# Patient Record
Sex: FEMALE | Race: WHITE | NOT HISPANIC OR LATINO | URBAN - METROPOLITAN AREA
[De-identification: names, ages, dates, MRNs, and addresses within clinical notes are randomized per-mention and may not be internally consistent; named-entity substitution may affect disease eponyms.]

---

## 2017-12-04 ENCOUNTER — INPATIENT (INPATIENT)
Facility: HOSPITAL | Age: 77
LOS: 37 days | Discharge: HOME | End: 2018-01-11
Attending: PSYCHIATRY & NEUROLOGY

## 2017-12-18 PROBLEM — Z00.00 ENCOUNTER FOR PREVENTIVE HEALTH EXAMINATION: Status: ACTIVE | Noted: 2017-12-18

## 2017-12-19 ENCOUNTER — APPOINTMENT (OUTPATIENT)
Dept: DERMATOLOGY | Facility: CLINIC | Age: 77
End: 2017-12-19

## 2017-12-19 ENCOUNTER — OUTPATIENT (OUTPATIENT)
Dept: OUTPATIENT SERVICES | Facility: HOSPITAL | Age: 77
LOS: 1 days | Discharge: HOME | End: 2017-12-19

## 2017-12-19 DIAGNOSIS — F25.9 SCHIZOAFFECTIVE DISORDER, UNSPECIFIED: ICD-10-CM

## 2017-12-20 ENCOUNTER — RESULT REVIEW (OUTPATIENT)
Age: 77
End: 2017-12-20

## 2017-12-20 DIAGNOSIS — Z02.9 ENCOUNTER FOR ADMINISTRATIVE EXAMINATIONS, UNSPECIFIED: ICD-10-CM

## 2018-01-18 DIAGNOSIS — C44.219 BASAL CELL CARCINOMA OF SKIN OF LEFT EAR AND EXTERNAL AURICULAR CANAL: ICD-10-CM

## 2018-01-18 DIAGNOSIS — F25.9 SCHIZOAFFECTIVE DISORDER, UNSPECIFIED: ICD-10-CM

## 2018-01-18 DIAGNOSIS — I10 ESSENTIAL (PRIMARY) HYPERTENSION: ICD-10-CM

## 2018-12-19 ENCOUNTER — OUTPATIENT (OUTPATIENT)
Dept: OUTPATIENT SERVICES | Facility: HOSPITAL | Age: 78
LOS: 1 days | Discharge: HOME | End: 2018-12-19

## 2018-12-19 DIAGNOSIS — R53.82 CHRONIC FATIGUE, UNSPECIFIED: ICD-10-CM

## 2018-12-19 DIAGNOSIS — E55.9 VITAMIN D DEFICIENCY, UNSPECIFIED: ICD-10-CM

## 2018-12-19 DIAGNOSIS — E13.9 OTHER SPECIFIED DIABETES MELLITUS WITHOUT COMPLICATIONS: ICD-10-CM

## 2018-12-19 DIAGNOSIS — R94.6 ABNORMAL RESULTS OF THYROID FUNCTION STUDIES: ICD-10-CM

## 2018-12-19 DIAGNOSIS — E78.5 HYPERLIPIDEMIA, UNSPECIFIED: ICD-10-CM

## 2018-12-19 DIAGNOSIS — D51.9 VITAMIN B12 DEFICIENCY ANEMIA, UNSPECIFIED: ICD-10-CM

## 2019-12-05 ENCOUNTER — OUTPATIENT (OUTPATIENT)
Dept: OUTPATIENT SERVICES | Facility: HOSPITAL | Age: 79
LOS: 1 days | Discharge: HOME | End: 2019-12-05

## 2019-12-06 DIAGNOSIS — I10 ESSENTIAL (PRIMARY) HYPERTENSION: ICD-10-CM

## 2019-12-06 DIAGNOSIS — F41.1 GENERALIZED ANXIETY DISORDER: ICD-10-CM

## 2020-05-28 ENCOUNTER — INPATIENT (INPATIENT)
Facility: HOSPITAL | Age: 80
LOS: 9 days | Discharge: HOME | End: 2020-06-07
Attending: INTERNAL MEDICINE | Admitting: INTERNAL MEDICINE
Payer: MEDICARE

## 2020-05-28 VITALS
OXYGEN SATURATION: 99 % | DIASTOLIC BLOOD PRESSURE: 82 MMHG | HEART RATE: 94 BPM | SYSTOLIC BLOOD PRESSURE: 181 MMHG | TEMPERATURE: 98 F | RESPIRATION RATE: 18 BRPM

## 2020-05-28 DIAGNOSIS — R09.89 OTHER SPECIFIED SYMPTOMS AND SIGNS INVOLVING THE CIRCULATORY AND RESPIRATORY SYSTEMS: ICD-10-CM

## 2020-05-28 LAB
ALBUMIN SERPL ELPH-MCNC: 3.5 G/DL — SIGNIFICANT CHANGE UP (ref 3.5–5.2)
ALP SERPL-CCNC: 87 U/L — SIGNIFICANT CHANGE UP (ref 30–115)
ALT FLD-CCNC: 7 U/L — SIGNIFICANT CHANGE UP (ref 0–41)
ANION GAP SERPL CALC-SCNC: 10 MMOL/L — SIGNIFICANT CHANGE UP (ref 7–14)
APPEARANCE UR: CLEAR — SIGNIFICANT CHANGE UP
APTT BLD: 28.3 SEC — SIGNIFICANT CHANGE UP (ref 27–39.2)
AST SERPL-CCNC: 11 U/L — SIGNIFICANT CHANGE UP (ref 0–41)
BACTERIA # UR AUTO: ABNORMAL
BASOPHILS # BLD AUTO: 0.05 K/UL — SIGNIFICANT CHANGE UP (ref 0–0.2)
BASOPHILS NFR BLD AUTO: 0.6 % — SIGNIFICANT CHANGE UP (ref 0–1)
BILIRUB SERPL-MCNC: 0.2 MG/DL — SIGNIFICANT CHANGE UP (ref 0.2–1.2)
BILIRUB UR-MCNC: NEGATIVE — SIGNIFICANT CHANGE UP
BUN SERPL-MCNC: 16 MG/DL — SIGNIFICANT CHANGE UP (ref 10–20)
CALCIUM SERPL-MCNC: 9.7 MG/DL — SIGNIFICANT CHANGE UP (ref 8.5–10.1)
CHLORIDE SERPL-SCNC: 107 MMOL/L — SIGNIFICANT CHANGE UP (ref 98–110)
CK SERPL-CCNC: 118 U/L — SIGNIFICANT CHANGE UP (ref 0–225)
CO2 SERPL-SCNC: 23 MMOL/L — SIGNIFICANT CHANGE UP (ref 17–32)
COLOR SPEC: COLORLESS — SIGNIFICANT CHANGE UP
CREAT SERPL-MCNC: 1.1 MG/DL — SIGNIFICANT CHANGE UP (ref 0.7–1.5)
DIFF PNL FLD: NEGATIVE — SIGNIFICANT CHANGE UP
EOSINOPHIL # BLD AUTO: 0.2 K/UL — SIGNIFICANT CHANGE UP (ref 0–0.7)
EOSINOPHIL NFR BLD AUTO: 2.3 % — SIGNIFICANT CHANGE UP (ref 0–8)
EPI CELLS # UR: 3 /HPF — SIGNIFICANT CHANGE UP (ref 0–5)
ETHANOL SERPL-MCNC: <10 MG/DL — SIGNIFICANT CHANGE UP
GLUCOSE BLDC GLUCOMTR-MCNC: 102 MG/DL — HIGH (ref 70–99)
GLUCOSE BLDC GLUCOMTR-MCNC: 110 MG/DL — HIGH (ref 70–99)
GLUCOSE SERPL-MCNC: 138 MG/DL — HIGH (ref 70–99)
GLUCOSE UR QL: NEGATIVE — SIGNIFICANT CHANGE UP
HCT VFR BLD CALC: 33 % — LOW (ref 37–47)
HGB BLD-MCNC: 10.2 G/DL — LOW (ref 12–16)
HYALINE CASTS # UR AUTO: 0 /LPF — SIGNIFICANT CHANGE UP (ref 0–7)
IMM GRANULOCYTES NFR BLD AUTO: 0.2 % — SIGNIFICANT CHANGE UP (ref 0.1–0.3)
INR BLD: 0.96 RATIO — SIGNIFICANT CHANGE UP (ref 0.65–1.3)
KETONES UR-MCNC: NEGATIVE — SIGNIFICANT CHANGE UP
LACTATE SERPL-SCNC: 0.8 MMOL/L — SIGNIFICANT CHANGE UP (ref 0.7–2)
LEUKOCYTE ESTERASE UR-ACNC: ABNORMAL
LIDOCAIN IGE QN: 53 U/L — SIGNIFICANT CHANGE UP (ref 7–60)
LYMPHOCYTES # BLD AUTO: 1.51 K/UL — SIGNIFICANT CHANGE UP (ref 1.2–3.4)
LYMPHOCYTES # BLD AUTO: 17.6 % — LOW (ref 20.5–51.1)
MCHC RBC-ENTMCNC: 29.6 PG — SIGNIFICANT CHANGE UP (ref 27–31)
MCHC RBC-ENTMCNC: 30.9 G/DL — LOW (ref 32–37)
MCV RBC AUTO: 95.7 FL — SIGNIFICANT CHANGE UP (ref 81–99)
MONOCYTES # BLD AUTO: 0.67 K/UL — HIGH (ref 0.1–0.6)
MONOCYTES NFR BLD AUTO: 7.8 % — SIGNIFICANT CHANGE UP (ref 1.7–9.3)
NEUTROPHILS # BLD AUTO: 6.15 K/UL — SIGNIFICANT CHANGE UP (ref 1.4–6.5)
NEUTROPHILS NFR BLD AUTO: 71.5 % — SIGNIFICANT CHANGE UP (ref 42.2–75.2)
NITRITE UR-MCNC: NEGATIVE — SIGNIFICANT CHANGE UP
NRBC # BLD: 0 /100 WBCS — SIGNIFICANT CHANGE UP (ref 0–0)
NT-PROBNP SERPL-SCNC: 1245 PG/ML — HIGH (ref 0–300)
PH UR: 6.5 — SIGNIFICANT CHANGE UP (ref 5–8)
PLATELET # BLD AUTO: 346 K/UL — SIGNIFICANT CHANGE UP (ref 130–400)
POTASSIUM SERPL-MCNC: 4.9 MMOL/L — SIGNIFICANT CHANGE UP (ref 3.5–5)
POTASSIUM SERPL-SCNC: 4.9 MMOL/L — SIGNIFICANT CHANGE UP (ref 3.5–5)
PROT SERPL-MCNC: 6.7 G/DL — SIGNIFICANT CHANGE UP (ref 6–8)
PROT UR-MCNC: NEGATIVE — SIGNIFICANT CHANGE UP
PROTHROM AB SERPL-ACNC: 11 SEC — SIGNIFICANT CHANGE UP (ref 9.95–12.87)
RBC # BLD: 3.45 M/UL — LOW (ref 4.2–5.4)
RBC # FLD: 12.9 % — SIGNIFICANT CHANGE UP (ref 11.5–14.5)
RBC CASTS # UR COMP ASSIST: 0 /HPF — SIGNIFICANT CHANGE UP (ref 0–4)
SODIUM SERPL-SCNC: 140 MMOL/L — SIGNIFICANT CHANGE UP (ref 135–146)
SP GR SPEC: 1.01 — LOW (ref 1.01–1.02)
TROPONIN T SERPL-MCNC: <0.01 NG/ML — SIGNIFICANT CHANGE UP
UROBILINOGEN FLD QL: SIGNIFICANT CHANGE UP
WBC # BLD: 8.6 K/UL — SIGNIFICANT CHANGE UP (ref 4.8–10.8)
WBC # FLD AUTO: 8.6 K/UL — SIGNIFICANT CHANGE UP (ref 4.8–10.8)
WBC UR QL: 4 /HPF — SIGNIFICANT CHANGE UP (ref 0–5)

## 2020-05-28 PROCEDURE — 73630 X-RAY EXAM OF FOOT: CPT | Mod: 26,RT

## 2020-05-28 PROCEDURE — 73080 X-RAY EXAM OF ELBOW: CPT | Mod: 26,LT

## 2020-05-28 PROCEDURE — 70450 CT HEAD/BRAIN W/O DYE: CPT | Mod: 26

## 2020-05-28 PROCEDURE — 99285 EMERGENCY DEPT VISIT HI MDM: CPT

## 2020-05-28 PROCEDURE — 99223 1ST HOSP IP/OBS HIGH 75: CPT | Mod: AI

## 2020-05-28 PROCEDURE — 74177 CT ABD & PELVIS W/CONTRAST: CPT | Mod: 26

## 2020-05-28 PROCEDURE — 93970 EXTREMITY STUDY: CPT | Mod: 26

## 2020-05-28 PROCEDURE — 71045 X-RAY EXAM CHEST 1 VIEW: CPT | Mod: 26

## 2020-05-28 PROCEDURE — 93010 ELECTROCARDIOGRAM REPORT: CPT

## 2020-05-28 PROCEDURE — 76705 ECHO EXAM OF ABDOMEN: CPT | Mod: 26

## 2020-05-28 PROCEDURE — 72170 X-RAY EXAM OF PELVIS: CPT | Mod: 26

## 2020-05-28 PROCEDURE — 71260 CT THORAX DX C+: CPT | Mod: 26

## 2020-05-28 PROCEDURE — 73552 X-RAY EXAM OF FEMUR 2/>: CPT | Mod: 26,LT

## 2020-05-28 PROCEDURE — 72125 CT NECK SPINE W/O DYE: CPT | Mod: 26

## 2020-05-28 RX ORDER — FUROSEMIDE 40 MG
20 TABLET ORAL DAILY
Refills: 0 | Status: DISCONTINUED | OUTPATIENT
Start: 2020-05-28 | End: 2020-06-07

## 2020-05-28 RX ORDER — TETANUS TOXOID, REDUCED DIPHTHERIA TOXOID AND ACELLULAR PERTUSSIS VACCINE, ADSORBED 5; 2.5; 8; 8; 2.5 [IU]/.5ML; [IU]/.5ML; UG/.5ML; UG/.5ML; UG/.5ML
0.5 SUSPENSION INTRAMUSCULAR ONCE
Refills: 0 | Status: COMPLETED | OUTPATIENT
Start: 2020-05-28 | End: 2020-05-28

## 2020-05-28 RX ORDER — CHLORHEXIDINE GLUCONATE 213 G/1000ML
1 SOLUTION TOPICAL DAILY
Refills: 0 | Status: DISCONTINUED | OUTPATIENT
Start: 2020-05-28 | End: 2020-06-07

## 2020-05-28 RX ORDER — SODIUM CHLORIDE 9 MG/ML
1000 INJECTION INTRAMUSCULAR; INTRAVENOUS; SUBCUTANEOUS ONCE
Refills: 0 | Status: COMPLETED | OUTPATIENT
Start: 2020-05-28 | End: 2020-05-28

## 2020-05-28 RX ORDER — ENOXAPARIN SODIUM 100 MG/ML
40 INJECTION SUBCUTANEOUS DAILY
Refills: 0 | Status: DISCONTINUED | OUTPATIENT
Start: 2020-05-28 | End: 2020-06-07

## 2020-05-28 RX ORDER — ESCITALOPRAM OXALATE 10 MG/1
2.5 TABLET, FILM COATED ORAL DAILY
Refills: 0 | Status: DISCONTINUED | OUTPATIENT
Start: 2020-05-28 | End: 2020-06-07

## 2020-05-28 RX ORDER — LOSARTAN POTASSIUM 100 MG/1
12.5 TABLET, FILM COATED ORAL DAILY
Refills: 0 | Status: DISCONTINUED | OUTPATIENT
Start: 2020-05-28 | End: 2020-06-07

## 2020-05-28 RX ADMIN — SODIUM CHLORIDE 1000 MILLILITER(S): 9 INJECTION INTRAMUSCULAR; INTRAVENOUS; SUBCUTANEOUS at 16:48

## 2020-05-28 RX ADMIN — TETANUS TOXOID, REDUCED DIPHTHERIA TOXOID AND ACELLULAR PERTUSSIS VACCINE, ADSORBED 0.5 MILLILITER(S): 5; 2.5; 8; 8; 2.5 SUSPENSION INTRAMUSCULAR at 05:58

## 2020-05-28 RX ADMIN — SODIUM CHLORIDE 1000 MILLILITER(S): 9 INJECTION INTRAMUSCULAR; INTRAVENOUS; SUBCUTANEOUS at 05:48

## 2020-05-28 NOTE — ED PROVIDER NOTE - SHIFT CHANGE DETAILS
78 y/o f presented s/p fall, gcs 15, labs reviewed, xray of R foot, L elbow and L femur obtained, FROM, no obvious bony deformity, pulses 2/4 b/l, pending ct head and neck results, no signs of basilar skull fracture, no spinous ttp, pt resting comfortably in nad, will continue to monitor and reassess.

## 2020-05-28 NOTE — ED ADULT NURSE REASSESSMENT NOTE - NS ED NURSE REASSESS COMMENT FT1
Pt found resting in bed, eyes closed.  Easily awakens to verbal stimuli.  Alert.  NAD.  Cardia monitor and continuous O2 placed on pt.  Bed in low locked position.  Will continue to monitor.

## 2020-05-28 NOTE — ED PROVIDER NOTE - NS ED ROS FT
Constitutional: (-) fever (-) vomiting  Eyes/ENT: (-) vision changes, (-) hearing changes  Cardiovascular: (-) chest pain, (-) sob  Respiratory: (-) cough, (-) shortness of breath  Gastrointestinal: (-) vomiting, (-) diarrhea, (-) abdominal pain  : (-) dysuria   Musculoskeletal: (-) back pain  Integumentary: (-) rash, (-) edema  Neurological: (-)loc  Allergic/Immunologic: (-) pruritus  Endocrine: No history of diabetes. Constitutional: (-) fever (-) vomiting  Eyes/ENT: (-) vision changes, (-) hearing changes  Cardiovascular: (-) chest pain, (-) sob  Respiratory: (-) cough, (-) shortness of breath  Gastrointestinal: (-) vomiting, (-) diarrhea, (-) abdominal pain  : (-) dysuria   Musculoskeletal: (-) back pain  Integumentary: (+) rash, (+) edema  Neurological: (-)loc  Allergic/Immunologic: (-) pruritus  Endocrine: No history of diabetes.

## 2020-05-28 NOTE — H&P ADULT - HISTORY OF PRESENT ILLNESS
79-year-old female with past medical history of hypertension, diabetes, dementia, chronic lower extremity edema, no blood thinners, basal cell carcinoma, schizoaffective disorder and frequent falls presented after a mechanical fall from bed. Patient was found down on the floor from bed by son and EMS was called. States she fell yesterday while walking, today admits to falling from bed. No loc, no headstrike. Very poor historian. Denies chest pain, palpitation, dizziness, sweating, feeling warm or any anxiety. As per daughter has frequent violent outbursts at home. Family have been trying to pursue placement but pt has objected, son Joaquín is only caregiver. Will admit for rehab, optimization.  In ED Pt A&Ox3, vitals B.P. 181/82, HR 94, RR 18, Temp 98.1 and sat 99% on room air. Trauma work up was negative for any fracture or dislocation. UA positive for asymptomatic bacteriuria and leucocyte estrase. for  An incidental findings were found on CT chest/Abd/pelvis, a medistinal mass and thyroid nodule, cholelithiasis and choledocholithiasis. R foot cyst/hematoma. Workup required. On PE laceration to left elbow and injury near the left ear. 79-year-old female with past medical history of hypertension, diabetes, dementia, chronic lower extremity edema, no blood thinners, basal cell carcinoma, schizoaffective disorder and frequent falls presented after an unwitnessed fall from bed. Patient was found down on the floor from bed by son and EMS was called. States she fell yesterday while walking, today admits to falling from bed. No loc, no headstrike. Very poor historian. Denies chest pain, palpitation, dizziness, sweating, feeling warm or any anxiety. As per daughter has frequent violent outbursts at home. Family have been trying to pursue placement but pt has objected, son Joaquín is only caregiver. Will admit for rehab, optimization.  In ED Pt A&Ox3, vitals B.P. 181/82, HR 94, RR 18, Temp 98.1 and sat 99% on room air. Trauma work up was negative for any fracture or dislocation. UA positive for asymptomatic bacteriuria and leucocyte estrase. for  An incidental findings were found on CT chest/Abd/pelvis, a medistinal mass and thyroid nodule, cholelithiasis and choledocholithiasis. R foot cyst/hematoma. Workup required. On PE laceration to left elbow and injury near the left ear.

## 2020-05-28 NOTE — ED PROVIDER NOTE - ATTENDING CONTRIBUTION TO CARE
I personally evaluated the patient. I reviewed the Resident’s or Physician Assistant’s note (as assigned above), and agree with the findings and plan except as documented in my note.    78yo F pmhx psychoaffective disorder, basal cell ca per chart review BIBEMS for falls. per ems, pt was on the floor at the scene. Pt states she fell off the bed. No LOC. No CP, SOB. No n/v.    CONSTITUTIONAL: Well-developed; well-nourished; in no acute distress. Sitting up and providing appropriate history and physical examination  SKIN: skin exam is warm and dry, no acute rash.  HEAD: Normocephalic; atraumatic.  EYES: PERRL, 3 mm bilateral, no nystagmus, EOM intact; conjunctiva and sclera clear.  ENT: + chronic lesion of the left ear lobe (bc ca?).   NECK: Supple; non tender.+ full passive ROM in all directions. No JVD  CARD: S1, S2 normal; no murmurs, gallops, or rubs. Regular rate and rhythm. + Symmetric Strong Pulses  RESP: No wheezes, rales or rhonchi. Good air movement bilaterally  ABD: soft; non-distended; non-tender. No Rebound, No Gaurding, No signs of peritnitis, No CVA tenderness  EXT: Normal ROM. No clubbing, cyanosis.  healing wound over left elbow.

## 2020-05-28 NOTE — H&P ADULT - ATTENDING COMMENTS
HX and physical limited due to baseline dementia. Supplemental information obtained from family, house staff, and EMR.     80 YO F with a PM of HTN, DM, dementia, chronic lower extremity edema, basal cell carcinoma, schizoaffective disorder and frequent falls who was sent into the hospital by her family for unwitnessed fall after being found on the ground of her bedroom. In the ED, a trauma workup was positive for a mediastinal mass and thyroid nodule, cholelithiasis and choledocholithiasis and a R foot cyst/hematoma. Seen by cardio who recommended IV LAsix, LE duplex, and echo.     Physical exam shows pt in NAD. VSS, afebrile, not hypoxic on RA. A&Ox3 but poor historian. Non-focal neuro exam. Muscle strength/sensation intact. CTA B/L with no W/C/R. RRR, no M/G/R. ABD is soft and non-tender, normoactive BSs. Right foot hematoma, Left elbow LAC, Left ear wound and 1+ pitting edema B/L. Labs and radiology as above.     Unwitnessed fall. Echo. 24 hours of telem. Serial cardiac enzymes and EKGs. Send TSH. EEG. Orthostatics. PT consult. PRN pain meds. Fall precautions. Social consult for possible placement.     Incidental finding of mediastinal mass and thyroid nodule. Thyroid US. TFTs. Endo consult.    Right foot hematoma. POD consult.    Normocytic anemia, pt denies any bleeding symptoms. Anemia work-up.     Diabetes mellitus with hyperglycemia. A1c. FSs. Insulin PRN.     BRYAN vs CKD3, likely pre-renal; Doubt ATN. Send UA, urine lytes, serum phos, and trend BMP. IVFs (LR). Monitor urinary out-put. Renal/bladder US. Hold nephrotoxic agents.     Asymptomatic bacteriuria. Monitor    Hx of schizoaffective and HTN. Restart home meds. GI and DVT PPX. Inform PCP of pt's admission to hospital. Rest as per above note.

## 2020-05-28 NOTE — ED ADULT NURSE REASSESSMENT NOTE - NS ED NURSE REASSESS COMMENT FT1
A&Ox4, VSS, no c/o pain. Left arm skin tear noted, cleaned with NS and nonadherent dressing with kerlix applied. Awaiting for CT results.

## 2020-05-28 NOTE — ED ADULT TRIAGE NOTE - CHIEF COMPLAINT QUOTE
Pt found down by son and EMS call. Pt with laceration to left elbow and injury near the left ear. Pt denies chest pain, neck pain. Pt A&Ox3. EMS reports pt has hx of dementia. Pt found down by son and EMS call. Pt with laceration to left elbow and injury near the left ear. Pt denies chest pain, neck pain. Pt A&Ox3. EMS reports pt has hx of dementia, no blood thinners.

## 2020-05-28 NOTE — ED ADULT NURSE NOTE - OBJECTIVE STATEMENT
RAJIV from home after being found on floor by son  as per pt, she fell in morning and was sitting on floor when son found her and called EMS  pt A&Ox3 in ED.   small lac to left elbow, pain to left hip

## 2020-05-28 NOTE — H&P ADULT - ASSESSMENT
79-year-old female with past medical history of hypertension, diabetes, dementia, chronic lower extremity edema, no blood thinners, basal cell carcinoma, schizoaffective disorder and frequent falls presented after a mechanical fall from bed.    # Mechanical fall  - Trauma workup negative for fracture/dislocation  - L wrist laceration - wound care  - R foot cyst/hematoma - podiatry was consulted  - follow TTE  - Medication optimization  - PT/Rehab eval for Placement issues  - Bed rest  - admit to tele    # Thyroid/Mediastinal mass  - Incidental finding of 6.1 cm heterogeneous lesion within the superior mediastinum, possibly arising from the right thyroid gland, suspicious for malignancy or goiter. Recommend a thyroid ultrasound for further evaluation.  - follow thyroid panel, thyroid US and ENT/ENDO consult if appropriate     # Normocytic anemia  - follow iron studies  - follow folic acid and Vit B12 levels    # Asymptomatic bacteriuria  - No indication of treatment    # H/o Schizoaffective disorder  - Consider psych consult    # DM   - Check FS qhs and start basal bolus insulin if Fs >180  - Follow lipid profile and HbA1C    # HTN  - c/w lasix 20mg daily    # Lower Extremity edema  - Follow TTE and va duplex of LE  - c/w lasix 20mg daily    # Diet: DASH  # DVT ppx: Lovenox  # Activity: bed rest  # FULL CODE  # Dispo: came from home, Most likely going to NH 79-year-old female with past medical history of hypertension, diabetes, dementia, chronic lower extremity edema, no blood thinners, basal cell carcinoma, schizoaffective disorder and frequent falls presented after a mechanical fall from bed.    # Mechanical fall  - Trauma workup negative for fracture/dislocation  - L wrist laceration - wound care  - R foot cyst/hematoma - podiatry was consulted  - follow TTE  - Medication optimization  - PT/Rehab eval for Placement issues  - Bed rest  - admit to tele    # Thyroid/Mediastinal mass  - Incidental finding of 6.1 cm heterogeneous lesion within the superior mediastinum, possibly arising from the right thyroid gland, suspicious for malignancy or goiter. Recommend a thyroid ultrasound for further evaluation.  - follow thyroid panel, thyroid US and ENT/ENDO consult if appropriate     # Normocytic anemia  - follow iron studies  - follow folic acid and Vit B12 levels    # Asymptomatic bacteriuria  - No indication of treatment    # H/o Schizoaffective disorder  - c/w Escitalopram 2.5 mg daily  - Consider psych consult    # DM   - Check FS qhs and start basal bolus insulin if Fs >180  - Follow lipid profile and HbA1C    # HTN  - c/w lasix 20mg daily  - c/w losartan 12.5 daily    # Lower Extremity edema  - Follow TTE and va duplex of LE  - c/w lasix 20mg daily    # Diet: DASH  # DVT ppx: Lovenox  # Activity: bed rest  # FULL CODE  # Dispo: came from home, Most likely going to NH 79-year-old female with past medical history of hypertension, diabetes, dementia, chronic lower extremity edema, no blood thinners, basal cell carcinoma, schizoaffective disorder and frequent falls presented after a mechanical fall from bed.    # Mechanical fall  - Pt is poor historian, cannot rule out syncope - will do workup  - Trauma workup negative for fracture/dislocation  - L wrist laceration - wound care  - Follow orthostatic vitals  - Follow routine EEG  - TELE for 24 hours  - follow TTE  - Medication optimization  - PT/Rehab eval for Placement issues    # R foot cyst/hematoma - podiatry was consulted  - Follow va duplex    # Thyroid/Mediastinal mass  - Incidental finding of 6.1 cm heterogeneous lesion within the superior mediastinum, possibly arising from the right thyroid gland, suspicious for malignancy or goiter. Recommend a thyroid ultrasound for further evaluation.  - follow thyroid panel, thyroid US and ENT/ENDO consult if appropriate     # Normocytic anemia  - follow iron studies  - follow folic acid and Vit B12 levels    # Asymptomatic bacteriuria  - No indication of treatment    # H/o Schizoaffective disorder  - c/w Escitalopram 2.5 mg daily  - Consider psych consult    # DM   - Check FS qhs and start basal bolus insulin if Fs >180  - Follow lipid profile and HbA1C    # HTN  - c/w lasix 20mg daily  - c/w losartan 12.5 daily    # Lower Extremity edema  - Follow TTE and va duplex of LE  - c/w lasix 20mg daily    # Diet: DASH  # DVT ppx: Lovenox  # Activity: as tolerated  # FULL CODE  # Dispo: came from home, Most likely going to NH 79-year-old female with past medical history of hypertension, diabetes, dementia, chronic lower extremity edema, no blood thinners, basal cell carcinoma, schizoaffective disorder and frequent falls presented after an unwitnessed fall from bed.    # Unwitnessed fall  - Pt is poor historian, cannot rule out syncope - will do workup  - Trauma workup negative for fracture/dislocation  - L wrist laceration - wound care  - Follow orthostatic vitals  - Follow routine EEG  - TELE for 24 hours  - follow TTE  - Medication optimization  - PT/Rehab eval for Placement issues    # R foot cyst/hematoma - podiatry was consulted  - Follow va duplex    # Thyroid/Mediastinal mass  - Incidental finding of 6.1 cm heterogeneous lesion within the superior mediastinum, possibly arising from the right thyroid gland, suspicious for malignancy or goiter. Recommend a thyroid ultrasound for further evaluation.  - follow thyroid panel, thyroid US and ENT/ENDO consult if appropriate     # Normocytic anemia  - follow iron studies  - follow folic acid and Vit B12 levels    # Asymptomatic bacteriuria  - No indication of treatment    # H/o Schizoaffective disorder  - c/w Escitalopram 2.5 mg daily  - Consider psych consult    # DM   - Check FS qhs and start basal bolus insulin if Fs >180  - Follow lipid profile and HbA1C    # HTN  - c/w lasix 20mg daily  - c/w losartan 12.5 daily    # Lower Extremity edema  - Follow TTE and va duplex of LE  - c/w lasix 20mg daily    # Diet: DASH  # DVT ppx: Lovenox  # Activity: as tolerated  # FULL CODE  # Dispo: came from home, Most likely going to NH

## 2020-05-28 NOTE — ED PROVIDER NOTE - PHYSICAL EXAMINATION
CONSTITUTIONAL: Well-developed; well-nourished; in no acute distress, speaking in full sentences, moving all extremities, GCS 15  SKIN: warm, dry  HEAD: Normocephalic  EYES: PERRL, EOMI, no conjunctival erythema  ENT: No nasal discharge; airway clear, mucous membranes moist  NECK: Supple; non tender, FROM  CARD: +S1, S2 no murmurs, gallops, or rubs. Regular rate and rhythm. radial 2+, no chest wall tenderness  RESP: No wheezes, rales or rhonchi. CTABL  ABD: soft ntnd, no rebound, no guarding, no rigidity  EXT: moves all extremities, +b/l le edema, les warm to touch, +swelling to anterior left foot, non ttp, +abrasion to L elbow  NEURO: Alert, oriented, grossly unremarkable, no focal deficits, cn ii-xii grossly intact, speaking in full sentences, following commands  PSYCH: Cooperative, appropriate CONSTITUTIONAL: Well-developed; well-nourished; in no acute distress, speaking in full sentences, moving all extremities, GCS 15  SKIN: warm, dry, ulcerated tissue under L eye  HEAD: Normocephalic  EYES: PERRL, EOMI, no conjunctival erythema  ENT: No nasal discharge; airway clear, mucous membranes moist  NECK: Supple; non tender, FROM  CARD: +S1, S2 no murmurs, gallops, or rubs. Regular rate and rhythm. radial 2+, no chest wall tenderness  RESP: No wheezes, rales or rhonchi. CTABL  ABD: soft ntnd, no rebound, no guarding, no rigidity  EXT: moves all extremities, +b/l le edema, LEs warm to touch, +swelling to anterior left foot, non ttp, +abrasion to L elbow, TTP L hip and femur  NEURO: Alert, oriented, grossly unremarkable, no focal deficits, cn ii-xii grossly intact, speaking in full sentences, following commands  PSYCH: Cooperative, appropriate

## 2020-05-28 NOTE — CONSULT NOTE ADULT - ASSESSMENT
79 yr old female with PMH of schizoaffective disorder, thyroid nodule resection many yrs ago, HTN p/w fall.    Impression:  Mechanical Fall  Possible Thyroid mass on CT  Right foot cyst  LE edema     Plan:  start Lasix PO 20 mg daily  obtain LE venous duplex and TTE  obtain thyroid panel, thyroid US and ENT consult  Podiatry eval for large right foot cyst  PT/Rehab   DVT prophylaxis 79 yr old female with PMH of schizoaffective disorder, thyroid nodule resection many yrs ago, HTN p/w fall.    Impression:  Mechanical Fall  Possible Thyroid mass on CT  Right foot cyst  LE edema     Plan:    start Lasix PO 20 mg daily  obtain LE venous duplex and TTE  obtain thyroid panel, thyroid US and ENT consult  Podiatry eval for large right foot cyst AND BURN EVAL IF NEEDED FOR DEBRIDEMENT.   ALSO PSYCH EVAL NEEDS EVAL HAS HX OF DISORDER NOT TAKING MEDS FOR YEARS   ALSO NEED  EVAL FOR POSSIBLE PLACEMENT PENDING PSYCHIATRIC EVAL.   PT/Rehab AS WELL    DVT prophylaxis

## 2020-05-28 NOTE — CONSULT NOTE ADULT - SUBJECTIVE AND OBJECTIVE BOX
Patient is a 79y old  Female who presents with a chief complaint of fall   HPI:  79 yr old female with PMH of schizoaffective disorder, HTN p/w fall. pt is a poor historian. pt states that she accidentally fell from the bed and denies any syncope/ chest pain/ palpitations/ dyspnea. pt is currently asymptomatic at rest.    ROS:  All other systems reviewed and are negative    PAST MEDICAL & SURGICAL HISTORY      FAMILY HISTORY:  FAMILY HISTORY:  NC    SOCIAL HISTORY:  [-]smoker  []Alcohol  []Drug    ALLERGIES:  No Known Allergies      MEDICATIONS:  MEDICATIONS  (STANDING):    MEDICATIONS  (PRN):      HOME MEDICATIONS:  Home Medications:      VITALS:   T(F): 98.3 (05-28 @ 13:07), Max: 98.3 (05-28 @ 13:07)  HR: 77 (05-28 @ 13:07) (72 - 94)  BP: 148/67 (05-28 @ 13:07) (145/76 - 181/82)  BP(mean): --  RR: 18 (05-28 @ 13:07) (18 - 18)  SpO2: 98% (05-28 @ 13:07) (98% - 99%)    I&O's Summary      PHYSICAL EXAM:  GEN: Alert and oriented X 3, No acute distress  HEENT: no pallor  NECK: Supple, no JVD  LUNGS: Clear to auscultation bilaterally  CARDIOVASCULAR: S1/S2 regular, no murmurs or rubs  ABD: Soft, BS+  EXT: LE edema (predominantly non pitting), +right foot Cyst on dorsal aspect   NEURO: AAOX3    LABS:                        10.2   8.60  )-----------( 346      ( 28 May 2020 05:35 )             33.0     05-28    140  |  107  |  16  ----------------------------<  138<H>  4.9   |  23  |  1.1    Ca    9.7      28 May 2020 05:35    TPro  6.7  /  Alb  3.5  /  TBili  0.2  /  DBili  x   /  AST  11  /  ALT  7   /  AlkPhos  87  05-28    PT/INR - ( 28 May 2020 05:35 )   PT: 11.00 sec;   INR: 0.96 ratio         PTT - ( 28 May 2020 05:35 )  PTT:28.3 sec  Lactate, Blood: 0.8 mmol/L (05-28-20 @ 05:35)  Creatine Kinase, Serum: 118 U/L (05-28-20 @ 05:35)    CARDIAC MARKERS ( 28 May 2020 05:35 )  x     / x     / 118 U/L / x     / x            Troponin trend:        Hemoglobin A1C   Thyroid      RADIOLOGY:    < from: US Abdomen Limited (05.28.20 @ 13:05) >  IMPRESSION:    Cholelithiasis with gallbladder wall thickening.    Nonobstructing right-sided nephrolithiasis.    < end of copied text >    < from: CT Chest w/ IV Cont (05.28.20 @ 09:38) >  IMPRESSION:    1.  No CT evidence of acute traumatic injury to the chest, abdomen or pelvis.    2.  Hyperdensities within the gallbladder and CBD with associated mild pericholecystic fat stranding. Findings are consistent with cholelithiasis and choledocholithiasis. The CBD measures 7 mm (series 5/309). If there is clinical concern for acute cholecystitis, right upper quadrant ultrasound may be obtained.    3.  6.1 cm heterogeneous lesion within the superior mediastinum, possibly arising from the right thyroid gland, suspicious for malignancy or goiter. Recommend a thyroid ultrasound for further evaluation.    < end of copied text >    < from: CT Head No Cont (05.28.20 @ 07:28) >  IMPRESSION:     1.  No evidence of acute intracranial pathology.    2.  Moderate chronic microvascular changes.    3.  Left sphenoid sinus opacification as described.    < end of copied text >    < from: Xray Pelvis AP only (05.28.20 @ 06:26) >    Impression:     No evidence of acute fracture or dislocation.    < end of copied text >        ECG:  < from: 12 Lead ECG (05.28.20 @ 06:34) >    Diagnosis Line Normal sinus rhythm  Left axis deviation  Moderate voltage criteria for LVH, may be normal variant  Abnormal ECG    < end of copied text > Patient is a 79y old  Female who presents with a chief complaint of fall   HPI:  79 yr old female with PMH of schizoaffective disorder, HTN p/w fall. pt is a poor historian. pt states that she accidentally fell from the bed and denies any syncope/ chest pain/ palpitations/ dyspnea. pt is currently asymptomatic at rest.    PT WITH HX OF PSYCHIATRIC DISORDER NO LONGER COMPLIANT WITH MEDICATIONS.   LARGE CYST NOTED RIGHT FOOT BASAL CELL LEFT FACE AND MASS LOCATED IN MEDIASTINUM ON CT CHEST.     ROS:  All other systems reviewed and are negative    PAST MEDICAL & SURGICAL HISTORY      FAMILY HISTORY:  FAMILY HISTORY:  NC    SOCIAL HISTORY:  [-]smoker  []Alcohol  []Drug    ALLERGIES:  No Known Allergies      MEDICATIONS:  MEDICATIONS  (STANDING):    MEDICATIONS  (PRN):      HOME MEDICATIONS:  Home Medications:      VITALS:   T(F): 98.3 (05-28 @ 13:07), Max: 98.3 (05-28 @ 13:07)  HR: 77 (05-28 @ 13:07) (72 - 94)  BP: 148/67 (05-28 @ 13:07) (145/76 - 181/82)  BP(mean): --  RR: 18 (05-28 @ 13:07) (18 - 18)  SpO2: 98% (05-28 @ 13:07) (98% - 99%)    I&O's Summary      PHYSICAL EXAM:  GEN: Alert and oriented X 3, No acute distress  HEENT: no pallor  NECK: Supple, no JVD  LUNGS: Clear to auscultation bilaterally  CARDIOVASCULAR: S1/S2 regular, no murmurs or rubs  ABD: Soft, BS+  EXT: LE edema (predominantly non pitting), +right foot Cyst on dorsal aspect   NEURO: AAOX3    LABS:                        10.2   8.60  )-----------( 346      ( 28 May 2020 05:35 )             33.0     05-28    140  |  107  |  16  ----------------------------<  138<H>  4.9   |  23  |  1.1    Ca    9.7      28 May 2020 05:35    TPro  6.7  /  Alb  3.5  /  TBili  0.2  /  DBili  x   /  AST  11  /  ALT  7   /  AlkPhos  87  05-28    PT/INR - ( 28 May 2020 05:35 )   PT: 11.00 sec;   INR: 0.96 ratio         PTT - ( 28 May 2020 05:35 )  PTT:28.3 sec  Lactate, Blood: 0.8 mmol/L (05-28-20 @ 05:35)  Creatine Kinase, Serum: 118 U/L (05-28-20 @ 05:35)    CARDIAC MARKERS ( 28 May 2020 05:35 )  x     / x     / 118 U/L / x     / x            Troponin trend:        Hemoglobin A1C   Thyroid      RADIOLOGY:    < from: US Abdomen Limited (05.28.20 @ 13:05) >  IMPRESSION:    Cholelithiasis with gallbladder wall thickening.    Nonobstructing right-sided nephrolithiasis.    < end of copied text >    < from: CT Chest w/ IV Cont (05.28.20 @ 09:38) >  IMPRESSION:    1.  No CT evidence of acute traumatic injury to the chest, abdomen or pelvis.    2.  Hyperdensities within the gallbladder and CBD with associated mild pericholecystic fat stranding. Findings are consistent with cholelithiasis and choledocholithiasis. The CBD measures 7 mm (series 5/309). If there is clinical concern for acute cholecystitis, right upper quadrant ultrasound may be obtained.    3.  6.1 cm heterogeneous lesion within the superior mediastinum, possibly arising from the right thyroid gland, suspicious for malignancy or goiter. Recommend a thyroid ultrasound for further evaluation.    < end of copied text >    < from: CT Head No Cont (05.28.20 @ 07:28) >  IMPRESSION:     1.  No evidence of acute intracranial pathology.    2.  Moderate chronic microvascular changes.    3.  Left sphenoid sinus opacification as described.    < end of copied text >    < from: Xray Pelvis AP only (05.28.20 @ 06:26) >    Impression:     No evidence of acute fracture or dislocation.    < end of copied text >        ECG:  < from: 12 Lead ECG (05.28.20 @ 06:34) >    Diagnosis Line Normal sinus rhythm  Left axis deviation  Moderate voltage criteria for LVH, may be normal variant  Abnormal ECG    < end of copied text >

## 2020-05-28 NOTE — ED ADULT NURSE NOTE - CHIEF COMPLAINT QUOTE
Pt found down by son and EMS call. Pt with laceration to left elbow and injury near the left ear. Pt denies chest pain, neck pain. Pt A&Ox3. EMS reports pt has hx of dementia, no blood thinners.

## 2020-05-28 NOTE — ED PROVIDER NOTE - CARE PLAN
Principal Discharge DX:	Thyroid mass Principal Discharge DX:	Thyroid mass  Secondary Diagnosis:	Falls  Secondary Diagnosis:	Basal cell carcinoma Principal Discharge DX:	Thyroid mass  Secondary Diagnosis:	Falls  Secondary Diagnosis:	Basal cell carcinoma  Secondary Diagnosis:	Cholelithiasis

## 2020-05-28 NOTE — ED PROVIDER NOTE - OBJECTIVE STATEMENT
80yo F pmhx psychoaffective disorder, basal cell ca per chart review BIBEMS for falls. per ems, pt was on the floor at the scene. pt states she fell yesterday while walking, today admits to falling from bed. no loc, no headstrike.
Quality 130: Documentation Of Current Medications In The Medical Record: Current Medications Documented
Quality 137: Melanoma: Continuity Of Care - Recall System: Patient information entered into a recall system that includes: target date for the next exam specified AND a process to follow up with patients regarding missed or unscheduled appointments
Quality 111:Pneumonia Vaccination Status For Older Adults: Pneumococcal Vaccination Previously Received
Detail Level: Detailed
Quality 110: Preventive Care And Screening: Influenza Immunization: Influenza Immunization Administered during Influenza season

## 2020-05-28 NOTE — ED PROVIDER NOTE - PROGRESS NOTE DETAILS
imaging reviewed ct added for further evaluation, bp meds ordered as pt did not take them yet, gcs 15, will continue to monitor and reassess. imaging reviewed ct added for further evaluation, gcs 15, will continue to monitor and reassess. AG: appreciate radiology resident read, called pt's son who will confer w/ his sister and brother-in-law Dr Razo who will advise further. AG: appreciate radiology resident read, called pt's son Joaquín [phone], who will confer w/ his sister and brother-in-law Dr Razo who will advise further. AG: per daughter Shaneka, pt having frequent falls at home, is non-compliant w/ medications, has frequent violent outbursts. Have been trying to pursue placement but pt has objected, son Joaquín is only caregiver. Will admit for rehab, optimization, poss psych consult.

## 2020-05-28 NOTE — ED PROVIDER NOTE - CLINICAL SUMMARY MEDICAL DECISION MAKING FREE TEXT BOX
all results reviewed, discussion had with daughter reports pt at increased risk of falls and home, understands results including incidental findings, medical admitting team aware of pt and admission.

## 2020-05-29 LAB
A1C WITH ESTIMATED AVERAGE GLUCOSE RESULT: 5.9 % — HIGH (ref 4–5.6)
ALBUMIN SERPL ELPH-MCNC: 3.1 G/DL — LOW (ref 3.5–5.2)
ALP SERPL-CCNC: 77 U/L — SIGNIFICANT CHANGE UP (ref 30–115)
ALT FLD-CCNC: 6 U/L — SIGNIFICANT CHANGE UP (ref 0–41)
ANION GAP SERPL CALC-SCNC: 9 MMOL/L — SIGNIFICANT CHANGE UP (ref 7–14)
AST SERPL-CCNC: 11 U/L — SIGNIFICANT CHANGE UP (ref 0–41)
BASOPHILS # BLD AUTO: 0.05 K/UL — SIGNIFICANT CHANGE UP (ref 0–0.2)
BASOPHILS NFR BLD AUTO: 0.8 % — SIGNIFICANT CHANGE UP (ref 0–1)
BILIRUB SERPL-MCNC: 0.3 MG/DL — SIGNIFICANT CHANGE UP (ref 0.2–1.2)
BUN SERPL-MCNC: 14 MG/DL — SIGNIFICANT CHANGE UP (ref 10–20)
CALCIUM SERPL-MCNC: 9.6 MG/DL — SIGNIFICANT CHANGE UP (ref 8.5–10.1)
CHLORIDE SERPL-SCNC: 108 MMOL/L — SIGNIFICANT CHANGE UP (ref 98–110)
CHOLEST SERPL-MCNC: 163 MG/DL — SIGNIFICANT CHANGE UP (ref 100–200)
CO2 SERPL-SCNC: 23 MMOL/L — SIGNIFICANT CHANGE UP (ref 17–32)
CREAT SERPL-MCNC: 1 MG/DL — SIGNIFICANT CHANGE UP (ref 0.7–1.5)
EOSINOPHIL # BLD AUTO: 0.2 K/UL — SIGNIFICANT CHANGE UP (ref 0–0.7)
EOSINOPHIL NFR BLD AUTO: 3.2 % — SIGNIFICANT CHANGE UP (ref 0–8)
ESTIMATED AVERAGE GLUCOSE: 123 MG/DL — HIGH (ref 68–114)
FERRITIN SERPL-MCNC: 48 NG/ML — SIGNIFICANT CHANGE UP (ref 15–150)
GLUCOSE SERPL-MCNC: 98 MG/DL — SIGNIFICANT CHANGE UP (ref 70–99)
HAPTOGLOB SERPL-MCNC: 234 MG/DL — HIGH (ref 34–200)
HCT VFR BLD CALC: 31.1 % — LOW (ref 37–47)
HDLC SERPL-MCNC: 54 MG/DL — SIGNIFICANT CHANGE UP
HGB BLD-MCNC: 9.5 G/DL — LOW (ref 12–16)
IMM GRANULOCYTES NFR BLD AUTO: 0.3 % — SIGNIFICANT CHANGE UP (ref 0.1–0.3)
IRON SATN MFR SERPL: 22 % — SIGNIFICANT CHANGE UP (ref 15–50)
IRON SATN MFR SERPL: 54 UG/DL — SIGNIFICANT CHANGE UP (ref 35–150)
LDH SERPL L TO P-CCNC: 165 — SIGNIFICANT CHANGE UP (ref 50–242)
LIPID PNL WITH DIRECT LDL SERPL: 96 MG/DL — SIGNIFICANT CHANGE UP (ref 4–129)
LYMPHOCYTES # BLD AUTO: 1.42 K/UL — SIGNIFICANT CHANGE UP (ref 1.2–3.4)
LYMPHOCYTES # BLD AUTO: 22.6 % — SIGNIFICANT CHANGE UP (ref 20.5–51.1)
MAGNESIUM SERPL-MCNC: 2 MG/DL — SIGNIFICANT CHANGE UP (ref 1.8–2.4)
MCHC RBC-ENTMCNC: 29.3 PG — SIGNIFICANT CHANGE UP (ref 27–31)
MCHC RBC-ENTMCNC: 30.5 G/DL — LOW (ref 32–37)
MCV RBC AUTO: 96 FL — SIGNIFICANT CHANGE UP (ref 81–99)
MONOCYTES # BLD AUTO: 0.58 K/UL — SIGNIFICANT CHANGE UP (ref 0.1–0.6)
MONOCYTES NFR BLD AUTO: 9.2 % — SIGNIFICANT CHANGE UP (ref 1.7–9.3)
NEUTROPHILS # BLD AUTO: 4.01 K/UL — SIGNIFICANT CHANGE UP (ref 1.4–6.5)
NEUTROPHILS NFR BLD AUTO: 63.9 % — SIGNIFICANT CHANGE UP (ref 42.2–75.2)
NRBC # BLD: 0 /100 WBCS — SIGNIFICANT CHANGE UP (ref 0–0)
PHOSPHATE SERPL-MCNC: 3.7 MG/DL — SIGNIFICANT CHANGE UP (ref 2.1–4.9)
PLATELET # BLD AUTO: 296 K/UL — SIGNIFICANT CHANGE UP (ref 130–400)
POTASSIUM SERPL-MCNC: 4.7 MMOL/L — SIGNIFICANT CHANGE UP (ref 3.5–5)
POTASSIUM SERPL-SCNC: 4.7 MMOL/L — SIGNIFICANT CHANGE UP (ref 3.5–5)
PROT SERPL-MCNC: 5.8 G/DL — LOW (ref 6–8)
RBC # BLD: 3.24 M/UL — LOW (ref 4.2–5.4)
RBC # FLD: 13.1 % — SIGNIFICANT CHANGE UP (ref 11.5–14.5)
SARS-COV-2 RNA SPEC QL NAA+PROBE: SIGNIFICANT CHANGE UP
SODIUM SERPL-SCNC: 140 MMOL/L — SIGNIFICANT CHANGE UP (ref 135–146)
T4 AB SER-ACNC: 7.7 UG/DL — SIGNIFICANT CHANGE UP (ref 4.6–12)
T4 FREE SERPL-MCNC: 1.1 NG/DL — SIGNIFICANT CHANGE UP (ref 0.9–1.8)
TIBC SERPL-MCNC: 251 UG/DL — SIGNIFICANT CHANGE UP (ref 220–430)
TOTAL CHOLESTEROL/HDL RATIO MEASUREMENT: 3 RATIO — LOW (ref 4–5.5)
TRIGL SERPL-MCNC: 107 MG/DL — SIGNIFICANT CHANGE UP (ref 10–149)
TROPONIN T SERPL-MCNC: <0.01 NG/ML — SIGNIFICANT CHANGE UP
TROPONIN T SERPL-MCNC: <0.01 NG/ML — SIGNIFICANT CHANGE UP
TSH SERPL-MCNC: 1.1 UIU/ML — SIGNIFICANT CHANGE UP (ref 0.27–4.2)
TSH SERPL-MCNC: 1.37 UIU/ML — SIGNIFICANT CHANGE UP (ref 0.27–4.2)
UIBC SERPL-MCNC: 197 UG/DL — SIGNIFICANT CHANGE UP (ref 110–370)
VIT B12 SERPL-MCNC: 192 PG/ML — LOW (ref 232–1245)
WBC # BLD: 6.28 K/UL — SIGNIFICANT CHANGE UP (ref 4.8–10.8)
WBC # FLD AUTO: 6.28 K/UL — SIGNIFICANT CHANGE UP (ref 4.8–10.8)

## 2020-05-29 PROCEDURE — 99222 1ST HOSP IP/OBS MODERATE 55: CPT

## 2020-05-29 PROCEDURE — 99233 SBSQ HOSP IP/OBS HIGH 50: CPT

## 2020-05-29 PROCEDURE — 76536 US EXAM OF HEAD AND NECK: CPT | Mod: 26

## 2020-05-29 PROCEDURE — 93306 TTE W/DOPPLER COMPLETE: CPT | Mod: 26

## 2020-05-29 RX ORDER — ONDANSETRON 8 MG/1
4 TABLET, FILM COATED ORAL ONCE
Refills: 0 | Status: COMPLETED | OUTPATIENT
Start: 2020-05-29 | End: 2020-05-29

## 2020-05-29 RX ADMIN — LOSARTAN POTASSIUM 12.5 MILLIGRAM(S): 100 TABLET, FILM COATED ORAL at 08:55

## 2020-05-29 RX ADMIN — Medication 1 MILLIGRAM(S): at 01:01

## 2020-05-29 RX ADMIN — Medication 20 MILLIGRAM(S): at 13:20

## 2020-05-29 RX ADMIN — ENOXAPARIN SODIUM 40 MILLIGRAM(S): 100 INJECTION SUBCUTANEOUS at 12:20

## 2020-05-29 RX ADMIN — ONDANSETRON 4 MILLIGRAM(S): 8 TABLET, FILM COATED ORAL at 17:34

## 2020-05-29 RX ADMIN — ESCITALOPRAM OXALATE 2.5 MILLIGRAM(S): 10 TABLET, FILM COATED ORAL at 12:20

## 2020-05-29 NOTE — CONSULT NOTE ADULT - ATTENDING COMMENTS
right soft tissue mass at the dorsum of the right foot and hallux. Pt subjectively relates mass has been present for >30 years?. Notes intermittent pain to the right foot when she steps down.  bedside aspiration attempted-->no fluid was obtained  f/u ultrasound  further recommendations to follow
Patient seen and examined and agree with above except as noted.  Patients history, notes, labs, imaging, vitals and meds reviewed personally.  Patient had fall and prior falls at home.  Patient recalls the fall and blames the height of the new bed for her falling.  Patient is oriented x3 and appears to have good insight into her presentation.  No focal weakness  Sensory symmetric  FTN NL  NIHSS 0    Plan as above (if REEG normal then no further neurological workup)

## 2020-05-29 NOTE — CONSULT NOTE ADULT - SUBJECTIVE AND OBJECTIVE BOX
HPI:  79-year-old female with past medical history of hypertension, diabetes, dementia, chronic lower extremity edema, no blood thinners, basal cell carcinoma, schizoaffective disorder and frequent falls presented after an unwitnessed fall from bed. Patient was found down on the floor from bed by son and EMS was called. States she fell yesterday while walking, today admits to falling from bed. No loc, no headstrike. Very poor historian. Denies chest pain, palpitation, dizziness, sweating, feeling warm or any anxiety. As per daughter has frequent violent outbursts at home. Family have been trying to pursue placement but pt has objected, son Joaquín is only caregiver. Will admit for rehab, optimization.  In ED Pt A&Ox3, vitals B.P. 181/82, HR 94, RR 18, Temp 98.1 and sat 99% on room air. Trauma work up was negative for any fracture or dislocation. UA positive for asymptomatic bacteriuria and leucocyte estrase. for  An incidental findings were found on CT chest/Abd/pelvis, a medistinal mass and thyroid nodule, cholelithiasis and choledocholithiasis. R foot cyst/hematoma. Workup required. On PE laceration to left elbow and injury near the left ear. (28 May 2020 16:00)      PAST MEDICAL & SURGICAL HISTORY:  Schizoaffective disorder  Hypertension  Diabetes mellitus      Hospital Course:    TODAY'S SUBJECTIVE & REVIEW OF SYMPTOMS:     Constitutional WNL   Cardio WNL   Resp WNL   GI WNL  Heme WNL  Endo WNL  Skin WNL  MSK Weakness  Neuro WNL  Cognitive WNL  Psych WNL      MEDICATIONS  (STANDING):  chlorhexidine 4% Liquid 1 Application(s) Topical daily  enoxaparin Injectable 40 milliGRAM(s) SubCutaneous daily  escitalopram 2.5 milliGRAM(s) Oral daily  furosemide    Tablet 20 milliGRAM(s) Oral daily  losartan 12.5 milliGRAM(s) Oral daily    MEDICATIONS  (PRN):      FAMILY HISTORY:      Allergies    No Known Allergies    Intolerances        SOCIAL HISTORY:    [  ] Etoh  [  ] Smoking  [  ] Substance abuse     Home Environment:  [  ] Home Alone  [x  ] Lives with Family  [  ] Home Health Aid    Dwelling:  [x  ] Apartment  [  ] Private House  [  ] Adult Home  [  ] Skilled Nursing Facility      [  ] Short Term  [  ] Long Term  [  ] Stairs       Elevator [  ]    FUNCTIONAL STATUS PTA: (Check all that apply)  Ambulation: [ x  ]Independent    [  ] Dependent     [  ] Non-Ambulatory  Assistive Device: [  ] SA Cane  [  ]  Q Cane  [  ] Walker  [  ]  Wheelchair  ADL : [x  ] Independent  [  ]  Dependent       Vital Signs Last 24 Hrs  T(C): 36.4 (29 May 2020 07:45), Max: 37.2 (28 May 2020 15:55)  T(F): 97.5 (29 May 2020 07:45), Max: 98.9 (28 May 2020 15:55)  HR: 74 (29 May 2020 07:45) (74 - 88)  BP: 137/72 (29 May 2020 07:45) (127/80 - 171/74)  BP(mean): --  RR: 19 (29 May 2020 07:45) (18 - 19)  SpO2: 97% (29 May 2020 07:45) (96% - 98%)      PHYSICAL EXAM: Alert & awake  GENERAL: NAD  HEAD:  Atraumatic, Normocephalic  CHEST/LUNG: Clear   HEART: S1S2+  ABDOMEN: Soft, Nontender  EXTREMITIES:  no calf tenderness    NERVOUS SYSTEM:  Cranial Nerves 2-12 intact [  ] Abnormal  [  ]  ROM: WFL all extremities [ x ]  Abnormal [  ]  Motor Strength: WFL all extremities  [x  ]  Abnormal [  ]  Sensation: intact to light touch [x  ] Abnormal [  ]  Reflexes: Symmetric [  ]  Abnormal [  ]    FUNCTIONAL STATUS:  Bed Mobility: Independent [  ]  Supervision [  ]  Needs Assistance [ x ]  N/A [  ]  Transfers: Independent [  ]  Supervision [  ]  Needs Assistance [x  ]  N/A [  ]   Ambulation: Independent [  ]  Supervision [  ]  Needs Assistance [  ]  N/A [  ]  ADL: Independent [  ] Requires Assistance [  ] N/A [  ]      LABS:                        9.5    6.28  )-----------( 296      ( 29 May 2020 06:29 )             31.1     05-29    140  |  108  |  14  ----------------------------<  98  4.7   |  23  |  1.0    Ca    9.6      29 May 2020 06:29  Phos  3.7     0529  Mg     2.0     29    TPro  5.8<L>  /  Alb  3.1<L>  /  TBili  0.3  /  DBili  x   /  AST  11  /  ALT  6   /  AlkPhos  77  05-29    PT/INR - ( 28 May 2020 05:35 )   PT: 11.00 sec;   INR: 0.96 ratio         PTT - ( 28 May 2020 05:35 )  PTT:28.3 sec  Urinalysis Basic - ( 28 May 2020 06:20 )    Color: Colorless / Appearance: Clear / S.006 / pH: x  Gluc: x / Ketone: Negative  / Bili: Negative / Urobili: <2 mg/dL   Blood: x / Protein: Negative / Nitrite: Negative   Leuk Esterase: Small / RBC: 0 /HPF / WBC 4 /HPF   Sq Epi: x / Non Sq Epi: 3 /HPF / Bacteria: Many        RADIOLOGY & ADDITIONAL STUDIES:    Assesment:

## 2020-05-29 NOTE — CONSULT NOTE ADULT - ASSESSMENT
An 79 years old right handed woman with past medical history of HTN, DM, chronic lower extremity edema, no blood thinners, basal cell carcinoma, schizoaffective disorder (non compliant with medications) p/w s/p fall, denies injury to head, no LOC, no confusion. Negative for any intracranial hemorrhage or infarction on CT head. UA positive for asymptomatic bacteriuria, and an incidental finding of a mediastinal mass and thyroid nodule, cholelithiasis and choledocholithiasis. R foot cyst/lipoma s/p aspiration. Consulted by Dr. Powers for possible seizure after an unwitnessed fall and "poor historian" per her chart, and ordered rEEG. However, per discussion with sonJoaquín and her son-in-law Dr. Razo, patient with psychiatry history who has been resistant in taking her home medications, but son denies symptoms of seizure when asked.     SUGGESTIONS:  - f/u rEEG results     Case discussed with General Neurology attending physician

## 2020-05-29 NOTE — CONSULT NOTE ADULT - ASSESSMENT
IMPRESSION: Rehab of gait dysfunction     PRECAUTIONS: [  ] Cardiac  [  ] Respiratory  [  ] Seizures [  ] Contact Isolation  [  ] Droplet Isolation  [  ] Other    Weight Bearing Status:     RECOMMENDATION:    Out of Bed to Chair     DVT/Decubiti Prophylaxis    REHAB PLAN:     [ x  ] Bedside P/T 3-5 times a week   [   ]   Bedside O/T  2-3 times a week             [   ] No Rehab Therapy Indicated                   [   ]  Speech Therapy   Conditioning/ROM                                    ADL  Bed Mobility                                               Conditioning/ROM  Transfers                                                     Bed Mobility  Sitting /Standing Balance                         Transfers                                        Gait Training                                               Sitting/Standing Balance  Stair Training [   ]Applicable                    Home equipment Eval                                                                        Splinting  [   ] Only      GOALS:   ADL   [   ]   Independent                    Transfers  [ x  ] Independent                          Ambulation  [x   ] Independent     [  x  ] With device                            [   ]  CG                                                         [   ]  CG                                                                  [   ] CG                            [    ] Min A                                                   [   ] Min A                                                              [   ] Min  A          DISCHARGE PLAN:   [   ]  Good candidate for Intensive Rehabilitation/Hospital based                                             Will tolerate 3hrs Intensive Rehab Daily                                       [ x   ]  Short Term Rehab in Skilled Nursing Facility                          vs             [ x   ]  Home with Outpatient or VN services                                         [    ]  Possible Candidate for Intensive Hospital based Rehab

## 2020-05-29 NOTE — ED ADULT NURSE REASSESSMENT NOTE - NS ED NURSE REASSESS COMMENT FT1
0020 Pt requesting to go to the bathroom.  Pt offered bed smith.  Pt wants walk to bathroom.  Pt informed of order for bedrest, not able to get up from bed due to fall risk.  Pt angry at staff.  Pt yelling and cursing; attempting to get out of bed.  Pt removing BP cuff and monitoring devices.  MD notified.  Ativan ordered.  0030 Pt continues to yell and curse at staff.  Pt slapping staff.  Security at bedside.  Ativan given.      Cardiac monitor and continuous O2 put back in place.  Will continue to monitor.

## 2020-05-29 NOTE — CONSULT NOTE ADULT - SUBJECTIVE AND OBJECTIVE BOX
PODIATRY CONSULT   CHELA GALLAGHER is a pleasant well-nourished, well developed 79y Female in no acute distress, alert awake, and oriented to person, place and time.   Patient is a 79y old  Female who presents with a chief complaint of Mechanical fall (28 May 2020 16:00)    HPI:  79-year-old female with past medical history of hypertension, diabetes, dementia, chronic lower extremity edema, no blood thinners, basal cell carcinoma, schizoaffective disorder and frequent falls presented after an unwitnessed fall from bed. Patient was found down on the floor from bed by son and EMS was called. States she fell yesterday while walking, today admits to falling from bed. No loc, no headstrike. Very poor historian. Denies chest pain, palpitation, dizziness, sweating, feeling warm or any anxiety. As per daughter has frequent violent outbursts at home. Family have been trying to pursue placement but pt has objected, son Joaquín is only caregiver. Will admit for rehab, optimization.  In ED Pt A&Ox3, vitals B.P. 181/82, HR 94, RR 18, Temp 98.1 and sat 99% on room air. Trauma work up was negative for any fracture or dislocation. UA positive for asymptomatic bacteriuria and leucocyte estrase. for  An incidental findings were found on CT chest/Abd/pelvis, a medistinal mass and thyroid nodule, cholelithiasis and choledocholithiasis. R foot cyst/hematoma. Workup required. On PE laceration to left elbow and injury near the left ear. (28 May 2020 16:00)    pt states that she had this cyst for over 40 years now and complaints of pain when she ambulates.   pt seen and assessed with attending Dr. elias.     THYROID MASS FALLS BASAL CELL CARCINOMA  Schizoaffective disorder  Hypertension  Diabetes mellitus      PMH: THYROID MASS FALLS BASAL CELL CARCINOMA  Schizoaffective disorder  Hypertension  Diabetes mellitus    PSH:   Medication   Allergy: No Known Allergies        Labs:                        9.5    6.28  )-----------( 296      ( 29 May 2020 06:29 )             31.1     PT/INR - ( 28 May 2020 05:35 )   PT: 11.00 sec;   INR: 0.96 ratio         PTT - ( 28 May 2020 05:35 )  PTT:28.3 sec  05-29    140  |  108  |  14  ----------------------------<  98  4.7   |  23  |  1.0    Ca    9.6      29 May 2020 06:29  Phos  3.7     05-29  Mg     2.0     05-29    TPro  5.8<L>  /  Alb  3.1<L>  /  TBili  0.3  /  DBili  x   /  AST  11  /  ALT  6   /  AlkPhos  77  05-29    CARDIAC MARKERS ( 29 May 2020 06:29 )  x     / <0.01 ng/mL / x     / x     / x      CARDIAC MARKERS ( 29 May 2020 01:13 )  x     / <0.01 ng/mL / x     / x     / x      CARDIAC MARKERS ( 28 May 2020 20:46 )  x     / <0.01 ng/mL / x     / x     / x      CARDIAC MARKERS ( 28 May 2020 05:35 )  x     / x     / 118 U/L / x     / x        < from: Xray Foot AP + Lateral + Oblique, Right (05.28.20 @ 06:27) >    EXAM:  XR FOOT COMP MIN 3 VIEWS RT            PROCEDURE DATE:  05/28/2020            INTERPRETATION:  Clinical History / Reason for exam: Trauma.     Comparison: None.    Procedure: Three views of the right foot.    Findings/  impression:    Large dorsal foot soft tissue hematoma.  Generalized osteopenia without evidence for acute fracture or dislocation.  Degenerative changes throughout the foot.  Calcaneal bony spur        ELLIOT LANDAU M.D., ATTENDING RADIOLOGIST  This document has been electronically signed. May 28 2020  7:16AM                < end of copied text >      REVIEW OF SYSTEMS:    CONSTITUTIONAL: No weakness, fevers or chills  EYES/ENT: No visual changes;  No vertigo or throat pain   NECK: No pain or stiffness  RESPIRATORY: No cough, wheezing, hemoptysis; No shortness of breath  CARDIOVASCULAR: No chest pain or palpitations  GASTROINTESTINAL: No abdominal or epigastric pain. No nausea, vomiting, or hematemesis; No diarrhea or constipation. No melena or hematochezia.  GENITOURINARY: No dysuria, frequency or hematuria  NEUROLOGICAL: No numbness or weakness  SKIN: No itching, burning, rashes, or lesions   All other review of systems is negative unless indicated below.      O:   Derm: no open lesion, fluctuance right dorsal hallux and forefoot. no erythema/edema, no clinical signs of infection.   Vascular: Dorsalis Pedis and Posterior Tibial pulses 0/4.  Capillary re-fill time less then 3 seconds digits 1-5 right foot. warm to touch  Neuro: Protective sensation intact to the level of the digits right foot.          Assessment and Plan:   cyst vs lipoma right foot  s/p bedside aspiration right foot 5/29    Chart reviewed and Patient evaluated  Discussed diagnosis and treatment with patient  consent signed, 3 cc of 2% lidocaine plain given to right hallux, aspiration attempted using 18 gauge needle under sterile, no fluids noted upon aspiration.   applied bandage.  xray reviewed as above  recommend ultrasound right foot  f/u with attending     spectra [id number]

## 2020-05-29 NOTE — CONSULT NOTE ADULT - SUBJECTIVE AND OBJECTIVE BOX
Neurology Consult    Patient is a 79y old  Female who presents with a chief complaint of Mechanical fall (29 May 2020 12:50)    HPI: This is an 79 years old right handed woman with past medical history of HTN, DM, chronic lower extremity edema, no blood thinners, basal cell carcinoma, schizoaffective disorder (non compliant with medications) and frequent falls presents after an unwitnessed fall from bed. Patient recently got a new high bed, and fell on floor while trying to go to bathroom. She called her son who lives with patient and he called EMS. She also admits that she fell the day prior while walking to store, but was able to bring herself back up and remembers event. Denies LOC, no head strike. Denies chest pain, palpitation, dizziness, sweating, feeling warm or any anxiety.     Per discussion with family son, Joaquín and son-in-law Dr. Razo, patient has been non compliant with her home medications for the last 2 years, and son reports that patient is stubborn. Family have been trying to pursue placement but pt has objected, son Joaquín is only caregiver. Pt is admitted for rehab, optimization.  In ED Pt A&Ox3, vitals B.P. 181/82, HR 94, RR 18, Temp 98.1 and sat 99% on room air. Trauma work up was negative for any fracture or dislocation. UA positive for asymptomatic bacteriuria and leucocyte estrase. for  An incidental findings were found on CT chest/Abd/pelvis, a medistinal mass and thyroid nodule, cholelithiasis and choledocholithiasis. R foot cyst/hematoma. Workup required. On PE laceration to left elbow and injury near the left ear. (28 May 2020 16:00)    PAST MEDICAL & SURGICAL HISTORY:  Schizoaffective disorder  Hypertension  Diabetes mellitus  Basal cell carcinoma    FAMILY HISTORY:  Mother - Rheumatic Fever    Social History: (-) x 3    Allergies  No Known Allergies  Intolerances    MEDICATIONS  (STANDING):  chlorhexidine 4% Liquid 1 Application(s) Topical daily  enoxaparin Injectable 40 milliGRAM(s) SubCutaneous daily  escitalopram 2.5 milliGRAM(s) Oral daily  furosemide    Tablet 20 milliGRAM(s) Oral daily  losartan 12.5 milliGRAM(s) Oral daily    Review of systems:    Constitutional: as per HPI  Eyes: No eye pain or discharge  ENMT:  No difficulty hearing; No sinus or throat pain  Neck: No pain or stiffness  Respiratory: + cough and sneezing  Cardiovascular: No chest pain, palpitations, shortness of breath, dyspnea on exertion  Gastrointestinal: No abdominal pain, nausea, vomiting or hematemesis; No diarrhea or constipation.   Genitourinary: No dysuria, frequency, hematuria or incontinence  Neurological: As per HPI  Skin: No rashes or lesions   Endocrine: No heat or cold intolerance; No hair loss  Musculoskeletal: No joint pain or swelling  Psychiatric: Denies visual and auditory hallucinations  Heme/Lymph: No easy bruising or bleeding gums    Vital Signs Last 24 Hrs  T(C): 36.4 (29 May 2020 07:45), Max: 37.2 (28 May 2020 15:55)  T(F): 97.5 (29 May 2020 07:45), Max: 98.9 (28 May 2020 15:55)  HR: 74 (29 May 2020 07:45) (74 - 88)  BP: 137/72 (29 May 2020 07:45) (127/80 - 171/74)  BP(mean): --  RR: 19 (29 May 2020 07:45) (18 - 19)  SpO2: 97% (29 May 2020 07:45) (96% - 98%)    Examination:  General: Appearance is consistent with chronologic age. No abnormal facies. Gross skin survey within normal limits.    Cognitive/Language: The patient is oriented to person, place, time and date. She is also aware of why she came into hospital. Recent and remote memory intact. Fund of knowledge is intact and normal.  Language with normal repetition, comprehension and naming.  Nondysarthric.    Eyes: intact VA, VFF.  EOMI w/o nystagmus, skew or reported double vision.  PERRL.  No ptosis/weakness of eyelid closure.    Face:  Facial sensation normal V1 - 3, no facial asymmetry.    Ears/Nose/Throat:  Hearing grossly intact b/l.  Palate elevates midline.  Tongue and uvula midline.   Motor examination:   Normal tone, bulk and range of motion.  No tenderness, twitching, tremors or involuntary movements.  Formal Muscle Strength Testing: (MRC grade R/L) 5/5 UE; 5/5 LE.  No observable drift.  Reflexes:   2+ b/l pectoralis, biceps, triceps, brachioradialis, patella and Achilles.  Plantar response downgoing b/l.   Sensory examination: Intact to light touch and pinprick, pain, proprioception, and vibration in all extremities.  Cerebellum: FTN/HKS intact with normal ARIAN in all limbs.  No dysmetria     Respiratory: + wheezing. No use of accessory muscles.   Cardiac: pulse palpable, no audible bruits  Abdomen: supple, no guarding, no TTP  EXT: Swollen with pitting edema in b/l LE and erythema    Labs:                           9.5    6.28  )-----------( 296      ( 29 May 2020 06:29 )             31.1           140  |  108  |  14  ----------------------------<  98  4.7   |  23  |  1.0    Ca    9.6      29 May 2020 06:29  Phos  3.7       Mg     2.0         TPro  5.8<L>  /  Alb  3.1<L>  /  TBili  0.3  /  DBili  x   /  AST  11  /  ALT  6   /  AlkPhos  77      PT/INR - ( 28 May 2020 05:35 )   PT: 11.00 sec;   INR: 0.96 ratio    PTT - ( 28 May 2020 05:35 )  PTT:28.3 sec    Urinalysis Basic - ( 28 May 2020 06:20 )  Color: Colorless / Appearance: Clear / S.006 / pH: x  Gluc: x / Ketone: Negative  / Bili: Negative / Urobili: <2 mg/dL   Blood: x / Protein: Negative / Nitrite: Negative   Leuk Esterase: Small / RBC: 0 /HPF / WBC 4 /HPF   Sq Epi: x / Non Sq Epi: 3 /HPF / Bacteria: Many    Neuroimaging:  < from: CT Head No Cont (20 @ 07:28) >  IMPRESSION:   1.  No evidence of acute intracranial pathology.  2.  Moderate chronic microvascular changes.  3.  Left sphenoid sinus opacification as described.

## 2020-05-29 NOTE — PROGRESS NOTE ADULT - SUBJECTIVE AND OBJECTIVE BOX
HPI:  79-year-old female with past medical history of hypertension, diabetes, dementia, chronic lower extremity edema, no blood thinners, basal cell carcinoma, schizoaffective disorder and frequent falls presented after an unwitnessed fall from bed. Patient was found down on the floor from bed by son and EMS was called. States she fell yesterday while walking, today admits to falling from bed. No loc, no headstrike. Very poor historian. Denies chest pain, palpitation, dizziness, sweating, feeling warm or any anxiety. As per daughter has frequent violent outbursts at home. Family have been trying to pursue placement but pt has objected, son Joaquín is only caregiver. Will admit for rehab, optimization.    pt seen and examined. no pain, she denies any loc.   Vital Signs Last 24 Hrs  T(C): 36.4 (29 May 2020 07:45), Max: 37.2 (28 May 2020 15:55)  T(F): 97.5 (29 May 2020 07:45), Max: 98.9 (28 May 2020 15:55)  HR: 74 (29 May 2020 07:45) (74 - 88)  BP: 137/72 (29 May 2020 07:45) (127/80 - 171/74)  BP(mean): --  RR: 19 (29 May 2020 07:45) (18 - 19)  SpO2: 97% (29 May 2020 07:45) (96% - 98%)    Physical exam:   constitutional NAD, AAOX3, Respiratory  lungs CTA, CVS heart RRR, GI: abdomen Soft NT, ND, BS+, skin: intact  neuro exam non focal.   swelling on the right foot.    On PE laceration to left elbow and injury near the left ear. not bleeding,                           9.5    6.28  )-----------( 296      ( 29 May 2020 06:29 )             31.1   05-29    140  |  108  |  14  ----------------------------<  98  4.7   |  23  |  1.0    Ca    9.6      29 May 2020 06:29  Phos  3.7     05-29  Mg     2.0     05-29    TPro  5.8<L>  /  Alb  3.1<L>  /  TBili  0.3  /  DBili  x   /  AST  11  /  ALT  6   /  AlkPhos  77  05-29    < from: CT Chest w/ IV Cont (05.28.20 @ 09:38) >    1.  No CT evidence of acute traumatic injury to the chest, abdomen or pelvis.    2.  Hyperdensities within the gallbladder and CBD with associated mild pericholecystic fat stranding. Findings are consistent with cholelithiasis and choledocholithiasis. The CBD measures 7 mm (series 5/309). If there is clinical concern for acute cholecystitis, right upper quadrant ultrasound may be obtained.    3.  6.1 cm heterogeneous lesion within the superior mediastinum, possibly arising from the right thyroid gland, suspicious for malignancy or goiter. Recommend a thyroid ultrasound for further evaluation.    < end of copied text >    ECG NSR and LVH    CARDIAC MARKERS ( 29 May 2020 06:29 )  x     / <0.01 ng/mL / x     / x     / x      CARDIAC MARKERS ( 29 May 2020 01:13 )  x     / <0.01 ng/mL / x     / x     / x      CARDIAC MARKERS ( 28 May 2020 20:46 )  x     / <0.01 ng/mL / x     / x     / x      CARDIAC MARKERS ( 28 May 2020 05:35 )  x     / x     / 118 U/L / x     / x        < from: Transthoracic Echocardiogram (05.29.20 @ 09:46) >  Summary:   1. Normal global left ventricular systolic function.   2. LV Ejection Fraction by Pond's Method with a biplane EF of 57 %.   3. Normal left ventricular internal cavity size.   4. Spectral Doppler shows impaired relaxation pattern of left ventricular myocardial filling (Grade I diastolic dysfunction).   5. Normal right atrial size.   6. There is no evidence of pericardial effusion.   7. Mild mitral annular calcification.   8. Mild thickening and calcification of the anterior and posterior mitral valve leaflets.   9. Mild aortic regurgitation.    < end of copied text >      a/p  #Fall, pt is a poor historian etiology unknown. no fractures, possibly rule out seizure, neuro consult .   #grade I diastolic CHF, cont ace and lasix.   #Incidental finding of mediastinal mass and thyroid nodule. Thyroid US. THS 1.37    #Right foot swelling, not tender, aspiration was dry ( done by podiatry)  poss cyst vs lipoma, rule out hematoma. POD consult appreciated , will do us of lesion      #Normocytic anemia, drop in hct, pt denies any bleeding symptoms, no active bleeding, not iron deficient   not iron deficient, repeat labs, check guaiac    #?hx of Diabetes mellitus controlled, A1c 5.9: no medications are needed.     #bacteriuria. nitrite neg, no leukocytosis, fu cultures, pt very poor historian but denies dysuria, no fever,     #Hx  of schizoaffective stable cont meds  #HTN. Restart home meds.       Full code  rehab eval WBAT

## 2020-05-29 NOTE — ED ADULT NURSE REASSESSMENT NOTE - NS ED NURSE REASSESS COMMENT FT1
Pt resting in bed, eyes closed.  NAD.  cardiac monitor and continuous O2 in place.  Will continue to monitor.

## 2020-05-30 LAB
ANION GAP SERPL CALC-SCNC: 14 MMOL/L — SIGNIFICANT CHANGE UP (ref 7–14)
BASOPHILS # BLD AUTO: 0.04 K/UL — SIGNIFICANT CHANGE UP (ref 0–0.2)
BASOPHILS NFR BLD AUTO: 0.5 % — SIGNIFICANT CHANGE UP (ref 0–1)
BUN SERPL-MCNC: 17 MG/DL — SIGNIFICANT CHANGE UP (ref 10–20)
CALCIUM SERPL-MCNC: 10.1 MG/DL — SIGNIFICANT CHANGE UP (ref 8.5–10.1)
CHLORIDE SERPL-SCNC: 103 MMOL/L — SIGNIFICANT CHANGE UP (ref 98–110)
CO2 SERPL-SCNC: 22 MMOL/L — SIGNIFICANT CHANGE UP (ref 17–32)
CREAT SERPL-MCNC: 1.1 MG/DL — SIGNIFICANT CHANGE UP (ref 0.7–1.5)
EOSINOPHIL # BLD AUTO: 0.15 K/UL — SIGNIFICANT CHANGE UP (ref 0–0.7)
EOSINOPHIL NFR BLD AUTO: 1.9 % — SIGNIFICANT CHANGE UP (ref 0–8)
GLUCOSE BLDC GLUCOMTR-MCNC: 112 MG/DL — HIGH (ref 70–99)
GLUCOSE BLDC GLUCOMTR-MCNC: 122 MG/DL — HIGH (ref 70–99)
GLUCOSE BLDC GLUCOMTR-MCNC: 126 MG/DL — HIGH (ref 70–99)
GLUCOSE BLDC GLUCOMTR-MCNC: 218 MG/DL — HIGH (ref 70–99)
GLUCOSE SERPL-MCNC: 126 MG/DL — HIGH (ref 70–99)
HCT VFR BLD CALC: 34.1 % — LOW (ref 37–47)
HGB BLD-MCNC: 10.7 G/DL — LOW (ref 12–16)
IMM GRANULOCYTES NFR BLD AUTO: 0.3 % — SIGNIFICANT CHANGE UP (ref 0.1–0.3)
LYMPHOCYTES # BLD AUTO: 1.13 K/UL — LOW (ref 1.2–3.4)
LYMPHOCYTES # BLD AUTO: 14.2 % — LOW (ref 20.5–51.1)
MCHC RBC-ENTMCNC: 30.5 PG — SIGNIFICANT CHANGE UP (ref 27–31)
MCHC RBC-ENTMCNC: 31.4 G/DL — LOW (ref 32–37)
MCV RBC AUTO: 97.2 FL — SIGNIFICANT CHANGE UP (ref 81–99)
MONOCYTES # BLD AUTO: 0.53 K/UL — SIGNIFICANT CHANGE UP (ref 0.1–0.6)
MONOCYTES NFR BLD AUTO: 6.6 % — SIGNIFICANT CHANGE UP (ref 1.7–9.3)
NEUTROPHILS # BLD AUTO: 6.1 K/UL — SIGNIFICANT CHANGE UP (ref 1.4–6.5)
NEUTROPHILS NFR BLD AUTO: 76.5 % — HIGH (ref 42.2–75.2)
NRBC # BLD: 0 /100 WBCS — SIGNIFICANT CHANGE UP (ref 0–0)
PLATELET # BLD AUTO: 361 K/UL — SIGNIFICANT CHANGE UP (ref 130–400)
POTASSIUM SERPL-MCNC: 4.8 MMOL/L — SIGNIFICANT CHANGE UP (ref 3.5–5)
POTASSIUM SERPL-SCNC: 4.8 MMOL/L — SIGNIFICANT CHANGE UP (ref 3.5–5)
RBC # BLD: 3.51 M/UL — LOW (ref 4.2–5.4)
RBC # FLD: 12.7 % — SIGNIFICANT CHANGE UP (ref 11.5–14.5)
SODIUM SERPL-SCNC: 139 MMOL/L — SIGNIFICANT CHANGE UP (ref 135–146)
WBC # BLD: 7.97 K/UL — SIGNIFICANT CHANGE UP (ref 4.8–10.8)
WBC # FLD AUTO: 7.97 K/UL — SIGNIFICANT CHANGE UP (ref 4.8–10.8)

## 2020-05-30 PROCEDURE — 99233 SBSQ HOSP IP/OBS HIGH 50: CPT

## 2020-05-30 PROCEDURE — 95819 EEG AWAKE AND ASLEEP: CPT | Mod: 26

## 2020-05-30 PROCEDURE — 76882 US LMTD JT/FCL EVL NVASC XTR: CPT | Mod: 26,RT

## 2020-05-30 RX ORDER — PREGABALIN 225 MG/1
1000 CAPSULE ORAL DAILY
Refills: 0 | Status: COMPLETED | OUTPATIENT
Start: 2020-05-30 | End: 2020-06-05

## 2020-05-30 RX ADMIN — ENOXAPARIN SODIUM 40 MILLIGRAM(S): 100 INJECTION SUBCUTANEOUS at 11:22

## 2020-05-30 RX ADMIN — ESCITALOPRAM OXALATE 2.5 MILLIGRAM(S): 10 TABLET, FILM COATED ORAL at 11:22

## 2020-05-30 RX ADMIN — Medication 20 MILLIGRAM(S): at 05:03

## 2020-05-30 RX ADMIN — LOSARTAN POTASSIUM 12.5 MILLIGRAM(S): 100 TABLET, FILM COATED ORAL at 05:03

## 2020-05-30 RX ADMIN — PREGABALIN 1000 MICROGRAM(S): 225 CAPSULE ORAL at 18:07

## 2020-05-30 NOTE — PHYSICAL THERAPY INITIAL EVALUATION ADULT - ORIENTATION, REHAB EVAL
Medical Necessity Information: It is in your best interest to select a reason for this procedure from the list below. All of these items fulfill various CMS LCD requirements except the new and changing color options.
Medical Necessity Clause: Traumatized
Detail Level: Detailed
Include Z78.9 (Other Specified Conditions Influencing Health Status) As An Associated Diagnosis?: No
Post-Care Instructions: I reviewed with the patient in detail post-care instructions. Patient is to wear sunprotection, and avoid picking at any of the treated lesions. Pt may apply Vaseline to crusted or scabbing areas.  Pt made aware that some lesions may take more than one treatment fully resolve and that some lesions may recur.
Render Post-Care Instructions In Note?: yes
Consent: The patient's consent was obtained including but not limited to risks of crusting, scabbing, blistering, scarring, darker or lighter pigmentary change, recurrence, incomplete removal and infection.
oriented to person, place, time and situation

## 2020-05-30 NOTE — OCCUPATIONAL THERAPY INITIAL EVALUATION ADULT - GENERAL OBSERVATIONS, REHAB EVAL
Pt encountered sleeping supine in bed in NAD. Pt left sitting in recliner +call bell, +chair alarm, RN aware.

## 2020-05-30 NOTE — PROGRESS NOTE ADULT - SUBJECTIVE AND OBJECTIVE BOX
Hospital Day:  2d    Subjective:    Pt was interviewed and examined at the bedside in the AM. No acute events overnight. Pt is resting comfortably in bed, is AAOx3. Denies any complaints.        Past Medical Hx:   Schizoaffective disorder  Hypertension  Diabetes mellitus    Past Sx:    Allergies:  No Known Allergies    Current Meds:   Standng Meds:  chlorhexidine 4% Liquid 1 Application(s) Topical daily  enoxaparin Injectable 40 milliGRAM(s) SubCutaneous daily  escitalopram 2.5 milliGRAM(s) Oral daily  furosemide    Tablet 20 milliGRAM(s) Oral daily  losartan 12.5 milliGRAM(s) Oral daily    PRN Meds:    HOME MEDICATIONS:  escitalopram 5 mg oral tablet: 0.5 tab(s) orally once a day  losartan 25 mg oral tablet: 0.5 tab(s) orally once a day      Vital Signs:   T(F): 96.8 (20 @ 07:25), Max: 98.6 (20 @ 15:30)  HR: 74 (20 @ 07:25) (74 - 87)  BP: 153/66 (20 @ 07:25) (128/71 - 159/68)  RR: 19 (20 @ 07:25) (18 - 19)  SpO2: 98% (20 @ 07:25) (96% - 98%)      20 @ 07:01  -  20 @ 07:00  --------------------------------------------------------  IN: 200 mL / OUT: 2420 mL / NET: -2220 mL    20 @ 07:01  -  20 @ 09:14  --------------------------------------------------------  IN: 0 mL / OUT: 700 mL / NET: -700 mL        Physical Exam:   CONSTITUTIONAL: Well-developed; well-nourished; in no acute distress, speaking in full sentences, on RA  HEAD: Normocephalic; atraumatic, laceration on L jaw, healing, appears dry  CARD: +S1, S2 Regular rate and rhythm  RESP: CTABL  ABD: soft nontender, nondistended, no rebound, no guarding, no rigidity  EXT: moves all extremities,  no clubbing, cyanosis, moderate edema of LE b/l  NEURO: Alert, oriented, grossly unremarkable, no focal deficits, cn ii-xii grossly intact  PSYCH: Cooperative, appropriate         Labs:                         9.5    6.28  )-----------( 296      ( 29 May 2020 06:29 )             31.1       29 May 2020 06:29    140    |  108    |  14     ----------------------------<  98     4.7     |  23     |  1.0      Ca    9.6        29 May 2020 06:29  Phos  3.7       29 May 2020 06:29  Mg     2.0       29 May 2020 06:29    TPro  5.8    /  Alb  3.1    /  TBili  0.3    /  DBili  x      /  AST  11     /  ALT  6      /  AlkPhos  77     29 May 2020 06:29        Amylase --, Lipase 53, 20 @ 05:35      Serum Pro-Brain Natriuretic Peptide: 1245 pg/mL (20 @ 20:46)    Troponin <0.01, CKMB --, CK -- 20 @ 06:29  Troponin <0.01, CKMB --, CK -- 20 @ 01:13  Troponin <0.01, CKMB --, CK -- 20 @ 20:46  Troponin --, CKMB --,  20 @ 05:35    Iron 54, TIBC 251, %Sat 22 Ferritin 48 20 @ 06:29      Urinalysis Basic - ( 28 May 2020 06:20 )    Color: Colorless / Appearance: Clear / S.006 / pH: x  Gluc: x / Ketone: Negative  / Bili: Negative / Urobili: <2 mg/dL   Blood: x / Protein: Negative / Nitrite: Negative   Leuk Esterase: Small / RBC: 0 /HPF / WBC 4 /HPF   Sq Epi: x / Non Sq Epi: 3 /HPF / Bacteria: Many

## 2020-05-30 NOTE — PROGRESS NOTE ADULT - ATTENDING COMMENTS
Patient seen and examined independently. Agree with resident note/ history / physical exam and plan of care with no exceptions/additions/updates. Case discussed with patient/pt decision maker, house-staff and nursing.

## 2020-05-30 NOTE — OCCUPATIONAL THERAPY INITIAL EVALUATION ADULT - NS ASR FOLLOW COMMAND OT EVAL
Pt required redirection 30% of the time during OT eval./able to follow single-step instructions/75% of the time

## 2020-05-30 NOTE — PROGRESS NOTE ADULT - ASSESSMENT
79-year-old female with past medical history of hypertension, diabetes, dementia, chronic lower extremity edema, no blood thinners, basal cell carcinoma, schizoaffective disorder and frequent falls presented after an unwitnessed fall from bed.    # Unwitnessed fall  - Pt is poor historian, cannot rule out syncope - will do workup  - Trauma workup negative for fracture/dislocation  - L wrist laceration - wound care  - Neurology c/s, pt got EEG, f/u read  - TTE shows EF 57%, GIDD, mild AR  - PT/Rehab eval, will likely be d/c'd to SNF    # R foot cyst/hematoma   - s/p aspiration w Podiatry, is WBAT R foot  - VA duplex neg    # Thyroid/Mediastinal mass  - Incidental finding of 6.1 cm heterogeneous lesion within the superior mediastinum, possibly arising from the right thyroid gland, suspicious for malignancy or goiter. Recommend a thyroid ultrasound for further evaluation.  - TSH 1.37  - Thyroid US shows 6.9 cm nodule w mediastinal ext, 2.6 cm nodule, 0.8 cm nodule  - Pt should follow up as outpt for management    # Normocytic anemia  - Iron studies wnl  - B12 low, Folate pending  - Will start B12 supplements    # Asymptomatic bacteriuria  - No indication of treatment    # H/o Schizoaffective disorder  - c/w Escitalopram 2.5 mg daily    # DM   - Check FS qhs and start basal bolus insulin if Fs >180  - HbA1c 5.9%  - Low cholesterol/HDL ratio    # HTN  - c/w lasix 20mg daily  - c/w losartan 12.5 daily    # Lower Extremity edema  - Echo - EF 57%, GIDD, mild AR; VA duplex neg  - c/w lasix 20mg daily    # Diet: DASH  # DVT ppx: Lovenox  # Activity: as tolerated  # FULL CODE  # Dispo: came from home, Most likely going to NH

## 2020-05-31 LAB
ANION GAP SERPL CALC-SCNC: 10 MMOL/L — SIGNIFICANT CHANGE UP (ref 7–14)
BASOPHILS # BLD AUTO: 0.05 K/UL — SIGNIFICANT CHANGE UP (ref 0–0.2)
BASOPHILS NFR BLD AUTO: 0.8 % — SIGNIFICANT CHANGE UP (ref 0–1)
BUN SERPL-MCNC: 23 MG/DL — HIGH (ref 10–20)
CALCIUM SERPL-MCNC: 9.2 MG/DL — SIGNIFICANT CHANGE UP (ref 8.5–10.1)
CHLORIDE SERPL-SCNC: 106 MMOL/L — SIGNIFICANT CHANGE UP (ref 98–110)
CO2 SERPL-SCNC: 25 MMOL/L — SIGNIFICANT CHANGE UP (ref 17–32)
CREAT SERPL-MCNC: 1.2 MG/DL — SIGNIFICANT CHANGE UP (ref 0.7–1.5)
EOSINOPHIL # BLD AUTO: 0.31 K/UL — SIGNIFICANT CHANGE UP (ref 0–0.7)
EOSINOPHIL NFR BLD AUTO: 5 % — SIGNIFICANT CHANGE UP (ref 0–8)
GLUCOSE BLDC GLUCOMTR-MCNC: 101 MG/DL — HIGH (ref 70–99)
GLUCOSE BLDC GLUCOMTR-MCNC: 111 MG/DL — HIGH (ref 70–99)
GLUCOSE BLDC GLUCOMTR-MCNC: 126 MG/DL — HIGH (ref 70–99)
GLUCOSE BLDC GLUCOMTR-MCNC: 141 MG/DL — HIGH (ref 70–99)
GLUCOSE SERPL-MCNC: 109 MG/DL — HIGH (ref 70–99)
HCT VFR BLD CALC: 32 % — LOW (ref 37–47)
HGB BLD-MCNC: 10.1 G/DL — LOW (ref 12–16)
IMM GRANULOCYTES NFR BLD AUTO: 0.2 % — SIGNIFICANT CHANGE UP (ref 0.1–0.3)
LYMPHOCYTES # BLD AUTO: 1.55 K/UL — SIGNIFICANT CHANGE UP (ref 1.2–3.4)
LYMPHOCYTES # BLD AUTO: 25.2 % — SIGNIFICANT CHANGE UP (ref 20.5–51.1)
MCHC RBC-ENTMCNC: 30.4 PG — SIGNIFICANT CHANGE UP (ref 27–31)
MCHC RBC-ENTMCNC: 31.6 G/DL — LOW (ref 32–37)
MCV RBC AUTO: 96.4 FL — SIGNIFICANT CHANGE UP (ref 81–99)
MONOCYTES # BLD AUTO: 0.56 K/UL — SIGNIFICANT CHANGE UP (ref 0.1–0.6)
MONOCYTES NFR BLD AUTO: 9.1 % — SIGNIFICANT CHANGE UP (ref 1.7–9.3)
NEUTROPHILS # BLD AUTO: 3.68 K/UL — SIGNIFICANT CHANGE UP (ref 1.4–6.5)
NEUTROPHILS NFR BLD AUTO: 59.7 % — SIGNIFICANT CHANGE UP (ref 42.2–75.2)
NRBC # BLD: 0 /100 WBCS — SIGNIFICANT CHANGE UP (ref 0–0)
PLATELET # BLD AUTO: 311 K/UL — SIGNIFICANT CHANGE UP (ref 130–400)
POTASSIUM SERPL-MCNC: 4.8 MMOL/L — SIGNIFICANT CHANGE UP (ref 3.5–5)
POTASSIUM SERPL-SCNC: 4.8 MMOL/L — SIGNIFICANT CHANGE UP (ref 3.5–5)
RBC # BLD: 3.32 M/UL — LOW (ref 4.2–5.4)
RBC # FLD: 12.7 % — SIGNIFICANT CHANGE UP (ref 11.5–14.5)
SODIUM SERPL-SCNC: 141 MMOL/L — SIGNIFICANT CHANGE UP (ref 135–146)
WBC # BLD: 6.16 K/UL — SIGNIFICANT CHANGE UP (ref 4.8–10.8)
WBC # FLD AUTO: 6.16 K/UL — SIGNIFICANT CHANGE UP (ref 4.8–10.8)

## 2020-05-31 PROCEDURE — 99233 SBSQ HOSP IP/OBS HIGH 50: CPT

## 2020-05-31 PROCEDURE — 99222 1ST HOSP IP/OBS MODERATE 55: CPT

## 2020-05-31 RX ADMIN — PREGABALIN 1000 MICROGRAM(S): 225 CAPSULE ORAL at 11:34

## 2020-05-31 RX ADMIN — ENOXAPARIN SODIUM 40 MILLIGRAM(S): 100 INJECTION SUBCUTANEOUS at 11:32

## 2020-05-31 RX ADMIN — Medication 20 MILLIGRAM(S): at 05:01

## 2020-05-31 RX ADMIN — ESCITALOPRAM OXALATE 2.5 MILLIGRAM(S): 10 TABLET, FILM COATED ORAL at 11:33

## 2020-05-31 RX ADMIN — LOSARTAN POTASSIUM 12.5 MILLIGRAM(S): 100 TABLET, FILM COATED ORAL at 05:01

## 2020-05-31 NOTE — PROGRESS NOTE ADULT - SUBJECTIVE AND OBJECTIVE BOX
HPI:  79-year-old female with past medical history of hypertension, diabetes, dementia, chronic lower extremity edema, no blood thinners, basal cell carcinoma, schizoaffective disorder and frequent falls presented after an unwitnessed fall from bed. Patient was found down on the floor from bed by son and EMS was called. States she fell yesterday while walking, today admits to falling from bed. No loc, no headstrike. Very poor historian. Denies chest pain, palpitation, dizziness, sweating, feeling warm or any anxiety. As per daughter has frequent violent outbursts at home. Family have been trying to pursue placement but pt has objected, son Joaquín is only caregiver. Will admit for rehab, optimization.    pt seen and examined. no pain, she denies any loc.     Vital Signs Last 24 Hrs  T(C): 36.6 (31 May 2020 07:30), Max: 36.8 (31 May 2020 00:00)  T(F): 97.8 (31 May 2020 07:30), Max: 98.3 (31 May 2020 00:00)  HR: 84 (31 May 2020 07:30) (74 - 84)  BP: 144/79 (31 May 2020 07:30) (122/67 - 154/67)  RR: 20 (31 May 2020 07:30) (18 - 20)  SpO2: 98% (31 May 2020 07:30) (98% - 99%)    Physical exam:   constitutional NAD, AAOX3, Respiratory  lungs CTA, CVS heart RRR, GI: abdomen Soft NT, ND, BS+, skin: intact  neuro exam non focal.   swelling on the right foot.   On PE laceration to left elbow and injury near the left ear. not bleeding,                                 10.1   6.16  )-----------( 311      ( 31 May 2020 04:16 )             32.0       05-31    141  |  106  |  23<H>  ----------------------------<  109<H>  4.8   |  25  |  1.2    Ca    9.2      31 May 2020 04:16    Ca    9.6      29 May 2020 06:29  Phos  3.7     05-29  Mg     2.0     05-29    TPro  5.8<L>  /  Alb  3.1<L>  /  TBili  0.3  /  DBili  x   /  AST  11  /  ALT  6   /  AlkPhos  77  05-29    MEDICATIONS  (STANDING):  chlorhexidine 4% Liquid 1 Application(s) Topical daily  cyanocobalamin Injectable 1000 MICROGram(s) SubCutaneous daily  enoxaparin Injectable 40 milliGRAM(s) SubCutaneous daily  escitalopram 2.5 milliGRAM(s) Oral daily  furosemide    Tablet 20 milliGRAM(s) Oral daily  losartan 12.5 milliGRAM(s) Oral daily      < from: CT Chest w/ IV Cont (05.28.20 @ 09:38) >    1.  No CT evidence of acute traumatic injury to the chest, abdomen or pelvis.    2.  Hyperdensities within the gallbladder and CBD with associated mild pericholecystic fat stranding. Findings are consistent with cholelithiasis and choledocholithiasis.   The CBD measures 7 mm (series 5/309). If there is clinical concern for acute cholecystitis, right upper quadrant ultrasound may be obtained.    3.  6.1 cm heterogeneous lesion within the superior mediastinum, possibly arising from the right thyroid gland, suspicious for malignancy or goiter. Recommend a thyroid ultrasound for further evaluation.    < end of copied text >    ECG NSR and LVH    CARDIAC MARKERS ( 29 May 2020 06:29 )  x     / <0.01 ng/mL / x     / x     / x      CARDIAC MARKERS ( 29 May 2020 01:13 )  x     / <0.01 ng/mL / x     / x     / x      CARDIAC MARKERS ( 28 May 2020 20:46 )  x     / <0.01 ng/mL / x     / x     / x      CARDIAC MARKERS ( 28 May 2020 05:35 )  x     / x     / 118 U/L / x     / x        < from: Transthoracic Echocardiogram (05.29.20 @ 09:46) >  Summary:   1. Normal global left ventricular systolic function.   2. LV Ejection Fraction by Pond's Method with a biplane EF of 57 %.   3. Normal left ventricular internal cavity size.   4. Spectral Doppler shows impaired relaxation pattern of left ventricular myocardial filling (Grade I diastolic dysfunction).   5. Normal right atrial size.   6. There is no evidence of pericardial effusion.   7. Mild mitral annular calcification.   8. Mild thickening and calcification of the anterior and posterior mitral valve leaflets.   9. Mild aortic regurgitation.    < end of copied text >      Assessment and Plan   #Fall, pt is a poor historian etiology unknown. no fractures, possibly rule out seizure, neuro consult .   #grade I diastolic CHF, cont ace and lasix.   #Incidental finding of mediastinal mass and thyroid nodule. Thyroid US done. TSH 1.37 Will need to f/u in Clinic - Endo/ENT/CTSx    #Right foot swelling, not tender, aspiration was dry ( done by podiatry)  possible cyst vs lipoma, rule out hematoma. POD consult appreciated  #Normocytic anemia, drop in hct, pt denies any bleeding symptoms, no active bleeding, not iron deficient   repeat labs, check stool occult     #bacteriuria. nitrite neg, no leukocytosis, fu cultures, pt very poor historian but denies dysuria, no fever,     #Hx  of schizoaffective stable cont meds  #HTN. Restart home meds.   Full code    Dispo: [facility] / [facility] working on it HPI:  79-year-old female with past medical history of hypertension, diabetes, dementia, chronic lower extremity edema, no blood thinners, basal cell carcinoma, schizoaffective disorder and frequent falls presented after an unwitnessed fall from bed. Patient was found down on the floor from bed by son and EMS was called. States she fell yesterday while walking, today admits to falling from bed. No loc, no headstrike. Very poor historian. Denies chest pain, palpitation, dizziness, sweating, feeling warm or any anxiety. As per daughter has frequent violent outbursts at home. Family have been trying to pursue placement but pt has objected, son Joaquín is only caregiver. Will admit for rehab, optimization.    pt seen and examined. no pain, she denies any loc.     Vital Signs Last 24 Hrs  T(C): 36.6 (31 May 2020 07:30), Max: 36.8 (31 May 2020 00:00)  T(F): 97.8 (31 May 2020 07:30), Max: 98.3 (31 May 2020 00:00)  HR: 84 (31 May 2020 07:30) (74 - 84)  BP: 144/79 (31 May 2020 07:30) (122/67 - 154/67)  RR: 20 (31 May 2020 07:30) (18 - 20)  SpO2: 98% (31 May 2020 07:30) (98% - 99%)    Physical exam:   constitutional NAD, AAOX3, Respiratory  lungs CTA, CVS heart RRR, GI: abdomen Soft NT, ND, BS+, skin: intact  neuro exam non focal.   swelling on the right foot.   On PE laceration to left elbow and injury near the left ear. not bleeding,                                 10.1   6.16  )-----------( 311      ( 31 May 2020 04:16 )             32.0       05-31    141  |  106  |  23<H>  ----------------------------<  109<H>  4.8   |  25  |  1.2    Ca    9.2      31 May 2020 04:16    Ca    9.6      29 May 2020 06:29  Phos  3.7     05-29  Mg     2.0     05-29    TPro  5.8<L>  /  Alb  3.1<L>  /  TBili  0.3  /  DBili  x   /  AST  11  /  ALT  6   /  AlkPhos  77  05-29    MEDICATIONS  (STANDING):  chlorhexidine 4% Liquid 1 Application(s) Topical daily  cyanocobalamin Injectable 1000 MICROGram(s) SubCutaneous daily  enoxaparin Injectable 40 milliGRAM(s) SubCutaneous daily  escitalopram 2.5 milliGRAM(s) Oral daily  furosemide    Tablet 20 milliGRAM(s) Oral daily  losartan 12.5 milliGRAM(s) Oral daily      < from: CT Chest w/ IV Cont (05.28.20 @ 09:38) >    1.  No CT evidence of acute traumatic injury to the chest, abdomen or pelvis.    2.  Hyperdensities within the gallbladder and CBD with associated mild pericholecystic fat stranding. Findings are consistent with cholelithiasis and choledocholithiasis.   The CBD measures 7 mm (series 5/309). If there is clinical concern for acute cholecystitis, right upper quadrant ultrasound may be obtained.    3.  6.1 cm heterogeneous lesion within the superior mediastinum, possibly arising from the right thyroid gland, suspicious for malignancy or goiter. Recommend a thyroid ultrasound for further evaluation.    < end of copied text >    ECG NSR and LVH    CARDIAC MARKERS ( 29 May 2020 06:29 )  x     / <0.01 ng/mL / x     / x     / x      CARDIAC MARKERS ( 29 May 2020 01:13 )  x     / <0.01 ng/mL / x     / x     / x      CARDIAC MARKERS ( 28 May 2020 20:46 )  x     / <0.01 ng/mL / x     / x     / x      CARDIAC MARKERS ( 28 May 2020 05:35 )  x     / x     / 118 U/L / x     / x        < from: Transthoracic Echocardiogram (05.29.20 @ 09:46) >  Summary:   1. Normal global left ventricular systolic function.   2. LV Ejection Fraction by Pond's Method with a biplane EF of 57 %.   3. Normal left ventricular internal cavity size.   4. Spectral Doppler shows impaired relaxation pattern of left ventricular myocardial filling (Grade I diastolic dysfunction).   5. Normal right atrial size.   6. There is no evidence of pericardial effusion.   7. Mild mitral annular calcification.   8. Mild thickening and calcification of the anterior and posterior mitral valve leaflets.   9. Mild aortic regurgitation.    < end of copied text >      Assessment and Plan     #Fall, pt is a poor historian etiology unknown. no fractures, possibly rule out seizure, neuro consult .     #grade I diastolic CHF, cont ace and lasix.     #Incidental finding of mediastinal mass and thyroid nodule. Thyroid US done. TSH 1.37 Will need to f/u in Clinic - Endo/ENT/CTSx    #Right foot swelling, not tender, aspiration was dry ( done by podiatry)  possible cyst vs lipoma, rule out hematoma. POD consult appreciated    #Normocytic anemia - no evidence of blood loss while in hospital, Iron Studies WNL. This anemia could be due to Mediastinal Mass / Malignancy.     #Bacteriuria. nitrite neg, no leukocytosis, fu cultures, pt very poor historian but denies dysuria, no fever, No Abx    #Hx  of schizoaffective stable cont meds    #HTN. Restart home meds.     Full code    Dispo: [facility] / [facility] working on it

## 2020-06-01 ENCOUNTER — TRANSCRIPTION ENCOUNTER (OUTPATIENT)
Age: 80
End: 2020-06-01

## 2020-06-01 LAB
ANION GAP SERPL CALC-SCNC: 15 MMOL/L — HIGH (ref 7–14)
BASOPHILS # BLD AUTO: 0.06 K/UL — SIGNIFICANT CHANGE UP (ref 0–0.2)
BASOPHILS NFR BLD AUTO: 0.9 % — SIGNIFICANT CHANGE UP (ref 0–1)
BUN SERPL-MCNC: 26 MG/DL — HIGH (ref 10–20)
CALCIUM SERPL-MCNC: 9.5 MG/DL — SIGNIFICANT CHANGE UP (ref 8.5–10.1)
CHLORIDE SERPL-SCNC: 102 MMOL/L — SIGNIFICANT CHANGE UP (ref 98–110)
CO2 SERPL-SCNC: 22 MMOL/L — SIGNIFICANT CHANGE UP (ref 17–32)
CREAT SERPL-MCNC: 1.1 MG/DL — SIGNIFICANT CHANGE UP (ref 0.7–1.5)
EOSINOPHIL # BLD AUTO: 0.38 K/UL — SIGNIFICANT CHANGE UP (ref 0–0.7)
EOSINOPHIL NFR BLD AUTO: 5.8 % — SIGNIFICANT CHANGE UP (ref 0–8)
GLUCOSE BLDC GLUCOMTR-MCNC: 119 MG/DL — HIGH (ref 70–99)
GLUCOSE BLDC GLUCOMTR-MCNC: 137 MG/DL — HIGH (ref 70–99)
GLUCOSE SERPL-MCNC: 104 MG/DL — HIGH (ref 70–99)
HCT VFR BLD CALC: 34.3 % — LOW (ref 37–47)
HGB BLD-MCNC: 10.5 G/DL — LOW (ref 12–16)
IMM GRANULOCYTES NFR BLD AUTO: 0.3 % — SIGNIFICANT CHANGE UP (ref 0.1–0.3)
LYMPHOCYTES # BLD AUTO: 1.49 K/UL — SIGNIFICANT CHANGE UP (ref 1.2–3.4)
LYMPHOCYTES # BLD AUTO: 22.7 % — SIGNIFICANT CHANGE UP (ref 20.5–51.1)
MCHC RBC-ENTMCNC: 29.4 PG — SIGNIFICANT CHANGE UP (ref 27–31)
MCHC RBC-ENTMCNC: 30.6 G/DL — LOW (ref 32–37)
MCV RBC AUTO: 96.1 FL — SIGNIFICANT CHANGE UP (ref 81–99)
MONOCYTES # BLD AUTO: 0.71 K/UL — HIGH (ref 0.1–0.6)
MONOCYTES NFR BLD AUTO: 10.8 % — HIGH (ref 1.7–9.3)
NEUTROPHILS # BLD AUTO: 3.9 K/UL — SIGNIFICANT CHANGE UP (ref 1.4–6.5)
NEUTROPHILS NFR BLD AUTO: 59.5 % — SIGNIFICANT CHANGE UP (ref 42.2–75.2)
NRBC # BLD: 0 /100 WBCS — SIGNIFICANT CHANGE UP (ref 0–0)
PLATELET # BLD AUTO: 339 K/UL — SIGNIFICANT CHANGE UP (ref 130–400)
POTASSIUM SERPL-MCNC: 4.9 MMOL/L — SIGNIFICANT CHANGE UP (ref 3.5–5)
POTASSIUM SERPL-SCNC: 4.9 MMOL/L — SIGNIFICANT CHANGE UP (ref 3.5–5)
RBC # BLD: 3.57 M/UL — LOW (ref 4.2–5.4)
RBC # FLD: 12.7 % — SIGNIFICANT CHANGE UP (ref 11.5–14.5)
SODIUM SERPL-SCNC: 139 MMOL/L — SIGNIFICANT CHANGE UP (ref 135–146)
WBC # BLD: 6.56 K/UL — SIGNIFICANT CHANGE UP (ref 4.8–10.8)
WBC # FLD AUTO: 6.56 K/UL — SIGNIFICANT CHANGE UP (ref 4.8–10.8)

## 2020-06-01 PROCEDURE — 99232 SBSQ HOSP IP/OBS MODERATE 35: CPT

## 2020-06-01 RX ORDER — FUROSEMIDE 40 MG
1 TABLET ORAL
Qty: 30 | Refills: 3
Start: 2020-06-01 | End: 2020-09-28

## 2020-06-01 RX ADMIN — PREGABALIN 1000 MICROGRAM(S): 225 CAPSULE ORAL at 11:42

## 2020-06-01 RX ADMIN — Medication 20 MILLIGRAM(S): at 05:01

## 2020-06-01 RX ADMIN — LOSARTAN POTASSIUM 12.5 MILLIGRAM(S): 100 TABLET, FILM COATED ORAL at 05:01

## 2020-06-01 RX ADMIN — CHLORHEXIDINE GLUCONATE 1 APPLICATION(S): 213 SOLUTION TOPICAL at 11:42

## 2020-06-01 RX ADMIN — ESCITALOPRAM OXALATE 2.5 MILLIGRAM(S): 10 TABLET, FILM COATED ORAL at 11:38

## 2020-06-01 RX ADMIN — ENOXAPARIN SODIUM 40 MILLIGRAM(S): 100 INJECTION SUBCUTANEOUS at 11:42

## 2020-06-01 NOTE — DISCHARGE NOTE PROVIDER - NSDCCPCAREPLAN_GEN_ALL_CORE_FT
PRINCIPAL DISCHARGE DIAGNOSIS  Diagnosis: Fall  Assessment and Plan of Treatment: Fall prevention includes ways to make your home and other areas safer. It also includes ways you can move more carefully to prevent a fall. Health conditions that cause changes in your blood pressure, vision, or muscle strength and coordination may increase your risk for falls. Medicines may also increase your risk for falls if they make you dizzy, weak, or sleepy.  Seek Medical Attention If:  You have fallen and are unconscious.  fallen and cannot move part of your body.  You have fallen and have pain or a headache.  Fall prevention tips:  Stand or sit up slowly.  Use assistive devices as directed.   You may need to have grab bars put in your bathroom near the toilet or in the shower.  Wear shoes that fit well and have soles that . Wear shoes both inside and outside. Do not wear shoes with high heels.  Wear a personal alarm that can call 911 in an emergency.   Manage your medical conditions. Keep all appointments with your healthcare providers. Visit your eye doctor as directed.  Home safety tips:   Put nonslip strips on your bath or shower floor to prevent you from   Use a shower seat so you do not need to stand while you shower. Sit on the toilet or a chair in your bathroom to dry yourself and put on clothing. This will prevent you from losing your balance from drying or dressing yourself while you are standing.  Keep paths clear. Remove books, shoes, and other objects from walkways and stairs. Place cords for telephones and lamps out of the way so that you do not need to walk over them. Tape them down if you cannot move them. Remove small rugs or secure it with double-sided tape to prevent you from   Install bright lights in your home. Use night lights to help light paths to the bathroom or kitchen. Always turn on the light before you start walking.  Keep items you use often on shelves within reach. Do not use a step stool to help you reach an item.  Place reflective tape on the edges of your stairs. To see better.        SECONDARY DISCHARGE DIAGNOSES  Diagnosis: Thyroid mass  Assessment and Plan of Treatment: you were found to have thyroid and mediastinal mass. please fu with endocrinologist and ENT doctor    Diagnosis: Cholelithiasis  Assessment and Plan of Treatment: please come to ED if you have any abdominal pain

## 2020-06-01 NOTE — DISCHARGE NOTE PROVIDER - CARE PROVIDER_API CALL
Juan Ortega  Dermatology  [address] [place] [id number]  Phone: [phone]  Fax: [phone]  Follow Up Time: 1 week    Maxim Copeland  PODIATRIC MEDICINE  [address] [place] [address]  Phone: [phone]  Fax: [phone]  Follow Up Time: 1 week    Attila Turner  EndocrinologyMetabDiabetes  [address] [place] [address]  Phone: [phone]  Fax: [phone]  Follow Up Time: 1 week    Ulices Linares  [facility]  [address] [place] [address]  Phone: [phone]  Fax: [phone]  Follow Up Time: 1 week

## 2020-06-01 NOTE — DISCHARGE NOTE PROVIDER - HOSPITAL COURSE
79-year-old female with past medical history of hypertension, diabetes, dementia, chronic lower extremity edema, no blood thinners, basal cell carcinoma, schizoaffective disorder and frequent falls presented after an unwitnessed fall from bed. Patient was found down on the floor from bed by son and EMS was called. States she fell yesterday while walking, today admits to falling from bed. No loc, no headstrike. Very poor historian. Denies chest pain, palpitation, dizziness, sweating, feeling warm or any anxiety. As per daughter has frequent violent outbursts at home. Family have been trying to pursue placement but pt has objected, son Joaquín is only caregiver. Will admit for rehab, optimization.    In ED Pt A&Ox3, vitals B.P. 181/82, HR 94, RR 18, Temp 98.1 and sat 99% on room air. Trauma work up was negative for any fracture or dislocation. UA positive for asymptomatic bacteriuria and leucocyte estrase. for  An incidental findings were found on CT chest/Abd/pelvis, a medistinal mass and thyroid nodule, cholelithiasis and choledocholithiasis. R foot cyst/hematoma. Workup required. On PE laceration to left elbow and injury near the left ear.        Hospital course significant for the following            #Fall, pt is a poor historian etiology unknown. no fractures, possibly rule out seizure, neuro consult --> EEG done no seizure. needs d/c to SNF        #grade I diastolic CHF, cont ace and lasix.         #Incidental finding of mediastinal mass and thyroid nodule. Thyroid US done. TSH 1.37 Will need to f/u in Clinic - Endo/ENT/CTSx        #Right foot swelling, not tender, aspiration was dry ( done by podiatry)  possible cyst vs lipoma, rule out hematoma. POD consult appreciated -->US done showed 9 cm lipoma --> outpatient fu        #Normocytic anemia - no evidence of blood loss while in hospital, Iron Studies WNL. This anemia could be due to Mediastinal Mass / Malignancy.         #Bacteriuria. nitrite neg, no leukocytosis, fu cultures, pt very poor historian but denies dysuria, no fever, No Abx        #Hx  of schizoaffective stable cont meds        #HTN. Restart home meds. 79-year-old female with past medical history of hypertension, diabetes, dementia, chronic lower extremity edema, no blood thinners, basal cell carcinoma, schizoaffective disorder and frequent falls presented after an unwitnessed fall from bed. Patient was found down on the floor from bed by son and EMS was called. States she fell yesterday while walking, today admits to falling from bed. No loc, no headstrike. Very poor historian. Denies chest pain, palpitation, dizziness, sweating, feeling warm or any anxiety. As per daughter has frequent violent outbursts at home. Family have been trying to pursue placement but pt has objected, son Joaquín is only caregiver. Will admit for rehab, optimization.    In ED Pt A&Ox3, vitals B.P. 181/82, HR 94, RR 18, Temp 98.1 and sat 99% on room air. Trauma work up was negative for any fracture or dislocation. UA positive for asymptomatic bacteriuria and leucocyte estrase. for  An incidental findings were found on CT chest/Abd/pelvis, a medistinal mass and thyroid nodule, cholelithiasis and choledocholithiasis. R foot cyst/hematoma. Workup required. On PE laceration to left elbow and injury near the left ear.        Hospital course significant for the following            #Fall, pt is a poor historian etiology unknown. no fractures, possibly rule out seizure, neuro consult --> EEG done no seizure. needs d/c to SNF        #grade I diastolic CHF, cont ace and lasix.         #Incidental finding of mediastinal mass and thyroid nodule. Thyroid US done. TSH 1.37 Will need to f/u in Clinic - Endo/ENT/CTSx        #Right foot swelling, not tender, aspiration was dry ( done by podiatry)  possible cyst vs lipoma, rule out hematoma. POD consult appreciated -->US done showed 9 cm lipoma --> outpatient fu        #Normocytic anemia - no evidence of blood loss while in hospital, Iron Studies WNL. This anemia could be due to Mediastinal Mass / Malignancy.         #Bacteriuria. nitrite neg, no leukocytosis, fu cultures, pt very poor historian but denies dysuria, no fever, No Abx        #Hx  of schizoaffective stable cont meds        #HTN. Restart home meds.         Attending Attestation    Pt seen and examined. Case and Plan discussed with Dr. Meehan and d/c summary reviewed and corrected.         GEN: Pt feeling well answering questions appropriately and is talking about the [employer] of [place].        Vital Signs Last 24 Hrs    T(C): 36.2 (01 Jun 2020 08:20), Max: 36.5 (31 May 2020 15:27)    T(F): 97.1 (01 Jun 2020 08:20), Max: 97.7 (31 May 2020 15:27)    HR: 72 (01 Jun 2020 08:20) (71 - 74)    BP: 162/56 (01 Jun 2020 08:20) (136/60 - 168/71)    RR: 19 (01 Jun 2020 08:20) (18 - 19)        CV: NL S1, S2    Resp: CTA B/L     GI: Benign    Ext: No e/c/c     MS: AOx3                                10.5     6.56  )-----------( 339      ( 01 Jun 2020 04:07 )               34.3         06-01        139  |  102  |  26<H>    ----------------------------<  104<H>    4.9   |  22  |  1.1        Ca    9.5      01 Jun 2020 04:07        Meds: Per [facility]         D/C to [facility] today when authorization available. CM on board.         Unwitnessed Fall - Needs [facility]. Syncope ruled out.     Mediastinal/Thyroid Mass - follow ups given. Need to do from Rehab 79-year-old female with past medical history of hypertension, diabetes, dementia, chronic lower extremity edema, no blood thinners, basal cell carcinoma, schizoaffective disorder and frequent falls presented after an unwitnessed fall from bed. Patient was found down on the floor from bed by son and EMS was called. States she fell yesterday while walking, today admits to falling from bed. No loc, no headstrike. Very poor historian. Denies chest pain, palpitation, dizziness, sweating, feeling warm or any anxiety. As per daughter has frequent violent outbursts at home. Family have been trying to pursue placement but pt has objected, son Joaquín is only caregiver. Will admit for rehab, optimization.    In ED Pt A&Ox3, vitals B.P. 181/82, HR 94, RR 18, Temp 98.1 and sat 99% on room air. Trauma work up was negative for any fracture or dislocation. UA positive for asymptomatic bacteriuria and leucocyte estrase. for  An incidental findings were found on CT chest/Abd/pelvis, a medistinal mass and thyroid nodule, cholelithiasis and choledocholithiasis. R foot cyst/hematoma. Workup required. On PE laceration to left elbow and injury near the left ear.        Hospital course significant for the following            #Fall, pt is a poor historian etiology unknown. no fractures, possibly rule out seizure, neuro consult --> EEG done no seizure.         #grade I diastolic CHF, cont ace and lasix.         #Incidental finding of mediastinal mass and thyroid nodule. Thyroid US done. TSH 1.37 Will need to f/u in Clinic - Endo/ENT/CTSx        #Right foot swelling, not tender, aspiration was dry ( done by podiatry)  possible cyst vs lipoma, rule out hematoma. POD consult appreciated -->US done showed 9 cm lipoma --> outpatient fu        #Normocytic anemia - no evidence of blood loss while in hospital, Iron Studies WNL. This anemia could be due to Mediastinal Mass / Malignancy.         #Bacteriuria. nitrite neg, no leukocytosis, fu cultures, pt very poor historian but denies dysuria, no fever, No Abx        #Hx  of schizoaffective stable cont meds        #HTN. Restart home meds.         Attending Attestation    Attending Attestation:    Patient was seen & examined independently. At least 10 systems were reviewed in ROS. All systems reviewed  are within normal limits. Latest vital signs and labs were reviewed today. Case was discussed with house staff in morning rounds for assessment and plan.  Patient is medically stable for discharge to HOME. About 40 mins spent on discharge disposition.         Vital Signs Last 24 Hrs    T(C): 36.2 (01 Jun 2020 08:20), Max: 36.5 (31 May 2020 15:27)    T(F): 97.1 (01 Jun 2020 08:20), Max: 97.7 (31 May 2020 15:27)    HR: 72 (01 Jun 2020 08:20) (71 - 74)    BP: 162/56 (01 Jun 2020 08:20) (136/60 - 168/71)    RR: 19 (01 Jun 2020 08:20) (18 - 19)        CV: NL S1, S2    Resp: CTA B/L     GI: Benign    Ext: No e/c/c     MS: AOx3                                10.5     6.56  )-----------( 339      ( 01 Jun 2020 04:07 )               34.3         06-01        139  |  102  |  26<H>    ----------------------------<  104<H>    4.9   |  22  |  1.1        Ca    9.5      01 Jun 2020 04:07        Meds: Per [facility]         D/C to HOME TODAY        Unwitnessed Fall - Needs [facility]. Syncope ruled out.     Mediastinal/Thyroid Mass - follow ups given. Need to do from Rehab

## 2020-06-01 NOTE — DISCHARGE NOTE PROVIDER - CARE PROVIDERS DIRECT ADDRESSES
,DirectAddress_Unknown,[email or web address],DirectAddress_Unknown,[email or web address]

## 2020-06-01 NOTE — DISCHARGE NOTE PROVIDER - PROVIDER TOKENS
PROVIDER:[TOKEN:[[id number]],FOLLOWUP:[1 week]],PROVIDER:[TOKEN:[[id number]],FOLLOWUP:[1 week]],PROVIDER:[TOKEN:[[id number]],FOLLOWUP:[1 week]],PROVIDER:[TOKEN:[[id number]],FOLLOWUP:[1 week]]

## 2020-06-01 NOTE — DISCHARGE NOTE PROVIDER - NSDCMRMEDTOKEN_GEN_ALL_CORE_FT
escitalopram 5 mg oral tablet: 0.5 tab(s) orally once a day  furosemide 20 mg oral tablet: 1 tab(s) orally once a day  losartan 25 mg oral tablet: 0.5 tab(s) orally once a day

## 2020-06-02 PROBLEM — F25.9 SCHIZOAFFECTIVE DISORDER, UNSPECIFIED: Chronic | Status: ACTIVE | Noted: 2020-05-28

## 2020-06-02 PROBLEM — E11.9 TYPE 2 DIABETES MELLITUS WITHOUT COMPLICATIONS: Chronic | Status: ACTIVE | Noted: 2020-05-28

## 2020-06-02 PROBLEM — I10 ESSENTIAL (PRIMARY) HYPERTENSION: Chronic | Status: ACTIVE | Noted: 2020-05-28

## 2020-06-02 LAB
GLUCOSE BLDC GLUCOMTR-MCNC: 115 MG/DL — HIGH (ref 70–99)
GLUCOSE BLDC GLUCOMTR-MCNC: 118 MG/DL — HIGH (ref 70–99)
GLUCOSE BLDC GLUCOMTR-MCNC: 122 MG/DL — HIGH (ref 70–99)
GLUCOSE BLDC GLUCOMTR-MCNC: 129 MG/DL — HIGH (ref 70–99)
GLUCOSE BLDC GLUCOMTR-MCNC: 93 MG/DL — SIGNIFICANT CHANGE UP (ref 70–99)
SARS-COV-2 RNA SPEC QL NAA+PROBE: SIGNIFICANT CHANGE UP

## 2020-06-02 PROCEDURE — 99233 SBSQ HOSP IP/OBS HIGH 50: CPT

## 2020-06-02 RX ADMIN — Medication 20 MILLIGRAM(S): at 05:40

## 2020-06-02 RX ADMIN — PREGABALIN 1000 MICROGRAM(S): 225 CAPSULE ORAL at 11:04

## 2020-06-02 RX ADMIN — LOSARTAN POTASSIUM 12.5 MILLIGRAM(S): 100 TABLET, FILM COATED ORAL at 05:40

## 2020-06-02 RX ADMIN — ENOXAPARIN SODIUM 40 MILLIGRAM(S): 100 INJECTION SUBCUTANEOUS at 11:03

## 2020-06-02 RX ADMIN — ESCITALOPRAM OXALATE 2.5 MILLIGRAM(S): 10 TABLET, FILM COATED ORAL at 11:04

## 2020-06-02 RX ADMIN — CHLORHEXIDINE GLUCONATE 1 APPLICATION(S): 213 SOLUTION TOPICAL at 11:03

## 2020-06-02 NOTE — PROGRESS NOTE ADULT - SUBJECTIVE AND OBJECTIVE BOX
CHELA GALLAGHER 79y Female  MRN#: [id number]         SUBJECTIVE  Patient is a 79y old Female who presents with a chief complaint of Mechanical fall (01 Jun 2020 10:13)  Currently admitted to medicine with the primary diagnosis of Fall    doing fine. no complaints.         OBJECTIVE  PAST MEDICAL & SURGICAL HISTORY  Schizoaffective disorder  Hypertension  Diabetes mellitus    ALLERGIES:  No Known Allergies    MEDICATIONS:  STANDING MEDICATIONS  chlorhexidine 4% Liquid 1 Application(s) Topical daily  cyanocobalamin Injectable 1000 MICROGram(s) SubCutaneous daily  enoxaparin Injectable 40 milliGRAM(s) SubCutaneous daily  escitalopram 2.5 milliGRAM(s) Oral daily  furosemide    Tablet 20 milliGRAM(s) Oral daily  losartan 12.5 milliGRAM(s) Oral daily    PRN MEDICATIONS      VITAL SIGNS: Last 24 Hours  T(C): 36.1 (02 Jun 2020 07:30), Max: 36.5 (01 Jun 2020 23:30)  T(F): 97 (02 Jun 2020 07:30), Max: 97.7 (01 Jun 2020 23:30)  HR: 76 (02 Jun 2020 07:30) (72 - 79)  BP: 140/67 (02 Jun 2020 07:30) (118/52 - 154/75)  BP(mean): --  RR: 18 (02 Jun 2020 07:30) (18 - 19)  SpO2: 95% (01 Jun 2020 23:30) (95% - 95%)    LABS:                        10.5   6.56  )-----------( 339      ( 01 Jun 2020 04:07 )             34.3     06-01    139  |  102  |  26<H>  ----------------------------<  104<H>  4.9   |  22  |  1.1    Ca    9.5      01 Jun 2020 04:07                    RADIOLOGY:      Physical Exam:   CONSTITUTIONAL: Well-developed; well-nourished; in no acute distress, speaking in full sentences, on RA  HEAD: Normocephalic; atraumatic, laceration on L jaw, healing, appears dry  CARD: +S1, S2 Regular rate and rhythm  RESP: CTABL  ABD: soft nontender, nondistended, no rebound, no guarding, no rigidity  EXT: moves all extremities,  no clubbing, cyanosis, moderate edema of LE b/l  NEURO: Alert, oriented, grossly unremarkable, no focal deficits, cn ii-xii grossly intact  PSYCH: Cooperative, appropriate             ASSESSMENT & PLAN  79-year-old female with past medical history of hypertension, diabetes, dementia, chronic lower extremity edema, no blood thinners, basal cell carcinoma, schizoaffective disorder and frequent falls presented after an unwitnessed fall from bed.    #Fall, pt is a poor historian etiology unknown. no fractures, possibly rule out seizure, neuro consult --> EEG done no seizure. needs d/c to SNF    #grade I diastolic CHF, cont ace and lasix.     #Incidental finding of mediastinal mass and thyroid nodule. Thyroid US done. TSH 1.37 Will need to f/u in Clinic - Endo/ENT/CTSx    #Right foot swelling, not tender, aspiration was dry ( done by podiatry)  possible cyst vs lipoma, rule out hematoma. POD consult appreciated -->US done showed 9 cm lipoma --> outpatient fu    #Normocytic anemia - no evidence of blood loss while in hospital, Iron Studies WNL. This anemia could be due to Mediastinal Mass / Malignancy.     #Bacteriuria. nitrite neg, no leukocytosis, fu cultures, pt very poor historian but denies dysuria, no fever, No Abx    #Hx  of schizoaffective stable cont meds    #HTN. Restart home meds.     #Dispo: d/c to SNF. awating COVID result

## 2020-06-03 DIAGNOSIS — F25.9 SCHIZOAFFECTIVE DISORDER, UNSPECIFIED: ICD-10-CM

## 2020-06-03 DIAGNOSIS — R41.89 OTHER SYMPTOMS AND SIGNS INVOLVING COGNITIVE FUNCTIONS AND AWARENESS: ICD-10-CM

## 2020-06-03 LAB
GLUCOSE BLDC GLUCOMTR-MCNC: 108 MG/DL — HIGH (ref 70–99)
GLUCOSE BLDC GLUCOMTR-MCNC: 121 MG/DL — HIGH (ref 70–99)
GLUCOSE BLDC GLUCOMTR-MCNC: 97 MG/DL — SIGNIFICANT CHANGE UP (ref 70–99)
GLUCOSE BLDC GLUCOMTR-MCNC: 99 MG/DL — SIGNIFICANT CHANGE UP (ref 70–99)

## 2020-06-03 PROCEDURE — 99233 SBSQ HOSP IP/OBS HIGH 50: CPT

## 2020-06-03 PROCEDURE — 99222 1ST HOSP IP/OBS MODERATE 55: CPT

## 2020-06-03 RX ADMIN — PREGABALIN 1000 MICROGRAM(S): 225 CAPSULE ORAL at 11:04

## 2020-06-03 RX ADMIN — ENOXAPARIN SODIUM 40 MILLIGRAM(S): 100 INJECTION SUBCUTANEOUS at 11:04

## 2020-06-03 RX ADMIN — LOSARTAN POTASSIUM 12.5 MILLIGRAM(S): 100 TABLET, FILM COATED ORAL at 05:46

## 2020-06-03 RX ADMIN — CHLORHEXIDINE GLUCONATE 1 APPLICATION(S): 213 SOLUTION TOPICAL at 11:04

## 2020-06-03 RX ADMIN — ESCITALOPRAM OXALATE 2.5 MILLIGRAM(S): 10 TABLET, FILM COATED ORAL at 11:05

## 2020-06-03 RX ADMIN — Medication 20 MILLIGRAM(S): at 05:47

## 2020-06-03 NOTE — BEHAVIORAL HEALTH ASSESSMENT NOTE - SUMMARY
Ms Yusuf is an 79 year old woman with a history of Schizoaffective disorder who was admitted to the medical floor status post fall. psychiatry consult was called for a mental health evaluation as the patient's disposition is potentially to a nursing home.   Patient does not appear acutely psychotic , manic or depressed. She denies suicidal ideations, intent or plan. Although patient is reportedly non compliant with her psychotropic medication and has some paranoid delusions, they seem chronic and she does not seem to act on her delusions. Patient appears to have some cognitive impairment which may explain her current behaviors and the perceived stubbornness by her family.   At this time, patient is not considered a danger to herself or others and does not need inpatient psychiatric hospitalization. Patient may benefit from re-starting low dose haldol to target her paranoia as she has been on this medication in the past  . We recommend that the medical team discusses with patient's family before starting this medication because it does have an associated black box warning of the risk of strokes and death when used in elderly patient's with dementia. please monitor her EKG for her QTC.  We recommend a neurology evaluation to rule out dementia , keeping in mind patient's back ground and past experiences . It is important to note that patient's family is unable to take care of her because of her worsening  physical disabilities and not because patient's psychiatric symptoms are worsening. We are agreeable to a higher level of care for patient if the patient's family is finding challenging to take care of her.

## 2020-06-03 NOTE — BEHAVIORAL HEALTH ASSESSMENT NOTE - NSBHCHARTREVIEWVS_PSY_A_CORE FT
Vital Signs Last 24 Hrs  T(C): 36.3 (03 Jun 2020 15:35), Max: 36.3 (03 Jun 2020 15:35)  T(F): 97.4 (03 Jun 2020 15:35), Max: 97.4 (03 Jun 2020 15:35)  HR: 67 (03 Jun 2020 15:35) (67 - 77)  BP: 128/62 (03 Jun 2020 15:35) (109/53 - 132/63)  BP(mean): --  RR: 18 (03 Jun 2020 15:35) (18 - 18)  SpO2: --

## 2020-06-03 NOTE — BEHAVIORAL HEALTH ASSESSMENT NOTE - NSBHCONSULTMEDAGITATION_PSY_A_CORE FT
If patient becomes agitated , we recommend haldol 0.5mg - 1 mg Q 6 hrs PRN for agitation . if patient becomes severely agitated and is considered a danger to herself or others , please note that this medication can be given via the intramuscular route

## 2020-06-03 NOTE — BEHAVIORAL HEALTH ASSESSMENT NOTE - AXIS III
hypertension, diabetes, dementia, chronic lower extremity edema, no blood thinners, basal cell carcinoma,

## 2020-06-03 NOTE — BEHAVIORAL HEALTH ASSESSMENT NOTE - DESCRIPTION
As per chart , hypertension, diabetes, dementia, chronic lower extremity edema, no blood thinners, basal cell carcinoma,

## 2020-06-03 NOTE — BEHAVIORAL HEALTH ASSESSMENT NOTE - RISK ASSESSMENT
Low Acute Suicide Risk Risk factors for suicide in this patient are her history of schizoaffective disorder and non compliance with medication , and one past suicide  attempt , however patient has no current mood symptoms, has no suicidal ideations at this time, if functioning well enough considering her physical disability , has good support system

## 2020-06-03 NOTE — BEHAVIORAL HEALTH ASSESSMENT NOTE - NSBHCONSULTRECOMMENDOTHER_PSY_A_CORE FT
Recommend neurology consult to rule out Dementia   Agree with  higher level of care as per medical team

## 2020-06-03 NOTE — BEHAVIORAL HEALTH ASSESSMENT NOTE - NSBHCONSULTFOLLOWAFTERCARE_PSY_A_CORE FT
Please make an appoitnent for patient at the [facility] , [address] [place] [address], Phone number- [phone]/ [phone]   OR give the patient the phone number -[phone] to make the appointment herself or for the family to make the appointment.   However if patient is being transferred to a nursing home, she can follow up with the psychiatrist affiliated with the particular nursing home she will be going to. Please make an appointment for patient at the [facility] , [address] [place] [address], Phone number- [phone]/ [phone]   OR give the patient the phone number -[phone] to make the appointment herself or for the family to make the appointment.   However if patient is being transferred to a nursing home, she can follow up with the psychiatrist affiliated with the particular nursing home she will be going to.

## 2020-06-03 NOTE — BEHAVIORAL HEALTH ASSESSMENT NOTE - NSBHCONSULTMEDS_PSY_A_CORE FT
Recommend restarting haldol 0.5mg p.o BID with the plan to gradually titrated up to 1mg P.o BID, please monitor EKG for her QTC which has t be < 500.

## 2020-06-03 NOTE — BEHAVIORAL HEALTH ASSESSMENT NOTE - HPI (INCLUDE ILLNESS QUALITY, SEVERITY, DURATION, TIMING, CONTEXT, MODIFYING FACTORS, ASSOCIATED SIGNS AND SYMPTOMS)
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 Psychiatry consult was called for a mental health evaluation as patient is going to be transferred to a nursing home.    Patient denies acute symptoms of psychosis , rafy and depression. She denies suicidal thought s, intent or plan. 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 Psychiatry consult was called for a mental health evaluation as patient is going to be transferred to a nursing home.    Patient denies acute symptoms of psychosis , rafy and depression. She denies suicidal thoughts, intent or plan.    Collateral information was obtained from patient's son Mr Yanes ([phone]). He confirms that patient has a long history of schizoaffective disorder and non compliance with her psychotropic medication. He reports that often she is observed to be taking to someone who is not there and thinks that a neighbor whom she does not like comes to touch her leg. However despite not being on her medication, patient is functional , able to go to the store on her own , cook and , take care of her activities of daily living. He reports that he is worried about her because she wants to walk around despite her swollen leg and not being steady on her feet. He reports that patient has fallen several times and he is always concerned that patient will fall when he is not around her however because he feels that she is very stubborn and independent person she does not listen when he tells her that she needs help. 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 Psychiatry consult was called for a mental health evaluation as patient is going to be transferred to a nursing home.  On approach of the patient, she was observed to be sleeping on her bed, easily awaken, and agreeable to participate in the interview.   Patient reports that there is nothing wrong with her and that she is in the hospital because she fell. Patient states " happens to everyone". She reports that she has no complaints and is eager to go home. Patient reports that she livers with her son who is very good to her and gets along well with her neighbors however there is an Didi family that live opposite her that is very dirty and has often times come into their apartment building. She states " it is a felony". Patient states " every one else is very clean, the man opposite is very dirty". Patient reports that she has no psychiatrist and is not on psychiatric medication . She reports that all her medications are prescribed to her by her primary medical doctor.   Patient denies acute symptoms of psychosis , rafy and depression. She denies suicidal thoughts, intent or plan.  As per nurse taking care of patient , she has been calm and cooperative since he has been taking care of her . he reports that she has been a bit irritable since she was informed that she was going to be transferred to the nursing home but is in good behavioral control.     Collateral information was obtained from patient's son Mr Yanes ([phone]). He confirms that patient has a long history of schizoaffective disorder and non compliance with her psychotropic medication. He reports that often she is observed to be taking to someone who is not there and thinks that a neighbor whom she does not like comes to touch her leg. However despite not being on her medication, patient is does not act on her delusions, is functional , able to go to the store on her own , cook and takes care of her activities of daily living. He reports that he is worried about her because she wants to walk around despite her swollen leg and not being steady on her feet. He reports that patient has fallen several times and he is always concerned that patient will fall when he is not around her however because he feels that she is very stubborn and independent person she does not listen when he tells her that she needs help.

## 2020-06-03 NOTE — PROGRESS NOTE ADULT - SUBJECTIVE AND OBJECTIVE BOX
CHELA GALLAGHER 79y Female  MRN#: [id number]         SUBJECTIVE  Patient is a 79y old Female who presents with a chief complaint of Mechanical fall (02 Jun 2020 08:51)  Currently admitted to medicine with the primary diagnosis of Fall    Patient reports no complaints. no pain or SOB        OBJECTIVE  PAST MEDICAL & SURGICAL HISTORY  Schizoaffective disorder  Hypertension  Diabetes mellitus    ALLERGIES:  No Known Allergies    MEDICATIONS:  STANDING MEDICATIONS  chlorhexidine 4% Liquid 1 Application(s) Topical daily  cyanocobalamin Injectable 1000 MICROGram(s) SubCutaneous daily  enoxaparin Injectable 40 milliGRAM(s) SubCutaneous daily  escitalopram 2.5 milliGRAM(s) Oral daily  furosemide    Tablet 20 milliGRAM(s) Oral daily  losartan 12.5 milliGRAM(s) Oral daily    PRN MEDICATIONS      VITAL SIGNS: Last 24 Hours  T(C): 35.7 (03 Jun 2020 07:30), Max: 36.2 (02 Jun 2020 15:30)  T(F): 96.3 (03 Jun 2020 07:30), Max: 97.1 (02 Jun 2020 15:30)  HR: 70 (03 Jun 2020 07:30) (62 - 77)  BP: 109/53 (03 Jun 2020 07:30) (109/53 - 167/70)  BP(mean): --  RR: 18 (03 Jun 2020 07:30) (18 - 18)  SpO2: --    LABS:                        RADIOLOGY:        Physical Exam:   CONSTITUTIONAL: Well-developed; well-nourished; in no acute distress, speaking in full sentences, on RA  HEAD: Normocephalic; atraumatic, laceration on L jaw, healing, appears dry  CARD: +S1, S2 Regular rate and rhythm  RESP: CTABL  ABD: soft nontender, nondistended, no rebound, no guarding, no rigidity  EXT: moves all extremities,  no clubbing, cyanosis, moderate edema of LE b/l  NEURO: Alert, oriented, grossly unremarkable, no focal deficits, cn ii-xii grossly intact  PSYCH: Cooperative, appropriate             ASSESSMENT & PLAN  79-year-old female with past medical history of hypertension, diabetes, dementia, chronic lower extremity edema, no blood thinners, basal cell carcinoma, schizoaffective disorder and frequent falls presented after an unwitnessed fall from bed.    #Fall, pt is a poor historian etiology unknown. no fractures, possibly rule out seizure, neuro consult --> EEG done no seizure. needs d/c to SNF    #grade I diastolic CHF, cont ace and lasix.     #Incidental finding of mediastinal mass and thyroid nodule. Thyroid US done. TSH 1.37 Will need to f/u in Clinic - Endo/ENT/CTSx    #Right foot swelling, not tender, aspiration was dry ( done by podiatry)  possible cyst vs lipoma, rule out hematoma. POD consult appreciated -->US done showed 9 cm lipoma --> outpatient fu    #Normocytic anemia - no evidence of blood loss while in hospital, Iron Studies WNL. This anemia could be due to Mediastinal Mass / Malignancy.     #Bacteriuria. nitrite neg, no leukocytosis, fu cultures, pt very poor historian but denies dysuria, no fever, No Abx    #Hx  of schizoaffective stable cont meds    #HTN. Restart home meds.     #Dispo: d/c to SNF. awaiting auth. COVID negative

## 2020-06-04 LAB
ALBUMIN SERPL ELPH-MCNC: 3.3 G/DL — LOW (ref 3.5–5.2)
ALP SERPL-CCNC: 74 U/L — SIGNIFICANT CHANGE UP (ref 30–115)
ALT FLD-CCNC: 9 U/L — SIGNIFICANT CHANGE UP (ref 0–41)
ANION GAP SERPL CALC-SCNC: 11 MMOL/L — SIGNIFICANT CHANGE UP (ref 7–14)
AST SERPL-CCNC: 15 U/L — SIGNIFICANT CHANGE UP (ref 0–41)
BILIRUB SERPL-MCNC: 0.4 MG/DL — SIGNIFICANT CHANGE UP (ref 0.2–1.2)
BUN SERPL-MCNC: 40 MG/DL — HIGH (ref 10–20)
CALCIUM SERPL-MCNC: 9.8 MG/DL — SIGNIFICANT CHANGE UP (ref 8.5–10.1)
CHLORIDE SERPL-SCNC: 105 MMOL/L — SIGNIFICANT CHANGE UP (ref 98–110)
CO2 SERPL-SCNC: 24 MMOL/L — SIGNIFICANT CHANGE UP (ref 17–32)
CREAT SERPL-MCNC: 1.3 MG/DL — SIGNIFICANT CHANGE UP (ref 0.7–1.5)
GLUCOSE BLDC GLUCOMTR-MCNC: 107 MG/DL — HIGH (ref 70–99)
GLUCOSE BLDC GLUCOMTR-MCNC: 124 MG/DL — HIGH (ref 70–99)
GLUCOSE BLDC GLUCOMTR-MCNC: 93 MG/DL — SIGNIFICANT CHANGE UP (ref 70–99)
GLUCOSE SERPL-MCNC: 99 MG/DL — SIGNIFICANT CHANGE UP (ref 70–99)
HCT VFR BLD CALC: 35.9 % — LOW (ref 37–47)
HGB BLD-MCNC: 10.7 G/DL — LOW (ref 12–16)
MAGNESIUM SERPL-MCNC: 2.1 MG/DL — SIGNIFICANT CHANGE UP (ref 1.8–2.4)
MCHC RBC-ENTMCNC: 28.9 PG — SIGNIFICANT CHANGE UP (ref 27–31)
MCHC RBC-ENTMCNC: 29.8 G/DL — LOW (ref 32–37)
MCV RBC AUTO: 97 FL — SIGNIFICANT CHANGE UP (ref 81–99)
NRBC # BLD: 0 /100 WBCS — SIGNIFICANT CHANGE UP (ref 0–0)
PLATELET # BLD AUTO: 345 K/UL — SIGNIFICANT CHANGE UP (ref 130–400)
POTASSIUM SERPL-MCNC: 5.5 MMOL/L — HIGH (ref 3.5–5)
POTASSIUM SERPL-SCNC: 5.5 MMOL/L — HIGH (ref 3.5–5)
PROT SERPL-MCNC: 6.3 G/DL — SIGNIFICANT CHANGE UP (ref 6–8)
RBC # BLD: 3.7 M/UL — LOW (ref 4.2–5.4)
RBC # FLD: 12.9 % — SIGNIFICANT CHANGE UP (ref 11.5–14.5)
SODIUM SERPL-SCNC: 140 MMOL/L — SIGNIFICANT CHANGE UP (ref 135–146)
WBC # BLD: 7.2 K/UL — SIGNIFICANT CHANGE UP (ref 4.8–10.8)
WBC # FLD AUTO: 7.2 K/UL — SIGNIFICANT CHANGE UP (ref 4.8–10.8)

## 2020-06-04 PROCEDURE — 99232 SBSQ HOSP IP/OBS MODERATE 35: CPT

## 2020-06-04 RX ORDER — HALOPERIDOL DECANOATE 100 MG/ML
1 INJECTION INTRAMUSCULAR EVERY 6 HOURS
Refills: 0 | Status: DISCONTINUED | OUTPATIENT
Start: 2020-06-04 | End: 2020-06-07

## 2020-06-04 RX ORDER — SODIUM POLYSTYRENE SULFONATE 4.1 MEQ/G
30 POWDER, FOR SUSPENSION ORAL ONCE
Refills: 0 | Status: COMPLETED | OUTPATIENT
Start: 2020-06-04 | End: 2020-06-04

## 2020-06-04 RX ORDER — HALOPERIDOL DECANOATE 100 MG/ML
1 INJECTION INTRAMUSCULAR
Qty: 0 | Refills: 0 | DISCHARGE
Start: 2020-06-04

## 2020-06-04 RX ORDER — LACTULOSE 10 G/15ML
20 SOLUTION ORAL ONCE
Refills: 0 | Status: COMPLETED | OUTPATIENT
Start: 2020-06-04 | End: 2020-06-04

## 2020-06-04 RX ORDER — HALOPERIDOL DECANOATE 100 MG/ML
0.5 INJECTION INTRAMUSCULAR
Refills: 0 | Status: DISCONTINUED | OUTPATIENT
Start: 2020-06-04 | End: 2020-06-07

## 2020-06-04 RX ORDER — HALOPERIDOL DECANOATE 100 MG/ML
0.5 INJECTION INTRAMUSCULAR EVERY 6 HOURS
Refills: 0 | Status: DISCONTINUED | OUTPATIENT
Start: 2020-06-04 | End: 2020-06-04

## 2020-06-04 RX ADMIN — ESCITALOPRAM OXALATE 2.5 MILLIGRAM(S): 10 TABLET, FILM COATED ORAL at 11:05

## 2020-06-04 RX ADMIN — Medication 20 MILLIGRAM(S): at 05:16

## 2020-06-04 RX ADMIN — ENOXAPARIN SODIUM 40 MILLIGRAM(S): 100 INJECTION SUBCUTANEOUS at 11:06

## 2020-06-04 RX ADMIN — HALOPERIDOL DECANOATE 0.5 MILLIGRAM(S): 100 INJECTION INTRAMUSCULAR at 17:53

## 2020-06-04 RX ADMIN — CHLORHEXIDINE GLUCONATE 1 APPLICATION(S): 213 SOLUTION TOPICAL at 11:06

## 2020-06-04 RX ADMIN — LOSARTAN POTASSIUM 12.5 MILLIGRAM(S): 100 TABLET, FILM COATED ORAL at 05:16

## 2020-06-04 RX ADMIN — SODIUM POLYSTYRENE SULFONATE 30 GRAM(S): 4.1 POWDER, FOR SUSPENSION ORAL at 08:09

## 2020-06-04 RX ADMIN — PREGABALIN 1000 MICROGRAM(S): 225 CAPSULE ORAL at 11:06

## 2020-06-04 RX ADMIN — LACTULOSE 20 GRAM(S): 10 SOLUTION ORAL at 08:09

## 2020-06-04 NOTE — DIETITIAN INITIAL EVALUATION ADULT. - OTHER INFO
Nutrition Hx PTA: Pt with excellent appetite/po intake, typically consumes 3 meals w/ snacks daily and denies use of oral nutrition supplements. Pt w/ no cultural/Amish restrictions and has NKFA.     Pt presented after an unwitnessed fall from bed, no fractures. Possibly rule out seizure; EEG non-significant for acute changes for seizure activity. Psych cleared for discharge.  Currently, awaiting auth for SNF/LTC .

## 2020-06-04 NOTE — PROGRESS NOTE ADULT - SUBJECTIVE AND OBJECTIVE BOX
CHELA GALLAGHER 79y Female  MRN#: [id number]         SUBJECTIVE  Patient is a 79y old Female who presents with a chief complaint of Mechanical fall (03 Jun 2020 09:28)  Currently admitted to medicine with the primary diagnosis of Fall    Patient doing fine. reports no complaints      OBJECTIVE  PAST MEDICAL & SURGICAL HISTORY  Schizoaffective disorder  Hypertension  Diabetes mellitus    ALLERGIES:  No Known Allergies    MEDICATIONS:  STANDING MEDICATIONS  chlorhexidine 4% Liquid 1 Application(s) Topical daily  cyanocobalamin Injectable 1000 MICROGram(s) SubCutaneous daily  enoxaparin Injectable 40 milliGRAM(s) SubCutaneous daily  escitalopram 2.5 milliGRAM(s) Oral daily  furosemide    Tablet 20 milliGRAM(s) Oral daily  haloperidol     Tablet 0.5 milliGRAM(s) Oral two times a day  losartan 12.5 milliGRAM(s) Oral daily    PRN MEDICATIONS  haloperidol     Tablet 1 milliGRAM(s) Oral every 6 hours PRN      VITAL SIGNS: Last 24 Hours  T(C): 36.2 (04 Jun 2020 07:39), Max: 36.4 (04 Jun 2020 00:00)  T(F): 97.2 (04 Jun 2020 07:39), Max: 97.6 (04 Jun 2020 00:00)  HR: 65 (04 Jun 2020 07:39) (61 - 67)  BP: 112/59 (04 Jun 2020 07:39) (109/56 - 128/62)  BP(mean): --  RR: 18 (04 Jun 2020 07:39) (18 - 18)  SpO2: 98% (04 Jun 2020 07:39) (98% - 98%)    LABS:                        10.7   7.20  )-----------( 345      ( 04 Jun 2020 05:41 )             35.9     06-04    140  |  105  |  40<H>  ----------------------------<  99  5.5<H>   |  24  |  1.3    Ca    9.8      04 Jun 2020 05:41  Mg     2.1     06-04    TPro  6.3  /  Alb  3.3<L>  /  TBili  0.4  /  DBili  x   /  AST  15  /  ALT  9   /  AlkPhos  74  06-04                  RADIOLOGY:          Physical Exam:   CONSTITUTIONAL: Well-developed; well-nourished; in no acute distress, speaking in full sentences, on RA  HEAD: Normocephalic; atraumatic, laceration on L jaw, healing, appears dry  CARD: +S1, S2 Regular rate and rhythm  RESP: CTABL  ABD: soft nontender, nondistended, no rebound, no guarding, no rigidity  EXT: moves all extremities,  no clubbing, cyanosis, moderate edema of LE b/l  NEURO: Alert, oriented, grossly unremarkable, no focal deficits, cn ii-xii grossly intact  PSYCH: Cooperative, appropriate             ASSESSMENT & PLAN  79-year-old female with past medical history of hypertension, diabetes, dementia, chronic lower extremity edema, no blood thinners, basal cell carcinoma, schizoaffective disorder and frequent falls presented after an unwitnessed fall from bed.    #Fall, pt is a poor historian etiology unknown. no fractures, possibly rule out seizure, neuro consult --> EEG done no seizure. needs d/c to SNF. needs Psych eval before going to NH --> done, she is not suicidal or psychotic --> restart her home med haldol 0.5 PO bid and give haldol 1 mg q6hrs as needed for agitation    #grade I diastolic CHF, cont ace and lasix.     #Incidental finding of mediastinal mass and thyroid nodule. Thyroid US done. TSH 1.37 Will need to f/u in Clinic - Endo/ENT/CTSx    #Right foot swelling, not tender, aspiration was dry ( done by podiatry)  possible cyst vs lipoma, rule out hematoma. POD consult appreciated -->US done showed 9 cm lipoma --> outpatient fu    #Normocytic anemia - no evidence of blood loss while in hospital, Iron Studies WNL. This anemia could be due to Mediastinal Mass / Malignancy.     #Bacteriuria. nitrite neg, no leukocytosis, fu cultures, pt very poor historian but denies dysuria, no fever, No Abx    #Hx  of schizoaffective stable cont meds    #HTN. Restart home meds.     #Dispo: d/c to SNF. awaiting auth. COVID negative

## 2020-06-04 NOTE — DIETITIAN INITIAL EVALUATION ADULT. - NAME AND PHONE
Pt to maintain % po intake of meals and snacks over the next 7 days. RD to monitor energy intake, electrolyte profile, nutrition focused physical findings

## 2020-06-04 NOTE — DIETITIAN INITIAL EVALUATION ADULT. - ENERGY NEEDS
Calories: 4354-4642 kcal/day (MSJ x 1.2-1.3 AF)  Protein: 64-77 gm/day (1-1.2 gm/kg CBW)  Fluid: 1 ml/kcal

## 2020-06-04 NOTE — DIETITIAN INITIAL EVALUATION ADULT. - PHYSICAL APPEARANCE
alert and oriented. BMI: 23.2 [ht per pt 65"]. UBW: 140#, denies any recent wt loss. 4+ edema to R foot, ecchymosis, skin tear./well nourished

## 2020-06-04 NOTE — PROGRESS NOTE ADULT - ATTENDING COMMENTS
Patient was seen independently. Latest vital signs and labs were reviewed. Case was discussed with housestaff. Agree with resident's assessment and plan with following additions/modifications.    79-year-old female with past medical history of hypertension, diabetes, dementia, chronic lower extremity edema, no blood thinners, basal cell carcinoma, schizoaffective disorder and frequent falls presented after an unwitnessed fall from bed.      ASSESSMENT  status post fall, No fracture  hypertension   diabetes mellitis type 2   Dementia  schizoaffective disorder   chronic diastolic CHF      PLAN:  Fracture ruled out  EEG non-significant for acute changes for seizure activity  Psyche cleared for discharge  diastolic CHF-euvolemic. cont ACE and Lasix.  Right foot lipoma- outpatient follow up   chronic Normocytic anemia- stable   Incidental finding of mediastinal mass and thyroid nodule. Thyroid US done. TSH 1.37 outpatient follow up with endo CT sx   Asymptomatic bacteriuria- antibiotics not warranted  hypertension - well controlled  history of schizoaffective disorder- continue with home meds  Discharge Disposition: Awaiting Auth for SNF/LTC Patient was seen independently. Latest vital signs and labs were reviewed. Case was discussed with housestaff. Agree with resident's assessment and plan with following additions/modifications.    79-year-old female with past medical history of hypertension, diabetes, dementia, chronic lower extremity edema, no blood thinners, basal cell carcinoma, schizoaffective disorder and frequent falls presented after an unwitnessed fall from bed.      ASSESSMENT  status post fall, No fracture  hypertension   diabetes mellitis type 2   Dementia  schizoaffective disorder   chronic diastolic CHF      PLAN:  Fracture ruled out  EEG non-significant for acute changes for seizure activity  Psyche cleared for discharge  diastolic CHF-euvolemic. cont ACE and Lasix.  Right foot lipoma- outpatient follow up   chronic Normocytic anemia- stable   Incidental finding of mediastinal mass and thyroid nodule. Thyroid US done. TSH 1.37 outpatient follow up with endo CT sx   Asymptomatic bacteriuria- antibiotics not warranted  hypertension -not  well controlled. Added amlodipine 2.5 milligrams once daily   history of schizoaffective disorder- continue with home meds  Discharge Disposition: Awaiting Auth for SNF/LTC Patient was seen independently. Latest vital signs and labs were reviewed. Case was discussed with housestaff. Agree with resident's assessment and plan with following additions/modifications.    79-year-old female with past medical history of hypertension, diabetes, dementia, chronic lower extremity edema, no blood thinners, basal cell carcinoma, schizoaffective disorder and frequent falls presented after an unwitnessed fall from bed.      ASSESSMENT  status post fall, No fracture  hypertension   diabetes mellitis type 2   Dementia  schizoaffective disorder   chronic diastolic CHF      PLAN:  Fracture ruled out  EEG non-significant for acute changes for seizure activity  Psyche cleared for discharge  diastolic CHF-euvolemic. cont ACE and Lasix.  Right foot lipoma- outpatient follow up   chronic Normocytic anemia- stable   Incidental finding of mediastinal mass and thyroid nodule. Thyroid US done. TSH 1.37 outpatient follow up with endo CT sx   Asymptomatic bacteriuria- antibiotics not warranted  hypertension -not  well controlled. Added amlodipine 2.5 milligrams once daily   history of schizoaffective disorder- continue with home meds  Discharge Disposition: Awaiting Auth for SNF/LTC  Follow up : SNF denied , adult care placement awaited Patient was seen independently. Latest vital signs and labs were reviewed. Case was discussed with housestaff. Agree with resident's assessment and plan with following additions/modifications.    79-year-old female with past medical history of hypertension, diabetes, dementia, chronic lower extremity edema, no blood thinners, basal cell carcinoma, schizoaffective disorder and frequent falls presented after an unwitnessed fall from bed.      ASSESSMENT  status post fall, No fracture  hypertension   diabetes mellitis type 2   Dementia  schizoaffective disorder   chronic diastolic CHF      PLAN:  Fracture ruled out  EEG non-significant for acute changes for seizure activity  Psyche cleared for discharge  diastolic CHF-euvolemic. cont ACE and Lasix.  Right foot lipoma- outpatient follow up   chronic Normocytic anemia- stable   Incidental finding of mediastinal mass and thyroid nodule. Thyroid US done. TSH 1.37 outpatient follow up with endo CT sx   Asymptomatic bacteriuria- antibiotics not warranted  hypertension -well controlled.   history of schizoaffective disorder- continue with home meds  Discharge Disposition: Awaiting Auth for SNF/LTC  Follow up : SNF denied , adult care placement awaited

## 2020-06-05 LAB
ANION GAP SERPL CALC-SCNC: 12 MMOL/L — SIGNIFICANT CHANGE UP (ref 7–14)
BUN SERPL-MCNC: 40 MG/DL — HIGH (ref 10–20)
CALCIUM SERPL-MCNC: 9.6 MG/DL — SIGNIFICANT CHANGE UP (ref 8.5–10.1)
CHLORIDE SERPL-SCNC: 105 MMOL/L — SIGNIFICANT CHANGE UP (ref 98–110)
CO2 SERPL-SCNC: 24 MMOL/L — SIGNIFICANT CHANGE UP (ref 17–32)
CREAT SERPL-MCNC: 1.2 MG/DL — SIGNIFICANT CHANGE UP (ref 0.7–1.5)
GLUCOSE BLDC GLUCOMTR-MCNC: 110 MG/DL — HIGH (ref 70–99)
GLUCOSE BLDC GLUCOMTR-MCNC: 113 MG/DL — HIGH (ref 70–99)
GLUCOSE BLDC GLUCOMTR-MCNC: 129 MG/DL — HIGH (ref 70–99)
GLUCOSE SERPL-MCNC: 97 MG/DL — SIGNIFICANT CHANGE UP (ref 70–99)
POTASSIUM SERPL-MCNC: 4.9 MMOL/L — SIGNIFICANT CHANGE UP (ref 3.5–5)
POTASSIUM SERPL-SCNC: 4.9 MMOL/L — SIGNIFICANT CHANGE UP (ref 3.5–5)
SODIUM SERPL-SCNC: 141 MMOL/L — SIGNIFICANT CHANGE UP (ref 135–146)

## 2020-06-05 PROCEDURE — 99232 SBSQ HOSP IP/OBS MODERATE 35: CPT

## 2020-06-05 RX ORDER — FUROSEMIDE 40 MG
1 TABLET ORAL
Qty: 30 | Refills: 3
Start: 2020-06-05 | End: 2020-10-02

## 2020-06-05 RX ORDER — HALOPERIDOL DECANOATE 100 MG/ML
1 INJECTION INTRAMUSCULAR
Qty: 120 | Refills: 3
Start: 2020-06-05 | End: 2020-10-02

## 2020-06-05 RX ORDER — HALOPERIDOL DECANOATE 100 MG/ML
1 INJECTION INTRAMUSCULAR
Qty: 60 | Refills: 3
Start: 2020-06-05 | End: 2020-10-02

## 2020-06-05 RX ADMIN — PREGABALIN 1000 MICROGRAM(S): 225 CAPSULE ORAL at 12:17

## 2020-06-05 RX ADMIN — ENOXAPARIN SODIUM 40 MILLIGRAM(S): 100 INJECTION SUBCUTANEOUS at 12:17

## 2020-06-05 RX ADMIN — LOSARTAN POTASSIUM 12.5 MILLIGRAM(S): 100 TABLET, FILM COATED ORAL at 06:03

## 2020-06-05 RX ADMIN — HALOPERIDOL DECANOATE 0.5 MILLIGRAM(S): 100 INJECTION INTRAMUSCULAR at 06:03

## 2020-06-05 RX ADMIN — Medication 20 MILLIGRAM(S): at 06:03

## 2020-06-05 RX ADMIN — ESCITALOPRAM OXALATE 2.5 MILLIGRAM(S): 10 TABLET, FILM COATED ORAL at 12:18

## 2020-06-05 NOTE — PROGRESS NOTE ADULT - SUBJECTIVE AND OBJECTIVE BOX
CHELA GALLAGHER 79y Female  MRN#: [id number]         SUBJECTIVE  Patient is a 79y old Female who presents with a chief complaint of Mechanical fall (04 Jun 2020 08:32)  Currently admitted to medicine with the primary diagnosis of Fall    Patient doing fine no complaints      OBJECTIVE  PAST MEDICAL & SURGICAL HISTORY  Schizoaffective disorder  Hypertension  Diabetes mellitus    ALLERGIES:  No Known Allergies    MEDICATIONS:  STANDING MEDICATIONS  chlorhexidine 4% Liquid 1 Application(s) Topical daily  cyanocobalamin Injectable 1000 MICROGram(s) SubCutaneous daily  enoxaparin Injectable 40 milliGRAM(s) SubCutaneous daily  escitalopram 2.5 milliGRAM(s) Oral daily  furosemide    Tablet 20 milliGRAM(s) Oral daily  haloperidol     Tablet 0.5 milliGRAM(s) Oral two times a day  losartan 12.5 milliGRAM(s) Oral daily    PRN MEDICATIONS  haloperidol     Tablet 1 milliGRAM(s) Oral every 6 hours PRN      VITAL SIGNS: Last 24 Hours  T(C): 36.6 (05 Jun 2020 07:30), Max: 36.7 (04 Jun 2020 15:13)  T(F): 97.9 (05 Jun 2020 07:30), Max: 98.1 (04 Jun 2020 15:13)  HR: 80 (05 Jun 2020 07:30) (72 - 80)  BP: 125/60 (05 Jun 2020 07:30) (120/59 - 127/59)  BP(mean): --  RR: 18 (05 Jun 2020 07:30) (18 - 18)  SpO2: 95% (05 Jun 2020 07:30) (95% - 97%)    LABS:                        10.7   7.20  )-----------( 345      ( 04 Jun 2020 05:41 )             35.9     06-05    141  |  105  |  40<H>  ----------------------------<  97  4.9   |  24  |  1.2    Ca    9.6      05 Jun 2020 05:45  Mg     2.1     06-04    TPro  6.3  /  Alb  3.3<L>  /  TBili  0.4  /  DBili  x   /  AST  15  /  ALT  9   /  AlkPhos  74  06-04                  RADIOLOGY:          Physical Exam:   CONSTITUTIONAL: Well-developed; well-nourished; in no acute distress, speaking in full sentences, on RA  HEAD: Normocephalic; atraumatic, laceration on L jaw, healing, appears dry  CARD: +S1, S2 Regular rate and rhythm  RESP: CTABL  ABD: soft nontender, nondistended, no rebound, no guarding, no rigidity  EXT: moves all extremities,  no clubbing, cyanosis, moderate edema of LE b/l  NEURO: Alert, oriented, grossly unremarkable, no focal deficits, cn ii-xii grossly intact  PSYCH: Cooperative, appropriate             ASSESSMENT & PLAN  79-year-old female with past medical history of hypertension, diabetes, dementia, chronic lower extremity edema, no blood thinners, basal cell carcinoma, schizoaffective disorder and frequent falls presented after an unwitnessed fall from bed.    #Fall, pt is a poor historian etiology unknown. no fractures, possibly rule out seizure, neuro consult --> EEG done no seizure. needs d/c to SNF. needs Psych eval before going to NH --> done, she is not suicidal or psychotic --> restart her home med haldol 0.5 PO bid and give haldol 1 mg q6hrs as needed for agitation    #grade I diastolic CHF, cont ace and lasix.     #Incidental finding of mediastinal mass and thyroid nodule. Thyroid US done. TSH 1.37 Will need to f/u in Clinic - Endo/ENT/CTSx    #Right foot swelling, not tender, aspiration was dry ( done by podiatry)  possible cyst vs lipoma, rule out hematoma. POD consult appreciated -->US done showed 9 cm lipoma --> outpatient fu    #Normocytic anemia - no evidence of blood loss while in hospital, Iron Studies WNL. This anemia could be due to Mediastinal Mass / Malignancy.     #Bacteriuria. nitrite neg, no leukocytosis, fu cultures, pt very poor historian but denies dysuria, no fever, No Abx    #Hx  of schizoaffective stable cont meds    #HTN. Restart home meds.     #Dispo: denied SNF. awaiting group home auth. COVID negative

## 2020-06-05 NOTE — PROGRESS NOTE ADULT - ASSESSMENT
Imp : gait dysfunction, doing well  Plan : awaiting for placement for adult home           continue bedside PT as tolerated

## 2020-06-05 NOTE — PROGRESS NOTE ADULT - SUBJECTIVE AND OBJECTIVE BOX
Patient seen / chart reviewed            afebrile             vss        lung: clear       ext : moves all ext     function : transfer with sup                   ambulate 150' RW / sup

## 2020-06-05 NOTE — PROGRESS NOTE ADULT - ATTENDING COMMENTS
Patient was seen independently. Latest vital signs and labs were reviewed. Case was discussed with housestaff. Agree with resident's assessment and plan with following additions/modifications.     79-year-old female with past medical history of hypertension, diabetes, dementia, chronic lower extremity edema, no blood thinners, basal cell carcinoma, schizoaffective disorder and frequent falls presented after an unwitnessed fall from bed.      ASSESSMENT  status post fall, No fracture  hypertension   diabetes mellitis type 2   Dementia  schizoaffective disorder   chronic diastolic CHF      PLAN:  Fracture ruled out  EEG non-significant for acute changes for seizure activity  Psyche cleared for discharge  diastolic CHF-euvolemic. cont ACE and Lasix.  Right foot lipoma- outpatient follow up   chronic Normocytic anemia- stable   Incidental finding of mediastinal mass and thyroid nodule. Thyroid US done. TSH 1.37 outpatient follow up with endo CT sx   Asymptomatic bacteriuria- antibiotics not warranted  hypertension -well controlled.   history of schizoaffective disorder- continue with home meds  Discharge Disposition: Follow up : SNF denied , adult care placement awaited . Medically stable for discharge

## 2020-06-06 LAB
GLUCOSE BLDC GLUCOMTR-MCNC: 101 MG/DL — HIGH (ref 70–99)
GLUCOSE BLDC GLUCOMTR-MCNC: 101 MG/DL — HIGH (ref 70–99)
GLUCOSE BLDC GLUCOMTR-MCNC: 121 MG/DL — HIGH (ref 70–99)
GLUCOSE BLDC GLUCOMTR-MCNC: 95 MG/DL — SIGNIFICANT CHANGE UP (ref 70–99)

## 2020-06-06 PROCEDURE — 99232 SBSQ HOSP IP/OBS MODERATE 35: CPT

## 2020-06-06 RX ADMIN — HALOPERIDOL DECANOATE 0.5 MILLIGRAM(S): 100 INJECTION INTRAMUSCULAR at 05:06

## 2020-06-06 RX ADMIN — LOSARTAN POTASSIUM 12.5 MILLIGRAM(S): 100 TABLET, FILM COATED ORAL at 05:06

## 2020-06-06 RX ADMIN — ENOXAPARIN SODIUM 40 MILLIGRAM(S): 100 INJECTION SUBCUTANEOUS at 11:10

## 2020-06-06 RX ADMIN — ESCITALOPRAM OXALATE 2.5 MILLIGRAM(S): 10 TABLET, FILM COATED ORAL at 11:11

## 2020-06-06 RX ADMIN — Medication 20 MILLIGRAM(S): at 05:06

## 2020-06-06 RX ADMIN — HALOPERIDOL DECANOATE 0.5 MILLIGRAM(S): 100 INJECTION INTRAMUSCULAR at 17:10

## 2020-06-06 NOTE — PROGRESS NOTE ADULT - SUBJECTIVE AND OBJECTIVE BOX
CHELA GALLAGHER 79y Female  MRN#: [id number]         SUBJECTIVE  Patient is a 79y old Female who presents with a chief complaint of Mechanical fall (05 Jun 2020 12:44)  Currently admitted to medicine with the primary diagnosis of Fall    seen and examined. no complaints    OBJECTIVE  PAST MEDICAL & SURGICAL HISTORY  Schizoaffective disorder  Hypertension  Diabetes mellitus    ALLERGIES:  No Known Allergies    MEDICATIONS:  STANDING MEDICATIONS  chlorhexidine 4% Liquid 1 Application(s) Topical daily  enoxaparin Injectable 40 milliGRAM(s) SubCutaneous daily  escitalopram 2.5 milliGRAM(s) Oral daily  furosemide    Tablet 20 milliGRAM(s) Oral daily  haloperidol     Tablet 0.5 milliGRAM(s) Oral two times a day  losartan 12.5 milliGRAM(s) Oral daily    PRN MEDICATIONS  haloperidol     Tablet 1 milliGRAM(s) Oral every 6 hours PRN      VITAL SIGNS: Last 24 Hours  T(C): 36.4 (06 Jun 2020 00:00), Max: 36.8 (05 Jun 2020 15:17)  T(F): 97.5 (06 Jun 2020 00:00), Max: 98.3 (05 Jun 2020 15:17)  HR: 77 (06 Jun 2020 05:06) (74 - 83)  BP: 154/67 (06 Jun 2020 05:06) (125/58 - 154/67)  BP(mean): --  RR: 18 (06 Jun 2020 00:00) (18 - 19)  SpO2: 99% (05 Jun 2020 15:17) (99% - 99%)    LABS:    06-05    141  |  105  |  40<H>  ----------------------------<  97  4.9   |  24  |  1.2    Ca    9.6      05 Jun 2020 05:45                    RADIOLOGY:          Physical Exam:   CONSTITUTIONAL: Well-developed; well-nourished; in no acute distress, speaking in full sentences, on RA  HEAD: Normocephalic; atraumatic, laceration on L jaw, healing, appears dry  CARD: +S1, S2 Regular rate and rhythm  RESP: CTABL  ABD: soft nontender, nondistended, no rebound, no guarding, no rigidity  EXT: moves all extremities,  no clubbing, cyanosis, moderate edema of LE b/l  NEURO: Alert, oriented, grossly unremarkable, no focal deficits, cn ii-xii grossly intact  PSYCH: Cooperative, appropriate             ASSESSMENT & PLAN  79-year-old female with past medical history of hypertension, diabetes, dementia, chronic lower extremity edema, no blood thinners, basal cell carcinoma, schizoaffective disorder and frequent falls presented after an unwitnessed fall from bed.    #Fall, pt is a poor historian etiology unknown. no fractures, possibly rule out seizure, neuro consult --> EEG done no seizure. needs d/c to SNF. needs Psych eval before going to NH --> done, she is not suicidal or psychotic --> restart her home med haldol 0.5 PO bid and give haldol 1 mg q6hrs as needed for agitation    #grade I diastolic CHF, cont ace and lasix.     #Incidental finding of mediastinal mass and thyroid nodule. Thyroid US done. TSH 1.37 Will need to f/u in Clinic - Endo/ENT/CTSx    #Right foot swelling, not tender, aspiration was dry ( done by podiatry)  possible cyst vs lipoma, rule out hematoma. POD consult appreciated -->US done showed 9 cm lipoma --> outpatient fu    #Normocytic anemia - no evidence of blood loss while in hospital, Iron Studies WNL. This anemia could be due to Mediastinal Mass / Malignancy.     #Bacteriuria. nitrite neg, no leukocytosis, fu cultures, pt very poor historian but denies dysuria, no fever, No Abx    #Hx  of schizoaffective stable cont meds    #HTN. Restart home meds.     #Dispo: denied SNF. awaiting group home auth. COVID negative

## 2020-06-06 NOTE — PROGRESS NOTE ADULT - ATTENDING COMMENTS
Patient was seen independently. Latest vital signs and labs were reviewed. Case was discussed with housestaff. Agree with resident's assessment and plan with following additions/modifications.         79-year-old female with past medical history of hypertension, diabetes, dementia, chronic lower extremity edema, no blood thinners, basal cell carcinoma, schizoaffective disorder and frequent falls presented after an unwitnessed fall from bed.      ASSESSMENT  status post fall, No fracture  hypertension   diabetes mellitis type 2   Dementia  schizoaffective disorder   chronic diastolic CHF      PLAN:  Fracture ruled out  EEG non-significant for acute changes for seizure activity  Psyche cleared for discharge  diastolic CHF-euvolemic. cont ACE and Lasix.  Right foot lipoma- outpatient follow up   chronic Normocytic anemia- stable   Incidental finding of mediastinal mass and thyroid nodule. Thyroid US done. TSH 1.37 outpatient follow up with endo CT sx   Asymptomatic bacteriuria- antibiotics not warranted  hypertension -well controlled.   history of schizoaffective disorder- continue with home meds  Discharge Disposition: Follow up : SNF denied , adult care placement awaited .  May discharge to home if family agreeable. Medically stable for discharge .

## 2020-06-07 ENCOUNTER — TRANSCRIPTION ENCOUNTER (OUTPATIENT)
Age: 80
End: 2020-06-07

## 2020-06-07 VITALS
HEART RATE: 72 BPM | DIASTOLIC BLOOD PRESSURE: 67 MMHG | OXYGEN SATURATION: 99 % | SYSTOLIC BLOOD PRESSURE: 143 MMHG | RESPIRATION RATE: 18 BRPM | TEMPERATURE: 98 F

## 2020-06-07 LAB
ALBUMIN SERPL ELPH-MCNC: 3.5 G/DL — SIGNIFICANT CHANGE UP (ref 3.5–5.2)
ALP SERPL-CCNC: 76 U/L — SIGNIFICANT CHANGE UP (ref 30–115)
ALT FLD-CCNC: 11 U/L — SIGNIFICANT CHANGE UP (ref 0–41)
ANION GAP SERPL CALC-SCNC: 12 MMOL/L — SIGNIFICANT CHANGE UP (ref 7–14)
AST SERPL-CCNC: 12 U/L — SIGNIFICANT CHANGE UP (ref 0–41)
BILIRUB SERPL-MCNC: 0.2 MG/DL — SIGNIFICANT CHANGE UP (ref 0.2–1.2)
BUN SERPL-MCNC: 39 MG/DL — HIGH (ref 10–20)
CALCIUM SERPL-MCNC: 10.1 MG/DL — SIGNIFICANT CHANGE UP (ref 8.5–10.1)
CHLORIDE SERPL-SCNC: 108 MMOL/L — SIGNIFICANT CHANGE UP (ref 98–110)
CO2 SERPL-SCNC: 22 MMOL/L — SIGNIFICANT CHANGE UP (ref 17–32)
CREAT SERPL-MCNC: 1.2 MG/DL — SIGNIFICANT CHANGE UP (ref 0.7–1.5)
GLUCOSE BLDC GLUCOMTR-MCNC: 102 MG/DL — HIGH (ref 70–99)
GLUCOSE BLDC GLUCOMTR-MCNC: 86 MG/DL — SIGNIFICANT CHANGE UP (ref 70–99)
GLUCOSE SERPL-MCNC: 91 MG/DL — SIGNIFICANT CHANGE UP (ref 70–99)
HCT VFR BLD CALC: 34.6 % — LOW (ref 37–47)
HGB BLD-MCNC: 10.7 G/DL — LOW (ref 12–16)
MAGNESIUM SERPL-MCNC: 2.1 MG/DL — SIGNIFICANT CHANGE UP (ref 1.8–2.4)
MCHC RBC-ENTMCNC: 30.2 PG — SIGNIFICANT CHANGE UP (ref 27–31)
MCHC RBC-ENTMCNC: 30.9 G/DL — LOW (ref 32–37)
MCV RBC AUTO: 97.7 FL — SIGNIFICANT CHANGE UP (ref 81–99)
NRBC # BLD: 0 /100 WBCS — SIGNIFICANT CHANGE UP (ref 0–0)
PLATELET # BLD AUTO: 293 K/UL — SIGNIFICANT CHANGE UP (ref 130–400)
POTASSIUM SERPL-MCNC: 4.7 MMOL/L — SIGNIFICANT CHANGE UP (ref 3.5–5)
POTASSIUM SERPL-SCNC: 4.7 MMOL/L — SIGNIFICANT CHANGE UP (ref 3.5–5)
PROT SERPL-MCNC: 6.5 G/DL — SIGNIFICANT CHANGE UP (ref 6–8)
RBC # BLD: 3.54 M/UL — LOW (ref 4.2–5.4)
RBC # FLD: 12.8 % — SIGNIFICANT CHANGE UP (ref 11.5–14.5)
SODIUM SERPL-SCNC: 142 MMOL/L — SIGNIFICANT CHANGE UP (ref 135–146)
WBC # BLD: 6.97 K/UL — SIGNIFICANT CHANGE UP (ref 4.8–10.8)
WBC # FLD AUTO: 6.97 K/UL — SIGNIFICANT CHANGE UP (ref 4.8–10.8)

## 2020-06-07 PROCEDURE — 99239 HOSP IP/OBS DSCHRG MGMT >30: CPT

## 2020-06-07 RX ORDER — FUROSEMIDE 40 MG
1 TABLET ORAL
Qty: 30 | Refills: 3
Start: 2020-06-07 | End: 2020-10-04

## 2020-06-07 RX ORDER — LOSARTAN POTASSIUM 100 MG/1
12.5 TABLET, FILM COATED ORAL
Qty: 0 | Refills: 0 | DISCHARGE
Start: 2020-06-07

## 2020-06-07 RX ORDER — LOSARTAN POTASSIUM 100 MG/1
0.5 TABLET, FILM COATED ORAL
Qty: 0 | Refills: 0 | DISCHARGE

## 2020-06-07 RX ORDER — ESCITALOPRAM OXALATE 10 MG/1
0.5 TABLET, FILM COATED ORAL
Qty: 15 | Refills: 0
Start: 2020-06-07 | End: 2020-07-06

## 2020-06-07 RX ORDER — LOSARTAN POTASSIUM 100 MG/1
0.5 TABLET, FILM COATED ORAL
Qty: 15 | Refills: 0
Start: 2020-06-07 | End: 2020-07-06

## 2020-06-07 RX ORDER — ESCITALOPRAM OXALATE 10 MG/1
0.5 TABLET, FILM COATED ORAL
Qty: 0 | Refills: 0 | DISCHARGE

## 2020-06-07 RX ADMIN — ESCITALOPRAM OXALATE 2.5 MILLIGRAM(S): 10 TABLET, FILM COATED ORAL at 11:01

## 2020-06-07 RX ADMIN — HALOPERIDOL DECANOATE 0.5 MILLIGRAM(S): 100 INJECTION INTRAMUSCULAR at 05:37

## 2020-06-07 RX ADMIN — Medication 20 MILLIGRAM(S): at 05:37

## 2020-06-07 RX ADMIN — LOSARTAN POTASSIUM 12.5 MILLIGRAM(S): 100 TABLET, FILM COATED ORAL at 05:37

## 2020-06-07 RX ADMIN — ENOXAPARIN SODIUM 40 MILLIGRAM(S): 100 INJECTION SUBCUTANEOUS at 11:01

## 2020-06-07 RX ADMIN — CHLORHEXIDINE GLUCONATE 1 APPLICATION(S): 213 SOLUTION TOPICAL at 11:01

## 2020-06-07 NOTE — PROGRESS NOTE ADULT - REASON FOR ADMISSION
Mechanical fall

## 2020-06-07 NOTE — PROGRESS NOTE ADULT - SUBJECTIVE AND OBJECTIVE BOX
Progress Note:  Provider Speciality                            Hospitalist      CHELA GALLAGHER [id number] 79y Female     CHIEF PRESENTING COMPLAINT:  Patient is a 79y old  Female who presents with a chief complaint of Mechanical fall (06 Jun 2020 08:48)        SUBJECTIVE:  Patient was seen and examined at bedside. Reports improvement in  presenting complaint. No significant overnight events reported.     HISTORY OF PRESENTING ILLNESS:  HPI:  79-year-old female with past medical history of hypertension, diabetes, dementia, chronic lower extremity edema, no blood thinners, basal cell carcinoma, schizoaffective disorder and frequent falls presented after an unwitnessed fall from bed. Patient was found down on the floor from bed by son and EMS was called. States she fell yesterday while walking, today admits to falling from bed. No loc, no headstrike. Very poor historian. Denies chest pain, palpitation, dizziness, sweating, feeling warm or any anxiety. As per daughter has frequent violent outbursts at home. Family have been trying to pursue placement but pt has objected, son Joaquín is only caregiver. Will admit for rehab, optimization.  In ED Pt A&Ox3, vitals B.P. 181/82, HR 94, RR 18, Temp 98.1 and sat 99% on room air. Trauma work up was negative for any fracture or dislocation. UA positive for asymptomatic bacteriuria and leucocyte estrase. for  An incidental findings were found on CT chest/Abd/pelvis, a medistinal mass and thyroid nodule, cholelithiasis and choledocholithiasis. R foot cyst/hematoma. Workup required. On PE laceration to left elbow and injury near the left ear. (28 May 2020 16:00)        REVIEW OF SYSTEMS:  Patient denies any headache, any vision complaints, runny nose, fever, chills, sore throat. Denies chest pain, shortness of breath, palpitation. Denies nausea, vomiting, abdominal pain, diarrhoea, Denies urinary burning, urgency, frequency, dysuria. Denies weakness in any part of the body or numbness.   At least 10 systems were reviewed in ROS. All systems reviewed  are within normal limits except for the complaints as described in Subjective.    PAST MEDICAL & SURGICAL HISTORY:  PAST MEDICAL & SURGICAL HISTORY:  Schizoaffective disorder  Hypertension  Diabetes mellitus          VITAL SIGNS:  Vital Signs Last 24 Hrs  T(C): 36.8 (07 Jun 2020 08:00), Max: 36.8 (07 Jun 2020 08:00)  T(F): 98.2 (07 Jun 2020 08:00), Max: 98.2 (07 Jun 2020 08:00)  HR: 64 (07 Jun 2020 08:00) (64 - 80)  BP: 131/89 (07 Jun 2020 08:00) (122/66 - 139/67)  BP(mean): 91 (07 Jun 2020 05:32) (91 - 91)  RR: 18 (07 Jun 2020 08:00) (18 - 19)  SpO2: 98% (07 Jun 2020 05:32) (98% - 100%)          PHYSICAL EXAMINATION:  Not in acute distress, obese  General: No pallor, no icterus  HEENT:   EOMI, no JVD,.  Heart: S1+S2 audible  Lungs: bilateral  fair air entry, no wheezing, no crepitations.  Abdomen: Soft, non-tender, non-distended , no  rigidity or guarding.  CNS: Awake alert, CN  grossly intact.  Extremities:  No edema            CONSULTS:  Consultant(s) Notes Reviewed by me.   Care Discussed with Consultants/Other Providers where required.        MEDICATIONS:  MEDICATIONS  (STANDING):  chlorhexidine 4% Liquid 1 Application(s) Topical daily  enoxaparin Injectable 40 milliGRAM(s) SubCutaneous daily  escitalopram 2.5 milliGRAM(s) Oral daily  furosemide    Tablet 20 milliGRAM(s) Oral daily  haloperidol     Tablet 0.5 milliGRAM(s) Oral two times a day  losartan 12.5 milliGRAM(s) Oral daily    MEDICATIONS  (PRN):  haloperidol     Tablet 1 milliGRAM(s) Oral every 6 hours PRN agitation            ASSESSMENT:      79-year-old female with past medical history of hypertension, diabetes, dementia, chronic lower extremity edema, no blood thinners, basal cell carcinoma, schizoaffective disorder and frequent falls presented after an unwitnessed fall from bed.      ASSESSMENT  status post fall, No fracture  hypertension   diabetes mellitis type 2   Dementia  schizoaffective disorder   chronic diastolic CHF      PLAN:  Fracture ruled out  EEG non-significant for acute changes for seizure activity  Psyche cleared for discharge  diastolic CHF-euvolemic. cont losrtan and Lasix.  Right foot lipoma- outpatient follow up   chronic Normocytic anemia- stable   Incidental finding of mediastinal mass and thyroid nodule. Thyroid US done. TSH 1.37 outpatient follow up with endo CT sx   Asymptomatic bacteriuria- antibiotics not warranted  hypertension -well controlled.   history of schizoaffective disorder- continue with home meds  Discharge Disposition: Medically stable for discharge to home.   sposition:

## 2020-06-07 NOTE — DISCHARGE NOTE NURSING/CASE MANAGEMENT/SOCIAL WORK - PATIENT PORTAL LINK FT
You can access the FollowMyHealth Patient Portal offered by [facility] by registering at the following website: [email or web address]. By joining "[employer]’s FollowMyHealth portal, you will also be able to view your health information using other applications (apps) compatible with our system.

## 2020-06-08 LAB — GLUCOSE BLDC GLUCOMTR-MCNC: 113 MG/DL — HIGH (ref 70–99)

## 2020-06-09 DIAGNOSIS — R82.71 BACTERIURIA: ICD-10-CM

## 2020-06-09 DIAGNOSIS — Z85.828 PERSONAL HISTORY OF OTHER MALIGNANT NEOPLASM OF SKIN: ICD-10-CM
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
## 2020-06-09 DIAGNOSIS — R29.6 REPEATED FALLS: ICD-10-CM

## 2020-06-09 DIAGNOSIS — I35.1 NONRHEUMATIC AORTIC (VALVE) INSUFFICIENCY: ICD-10-CM

## 2020-06-09 DIAGNOSIS — Z11.59 ENCOUNTER FOR SCREENING FOR OTHER VIRAL DISEASES: ICD-10-CM

## 2020-06-09 DIAGNOSIS — Z91.81 HISTORY OF FALLING: ICD-10-CM

## 2020-06-09 DIAGNOSIS — E11.65 TYPE 2 DIABETES MELLITUS WITH HYPERGLYCEMIA: ICD-10-CM

## 2020-06-09 DIAGNOSIS — F25.9 SCHIZOAFFECTIVE DISORDER, UNSPECIFIED: ICD-10-CM

## 2020-06-09 DIAGNOSIS — D17.23 BENIGN LIPOMATOUS NEOPLASM OF SKIN AND SUBCUTANEOUS TISSUE OF RIGHT LEG: ICD-10-CM
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
## 2020-06-09 DIAGNOSIS — S51.012A LACERATION WITHOUT FOREIGN BODY OF LEFT ELBOW, INITIAL ENCOUNTER: ICD-10-CM

## 2020-06-09 DIAGNOSIS — R55 SYNCOPE AND COLLAPSE: ICD-10-CM

## 2020-06-09 DIAGNOSIS — N18.3 CHRONIC KIDNEY DISEASE, STAGE 3 (MODERATE): ICD-10-CM

## 2020-06-09 DIAGNOSIS — Z91.14 PATIENT'S OTHER NONCOMPLIANCE WITH MEDICATION REGIMEN: ICD-10-CM

## 2020-06-09 DIAGNOSIS — K80.70 CALCULUS OF GALLBLADDER AND BILE DUCT WITHOUT CHOLECYSTITIS WITHOUT OBSTRUCTION: ICD-10-CM

## 2020-06-09 DIAGNOSIS — I50.32 CHRONIC DIASTOLIC (CONGESTIVE) HEART FAILURE: ICD-10-CM

## 2020-06-09 DIAGNOSIS — F03.91 UNSPECIFIED DEMENTIA WITH BEHAVIORAL DISTURBANCE: ICD-10-CM

## 2020-06-09 DIAGNOSIS — E11.22 TYPE 2 DIABETES MELLITUS WITH DIABETIC CHRONIC KIDNEY DISEASE: ICD-10-CM

## 2020-06-09 DIAGNOSIS — J98.59 OTHER DISEASES OF MEDIASTINUM, NOT ELSEWHERE CLASSIFIED: ICD-10-CM

## 2020-06-09 DIAGNOSIS — R41.89 OTHER SYMPTOMS AND SIGNS INVOLVING COGNITIVE FUNCTIONS AND AWARENESS: ICD-10-CM

## 2020-06-09 DIAGNOSIS — D63.0 ANEMIA IN NEOPLASTIC DISEASE: ICD-10-CM

## 2020-06-09 DIAGNOSIS — E07.9 DISORDER OF THYROID, UNSPECIFIED: ICD-10-CM

## 2020-06-09 DIAGNOSIS — R26.89 OTHER ABNORMALITIES OF GAIT AND MOBILITY: ICD-10-CM

## 2020-06-09 DIAGNOSIS — I13.0 HYPERTENSIVE HEART AND CHRONIC KIDNEY DISEASE WITH HEART FAILURE AND STAGE 1 THROUGH STAGE 4 CHRONIC KIDNEY DISEASE, OR UNSPECIFIED CHRONIC KIDNEY DISEASE: ICD-10-CM
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
## 2020-06-09 DIAGNOSIS — E04.1 NONTOXIC SINGLE THYROID NODULE: ICD-10-CM
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
## 2020-06-09 DIAGNOSIS — S01.312A LACERATION WITHOUT FOREIGN BODY OF LEFT EAR, INITIAL ENCOUNTER: ICD-10-CM

## 2020-06-10 ENCOUNTER — APPOINTMENT (OUTPATIENT)
Dept: OTOLARYNGOLOGY | Facility: CLINIC | Age: 80
End: 2020-06-10

## 2020-07-10 ENCOUNTER — INPATIENT (INPATIENT)
Facility: HOSPITAL | Age: 80
LOS: 5 days | Discharge: HOME | End: 2020-07-16
Attending: HOSPITALIST | Admitting: HOSPITALIST
Payer: MEDICARE

## 2020-07-10 VITALS
DIASTOLIC BLOOD PRESSURE: 87 MMHG | OXYGEN SATURATION: 99 % | HEART RATE: 104 BPM | RESPIRATION RATE: 17 BRPM | WEIGHT: 199.96 LBS | SYSTOLIC BLOOD PRESSURE: 201 MMHG | TEMPERATURE: 99 F

## 2020-07-10 DIAGNOSIS — F25.9 SCHIZOAFFECTIVE DISORDER, UNSPECIFIED: ICD-10-CM

## 2020-07-10 LAB
ALBUMIN SERPL ELPH-MCNC: 3.4 G/DL — LOW (ref 3.5–5.2)
ALP SERPL-CCNC: 85 U/L — SIGNIFICANT CHANGE UP (ref 30–115)
ALT FLD-CCNC: 7 U/L — SIGNIFICANT CHANGE UP (ref 0–41)
ANION GAP SERPL CALC-SCNC: 14 MMOL/L — SIGNIFICANT CHANGE UP (ref 7–14)
APPEARANCE UR: CLEAR — SIGNIFICANT CHANGE UP
AST SERPL-CCNC: 15 U/L — SIGNIFICANT CHANGE UP (ref 0–41)
BASOPHILS # BLD AUTO: 0.06 K/UL — SIGNIFICANT CHANGE UP (ref 0–0.2)
BASOPHILS NFR BLD AUTO: 0.6 % — SIGNIFICANT CHANGE UP (ref 0–1)
BILIRUB SERPL-MCNC: 0.2 MG/DL — SIGNIFICANT CHANGE UP (ref 0.2–1.2)
BILIRUB UR-MCNC: NEGATIVE — SIGNIFICANT CHANGE UP
BUN SERPL-MCNC: 22 MG/DL — HIGH (ref 10–20)
CALCIUM SERPL-MCNC: 10.1 MG/DL — SIGNIFICANT CHANGE UP (ref 8.5–10.1)
CHLORIDE SERPL-SCNC: 108 MMOL/L — SIGNIFICANT CHANGE UP (ref 98–110)
CO2 SERPL-SCNC: 14 MMOL/L — LOW (ref 17–32)
COLOR SPEC: COLORLESS — SIGNIFICANT CHANGE UP
CREAT SERPL-MCNC: 1.2 MG/DL — SIGNIFICANT CHANGE UP (ref 0.7–1.5)
DIFF PNL FLD: NEGATIVE — SIGNIFICANT CHANGE UP
EOSINOPHIL # BLD AUTO: 0.09 K/UL — SIGNIFICANT CHANGE UP (ref 0–0.7)
EOSINOPHIL NFR BLD AUTO: 0.9 % — SIGNIFICANT CHANGE UP (ref 0–8)
ETHANOL SERPL-MCNC: <10 MG/DL — SIGNIFICANT CHANGE UP
GLUCOSE SERPL-MCNC: 118 MG/DL — HIGH (ref 70–99)
GLUCOSE UR QL: NEGATIVE — SIGNIFICANT CHANGE UP
HCT VFR BLD CALC: 34.6 % — LOW (ref 37–47)
HGB BLD-MCNC: 10.5 G/DL — LOW (ref 12–16)
IMM GRANULOCYTES NFR BLD AUTO: 0.2 % — SIGNIFICANT CHANGE UP (ref 0.1–0.3)
KETONES UR-MCNC: NEGATIVE — SIGNIFICANT CHANGE UP
LACTATE SERPL-SCNC: 1.4 MMOL/L — SIGNIFICANT CHANGE UP (ref 0.7–2)
LEUKOCYTE ESTERASE UR-ACNC: NEGATIVE — SIGNIFICANT CHANGE UP
LYMPHOCYTES # BLD AUTO: 1.27 K/UL — SIGNIFICANT CHANGE UP (ref 1.2–3.4)
LYMPHOCYTES # BLD AUTO: 12.9 % — LOW (ref 20.5–51.1)
MCHC RBC-ENTMCNC: 29 PG — SIGNIFICANT CHANGE UP (ref 27–31)
MCHC RBC-ENTMCNC: 30.3 G/DL — LOW (ref 32–37)
MCV RBC AUTO: 95.6 FL — SIGNIFICANT CHANGE UP (ref 81–99)
MONOCYTES # BLD AUTO: 0.87 K/UL — HIGH (ref 0.1–0.6)
MONOCYTES NFR BLD AUTO: 8.8 % — SIGNIFICANT CHANGE UP (ref 1.7–9.3)
NEUTROPHILS # BLD AUTO: 7.55 K/UL — HIGH (ref 1.4–6.5)
NEUTROPHILS NFR BLD AUTO: 76.6 % — HIGH (ref 42.2–75.2)
NITRITE UR-MCNC: NEGATIVE — SIGNIFICANT CHANGE UP
NRBC # BLD: 0 /100 WBCS — SIGNIFICANT CHANGE UP (ref 0–0)
PH UR: 6.5 — SIGNIFICANT CHANGE UP (ref 5–8)
PLATELET # BLD AUTO: 305 K/UL — SIGNIFICANT CHANGE UP (ref 130–400)
POTASSIUM SERPL-MCNC: 5.4 MMOL/L — HIGH (ref 3.5–5)
POTASSIUM SERPL-SCNC: 5.4 MMOL/L — HIGH (ref 3.5–5)
PROT SERPL-MCNC: 7.6 G/DL — SIGNIFICANT CHANGE UP (ref 6–8)
PROT UR-MCNC: NEGATIVE — SIGNIFICANT CHANGE UP
RBC # BLD: 3.62 M/UL — LOW (ref 4.2–5.4)
RBC # FLD: 12.7 % — SIGNIFICANT CHANGE UP (ref 11.5–14.5)
SARS-COV-2 RNA SPEC QL NAA+PROBE: SIGNIFICANT CHANGE UP
SODIUM SERPL-SCNC: 136 MMOL/L — SIGNIFICANT CHANGE UP (ref 135–146)
SP GR SPEC: 1.01 — LOW (ref 1.01–1.02)
UROBILINOGEN FLD QL: SIGNIFICANT CHANGE UP
WBC # BLD: 9.86 K/UL — SIGNIFICANT CHANGE UP (ref 4.8–10.8)
WBC # FLD AUTO: 9.86 K/UL — SIGNIFICANT CHANGE UP (ref 4.8–10.8)

## 2020-07-10 PROCEDURE — 93010 ELECTROCARDIOGRAM REPORT: CPT | Mod: 77

## 2020-07-10 PROCEDURE — 99223 1ST HOSP IP/OBS HIGH 75: CPT | Mod: AI

## 2020-07-10 PROCEDURE — 71045 X-RAY EXAM CHEST 1 VIEW: CPT | Mod: 26

## 2020-07-10 PROCEDURE — 93010 ELECTROCARDIOGRAM REPORT: CPT | Mod: 76

## 2020-07-10 PROCEDURE — 99221 1ST HOSP IP/OBS SF/LOW 40: CPT

## 2020-07-10 PROCEDURE — 99282 EMERGENCY DEPT VISIT SF MDM: CPT

## 2020-07-10 PROCEDURE — 99285 EMERGENCY DEPT VISIT HI MDM: CPT

## 2020-07-10 RX ORDER — ENOXAPARIN SODIUM 100 MG/ML
40 INJECTION SUBCUTANEOUS DAILY
Refills: 0 | Status: DISCONTINUED | OUTPATIENT
Start: 2020-07-10 | End: 2020-07-16

## 2020-07-10 RX ORDER — CHLORHEXIDINE GLUCONATE 213 G/1000ML
1 SOLUTION TOPICAL
Refills: 0 | Status: DISCONTINUED | OUTPATIENT
Start: 2020-07-10 | End: 2020-07-16

## 2020-07-10 RX ORDER — FUROSEMIDE 40 MG
20 TABLET ORAL DAILY
Refills: 0 | Status: DISCONTINUED | OUTPATIENT
Start: 2020-07-10 | End: 2020-07-16

## 2020-07-10 RX ORDER — LOSARTAN POTASSIUM 100 MG/1
12.5 TABLET, FILM COATED ORAL DAILY
Refills: 0 | Status: DISCONTINUED | OUTPATIENT
Start: 2020-07-10 | End: 2020-07-11

## 2020-07-10 RX ORDER — ESCITALOPRAM OXALATE 10 MG/1
2.5 TABLET, FILM COATED ORAL DAILY
Refills: 0 | Status: DISCONTINUED | OUTPATIENT
Start: 2020-07-10 | End: 2020-07-10

## 2020-07-10 RX ORDER — PREGABALIN 225 MG/1
1000 CAPSULE ORAL DAILY
Refills: 0 | Status: DISCONTINUED | OUTPATIENT
Start: 2020-07-10 | End: 2020-07-16

## 2020-07-10 RX ORDER — HALOPERIDOL DECANOATE 100 MG/ML
1 INJECTION INTRAMUSCULAR EVERY 12 HOURS
Refills: 0 | Status: DISCONTINUED | OUTPATIENT
Start: 2020-07-10 | End: 2020-07-16

## 2020-07-10 RX ORDER — ESCITALOPRAM OXALATE 10 MG/1
5 TABLET, FILM COATED ORAL DAILY
Refills: 0 | Status: DISCONTINUED | OUTPATIENT
Start: 2020-07-10 | End: 2020-07-16

## 2020-07-10 RX ADMIN — LOSARTAN POTASSIUM 12.5 MILLIGRAM(S): 100 TABLET, FILM COATED ORAL at 12:59

## 2020-07-10 RX ADMIN — Medication 20 MILLIGRAM(S): at 12:59

## 2020-07-10 RX ADMIN — ENOXAPARIN SODIUM 40 MILLIGRAM(S): 100 INJECTION SUBCUTANEOUS at 12:59

## 2020-07-10 RX ADMIN — PREGABALIN 1000 MICROGRAM(S): 225 CAPSULE ORAL at 13:00

## 2020-07-10 RX ADMIN — ESCITALOPRAM OXALATE 2.5 MILLIGRAM(S): 10 TABLET, FILM COATED ORAL at 12:59

## 2020-07-10 NOTE — OCCUPATIONAL THERAPY INITIAL EVALUATION ADULT - PHYSICAL ASSIST/NONPHYSICAL ASSIST:DRESS LOWER BODY, OT EVAL
verbal cues/1 person assist/seated EOB to don b/l socks/set-up required/nonverbal cues (demo/gestures)

## 2020-07-10 NOTE — OCCUPATIONAL THERAPY INITIAL EVALUATION ADULT - ADDITIONAL COMMENTS
pt poor historian 2* cognitive status, unable to obtain PLOF and home environment PTA. as per H&P, pt lives with son in private house and is noncompliant with medication.

## 2020-07-10 NOTE — BEHAVIORAL HEALTH ASSESSMENT NOTE - HPI (INCLUDE ILLNESS QUALITY, SEVERITY, DURATION, TIMING, CONTEXT, MODIFYING FACTORS, ASSOCIATED SIGNS AND SYMPTOMS)
Of note, on chart review, the pt presented to the ED with the same presentation on June 4th (about one month ago). At that time Psychiatry cleared her as having chronic delusions. The pt was told to go to [facility] outpatient psychiatry but did not follow up.     Ms Yani Yusuf is an 79 year old [place] woman, residing with her son, , has 3 adult children, is a retired environmental worker with the [employer], with a documented psychiatric history of Schizoaffective Disorder, no IPP admissions, no suicide attempts, has not followed up with a psychiatrist in 3 years, noncompliant with her prescribed lexapro 5mg qd by her PMD, with a history of Haldol working for her in the past to reduce her psychotic symptoms, with past medical history of diabetes, HTN, chronic LE edema, basal cell carcinoma, and unspecified "thyroid" issues (per chart review removal of thyroid nodule many years ago) who was admitted to the medical floor for RLE Cyst. Psychiatry consult was called for evaluation of her psychosis. While in the ED staff report no behavioral disturbances, no kathrine psychosis, and describe the pt as "pleasant". On approach of the patient, she was observed to be sleeping on her bed, easily awaken, and agreeable to participate in the interview. During the interview the pt was fully oriented, alert, calm and cooperative, with mostly linear thought process with occasional tangential or circumstantial thought process. When asked why she is here the pt responds "foot cyst". When asked what happened before the admission she states that her neighbors are "dirty" and will enter her home which she states "is a felony". She also reports that the neighbors call the pharmacy to change her meds and will urinate on her floor. She denies that people are following her, that people are out to get her, that she can hear voices or see things that may not be there, or that she can read minds, or that people can read her mind, or that she receives messages from the TV or radio. She does report trouble staying asleep, stating her neighbors call her in the night to wake her, but then the phone has no record of the call. She denies any trouble with appetite, energy or concentration. she describes herself as very independent, showering daily, cleaning daily, cooking daily, going out and shopping on her own. She denies any depressed or elated mood. She denies any symptoms of depression or rafy. She denies having a psychiatric illness and denies needing medication. She admits to being noncompliant with medication, stating she only needs "the water pill". She denies any past or current thoughts of committing suicide or killing other people.    Collateral obtained from the pt's son, eric, at [phone]. He confirms that he lives with the pt, and that the pt is non-compliant with her medications (except for her furosemide) and did not follow up with psychiatry outpatient after her discharge last month. He remarks that he is concerned about her frequently calling 911 (she has called 3 times in the last 2 months) on her neighbors accusing them of defecating in her home and states that she is not "mentally able to help herself". When asked to explain how she can not help herself he reiterates the paranoid delusion and calling 911. When reviewing the pt's ADLs and iADLs he confirms the pt is fully independent, will go out shopping on her own, will cook and clean the house on her own, is eating normally, and is showering on her own. When asked about his concerns again he states that he is also concerned that she is walking with her swollen legs and is concerned she will fall again. He confirms the pt has not been aggressive, assaultive, and has not confronted the neighbors in person. He confirms this has been an issue for several months and there has been no major change since her discharge one month ago.    Collateral obtained from the patient's primary care doctor. They report they have known the pt for 15 years and that the pt is not at her baseline. they report the pt has been getting worse since february, and has had escalating behavior since then, they are concerned for the pt and do not feel she is safe to go home. Of note, on chart review, the pt presented to the ED with the same presentation on June 4th (about one month ago). At that time Psychiatry cleared her as having chronic delusions. The pt was told to go to [facility] outpatient psychiatry but did not follow up.     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 Psychiatry consult was called for evaluation of her psychosis. While in the ED staff report no behavioral disturbances, no kathrine psychosis, and describe the pt as "pleasant". On approach of the patient, she was observed to be sleeping on her bed, easily awaken, and agreeable to participate in the interview. During the interview the pt was fully oriented, alert, calm and cooperative, with mostly linear thought process with occasional tangential or circumstantial thought process. When asked why she is here the pt responds "foot cyst". When asked what happened before the admission she states that her neighbors are "dirty" and will enter her home which she states "is a felony". She also reports that the neighbors call the pharmacy to change her meds and will urinate on her floor. She denies that people are following her, that people are out to get her, that she can hear voices or see things that may not be there, or that she can read minds, or that people can read her mind, or that she receives messages from the TV or radio. She does report trouble staying asleep, stating her neighbors call her in the night to wake her, but then the phone has no record of the call. She denies any trouble with appetite, energy or concentration. she describes herself as very independent, showering daily, cleaning daily, cooking daily, going out and shopping on her own. She denies any depressed or elated mood. She denies any symptoms of depression or rafy. She denies having a psychiatric illness and denies needing medication. She admits to being noncompliant with medication, stating she only needs "the water pill". She denies any past or current thoughts of committing suicide or killing other people.    Collateral obtained from the pt's son, eric, at [phone]. He confirms that he lives with the pt, and that the pt is non-compliant with her medications (except for her furosemide) and did not follow up with psychiatry outpatient after her discharge last month. He remarks that he is concerned about her frequently calling 911 (she has called 3 times in the last 2 months) on her neighbors accusing them of defecating in her home and states that she is not "mentally able to help herself". When asked to explain how she can not help herself he reiterates the paranoid delusion and calling 911. When reviewing the pt's ADLs and iADLs he confirms the pt is fully independent, will go out shopping on her own, will cook and clean the house on her own, is eating normally, and is showering on her own. When asked about his concerns again he states that he is also concerned that she is walking with her swollen legs and is concerned she will fall again. He confirms the pt has not been aggressive, assaultive, and has not confronted the neighbors in person. He confirms this has been an issue for several months and there has been no major change since her discharge one month ago.    Collateral obtained from the patient's primary care doctor. They report they have known the pt for 15 years and that the. th Of note, on chart review, the pt presented to the ED with the same presentation on June 4th (about one month ago). At that time Psychiatry cleared her as having chronic delusions. The pt was told to go to [facility] outpatient psychiatry but did not follow up.     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 Psychiatry consult was called for evaluation of her psychosis. While in the ED staff report no behavioral disturbances, no kathrine psychosis, and describe the pt as "pleasant". On approach of the patient, she was observed to be sleeping on her bed, easily awaken, and agreeable to participate in the interview. During the interview the pt was fully oriented, alert, calm and cooperative, with mostly linear thought process with occasional tangential or circumstantial thought process. When asked why she is here the pt responds "foot cyst". When asked what happened before the admission she states that her neighbors are "dirty" and will enter her home which she states "is a felony". She also reports that the neighbors call the pharmacy to change her meds and will urinate on her floor. She denies that people are following her, that people are out to get her, that she can hear voices or see things that may not be there, or that she can read minds, or that people can read her mind, or that she receives messages from the TV or radio. She does report trouble staying asleep, stating her neighbors call her in the night to wake her, but then the phone has no record of the call. She denies any trouble with appetite, energy or concentration. she describes herself as very independent, showering daily, cleaning daily, cooking daily, going out and shopping on her own. She denies any depressed or elated mood. She denies any symptoms of depression or rafy. She denies having a psychiatric illness and denies needing medication. She admits to being noncompliant with medication, stating she only needs "the water pill". She denies any past or current thoughts of committing suicide or killing other people.    Collateral obtained from the pt's son, eric, at [phone]. He confirms that he lives with the pt, and that the pt is non-compliant with her medications (except for her furosemide) and did not follow up with psychiatry outpatient after her discharge last month. He remarks that he is concerned about her frequently calling 911 (she has called 3 times in the last 2 months) on her neighbors accusing them of defecating in her home and states that she is not "mentally able to help herself". When asked to explain how she can not help herself he reiterates the paranoid delusion and calling 911. When reviewing the pt's ADLs and iADLs he confirms the pt is fully independent, will go out shopping on her own, will cook and clean the house on her own, is eating normally, and is showering on her own. When asked about his concerns again he states that he is also concerned that she is walking with her swollen legs and is concerned she will fall again. He confirms the pt has not been aggressive, assaultive, and has not confronted the neighbors in person. He confirms this has been an issue for several months and there has been no major change since her discharge one month ago.    Spoke with her son and her son in law and noted patient has been non-compliant with her medications and this has been an ongoing issue with her in the past. Family is concerned about patient's ability to take care of herself and currently in need of higher level of care. Psychotic symptoms have been there for some time, now becoming more overt, patient has a history of refusing all medications prior to the psychosis.

## 2020-07-10 NOTE — H&P ADULT - ASSESSMENT
79 year-old female with a PMH of hypertension, diabetes, schizoaffective disorder, chronic LE edema, basal cell carcinoma, and unspecified "thyroid" issues (per chart review removal of thyroid nodule many years ago) presents for psychiatric help, evaluation of right foot mass, and long-term placement evaluation.     #) Hallucinations in setting of diagnosed schizoaffective disorder  - As per family, patient has been "off psychiatric meds for years"  - No obvious focal point for a metabolic encephalopathy type picture on lab results   - Patient is calm and A/Ox3 on my exam, though she is a very poor historian - she will frequently begin to answer a question regarding her medical history, then start talking about an unrelated topic without answering question (e.g. when asked about seeing a psychiatrist, she told me about a trip to the beach).   - Patient had been prescribed escitalopram (not known for how long), but has not been taking medications reliably for at least a few months   - Removal of long-standing SSRI can produce worsening of mood, exacerbation of anxiety, perceptual abnormalities; and insomnia/nightmares; though this is also usually accompanied by flu-like symptoms (not seen here). These "withdrawal" effects also tend to self-resolve after a few weeks   - Will re-start escitalopram for now  - Recommend psychiatry consult to see if starting an atypical antipsychotic would be prudent here.     #) Declining functional status  - Son states that patient now needs more care than he can provide at home; her independence is diminishing   - Will place social work consult to evaluate placement options  - PT/Rehab consult placed    #) Right foot mass  - Mass is quite enlarged, but non-tender and non-erythematous  - Evaluated by podiatry on last admission (5/2020) - aspiration attempted but no fluid produced  - US of the affected area showed a 9.4 x 9.4 x 3.9 cm mass within the dorsal foot. Sonographic signature of fat lobules was suggestive of lipoma or inflamed fat. No cyst noted.  - Mass is causing patient discomfort as per son - she now walks with a limp   - Will consult podiatry to evaluate for any surgical options that can be taken.     #) Hypertension   - BP was reportedly 201/87 when she presented to ED  - BP at time of my examination was 136/63  - Continue home furosemide and losartan   - Monitor BP for any major changes     #) Hyperkalemia   - Hemolyzed sample, repeat labs ordered for 11:00am  - If still high recommend lokelma     #) Normocytic anemia   - Hgb this admission 10.5   - Effectively unchanged from hgb levels on last admission ~1.5 months ago.   - Iron studies were effectively unremarkable, though B12 was a bit low  - Will start B12 supplement    #) Chronic LE edema  - 1+ edema on my exam  - Can continue furosemide, providing patient is willing to take it   - Monitor for medication compliance and any improvement     #) Basal cell carcinoma   - Long-standing issue as per son (unsure of when diagnosis was made, or who classified it as a true BCC)  - He notes that it has started to bleed intermittently   - These lesions are typically an issue that can be resolved in the outpatient setting, though patient has clearly had poor outpatient follow-up  - Would normally hesitate to consult inpatient dermatology for a BCC, but given poor outpatient follow-up for this lesion + recent bleeding episodes will place consult    #) Diabetes  - Blood sugar on labs is <200  - Will order A1c  - QHS finger-stick checks     #) History of thyroid complications   - Reportedly had thyroid nodule removed many years ago   - On last admission, an incidental finding of 6.1 cm heterogeneous lesion was found within the superior mediastinum, possibly arising from the right thyroid gland, suspicious for malignancy or goiter.  - US thyroid showed multiple thyroid nodules, the largest of which measures up to 6.9 cm with mediastinal extension, for which an ultrasound-guided fine-needle aspiration is recommended.  - Patient was discharged for outpatient follow-up to get FNA  - Evidently never completed  - Will check TSH and thyroid function tests (thyroglobulin antibody, thyroperoxidase antibody, etc.)     #) Diet - DASH  #) DVT prophylaxis - Lovenox 40 QD  #) Disposition - TBD   #) Activity - Increase as tolerated  #) Code status - Full 79 year-old female with a PMH of hypertension, diabetes, schizoaffective disorder, chronic LE edema, basal cell carcinoma, and unspecified "thyroid" issues (per chart review removal of thyroid nodule many years ago) presents for psychiatric help, evaluation of right foot mass, and long-term placement evaluation.     #) Hallucinations in setting of diagnosed schizoaffective disorder  - As per family, patient has been "off psychiatric meds for years"  - No obvious focal point for a metabolic encephalopathy type picture on lab results   - Patient is calm and A/Ox3 on my exam, though she is a very poor historian - she will frequently begin to answer a question regarding her medical history, then start talking about an unrelated topic without answering question (e.g. when asked about seeing a psychiatrist, she told me about a trip to the beach).   - Patient had been prescribed escitalopram (not known for how long), but has not been taking medications reliably for at least a few months   - Removal of long-standing SSRI can produce worsening of mood, exacerbation of anxiety, perceptual abnormalities; and insomnia/nightmares; though this is also usually accompanied by flu-like symptoms (not seen here). These "withdrawal" effects also tend to self-resolve after a few weeks   - Will re-start escitalopram for now  - Recommend psychiatry consult to see if starting an atypical antipsychotic would be prudent here.     #) Declining functional status  - Son states that patient now needs more care than he can provide at home; her independence is diminishing   - Will place social work consult to evaluate placement options  - PT/Rehab consult placed    #) Right foot mass  - Mass is quite enlarged, but non-tender and non-erythematous  - Evaluated by podiatry on last admission (5/2020) - aspiration attempted but no fluid produced  - US of the affected area showed a 9.4 x 9.4 x 3.9 cm mass within the dorsal foot. Sonographic signature of fat lobules was suggestive of lipoma or inflamed fat. No cyst noted.  - Mass is causing patient discomfort as per son - she now walks with a limp   - Will consult podiatry to evaluate for any surgical options that can be taken.     #) Hypertension   - BP was reportedly 201/87 when she presented to ED  - BP at time of my examination was 136/63  - Continue home furosemide and losartan   - Monitor BP for any major changes     #) Hyperkalemia   - Hemolyzed sample, repeat labs ordered for 11:00am  - If still high recommend lokelma     #) Low Bicarb   - No clear cause for an acidotic status  - No anion gap   - Respiratory status on physical exam is unremarkable   - Trend with next lab draw to see if same or better.   - If worse, get VBG.     #) Normocytic anemia   - Hgb this admission 10.5   - Effectively unchanged from hgb levels on last admission ~1.5 months ago.   - Iron studies were effectively unremarkable, though B12 was a bit low  - Will start B12 supplement    #) Chronic LE edema  - 1+ edema on my exam  - Can continue furosemide, providing patient is willing to take it   - Monitor for medication compliance and any improvement     #) Basal cell carcinoma   - Long-standing issue as per son (unsure of when diagnosis was made, or who classified it as a true BCC)  - He notes that it has started to bleed intermittently   - These lesions are typically an issue that can be resolved in the outpatient setting, though patient has clearly had poor outpatient follow-up  - Would normally hesitate to consult inpatient dermatology for a BCC, but given poor outpatient follow-up for this lesion + recent bleeding episodes will place consult    #) Diabetes  - Blood sugar on labs is <200  - Will order A1c  - QHS finger-stick checks     #) History of thyroid complications   - Reportedly had thyroid nodule removed many years ago   - On last admission, an incidental finding of 6.1 cm heterogeneous lesion was found within the superior mediastinum, possibly arising from the right thyroid gland, suspicious for malignancy or goiter.  - US thyroid showed multiple thyroid nodules, the largest of which measures up to 6.9 cm with mediastinal extension, for which an ultrasound-guided fine-needle aspiration is recommended.  - Patient was discharged for outpatient follow-up to get FNA  - Evidently never completed  - Will check TSH and thyroid function tests (thyroglobulin antibody, thyroperoxidase antibody, etc.)     #) Diet - DASH  #) DVT prophylaxis - Lovenox 40 QD  #) Disposition - TBD   #) Activity - Increase as tolerated  #) Code status - Full 79 year-old woman with a PMH of hypertension, diabetes, schizoaffective disorder, chronic LE edema, basal cell carcinoma, and unspecified "thyroid" issues (per chart review removal of thyroid nodule many years ago) presents for psychiatric help, evaluation of right foot mass, and long-term placement evaluation.     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 Sonographic signature of fat lobules was suggestive of lipoma or inflamed fat. No cyst noted.  I d/w pod -> need to resolve decompensated psych condition prior to elective foot surgery  obtain darco shoe for rt foot (will fit better than her shoe)    # Hypertension   BP was 201/87 when she presented to ED  now 136/63  Continue home furosemide 20mg q24 and losartan 12.5mg po q24 (rpt hemolyzed K+)    # Low Bicarb w/ nl gap likely from hemolyzed specimen  rpt BMP    # Chronic mild LE edema prob 2/2 obesity  Can continue low-dose furosemide    # "Basal cell carcinoma" of ear  Long-standing issue as per son; he notes that it has started to bleed intermittently   outpt derm eval - not urgent (BCC does not metastasize     # "Diabetes"; A1c 5.9 (May 2020)  not sure she qualifies for such dx  no tx for now  would NOT monitor FS  can give diabetic diet    # History of thyroid nodules  On last admission, an incidental finding of 6.1 cm heterogeneous lesion was found within the superior mediastinum, possibly arising from the right thyroid gland, suspicious for malignancy or goiter.  US thyroid showed multiple thyroid nodules, the largest of which measures up to 6.9 cm with mediastinal extension, for which an ultrasound-guided fine-needle aspiration is recommended.  TSH 1.1; FT4 1.1 = nl  can see endo as outpt: non-urgent    # DVT prophylaxis - Lovenox 40 QD    # Activity - amb as tolerated    # Code status - Full     Dispo: there is no medical indication for medical hospitalization; I would support IPP for stabilization of psych illness prior to any attempts of placing her in NH for LTC; begin B12 tx as above; rpt BMP; obtain darco shoe  OUTPT Pod f/u of rt foot lipoma  OUTPT ENDO eval of thyroid nodule  OUTPT DERM eval of ear BCC  OUTPT IM F/u for B12, anemia, HTN, pre-DM, 79 year-old woman with a PMH of hypertension, diabetes, schizoaffective disorder, chronic LE edema, basal cell carcinoma, and unspecified "thyroid" issues (per chart review removal of thyroid nodule many years ago) presents for psychiatric help, evaluation of right foot mass, and long-term placement evaluation.     # Hallucinations in setting of diagnosed schizoaffective disorder; declining fxn status 2/2 psych illness  pt non-compliant w/ meds and physician f/u  should start B12 replacement, but hospitalization is not needed for this  case d/w psych attd:  cont lexapro 5mg po qhs  haldol 1mg po or IM q12 (around the clock)  haldol 1mg po or IM q12 prn agitation  psych will cont to follow in hospital, but they feel she is safe for long term care placement  CM/SW evals for dispo    # Vitamin B12 Deficiency w/ mild, borderline macrocytic anemia  B12 1000mcg sc q24 x1 wk, then qwk x4, then qmo  recheck level in 1 mo  outpt GI f/u re EGD  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 Sonographic signature of fat lobules was suggestive of lipoma or inflamed fat. No cyst noted.  I d/w pod -> need to resolve decompensated psych condition prior to elective foot surgery  obtain darco shoe for rt foot (will fit better than her shoe)    # Hypertension   BP was 201/87 when she presented to ED  now 136/63  Continue home furosemide 20mg q24 and losartan 12.5mg po q24 (rpt hemolyzed K+)    # Low Bicarb w/ nl gap likely from hemolyzed specimen  rpt BMP    # Chronic mild LE edema prob 2/2 obesity  Can continue low-dose furosemide    # "Basal cell carcinoma" of ear  Long-standing issue as per son; he notes that it has started to bleed intermittently   outpt derm eval - not urgent (BCC does not metastasize     # "Diabetes"; A1c 5.9 (May 2020)  not sure she qualifies for such dx  no tx for now  would NOT monitor FS  can give diabetic diet    # History of thyroid nodules  On last admission, an incidental finding of 6.1 cm heterogeneous lesion was found within the superior mediastinum, possibly arising from the right thyroid gland, suspicious for malignancy or goiter.  US thyroid showed multiple thyroid nodules, the largest of which measures up to 6.9 cm with mediastinal extension, for which an ultrasound-guided fine-needle aspiration is recommended.  TSH 1.1; FT4 1.1 = nl  can see endo as outpt: non-urgent    # DVT prophylaxis - Lovenox 40 QD    # Activity - amb as tolerated    # Code status - Full     Dispo: there is no medical indication for medical hospitalization; pt is being admitted for long term care placement -> CM/SW; B12 as ordered; haldol/lexapro as ordered (f/u w/ psych); f/u BMP; obtain darco shoe  OUTPT Pod f/u of rt foot lipoma  OUTPT ENDO eval of thyroid nodule  OUTPT DERM eval of ear BCC  OUTPT IM F/u for B12, anemia, HTN, pre-DM,
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
## 2020-07-10 NOTE — ED ADULT TRIAGE NOTE - CHIEF COMPLAINT QUOTE
BIBA from home  pt's son called EMS 2' to pt having increasing altered mental status  as per EMS, pt on many psych meds and not compliant with them

## 2020-07-10 NOTE — BEHAVIORAL HEALTH ASSESSMENT NOTE - OTHER
deferred, no abnormal tone noted deferred Describes hearing a phone ringing in the night recalled 2 out of 3 words in 10 minutes

## 2020-07-10 NOTE — OCCUPATIONAL THERAPY INITIAL EVALUATION ADULT - NS ASR FOLLOW COMMAND OT EVAL
pt able to follow simple one step commands, though requiring cueing and redirection to task at hand 2* distractibility and tangential speaking./able to follow single-step instructions/75% of the time

## 2020-07-10 NOTE — OCCUPATIONAL THERAPY INITIAL EVALUATION ADULT - ORIENTATION, REHAB EVAL
time/A&Ox3, though with tangential speaking points and perseverating on disruptive neighbors and receiving the wrong medication for her legs from [facility] doctor./place/person
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
## 2020-07-10 NOTE — ED ADULT NURSE REASSESSMENT NOTE - NS ED NURSE REASSESS COMMENT FT1
pt calm this time , no agitation noted , for hospital admission , awaiting for bed , follow up again for urine specimen collection, pt resting comfortably on bed , no SOB

## 2020-07-10 NOTE — OCCUPATIONAL THERAPY INITIAL EVALUATION ADULT - PHYSICAL ASSIST/NONPHYSICAL ASSIST:TOILET, OT EVAL
1 person assist/standing at standard toilet to perform perineal hygiene/verbal cues/nonverbal cues (demo/gestures)

## 2020-07-10 NOTE — H&P ADULT - NSHPLABSRESULTS_GEN_ALL_CORE
LABS:                        10.5   9.86  )-----------( 305      ( 10 Jul 2020 03:05 )             34.6     07-10    136  |  108  |  22<H>  ----------------------------<  118<H>  5.4<H>   |  14<L>  |  1.2    Ca    10.1      10 Jul 2020 03:05    TPro  7.6  /  Alb  3.4<L>  /  TBili  0.2  /  DBili  x   /  AST  15  /  ALT  7   /  AlkPhos  85  07-10      Lactate, Blood: 1.4 mmol/L (07-10-20 @ 03:50)

## 2020-07-10 NOTE — H&P ADULT - NSICDXPASTMEDICALHX_GEN_ALL_CORE_FT
PAST MEDICAL HISTORY:  Diabetes mellitus     Disorder of thyroid, unspecified Son cannot provide details. States she had "thyroid surgery" decades ago when she was young    Edema Bilateral LE    Hypertension     Schizoaffective disorder

## 2020-07-10 NOTE — OCCUPATIONAL THERAPY INITIAL EVALUATION ADULT - GENERAL OBSERVATIONS, REHAB EVAL
pt received and left supine in bed in NAD +IV lock x2 +call bell within reach. pt agreeable to OT IE. RN made aware.

## 2020-07-10 NOTE — ED PROVIDER NOTE - OBJECTIVE STATEMENT
79 yold female to ED Pmhx schizoaffective disorder, Basal cell ca left side face, Dm, mild dementia; I spoke to son and pt specifically her to have surgery on right foot cyst; pt with large cyst to have drained/debridement by podiatry; pt also with psychiatric issues - with paranoid delusions - thoughts of neighbors coming and defacating/urinating in her apartment; Son states psych issues progressively getting worse;

## 2020-07-10 NOTE — BEHAVIORAL HEALTH ASSESSMENT NOTE - NSBHCHARTREVIEWIMAGING_PSY_A_CORE FT
< from: CT Head No Cont (05.28.20 @ 07:28) >    IMPRESSION:     1.  No evidence of acute intracranial pathology.    2.  Moderate chronic microvascular changes.    3.  Left sphenoid sinus opacification as described.    < end of copied text >

## 2020-07-10 NOTE — OCCUPATIONAL THERAPY INITIAL EVALUATION ADULT - TRANSFER SAFETY CONCERNS NOTED: TOILET, REHAB EVAL
decreased balance during turns/decreased weight-shifting ability/decreased safety awareness/inability to maintain weight-bearing restrictions w/o assist

## 2020-07-10 NOTE — H&P ADULT - NSHPPHYSICALEXAM_GEN_ALL_CORE
VITALS:   T(F): 98.4  HR: 80  BP: 136/63  RR: 20  SpO2: 99%    PHYSICAL EXAM:  GENERAL: NAD, speaks in full sentences, no signs of respiratory distress at rest  HEAD/Neck: Atraumatic. Large skin lesion (history of BCC) left side of face in vicinity of ear, extends to neck. Appears to have crusted blood over it.   CHEST/LUNG: Clear to auscultation bilaterally; No wheeze or crackles  HEART: S1, S2; RRR; No murmurs, rubs, or gallops.  ABDOMEN: BS+; Soft, Non-tender. Obese, some distension noted.   EXTREMITIES:  2+ Peripheral Pulses, No clubbing, cyanosis. 1+ edema noted in both lower extremities. Cyst-like mass noted over dorsal aspect of right foot - not tender or erythematous.   PSYCH: AAOx3, but gets side-tracked with completely unrelated topics easily.   NEUROLOGY: non-focal  SKIN: BCC lesion as described above

## 2020-07-10 NOTE — CONSULT NOTE ADULT - SUBJECTIVE AND OBJECTIVE BOX
PODIATRY CONSULT   CHELA GALLAGHER is a 79y Female Patient is a 79y old  Female who presents with a chief complaint of Hallucinations, RLE cyst, Placement for psych management (10 Jul 2020 08:12)    HPI:  Ms. Gallagher is an 79 year-old female with a PMH of hypertension, diabetes, schizoaffective disorder, chronic LE edema, basal cell carcinoma, and unspecified "thyroid" issues (per chart review removal of thyroid nodule many years ago). Although patient is technically A/Ox3 (she knows who she is, where she is, and current date/time), she does not provide a very accurate history and gets side-tracked easily with unrelated topics. History was therefore primarily obtained by her son ([phone]) whom she lives with at home.     Son states that for the past 7-8 months, Ms. Gallagher has had progressively worsening cognitive function alongside worsening psychiatric symptoms. He reports that she is constantly seeing people in their home who are not there. She will frequently call 911 on her own reporting intruders performing strange activities (e.g. defecating in the hallways of her home). He states that today patient had yet another episode of these hallucinations and called 911 again. Son states that it has reached a point where she needs serious help and placement in a facility. Although she is currently prescribed furosemide, escitalopram, and losartan, she refuses to take any medications. Son states she had been on psychiatric medications "a while ago" but does not recall the name. Patient also has not seen a psychiatrist in years. PMD is Dr. Alejandro on [facility].     Son also states there has been a progressive enlargement of a cyst-like mass on the dorsal aspect of her right foot. He claims it has been present for "years", but appears now to be causing her pain as she walks with a limp. According to son, patient has had no fevers, chills, nausea, vomiting, headache, balance deficits, or chest pain. He does say that she has had some shortness of breath, but this is not necessarily new. Frequent falls are also a problem for her. She has also had severe insomnia - often not sleeping at all during the night.     In the ED, vitals were /87, , RR 17, T 98.9, pulse-ox 99% RA. EKG NSR. Labs significant for normocytic anemia (appears chronic) and low bicarb. No anion gap. (10 Jul 2020 08:12)      RIGHT FOOT PAIN;SCHIZOAFFECTIVE DISORDER  Disorder of thyroid, unspecified  Edema  Schizoaffective disorder  Hypertension  Diabetes mellitus      PMH: RIGHT FOOT PAIN;SCHIZOAFFECTIVE DISORDER  Disorder of thyroid, unspecified  Edema  Schizoaffective disorder  Hypertension  Diabetes mellitus    PSH:   Medication   Allergy: No Known Allergies        Labs:                        10.5   9.86  )-----------( 305      ( 10 Jul 2020 03:05 )             34.6       07-10    136  |  108  |  22<H>  ----------------------------<  118<H>  5.4<H>   |  14<L>  |  1.2    Ca    10.1      10 Jul 2020 03:05    TPro  7.6  /  Alb  3.4<L>  /  TBili  0.2  /  DBili  x   /  AST  15  /  ALT  7   /  AlkPhos  85  07-10            ROS:  [ x]A ten point review of system was otherwise negative except as noted    Physical Exam -Right Lower Extremity Focused:   Derm: no open lesion, fluctuance right dorsal hallux and forefoot. No erythema/edema, no clinical signs of infection.   Vascular: Dorsalis Pedis and Posterior Tibial pulses 0/4.  Capillary re-fill time less then 3 seconds digits 1-5 right foot. warm to touch  Neuro: Protective sensation intact to the level of the digits right foot.    Vascular:   DP and PT pulses mildly diminished. Cap re-fill time > then 3sec to the digits. Skin temperature is warm to cool from proximal to distal  Neuro:  - Protective sensation grossly intact.   MSK:   Manual Muscle strength +5/5 in all muscle compartments. Tenderness upon palpation at the dorsum of the right foot    < from: US Extremity Nonvasc Limited, Right (05.30.20 @ 17:51) >    EXAM:  US NONVASC EXT LTD RT            PROCEDURE DATE:  05/30/2020            INTERPRETATION:  Clinical History/Reason For Exam: r/o cyst vs soft tissue mass vs lipoma right foot. Swelling.    Technique: US NONVASC EXTREMITY LIMITED RIGHT    Comparison: None.    Findings:  Within the dorsal aspect of the right foot, there is a mass measuring 9.4 x 9.4 x 3.9 cm (transverse by length by depth). On high-resolution linear probe has the appearance of inflamed fat lobules. Foci of color Doppler floware seen within the lesion. Possible hypoechoic capsule is partially seen.    No cystic changes are present. No calcifications are appreciated.    Impression:  9.4 x 9.4 x 3.9 cm mass within the dorsal foot. Sonographic signature of fat lobules is suggestive of lipoma or inflamed fat. No cyst is seen. Clinical follow-up and re-image if enlarging.                  CHARLI CONNOR M.D., ATTENDING RADIOLOGIST  This document has been electronically signed. Jun 1 2020  8:35AM          < end of copied text >        Assessment:   Cyst vs Lipoma right foot    Plan:  Chart reviewed and Patient evaluated w/ Attending, Dr. Overton  Discussed diagnosis and treatment with patient  No dressings  Ultrasound of right foot reviewed from 5/30/20: Shows No Cyst  WBAT   Pt. can f/u as o/p w/ Dr. Copeland in clinic at [address] [place] [address]. [address]  Discussed plan  with  Attending, Dr. Copeland PODIATRY CONSULT   CHELA GALLAGHER is a 79y Female Patient is a 79y old  Female who presents with a chief complaint of Hallucinations, RLE cyst, Placement for psych management (10 Jul 2020 08:12)    HPI:  Ms. Gallagher is an 79 year-old female with a PMH of hypertension, diabetes, schizoaffective disorder, chronic LE edema, basal cell carcinoma, and unspecified "thyroid" issues (per chart review removal of thyroid nodule many years ago). Although patient is technically A/Ox3 (she knows who she is, where she is, and current date/time), she does not provide a very accurate history and gets side-tracked easily with unrelated topics. History was therefore primarily obtained by her son [phone]) whom she lives with at home.     Son states that for the past 7-8 months, Ms. Gallagher has had progressively worsening cognitive function alongside worsening psychiatric symptoms. He reports that she is constantly seeing people in their home who are not there. She will frequently call 911 on her own reporting intruders performing strange activities (e.g. defecating in the hallways of her home). He states that today patient had yet another episode of these hallucinations and called 911 again. Son states that it has reached a point where she needs serious help and placement in a facility. Although she is currently prescribed furosemide, escitalopram, and losartan, she refuses to take any medications. Son states she had been on psychiatric medications "a while ago" but does not recall the name. Patient also has not seen a psychiatrist in years. PMD is Dr. Alejandro on [facility].     Son also states there has been a progressive enlargement of a cyst-like mass on the dorsal aspect of her right foot. He claims it has been present for "years", but appears now to be causing her pain as she walks with a limp. According to son, patient has had no fevers, chills, nausea, vomiting, headache, balance deficits, or chest pain. He does say that she has had some shortness of breath, but this is not necessarily new. Frequent falls are also a problem for her. She has also had severe insomnia - often not sleeping at all during the night.     In the ED, vitals were /87, , RR 17, T 98.9, pulse-ox 99% RA. EKG NSR. Labs significant for normocytic anemia (appears chronic) and low bicarb. No anion gap. (10 Jul 2020 08:12)      RIGHT FOOT PAIN;SCHIZOAFFECTIVE DISORDER  Disorder of thyroid, unspecified  Edema  Schizoaffective disorder  Hypertension  Diabetes mellitus      PMH: RIGHT FOOT PAIN;SCHIZOAFFECTIVE DISORDER  Disorder of thyroid, unspecified  Edema  Schizoaffective disorder  Hypertension  Diabetes mellitus    PSH:   Medication   Allergy: No Known Allergies        Labs:                        10.5   9.86  )-----------( 305      ( 10 Jul 2020 03:05 )             34.6       07-10    136  |  108  |  22<H>  ----------------------------<  118<H>  5.4<H>   |  14<L>  |  1.2    Ca    10.1      10 Jul 2020 03:05    TPro  7.6  /  Alb  3.4<L>  /  TBili  0.2  /  DBili  x   /  AST  15  /  ALT  7   /  AlkPhos  85  07-10            ROS:  [ x]A ten point review of system was otherwise negative except as noted    Physical Exam -Right Lower Extremity Focused:   Derm: no open lesion, fluctuance right dorsal hallux and forefoot. No erythema/edema, no clinical signs of infection.   Vascular: Dorsalis Pedis and Posterior Tibial pulses 0/4.  Capillary re-fill time less then 3 seconds digits 1-5 right foot. warm to touch  Neuro: Protective sensation intact to the level of the digits right foot.    Vascular:   DP and PT pulses mildly diminished. Cap re-fill time > then 3sec to the digits. Skin temperature is warm to cool from proximal to distal  Neuro:  - Protective sensation grossly intact.   MSK:   Manual Muscle strength +5/5 in all muscle compartments. Tenderness upon palpation at the dorsum of the right foot    < from: US Extremity Nonvasc Limited, Right (05.30.20 @ 17:51) >    EXAM:  US NONVASC EXT LTD RT            PROCEDURE DATE:  05/30/2020            INTERPRETATION:  Clinical History/Reason For Exam: r/o cyst vs soft tissue mass vs lipoma right foot. Swelling.    Technique: US NONVASC EXTREMITY LIMITED RIGHT    Comparison: None.    Findings:  Within the dorsal aspect of the right foot, there is a mass measuring 9.4 x 9.4 x 3.9 cm (transverse by length by depth). On high-resolution linear probe has the appearance of inflamed fat lobules. Foci of color Doppler floware seen within the lesion. Possible hypoechoic capsule is partially seen.    No cystic changes are present. No calcifications are appreciated.    Impression:  9.4 x 9.4 x 3.9 cm mass within the dorsal foot. Sonographic signature of fat lobules is suggestive of lipoma or inflamed fat. No cyst is seen. Clinical follow-up and re-image if enlarging.                  CHARLI CONNOR M.D., ATTENDING RADIOLOGIST  This document has been electronically signed. Jun 1 2020  8:35AM          < end of copied text >        Assessment:   Cyst vs Lipoma right foot    Plan:  Chart reviewed and Patient evaluated w/ Attending, Dr. Overton  Discussed diagnosis and treatment with patient  No dressings  Ultrasound of right foot reviewed from 5/30/20: Shows No Cyst  WBAT   Pt. can f/u as o/p w/ Dr. Copeland in clinic at [address] [place] [address]. [address]  Evaluated  with  Attending, Dr. Copeland

## 2020-07-10 NOTE — BEHAVIORAL HEALTH ASSESSMENT NOTE - SUMMARY
Ms Yusuf is an 79 year old woman with a history of Schizoaffective disorder who was admitted to the medical floor for RLE cyst. psychiatry consult was called for evaluation of her psychosis.    The patient, while chronically psychotic, appears to have acute decompensation of her psychosis in the setting of medication noncompliance. She is not manic or depressed. She denies suicidal ideations, intent or plan. While the pt has been independent, taking care of her ADLs and shopping independently, her PMD reports that she is not at her baseline and her paranoid delusions are getting worse. Patient appears to have some cognitive impairment which may explain her current behaviors and the perceived stubbornness by her family.     At this time, patient is deemed unable to care for herself and will be admitted to [facility] inpatient psychiatric hospital. This is due to her worsening delusions with escalating behavior (calling 911 more frequently) and her noncompliance with followup and medications. Patient may benefit from re-starting low dose haldol to target her paranoia as she has been on this medication in the past. Please monitor her EKG for her QTc prolongation and stop antipsychotic medications for QTc >500.  It is important to note that as per collateral, the patient's family is unable to take care of her because of her worsening physical disabilities and her worsening psychiatric symptoms. We are agreeable to a higher level of care for patient if the patient's family is unable to take care of her on discharge.    Recommendations:  - admit pt to IPP by 9.27  - Continue Lasix and losartan  - Continue Lexapro 5mg qd  - Start Haldol 1mg PO qhs Ms Yusuf is an 79 year old woman with a history of Schizoaffective disorder who was admitted to the medical floor for RLE cyst. psychiatry consult was called for evaluation of her psychosis. Ms Yusuf is an 79 year old woman with a history of Schizoaffective disorder who was admitted to the medical floor for RLE cyst. psychiatry consult was called for evaluation of her psychosis.    On evaluation patient perseverated about having people coming into her apartment, which has been ongoing for sometime, no acute agitation at home or violent behavior requiring inpatient psychiatric admission at this time. Although this patient will benefit from psychotropic medications, however, the benefit to admit her psychiatrically will not change the long term goals of compliance. She is not at no acute risk for herself and she has no suicide or homicidal ideation, no homicidal ideation. Psychosis is not new and she has been refusing  all psychotropic medications in the past for more than 8 years.   Recommendation for social work service/Visiting nurse services.  -Haldol 1mg po hs for now.  -if patient becomes agitated consider using haldol 1mg im if pt refuses po.   -Nursing 1:1 for fall, not for psychiatric indication  -No need for inpatient psych admission  - Ms Yusuf is an 79 year old woman with a history of Schizoaffective disorder who was admitted to the medical floor for RLE cyst. psychiatry consult was called for evaluation of her psychosis.    On evaluation patient perseverated about having people coming into her apartment, which has been ongoing for sometime, no acute agitation at home or violent behavior requiring inpatient psychiatric admission at this time. Although this patient will benefit from psychotropic medications, however, the benefit to admit her psychiatrically will not change the long term goals of compliance. She is not at no acute risk for herself and she has no suicide or homicidal ideation, no homicidal ideation. Psychosis is not new and she has been refusing  all psychotropic medications in the past for more than 8 years.   Recommendation for social work service/Visiting nurse services.  -Haldol 1mg po hs  and 1mg po q am for now.  -If patient becomes agitated consider using haldol 1mg po  q12hrs prn for psychosis. Use IM dose medications if patient refuses po meds.   -Continue home dose Lexapro 5mg po am  -Nursing 1:1 for fall, not for psychiatric indication  -No need for inpatient psych admission  -Will follow Ms Yusuf is an 79 year old woman with a history of Schizoaffective disorder who was admitted to the medical floor for RLE cyst. psychiatry consult was called for evaluation of her psychosis.    On evaluation patient perseverated about having people coming into her apartment, which has been ongoing for sometime, no acute agitation at home or violent behavior requiring inpatient psychiatric admission at this time. Although this patient will benefit from psychotropic medications, however, the benefit to admit her psychiatrically will not change the long term goals of compliance. She is not at no acute risk for herself and she has no suicide or homicidal ideation, no homicidal ideation. Psychosis is not new and she has been refusing  all psychotropic medications in the past for more than 8 years.   Recommendation for social work service/Visiting nurse services.  -Haldol 1mg po hs  and 1mg po q am for now.  -If patient becomes agitated consider using haldol 1mg po  q12hrs prn for psychosis. Use IM dose medications if patient refuses po meds.   -Monitor Qtc while on haldol if >500ms please hold all psychotropic medications.   -Continue home dose Lexapro 5mg po am  -Nursing 1:1 for fall, not for psychiatric indication  -No need for inpatient psych admission  -Will follow

## 2020-07-10 NOTE — ED PROVIDER NOTE - ATTENDING CONTRIBUTION TO CARE
80 yo female with PMH schizoaffective d/o, DM, dementia, basal cell ca, sent in by son for increased agitation at night and paranoid thinking. Pt c/o swelling to her right foot which she did know how long it has been there; has podiatry eval scheduled but has been difficult to follow up due to pt's ongoing behaviors. Son reported chronic issues worsening including fears neighbors defecating in her home and her calling 911 to make reports. Pt denied any complaints, no fevers, HA, dizziness, CP, SOB, N/V/D or abdominal pain.     VITAL SIGNS: noted  CONSTITUTIONAL: Well-developed; well-nourished; in no acute distress  HEAD: Normocephalic; atraumatic  EYES: PERRL, EOM intact; conjunctiva and sclera clear  ENT: No nasal discharge; airway clear. MMM  NECK: Supple; non tender.    CARD: S1, S2 normal; no murmurs, gallops, or rubs. Regular rate and rhythm  RESP: CTAB/L, no wheezes, rales or rhonchi  ABD: Normal bowel sounds; soft; non-distended; non-tender; no hepatosplenomegaly. No CVA tenderness  EXT: Normal ROM. No calf tenderness or edema. Distal pulses intact. Left foot with large cyst anteriorly, no signs cellulitis or tenderness on exam  NEURO: Alert, oriented. Grossly unremarkable. No focal deficits  SKIN: Skin exam is warm and dry, left pre-auricular area with area chronic appearing wound suspicious for carcinoma, no foul odor, no purulent d/c 78 yo female with PMH schizoaffective d/o, DM, dementia, basal cell ca, sent in by son for increased agitation at night and paranoid thinking. Pt c/o swelling to her right foot which she did know how long it has been there; has podiatry eval scheduled but has been difficult to follow up due to pt's ongoing behaviors. Son reported chronic issues worsening including fears neighbors defecating in her home and her calling 911 to make reports. Pt denied any complaints, no fevers, HA, dizziness, CP, SOB, N/V/D or abdominal pain.     VITAL SIGNS: noted  CONSTITUTIONAL: Well-developed; well-nourished; in no acute distress  HEAD: Normocephalic; atraumatic  EYES: PERRL, EOM intact; conjunctiva and sclera clear  ENT: No nasal discharge; airway clear. MMM  NECK: Supple; non tender.    CARD: S1, S2 normal; no murmurs, gallops, or rubs. Regular rate and rhythm  RESP: CTAB/L, no wheezes, rales or rhonchi  ABD: Normal bowel sounds; soft; non-distended; non-tender; no hepatosplenomegaly. No CVA tenderness  EXT: Normal ROM. No calf tenderness or edema. Distal pulses intact. Right foot with large cyst anteriorly, no signs cellulitis or tenderness on exam  NEURO: Alert, oriented. Grossly unremarkable. No focal deficits  SKIN: Skin exam is warm and dry, left pre-auricular area with area chronic appearing wound suspicious for carcinoma, no foul odor, no purulent d/c

## 2020-07-10 NOTE — BEHAVIORAL HEALTH ASSESSMENT NOTE - NSBHCHARTREVIEWINVESTIGATE_PSY_A_CORE FT
< from: 12 Lead ECG (07.10.20 @ 04:34) >      Ventricular Rate 76 BPM    Atrial Rate 76 BPM    P-R Interval 206 ms    QRS Duration 102 ms    Q-T Interval 368 ms    QTC Calculation(Bezet) 414 ms    P Axis 80 degrees    R Axis -38 degrees    T Axis 48 degrees    Diagnosis Line Sinus rhythm with marked sinus arrhythmia  Left axis deviation  Voltage criteria for left ventricular hypertrophy  Abnormal ECG    < end of copied text >

## 2020-07-10 NOTE — OCCUPATIONAL THERAPY INITIAL EVALUATION ADULT - PERTINENT HX OF CURRENT PROBLEM, REHAB EVAL
pt 80 y/o female PMH of hypertension, diabetes, schizoaffective disorder, chronic LE edema, basal cell carcinoma who presented to ED via son with AMS and confusion.

## 2020-07-10 NOTE — BEHAVIORAL HEALTH ASSESSMENT NOTE - NSBHCONSULTMEDAGITATION_PSY_A_CORE FT
If patient becomes agitated consider using haldol 1mg po  q12hrs prn for psychosis. Use IM dose medications if patient refuses po meds.

## 2020-07-10 NOTE — BEHAVIORAL HEALTH ASSESSMENT NOTE - NSBHCHARTREVIEWVS_PSY_A_CORE FT
Vital Signs Last 24 Hrs  T(C): 36.9 (10 Jul 2020 08:17), Max: 37.2 (10 Jul 2020 01:23)  T(F): 98.4 (10 Jul 2020 08:17), Max: 98.9 (10 Jul 2020 01:23)  HR: 80 (10 Jul 2020 08:17) (80 - 106)  BP: 136/63 (10 Jul 2020 08:17) (136/63 - 201/87)  BP(mean): --  RR: 20 (10 Jul 2020 08:17) (17 - 20)  SpO2: 99% (10 Jul 2020 08:17) (95% - 99%)

## 2020-07-10 NOTE — BEHAVIORAL HEALTH ASSESSMENT NOTE - NSBHCHARTREVIEWLAB_PSY_A_CORE FT
10.5   9.86  )-----------( 305      ( 10 Jul 2020 03:05 )             34.6    07-10    136  |  108  |  22<H>  ----------------------------<  118<H>  5.4<H>   |  14<L>  |  1.2    Ca    10.1      10 Jul 2020 03:05    TPro  7.6  /  Alb  3.4<L>  /  TBili  0.2  /  DBili  x   /  AST  15  /  ALT  7   /  AlkPhos  85  07-10

## 2020-07-10 NOTE — BEHAVIORAL HEALTH ASSESSMENT NOTE - RISK ASSESSMENT
Low Acute Suicide Risk Pt is at elevated chronic risk due to her age, poor insight, treatment noncompliance, and chronic paranoid delusions. However this is mitigated by her desire to live, her lack of suicide attempts, her lack of suicidality, her strong social supports, her housing stability, and future orientation. No prior inpatient psychiatric admission. No active suicidal ideation currently or in the remote past. She is future oriented.

## 2020-07-10 NOTE — ED PROVIDER NOTE - CLINICAL SUMMARY MEDICAL DECISION MAKING FREE TEXT BOX
pt sent in by son for behavior issues at home with increased paranoid thinking and agitation a night, pt without signficant complaints, admitted after labs unremarkable for SW/psych evaluation and podiatry consult for large foot cyst

## 2020-07-10 NOTE — OCCUPATIONAL THERAPY INITIAL EVALUATION ADULT - PLANNED THERAPY INTERVENTIONS, OT EVAL
balance training/ROM/parent/caregiver training.../strengthening/cognitive, visual perceptual/stretching/ADL retraining/IADL retraining/bed mobility training/transfer training

## 2020-07-10 NOTE — ED ADULT NURSE NOTE - OBJECTIVE STATEMENT
Pt BIBA  c/o altered mental status. Per pts son , has increased altered mental status , on psych meds but m=noncompliant .Pt HX schizoaffective disorder , AO x 4 , occasional flight of ideas noted , no grimaced face noted , denies chest pain , calm ,no restlessness noted , no agitation noted , no labored breathing , no accessory muscles used , speaks in full sentences , no vomiting noted Pt BIBA  c/o altered mental status. Per pts son , has increased altered mental status , on psych meds but m=noncompliant .Pt HX schizoaffective disorder oriented to name ,person . place,  time ,  flight of ideas noted noted , noted right foot swelling ,+CMS , noted left ear abrasion  no grimaced face noted , denies chest pain , calm ,no restlessness noted , no agitation noted , no labored breathing , no accessory muscles used , speaks in full sentences , no vomiting noted Pt BIBA  c/o altered mental status. Per pts son , has increased altered mental status , on psych meds but noncompliant .Pt HX schizoaffective disorder oriented to name ,person . place,  time ,  flight of ideas noted noted , pyscho delusions noted as pt says ' my neighbor is urinating/defecating at the hallway of my apartment , noted right foot swelling , right foot huge size  cyst ,+CMS , noted left facial/left ear abrasion  , HX cell carcinoma , no grimaced face noted , denies chest pain , calm ,no restlessness noted , no agitation noted , no labored breathing , no accessory muscles used , speaks in full sentences , no vomiting noted Pt BIBA  c/o altered mental status. Per pts son , has increased altered mental status , on psych meds but noncompliant .Pt HX schizoaffective disorder oriented to name ,person . place,  time ,  flight of ideas noted noted , pyscho delusions noted as pt says ' my neighbor is urinating/defecating at the hallway of my apartment , noted right foot swelling , right foot huge size  cyst ,+CMS , noted left facial/ left ear abrasion  , HX left facial cell carcinoma , no grimaced face noted , denies chest pain , calm ,no restlessness noted , no agitation noted , no labored breathing , no accessory muscles used , speaks in full sentences , no vomiting noted

## 2020-07-10 NOTE — ED ADULT NURSE REASSESSMENT NOTE - NS ED NURSE REASSESS COMMENT FT1
Covering RN: Lab called,. Type and screen hemolyzed. As per MD, Dr. Sánchez, patient does not need another type and screen at this time,

## 2020-07-10 NOTE — OCCUPATIONAL THERAPY INITIAL EVALUATION ADULT - LEVEL OF INDEPENDENCE: DRESS LOWER BODY, OT EVAL
pt reports her son performed LB dressing for her, with emphasis on socks and shoes, at baseline PTA/dependent (less than 25% patients effort)

## 2020-07-10 NOTE — ED PROVIDER NOTE - PHYSICAL EXAMINATION
Constitutional: Well developed, well nourished. NAD  Head: Normocephalic, atraumatic.  Eyes: PERRL, EOMI. + left facial basal cell ca;  ENT: No nasal discharge. Mucous membranes dry.  Neck: Supple. Painless ROM.  Cardiovascular:  Regular rate and rhythm.   Pulmonary:  Lungs clear to auscultation bilaterally.   Abdominal: Soft. Nondistended. No rebound, guarding, rigidity.  Extremities. Pelvis stable. No lower extremity edema, symmetric calves. + right foot large 5cm cyst noted on dorsum;   Skin: No rashes, cyanosis.  Neuro: AAOx3. No focal neurological deficits.  Psych: Normal mood. Normal affect.

## 2020-07-11 LAB
ALBUMIN SERPL ELPH-MCNC: 3.6 G/DL — SIGNIFICANT CHANGE UP (ref 3.5–5.2)
ALP SERPL-CCNC: 91 U/L — SIGNIFICANT CHANGE UP (ref 30–115)
ALT FLD-CCNC: 7 U/L — SIGNIFICANT CHANGE UP (ref 0–41)
ANION GAP SERPL CALC-SCNC: 11 MMOL/L — SIGNIFICANT CHANGE UP (ref 7–14)
AST SERPL-CCNC: 12 U/L — SIGNIFICANT CHANGE UP (ref 0–41)
BASOPHILS # BLD AUTO: 0.05 K/UL — SIGNIFICANT CHANGE UP (ref 0–0.2)
BASOPHILS NFR BLD AUTO: 0.5 % — SIGNIFICANT CHANGE UP (ref 0–1)
BILIRUB SERPL-MCNC: 0.8 MG/DL — SIGNIFICANT CHANGE UP (ref 0.2–1.2)
BUN SERPL-MCNC: 18 MG/DL — SIGNIFICANT CHANGE UP (ref 10–20)
CALCIUM SERPL-MCNC: 9.8 MG/DL — SIGNIFICANT CHANGE UP (ref 8.5–10.1)
CHLORIDE SERPL-SCNC: 105 MMOL/L — SIGNIFICANT CHANGE UP (ref 98–110)
CO2 SERPL-SCNC: 22 MMOL/L — SIGNIFICANT CHANGE UP (ref 17–32)
CREAT SERPL-MCNC: 0.9 MG/DL — SIGNIFICANT CHANGE UP (ref 0.7–1.5)
CULTURE RESULTS: SIGNIFICANT CHANGE UP
EOSINOPHIL # BLD AUTO: 0.09 K/UL — SIGNIFICANT CHANGE UP (ref 0–0.7)
EOSINOPHIL NFR BLD AUTO: 0.9 % — SIGNIFICANT CHANGE UP (ref 0–8)
GLUCOSE SERPL-MCNC: 145 MG/DL — HIGH (ref 70–99)
HCT VFR BLD CALC: 35.7 % — LOW (ref 37–47)
HGB BLD-MCNC: 11.2 G/DL — LOW (ref 12–16)
IMM GRANULOCYTES NFR BLD AUTO: 0.3 % — SIGNIFICANT CHANGE UP (ref 0.1–0.3)
LYMPHOCYTES # BLD AUTO: 1.12 K/UL — LOW (ref 1.2–3.4)
LYMPHOCYTES # BLD AUTO: 11.2 % — LOW (ref 20.5–51.1)
MAGNESIUM SERPL-MCNC: 1.9 MG/DL — SIGNIFICANT CHANGE UP (ref 1.8–2.4)
MCHC RBC-ENTMCNC: 29.3 PG — SIGNIFICANT CHANGE UP (ref 27–31)
MCHC RBC-ENTMCNC: 31.4 G/DL — LOW (ref 32–37)
MCV RBC AUTO: 93.5 FL — SIGNIFICANT CHANGE UP (ref 81–99)
MONOCYTES # BLD AUTO: 0.72 K/UL — HIGH (ref 0.1–0.6)
MONOCYTES NFR BLD AUTO: 7.2 % — SIGNIFICANT CHANGE UP (ref 1.7–9.3)
NEUTROPHILS # BLD AUTO: 7.98 K/UL — HIGH (ref 1.4–6.5)
NEUTROPHILS NFR BLD AUTO: 79.9 % — HIGH (ref 42.2–75.2)
NRBC # BLD: 0 /100 WBCS — SIGNIFICANT CHANGE UP (ref 0–0)
PLATELET # BLD AUTO: 316 K/UL — SIGNIFICANT CHANGE UP (ref 130–400)
POTASSIUM SERPL-MCNC: 4.7 MMOL/L — SIGNIFICANT CHANGE UP (ref 3.5–5)
POTASSIUM SERPL-SCNC: 4.7 MMOL/L — SIGNIFICANT CHANGE UP (ref 3.5–5)
PROT SERPL-MCNC: 6.6 G/DL — SIGNIFICANT CHANGE UP (ref 6–8)
RBC # BLD: 3.82 M/UL — LOW (ref 4.2–5.4)
RBC # FLD: 12.6 % — SIGNIFICANT CHANGE UP (ref 11.5–14.5)
SODIUM SERPL-SCNC: 138 MMOL/L — SIGNIFICANT CHANGE UP (ref 135–146)
SPECIMEN SOURCE: SIGNIFICANT CHANGE UP
WBC # BLD: 9.99 K/UL — SIGNIFICANT CHANGE UP (ref 4.8–10.8)
WBC # FLD AUTO: 9.99 K/UL — SIGNIFICANT CHANGE UP (ref 4.8–10.8)

## 2020-07-11 PROCEDURE — 99232 SBSQ HOSP IP/OBS MODERATE 35: CPT

## 2020-07-11 RX ORDER — LOSARTAN POTASSIUM 100 MG/1
25 TABLET, FILM COATED ORAL DAILY
Refills: 0 | Status: DISCONTINUED | OUTPATIENT
Start: 2020-07-11 | End: 2020-07-16

## 2020-07-11 RX ADMIN — LOSARTAN POTASSIUM 12.5 MILLIGRAM(S): 100 TABLET, FILM COATED ORAL at 06:22

## 2020-07-11 RX ADMIN — ESCITALOPRAM OXALATE 5 MILLIGRAM(S): 10 TABLET, FILM COATED ORAL at 12:43

## 2020-07-11 RX ADMIN — CHLORHEXIDINE GLUCONATE 1 APPLICATION(S): 213 SOLUTION TOPICAL at 06:34

## 2020-07-11 RX ADMIN — ENOXAPARIN SODIUM 40 MILLIGRAM(S): 100 INJECTION SUBCUTANEOUS at 12:43

## 2020-07-11 RX ADMIN — LOSARTAN POTASSIUM 25 MILLIGRAM(S): 100 TABLET, FILM COATED ORAL at 17:43

## 2020-07-11 RX ADMIN — Medication 20 MILLIGRAM(S): at 06:22

## 2020-07-11 NOTE — PHYSICAL THERAPY INITIAL EVALUATION ADULT - GENERAL OBSERVATIONS, REHAB EVAL
Patient encountered in supine position in no apparent distress, agreeable to PT PT eval 8:40- 9:20.Patient encountered and left in supine position in no apparent distress, (+) IV lock x 2, agreeable to PT

## 2020-07-11 NOTE — PROGRESS NOTE ADULT - SUBJECTIVE AND OBJECTIVE BOX
CHELA GALLAGHER  79y  Female  ***My note supersedes ALL resident notes that I sign.  My corrections for their notes are in my note.***    I can be reached directly on [employer] [id number]. My office number is [phone]. My personal cell number is [phone].    INTERVAL EVENTS: Here for f/u of psychosis. Pt is still psychotic. She is refusing all meds. She says she just wants to go home.    T(F): 100.1 (20 @ 12:57), Max: 100.1 (20 @ 12:57)  HR: 107 (20 @ 12:57) (71 - 107) - HR 84 on my exam  BP: 183/86 (20 @ 12:57) (127/74 - 183/86)  RR: 19 (20 @ 12:57) (19 - 20)  SpO2: --    Gen: NAD; mildly agitated, but not combative  HEENT: PERRL, mouth clr, nose clr  Neck: no nodes, no JVD  lungs: clr  hrt: s1 s2 rrr no murmur  abd: soft, NT/ND, no HS megaly  ext: no c/c, tr ankle edema, rt dorsal foot lipoma - stable  neuro: aa, cn intact, can move all 4 ext    LABS:                      11.2    (    93.5   9.99  )-----------( ---------      316      ( 2020 06:53 )             35.7    (    12.6     138   (   105   (   145      20 @ 06:53  ----------------------               4.7   (   22   (   18                             -----                        0.9  Ca  9.8   Mg  1.9    P   --     LFT  6.6  (  0.8  (  12       20 @ 06:53  -------------------------  3.6  (  91  (  7    Urinalysis Basic - ( 10 Jul 2020 02:59 )    Color: Colorless / Appearance: Clear / S.009 / pH: x  Gluc: x / Ketone: Negative  / Bili: Negative / Urobili: <2 mg/dL   Blood: x / Protein: Negative / Nitrite: Negative   Leuk Esterase: Negative / RBC: x / WBC x   Sq Epi: x / Non Sq Epi: x / Bacteria: x    RADIOLOGY & ADDITIONAL TESTS:  < from: Xray Chest 1 View AP/PA (07.10.20 @ 04:20) >  IMPRESSION:     No radiographic evidence of acute cardiopulmonary disease.    < end of copied text >      MEDICATIONS:    chlorhexidine 4% Liquid 1 Application(s) Topical <User Schedule>  cyanocobalamin 1000 MICROGram(s) Oral daily  enoxaparin Injectable 40 milliGRAM(s) SubCutaneous daily  escitalopram 5 milliGRAM(s) Oral daily  furosemide    Tablet 20 milliGRAM(s) Oral daily  haloperidol     Tablet 1 milliGRAM(s) Oral every 12 hours  haloperidol     Tablet 1 milliGRAM(s) Oral every 12 hours PRN  losartan 12.5 milliGRAM(s) Oral daily

## 2020-07-11 NOTE — PROGRESS NOTE ADULT - ASSESSMENT
79 year-old woman with a PMH of hypertension, diabetes, schizoaffective disorder, chronic LE edema, basal cell carcinoma, and unspecified "thyroid" issues (per chart review removal of thyroid nodule many years ago) presents for psychiatric help, evaluation of right foot mass, and long-term placement evaluation.     # Hallucinations in setting of diagnosed schizoaffective disorder; declining fxn status 2/2 psych illness  pt non-compliant w/ meds and physician f/u  should start B12 replacement, but hospitalization is not needed for this  case d/w psych attd: they do NOT feel pt requires IPP  cont lexapro 5mg po qhs  haldol 1mg po or IM q12 (around the clock)  haldol 1mg po or IM q12 prn agitation  psych will cont to follow in hospital, but they feel she is safe for long term care placement  CM/SW evals for dispo    # Vitamin B12 Deficiency w/ mild, borderline macrocytic anemia  B12 1000mcg sc q24 x1 wk, then qwk x4, then qmo  if pt refuses injection, can try oral B12 1mg po q24  recheck level in 1 mo  outpt GI f/u re EGD  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 Sonographic signature of fat lobules was suggestive of lipoma or inflamed fat. No cyst noted.  I d/w pod -> need to resolve decompensated psych condition prior to elective foot surgery  obtain darco shoe for rt foot (will fit better than her shoe)    # Hypertension - not fully controlled  BP was 201/87 when she presented to ED  now 136/63  Continue furosemide 20mg q24  incr losartan 25mg po q24     # Low Bicarb w/ nl gap likely from hemolyzed specimen  rpt BMP - HCO3 is nl; K+ also OK    # Chronic mild LE edema prob 2/2 obesity  Can continue low-dose furosemide    # "Basal cell carcinoma" of ear  Long-standing issue as per son; he notes that it has started to bleed intermittently   outpt derm eval - not urgent (BCC does not metastasize     # "Diabetes"; A1c 5.9 (May 2020)  not sure she qualifies for such dx  no tx for now  would NOT monitor FS  can give diabetic diet    # History of thyroid nodules  On last admission, an incidental finding of 6.1 cm heterogeneous lesion was found within the superior mediastinum, possibly arising from the right thyroid gland, suspicious for malignancy or goiter.  US thyroid showed multiple thyroid nodules, the largest of which measures up to 6.9 cm with mediastinal extension, for which an ultrasound-guided fine-needle aspiration is recommended.  TSH 1.1; FT4 1.1 = nl  can see endo as outpt: non-urgent    # DVT prophylaxis - Lovenox 40 QD    # Activity - amb as tolerated    # Code status - Full     Dispo: there is no medical indication for medical hospitalization; pt is being admitted for long term care placement -> CM/SW; B12 as ordered; haldol/lexapro as ordered (f/u w/ psych); tx HTN; obtain darco shoe  OUTPT Pod f/u of rt foot lipoma  OUTPT ENDO eval of thyroid nodule  OUTPT DERM eval of ear BCC  OUTPT IM F/u for B12, anemia, HTN, pre-DM,

## 2020-07-11 NOTE — PHYSICAL THERAPY INITIAL EVALUATION ADULT - PERTINENT HX OF CURRENT PROBLEM, REHAB EVAL
80 y/o female although A&Ox3 has had progressively worsening cognitive function alongside worsening psychiatric symptoms. As per son, she is constantly seeing people in their home who are not there. She has progressive enlargement of cyst-like mass on dorsal aspect of right foot. It has been present for "years", but appears now to cause her pain as she walks with a limp.She also had some SOB. Frequent falls are also a problem for her

## 2020-07-12 PROCEDURE — 99232 SBSQ HOSP IP/OBS MODERATE 35: CPT

## 2020-07-12 NOTE — PROGRESS NOTE BEHAVIORAL HEALTH - NSBHCHARTREVIEWLAB_PSY_A_CORE FT
11.2   9.99  )-----------( 316      ( 11 Jul 2020 06:53 )             35.7     07-11    138  |  105  |  18  ----------------------------<  145<H>  4.7   |  22  |  0.9    Ca    9.8      11 Jul 2020 06:53  Mg     1.9     07-11    TPro  6.6  /  Alb  3.6  /  TBili  0.8  /  DBili  x   /  AST  12  /  ALT  7   /  AlkPhos  91  07-11

## 2020-07-12 NOTE — PROGRESS NOTE BEHAVIORAL HEALTH - SUMMARY
Ms Yusuf is an 79 year old woman with a history of Schizoaffective disorder who was admitted to the medical floor for RLE cyst. psychiatry consult was called for evaluation of her psychosis.    On evaluation patient perseverated about having people coming into her apartment, which has been ongoing for sometime, no acute agitation at home or violent behavior requiring inpatient psychiatric admission at this time. Although this patient will benefit from psychotropic medications, however, the benefit to admit her psychiatrically will not change the long term goals of compliance. She is not at no acute risk for herself and she has no suicide or homicidal ideation, no homicidal ideation. Psychosis is not new and she has been refusing  all psychotropic medications in the past for more than 8 years.   Recommendation for social work service/Visiting nurse services.    -Haldol 1mg po hs  and 1mg po q am for now.  -If patient becomes agitated consider using haldol 1mg po  q12hrs prn for psychosis. Use IM dose medications if patient refuses po meds.   -Monitor Qtc while on haldol if >500ms please hold all psychotropic medications.   -Continue home dose Lexapro 5mg po am  -Nursing 1:1 for fall, not for psychiatric indication  -No need for inpatient psych admission  -Will follow

## 2020-07-12 NOTE — PROGRESS NOTE ADULT - ASSESSMENT
79 year-old woman with a PMH of hypertension, diabetes, schizoaffective disorder, chronic LE edema, basal cell carcinoma, and unspecified "thyroid" issues (per chart review removal of thyroid nodule many years ago) presents for psychiatric help, evaluation of right foot mass, and long-term placement evaluation.     # Schizoaffective disorder  pt non-compliant w/ psych meds and physician f/u  pt remains calm and cooperative  case d/w psych attd: they do NOT feel pt requires IPP, but I would like psych f/u to asst w/ dispo  cont lexapro 5mg po qhs  haldol 1mg po q12 (around the clock) - she is refusing (which seems OK to me at this point)  haldol 1mg po or IM q12 prn agitation (if VERY agitated, she CANNOT refuse IM haldol)  psych feels she is safe for long term care placement ([facility] vs ? [place] vs ? 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 Sonographic signature of fat lobules was suggestive of lipoma or inflamed fat. No cyst noted.  I d/w pod -> need to resolve decompensated psych condition prior to elective foot surgery  obtain darco shoe for rt foot (will fit better than her shoe)    # Hypertension - better controlled  BP was 201/87 when she presented to ED  furosemide 20mg q24  losartan 25mg po q24     # Chronic mild LE edema prob 2/2 obesity  low-dose furosemide    # "Basal cell carcinoma" of ear  Long-standing issue as per son; he notes that it has started to bleed intermittently   outpt derm eval - not urgent (BCC does not metastasize)    # "Diabetes"; A1c 5.9 (May 2020)  not sure she qualifies for such dx  no tx for now  would NOT monitor FS  can give diabetic diet    # History of thyroid nodules  On last admission, an incidental finding of 6.1 cm heterogeneous lesion was found within the superior mediastinum, possibly arising from the right thyroid gland, suspicious for malignancy or goiter.  US thyroid showed multiple thyroid nodules, the largest of which measures up to 6.9 cm with mediastinal extension, for which an ultrasound-guided fine-needle aspiration is recommended.  TSH 1.1; FT4 1.1 = nl  can see endo as outpt: non-urgent    # DVT prophylaxis - Lovenox 40 QD    # Activity - amb as tolerated    # Code status - Full     Dispo: there is no medical indication for medical hospitalization; pt is being admitted for long term care placement -> CM/SW; B12 as ordered; haldol/lexapro as ordered (f/u w/ psych); tx HTN; obtain darco shoe  OUTPT Pod f/u of rt foot lipoma  OUTPT ENDO eval of thyroid nodule  OUTPT DERM eval of ear BCC  OUTPT IM F/u for B12, anemia, HTN, pre-DM,

## 2020-07-12 NOTE — PROGRESS NOTE BEHAVIORAL HEALTH - RISK ASSESSMENT
No prior inpatient psychiatric admission. No active suicidal ideation currently or in the remote past. She is future oriented

## 2020-07-12 NOTE — PROGRESS NOTE ADULT - SUBJECTIVE AND OBJECTIVE BOX
CHELA GALLAGHER  79y  Female  ***My note supersedes ALL resident notes that I sign.  My corrections for their notes are in my note.***    I can be reached directly on [id number]. My office number is [phone]. My personal cell number is [phone].    INTERVAL EVENTS: Here for f/u of psychosis. Pt is rather cooperative, except she will not take haldol.  It took a little convincing, but she'll take the B12 pill (not injection).  Pt walked w/ a walker to BR and was able to toilet herself.  She need a little asst to get out of bed, but not much (she stood on her own).  She had not complaints. She is NOT on 1:1 sit.    T(F): 99.6 (07-12-20 @ 05:09), Max: 100.1 (07-11-20 @ 12:57)  HR: 86 (07-12-20 @ 05:09) (86 - 107)  BP: 136/55 (07-12-20 @ 05:09) (133/62 - 183/86)  RR: 18 (07-12-20 @ 05:09) (18 - 19)  SpO2: --    Gen: NAD; mildly agitated, but not combative  HEENT: PERRL, mouth clr, nose clr  Neck: no nodes, no JVD  lungs: clr  hrt: s1 s2 rrr no murmur  abd: soft, NT/ND, no HS megaly  ext: no c/c, tr ankle edema, rt dorsal foot lipoma - stable  neuro: aa, cn intact, can move all 4 ext    LABS:                      11.2    (    93.5   9.99  )-----------( ---------      316      ( 11 Jul 2020 06:53 )             35.7    (    12.6     RADIOLOGY & ADDITIONAL TESTS:    MEDICATIONS:    chlorhexidine 4% Liquid 1 Application(s) Topical <User Schedule>  cyanocobalamin 1000 MICROGram(s) Oral daily  enoxaparin Injectable 40 milliGRAM(s) SubCutaneous daily  escitalopram 5 milliGRAM(s) Oral daily  furosemide    Tablet 20 milliGRAM(s) Oral daily  haloperidol     Tablet 1 milliGRAM(s) Oral every 12 hours  haloperidol     Tablet 1 milliGRAM(s) Oral every 12 hours PRN  losartan 25 milliGRAM(s) Oral daily

## 2020-07-13 ENCOUNTER — TRANSCRIPTION ENCOUNTER (OUTPATIENT)
Age: 80
End: 2020-07-13

## 2020-07-13 PROCEDURE — 99232 SBSQ HOSP IP/OBS MODERATE 35: CPT

## 2020-07-13 PROCEDURE — 99232 SBSQ HOSP IP/OBS MODERATE 35: CPT | Mod: GC

## 2020-07-13 PROCEDURE — 93010 ELECTROCARDIOGRAM REPORT: CPT

## 2020-07-13 NOTE — PROGRESS NOTE BEHAVIORAL HEALTH - SUMMARY
Ms Yusuf is an 79 year old woman with a history of Schizoaffective disorder who was admitted to the medical floor for RLE cyst. psychiatry consult was called for evaluation of her psychosis.    On initial evaluation patient perseverated about having people coming into her apartment, which has been ongoing for sometime, no acute agitation at home or violent behavior requiring inpatient psychiatric admission at this time. Although this patient will benefit from psychotropic medications, however, the benefit to admit her psychiatrically will not change the long term goals of compliance. She is not at no acute risk for herself and she has no suicide or homicidal ideation, no homicidal ideation. Psychosis is not new and she has been refusing all psychotropic medications in the past for more than 8 years. So this is NOT an acute decompensation of her psychosis and the pt does not require inpatient psychiatric care.    Recommendation for social work service/Visiting nurse services.    -Haldol 1mg po hs  and 1mg po q am for now.  -If patient becomes agitated consider using haldol 1mg po  q12hrs prn for psychosis. Use IM dose medications if patient refuses po meds.   -Monitor Qtc while on haldol if >500ms please hold all psychotropic medications.   -Continue home dose Lexapro 5mg po am  -Nursing 1:1 for fall, not for psychiatric indication  -No need for inpatient psych admission  -Will follow. Ms Yusuf is an 79 year old woman with a history of Schizoaffective disorder who was admitted to the medical floor for RLE cyst. psychiatry consult was called for evaluation of her psychosis.    On initial evaluation patient perseverated about having people coming into her apartment, which has been ongoing for sometime, no acute agitation at home or violent behavior requiring inpatient psychiatric admission at this time. Although this patient will benefit from psychotropic medications, however, the benefit to admit her psychiatrically will not change the long term goals of compliance. She is not at no acute risk for herself and she has no suicide or homicidal ideation. Psychosis is not new and she has been refusing all psychotropic medications in the past for more than 8 years. So this is not  an acute decompensation of her psychosis and the pt does not require inpatient psychiatric care.    Recommendation for social work service/Visiting nurse services.    -Haldol 1mg po hs  and 1mg po q am for now.  -If patient becomes agitated consider using haldol 1mg po  q12hrs prn for psychosis. Use IM dose medications if patient refuses po meds.   -Monitor Qtc while on haldol if >500ms please hold all psychotropic medications.   -Continue home dose Lexapro 5mg po am  -Nursing 1:1 for fall, not for psychiatric indication  -No need for inpatient psych admission  -Will follow.

## 2020-07-13 NOTE — CONSULT NOTE ADULT - ASSESSMENT
79 years old emale pt admitted because of acute psychosis, derm is consulted for left lower lobe lesion.    # left lower ear lobe lesion    as per pt diagnosed as basal cell carcinoma    no acute intervention while pt is inpatient     need biopsy as outpt     if possible get old record    need to follow outpt    though pt is refusing any further procedure 79 years old female pt admitted because of acute psychosis, derm is consulted for left lower lobe lesion.    # left lower ear lobe lesion    as per pt diagnosed as basal cell carcinoma    no acute intervention while pt is inpatient     need biopsy as outpt     if possible get old record    need to follow outpt    though pt is refusing any further procedure 79 years old female pt admitted because of acute psychosis, derm is consulted for left lower lobe lesion.    # left lower ear lobe lesion    Documented basal cell carcinoma    This requires excision by Mohs surgeon as an outpatient

## 2020-07-13 NOTE — DISCHARGE NOTE PROVIDER - NSDCCPCAREPLAN_GEN_ALL_CORE_FT
PRINCIPAL DISCHARGE DIAGNOSIS  Diagnosis: Lipoma  Assessment and Plan of Treatment: You presented to the hospital with right foot pain and appear to have a history of lipoma. The podiatry doctor saw you and recommended no acute interventions.   Please follow up with the Podiaty doctor as an outpatient visit for management of the right foot lipoma.  Please follow up with your primary care doctor as well for th emanagement of your HTN and B12 deficiency,  Also follow up with the dermatologist for your ear skin lesion and endocrine doctor for evaluation of your thyroid.      SECONDARY DISCHARGE DIAGNOSES  Diagnosis: Schizoaffective disorder  Assessment and Plan of Treatment: Please take your psychiatric medications as prescribed. PRINCIPAL DISCHARGE DIAGNOSIS  Diagnosis: Lipoma  Assessment and Plan of Treatment: You presented to the hospital with right foot pain and appear to have a history of lipoma. The podiatry doctor saw you and recommended no acute interventions.   Please follow up with the Podiaty doctor as an outpatient visit for management of the right foot lipoma.  Please follow up with your primary care doctor as well for th emanagement of your HTN and B12 deficiency,  Also follow up with the dermatologist for your ear skin lesion and endocrine doctor for evaluation of your thyroid. The dermatologist saw you in the hospital and has made no recommendations for the current moment, but please follow up at the clinic.      SECONDARY DISCHARGE DIAGNOSES  Diagnosis: Schizoaffective disorder  Assessment and Plan of Treatment: Please take your psychiatric medications as prescribed. PRINCIPAL DISCHARGE DIAGNOSIS  Diagnosis: Lipoma  Assessment and Plan of Treatment: You presented to the hospital with right foot pain and appear to have a history of lipoma. The podiatry doctor saw you and recommended no acute interventions.   Please follow up with the Podiaty doctor as an outpatient visit for management of the right foot lipoma.  Please follow up with your primary care doctor as well for the management of your HTN and B12 deficiency.  Also follow up with the dermatologist for your ear skin lesion. The dermatologist saw you in the hospital and has made no recommendations for the current moment, but please follow up at the clinic.  Also follow up with the Endocrine doctor for evaluation of your thyroid.      SECONDARY DISCHARGE DIAGNOSES  Diagnosis: Schizoaffective disorder  Assessment and Plan of Treatment: Please take your psychiatric medications as prescribed.

## 2020-07-13 NOTE — CONSULT NOTE ADULT - SUBJECTIVE AND OBJECTIVE BOX
79 year-old female with a PMH of hypertension, diabetes, schizoaffective disorder, chronic LE edema, basal cell carcinoma, and unspecified "thyroid" issues (per chart review removal of thyroid nodule many years ago) for acute psychosis.  Currently under medicine service, being treated for schizoaffective disorder.  derm is consulted for left lower ear lobe lesion.  As per patient, she used to follow with Dr salazar, she was diagnosed with basal cell carcinoma, had the lesion excised, since then she is bleeding intermittently, denies any change of size, no pain or tenderness.  she lost follow up with her doctor.  she is applying over the counter creams over the lesion.  upon further asking, she refused any surgical intervention like biopsy.    ICU Vital Signs Last 24 Hrs  T(C): 37.2 (13 Jul 2020 05:49), Max: 37.2 (13 Jul 2020 05:49)  T(F): 99 (13 Jul 2020 05:49), Max: 99 (13 Jul 2020 05:49)  HR: 103 (13 Jul 2020 08:13) (71 - 103)  BP: 148/67 (13 Jul 2020 08:13) (138/67 - 185/80)  RR: 18 (13 Jul 2020 08:13) (18 - 18)    on exam  alert oriented not in any acute distress  chest clear bilaterally  cvs s1 and s2 no added sound  abdomen soft lax no organomegaly    clotted scab on left lower ear lobe, 1.5x 2 cm, non tender on exam, no drainage, no active bleeding  well demarcated with surrounding erythema  on lymphadenopathy           11.2    (    93.5   9.99  )-----------( ---------      316      ( 11 Jul 2020 06:53 )             35.7    (    12.6     < from: Xray Chest 1 View AP/PA (07.10.20 @ 04:20) >  IMPRESSION:     No radiographic evidence of acute cardiopulmonary disease.    < end of copied text > 79 year-old female with a PMH of hypertension, diabetes, schizoaffective disorder, chronic LE edema, basal cell carcinoma, and unspecified "thyroid" issues (per chart review removal of thyroid nodule many years ago) for acute psychosis.  Currently under medicine service, being treated for schizoaffective disorder.  derm is consulted for left lower ear lobe lesion.  As per patient, she used to follow with Dr salazar, she was diagnosed with basal cell carcinoma, had the lesion excised, since then she is bleeding intermittently, denies any change of size, no pain or tenderness.  she lost follow up with her doctor.  she is applying over the counter creams over the lesion.  upon further asking, she refused any surgical intervention like biopsy.    ICU Vital Signs Last 24 Hrs  T(C): 37.2 (13 Jul 2020 05:49), Max: 37.2 (13 Jul 2020 05:49)  T(F): 99 (13 Jul 2020 05:49), Max: 99 (13 Jul 2020 05:49)  HR: 103 (13 Jul 2020 08:13) (71 - 103)  BP: 148/67 (13 Jul 2020 08:13) (138/67 - 185/80)  RR: 18 (13 Jul 2020 08:13) (18 - 18)    on exam  alert oriented not in any acute distress  chest clear bilaterally  cvs s1 and s2 no added sound  abdomen soft lax no organomegaly    Photos reviewed    Hemorrhagic crusted plaque left inferior/preauricular           11.2    (    93.5   9.99  )-----------( ---------      316      ( 11 Jul 2020 06:53 )             35.7    (    12.6     < from: Xray Chest 1 View AP/PA (07.10.20 @ 04:20) >  IMPRESSION:     No radiographic evidence of acute cardiopulmonary disease.

## 2020-07-13 NOTE — CONSULT NOTE ADULT - ATTENDING COMMENTS
pt was seen on last admission for right soft tissue mass. Patient gave >20 year h/o of right tissue mass  On last admission, aspiration of mass on the right toe was attempted-->no aspirate obtained  u/s suggests likely lipoma  from podiatric standpoint, patient is stable for discharge  can follow up as outpatient
Attested above

## 2020-07-13 NOTE — DISCHARGE NOTE PROVIDER - CARE PROVIDER_API CALL
Juan Ortega  Dermatology  [address] [place] [address]  Phone: [phone]  Fax: [phone]  Established Patient  Follow Up Time: Routine    Maxim Copeland  PODIATRIC MEDICINE  [address] [place] [address]  Phone: [phone]  Fax: [phone]  Follow Up Time: Routine Juan Ortega  Dermatology  [address] [place] [address]  Phone: [phone]  Fax: [phone]  Established Patient  Follow Up Time: Routine    Maxim Copeland  PODIATRIC MEDICINE  [address] [place] [id number]  Phone: [phone]  Fax: [phone]  Follow Up Time: Routine    Kelsie Robison  Endocrinology, Diabetes and Metabolism  [facility] [place] [address]  Phone: [phone]  Fax: [phone]  Follow Up Time: 2 weeks

## 2020-07-13 NOTE — PROGRESS NOTE ADULT - ASSESSMENT
79 year-old woman with a PMH of hypertension, diabetes, schizoaffective disorder, chronic LE edema, basal cell carcinoma, and unspecified "thyroid" issues (per chart review removal of thyroid nodule many years ago) presents for psychiatric help, evaluation of right foot mass, and long-term placement evaluation.     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 Sonographic signature of fat lobules was suggestive of lipoma or inflamed fat. No cyst noted. need to resolve decompensated psych condition prior to elective foot surgery  obtain darco shoe for rt foot (will fit better than her shoe)    # Hypertension - better controlled  furosemide 20mg q24  losartan 25mg po q24     # Chronic mild LE edema prob 2/2 obesity  low-dose furosemide    # "Basal cell carcinoma" of ear  Long-standing issue as per son; he notes that it has started to bleed intermittently   derm eval --    # "Diabetes"; A1c 5.9 (May 2020)  can give diabetic diet    # History of thyroid nodules  On last admission, an incidental finding of 6.1 cm heterogeneous lesion was found within the superior mediastinum, possibly arising from the right thyroid gland, suspicious for malignancy or goiter.  US thyroid showed multiple thyroid nodules, the largest of which measures up to 6.9 cm with mediastinal extension, for which an ultrasound-guided fine-needle aspiration is recommended.  TSH 1.1; FT4 1.1 = nl  can see endo as outpt: non-urgent    # DVT prophylaxis - Lovenox 40 QD    # Activity - amb as tolerated    # Code status - Full     patient is now waiting for placement 79 year-old woman with a PMH of hypertension, diabetes, schizoaffective disorder, chronic LE edema, basal cell carcinoma, and unspecified "thyroid" issues (per chart review removal of thyroid nodule many years ago) presents for psychiatric help, evaluation of right foot mass, and long-term placement evaluation.     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 Sonographic signature of fat lobules was suggestive of lipoma or inflamed fat. No cyst noted. need to resolve decompensated psych condition prior to elective foot surgery  obtain darco shoe for rt foot (will fit better than her shoe)    # Hypertension - better controlled  furosemide 20mg q24  losartan 25mg po q24     # Chronic mild LE edema prob 2/2 obesity  low-dose furosemide    # "Basal cell carcinoma" of ear  Long-standing issue as per son; he notes that it has started to bleed intermittently   derm eval --    # "Diabetes"; A1c 5.9 (May 2020)  can give diabetic diet    # History of thyroid nodules  On last admission, an incidental finding of 6.1 cm heterogeneous lesion was found within the superior mediastinum, possibly arising from the right thyroid gland, suspicious for malignancy or goiter.  US thyroid showed multiple thyroid nodules, the largest of which measures up to 6.9 cm with mediastinal extension, for which an ultrasound-guided fine-needle aspiration is recommended.  TSH 1.1; FT4 1.1 = nl  can see endo as outpt: non-urgent    # DVT prophylaxis - Lovenox 40 QD    # Activity - amb as tolerated    # Code status - Full     patient is now waiting for placement

## 2020-07-13 NOTE — DISCHARGE NOTE PROVIDER - NSDCMRMEDTOKEN_GEN_ALL_CORE_FT
escitalopram 5 mg oral tablet: 0.5 tab(s) orally once a day  furosemide 20 mg oral tablet: 1 tab(s) orally once a day  losartan 25 mg oral tablet: 0.5 tab(s) orally once a day cyanocobalamin 1000 mcg oral tablet: 1 tab(s) orally once a day  escitalopram 5 mg oral tablet: 0.5 tab(s) orally once a day  furosemide 20 mg oral tablet: 1 tab(s) orally once a day  haloperidol 1 mg oral tablet: 1 tab(s) orally every 12 hours, As Needed -for agitation   losartan 25 mg oral tablet: 0.5 tab(s) orally once a day cyanocobalamin 1000 mcg oral tablet: 1 tab(s) orally once a day  escitalopram 5 mg oral tablet: 0.5 tab(s) orally once a day  furosemide 20 mg oral tablet: 1 tab(s) orally once a day  haloperidol 1 mg oral tablet: 1 tab(s) orally every 12 hours  -for agitation   losartan 25 mg oral tablet: 0.5 tab(s) orally once a day cyanocobalamin 1000 mcg oral tablet: 1 tab(s) orally once a day  escitalopram 5 mg oral tablet: 0.5 tab(s) orally once a day  furosemide 20 mg oral tablet: 1 tab(s) orally once a day  haloperidol 1 mg oral tablet: 1 tab(s) orally every 12 hours  haloperidol 1 mg oral tablet: 1 tab(s) orally every 12 hours, As needed, agitation  losartan 25 mg oral tablet: 0.5 tab(s) orally once a day cyanocobalamin 1000 mcg oral tablet: 1 tab(s) orally once a day  escitalopram 5 mg oral tablet: 0.5 tab(s) orally once a day  furosemide 20 mg oral tablet: 1 tab(s) orally once a day  haloperidol 1 mg oral tablet: 1 tab(s) orally every 12 hours  losartan 25 mg oral tablet: 0.5 tab(s) orally once a day

## 2020-07-13 NOTE — DISCHARGE NOTE PROVIDER - CARE PROVIDERS DIRECT ADDRESSES
,DirectAddress_Unknown,[email or web address] ,DirectAddress_Unknown,[email or web address],DirectAddress_Unknown

## 2020-07-13 NOTE — DISCHARGE NOTE PROVIDER - HOSPITAL COURSE
79 year-old woman with a PMH of hypertension, diabetes, schizoaffective disorder, chronic LE edema, basal cell carcinoma, and unspecified "thyroid" issues (per chart review removal of thyroid nodule many years ago) presented for psychiatric help, evaluation of right foot mass, and long-term placement evaluation. She has history of Schizoaffective disorder    and is non-compliant w/ psych meds and physician f/u, on lexapro 5mg po qhs, seen by psychiatry on this admission, started on haldol 1mg po q12 (around the clock) - QTC 425millisec with haldol 1mg po or IM q12 prn agitation, no indication of IPP as per Psychiatry.         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 Evaluated by podiatry on last admission (5/2020) - aspiration attempted, but no fluid produced.    US of the affected area showed a 9.4 x 9.4 x 3.9 cm mass within the dorsal foot. Sonographic signature of fat lobules was suggestive of lipoma or inflamed fat. No cyst noted. Darco shoe will be provided on discharge, needs OP follow up.     - "Basal cell carcinoma" of ear    Long-standing issue as per son; he notes that it has started to bleed intermittently     derm evaluated, OP MOH's procedure recommended    -History of thyroid nodules: On last admission, an incidental finding of 6.1 cm heterogeneous lesion was found within the superior mediastinum, possibly arising from the right thyroid gland, suspicious for malignancy or goiter.    US thyroid showed multiple thyroid nodules, the largest of which measures up to 6.9 cm with mediastinal extension, for which an ultrasound-guided fine-needle aspiration is recommended.    TSH 1.1; FT4 1.1 = nl    -can see endo as outpt: non-urgent 79 year-old woman with a PMH of hypertension, diabetes, schizoaffective disorder, chronic LE edema, basal cell carcinoma, and unspecified "thyroid" issues (per chart review removal of thyroid nodule many years ago) presented for psychiatric help, evaluation of right foot mass, and long-term placement evaluation. She has history of Schizoaffective disorder    and is non-compliant w/ psych meds and physician f/u, on lexapro 5mg po qhs, seen by psychiatry on this admission, started on haldol 1mg po q12 (around the clock) - QTC 425millisec with haldol 1mg po or IM q12 prn agitation, no indication of IPP as per Psychiatry.         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 Evaluated by podiatry on last admission (5/2020) - aspiration attempted, but no fluid produced.    US of the affected area showed a 9.4 x 9.4 x 3.9 cm mass within the dorsal foot. Sonographic signature of fat lobules was suggestive of lipoma or inflamed fat. No cyst noted. Darco shoe will be provided on discharge, needs OP follow up.     - "Basal cell carcinoma" of ear    Long-standing issue as per son; he notes that it has started to bleed intermittently     Derm evaluated pt, Outpt MOH's procedure recommended.    -History of thyroid nodules: On last admission, an incidental finding of 6.1 cm heterogeneous lesion was found within the superior mediastinum, possibly arising from the right thyroid gland, suspicious for malignancy or goiter.    US thyroid showed multiple thyroid nodules, the largest of which measures up to 6.9 cm with mediastinal extension, for which an ultrasound-guided fine-needle aspiration is recommended.    TSH 1.1; FT4 1.1 = nl    -can see endo as outpt: non-urgent 79 year-old woman with a PMH of hypertension, diabetes, schizoaffective disorder, chronic LE edema, basal cell carcinoma, and unspecified "thyroid" issues (per chart review removal of thyroid nodule many years ago) presented for psychiatric help, evaluation of right foot mass, and long-term placement evaluation. She has history of Schizoaffective disorder    and is non-compliant w/ psych meds and physician f/u, on lexapro 5mg po qhs, seen by psychiatry on this admission, started on haldol 1mg po q12 (around the clock) - QTC 425millisec with haldol 1mg po or IM q12 prn agitation, no indication of IPP as per Psychiatry. Pt had spiked a temperature of 101.3 during admission. Her UA was negative for infection, Blood Cx NGTD,  CXR revealed atelectasis, pt has no clinical symptoms to point to infectious source. She has been afebrile for the remainder of hospital stay.    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 Was evaluated by Podiatry, recommend OP follow up.     - "Basal cell carcinoma" of ear is longstanding per the son, did not appear to be active bleeding during admission. Evaluated by Derm, recommend outpt Mohs procedure.    -History of thyroid nodules found incidentally on prior admission. Seen by Endocrine, recommend     outpt follow up.

## 2020-07-13 NOTE — PROGRESS NOTE BEHAVIORAL HEALTH - RISK ASSESSMENT
No prior inpatient psychiatric admission. No active suicidal ideation currently or in the remote past. She is future oriented.

## 2020-07-13 NOTE — PROGRESS NOTE ADULT - SUBJECTIVE AND OBJECTIVE BOX
SUBJECTIVE:    Patient is a 79y old Female who presents with a chief complaint of acute psychosis (13 Jul 2020 11:20)    Currently admitted to medicine with the primary diagnosis of Right foot pain     Today is hospital day 3d.     PAST MEDICAL & SURGICAL HISTORY  Disorder of thyroid, unspecified: Son cannot provide details. States she had &quot;thyroid surgery&quot; decades ago when she was young  Edema: Bilateral LE  Schizoaffective disorder  Hypertension  Diabetes mellitus    ALLERGIES:  No Known Allergies    MEDICATIONS:  STANDING MEDICATIONS  chlorhexidine 4% Liquid 1 Application(s) Topical <User Schedule>  cyanocobalamin 1000 MICROGram(s) Oral daily  enoxaparin Injectable 40 milliGRAM(s) SubCutaneous daily  escitalopram 5 milliGRAM(s) Oral daily  furosemide    Tablet 20 milliGRAM(s) Oral daily  haloperidol     Tablet 1 milliGRAM(s) Oral every 12 hours  losartan 25 milliGRAM(s) Oral daily    PRN MEDICATIONS  haloperidol     Tablet 1 milliGRAM(s) Oral every 12 hours PRN    VITALS:   T(F): 99  HR: 103  BP: 148/67  RR: 18  SpO2: --    LABS:                        RADIOLOGY:    PHYSICAL EXAM:  GEN: No acute distress  LUNGS: Clear to auscultation bilaterally   HEART: S1/S2 present. RRR.   ABD/ GI: Soft, non-tender, non-distended. Bowel sounds present  EXT: NC/NC/NE/2+PP/MARCUM  NEURO: AAOX3

## 2020-07-13 NOTE — DISCHARGE NOTE PROVIDER - PROVIDER TOKENS
PROVIDER:[TOKEN:[[id number]],FOLLOWUP:[Routine],ESTABLISHEDPATIENT:[T]],PROVIDER:[TOKEN:[[id number]],FOLLOWUP:[Routine]] PROVIDER:[TOKEN:[[id number]],FOLLOWUP:[Routine],ESTABLISHEDPATIENT:[T]],PROVIDER:[TOKEN:[[id number]],FOLLOWUP:[Routine]],PROVIDER:[TOKEN:[[id number]],FOLLOWUP:[2 weeks]]

## 2020-07-14 ENCOUNTER — TRANSCRIPTION ENCOUNTER (OUTPATIENT)
Age: 80
End: 2020-07-14

## 2020-07-14 LAB
APPEARANCE UR: CLEAR — SIGNIFICANT CHANGE UP
BILIRUB UR-MCNC: NEGATIVE — SIGNIFICANT CHANGE UP
COLOR SPEC: SIGNIFICANT CHANGE UP
DIFF PNL FLD: SIGNIFICANT CHANGE UP
GLUCOSE BLDC GLUCOMTR-MCNC: 105 MG/DL — HIGH (ref 70–99)
GLUCOSE UR QL: NEGATIVE — SIGNIFICANT CHANGE UP
KETONES UR-MCNC: NEGATIVE — SIGNIFICANT CHANGE UP
LEUKOCYTE ESTERASE UR-ACNC: ABNORMAL
NITRITE UR-MCNC: NEGATIVE — SIGNIFICANT CHANGE UP
PH UR: 6 — SIGNIFICANT CHANGE UP (ref 5–8)
PROT UR-MCNC: SIGNIFICANT CHANGE UP
SP GR SPEC: 1.01 — LOW (ref 1.01–1.02)
UROBILINOGEN FLD QL: SIGNIFICANT CHANGE UP

## 2020-07-14 PROCEDURE — 99232 SBSQ HOSP IP/OBS MODERATE 35: CPT | Mod: GC

## 2020-07-14 PROCEDURE — 76705 ECHO EXAM OF ABDOMEN: CPT | Mod: 26

## 2020-07-14 PROCEDURE — 99233 SBSQ HOSP IP/OBS HIGH 50: CPT

## 2020-07-14 PROCEDURE — 71045 X-RAY EXAM CHEST 1 VIEW: CPT | Mod: 26

## 2020-07-14 RX ORDER — PREGABALIN 225 MG/1
1 CAPSULE ORAL
Qty: 30 | Refills: 0
Start: 2020-07-14 | End: 2020-08-12

## 2020-07-14 RX ORDER — HALOPERIDOL DECANOATE 100 MG/ML
1 INJECTION INTRAMUSCULAR
Qty: 60 | Refills: 0
Start: 2020-07-14 | End: 2020-08-12

## 2020-07-14 RX ORDER — ACETAMINOPHEN 500 MG
650 TABLET ORAL EVERY 6 HOURS
Refills: 0 | Status: DISCONTINUED | OUTPATIENT
Start: 2020-07-14 | End: 2020-07-16

## 2020-07-14 NOTE — PROGRESS NOTE BEHAVIORAL HEALTH - NSBHFUPINTERVALHXFT_PSY_A_CORE
Overnight staff report no behavioral disturbances, no events, and no PRNs given. Staff report pt has been "pleasant" but has been refusing her haldol medication. Upon approach pt was laying in bed. During interview pt was awake, alert, oriented, calm and cooperative with linear thought process. The pt reports not taking her haldol because she "doesn't want it". She reports being told by a doctor that she only needs the b12, and that she doesn't need haldol. She continues to feel there is nothing wrong with her and does not want to follow up outpatient. She did not bring up her neighbors. When prompted by asking about her neighbors she responds that she is still worried because they are dirty and enter her house illegally. She denies any current SI, HI, AH, VH.
Overnight staff report no behavioral disturbances, no events, and no PRNs given. Staff report pt has been calm, very nice, and smiling, but has been refusing her haldol medication.  On chart review the pt has taken all her other medications. Upon approach pt was sitting in a chair next to her bed. During interview pt was awake, alert, oriented, calm and cooperative with linear thought process. Pt still reports not wanting Haldol and not wanting psychiatric care. She reports no pain and feels comfortable and safe. She reports wanting to go home. She reports she slept well and is eating well. When asked about her neighbors she states that she "hopes they stay out". She reports that she will not confront the neighbors or try to harm them in any way. She feels safe going home and plans to have her son pick her up today. She denies any current SI, HI, AH, VH.
Overnight staff report no behavioral disturbances, no events, and no PRNs given. Staff report pt has been "pleasant" but has been refusing her haldol medication. On chart review the pt has taken all her other medications. Upon approach pt was laying in bed. During interview pt was awake, alert, oriented, calm and cooperative with linear thought process. Pt still reports not wanting Haldol and not wanting psychiatric care. She reports no pain and feels comfortable and safe. She reports wanting to go home, and not to a nursing home. When asked about her neighbors she reports that she is not worried, that she'll just tell them to go away if they show up. She denies any current SI, HI, AH, VH.

## 2020-07-14 NOTE — PROGRESS NOTE BEHAVIORAL HEALTH - NSBHCONSULTFOLLOWAFTERCARE_PSY_A_CORE FT
[facility] outpatient psychiatry
[facility] outpatient psychiatry
pt to f/u at   [facility] outpatient psychiatry  [address] [place] [id number]   [phone]

## 2020-07-14 NOTE — PROGRESS NOTE ADULT - SUBJECTIVE AND OBJECTIVE BOX
Patient Information:  CHELA GALLAGHER / 79y / Female / MRN#:[id number]    Hospital Day: 4d    Interval History:  Patient seen and examined at bedside. No acute events overnight. Pt is resting in bed this morning, is calm and pleasant. States she feels "good". Is to be discharged home this afternoon.    Past Medical History:  Disorder of thyroid, unspecified  Edema  Schizoaffective disorder  Hypertension  Diabetes mellitus    Past Surgical History:    Allergies:  No Known Allergies    Medications:  PRN:  haloperidol     Tablet 1 milliGRAM(s) Oral every 12 hours PRN agitation    Standing:  chlorhexidine 4% Liquid 1 Application(s) Topical <User Schedule>  cyanocobalamin 1000 MICROGram(s) Oral daily  enoxaparin Injectable 40 milliGRAM(s) SubCutaneous daily  escitalopram 5 milliGRAM(s) Oral daily  furosemide    Tablet 20 milliGRAM(s) Oral daily  haloperidol     Tablet 1 milliGRAM(s) Oral every 12 hours  losartan 25 milliGRAM(s) Oral daily    Home:    Vitals:  T(C): 37.6, Max: 37.8 (07-13-20 @ 21:37)  T(F): 99.6, Max: 100 (07-13-20 @ 21:37)  HR: 92 (72 - 94)  BP: 137/66 (132/60 - 137/66)  RR: 18 (18 - 19)  SpO2: 98% (98% - 98%)    Physical Exam:  General: Awake, alert, NAD, resting comfortably in bed, on RA  Heart: RRR; holosystolic murmur appreciated  Lungs: Clear to auscultation bilaterally, no wheezing, rales or rhonchi  Abdomen: Soft, nontender, nondistended, BS+  Extremities: No edema, clubbing, cyanosis in upper or lower extremities.        Microbiology:  Culture - Urine (collected 07-10 @ 02:59)  Source: .Urine Clean Catch (Midstream)  Final Report (07-11 @ 18:28):    <10,000 CFU/mL Normal Urogenital Randa

## 2020-07-14 NOTE — PROGRESS NOTE BEHAVIORAL HEALTH - NSBHCONSULTMEDS_PSY_A_CORE FT
Haldol 1mg BID,  Lexapro 5mg po am

## 2020-07-14 NOTE — PROGRESS NOTE BEHAVIORAL HEALTH - SUMMARY
Ms Yusuf is an 79 year old woman with a history of Schizoaffective disorder who was admitted to the medical floor for RLE cyst. psychiatry consult was called for evaluation of her psychosis.    On initial evaluation patient perseverated about having people coming into her apartment, which has been ongoing for sometime, no acute agitation at home or violent behavior requiring inpatient psychiatric admission at this time. Although this patient will benefit from psychotropic medications, however, the benefit to admit her psychiatrically will not change the long term goals of compliance. She is not at no acute risk for herself and she has no suicide or homicidal ideation. Psychosis is not new and she has been refusing all psychotropic medications in the past for more than 8 years. So this is not  an acute decompensation of her psychosis and the pt does not require inpatient psychiatric care.    Recommendation for social work service/Visiting nurse services.    -Haldol 1mg po hs  and 1mg po q am for now.  -If patient becomes agitated consider using haldol 1mg po  q12hrs prn for psychosis. Use IM dose medications if patient refuses po meds.   -Monitor Qtc while on haldol if >500ms please hold all psychotropic medications.   -Continue home dose Lexapro 5mg po am  -Nursing 1:1 for fall, not for psychiatric indication  -No need for inpatient psych admission  -signing off Ms Yusuf is an 79 year old woman with a history of Schizoaffective disorder who was admitted to the medical floor for RLE cyst. psychiatry consult was called for evaluation of her psychosis.    On initial evaluation patient perseverated about having people coming into her apartment, which has been ongoing for sometime, no acute agitation at home or violent behavior requiring inpatient psychiatric admission at this time. Although this patient will benefit from psychotropic medications, however, the benefit to admit her psychiatrically will not change the long term goals of compliance. She is not at no acute risk for herself and she has no suicide or homicidal ideation. Psychosis is not new and she has been refusing all psychotropic medications in the past for more than 8 years. So this is not  an acute decompensation of her psychosis and the pt does not require inpatient psychiatric care.    Recommendation for social work service/Visiting nurse services.    -Haldol 1mg po hs  and 1mg po q am for now.  -If patient becomes agitated consider using haldol 1mg po  q12hrs prn for psychosis. Use IM dose medications if patient refuses po meds.   -Monitor Qtc while on haldol if >500ms please hold all psychotropic medications.   -Continue home dose Lexapro 5mg po am  -Nursing 1:1 for fall, not for psychiatric indication  -No need for inpatient psych admission

## 2020-07-14 NOTE — PROGRESS NOTE BEHAVIORAL HEALTH - AXIS III
diabetes, HTN, chronic LE edema, basal cell carcinoma, and unspecified "thyroid" issues

## 2020-07-14 NOTE — PROGRESS NOTE BEHAVIORAL HEALTH - CASE SUMMARY
79 year old woman with a history of Schizoaffective disorder,  admitted to the medical floor for RLE cyst.  Seen initially for psychosis accusing her neighbors of showering and placing feces in her apartment. Since admission there has been no worsening symptoms of psychotic symptoms since admission. Remains pleasant and cooperative on the unit. She is persistently refusing haldol in a composed manner. Patient remains free of thought of self-harm, no homicidal ideation elicited, therefore no indication for inpatient psych admission at this time.
79 year old female with schizoaffective history, admitted for RLE cyst, has been followed by psychiatry service since her admission for underlying paranoid psychosis toward one of her neighbors, often she called 911 for the neighbors. Psychosis has not interfered in her daily functioning. She is currently refusing taking haldol 1mg po bid, and underlying psychosis has not worsened. She has no intention nor the thought to hurt her neighbor. She has remained composed on the unit during her hospitalization. Given the current circumstances, there is no indication for inpatient psych admission. Although she is adamant about psychiatric treatment, patient will benefit from both psychotropic medication, haldol 1mg po bid, and outpatient mental health follow up.

## 2020-07-14 NOTE — PROGRESS NOTE BEHAVIORAL HEALTH - OTHER
deferred, no abnormal tone noted deferred, claims to walk with walker recalled 2 out of 3 words in 10 minutes no abnormal tone noted claims to walk with walker

## 2020-07-14 NOTE — PROGRESS NOTE BEHAVIORAL HEALTH - ORIENTATION OTHER
unable to assess orientation to person

## 2020-07-14 NOTE — PROGRESS NOTE BEHAVIORAL HEALTH - NSBHCHARTREVIEWVS_PSY_A_CORE FT
Vital Signs Last 24 Hrs  T(C): 37.6 (12 Jul 2020 05:09), Max: 37.8 (11 Jul 2020 12:57)  T(F): 99.6 (12 Jul 2020 05:09), Max: 100.1 (11 Jul 2020 12:57)  HR: 86 (12 Jul 2020 05:09) (86 - 107)  BP: 136/55 (12 Jul 2020 05:09) (133/62 - 183/86)  BP(mean): --  RR: 18 (12 Jul 2020 05:09) (18 - 19)  SpO2: --
Vital Signs Last 24 Hrs  T(C): 37.6 (14 Jul 2020 05:00), Max: 37.8 (13 Jul 2020 21:37)  T(F): 99.6 (14 Jul 2020 05:00), Max: 100 (13 Jul 2020 21:37)  HR: 92 (14 Jul 2020 05:00) (72 - 94)  BP: 137/66 (14 Jul 2020 05:00) (132/60 - 137/66)  BP(mean): --  RR: 18 (14 Jul 2020 05:00) (18 - 19)  SpO2: 98% (13 Jul 2020 21:37) (98% - 98%)
Vital Signs Last 24 Hrs  T(C): 37.2 (13 Jul 2020 05:49), Max: 37.2 (13 Jul 2020 05:49)  T(F): 99 (13 Jul 2020 05:49), Max: 99 (13 Jul 2020 05:49)  HR: 103 (13 Jul 2020 08:13) (71 - 103)  BP: 148/67 (13 Jul 2020 08:13) (138/67 - 185/80)  BP(mean): --  RR: 18 (13 Jul 2020 08:13) (18 - 18)  SpO2: --

## 2020-07-14 NOTE — PROGRESS NOTE ADULT - ATTENDING COMMENTS
Patient seen and examined independently. Agree with resident note.  # Sudden new onset fever-- CXR blood and urine cx and urinalysis. NO ABX for now --source not known.  # Schizoaffective dis--on lexapro and haldol  # Chr lower ext edema--on lasix at home.  # Rt foot lipoma-- no intervention inpatient  # basal cell ca lt ear-- stable-- outpatient management as per derm consult. Patient seen and examined independently. Agree with resident note.  # Sudden new onset fever-- CXR blood and urine cx and urinalysis. NO ABX for now --source not known. Hx of gall stones and gall bladder wall thickening in may 2020-- will repeat sonogram r/o ac cholecystitis-- patient denies any pain.  # Schizoaffective dis--on lexapro and haldol  # Chr lower ext edema--on lasix at home.  # Rt foot lipoma-- no intervention inpatient  # basal cell ca lt ear-- stable-- outpatient management as per derm consult.

## 2020-07-14 NOTE — PROGRESS NOTE ADULT - ASSESSMENT
79 year-old woman with a PMH of hypertension, diabetes, schizoaffective disorder, chronic LE edema, basal cell carcinoma, and unspecified "thyroid" issues (per chart review removal of thyroid nodule many years ago) presents for psychiatric help, evaluation of right foot mass, and long-term placement evaluation.     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 and is stable  No evidence for cancer, infection  Evaluated by podiatry on last admission (5/2020) - aspiration attempted, but no fluid produced  Seen by Podiatry this admission, rec outpt follow up    # Hypertension - better controlled  furosemide 20mg q24  losartan 25mg po q24     # Chronic mild LE edema prob 2/2 obesity  low-dose furosemide    # "Basal cell carcinoma" of ear  Long-standing issue as per son; he notes that it has started to bleed intermittently   derm eval - f/u as outpt    #Prediabetic; A1c 5.9   F/u as outpt with PCP to monitor    # History of thyroid nodules  On last admission, an incidental finding of 6.1 cm heterogeneous lesion was found within the superior mediastinum, possibly arising from the right thyroid gland, suspicious for malignancy or goiter.  US thyroid showed multiple thyroid nodules, the largest of which measures up to 6.9 cm with mediastinal extension, for which an ultrasound-guided fine-needle aspiration is recommended.  TSH 1.1; FT4 1.1 = nl  Endocrine f/u as outpt      # Activity - amb as tolerated  # Code status - Full   #Dispo: pt will be d/c'd home this afternoon

## 2020-07-14 NOTE — DISCHARGE NOTE NURSING/CASE MANAGEMENT/SOCIAL WORK - PATIENT PORTAL LINK FT
You can access the FollowMyHealth Patient Portal offered by [facility] by registering at the following website: [email or web address]. By joining [employer]’s FollowMyHealth portal, you will also be able to view your health information using other applications (apps) compatible with our system.

## 2020-07-15 LAB
ALBUMIN SERPL ELPH-MCNC: 2.7 G/DL — LOW (ref 3.5–5.2)
ALP SERPL-CCNC: 95 U/L — SIGNIFICANT CHANGE UP (ref 30–115)
ALT FLD-CCNC: 6 U/L — SIGNIFICANT CHANGE UP (ref 0–41)
ANION GAP SERPL CALC-SCNC: 13 MMOL/L — SIGNIFICANT CHANGE UP (ref 7–14)
AST SERPL-CCNC: 7 U/L — SIGNIFICANT CHANGE UP (ref 0–41)
BASOPHILS # BLD AUTO: 0.04 K/UL — SIGNIFICANT CHANGE UP (ref 0–0.2)
BASOPHILS NFR BLD AUTO: 0.4 % — SIGNIFICANT CHANGE UP (ref 0–1)
BILIRUB SERPL-MCNC: 0.4 MG/DL — SIGNIFICANT CHANGE UP (ref 0.2–1.2)
BUN SERPL-MCNC: 24 MG/DL — HIGH (ref 10–20)
CALCIUM SERPL-MCNC: 9.9 MG/DL — SIGNIFICANT CHANGE UP (ref 8.5–10.1)
CHLORIDE SERPL-SCNC: 101 MMOL/L — SIGNIFICANT CHANGE UP (ref 98–110)
CO2 SERPL-SCNC: 25 MMOL/L — SIGNIFICANT CHANGE UP (ref 17–32)
CREAT SERPL-MCNC: 0.9 MG/DL — SIGNIFICANT CHANGE UP (ref 0.7–1.5)
EOSINOPHIL # BLD AUTO: 0.06 K/UL — SIGNIFICANT CHANGE UP (ref 0–0.7)
EOSINOPHIL NFR BLD AUTO: 0.5 % — SIGNIFICANT CHANGE UP (ref 0–8)
GLUCOSE BLDC GLUCOMTR-MCNC: 106 MG/DL — HIGH (ref 70–99)
GLUCOSE BLDC GLUCOMTR-MCNC: 145 MG/DL — HIGH (ref 70–99)
GLUCOSE SERPL-MCNC: 102 MG/DL — HIGH (ref 70–99)
HCT VFR BLD CALC: 31.6 % — LOW (ref 37–47)
HGB BLD-MCNC: 9.8 G/DL — LOW (ref 12–16)
IMM GRANULOCYTES NFR BLD AUTO: 0.5 % — HIGH (ref 0.1–0.3)
LIDOCAIN IGE QN: 40 U/L — SIGNIFICANT CHANGE UP (ref 7–60)
LYMPHOCYTES # BLD AUTO: 0.99 K/UL — LOW (ref 1.2–3.4)
LYMPHOCYTES # BLD AUTO: 9 % — LOW (ref 20.5–51.1)
MCHC RBC-ENTMCNC: 28.8 PG — SIGNIFICANT CHANGE UP (ref 27–31)
MCHC RBC-ENTMCNC: 31 G/DL — LOW (ref 32–37)
MCV RBC AUTO: 92.9 FL — SIGNIFICANT CHANGE UP (ref 81–99)
MONOCYTES # BLD AUTO: 1.15 K/UL — HIGH (ref 0.1–0.6)
MONOCYTES NFR BLD AUTO: 10.5 % — HIGH (ref 1.7–9.3)
NEUTROPHILS # BLD AUTO: 8.66 K/UL — HIGH (ref 1.4–6.5)
NEUTROPHILS NFR BLD AUTO: 79.1 % — HIGH (ref 42.2–75.2)
NRBC # BLD: 0 /100 WBCS — SIGNIFICANT CHANGE UP (ref 0–0)
PLATELET # BLD AUTO: 346 K/UL — SIGNIFICANT CHANGE UP (ref 130–400)
POTASSIUM SERPL-MCNC: 4.4 MMOL/L — SIGNIFICANT CHANGE UP (ref 3.5–5)
POTASSIUM SERPL-SCNC: 4.4 MMOL/L — SIGNIFICANT CHANGE UP (ref 3.5–5)
PROT SERPL-MCNC: 5.9 G/DL — LOW (ref 6–8)
RBC # BLD: 3.4 M/UL — LOW (ref 4.2–5.4)
RBC # FLD: 12.6 % — SIGNIFICANT CHANGE UP (ref 11.5–14.5)
SODIUM SERPL-SCNC: 139 MMOL/L — SIGNIFICANT CHANGE UP (ref 135–146)
WBC # BLD: 10.95 K/UL — HIGH (ref 4.8–10.8)
WBC # FLD AUTO: 10.95 K/UL — HIGH (ref 4.8–10.8)

## 2020-07-15 PROCEDURE — 99233 SBSQ HOSP IP/OBS HIGH 50: CPT

## 2020-07-15 PROCEDURE — 93970 EXTREMITY STUDY: CPT | Mod: 26

## 2020-07-15 NOTE — PROGRESS NOTE ADULT - ASSESSMENT
79 year-old woman with a PMH of hypertension, diabetes, schizoaffective disorder, chronic LE edema, basal cell carcinoma, and unspecified "thyroid" issues (per chart review removal of thyroid nodule many years ago) presents for psychiatric help, evaluation of right foot mass, and long-term placement evaluation.     # Schizoaffective disorder  - Pt non-compliant w/ psych meds and physician f/u  - Seen by psych, no need for IPP, signed off  - On Lexapro 5mg po qhs and Haldol 1mg PO q12; refuses at times  - Was to be d/c'd yesterday but began spiking fevers    #T of 101.3 - unclear source   - T of 101.3 came down to 99, afebrile overnight  - Pt denies any sx or complaints  - UA showed LE, neg nitrites, no bacteria  - Urine Cx, Blood Cx pending  - CXR revealed R sided atelectasis  - RUQ US done in setting if hx of cholelithiasis, revealed cholelithiasis, GB wall thickening; no RUQ tenderness, LFTs wnl, will order lipase     # Vitamin B12 Deficiency w/ mild, borderline macrocytic anemia  - C/w B12 supplementation - 1mg PO q24  - Out pt f/u for workup of Pernicious Anemia    # Right foot lipoma - chronic, stable  - No evidence for cancer, infection  - Evaluated by podiatry on last admission (5/2020) - aspiration attempted, but no fluid produced  - Seen by Podiatry this admission, rec outpt follow up    # HTN  - Lasix 20mg q24  - Losartan 25mg po q24     # Chronic mild LE edema   - C/w Lasix 20 mg PO q24    # "Basal cell carcinoma" of ear  - Long-standing issue as per son; he notes that it has started to bleed intermittently   - Appears dry, intact  - Seen by Dermatology, recommend outpt Mohs procedure    #Prediabetic; A1c 5.9   - F/u as outpt with PCP to monitor    # History of thyroid nodules  - On last admission, an incidental finding of 6.1 cm heterogeneous lesion was found within the superior mediastinum, possibly arising from the right thyroid gland, suspicious for malignancy or goiter  - US thyroid showed multiple thyroid nodules, the largest of which measures up to 6.9 cm with mediastinal extension, for which an ultrasound-guided fine-needle aspiration is recommended.  - TSH 1.1; FT4 1.1 = nl  - Endocrine f/u as outpt    #DVT ppx: Lovenox 40 mg SQ  #Diet: DASH  # Activity - amb as tolerated  # Code status - Full   #Dispo: Acute, monitoring for fevers 79 year-old woman with a PMH of hypertension, diabetes, schizoaffective disorder, chronic LE edema, basal cell carcinoma, and unspecified "thyroid" issues (per chart review removal of thyroid nodule many years ago) presents for psychiatric help, evaluation of right foot mass, and long-term placement evaluation.     # Schizoaffective disorder  - Pt non-compliant w/ psych meds and physician f/u  - Seen by psych, no need for IPP, signed off  - On Lexapro 5mg po qhs and Haldol 1mg PO q12; refuses at times  - Was to be d/c'd yesterday but began spiking fevers    #Fever - unclear source   - T of 101.3 came down to 99, afebrile overnight  - Pt denies any sx or complaints  - UA showed LE, neg nitrites, no bacteria  - Urine Cx, Blood Cx pending  - CXR revealed R sided atelectasis  - RUQ US done in setting if hx of cholelithiasis, revealed cholelithiasis, GB wall thickening; no RUQ tenderness, LFTs wnl, will order lipase   - w/u so far neg, pt afebrile, will watch 24 hrs and wait fro BC if spikes again, will consider ID and antibiotics.      # Vitamin B12 Deficiency w/ mild, borderline macrocytic anemia  - C/w B12 supplementation - 1mg PO q24  - Out pt f/u for workup of Pernicious Anemia    # Right foot lipoma - chronic, stable  - No evidence for cancer, infection  - Evaluated by podiatry on last admission (5/2020) - aspiration attempted, but no fluid produced  - Seen by Podiatry this admission, rec outpt follow up    # HTN  - Lasix 20mg q24  - Losartan 25mg po q24     # Chronic mild LE edema   - C/w Lasix 20 mg PO q24    # "Basal cell carcinoma" of ear  - Long-standing issue as per son; he notes that it has started to bleed intermittently   - Appears dry, intact  - Seen by Dermatology, recommend outpt Mohs procedure    #Prediabetic; A1c 5.9   - F/u as outpt with PCP to monitor    # History of thyroid nodules  - On last admission, an incidental finding of 6.1 cm heterogeneous lesion was found within the superior mediastinum, possibly arising from the right thyroid gland, suspicious for malignancy or goiter  - US thyroid showed multiple thyroid nodules, the largest of which measures up to 6.9 cm with mediastinal extension, for which an ultrasound-guided fine-needle aspiration is recommended.  - TSH 1.1; FT4 1.1 = nl  - Endocrine f/u as outpt    #DVT ppx: Lovenox 40 mg SQ  #Diet: DASH  # Activity - amb as tolerated  # Code status - Full   #Dispo: Acute, monitoring for fevers

## 2020-07-15 NOTE — PROGRESS NOTE ADULT - SUBJECTIVE AND OBJECTIVE BOX
Patient Information:  CHELA GALLAGHER / 79y / Female / MRN#:[id number]    Hospital Day: 5d    Interval History:  Patient seen and examined at bedside. Pt was scheduled to be d/c'd yesterday and began to spike temp in the afternoon. Had temps of 100.6, 101.3, came down to 99 after Tylenol administration. Pt denies any complaints, UA negative. Stat CXR revealed R sided atelectasis, Blood Cx, Urine Cx pending. Will cont to monitor pt.     Past Medical History:  Disorder of thyroid, unspecified  Edema  Schizoaffective disorder  Hypertension  Diabetes mellitus    Past Surgical History:    Allergies:  No Known Allergies    Medications:  PRN:  acetaminophen   Tablet .. 650 milliGRAM(s) Oral every 6 hours PRN Temp greater or equal to 38C (100.4F), Moderate Pain (4 - 6)  haloperidol     Tablet 1 milliGRAM(s) Oral every 12 hours PRN agitation    Standing:  chlorhexidine 4% Liquid 1 Application(s) Topical <User Schedule>  cyanocobalamin 1000 MICROGram(s) Oral daily  enoxaparin Injectable 40 milliGRAM(s) SubCutaneous daily  escitalopram 5 milliGRAM(s) Oral daily  furosemide    Tablet 20 milliGRAM(s) Oral daily  haloperidol     Tablet 1 milliGRAM(s) Oral every 12 hours  losartan 25 milliGRAM(s) Oral daily    Home:    Vitals:  T(C): 37.6, Max: 38.5 (20 @ 16:15)  T(F): 99.6, Max: 101.3 (20 @ 16:15)  HR: 83 (83 - 103)  BP: 137/65 (137/65 - 202/88)  RR: 18 (18 - 19)  SpO2: 96% (96% - 96%)    Physical Exam:  General: Awake, alert, NAD, resting comfortably in bed, on RA  HEENT: L sided lesion on jaw near L ear, appears stable, not actively bleeding  Heart: RRR; S1/S2; holosystolic murmur  Lungs: Clear to auscultation bilaterally, no wheezing, rales or rhonchi  Abdomen: Soft, nontender, nondistended, BS+  Extremities: Stable swelling on R ant foot, no tenderness of erythema    Labs:  CBC (07-15 @ 05:51)                        Hgb: 9.8    WBC: 10.95 )-----------------( Plts: 346                              Hct: 31.6     Chem (07-15 @ 05:51)  Na: 139  |     Cl: 101     |  BUN: 24  -----------------------------------------< Gluc: 102    K: 4.4   |    HCO3: 25  |  Cr: 0.9    Ca 9.9 (07-15 @ 05:51)    LFTs (07-15 @ 05:51)  TPro 5.9  /  Alb 2.7  TBili 0.4  /  DBili <0.2  AST 7   /  ALT 6   /  AlkPhos 95          Urinalysis Basic ( @ 17:50)  Color: Light Yellow  Appearance: Clear  S.009  pH:   Gluc:   Ketone: Negative  Bili: Negative  Urobili: <2 mg/dL   Blood:   Protein: Trace  Nitrite: Negative   Leuk Esterase: Moderate  RBC: 2  WBC: 11   Sq Epi:   Non Sq Epi: 2  Bacteria: Negative    Microbiology:  Culture - Urine (collected 07-10 @ 02:59)  Source: .Urine Clean Catch (Midstream)  Final Report ( @ 18:28):    <10,000 CFU/mL Normal Urogenital Randa        Radiology: Patient Information:  CHELA GALLAGHER / 79y / Female / MRN#:[id number]    Hospital Day: 5d    Interval History:  Patient seen and examined at bedside. Pt was scheduled to be d/c'd yesterday and began to spike temp in the afternoon. Had temps of 100.6, 101.3, came down to 99 after Tylenol administration. Pt denies any complaints, UA negative. Stat CXR revealed R sided atelectasis, Blood Cx, Urine Cx pending. Will cont to monitor pt.     Attending: Pt seen and examined at bedside. No cp or sob. Pt spiked fever in evening. Fever w/u sent.     Past Medical History:  Disorder of thyroid, unspecified  Edema  Schizoaffective disorder  Hypertension  Diabetes mellitus    Past Surgical History:    Allergies:  No Known Allergies    Medications:  PRN:  acetaminophen   Tablet .. 650 milliGRAM(s) Oral every 6 hours PRN Temp greater or equal to 38C (100.4F), Moderate Pain (4 - 6)  haloperidol     Tablet 1 milliGRAM(s) Oral every 12 hours PRN agitation    Standing:  chlorhexidine 4% Liquid 1 Application(s) Topical <User Schedule>  cyanocobalamin 1000 MICROGram(s) Oral daily  enoxaparin Injectable 40 milliGRAM(s) SubCutaneous daily  escitalopram 5 milliGRAM(s) Oral daily  furosemide    Tablet 20 milliGRAM(s) Oral daily  haloperidol     Tablet 1 milliGRAM(s) Oral every 12 hours  losartan 25 milliGRAM(s) Oral daily    Home:    Vitals:  T(C): 37.6, Max: 38.5 (20 @ 16:15)  T(F): 99.6, Max: 101.3 (20 @ 16:15)  HR: 83 (83 - 103)  BP: 137/65 (137/65 - 202/88)  RR: 18 (18 - 19)  SpO2: 96% (96% - 96%)    Physical Exam:  General: Awake, alert, NAD, resting comfortably in bed, on RA  HEENT: L sided lesion on jaw near L ear, appears stable, not actively bleeding  Heart: RRR; S1/S2; holosystolic murmur  Lungs: Clear to auscultation bilaterally, no wheezing, rales or rhonchi  Abdomen: Soft, nontender, nondistended, BS+  Extremities: Stable swelling on R ant foot, no tenderness of erythema    Labs:  CBC (07-15 @ 05:51)                        Hgb: 9.8    WBC: 10.95 )-----------------( Plts: 346                              Hct: 31.6     Chem (07-15 @ 05:51)  Na: 139  |     Cl: 101     |  BUN: 24  -----------------------------------------< Gluc: 102    K: 4.4   |    HCO3: 25  |  Cr: 0.9    Ca 9.9 (07-15 @ 05:51)    LFTs (07-15 @ 05:51)  TPro 5.9  /  Alb 2.7  TBili 0.4  /  DBili <0.2  AST 7   /  ALT 6   /  AlkPhos 95          Urinalysis Basic ( @ 17:50)  Color: Light Yellow  Appearance: Clear  S.009  pH:   Gluc:   Ketone: Negative  Bili: Negative  Urobili: <2 mg/dL   Blood:   Protein: Trace  Nitrite: Negative   Leuk Esterase: Moderate  RBC: 2  WBC: 11   Sq Epi:   Non Sq Epi: 2  Bacteria: Negative    Microbiology:  Culture - Urine (collected 07-10 @ 02:59)  Source: .Urine Clean Catch (Midstream)  Final Report ( @ 18:28):    <10,000 CFU/mL Normal Urogenital Randa        Radiology:

## 2020-07-15 NOTE — PROGRESS NOTE ADULT - ATTENDING COMMENTS
#Progress Note Handoff  Pending (specify):  pending blood cultures  Family discussion: no number in chart  Disposition: Home_x anticipated __/SNF___/Other___/Unknown at this time___  Pt seen during COVID 19 Pandemic.

## 2020-07-16 VITALS
HEART RATE: 80 BPM | RESPIRATION RATE: 18 BRPM | TEMPERATURE: 99 F | SYSTOLIC BLOOD PRESSURE: 134 MMHG | DIASTOLIC BLOOD PRESSURE: 71 MMHG | OXYGEN SATURATION: 97 %

## 2020-07-16 LAB
ALBUMIN SERPL ELPH-MCNC: 2.8 G/DL — LOW (ref 3.5–5.2)
ALP SERPL-CCNC: 101 U/L — SIGNIFICANT CHANGE UP (ref 30–115)
ALT FLD-CCNC: 10 U/L — SIGNIFICANT CHANGE UP (ref 0–41)
ANION GAP SERPL CALC-SCNC: 14 MMOL/L — SIGNIFICANT CHANGE UP (ref 7–14)
AST SERPL-CCNC: 10 U/L — SIGNIFICANT CHANGE UP (ref 0–41)
BASOPHILS # BLD AUTO: 0.03 K/UL — SIGNIFICANT CHANGE UP (ref 0–0.2)
BASOPHILS NFR BLD AUTO: 0.4 % — SIGNIFICANT CHANGE UP (ref 0–1)
BILIRUB SERPL-MCNC: 0.3 MG/DL — SIGNIFICANT CHANGE UP (ref 0.2–1.2)
BUN SERPL-MCNC: 25 MG/DL — HIGH (ref 10–20)
CALCIUM SERPL-MCNC: 9.9 MG/DL — SIGNIFICANT CHANGE UP (ref 8.5–10.1)
CHLORIDE SERPL-SCNC: 99 MMOL/L — SIGNIFICANT CHANGE UP (ref 98–110)
CO2 SERPL-SCNC: 26 MMOL/L — SIGNIFICANT CHANGE UP (ref 17–32)
CREAT SERPL-MCNC: 1 MG/DL — SIGNIFICANT CHANGE UP (ref 0.7–1.5)
EOSINOPHIL # BLD AUTO: 0.13 K/UL — SIGNIFICANT CHANGE UP (ref 0–0.7)
EOSINOPHIL NFR BLD AUTO: 1.6 % — SIGNIFICANT CHANGE UP (ref 0–8)
GLUCOSE BLDC GLUCOMTR-MCNC: 143 MG/DL — HIGH (ref 70–99)
GLUCOSE BLDC GLUCOMTR-MCNC: 98 MG/DL — SIGNIFICANT CHANGE UP (ref 70–99)
GLUCOSE SERPL-MCNC: 108 MG/DL — HIGH (ref 70–99)
HCT VFR BLD CALC: 33.3 % — LOW (ref 37–47)
HGB BLD-MCNC: 10.1 G/DL — LOW (ref 12–16)
IMM GRANULOCYTES NFR BLD AUTO: 0.4 % — HIGH (ref 0.1–0.3)
LYMPHOCYTES # BLD AUTO: 1.11 K/UL — LOW (ref 1.2–3.4)
LYMPHOCYTES # BLD AUTO: 13.4 % — LOW (ref 20.5–51.1)
MCHC RBC-ENTMCNC: 28.9 PG — SIGNIFICANT CHANGE UP (ref 27–31)
MCHC RBC-ENTMCNC: 30.3 G/DL — LOW (ref 32–37)
MCV RBC AUTO: 95.4 FL — SIGNIFICANT CHANGE UP (ref 81–99)
MONOCYTES # BLD AUTO: 0.91 K/UL — HIGH (ref 0.1–0.6)
MONOCYTES NFR BLD AUTO: 11 % — HIGH (ref 1.7–9.3)
NEUTROPHILS # BLD AUTO: 6.09 K/UL — SIGNIFICANT CHANGE UP (ref 1.4–6.5)
NEUTROPHILS NFR BLD AUTO: 73.2 % — SIGNIFICANT CHANGE UP (ref 42.2–75.2)
NRBC # BLD: 0 /100 WBCS — SIGNIFICANT CHANGE UP (ref 0–0)
PLATELET # BLD AUTO: 386 K/UL — SIGNIFICANT CHANGE UP (ref 130–400)
POTASSIUM SERPL-MCNC: 4.4 MMOL/L — SIGNIFICANT CHANGE UP (ref 3.5–5)
POTASSIUM SERPL-SCNC: 4.4 MMOL/L — SIGNIFICANT CHANGE UP (ref 3.5–5)
PROT SERPL-MCNC: 6.2 G/DL — SIGNIFICANT CHANGE UP (ref 6–8)
RBC # BLD: 3.49 M/UL — LOW (ref 4.2–5.4)
RBC # FLD: 12.8 % — SIGNIFICANT CHANGE UP (ref 11.5–14.5)
SODIUM SERPL-SCNC: 139 MMOL/L — SIGNIFICANT CHANGE UP (ref 135–146)
WBC # BLD: 8.3 K/UL — SIGNIFICANT CHANGE UP (ref 4.8–10.8)
WBC # FLD AUTO: 8.3 K/UL — SIGNIFICANT CHANGE UP (ref 4.8–10.8)

## 2020-07-16 PROCEDURE — 99239 HOSP IP/OBS DSCHRG MGMT >30: CPT

## 2020-07-16 RX ORDER — HALOPERIDOL DECANOATE 100 MG/ML
1 INJECTION INTRAMUSCULAR
Qty: 0 | Refills: 0 | DISCHARGE
Start: 2020-07-16

## 2020-07-16 NOTE — PROGRESS NOTE ADULT - ASSESSMENT
79 year-old woman with a PMH of hypertension, diabetes, schizoaffective disorder, chronic LE edema, basal cell carcinoma, and unspecified "thyroid" issues (per chart review removal of thyroid nodule many years ago) presents for psychiatric help, evaluation of right foot mass, and long-term placement evaluation.     # Schizoaffective disorder  - Pt non-compliant w/ psych meds and physician f/u  - Seen by psych, no need for IPP, signed off  - On Lexapro 5mg po qhs and Haldol 1mg PO q12; refuses at times    #Fever - unclear source   - Has been afebrile since Temp of 101.3 on 7/14  - Pt denies any sx or complaints  - UA showed LE, neg nitrites, no bacteria  - Blood Cx 7/14 prelim NGTD  - CXR revealed R sided atelectasis; pt denies any chest pain, SOB  - RUQ US done in setting if hx of cholelithiasis, revealed cholelithiasis, GB wall thickening; no RUQ tenderness, LFTs wnl  - Will repeat labs this morning and possible d/c this afternoon pending results    # Vitamin B12 Deficiency w/ mild, borderline macrocytic anemia  - C/w B12 supplementation - 1mg PO q24  - Out pt f/u for workup of Pernicious Anemia    # Right foot lipoma - chronic, stable  - No evidence for cancer or active infection  - Evaluated by podiatry on last admission (5/2020) - aspiration attempted, but no fluid produced  - Seen by Podiatry this admission, rec outpt follow up    # HTN  - Lasix 20mg q24  - Losartan 25mg po q24     # Chronic mild LE edema   - C/w Lasix 20 mg PO q24    # "Basal cell carcinoma" of ear  - Long-standing issue as per son; he notes that it has started to bleed intermittently   - Appears dry, intact  - Seen by Dermatology, recommend outpt Mohs procedure    #Prediabetic; A1c 5.9   - F/u as outpt with PCP to monitor    # History of thyroid nodules  - On last admission, an incidental finding of 6.1 cm heterogeneous lesion was found within the superior mediastinum, possibly arising from the right thyroid gland, suspicious for malignancy or goiter  - US thyroid showed multiple thyroid nodules, the largest of which measures up to 6.9 cm with mediastinal extension, for which an ultrasound-guided fine-needle aspiration is recommended.  - TSH 1.1; FT4 1.1 = nl  - Endocrine f/u as outpt    #DVT ppx: Lovenox 40 mg SQ  #Diet: DASH  # Activity - amb as tolerated  # Code status - Full   #Dispo: Possible d/c this afternoon pending AM labs

## 2020-07-16 NOTE — PROGRESS NOTE ADULT - SUBJECTIVE AND OBJECTIVE BOX
Patient Information:  CHELA GALLAGHER / 79y / Female / MRN#:[id number]    Hospital Day: 6d    Interval History:  Patient seen and examined at bedside. No acute events overnight. Pt has remained afebrile since . Blood Cx prelim results from  are NGTD. Pt reports she feels "good", wants to go home. Will repeat labs at 11AM and possible d/c home this afternoon.    Past Medical History:  Disorder of thyroid, unspecified  Edema  Schizoaffective disorder  Hypertension  Diabetes mellitus    Past Surgical History:    Allergies:  No Known Allergies    Medications:  PRN:  acetaminophen   Tablet .. 650 milliGRAM(s) Oral every 6 hours PRN Temp greater or equal to 38C (100.4F), Moderate Pain (4 - 6)  haloperidol     Tablet 1 milliGRAM(s) Oral every 12 hours PRN agitation    Standing:  chlorhexidine 4% Liquid 1 Application(s) Topical <User Schedule>  cyanocobalamin 1000 MICROGram(s) Oral daily  enoxaparin Injectable 40 milliGRAM(s) SubCutaneous daily  escitalopram 5 milliGRAM(s) Oral daily  furosemide    Tablet 20 milliGRAM(s) Oral daily  haloperidol     Tablet 1 milliGRAM(s) Oral every 12 hours  losartan 25 milliGRAM(s) Oral daily    Home:    Vitals:  T(C): 37.1, Max: 37.2 (07-15-20 @ 21:20)  T(F): 98.7, Max: 99 (07-15-20 @ 21:20)  HR: 88 (79 - 88)  BP: 131/63 (124/59 - 175/67)  RR: 18 (18 - 18)  SpO2: 98% (98% - 98%)    Physical Exam:  General: Awake, alert, NAD, resting comfortably in bed, on RA  HEENT: L sided lesion on jaw near L ear, appears stable, not actively bleeding  Heart: RRR; S1/S2; holosystolic murmur  Lungs: Clear to auscultation bilaterally, no wheezing, rales or rhonchi  Abdomen: Soft, nontender, nondistended, BS+  Extremities: Stable swelling on R ant foot, no tenderness of erythema    Labs:  CBC (07-15 @ 05:51)                        Hgb: 9.8    WBC: 10.95 )-----------------( Plts: 346                              Hct: 31.6     Chem (07-15 @ 05:51)  Na: 139  |     Cl: 101     |  BUN: 24  -----------------------------------------< Gluc: 102    K: 4.4   |    HCO3: 25  |  Cr: 0.9    Ca 9.9 (07-15 @ 05:51)    LFTs (07-15 @ 05:51)  TPro 5.9  /  Alb 2.7  TBili 0.4  /  DBili <0.2  AST 7   /  ALT 6   /  AlkPhos 95          Urinalysis Basic ( @ 17:50)  Color: Light Yellow  Appearance: Clear  S.009  pH:   Gluc:   Ketone: Negative  Bili: Negative  Urobili: <2 mg/dL   Blood:   Protein: Trace  Nitrite: Negative   Leuk Esterase: Moderate  RBC: 2  WBC: 11   Sq Epi:   Non Sq Epi: 2  Bacteria: Negative    Microbiology:  Culture - Blood (collected  @ 21:48)  Source: .Blood None  Preliminary Report ( @ 07:01):    No growth to date.    Culture - Urine (collected 07-10 @ 02:59)  Source: .Urine Clean Catch (Midstream)  Final Report ( @ 18:28):    <10,000 CFU/mL Normal Urogenital Randa        Radiology:

## 2020-07-20 DIAGNOSIS — C44.219 BASAL CELL CARCINOMA OF SKIN OF LEFT EAR AND EXTERNAL AURICULAR CANAL: ICD-10-CM

## 2020-07-20 DIAGNOSIS — R73.03 PREDIABETES: ICD-10-CM

## 2020-07-20 DIAGNOSIS — E04.1 NONTOXIC SINGLE THYROID NODULE: ICD-10-CM

## 2020-07-20 DIAGNOSIS — Z91.14 PATIENT'S OTHER NONCOMPLIANCE WITH MEDICATION REGIMEN: ICD-10-CM

## 2020-07-20 DIAGNOSIS — R41.82 ALTERED MENTAL STATUS, UNSPECIFIED: ICD-10-CM

## 2020-07-20 DIAGNOSIS — F25.9 SCHIZOAFFECTIVE DISORDER, UNSPECIFIED: ICD-10-CM

## 2020-07-20 DIAGNOSIS — R60.0 LOCALIZED EDEMA: ICD-10-CM

## 2020-07-20 DIAGNOSIS — E66.9 OBESITY, UNSPECIFIED: ICD-10-CM

## 2020-07-20 DIAGNOSIS — J98.11 ATELECTASIS: ICD-10-CM

## 2020-07-20 DIAGNOSIS — F03.90 UNSPECIFIED DEMENTIA, UNSPECIFIED SEVERITY, WITHOUT BEHAVIORAL DISTURBANCE, PSYCHOTIC DISTURBANCE, MOOD DISTURBANCE, AND ANXIETY: ICD-10-CM

## 2020-07-20 DIAGNOSIS — I10 ESSENTIAL (PRIMARY) HYPERTENSION: ICD-10-CM

## 2020-07-20 DIAGNOSIS — D17.79 BENIGN LIPOMATOUS NEOPLASM OF OTHER SITES: ICD-10-CM

## 2020-07-20 DIAGNOSIS — D51.0 VITAMIN B12 DEFICIENCY ANEMIA DUE TO INTRINSIC FACTOR DEFICIENCY: ICD-10-CM

## 2020-07-20 DIAGNOSIS — D53.9 NUTRITIONAL ANEMIA, UNSPECIFIED: ICD-10-CM

## 2020-07-20 DIAGNOSIS — R50.9 FEVER, UNSPECIFIED: ICD-10-CM

## 2020-07-20 LAB
CULTURE RESULTS: SIGNIFICANT CHANGE UP
CULTURE RESULTS: SIGNIFICANT CHANGE UP
SPECIMEN SOURCE: SIGNIFICANT CHANGE UP
SPECIMEN SOURCE: SIGNIFICANT CHANGE UP

## 2020-09-09 ENCOUNTER — EMERGENCY (EMERGENCY)
Facility: HOSPITAL | Age: 80
LOS: 0 days | Discharge: HOME | End: 2020-09-09
Attending: EMERGENCY MEDICINE | Admitting: EMERGENCY MEDICINE
Payer: MEDICARE

## 2020-09-09 VITALS
TEMPERATURE: 98 F | SYSTOLIC BLOOD PRESSURE: 145 MMHG | RESPIRATION RATE: 20 BRPM | HEART RATE: 83 BPM | OXYGEN SATURATION: 98 % | DIASTOLIC BLOOD PRESSURE: 68 MMHG

## 2020-09-09 VITALS
SYSTOLIC BLOOD PRESSURE: 136 MMHG | HEART RATE: 66 BPM | RESPIRATION RATE: 18 BRPM | TEMPERATURE: 98 F | OXYGEN SATURATION: 98 % | DIASTOLIC BLOOD PRESSURE: 64 MMHG

## 2020-09-09 DIAGNOSIS — L03.115 CELLULITIS OF RIGHT LOWER LIMB: ICD-10-CM

## 2020-09-09 DIAGNOSIS — Z00.00 ENCOUNTER FOR GENERAL ADULT MEDICAL EXAMINATION WITHOUT ABNORMAL FINDINGS: ICD-10-CM

## 2020-09-09 DIAGNOSIS — F03.90 UNSPECIFIED DEMENTIA WITHOUT BEHAVIORAL DISTURBANCE: ICD-10-CM

## 2020-09-09 PROBLEM — R60.9 EDEMA, UNSPECIFIED: Chronic | Status: ACTIVE | Noted: 2020-07-10

## 2020-09-09 PROBLEM — E07.9 DISORDER OF THYROID, UNSPECIFIED: Chronic | Status: ACTIVE | Noted: 2020-07-10

## 2020-09-09 LAB
ALBUMIN SERPL ELPH-MCNC: 3.8 G/DL — SIGNIFICANT CHANGE UP (ref 3.5–5.2)
ALP SERPL-CCNC: 94 U/L — SIGNIFICANT CHANGE UP (ref 30–115)
ALT FLD-CCNC: 8 U/L — SIGNIFICANT CHANGE UP (ref 0–41)
ANION GAP SERPL CALC-SCNC: 11 MMOL/L — SIGNIFICANT CHANGE UP (ref 7–14)
AST SERPL-CCNC: 11 U/L — SIGNIFICANT CHANGE UP (ref 0–41)
BASOPHILS # BLD AUTO: 0.08 K/UL — SIGNIFICANT CHANGE UP (ref 0–0.2)
BASOPHILS NFR BLD AUTO: 1 % — SIGNIFICANT CHANGE UP (ref 0–1)
BILIRUB SERPL-MCNC: <0.2 MG/DL — SIGNIFICANT CHANGE UP (ref 0.2–1.2)
BUN SERPL-MCNC: 34 MG/DL — HIGH (ref 10–20)
CALCIUM SERPL-MCNC: 10.1 MG/DL — SIGNIFICANT CHANGE UP (ref 8.5–10.1)
CHLORIDE SERPL-SCNC: 101 MMOL/L — SIGNIFICANT CHANGE UP (ref 98–110)
CK SERPL-CCNC: 47 U/L — SIGNIFICANT CHANGE UP (ref 0–225)
CO2 SERPL-SCNC: 23 MMOL/L — SIGNIFICANT CHANGE UP (ref 17–32)
CREAT SERPL-MCNC: 1.3 MG/DL — SIGNIFICANT CHANGE UP (ref 0.7–1.5)
D DIMER BLD IA.RAPID-MCNC: 802 NG/ML DDU — HIGH (ref 0–230)
EOSINOPHIL # BLD AUTO: 0.26 K/UL — SIGNIFICANT CHANGE UP (ref 0–0.7)
EOSINOPHIL NFR BLD AUTO: 3.2 % — SIGNIFICANT CHANGE UP (ref 0–8)
GLUCOSE SERPL-MCNC: 126 MG/DL — HIGH (ref 70–99)
HCT VFR BLD CALC: 32.9 % — LOW (ref 37–47)
HGB BLD-MCNC: 10.1 G/DL — LOW (ref 12–16)
IMM GRANULOCYTES NFR BLD AUTO: 0.2 % — SIGNIFICANT CHANGE UP (ref 0.1–0.3)
LYMPHOCYTES # BLD AUTO: 2.18 K/UL — SIGNIFICANT CHANGE UP (ref 1.2–3.4)
LYMPHOCYTES # BLD AUTO: 26.9 % — SIGNIFICANT CHANGE UP (ref 20.5–51.1)
MCHC RBC-ENTMCNC: 28.7 PG — SIGNIFICANT CHANGE UP (ref 27–31)
MCHC RBC-ENTMCNC: 30.7 G/DL — LOW (ref 32–37)
MCV RBC AUTO: 93.5 FL — SIGNIFICANT CHANGE UP (ref 81–99)
MONOCYTES # BLD AUTO: 0.71 K/UL — HIGH (ref 0.1–0.6)
MONOCYTES NFR BLD AUTO: 8.8 % — SIGNIFICANT CHANGE UP (ref 1.7–9.3)
NEUTROPHILS # BLD AUTO: 4.86 K/UL — SIGNIFICANT CHANGE UP (ref 1.4–6.5)
NEUTROPHILS NFR BLD AUTO: 59.9 % — SIGNIFICANT CHANGE UP (ref 42.2–75.2)
NRBC # BLD: 0 /100 WBCS — SIGNIFICANT CHANGE UP (ref 0–0)
PLATELET # BLD AUTO: 394 K/UL — SIGNIFICANT CHANGE UP (ref 130–400)
POTASSIUM SERPL-MCNC: 4.4 MMOL/L — SIGNIFICANT CHANGE UP (ref 3.5–5)
POTASSIUM SERPL-SCNC: 4.4 MMOL/L — SIGNIFICANT CHANGE UP (ref 3.5–5)
PROT SERPL-MCNC: 7.5 G/DL — SIGNIFICANT CHANGE UP (ref 6–8)
RBC # BLD: 3.52 M/UL — LOW (ref 4.2–5.4)
RBC # FLD: 13.5 % — SIGNIFICANT CHANGE UP (ref 11.5–14.5)
SODIUM SERPL-SCNC: 135 MMOL/L — SIGNIFICANT CHANGE UP (ref 135–146)
WBC # BLD: 8.11 K/UL — SIGNIFICANT CHANGE UP (ref 4.8–10.8)
WBC # FLD AUTO: 8.11 K/UL — SIGNIFICANT CHANGE UP (ref 4.8–10.8)

## 2020-09-09 PROCEDURE — 93970 EXTREMITY STUDY: CPT | Mod: 26

## 2020-09-09 PROCEDURE — 99285 EMERGENCY DEPT VISIT HI MDM: CPT

## 2020-09-09 RX ORDER — CEPHALEXIN 500 MG
500 CAPSULE ORAL ONCE
Refills: 0 | Status: COMPLETED | OUTPATIENT
Start: 2020-09-09 | End: 2020-09-09

## 2020-09-09 RX ORDER — CEPHALEXIN 500 MG
1 CAPSULE ORAL
Qty: 28 | Refills: 0
Start: 2020-09-09 | End: 2020-09-15

## 2020-09-09 RX ORDER — HALOPERIDOL DECANOATE 100 MG/ML
5 INJECTION INTRAMUSCULAR ONCE
Refills: 0 | Status: COMPLETED | OUTPATIENT
Start: 2020-09-09 | End: 2020-09-09

## 2020-09-09 RX ADMIN — Medication 500 MILLIGRAM(S): at 06:00

## 2020-09-09 RX ADMIN — HALOPERIDOL DECANOATE 5 MILLIGRAM(S): 100 INJECTION INTRAMUSCULAR at 02:55

## 2020-09-09 RX ADMIN — Medication 2 MILLIGRAM(S): at 02:55

## 2020-09-09 NOTE — ED PROVIDER NOTE - PATIENT PORTAL LINK FT
You can access the FollowMyHealth Patient Portal offered by [facility] by registering at the following website: [email or web address]. By joining [employer]’s FollowMyHealth portal, you will also be able to view your health information using other applications (apps) compatible with our system.

## 2020-09-09 NOTE — ED ADULT NURSE NOTE - PMH
Diabetes mellitus    Disorder of thyroid, unspecified  Son cannot provide details. States she had "thyroid surgery" decades ago when she was young  Edema  Bilateral LE  Hypertension    Schizoaffective disorder

## 2020-09-09 NOTE — ED PROVIDER NOTE - OBJECTIVE STATEMENT
80 yo female, pmh of dementia, schizoaffective disorder, htn, dm, presents to ed for eval. as per son eric, pt woke up started screaming that right leg hurt her. he denies fever, trauma. eric states pt freq complains of pain to legs. pt has chronic left ear ulcer that she is being followed for.

## 2020-09-09 NOTE — ED PROVIDER NOTE - ATTENDING CONTRIBUTION TO CARE
I personally evaluated the patient. I reviewed the Resident’s or Physician Assistant’s note (as assigned above), and agree with the findings and plan except as documented in my note.    78 y/o F w hx of dementia presents c/o of r leg swelling and pain according to the son. No fevers, chills. No n/v.     CONSTITUTIONAL: NAD  SKIN: skin exam is warm and dry, no acute rash.  HEAD: Normocephalic; atraumatic.  EYES: PERRL, 3 mm bilateral, no nystagmus, EOM intact; conjunctiva and sclera clear.  ENT: No nasal discharge; airway clear.  NECK: Supple; non tender.+ full passive ROM in all directions. No JVD  CARD: S1, S2 normal; no murmurs, gallops, or rubs. Regular rate and rhythm. + Symmetric Strong Pulses  RESP: No wheezes, rales or rhonchi. Good air movement bilaterally  ABD: soft; non-distended; non-tender. No Rebound, No Gaurding, No signs of peritnitis, No CVA tenderness  EXT: Normal ROM. R leg slightly more swollen and more warm than the left. Distal pulses intact.    Plan- labs, D-dimer for DVT screening, keflex, reassess

## 2020-09-09 NOTE — ED PROVIDER NOTE - PROGRESS NOTE DETAILS
Pt required haldol to draw the blood work. D-dimer elevated. Will obtain US LE to r/o DVT. Will cont to monitor the patient. Authored by Dr Coello: the patient was endorsed to me to follow up lower extremity duplex and is negative prescribe abx.  Patient was sleeping this AM, now went for duplex. prelim read of venous duplex neg for DVT of lower extremities, will treat for cellulitis, cepahalexin sent ot pharmacy, discussed case with martha Yanes dc

## 2020-09-09 NOTE — ED ADULT NURSE NOTE - CHIEF COMPLAINT QUOTE
Pt BIBA fo complaints of leg pain that woke her up in the middle of the night as per son. Son reports start of dementia but states it is getting worse, has chronic open wound to left ear, and leg swelling. Son's name is Joaquín [phone]

## 2020-09-09 NOTE — ED PROVIDER NOTE - CLINICAL SUMMARY MEDICAL DECISION MAKING FREE TEXT BOX
80 yo female with leg swelling, afebrile, no DVT found WBC WNL. exam may represent cellulitis, there is no ttp or palpable crepitus.  Abx prescribed, strict return precautions given.

## 2020-09-09 NOTE — ED ADULT NURSE NOTE - OBJECTIVE STATEMENT
Pt states she lives with son BIBA c/o b/l LE pain and swelling. Pt agitated and not cooperative with staff. PMH HTN. Pt states "I take a water pill and Dr. Razo is my son in law".

## 2020-09-09 NOTE — ED PROVIDER NOTE - PHYSICAL EXAMINATION
Physical Exam    Vital Signs: I have reviewed the initial vital signs.  Constitutional: well-nourished, appears stated age, no acute distress  Eyes: Conjunctiva pink, Sclera clear,   Cardiovascular: S1 and S2, regular rate, regular rhythm, well-perfused extremities, radial pulses equal and 2+  Respiratory: unlabored respiratory effort, clear to auscultation bilaterally no wheezing, rales and rhonchi  Gastrointestinal: soft, non-tender abdomen, no pulsatile mass, normal bowl sounds  Musculoskeletal: supple neck, b/l lower extremity edema, no midline tenderness  Integumentary: superficial ulcer to inferior aspect of left ear. right leg more red and warm then left  Neurologic: awake, alert, nvi

## 2020-09-09 NOTE — ED ADULT TRIAGE NOTE - CHIEF COMPLAINT QUOTE
Pt BIBA fo complaints of leg pain that woke her up in the middle of the night as per son. Son reports start of dementia but states it is getting worse, has chronic open wound to left ear, and leg swelling. Son's name is Joaquín [phone]

## 2020-09-09 NOTE — ED PROVIDER NOTE - CARE PROVIDER_API CALL
Juan Ortega)  Dermatology; Internal Medicine  [address] [place] [address]  Phone: [phone]  Fax: [phone]  Follow Up Time:

## 2020-12-05 ENCOUNTER — EMERGENCY (EMERGENCY)
Facility: HOSPITAL | Age: 80
LOS: 0 days | Discharge: HOME | End: 2020-12-05
Attending: EMERGENCY MEDICINE | Admitting: EMERGENCY MEDICINE
Payer: MEDICARE

## 2020-12-05 VITALS
SYSTOLIC BLOOD PRESSURE: 150 MMHG | DIASTOLIC BLOOD PRESSURE: 70 MMHG | TEMPERATURE: 98 F | HEART RATE: 82 BPM | RESPIRATION RATE: 18 BRPM | OXYGEN SATURATION: 96 %

## 2020-12-05 VITALS
HEART RATE: 90 BPM | DIASTOLIC BLOOD PRESSURE: 61 MMHG | RESPIRATION RATE: 18 BRPM | OXYGEN SATURATION: 96 % | SYSTOLIC BLOOD PRESSURE: 131 MMHG | HEIGHT: 65 IN | TEMPERATURE: 98 F

## 2020-12-05 DIAGNOSIS — M79.671 PAIN IN RIGHT FOOT: ICD-10-CM

## 2020-12-05 DIAGNOSIS — F25.9 SCHIZOAFFECTIVE DISORDER, UNSPECIFIED: ICD-10-CM

## 2020-12-05 DIAGNOSIS — I10 ESSENTIAL (PRIMARY) HYPERTENSION: ICD-10-CM

## 2020-12-05 DIAGNOSIS — E11.9 TYPE 2 DIABETES MELLITUS WITHOUT COMPLICATIONS: ICD-10-CM

## 2020-12-05 DIAGNOSIS — R60.0 LOCALIZED EDEMA: ICD-10-CM

## 2020-12-05 DIAGNOSIS — M79.672 PAIN IN LEFT FOOT: ICD-10-CM

## 2020-12-05 LAB
ANION GAP SERPL CALC-SCNC: 8 MMOL/L — SIGNIFICANT CHANGE UP (ref 7–14)
APAP SERPL-MCNC: <5 UG/ML — LOW (ref 10–30)
BASOPHILS # BLD AUTO: 0.05 K/UL — SIGNIFICANT CHANGE UP (ref 0–0.2)
BASOPHILS NFR BLD AUTO: 0.8 % — SIGNIFICANT CHANGE UP (ref 0–1)
BUN SERPL-MCNC: 20 MG/DL — SIGNIFICANT CHANGE UP (ref 10–20)
CALCIUM SERPL-MCNC: 9.9 MG/DL — SIGNIFICANT CHANGE UP (ref 8.5–10.1)
CHLORIDE SERPL-SCNC: 106 MMOL/L — SIGNIFICANT CHANGE UP (ref 98–110)
CO2 SERPL-SCNC: 24 MMOL/L — SIGNIFICANT CHANGE UP (ref 17–32)
CREAT SERPL-MCNC: 1.3 MG/DL — SIGNIFICANT CHANGE UP (ref 0.7–1.5)
EOSINOPHIL # BLD AUTO: 0.2 K/UL — SIGNIFICANT CHANGE UP (ref 0–0.7)
EOSINOPHIL NFR BLD AUTO: 3.1 % — SIGNIFICANT CHANGE UP (ref 0–8)
ETHANOL SERPL-MCNC: <10 MG/DL — SIGNIFICANT CHANGE UP
GLUCOSE SERPL-MCNC: 113 MG/DL — HIGH (ref 70–99)
HCT VFR BLD CALC: 34 % — LOW (ref 37–47)
HGB BLD-MCNC: 10.3 G/DL — LOW (ref 12–16)
IMM GRANULOCYTES NFR BLD AUTO: 0.3 % — SIGNIFICANT CHANGE UP (ref 0.1–0.3)
LYMPHOCYTES # BLD AUTO: 1.29 K/UL — SIGNIFICANT CHANGE UP (ref 1.2–3.4)
LYMPHOCYTES # BLD AUTO: 20.3 % — LOW (ref 20.5–51.1)
MCHC RBC-ENTMCNC: 28.3 PG — SIGNIFICANT CHANGE UP (ref 27–31)
MCHC RBC-ENTMCNC: 30.3 G/DL — LOW (ref 32–37)
MCV RBC AUTO: 93.4 FL — SIGNIFICANT CHANGE UP (ref 81–99)
MONOCYTES # BLD AUTO: 0.71 K/UL — HIGH (ref 0.1–0.6)
MONOCYTES NFR BLD AUTO: 11.2 % — HIGH (ref 1.7–9.3)
NEUTROPHILS # BLD AUTO: 4.09 K/UL — SIGNIFICANT CHANGE UP (ref 1.4–6.5)
NEUTROPHILS NFR BLD AUTO: 64.3 % — SIGNIFICANT CHANGE UP (ref 42.2–75.2)
NRBC # BLD: 0 /100 WBCS — SIGNIFICANT CHANGE UP (ref 0–0)
PLATELET # BLD AUTO: 302 K/UL — SIGNIFICANT CHANGE UP (ref 130–400)
POTASSIUM SERPL-MCNC: 5.2 MMOL/L — HIGH (ref 3.5–5)
POTASSIUM SERPL-SCNC: 5.2 MMOL/L — HIGH (ref 3.5–5)
RBC # BLD: 3.64 M/UL — LOW (ref 4.2–5.4)
RBC # FLD: 14.1 % — SIGNIFICANT CHANGE UP (ref 11.5–14.5)
SALICYLATES SERPL-MCNC: <0.3 MG/DL — LOW (ref 4–30)
SODIUM SERPL-SCNC: 138 MMOL/L — SIGNIFICANT CHANGE UP (ref 135–146)
WBC # BLD: 6.36 K/UL — SIGNIFICANT CHANGE UP (ref 4.8–10.8)
WBC # FLD AUTO: 6.36 K/UL — SIGNIFICANT CHANGE UP (ref 4.8–10.8)

## 2020-12-05 PROCEDURE — 99285 EMERGENCY DEPT VISIT HI MDM: CPT

## 2020-12-05 PROCEDURE — 93970 EXTREMITY STUDY: CPT | Mod: 26

## 2020-12-05 PROCEDURE — 73630 X-RAY EXAM OF FOOT: CPT | Mod: 26,RT

## 2020-12-05 PROCEDURE — 93010 ELECTROCARDIOGRAM REPORT: CPT

## 2020-12-05 RX ORDER — ACETAMINOPHEN 500 MG
650 TABLET ORAL ONCE
Refills: 0 | Status: COMPLETED | OUTPATIENT
Start: 2020-12-05 | End: 2020-12-05
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
## 2020-12-05 NOTE — ED PROVIDER NOTE - PATIENT PORTAL LINK FT
You can access the FollowMyHealth Patient Portal offered by [facility] by registering at the following website: [email or web address]. By joining [employer]’s FollowMyHealth portal, you will also be able to view your health information using other applications (apps) compatible with our system.
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
## 2020-12-05 NOTE — ED PROVIDER NOTE - PROGRESS NOTE DETAILS
TA: psych recommended podiatry consult as son is insistent patient need podiatry evaluation; called podiatry- will see pt in ED. TA: Patient cleared by podiatry with outpatient follow up; cleared by psych. Spoke with son and discussed plan and need for patient to follow up outpatient and establish care with primary care physician; will include primary care physicians in discharge paper works.

## 2020-12-05 NOTE — ED PROVIDER NOTE - PHYSICAL EXAMINATION
CONSTITUTIONAL: pt laying in stretcher; in no acute distress.   SKIN: warm, dry  HEAD: Normocephalic; atraumatic.  EYES: normal sclera and conjunctiva   ENT: No nasal discharge; airway clear.  NECK: Supple; non tender.  CARD: S1, S2 normal; no murmurs, gallops, or rubs. Regular rate and rhythm.   RESP: No wheezes, rales or rhonchi.  ABD: soft ntnd  EXT: Normal ROM.  b/l lower extremity edema. Lipoma like structure overlying dorsum of right foot.   LYMPH: No acute cervical adenopathy.  NEURO: Alert, oriented, grossly unremarkable  PSYCH: Cooperative, no SI/HI. disorganized speech.

## 2020-12-05 NOTE — ED PROVIDER NOTE - NS ED ROS FT
Eyes:  No visual changes, eye pain or discharge.  ENMT:  No hearing changes, pain, discharge or infections. No neck pain or stiffness.  Cardiac:  No chest pain, SOB or edema. No chest pain with exertion.  Respiratory:  No cough or respiratory distress. No hemoptysis.   GI:  No nausea, vomiting, diarrhea or abdominal pain.  :  No dysuria, frequency or burning.  MS:  No myalgia, muscle weakness, joint pain or back pain.  Neuro:  No headache or weakness.  No LOC.  Skin:  +lesion overlying left earlobe preauricular skin area with bleeding that has stopped.   Endocrine: No history of thyroid disease + diabetes.

## 2020-12-05 NOTE — ED BEHAVIORAL HEALTH ASSESSMENT NOTE - RISK ASSESSMENT
Pt is at low risk due to chronic delusions and med noncompliance that is mitigated by lack of suicide attempts, lack of suicidal ideations, housing stability, future orientation, and strong social supports Low Acute Suicide Risk

## 2020-12-05 NOTE — ED PROVIDER NOTE - CARE PROVIDER_API CALL
Maxim Copeland  PODIATRIC MEDICINE  [address] [facility] [place] [address]  Phone: [phone]  Fax: [phone]  Follow Up Time: Urgent

## 2020-12-05 NOTE — CONSULT NOTE ADULT - SUBJECTIVE AND OBJECTIVE BOX
Podiatry Consult Note    Subjective:  CHELA GALLAGHER is a pleasant well-nourished, well developed 80y Female in no acute distress, alert awake, and oriented to person, place and time.   Patient is a 80y old  Female who presents with a chief complaint of right foot dorsum midfoot edema;  HPI:  Seen by ED bedside; hx of right dorsum midfoot edema back in May and July 2020; ultrasound was performed and showed no cyst;     PAST MEDICAL & SURGICAL HISTORY:  Disorder of thyroid, unspecified  Son cannot provide details. States she had &quot;thyroid surgery&quot; decades ago when she was young  Edema  Bilateral LE  Schizoaffective disorder  Hypertension  Diabetes mellitus    Objective:  Vital Signs Last 24 Hrs  T(C): 36.9 (05 Dec 2020 06:54), Max: 36.9 (05 Dec 2020 06:54)  T(F): 98.5 (05 Dec 2020 06:54), Max: 98.5 (05 Dec 2020 06:54)  HR: 90 (05 Dec 2020 06:54) (90 - 90)  BP: 131/61 (05 Dec 2020 06:54) (131/61 - 131/61)  BP(mean): --  RR: 18 (05 Dec 2020 06:54) (18 - 18)  SpO2: 96% (05 Dec 2020 06:54) (96% - 96%)                        10.3   6.36  )-----------( 302      ( 05 Dec 2020 08:20 )             34.0                 12-05    138  |  106  |  20  ----------------------------<  113<H>  5.2<H>   |  24  |  1.3    Ca    9.9      05 Dec 2020 08:20    Physical Exam - Lower Extremity Focused:   Derm:   Right dorsum midfoot edema; no open wound;   Vascular: DP was not able to palpable due to edema; PT diminished  Neuro: Protective Sensation Diminished    Assessment:  Right dorsum midfoot edema;     Plan:  Chart reviewed and Patient evaluated. All Questions and Concerns Addressed and Answered  Discussed diagnosis and treatment with patient  No dressing needed;   History of right dorsum midfoot edema back in May and July 2020; showed no cyst; no aspiration needed;  Patient is stable from podiatry standpoint;   Patient can follow up as an outpatient to [address] [place] [address] Podiatry Clinic to Dr. Copeland upon discharge;  Weight bearing status; WBAT to bilateral feet;   Discussed Plan w/ Dr. Copeland    Podiatry

## 2020-12-05 NOTE — ED BEHAVIORAL HEALTH ASSESSMENT NOTE - DESCRIPTION
Labwork obtained, Psychiatry consulted diabetes, HTN, chronic LE edema, basal cell carcinoma, and unspecified "thyroid" issues (per chart review removal of thyroid nodule many years ago) 79 year old [place] woman, residing with her son, , has 3 adult children, is a retired environmental worker with the [employer]

## 2020-12-05 NOTE — ED PROVIDER NOTE - NSFOLLOWUPCLINICS_GEN_ALL_ED_FT
[facility]  OP Mental Health  [address] [place] [id number]  Phone: [phone]  Fax:   Follow Up Time: Urgent

## 2020-12-05 NOTE — ED BEHAVIORAL HEALTH ASSESSMENT NOTE - SUMMARY
Ms Yani Yusuf is an 79 year old [place] woman, residing with her son, , has 3 adult children, is a retired environmental worker with the [employer], with a documented psychiatric history of Schizoaffective Disorder, no IPP admissions, no suicide attempts, has not followed up with a psychiatrist in 3 years, noncompliant with her prescribed lexapro 5mg qd by her PMD, with a history of Haldol working for her in the past to reduce her psychotic symptoms, with past medical history of diabetes, HTN, chronic LE edema, basal cell carcinoma, and unspecified "thyroid" issues (per chart review removal of thyroid nodule many years ago) who has been BIB ambulance activated by her son for leg/foot pain. Psychiatry consult was called for mental health evaluation.     On evaluation patient perseverated about having people coming into her apartment, which has been ongoing for sometime, no acute agitation at home or violent behavior requiring inpatient psychiatric admission at this time. Although this patient will benefit from psychotropic medications, however, the benefit to admit her psychiatrically will not change the long term goals of compliance. There is no acute risk for herself and she has no suicide or homicidal ideation. Psychosis is not new and she has been refusing all psychotropic medications in the past for more than 8 years.   Recommendation for social work service/Visiting nurse services.    Recommendations:  If admitted:   - Haldol 1mg po hs.  - If patient becomes agitated consider using haldol 1mg po  q12hrs prn for psychosis. Use IM dose medications if patient refuses po meds.   -Monitor Qtc while on haldol if >500ms please hold all psychotropic medications.   -Continue home dose Lexapro 5mg po am  -Nursing 1:1 for fall, not for psychiatric indication  -No need for inpatient psych admission    If discharged can refer to outpatient psychiatry if willing:  -[address] [place] [address]   [phone]

## 2020-12-05 NOTE — ED BEHAVIORAL HEALTH ASSESSMENT NOTE - AXIS III
diabetes, HTN, chronic LE edema, basal cell carcinoma, and unspecified "thyroid" issues (per chart review removal of thyroid nodule many years ago)
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
## 2020-12-05 NOTE — ED PROVIDER NOTE - NSFOLLOWUPINSTRUCTIONS_ED_ALL_ED_FT
Foot Pain    Many things can cause foot pain. Some common causes are:     An injury.  A sprain.  Arthritis.  Blisters.  Bunions.    HOME CARE INSTRUCTIONS  Pay attention to any changes in your symptoms. Take these actions to help with your discomfort:    If directed, put ice on the affected area:  Put ice in a plastic bag.  Place a towel between your skin and the bag.  Leave the ice on for 15–20 minutes, 3?4 times a day for 2 days.  Take over-the-counter and prescription medicines only as told by your health care provider.  Wear comfortable, supportive shoes that fit you well. Do not wear high heels.  Do not stand or walk for long periods of time.  Do not lift a lot of weight. This can put added pressure on your feet.  Do stretches to relieve foot pain and stiffness as told by your health care provider.  Rub your foot gently.  Keep your feet clean and dry.    SEEK MEDICAL CARE IF:  Your pain does not get better after a few days of self-care.  Your pain gets worse.  You cannot stand on your foot.    SEEK IMMEDIATE MEDICAL CARE IF:  Your foot is numb or tingling.  Your foot or toes are swollen.  Your foot or toes turn white or blue.  You have warmth and redness along your foot.    ADDITIONAL NOTES AND INSTRUCTIONS    Please follow up with your Primary MD in 24-48 hr.  Seek immediate medical care for any new/worsening signs or symptoms.       Schizoaffective Disorder    Schizoaffective disorder (ScAD) is a mental illness. It causes symptoms that are a mixture of schizophrenia (a psychotic disorder) and an affective (mood) disorder. The schizophrenic symptoms may include delusions, hallucinations, or odd behavior. The mood symptoms may be similar to major depression or bipolar disorder. ScAD may interfere with personal relationships or normal daily activities. People with ScAD are at increased risk for job loss, social isolation, physical health problems, anxiety and substance use disorders, and suicide.    ScAD usually occurs in cycles. Periods of severe symptoms are followed by periods of  less severe symptoms or improvement. The illness affects men and women equally but usually appears at an earlier age (teenage or early adult years) in men. People who have family members with schizophrenia, bipolar disorder, or ScAD are at higher risk of developing ScAD.    SYMPTOMS  At any one time, people with ScAD may have psychotic symptoms only or both psychotic and mood symptoms. The psychotic symptoms include one or more of the following:    Hearing, seeing, or feeling things that are not there (hallucinations).    Having fixed, false beliefs (delusions). The delusions usually are of being attacked, harassed, cheated, persecuted, or conspired against (paranoid delusions).  Speaking in a way that makes no sense to others (disorganized speech).    The psychotic symptoms of ScAD may also include confusing or odd behavior or any of the negative symptoms of schizophrenia. These include loss of motivation for normal daily activities, such as bathing or grooming, withdrawal from other people, and lack of emotions.       The mood symptoms of ScAD occur more often than not. They resemble major depressive disorder or bipolar rafy. Symptoms of major depression include depressed mood and four or more of the following:    Loss of interest in usually pleasurable activities (anhedonia).  Sleeping more or less than normal.  Feeling worthless or excessively guilty.  Lack of energy or motivation.  Trouble concentrating.  Eating more or less than usual.  Thinking a lot about death or suicide.    Symptoms of bipolar rafy include abnormally elevated or irritable mood and increased energy or activity, plus three or more of the following:      More confidence than normal or feeling that you are able to do anything (grandiosity).  Feeling rested with less sleep than normal.    Being easily distracted.    Talking more than usual or feeling pressured to keep talking.    Feeling that your thoughts are racing.   Engaging in high-risk activities such as buying sprees or foolish business decisions.    DIAGNOSIS  ScAD is diagnosed through an assessment by your health care provider. Your health care provider will observe and ask questions about your thoughts, behavior, mood, and ability to function in daily life. Your health care provider may also ask questions about your medical history and use of drugs, including prescription medicines. Your health care provider may also order blood tests and imaging exams. Certain medical conditions and substances can cause symptoms that resemble ScAD. Your health care provider may refer you to a mental health specialist for evaluation.     ScAD is divided into two types. The depressive type is diagnosed if your mood symptoms are limited to major depression. The bipolar type is diagnosed if your mood symptoms are manic or a mixture of manic and depressive symptoms    TREATMENT  ScAD is usually a lifelong illness. Long-term treatment is necessary. The following treatments are available:     Medicine. Different types of medicine are used to treat ScAD. The exact combination depends on the type and severity of your symptoms. Antipsychotic medicine is used to control psychotic symptoms such as delusions, paranoia, and hallucinations. Mood stabilizers can even the highs and lows of bipolar manic mood swings. Antidepressant medicines are used to treat major depressive symptoms.   Counseling or talk therapy. Individual, group, or family counseling may be helpful in providing education, support, and guidance. Many people with ScAD also benefit from social skills and job skills (vocational) training.     A combination of medicine and counseling is usually best for managing the disorder over time. A procedure in which electricity is applied to the brain through the scalp (electroconvulsive therapy) may be used to treat people with severe manic symptoms that do not respond to medicine and counseling.    HOME CARE INSTRUCTIONS  Take all your medicine as prescribed.  Check with your health care provider before starting new prescription or over-the-counter medicines.  Keep all follow up appointments with your health care provider.    SEEK MEDICAL CARE IF:  If you are not able to take your medicines as prescribed.  If your symptoms get worse.    SEEK IMMEDIATE MEDICAL CARE IF:  You have serious thoughts about hurting yourself or others.      Suicide Prevention Lifeline Phone:	[phone] ([phone])

## 2021-03-01 ENCOUNTER — EMERGENCY (EMERGENCY)
Facility: HOSPITAL | Age: 81
LOS: 0 days | Discharge: HOME | End: 2021-03-01
Attending: EMERGENCY MEDICINE | Admitting: EMERGENCY MEDICINE
Payer: MEDICARE

## 2021-03-01 VITALS
WEIGHT: 179.9 LBS | RESPIRATION RATE: 18 BRPM | OXYGEN SATURATION: 97 % | SYSTOLIC BLOOD PRESSURE: 210 MMHG | DIASTOLIC BLOOD PRESSURE: 100 MMHG | TEMPERATURE: 99 F | HEART RATE: 106 BPM | HEIGHT: 65 IN

## 2021-03-01 VITALS
HEART RATE: 88 BPM | DIASTOLIC BLOOD PRESSURE: 88 MMHG | OXYGEN SATURATION: 98 % | RESPIRATION RATE: 16 BRPM | SYSTOLIC BLOOD PRESSURE: 168 MMHG | TEMPERATURE: 98 F

## 2021-03-01 DIAGNOSIS — I10 ESSENTIAL (PRIMARY) HYPERTENSION: ICD-10-CM

## 2021-03-01 DIAGNOSIS — Z79.899 OTHER LONG TERM (CURRENT) DRUG THERAPY: ICD-10-CM

## 2021-03-01 DIAGNOSIS — R06.02 SHORTNESS OF BREATH: ICD-10-CM

## 2021-03-01 DIAGNOSIS — F03.90 UNSPECIFIED DEMENTIA, UNSPECIFIED SEVERITY, WITHOUT BEHAVIORAL DISTURBANCE, PSYCHOTIC DISTURBANCE, MOOD DISTURBANCE, AND ANXIETY: ICD-10-CM

## 2021-03-01 DIAGNOSIS — E11.9 TYPE 2 DIABETES MELLITUS WITHOUT COMPLICATIONS: ICD-10-CM

## 2021-03-01 DIAGNOSIS — F20.9 SCHIZOPHRENIA, UNSPECIFIED: ICD-10-CM

## 2021-03-01 LAB
ALBUMIN SERPL ELPH-MCNC: 3.9 G/DL — SIGNIFICANT CHANGE UP (ref 3.5–5.2)
ALP SERPL-CCNC: 102 U/L — SIGNIFICANT CHANGE UP (ref 30–115)
ALT FLD-CCNC: 7 U/L — SIGNIFICANT CHANGE UP (ref 0–41)
ANION GAP SERPL CALC-SCNC: 15 MMOL/L — HIGH (ref 7–14)
APTT BLD: 22.5 SEC — CRITICAL LOW (ref 27–39.2)
AST SERPL-CCNC: 15 U/L — SIGNIFICANT CHANGE UP (ref 0–41)
BASOPHILS # BLD AUTO: 0.04 K/UL — SIGNIFICANT CHANGE UP (ref 0–0.2)
BASOPHILS NFR BLD AUTO: 0.3 % — SIGNIFICANT CHANGE UP (ref 0–1)
BILIRUB SERPL-MCNC: 0.3 MG/DL — SIGNIFICANT CHANGE UP (ref 0.2–1.2)
BUN SERPL-MCNC: 20 MG/DL — SIGNIFICANT CHANGE UP (ref 10–20)
CALCIUM SERPL-MCNC: 10.3 MG/DL — HIGH (ref 8.5–10.1)
CHLORIDE SERPL-SCNC: 102 MMOL/L — SIGNIFICANT CHANGE UP (ref 98–110)
CO2 SERPL-SCNC: 20 MMOL/L — SIGNIFICANT CHANGE UP (ref 17–32)
CREAT SERPL-MCNC: 1.2 MG/DL — SIGNIFICANT CHANGE UP (ref 0.7–1.5)
D DIMER BLD IA.RAPID-MCNC: 275 NG/ML DDU — HIGH (ref 0–230)
EOSINOPHIL # BLD AUTO: 0.1 K/UL — SIGNIFICANT CHANGE UP (ref 0–0.7)
EOSINOPHIL NFR BLD AUTO: 0.7 % — SIGNIFICANT CHANGE UP (ref 0–8)
GLUCOSE SERPL-MCNC: 126 MG/DL — HIGH (ref 70–99)
HCT VFR BLD CALC: 34.9 % — LOW (ref 37–47)
HGB BLD-MCNC: 10.9 G/DL — LOW (ref 12–16)
IMM GRANULOCYTES NFR BLD AUTO: 0.1 % — SIGNIFICANT CHANGE UP (ref 0.1–0.3)
INR BLD: 0.97 RATIO — SIGNIFICANT CHANGE UP (ref 0.65–1.3)
LYMPHOCYTES # BLD AUTO: 1.05 K/UL — LOW (ref 1.2–3.4)
LYMPHOCYTES # BLD AUTO: 7.9 % — LOW (ref 20.5–51.1)
MAGNESIUM SERPL-MCNC: 2 MG/DL — SIGNIFICANT CHANGE UP (ref 1.8–2.4)
MCHC RBC-ENTMCNC: 28.5 PG — SIGNIFICANT CHANGE UP (ref 27–31)
MCHC RBC-ENTMCNC: 31.2 G/DL — LOW (ref 32–37)
MCV RBC AUTO: 91.1 FL — SIGNIFICANT CHANGE UP (ref 81–99)
MONOCYTES # BLD AUTO: 0.78 K/UL — HIGH (ref 0.1–0.6)
MONOCYTES NFR BLD AUTO: 5.8 % — SIGNIFICANT CHANGE UP (ref 1.7–9.3)
NEUTROPHILS # BLD AUTO: 11.36 K/UL — HIGH (ref 1.4–6.5)
NEUTROPHILS NFR BLD AUTO: 85.2 % — HIGH (ref 42.2–75.2)
NRBC # BLD: 0 /100 WBCS — SIGNIFICANT CHANGE UP (ref 0–0)
NT-PROBNP SERPL-SCNC: 921 PG/ML — HIGH (ref 0–300)
PLATELET # BLD AUTO: 328 K/UL — SIGNIFICANT CHANGE UP (ref 130–400)
POTASSIUM SERPL-MCNC: 4.1 MMOL/L — SIGNIFICANT CHANGE UP (ref 3.5–5)
POTASSIUM SERPL-SCNC: 4.1 MMOL/L — SIGNIFICANT CHANGE UP (ref 3.5–5)
PROT SERPL-MCNC: 7.5 G/DL — SIGNIFICANT CHANGE UP (ref 6–8)
PROTHROM AB SERPL-ACNC: 11.1 SEC — SIGNIFICANT CHANGE UP (ref 9.95–12.87)
RBC # BLD: 3.83 M/UL — LOW (ref 4.2–5.4)
RBC # FLD: 13.8 % — SIGNIFICANT CHANGE UP (ref 11.5–14.5)
SODIUM SERPL-SCNC: 137 MMOL/L — SIGNIFICANT CHANGE UP (ref 135–146)
TROPONIN T SERPL-MCNC: 0.01 NG/ML — SIGNIFICANT CHANGE UP
WBC # BLD: 13.35 K/UL — HIGH (ref 4.8–10.8)
WBC # FLD AUTO: 13.35 K/UL — HIGH (ref 4.8–10.8)

## 2021-03-01 PROCEDURE — 71045 X-RAY EXAM CHEST 1 VIEW: CPT | Mod: 26

## 2021-03-01 PROCEDURE — 71275 CT ANGIOGRAPHY CHEST: CPT | Mod: 26

## 2021-03-01 PROCEDURE — 93010 ELECTROCARDIOGRAM REPORT: CPT

## 2021-03-01 PROCEDURE — 99285 EMERGENCY DEPT VISIT HI MDM: CPT

## 2021-03-01 NOTE — ED PROVIDER NOTE - NS ED ROS FT
Review of Systems  Constitutional:  No fever, chills.  Eyes:  No visual changes, eye pain, or discharge.  ENMT:  No hearing changes, pain, or discharge. No nasal congestion, discharge, or bleeding. No throat pain, swelling, or difficulty swallowing.  Cardiac:  No chest pain, palpitations, syncope.  Respiratory:  No cough. No hemoptysis. (+) SOB  GI:  No nausea, vomiting, diarrhea, or abdominal pain.   :  No dysuria, hematuria, frequency, or burning.   MS:  No back pain.  Skin:  No skin rash, pruritis, jaundice, or lesions.  Neuro:  No headache, dizziness, loss of sensation, or focal weakness.  No change in mental status.

## 2021-03-01 NOTE — ED PROVIDER NOTE - PHYSICAL EXAMINATION
VITAL SIGNS: I have reviewed nursing notes and confirm.  CONSTITUTIONAL: Elderly female laying on stretcher in NAD.   SKIN: Skin exam is warm and dry, no acute rash.  HEAD: Normocephalic; atraumatic.  EYES: PERRL, EOM intact; conjunctiva and sclera clear.  ENT: No nasal discharge; airway clear  CARD: S1, S2 normal; no murmurs, gallops, or rubs. Regular rate and rhythm.  RESP: No wheezes, rales or rhonchi. Speaking in full sentences.   ABD: Normal bowel sounds; soft; non-distended; non-tender; No rebound or guarding. No CVA tenderness.  EXT: Normal ROM. No clubbing, cyanosis. 1+ B/L LE edema.  NEURO: Alert, oriented. Grossly unremarkable. No focal deficits.

## 2021-03-01 NOTE — ED PROVIDER NOTE - OBJECTIVE STATEMENT
79 yo F with PMHx of HTN, DM, mild dementia, basal cell carcinoma of face s/p excision, and schizoaffective disorder presents to the ED c/o mild SOB. Pt states symptoms started after she drank some almond milk and had a yogurt. Pt states her symptoms have improved since onset. Pt has hx of frequently calling EMS with fake complaints, today stated her neighbor defecated in her hallway. Spoke with pts son--states this did not happen. Pt denies other complaints, requesting to just rest. Pt denies fever, chills, nausea, vomiting, abdominal pain, diarrhea, headache, dizziness, weakness, chest pain, back pain, LOC, trauma, urinary symptoms, cough, calf pain, recent travel, recent surgery.

## 2021-03-01 NOTE — ED PROVIDER NOTE - PATIENT PORTAL LINK FT
You can access the FollowMyHealth Patient Portal offered by [facility] by registering at the following website: [email or web address]. By joining [employer]’s FollowMyHealth portal, you will also be able to view your health information using other applications (apps) compatible with our system.

## 2021-03-01 NOTE — ED PROVIDER NOTE - ATTENDING CONTRIBUTION TO CARE
80yF BIBEMS for SOB.  Pt reportedly called EMS for issue with her neighbors, but also c/o SOB.  No fever, cough or chest pain.    Pt resting comfortably - no tachypnea, inc WOB or resp distress

## 2021-03-01 NOTE — ED PROVIDER NOTE - PROGRESS NOTE DETAILS
Spoke with pts son Joaquín regarding all results and plan to d/c pt home. Instructed to follow-up with PMD. Return precautions provided. He will  pt in ED and is comfortable with dc.

## 2021-03-01 NOTE — ED ADULT TRIAGE NOTE - CHIEF COMPLAINT QUOTE
BIBA pt called 911 because her neighbor defecated in the hallway, pt also c/o shortness of breath tonight.

## 2021-03-01 NOTE — ED PROVIDER NOTE - CARE PROVIDER_API CALL
Austyn Alejandro)  Family Medicine  [address] [place] [address]  Phone: [phone]  Fax: [phone]  Follow Up Time: 1-3 Days

## 2021-03-01 NOTE — ED PROVIDER NOTE - CLINICAL SUMMARY MEDICAL DECISION MAKING FREE TEXT BOX
80yF HTN DM dementia basal cell cancer schizoaffective p/w mild/transient SOB.  Pt well appearing, w/o hypoxia or resp distress.  CXR w/o ptx or pna.  EKG w/o ischemia or arrhythmia.  Labs reassuring, including neg trop x 1, though w/ elevated d-dimer.  CTA chest w/o acute pathology including no PE, but w/ thyroid nodule requiring f/u  D/w pt - plan for supportive care, o/p endo f/u, return precautions.

## 2021-03-21 ENCOUNTER — INPATIENT (INPATIENT)
Facility: HOSPITAL | Age: 81
LOS: 3 days | Discharge: NURSING, REHAB OR LONG-TERM CARE | End: 2021-03-25
Attending: HOSPITALIST | Admitting: HOSPITALIST
Payer: MEDICARE

## 2021-03-21 VITALS
SYSTOLIC BLOOD PRESSURE: 198 MMHG | WEIGHT: 199.96 LBS | HEART RATE: 68 BPM | DIASTOLIC BLOOD PRESSURE: 79 MMHG | HEIGHT: 65 IN | OXYGEN SATURATION: 99 % | RESPIRATION RATE: 20 BRPM | TEMPERATURE: 97 F

## 2021-03-21 LAB
ALBUMIN SERPL ELPH-MCNC: 3.8 G/DL — SIGNIFICANT CHANGE UP (ref 3.5–5.2)
ALP SERPL-CCNC: 102 U/L — SIGNIFICANT CHANGE UP (ref 30–115)
ALT FLD-CCNC: 8 U/L — SIGNIFICANT CHANGE UP (ref 0–41)
ANION GAP SERPL CALC-SCNC: 11 MMOL/L — SIGNIFICANT CHANGE UP (ref 7–14)
APPEARANCE UR: CLEAR — SIGNIFICANT CHANGE UP
AST SERPL-CCNC: 15 U/L — SIGNIFICANT CHANGE UP (ref 0–41)
BASOPHILS # BLD AUTO: 0.08 K/UL — SIGNIFICANT CHANGE UP (ref 0–0.2)
BASOPHILS NFR BLD AUTO: 1.3 % — HIGH (ref 0–1)
BILIRUB SERPL-MCNC: <0.2 MG/DL — SIGNIFICANT CHANGE UP (ref 0.2–1.2)
BILIRUB UR-MCNC: NEGATIVE — SIGNIFICANT CHANGE UP
BUN SERPL-MCNC: 21 MG/DL — HIGH (ref 10–20)
CALCIUM SERPL-MCNC: 10 MG/DL — SIGNIFICANT CHANGE UP (ref 8.5–10.1)
CHLORIDE SERPL-SCNC: 106 MMOL/L — SIGNIFICANT CHANGE UP (ref 98–110)
CO2 SERPL-SCNC: 24 MMOL/L — SIGNIFICANT CHANGE UP (ref 17–32)
COLOR SPEC: SIGNIFICANT CHANGE UP
CREAT SERPL-MCNC: 1.3 MG/DL — SIGNIFICANT CHANGE UP (ref 0.7–1.5)
DIFF PNL FLD: NEGATIVE — SIGNIFICANT CHANGE UP
EOSINOPHIL # BLD AUTO: 0.25 K/UL — SIGNIFICANT CHANGE UP (ref 0–0.7)
EOSINOPHIL NFR BLD AUTO: 4.1 % — SIGNIFICANT CHANGE UP (ref 0–8)
GLUCOSE SERPL-MCNC: 89 MG/DL — SIGNIFICANT CHANGE UP (ref 70–99)
GLUCOSE UR QL: NEGATIVE — SIGNIFICANT CHANGE UP
HCT VFR BLD CALC: 32.6 % — LOW (ref 37–47)
HGB BLD-MCNC: 10 G/DL — LOW (ref 12–16)
IMM GRANULOCYTES NFR BLD AUTO: 0.2 % — SIGNIFICANT CHANGE UP (ref 0.1–0.3)
KETONES UR-MCNC: NEGATIVE — SIGNIFICANT CHANGE UP
LACTATE SERPL-SCNC: 1 MMOL/L — SIGNIFICANT CHANGE UP (ref 0.7–2)
LEUKOCYTE ESTERASE UR-ACNC: NEGATIVE — SIGNIFICANT CHANGE UP
LYMPHOCYTES # BLD AUTO: 1.37 K/UL — SIGNIFICANT CHANGE UP (ref 1.2–3.4)
LYMPHOCYTES # BLD AUTO: 22.6 % — SIGNIFICANT CHANGE UP (ref 20.5–51.1)
MCHC RBC-ENTMCNC: 28.3 PG — SIGNIFICANT CHANGE UP (ref 27–31)
MCHC RBC-ENTMCNC: 30.7 G/DL — LOW (ref 32–37)
MCV RBC AUTO: 92.4 FL — SIGNIFICANT CHANGE UP (ref 81–99)
MONOCYTES # BLD AUTO: 0.64 K/UL — HIGH (ref 0.1–0.6)
MONOCYTES NFR BLD AUTO: 10.6 % — HIGH (ref 1.7–9.3)
NEUTROPHILS # BLD AUTO: 3.71 K/UL — SIGNIFICANT CHANGE UP (ref 1.4–6.5)
NEUTROPHILS NFR BLD AUTO: 61.2 % — SIGNIFICANT CHANGE UP (ref 42.2–75.2)
NITRITE UR-MCNC: NEGATIVE — SIGNIFICANT CHANGE UP
NRBC # BLD: 0 /100 WBCS — SIGNIFICANT CHANGE UP (ref 0–0)
NT-PROBNP SERPL-SCNC: 585 PG/ML — HIGH (ref 0–300)
PH UR: 6 — SIGNIFICANT CHANGE UP (ref 5–8)
PLATELET # BLD AUTO: 401 K/UL — HIGH (ref 130–400)
POTASSIUM SERPL-MCNC: 4.9 MMOL/L — SIGNIFICANT CHANGE UP (ref 3.5–5)
POTASSIUM SERPL-SCNC: 4.9 MMOL/L — SIGNIFICANT CHANGE UP (ref 3.5–5)
PROT SERPL-MCNC: 7.4 G/DL — SIGNIFICANT CHANGE UP (ref 6–8)
PROT UR-MCNC: NEGATIVE — SIGNIFICANT CHANGE UP
RBC # BLD: 3.53 M/UL — LOW (ref 4.2–5.4)
RBC # FLD: 13.9 % — SIGNIFICANT CHANGE UP (ref 11.5–14.5)
SARS-COV-2 RNA SPEC QL NAA+PROBE: SIGNIFICANT CHANGE UP
SODIUM SERPL-SCNC: 141 MMOL/L — SIGNIFICANT CHANGE UP (ref 135–146)
SP GR SPEC: 1.01 — SIGNIFICANT CHANGE UP (ref 1.01–1.03)
TROPONIN T SERPL-MCNC: <0.01 NG/ML — SIGNIFICANT CHANGE UP
UROBILINOGEN FLD QL: SIGNIFICANT CHANGE UP
WBC # BLD: 6.06 K/UL — SIGNIFICANT CHANGE UP (ref 4.8–10.8)
WBC # FLD AUTO: 6.06 K/UL — SIGNIFICANT CHANGE UP (ref 4.8–10.8)

## 2021-03-21 PROCEDURE — 99223 1ST HOSP IP/OBS HIGH 75: CPT | Mod: AI

## 2021-03-21 PROCEDURE — 71045 X-RAY EXAM CHEST 1 VIEW: CPT | Mod: 26

## 2021-03-21 PROCEDURE — 99285 EMERGENCY DEPT VISIT HI MDM: CPT

## 2021-03-21 PROCEDURE — 93970 EXTREMITY STUDY: CPT | Mod: 26

## 2021-03-21 PROCEDURE — 93010 ELECTROCARDIOGRAM REPORT: CPT

## 2021-03-21 RX ORDER — ESCITALOPRAM OXALATE 10 MG/1
5 TABLET, FILM COATED ORAL DAILY
Refills: 0 | Status: DISCONTINUED | OUTPATIENT
Start: 2021-03-21 | End: 2021-03-25

## 2021-03-21 RX ORDER — HEPARIN SODIUM 5000 [USP'U]/ML
5000 INJECTION INTRAVENOUS; SUBCUTANEOUS EVERY 12 HOURS
Refills: 0 | Status: DISCONTINUED | OUTPATIENT
Start: 2021-03-21 | End: 2021-03-25

## 2021-03-21 RX ORDER — LOSARTAN POTASSIUM 100 MG/1
25 TABLET, FILM COATED ORAL ONCE
Refills: 0 | Status: COMPLETED | OUTPATIENT
Start: 2021-03-21 | End: 2021-03-21

## 2021-03-21 RX ORDER — FUROSEMIDE 40 MG
20 TABLET ORAL ONCE
Refills: 0 | Status: COMPLETED | OUTPATIENT
Start: 2021-03-21 | End: 2021-03-21

## 2021-03-21 RX ORDER — HALOPERIDOL DECANOATE 100 MG/ML
1 INJECTION INTRAMUSCULAR EVERY 12 HOURS
Refills: 0 | Status: DISCONTINUED | OUTPATIENT
Start: 2021-03-21 | End: 2021-03-25

## 2021-03-21 RX ORDER — LOSARTAN POTASSIUM 100 MG/1
50 TABLET, FILM COATED ORAL DAILY
Refills: 0 | Status: DISCONTINUED | OUTPATIENT
Start: 2021-03-21 | End: 2021-03-25

## 2021-03-21 RX ORDER — PREGABALIN 225 MG/1
1000 CAPSULE ORAL DAILY
Refills: 0 | Status: DISCONTINUED | OUTPATIENT
Start: 2021-03-21 | End: 2021-03-25

## 2021-03-21 RX ADMIN — Medication 20 MILLIGRAM(S): at 23:12

## 2021-03-21 RX ADMIN — LOSARTAN POTASSIUM 25 MILLIGRAM(S): 100 TABLET, FILM COATED ORAL at 21:21

## 2021-03-21 NOTE — ED ADULT NURSE NOTE - NSIMPLEMENTINTERV_GEN_ALL_ED
Implemented All Fall with Harm Risk Interventions:  [place] to call system. Call bell, personal items and telephone within reach. Instruct patient to call for assistance. Room bathroom lighting operational. Non-slip footwear when patient is off stretcher. Physically safe environment: no spills, clutter or unnecessary equipment. Stretcher in lowest position, wheels locked, appropriate side rails in place. Provide visual cue, wrist band, yellow gown, etc. Monitor gait and stability. Monitor for mental status changes and reorient to person, place, and time. Review medications for side effects contributing to fall risk. Reinforce activity limits and safety measures with patient and family. Provide visual clues: red socks.

## 2021-03-21 NOTE — ED ADULT NURSE NOTE - OBJECTIVE STATEMENT
Patient states " I had such a bad [place] horse before arrival and also I had weakness all day and I couldn't get up." B/l lower extremity redness and swelling noted.

## 2021-03-21 NOTE — ED PROVIDER NOTE - CARE PLAN
Principal Discharge DX:	Edema  Secondary Diagnosis:	Ambulatory dysfunction  Secondary Diagnosis:	Schizoaffective disorder

## 2021-03-21 NOTE — H&P ADULT - NSHPREVIEWOFSYSTEMS_GEN_ALL_CORE
CONSTITUTIONAL: No weakness, fevers or chills; No headaches  EYES: No visual changes, eye pain, or discharge  ENT: No vertigo; No ear pain or change in hearing; No sore throat or difficulty swallowing  NECK: No pain or stiffness  RESPIRATORY: No cough, wheezing, or hemoptysis; No shortness of breath  CARDIOVASCULAR: No chest pain or palpitations  GASTROINTESTINAL: No abdominal or epigastric pain; No nausea, vomiting, or hematemesis; No diarrhea or constipation; No melena or hematochezia  GENITOURINARY: No dysuria, frequency or hematuria  MUSCULOSKELETAL: No joint pain, no muscle pain, no weakness  NEUROLOGICAL: No numbness or weakness  SKIN: No itching or rashes CONSTITUTIONAL: No weakness, fevers or chills; No headaches  EYES: No visual changes, eye pain, or discharge  ENT: No vertigo; No ear pain or change in hearing; No sore throat or difficulty swallowing  NECK: No pain or stiffness  RESPIRATORY: No cough, wheezing, or hemoptysis; No shortness of breath  CARDIOVASCULAR: No chest pain or palpitations  GASTROINTESTINAL: No abdominal or epigastric pain; No nausea, vomiting, or hematemesis; No diarrhea or constipation; No melena or hematochezia  GENITOURINARY: No dysuria, frequency or hematuria  MUSCULOSKELETAL: No joint pain, no muscle pain, no weakness  NEUROLOGICAL: No numbness or weakness  SKIN: bleeding from left side of face

## 2021-03-21 NOTE — H&P ADULT - HISTORY OF PRESENT ILLNESS
81 YO F with PMHx of HTN, DM type II, schizoaffective disorder, chronic LE edema, basal cell carcinoma of Left side of face s/p excision was sent in by family for worsening LE swelling and difficulty in taking care of her at home.   Patient normally ambulates well without assistance but is now having difficulty walking due to worsening swelling in legs. Family also reported that patient is non-compliant with doctors appointments and refuses to go. She also refuses to take her psych medication and is having worsening delusions and states that neighbors are coming into house stealing clothes and cutting her face.   Basal cell carcinoma on left cheek has been bleeding for almost 3 months now.  In the ED VS were significant for a BP of 198/79 mmHg.      79 YO F with PMHx of HTN, DM type II, schizoaffective disorder, chronic LE edema, basal cell carcinoma of Left side of face s/p excision was sent in by family for worsening LE swelling and difficulty in taking care of her at home.   Collateral history obtained from the son over the phone. Patient normally ambulates well without assistance but is now having difficulty walking due to worsening swelling in LE. Family also reported that patient is non-compliant with doctors appointments and refuses to go. She also refuses to take her psych medication and is having worsening delusions and states that neighbors are coming into house stealing clothes and cutting her face.   Basal cell carcinoma on left cheek has been bleeding for almost 3 months now.  In the ED VS were significant for a BP of 198/79 mmHg.

## 2021-03-21 NOTE — H&P ADULT - ATTENDING COMMENTS
Hx and physical limited due to pt being a poor historian. Supplemental information obtained from family, house staff, and EMR    79 YO F with a PMH of obesity, HTN, DM2, HFpEF, schizoaffective disorder, chronic LE edema, and basal cell carcinoma of Left side of face s/p excision was sent in by family for worsening LE swelling and difficulty in taking care of her at home. As per family, pt has difficulty ambulating due to swelling of the B/L LEs. the pt is also not taking her meds and has missed multiple doctor appointments.     Physical exam shows obese pt in NAD. VSS, afebrile, not hypoxic on RA. A&Ox3. Left ear with dried blood noted. Non-focal neuro exam. Muscle strength/sensation intact. CTA B/L with no W/C/R. RRR, no M/G/R. ABD is soft and non-tender, normoactive BSs. B/L LEs with 1+ pitting edema, there is a circumferential area on the mid shin on both LEs with redness and warmth; pulses/sensation intact B/L. the Right dorsum of the foot has a lipoma present. Labs and radiology as above.     B/L LE venous stasis dermatitis, suspect from poor medication compliance; no sepsis present on admission. IV Lasix and transition to PO. Elevation of LEs. Compression wraps with ACE bandages. Topical CSs. PRN pain meds. Wash LEs daily with soap and water. FU official LE doppler results. On discharge, give pt an Rx for compression stockings and vascular Out-pt FU. PT consult     Normocytic anemia, at baseline. Pt denies bleeding symptoms. Send anemia work-up. Replace as necessary.     Hx of schizoaffective disorder. Psych consult. Pt denies taking any psych meds but is supposed to be on Haldol/Escitalopram as per last notes.     Hx of obesity, HTN, DM2, HFpEF, chronic LE edema, and basal cell carcinoma of Left side of face s/p excision. Restart home meds, except as stated above. DVT PPX. Inform PCP of pt's admission to hospital. My note supersedes the residents note.

## 2021-03-21 NOTE — H&P ADULT - ASSESSMENT
79 YO F with PMHx of HTN, DM type II, schizoaffective disorder, chronic LE edema, basal cell carcinoma of Left side of face s/p excision was sent in by family for worsening LE swelling and difficulty in taking care of her at home.     #Chronic LE swelling with venous stasis dermatitis   - On Lasix 20mg PO QD at home   - . Troponin negative x 1  - Dr Landon Hannahheidy is her son-in-law and prescribed her lasix  - Will give 20mg IV lasix and start home dose of 20mg PO QD from tomorrow   - ACE wraps and topical steroids     #TWI on EKG  - New TWI in precordial leads and II III and aVF  - Not present on EKG performed 20 days ago  - Denies any active chest pain  - Troponin trend: <0.01 (21 Mar 2021 16:36). Follow repeat troponin in AM  - Admit to telemetry    #Schizoaffective disorder  - Patient non-compliant with psych meds and physician follow ups   - As per notes from previous admission will start Lexapro 5mg PO QHS and Haldol 1mg PO BID  - Follow psych consult. Currently AAO x 3 and denies any hallucinations     #Basal cell carcinoma s/p excision   - History of intermittent bleeding as per the family. Current bleeding noted   - Follow dermatology eval     #Right foot lipoma  - Evaluated by podiatry on previous admissions; Aspiration attempted, but no fluid produced. Outpatient follow up recommended     #HTN - Losartan 50mg PO QD    #Prediabetes   - Last HbA1C 5.9 % [05-29-20]   - Follow repeat HbA1C in AM  - Carb consistent diet     #Misc  - DVT Prophylaxis: Lovenox   - Diet: DASH/TLC, carb consistent   - GI Prophylaxis: Not indicated   - Activity: AAT  - Code Status: Full code    Dispo: Admit to medicine    79 YO F with PMHx of HTN, DM type II, schizoaffective disorder, chronic LE edema, basal cell carcinoma of Left side of face s/p excision was sent in by family for worsening LE swelling and difficulty in taking care of her at home.     #Chronic LE swelling with venous stasis dermatitis   - On Lasix 20mg PO QD at home   - . Troponin negative x 1  - Follow LE duplex; pending official read  - Dr Dipesh Razo is her son-in-law and prescribed her lasix  - Will give 20mg IV lasix and start home dose of 20mg PO QD from tomorrow   - ACE wraps and topical steroids     #TWI on EKG  - New TWI in precordial leads and II III and aVF  - Not present on EKG performed 20 days ago  - Denies any active chest pain  - Troponin trend: <0.01 (21 Mar 2021 16:36). Follow repeat troponin in AM  - Admit to telemetry  - Cardio consult. Dr Jimenez     #Schizoaffective disorder  - Patient non-compliant with psych meds and physician follow ups   - As per notes from previous admission will start Lexapro 5mg PO QHS and Haldol 1mg PO BID  - Follow psych consult. Currently AAO x 3 and denies any hallucinations     #Basal cell carcinoma s/p excision   - History of intermittent bleeding as per the family. Current bleeding noted   - Follow dermatology eval     #Right foot lipoma  - Evaluated by podiatry on previous admissions; Aspiration attempted, but no fluid produced. Outpatient follow up recommended     #HTN - Losartan 50mg PO QD    #Prediabetes   - Last HbA1C 5.9 % [05-29-20]   - Follow repeat HbA1C in AM  - Carb consistent diet     #Misc  - DVT Prophylaxis: Heparin   - Diet: DASH/TLC, carb consistent   - GI Prophylaxis: Not indicated   - Activity: AAT  - Code Status: Full code    Dispo: Admit to medicine

## 2021-03-21 NOTE — H&P ADULT - NSHPPHYSICALEXAM_GEN_ALL_CORE
CONSTITUTIONAL: No acute distress, well-developed, well-groomed, AAOx3  HEAD: Atraumatic, normocephalic  EYES: EOM intact, PERRLA, conjunctiva and sclera clear  ENT: Supple, no masses, no thyromegaly, no bruits, no JVD; moist mucous membranes  PULMONARY: Clear to auscultation bilaterally; no wheezes, rales, or rhonchi  CARDIOVASCULAR: Regular rate and rhythm; no murmurs, rubs, or gallops  GASTROINTESTINAL: Soft, non-tender, non-distended; bowel sounds present  MUSCULOSKELETAL: 2+ peripheral pulses; no clubbing, no cyanosis, no edema  NEUROLOGY: non-focal  SKIN: No rashes or lesions; warm and dry CONSTITUTIONAL: No acute distress, well-developed, well-groomed, AAOx3  HEAD: Atraumatic, normocephalic  EYES: EOM intact, PERRLA, conjunctiva and sclera clear  ENT: Supple, no masses, no thyromegaly, no bruits, no JVD; moist mucous membranes  PULMONARY: Basal crackles   CARDIOVASCULAR: Regular rate and rhythm; no murmurs, rubs, or gallops  GASTROINTESTINAL: Soft, non-tender, non-distended; bowel sounds present  MUSCULOSKELETAL: b/l LE swelling. R foot lipoma   NEUROLOGY: non-focal  SKIN: Bleeding from L side of face. b/l LE erythema

## 2021-03-21 NOTE — ED PROVIDER NOTE - CLINICAL SUMMARY MEDICAL DECISION MAKING FREE TEXT BOX
80F with pmh dementia, schizoaffective d/o, htn, t2dm, BCC of L face s/p excision who presents to [facility] for multiple issues; She has been non-commpliant with medication and having more difficulty ambulating 2/2 LE edema. She refuses to see her PCP and has been having paranoid delusions that her neighbors are cutting her face and entering her home and stealing her clothes. She has not been able to f/u with psych. Deny CP, sob, n/v/d/f/c. EKG, labs reviewed, EKG with new TWI in the inferolateral leads. Trop neg x1. Pt admitted for further mgmt.

## 2021-03-21 NOTE — ED PROVIDER NOTE - OBJECTIVE STATEMENT
80 year old F with hx HTN, DM, mild dementia, schizoaffective d/o, BCC of L side of face s/p excision sent in by family for evaluation. As per son in law family is having difficulty caring for pt at home. Pt normally ambulates well without assistance but now having difficulty walking due to swelling in legs. Sts has been unable to take pt to any doctors appointments because pt refuses to go. Sts BCC on L cheek has been bleeding > 3 months now. Pt having worsening deulsions, sts neighbors are coming into house stealing clothes/and cutting her face. Pt has not been able to follow with regular psych. As per family pt non compliant with meds. Limited hx from pt due to dementia/schizoaffective.

## 2021-03-21 NOTE — ED PROVIDER NOTE - PHYSICAL EXAMINATION
CONSTITUTIONAL: Well-appearing; well-nourished; in no apparent distress.   EYES: PERRL; EOM intact.   ENT: normal nose; no rhinorrhea; normal pharynx with no tonsillar hypertrophy.   NECK: Supple; non-tender; no cervical lymphadenopathy.   CARDIOVASCULAR: Normal S1, S2; no murmurs, rubs, or gallops.   RESPIRATORY: Normal chest excursion with respiration; breath sounds clear and equal bilaterally; no wheezes, rhonchi, or rales.  GI/: Normal bowel sounds; non-distended; non-tender; no palpable organomegaly.   MS: No evidence of trauma or deformity. Normal ROM in all four extremities; non-tender to palpation;   Extrem: + b/l peripheral pitting edema. no calf ttp  SKIN: + black lesion noted to L earlobe extending 3cm to L anterior ear. No active bleeding noted  NEURO/PSYCH: A & O x 3; grossly unremarkable. mood and manner are appropriate. No focal deficits. Pt following commands well.

## 2021-03-22 DIAGNOSIS — F25.9 SCHIZOAFFECTIVE DISORDER, UNSPECIFIED: ICD-10-CM

## 2021-03-22 DIAGNOSIS — R41.0 DISORIENTATION, UNSPECIFIED: ICD-10-CM

## 2021-03-22 LAB
A1C WITH ESTIMATED AVERAGE GLUCOSE RESULT: 6.2 % — HIGH (ref 4–5.6)
ALBUMIN SERPL ELPH-MCNC: 3.7 G/DL — SIGNIFICANT CHANGE UP (ref 3.5–5.2)
ALP SERPL-CCNC: 104 U/L — SIGNIFICANT CHANGE UP (ref 30–115)
ALT FLD-CCNC: 7 U/L — SIGNIFICANT CHANGE UP (ref 0–41)
ANION GAP SERPL CALC-SCNC: 9 MMOL/L — SIGNIFICANT CHANGE UP (ref 7–14)
AST SERPL-CCNC: 13 U/L — SIGNIFICANT CHANGE UP (ref 0–41)
BASOPHILS # BLD AUTO: 0.08 K/UL — SIGNIFICANT CHANGE UP (ref 0–0.2)
BASOPHILS NFR BLD AUTO: 1.4 % — HIGH (ref 0–1)
BILIRUB SERPL-MCNC: 0.3 MG/DL — SIGNIFICANT CHANGE UP (ref 0.2–1.2)
BUN SERPL-MCNC: 18 MG/DL — SIGNIFICANT CHANGE UP (ref 10–20)
CALCIUM SERPL-MCNC: 10.5 MG/DL — HIGH (ref 8.5–10.1)
CHLORIDE SERPL-SCNC: 107 MMOL/L — SIGNIFICANT CHANGE UP (ref 98–110)
CHOLEST SERPL-MCNC: 195 MG/DL — SIGNIFICANT CHANGE UP
CO2 SERPL-SCNC: 26 MMOL/L — SIGNIFICANT CHANGE UP (ref 17–32)
CREAT SERPL-MCNC: 1.1 MG/DL — SIGNIFICANT CHANGE UP (ref 0.7–1.5)
EOSINOPHIL # BLD AUTO: 0.37 K/UL — SIGNIFICANT CHANGE UP (ref 0–0.7)
EOSINOPHIL NFR BLD AUTO: 6.7 % — SIGNIFICANT CHANGE UP (ref 0–8)
ESTIMATED AVERAGE GLUCOSE: 131 MG/DL — HIGH (ref 68–114)
GLUCOSE BLDC GLUCOMTR-MCNC: 102 MG/DL — HIGH (ref 70–99)
GLUCOSE BLDC GLUCOMTR-MCNC: 110 MG/DL — HIGH (ref 70–99)
GLUCOSE SERPL-MCNC: 99 MG/DL — SIGNIFICANT CHANGE UP (ref 70–99)
HCT VFR BLD CALC: 34.9 % — LOW (ref 37–47)
HDLC SERPL-MCNC: 52 MG/DL — SIGNIFICANT CHANGE UP
HGB BLD-MCNC: 10.6 G/DL — LOW (ref 12–16)
IMM GRANULOCYTES NFR BLD AUTO: 0.4 % — HIGH (ref 0.1–0.3)
LIPID PNL WITH DIRECT LDL SERPL: 129 MG/DL — HIGH
LYMPHOCYTES # BLD AUTO: 1.59 K/UL — SIGNIFICANT CHANGE UP (ref 1.2–3.4)
LYMPHOCYTES # BLD AUTO: 28.6 % — SIGNIFICANT CHANGE UP (ref 20.5–51.1)
MAGNESIUM SERPL-MCNC: 2.2 MG/DL — SIGNIFICANT CHANGE UP (ref 1.8–2.4)
MCHC RBC-ENTMCNC: 28.6 PG — SIGNIFICANT CHANGE UP (ref 27–31)
MCHC RBC-ENTMCNC: 30.4 G/DL — LOW (ref 32–37)
MCV RBC AUTO: 94.1 FL — SIGNIFICANT CHANGE UP (ref 81–99)
MONOCYTES # BLD AUTO: 0.57 K/UL — SIGNIFICANT CHANGE UP (ref 0.1–0.6)
MONOCYTES NFR BLD AUTO: 10.3 % — HIGH (ref 1.7–9.3)
NEUTROPHILS # BLD AUTO: 2.92 K/UL — SIGNIFICANT CHANGE UP (ref 1.4–6.5)
NEUTROPHILS NFR BLD AUTO: 52.6 % — SIGNIFICANT CHANGE UP (ref 42.2–75.2)
NON HDL CHOLESTEROL: 143 MG/DL — HIGH
NRBC # BLD: 0 /100 WBCS — SIGNIFICANT CHANGE UP (ref 0–0)
PHOSPHATE SERPL-MCNC: 4.1 MG/DL — SIGNIFICANT CHANGE UP (ref 2.1–4.9)
PLATELET # BLD AUTO: 403 K/UL — HIGH (ref 130–400)
POTASSIUM SERPL-MCNC: 5.2 MMOL/L — HIGH (ref 3.5–5)
POTASSIUM SERPL-SCNC: 5.2 MMOL/L — HIGH (ref 3.5–5)
PROT SERPL-MCNC: 7.3 G/DL — SIGNIFICANT CHANGE UP (ref 6–8)
RBC # BLD: 3.71 M/UL — LOW (ref 4.2–5.4)
RBC # FLD: 13.9 % — SIGNIFICANT CHANGE UP (ref 11.5–14.5)
SODIUM SERPL-SCNC: 142 MMOL/L — SIGNIFICANT CHANGE UP (ref 135–146)
TRIGL SERPL-MCNC: 118 MG/DL — SIGNIFICANT CHANGE UP
WBC # BLD: 5.55 K/UL — SIGNIFICANT CHANGE UP (ref 4.8–10.8)
WBC # FLD AUTO: 5.55 K/UL — SIGNIFICANT CHANGE UP (ref 4.8–10.8)

## 2021-03-22 PROCEDURE — 99233 SBSQ HOSP IP/OBS HIGH 50: CPT

## 2021-03-22 RX ADMIN — HALOPERIDOL DECANOATE 1 MILLIGRAM(S): 100 INJECTION INTRAMUSCULAR at 17:40

## 2021-03-22 RX ADMIN — HALOPERIDOL DECANOATE 1 MILLIGRAM(S): 100 INJECTION INTRAMUSCULAR at 05:00

## 2021-03-22 RX ADMIN — Medication 1 APPLICATION(S): at 05:00

## 2021-03-22 RX ADMIN — PREGABALIN 1000 MICROGRAM(S): 225 CAPSULE ORAL at 14:25

## 2021-03-22 RX ADMIN — HEPARIN SODIUM 5000 UNIT(S): 5000 INJECTION INTRAVENOUS; SUBCUTANEOUS at 05:00

## 2021-03-22 RX ADMIN — Medication 1 APPLICATION(S): at 17:40

## 2021-03-22 RX ADMIN — HEPARIN SODIUM 5000 UNIT(S): 5000 INJECTION INTRAVENOUS; SUBCUTANEOUS at 17:39

## 2021-03-22 RX ADMIN — LOSARTAN POTASSIUM 50 MILLIGRAM(S): 100 TABLET, FILM COATED ORAL at 05:00

## 2021-03-22 RX ADMIN — ESCITALOPRAM OXALATE 5 MILLIGRAM(S): 10 TABLET, FILM COATED ORAL at 14:25

## 2021-03-22 NOTE — BEHAVIORAL HEALTH ASSESSMENT NOTE - NSBHCHARTREVIEWLAB_PSY_A_CORE FT
10.6   5.55  )-----------( 403      ( 22 Mar 2021 06:03 )             34.9       03-22    142  |  107  |  18  ----------------------------<  99  5.2<H>   |  26  |  1.1    Ca    10.5<H>      22 Mar 2021 06:03  Phos  4.1     03-22  Mg     2.2     03-22    TPro  7.3  /  Alb  3.7  /  TBili  0.3  /  DBili  x   /  AST  13  /  ALT  7   /  AlkPhos  104  03-22

## 2021-03-22 NOTE — BEHAVIORAL HEALTH ASSESSMENT NOTE - NSBHCHARTREVIEWINVESTIGATE_PSY_A_CORE FT
< from: 12 Lead ECG (03.21.21 @ 15:58) >  Ventricular Rate 58 BPM  Atrial Rate 58 BPM  P-R Interval 182 ms  QRS Duration 114 ms  Q-T Interval 426 ms  QTC Calculation(Bazett) 418 ms  P Axis 61 degrees  R Axis -37 degrees  T Axis -24 degrees    Diagnosis Line Sinus bradycardia  Left axis deviation  Voltage criteria for left ventricular hypertrophy  T wave abnormality, consider anterolateral ischemia  T wave abnormality, consider inferior ischemia  Abnormal ECG    Confirmed by Joshua Marshall ([id number]) on 3/22/2021 6:44:41 AM    < end of copied text >

## 2021-03-22 NOTE — BEHAVIORAL HEALTH ASSESSMENT NOTE - SUMMARY
79yo F , unemployed, domiciled with one of her sons in a private residence, with PMH of HTN, DM type II, chronic LE edema, basal cell carcinoma of left face s/p excision, and PPH of Schizoaffective d/o was sent in by family for worsening LE swelling and difficulty in taking care of her at home. Psychiatry was consulted due to concern from family for a mental health evaluation.    On evaluation, the patient presents with some alteration of mental status 81yo F , unemployed, domiciled with one of her sons in a private residence, with PMH of HTN, DM type II, chronic LE edema, basal cell carcinoma of left face s/p excision, and PPH of Schizoaffective d/o was sent in by family for worsening LE swelling and difficulty in taking care of her at home. Psychiatry was consulted due to concern from family for a mental health evaluation.    On evaluation, the patient presents with some alteration of mental status and continuation of a chronic delusion (regarding neighbors), but she does not appear to be having an acute decompensation of a psychiatric illness. As such, she does not warrant inpatient psych hospitalization at present. Patient continues to refuse psychotropic medications, but should she be open to them, please refer to the recommendations below. 81yo F , unemployed, domiciled with one of her sons in a private residence, with PMH of HTN, DM type II, chronic LE edema, basal cell carcinoma of left face s/p excision, and PPH of Schizoaffective d/o was sent in by family for worsening LE swelling and difficulty in taking care of her at home. Psychiatry was consulted due to concern from family for a mental health evaluation.    On evaluation, the patient presents with some alteration of mental status and continuation of a chronic delusion (regarding neighbors), but she does not appear to be having an acute decompensation of a psychiatric illness. As such, she does not warrant inpatient psych hospitalization at present. Patient continues to refuse psychotropic medications, but should she be open to them, please refer to the recommendations below.    - If patient is agreeable, may offer Haldol PO 1mg Q12  - Should patient become agitated and not responding to verbal redirection, may give Haldol 1mg PO Q12 PRN with escalation to IM formulation should she refuse/become an acute danger to herself/others with EKG to follow to monitor QTc 79yo F , unemployed, domiciled with one of her sons in a private residence, with PMH of HTN, DM type II, chronic LE edema, basal cell carcinoma of left face s/p excision, and PPH of Schizoaffective d/o was sent in by family for worsening LE swelling and difficulty in taking care of her at home. Psychiatry was consulted due to concern from family for a mental health evaluation.    On evaluation, the patient presents with some alteration of mental status and continuation of a chronic delusion (regarding neighbors), but she does not appear to be having an acute decompensation of a psychiatric illness.  She is noted to be calm and cooperative during the interview. She is not suicidal, she is not homicidal.  therefore,  she does not warrant inpatient psych hospitalization at present. Patient continues to refuse psychotropic medications, but should she be open to them, please refer to the recommendations below.    - If patient is agreeable, may offer Haldol PO 1mg Q12  - Should patient become agitated and not responding to verbal redirection, may give Haldol 1mg PO Q12 PRN with escalation to IM formulation should she refuse/become an acute danger to herself/others with EKG to follow to monitor QTc

## 2021-03-22 NOTE — BEHAVIORAL HEALTH ASSESSMENT NOTE - NSBHCONSULTFOLLOWAFTERCARE_PSY_A_CORE FT
should the patient be interested, may provide referral to Behavioral Health clinic:  [address] [place] [address]    #: [phone]

## 2021-03-22 NOTE — BEHAVIORAL HEALTH ASSESSMENT NOTE - NSBHCHARTREVIEWVS_PSY_A_CORE FT
Vital Signs Last 24 Hrs  T(C): 36.7 (22 Mar 2021 13:21), Max: 36.7 (22 Mar 2021 13:21)  T(F): 98.1 (22 Mar 2021 13:21), Max: 98.1 (22 Mar 2021 13:21)  HR: 68 (22 Mar 2021 13:21) (68 - 78)  BP: 114/61 (22 Mar 2021 13:21) (114/61 - 183/98)  BP(mean): --  RR: 18 (22 Mar 2021 13:21) (18 - 18)  SpO2: 95% (21 Mar 2021 21:00) (95% - 95%)

## 2021-03-22 NOTE — PROGRESS NOTE ADULT - SUBJECTIVE AND OBJECTIVE BOX
Psych/cardio reviewed  Pt seen earlier today  was in good spirits, no complaints, complimented the hospital food    PAST MEDICAL & SURGICAL HISTORY:  Disorder of thyroid, unspecified  Son cannot provide details. States she had &quot;thyroid surgery&quot; decades ago when she was young  Edema Bilateral LE  Schizoaffective disorder  Hypertension  Diabetes mellitus    MEDICATIONS:  MEDICATIONS  (STANDING):  cyanocobalamin 1000 MICROGram(s) Oral daily  escitalopram 5 milliGRAM(s) Oral daily  haloperidol     Tablet 1 milliGRAM(s) Oral every 12 hours  heparin   Injectable 5000 Unit(s) SubCutaneous every 12 hours  losartan 50 milliGRAM(s) Oral daily  triamcinolone 0.1% Ointment 1 Application(s) Topical every 12 hours    VITALS:  T(F): 98.2 (21 @ 20:14), Max: 98.2 (21 @ 20:14)  HR: 81 (21 @ 20:14)  BP: 133/72 (21 @ 20:14) (114/61 - 136/62)  RR: 18 (21 @ 20:14)    pulm: cta b/l  cv: [id number]  gi:+bs, soft, nt/nd  derm: no cyanosis/pallor                          10.6   5.55  )-----------( 403      ( 22 Mar 2021 06:03 )             34.9       142  |  107  |  18  ----------------------------<  99  5.2<H>   |  26  |  1.1    Ca    10.5<H>      22 Mar 2021 06:03  Phos  4.1       Mg     2.2     -    TPro  7.3  /  Alb  3.7  /  TBili  0.3  /  DBili  x   /  AST  13  /  ALT  7   /  AlkPhos  104  -    LIVER FUNCTIONS - ( 22 Mar 2021 06:03 )  Alb: 3.7 g/dL / Pro: 7.3 g/dL / ALK PHOS: 104 U/L / ALT: 7 U/L / AST: 13 U/L / GGT: x           CARDIAC MARKERS ( 21 Mar 2021 16:36 )  <0.01 ng/mL         Urinalysis Basic - ( 21 Mar 2021 22:00 )    Color: Light Yellow / Appearance: Clear / S.010 / pH: x  Gluc: x / Ketone: Negative  / Bili: Negative / Urobili: <2 mg/dL   Blood: x / Protein: Negative / Nitrite: Negative   Leuk Esterase: Negative / RBC: x / WBC x   Sq Epi: x / Non Sq Epi: x / Bacteria: x      a/p    HFpEF  Venous stasis  T2DM  HTN  Possible CAD  Scizhoaffective disorder  Possible vitamin B12 deficiency  -continue all baseline meds as outlined on admission with mild diuresis   -psych issues require elucidation and /family input  -ischemic workup per cardio post psych stabilization  -recheck BMP in am  -DVT proph

## 2021-03-22 NOTE — BEHAVIORAL HEALTH ASSESSMENT NOTE - PERCEPTIONS
Care assumed on this patient he is resting in [facility]. VSS. Denies any changes in condition.    No abnormalities

## 2021-03-22 NOTE — CONSULT NOTE ADULT - ASSESSMENT
leg edema, possibly due to stasis and HTN? no evidence of pulmonary edema normal chest x-ray normal troponin  abnormal ECG, new change of T inversion in inferolateral leads  non compliant with meds, f/u  accidental finding of a mass, growth on the thyroid    iv Lasix for today  then switch to oral, possibly will benefit from Aldactone, will d/w dr Razo  new ECG changes, possibly asymptomatic ( not possible to get proper hx), r/om CAD, will do adenosine nuclear stress test, i asked her and explained to her, she stated was able to do it  needs endo f/u, likely as out patient, for the thyroid biopsy and further w/u leg edema, possibly due to stasis and HTN? no evidence of pulmonary edema normal chest x-ray normal troponin  abnormal ECG, some new change of T inversion in inferolateral leads  non compliant with meds, f/u  accidental finding of a mass, growth on the thyroid    iv Lasix for today  then switch to oral, possibly will benefit from Aldactone, will d/w dr Razo  new ECG changes, possibly asymptomatic ( not possible to get proper hx), in the future will try to r/o CAD, after optimizing the psychiatric management and in the future in a timely fashion and elective basis will do adenosine nuclear stress test, i asked her and explained to her, she stated possibly was able to do it  needs endo f/u, likely as out patient, for the thyroid biopsy and further w/u, there is a thyroid mass  at this point diuresis and psychiatry evaluation and i believe she should be admitted to psych floor for the proper management she has delusion and hallucinaion

## 2021-03-22 NOTE — BEHAVIORAL HEALTH ASSESSMENT NOTE - NSBHCONSULTMEDAGITATION_PSY_A_CORE FT
- Should patient become agitated and not responding to verbal redirection, may give Haldol 1mg PO Q12 PRN with escalation to IM formulation should she refuse/become an acute danger to herself/others with EKG to follow to monitor QTc

## 2021-03-22 NOTE — CONSULT NOTE ADULT - ATTENDING COMMENTS
as above  current RX  ASA 81 mg daily  pharmacology nuclear stress test as above  current RX  ASA 81 mg daily  psych evaluation and psych floor admission  in the future in the timely fashion under dr Razo's care will get pharmacology nuclear stress test

## 2021-03-22 NOTE — BEHAVIORAL HEALTH ASSESSMENT NOTE - OTHER PAST PSYCHIATRIC HISTORY (INCLUDE DETAILS REGARDING ONSET, COURSE OF ILLNESS, INPATIENT/OUTPATIENT TREATMENT)
documented Schizoaffective d/o, denies any SA or IPP admissions; denies any recent OPD or psych medications in recent past.

## 2021-03-22 NOTE — BEHAVIORAL HEALTH ASSESSMENT NOTE - RISK ASSESSMENT
Modifiable risk factors include psychosis, psychiatric illness, recent psychosocial stressors, medical illness/pain, single.  Static risk factors include advanced age.  Protective factors include social support/family connectiveness, parenthood, residential  stability. Low Acute Suicide Risk Modifiable risk factors include psychosis, psychiatric illness, recent psychosocial stressors, medical illness/pain, single.  Static risk factors include advanced age.  Protective factors include social support/family collectiveness, parenthood, residential  stability.

## 2021-03-22 NOTE — CONSULT NOTE ADULT - SUBJECTIVE AND OBJECTIVE BOX
Patient is a 80y old  Female who presents with a chief complaint of Worsening LE swelling (21 Mar 2021 22:07)      HPI:  I saw the patient for the new finding of ECG and worsening leg edema, she has no chest pain, sob, palpitation but leg edema, legs are wrapped  almost impossible to get hx from her, flight of the ideas, changes the conversion topics, not answering the questions directly  ct scan revealed a mass on the thyroid    79 YO F with PMHx of HTN, DM type II, schizoaffective disorder, chronic LE edema, basal cell carcinoma of Left side of face s/p excision was sent in by family for worsening LE swelling and difficulty in taking care of her at home.   Collateral history obtained from the son over the phone. Patient normally ambulates well without assistance but is now having difficulty walking due to worsening swelling in LE. Family also reported that patient is non-compliant with doctors appointments and refuses to go. She also refuses to take her psych medication and is having worsening delusions and states that neighbors are coming into house stealing clothes and cutting her face.   Basal cell carcinoma on left cheek has been bleeding for almost 3 months now.  In the ED VS were significant for a BP of 198/79 mmHg.      (21 Mar 2021 22:07)      PAST MEDICAL & SURGICAL HISTORY:  Disorder of thyroid, unspecified  Son cannot provide details: thyroid surgery decades ago when she was young    Edema  Bilateral LE    Schizoaffective disorder    Hypertension    Diabetes mellitus        PREVIOUS DIAGNOSTIC TESTING:      ECHO  FINDINGS: < from: Transthoracic Echocardiogram (20 @ 09:46) >   1. Normal global left ventricular systolic function.   2. LV Ejection Fraction by Pond's Method with a biplane EF of 57 %.   3. Normal left ventricular internal cavity size.   4. Spectral Doppler shows impaired relaxation pattern of left ventricular myocardial filling (Grade I diastolic dysfunction).   5. Normal right atrial size.   6. There is no evidence of pericardial effusion.   7. Mild mitral annular calcification.   8. Mild thickening and calcification of the anterior and posterior mitral valve leaflets.   9. Mild aortic regurgitation.    < end of copied text >      MEDICATIONS  (STANDING):  cyanocobalamin 1000 MICROGram(s) Oral daily  escitalopram 5 milliGRAM(s) Oral daily  haloperidol     Tablet 1 milliGRAM(s) Oral every 12 hours  heparin   Injectable 5000 Unit(s) SubCutaneous every 12 hours  losartan 50 milliGRAM(s) Oral daily  triamcinolone 0.1% Ointment 1 Application(s) Topical every 12 hours    MEDICATIONS  (PRN):      FAMILY HISTORY:      SOCIAL HISTORY:  CIGARETTES: no  ALCOHOL: no  DRUGS: no                      REVIEW OF SYSTEMS:  leg edema  no chest pain or SOB  no response to any other question        Vital Signs Last 24 Hrs  T(C): 35.7 (22 Mar 2021 04:55), Max: 36.2 (21 Mar 2021 21:00)  T(F): 96.2 (22 Mar 2021 04:55), Max: 97.2 (21 Mar 2021 21:00)  HR: 78 (22 Mar 2021 04:55) (68 - 78)  BP: 136/62 (22 Mar 2021 04:55) (136/62 - 198/79)  BP(mean): --  RR: 18 (22 Mar 2021 04:55) (18 - 20)  SpO2: 95% (21 Mar 2021 21:00) (95% - 99%)                      PHYSICAL EXAM:  GENERAL: No distress, supine, no distress  HEAD:  Atraumatic, Normocephalic  NECK: Supple, No JVD, No Bruit   NERVOUS SYSTEM:  Alert, Awake, not well Oriented not able to assess memory, normal speech; Normal motor Strength 5/5 B/L upper and lower extremities  CHEST/LUNG: Normal air entry to lung base bilaterally; No wheeze, crackle, rales  HEART: Regular heart beat, S1, A2, P2, No S3, No gallop, No murmur  ABDOMEN: Soft, Non tender, Non distended; Bowel sounds present  EXTREMITIES: 1+ Peripheral Pulses, No clubbing, b/l edema  SKIN: No rashes or lesions    TELEMETRY: NSR    ECG: < from: 12 Lead ECG (21 @ 15:58) >  Sinus bradycardia  Left axis deviation  Voltage criteria for left ventricular hypertrophy  T wave abnormality, consider anterolateral ischemia  T wave abnormality, consider inferior ischemia    < end of copied text >      < from: 12 Lead ECG (21 @ 02:07) >   Sinus rhythm withmarked sinus arrhythmia  Minimal voltage criteria for LVH, may be normal variant  Borderline ECG    < end of copied text >        I&O's Detail      LABS:                        10.6   5.55  )-----------( 403      ( 22 Mar 2021 06:03 )             34.9     03-    142  |  107  |  18  ----------------------------<  99  5.2<H>   |  26  |  1.1    Ca    10.5<H>      22 Mar 2021 06:03  Phos  4.1       Mg     2.2         TPro  7.3  /  Alb  3.7  /  TBili  0.3  /  DBili  x   /  AST  13  /  ALT  7   /  AlkPhos  104    CARDIAC MARKERS ( 21 Mar 2021 16:36 )  x     / <0.01 ng/mL / x     / x     / x            Urinalysis Basic - ( 21 Mar 2021 22:00 )    Color: Light Yellow / Appearance: Clear / S.010 / pH: x  Gluc: x / Ketone: Negative  / Bili: Negative / Urobili: <2 mg/dL   Blood: x / Protein: Negative / Nitrite: Negative   Leuk Esterase: Negative / RBC: x / WBC x   Sq Epi: x / Non Sq Epi: x / Bacteria: x      I&O's Summary      RADIOLOGY & ADDITIONAL STUDIES: < from: Xray Chest 1 View-PORTABLE IMMEDIATE (Xray Chest 1 View-PORTABLE IMMEDIATE .) (21 @ 17:30) >    Impression:    No consolidation, effusion or pneumothorax.    < end of copied text >  < from: CT Angio Chest PE Protocol w/ IV Cont (21 @ 04:54) >  Since May 28, 2020;  1.  No pulmonary embolus.  2.  Stable 6 cm heterogeneous lesion extending from the inferior right thyroid lobe into the superior mediastinum. Based on sonographic findings from May 29, 2020, recommend fine needle aspiration.    < end of copied text >

## 2021-03-22 NOTE — BEHAVIORAL HEALTH ASSESSMENT NOTE - HPI (INCLUDE ILLNESS QUALITY, SEVERITY, DURATION, TIMING, CONTEXT, MODIFYING FACTORS, ASSOCIATED SIGNS AND SYMPTOMS)
79yo F , unemployed, domiciled with one of her sons in a private residence, with PMH of HTN, DM type II, chronic LE edema, basal cell carcinoma of left face s/p excision, and PPH of Schizoaffective d/o was sent in by family for worsening LE swelling and difficulty in taking care of her at home. Psychiatry was consulted due to concern from family for a mental health evaluation.    On interview of the patient, she was very calm and pleasant. 79yo F , unemployed, domiciled with one of her sons in a private residence, with PMH of HTN, DM type II, chronic LE edema, basal cell carcinoma of left face s/p excision, and PPH of Schizoaffective d/o was sent in by family for worsening LE swelling and difficulty in taking care of her at home. Psychiatry was consulted due to concern from family for a mental health evaluation.    On interview of the patient, she was very calm and pleasant. Patient launches into discussion about Kevin Lentz and Lilly Block, then went on to talk about Turkish cooking, and then went on to talk about zodiac signs. She is A+Ox3 except mistakenly says the year is 1921. She is able to name objects, perform serial seven's accurately, but struggles with spelling WORLD backwards and with 3-word recall.    Patient reports "good" mood recently. She endorses adequate sleep and appetite. She reports being active around the home, cleaning and cooking regularly. She continues to report suspicions of her neighbors entering her home (similar to past assessments), but has not gone to bother them at their homes. She denies any suicidal/homicidal ideations or A/V hallucinations. Patient denies any substance use.    Patient endorses taking her medical medications but denies taking any psychotropic medications. She explains that she used to take Navane years ago, but refuses to take Haldol as it had a negative effect on her GI system and made her feel stiff. Her reason for taking Navane was because she was "too hyper" sometimes.    COLLATERAL was unable to be obtained. Attempts were made to call Dr. Jimenez but there was no answer.

## 2021-03-23 LAB
GLUCOSE BLDC GLUCOMTR-MCNC: 103 MG/DL — HIGH (ref 70–99)
GLUCOSE BLDC GLUCOMTR-MCNC: 97 MG/DL — SIGNIFICANT CHANGE UP (ref 70–99)

## 2021-03-23 PROCEDURE — 99233 SBSQ HOSP IP/OBS HIGH 50: CPT

## 2021-03-23 PROCEDURE — 99221 1ST HOSP IP/OBS SF/LOW 40: CPT

## 2021-03-23 RX ORDER — FUROSEMIDE 40 MG
20 TABLET ORAL DAILY
Refills: 0 | Status: DISCONTINUED | OUTPATIENT
Start: 2021-03-23 | End: 2021-03-25

## 2021-03-23 RX ADMIN — HALOPERIDOL DECANOATE 1 MILLIGRAM(S): 100 INJECTION INTRAMUSCULAR at 17:07

## 2021-03-23 RX ADMIN — Medication 20 MILLIGRAM(S): at 11:34

## 2021-03-23 RX ADMIN — Medication 1 APPLICATION(S): at 17:08

## 2021-03-23 RX ADMIN — HEPARIN SODIUM 5000 UNIT(S): 5000 INJECTION INTRAVENOUS; SUBCUTANEOUS at 05:06

## 2021-03-23 RX ADMIN — HEPARIN SODIUM 5000 UNIT(S): 5000 INJECTION INTRAVENOUS; SUBCUTANEOUS at 17:07

## 2021-03-23 RX ADMIN — PREGABALIN 1000 MICROGRAM(S): 225 CAPSULE ORAL at 11:35

## 2021-03-23 RX ADMIN — HALOPERIDOL DECANOATE 1 MILLIGRAM(S): 100 INJECTION INTRAMUSCULAR at 05:06

## 2021-03-23 RX ADMIN — Medication 1 APPLICATION(S): at 05:07

## 2021-03-23 RX ADMIN — LOSARTAN POTASSIUM 50 MILLIGRAM(S): 100 TABLET, FILM COATED ORAL at 05:06

## 2021-03-23 RX ADMIN — ESCITALOPRAM OXALATE 5 MILLIGRAM(S): 10 TABLET, FILM COATED ORAL at 11:35

## 2021-03-23 NOTE — PROGRESS NOTE ADULT - ASSESSMENT
81 YO F with PMH of HTN, DM type II, schizoaffective disorder, chronic LE edema, basal cell carcinoma of Left side of face s/p excision was sent in by family for worsening LE swelling and difficulty in taking care of her at home.    79 YO F with PMH of HTN, DM type II, schizoaffective disorder, chronic LE edema, basal cell carcinoma of Left side of face s/p excision was sent in by family for worsening LE swelling and difficulty in taking care of her at home.     LE edema likely due to chronic venous stasis vs chronic lymphedema  No CHF  DM-2 / HTN  CKD-3  Abnormal EKG  Schizoaffective disorder            PLAN:    ·	Cardiology eval noted. No in patient ischemia W/U. F/U with cardiology as an out pt.   ·	CTA chest is negative for PE  ·	CXR is unremarkable  ·	Venous doppler of LE is negative for PE.   ·	Cont her home meds  ·	Psych eval noted. Haldol PRN  ·	PT eval  ·	Can D/C from medical standpoint    Progress Note Handoff    Pending (specify):  Consults_________, Tests________, Test Results_______, Other__SNF vs Adult home placement_______  Family discussion:  Disposition: Home___/SNF___/Other________/Unknown at this time________   81 YO F with PMH of HTN, DM type II, schizoaffective disorder, chronic LE edema, basal cell carcinoma of Left side of face s/p excision was sent in by family for worsening LE swelling and difficulty in taking care of her at home.     LE edema likely due to chronic venous stasis vs chronic lymphedema  No CHF  DM-2 / HTN  CKD-3  Abnormal EKG  Schizoaffective disorder  Basal cell carcinoma of L side of the face  Thyroid mass            PLAN:    ·	Cardiology eval noted. No in patient ischemia W/U. F/U with cardiology as an out pt.   ·	CTA chest is negative for PE  ·	CXR is unremarkable  ·	Venous doppler of LE is negative for PE.   ·	Cont her home meds  ·	Psych eval noted. Haldol PRN  ·	Local wound care of the wound on L side of the face  ·	Out pt endocrine F/U for thyroid biopsy  ·	PT eval  ·	Can D/C from medical standpoint    Progress Note Handoff    Pending (specify):  Consults_________, Tests________, Test Results_______, Other__SNF vs Adult home placement_______  Family discussion:  Disposition: Home___/SNF___/Other________/Unknown at this time________

## 2021-03-23 NOTE — PROGRESS NOTE ADULT - SUBJECTIVE AND OBJECTIVE BOX
CHELA GALLAGHER  80y  Female      Patient is a 80y old  Female with PMH of HTN, pre-DM type 2, schizoaffective disorder, chronic lower extremity edema, basal cell carcinoma of left ear, s/p excision who presents with a chief complaint of Worsening LE swelling (23 Mar 2021 07:31)      INTERVAL HPI/OVERNIGHT EVENTS: No acute overnight events. Pt was sitting comfortably in bed in no acute distress. She denies any chest pain, shortness of breath, orthopnea, PND. She denies any nausea, vomiting or abdominal pain.       REVIEW OF SYSTEMS:  CONSTITUTIONAL: No fever, weight loss, or fatigue  EYES: No eye pain, visual disturbances, or discharge  ENMT:  No difficulty hearing, tinnitus, vertigo; No sinus or throat pain  NECK: No pain or stiffness  BREASTS: No pain, masses, or nipple discharge  RESPIRATORY: No cough, wheezing, chills or hemoptysis; No shortness of breath  CARDIOVASCULAR: No chest pain, palpitations, dizziness, or leg swelling  GASTROINTESTINAL: No abdominal or epigastric pain. No nausea, vomiting, or hematemesis; No diarrhea or constipation. No melena or hematochezia.  GENITOURINARY: No dysuria, frequency, hematuria, or incontinence  NEUROLOGICAL: No headaches, memory loss, loss of strength, numbness, or tremors  SKIN: No itching, burning, rashes, or lesions   LYMPH NODES: No enlarged glands  ENDOCRINE: No heat or cold intolerance; No hair loss  MUSCULOSKELETAL: No joint pain or swelling; No muscle, back, or extremity pain  PSYCHIATRIC: No depression, anxiety, mood swings, or difficulty sleeping  HEME/LYMPH: No easy bruising, or bleeding gums  ALLERY AND IMMUNOLOGIC: No hives or eczema  FAMILY HISTORY:    T(C): 36.9 (03-23-21 @ 05:31), Max: 36.9 (03-23-21 @ 05:31)  HR: 87 (03-23-21 @ 05:31) (68 - 87)  BP: 145/81 (03-23-21 @ 05:31) (114/61 - 145/81)  RR: 18 (03-23-21 @ 05:31) (18 - 18)  SpO2: --  Wt(kg): --Vital Signs Last 24 Hrs  T(C): 36.9 (23 Mar 2021 05:31), Max: 36.9 (23 Mar 2021 05:31)  T(F): 98.4 (23 Mar 2021 05:31), Max: 98.4 (23 Mar 2021 05:31)  HR: 87 (23 Mar 2021 05:31) (68 - 87)  BP: 145/81 (23 Mar 2021 05:31) (114/61 - 145/81)  BP(mean): --  RR: 18 (23 Mar 2021 05:31) (18 - 18)  SpO2: --  No Known Allergies      PHYSICAL EXAM:  GENERAL: NAD, well-groomed, well-developed  HEAD:  Atraumatic, Normocephalic  EYES: EOMI, PERRLA, conjunctiva and sclera clear  ENMT: No tonsillar erythema, exudates, or enlargement; Moist mucous membranes, Good dentition, No lesions  NECK: Supple, No JVD, thyroid mass  NERVOUS SYSTEM:  Alert & Oriented X3, Good concentration; Motor Strength 5/5 B/L upper and lower extremities; DTRs 2+ intact and symmetric  CHEST/LUNG: Clear to percussion bilaterally; No rales, rhonchi, wheezing, or rubs  HEART: Regular rate and rhythm; No murmurs, rubs, or gallops  ABDOMEN: Soft, Nontender, Nondistended; Bowel sounds present  EXTREMITIES:  2+ Peripheral Pulses, No clubbing, cyanosis, non pitting b/l edema  LYMPH: No lymphadenopathy noted  SKIN: dry skin in lower extremities    Consultant(s) Notes Reviewed:  [x ] YES  [ ] NO  Care Discussed with Consultants/Other Providers [ x] YES  [ ] NO    LABS:                        10.6   5.55  )-----------( 403      ( 22 Mar 2021 06:03 )             34.9   03-22    142  |  107  |  18  ----------------------------<  99  5.2<H>   |  26  |  1.1    Ca    10.5<H>      22 Mar 2021 06:03  Phos  4.1     03-22  Mg     2.2     03-22    TPro  7.3  /  Alb  3.7  /  TBili  0.3  /  DBili  x   /  AST  13  /  ALT  7   /  AlkPhos  104  03-22    RADIOLOGY & ADDITIONAL TESTS:  < from: Xray Chest 1 View-PORTABLE IMMEDIATE (Xray Chest 1 View-PORTABLE IMMEDIATE .) (03.21.21 @ 17:30) >  Impression:    No consolidation, effusion or pneumothorax.  < from: [facility] Duplex Lower Ext Vein Scan, Bilat (03.21.21 @ 17:03) >  No evidence of deep venous thrombosis or superficial thrombophlebitis in the bilateral lower extremities.    Imaging Personally Reviewed:  [ ] YES  [ ] NO  cyanocobalamin 1000 MICROGram(s) Oral daily  escitalopram 5 milliGRAM(s) Oral daily  furosemide    Tablet 20 milliGRAM(s) Oral daily  haloperidol     Tablet 1 milliGRAM(s) Oral every 12 hours  heparin   Injectable 5000 Unit(s) SubCutaneous every 12 hours  losartan 50 milliGRAM(s) Oral daily  triamcinolone 0.1% Ointment 1 Application(s) Topical every 12 hours      HEALTH ISSUES - PROBLEM Dx:  Schizoaffective disorder    Delirium

## 2021-03-23 NOTE — PROGRESS NOTE ADULT - ASSESSMENT
81 YO F with PMHx of HTN, pre-DM type II, schizoaffective disorder, chronic LE edema, basal cell carcinoma of Left side of face s/p excision was sent in by family for worsening LE swelling and difficulty in taking care of her at home.     #Chronic LE swelling with venous stasis dermatitis   - On Lasix 20mg PO QD at home   - . Troponin negative x 1  - Follow LE duplex> no sign of DVT or thrombophlebitis  - Dr Dipesh Razo is her son-in-law and prescribed her lasix  - Will give 20mg IV lasix and start home dose of 20mg PO QD from tomorrow   - ACE wraps and topical steroids     #TWI on EKG  - New TWI in precordial leads and II III and aVF  - Not present on EKG performed 20 days ago  - Denies any active chest pain  - Troponin x2 negative  - Admit to telemetry-No events on telemetry  - Seen by Dr. Gaspar who recommended Lasix 20mg PO and outpatient ischemic cardiac workup    #Schizoaffective disorder  - Patient non-compliant with psych meds and physician follow ups   - As per notes from previous admission will start Lexapro 5mg PO QHS and Haldol 1mg PO BID  - Follow psych consult. Currently AAO x 3 and denies any hallucinations     #Basal cell carcinoma s/p excision   - History of intermittent bleeding as per the family. Current bleeding noted   - Follow dermatology eval     #Right foot lipoma  - Evaluated by podiatry on previous admissions; Aspiration attempted, but no fluid produced. Outpatient follow up recommended     #HTN - Losartan 50mg PO QD    #Prediabetes   - Last HbA1C 5.9 % [05-29-20]   - A1c=6.2%  - Carb consistent diet     #Misc  - DVT Prophylaxis: Heparin   - Diet: DASH/TLC, carb consistent   - GI Prophylaxis: Not indicated   - Activity: AAT  - Code Status: Full code    Dispo: Admit to medicine   Pending: Outpatient vs inpatient cardiac ischemic workup

## 2021-03-23 NOTE — PROGRESS NOTE ADULT - SUBJECTIVE AND OBJECTIVE BOX
CHELA GALLAGHER  80y Female    CHIEF COMPLAINT:    Patient is a 80y old  Female who presents with a chief complaint of Worsening LE swelling (22 Mar 2021 23:14)      INTERVAL HPI/OVERNIGHT EVENTS:    Patient seen and examined.    ROS: All other systems are negative.    Vital Signs:    T(F): 98.4 (21 @ 05:31), Max: 98.4 (21 @ 05:31)  HR: 87 (21 @ 05:31) (68 - 87)  BP: 145/81 (21 @ 05:31) (114/61 - 145/81)  RR: 18 (21 @ 05:31) (18 - 18)  SpO2: --  I&O's Summary    22 Mar 2021 07:01  -  23 Mar 2021 07:00  --------------------------------------------------------  IN: 570 mL / OUT: 500 mL / NET: 70 mL      Daily     Daily Weight in k (23 Mar 2021 05:31)  CAPILLARY BLOOD GLUCOSE      POCT Blood Glucose.: 102 mg/dL (22 Mar 2021 21:44)  POCT Blood Glucose.: 110 mg/dL (22 Mar 2021 16:41)      PHYSICAL EXAM:    GENERAL:  NAD  SKIN: No rashes or lesions  HENT: Atrumatic. Normocephalic. PERRL. Moist membranes.  NECK: Supple, No JVD. No lymphadenopathy.  PULMONARY: CTA B/L. No wheezing. No rales  CVS: Normal S1, S2. Rate and Rythm are regular. No murmurs.  ABDOMEN/GI: Soft, Nontender, Nondistended; BS present  EXTREMITIES: Peripheral pulses intact. No edema B/L LE.  NEUROLOGIC:  No motor or sensory deficit.  PSYCH: Alert & oriented x 3    Consultant(s) Notes Reviewed:  [x ] YES  [ ] NO  Care Discussed with Consultants/Other Providers [ x] YES  [ ] NO    EKG reviewed  Telemetry reviewed    LABS:                        10.6   5.55  )-----------( 403      ( 22 Mar 2021 06:03 )             34.9         142  |  107  |  18  ----------------------------<  99  5.2<H>   |  26  |  1.1    Ca    10.5<H>      22 Mar 2021 06:03  Phos  4.1       Mg     2.2         TPro  7.3  /  Alb  3.7  /  TBili  0.3  /  DBili  x   /  AST  13  /  ALT  7   /  AlkPhos  104        Serum Pro-Brain Natriuretic Peptide: 585 pg/mL (21 @ 15:29)    Trop <0.01, CKMB --, CK --, 21 @ 16:36        RADIOLOGY & ADDITIONAL TESTS:      Imaging or report Personally Reviewed:  [ ] YES  [ ] NO    Medications:  Standing  cyanocobalamin 1000 MICROGram(s) Oral daily  escitalopram 5 milliGRAM(s) Oral daily  haloperidol     Tablet 1 milliGRAM(s) Oral every 12 hours  heparin   Injectable 5000 Unit(s) SubCutaneous every 12 hours  losartan 50 milliGRAM(s) Oral daily  triamcinolone 0.1% Ointment 1 Application(s) Topical every 12 hours    PRN Meds      Case discussed with resident    Care discussed with pt/family           CHELA GALLAGHER  80y Female    CHIEF COMPLAINT:    Patient is a 80y old  Female who presents with a chief complaint of Worsening LE swelling (22 Mar 2021 23:14)      INTERVAL HPI/OVERNIGHT EVENTS:    Patient seen and examined. Feels good. No sob. No cp. C/O LE swelling.     ROS: All other systems are negative.    Vital Signs:    T(F): 98.4 (21 @ 05:31), Max: 98.4 (21 @ 05:31)  HR: 87 (21 @ 05:31) (68 - 87)  BP: 145/81 (21 @ 05:31) (114/61 - 145/81)  RR: 18 (21 @ 05:31) (18 - 18)  SpO2: --  I&O's Summary    22 Mar 2021 07:01  -  23 Mar 2021 07:00  --------------------------------------------------------  IN: 570 mL / OUT: 500 mL / NET: 70 mL      Daily     Daily Weight in k (23 Mar 2021 05:31)  CAPILLARY BLOOD GLUCOSE      POCT Blood Glucose.: 102 mg/dL (22 Mar 2021 21:44)  POCT Blood Glucose.: 110 mg/dL (22 Mar 2021 16:41)      PHYSICAL EXAM:    GENERAL:  NAD  SKIN: No rashes or lesions  HENT: Atraumatic Normocephalic. PERRL. Moist membranes.  NECK: Supple, No JVD. No lymphadenopathy.  PULMONARY: CTA B/L. No wheezing. No rales  CVS: Normal S1, S2. Rate and Rhythm are regular. No murmurs.  ABDOMEN/GI: Soft, Nontender, Nondistended; BS present  EXTREMITIES: Peripheral pulses intact. No edema B/L LE.  NEUROLOGIC:  No motor or sensory deficit.  PSYCH: Alert & oriented x 3    Consultant(s) Notes Reviewed:  [x ] YES  [ ] NO  Care Discussed with Consultants/Other Providers [ x] YES  [ ] NO    EKG reviewed  Telemetry reviewed    LABS:                        10.6   5.55  )-----------( 403      ( 22 Mar 2021 06:03 )             34.9         142  |  107  |  18  ----------------------------<  99  5.2<H>   |  26  |  1.1    Ca    10.5<H>      22 Mar 2021 06:03  Phos  4.1       Mg     2.2         TPro  7.3  /  Alb  3.7  /  TBili  0.3  /  DBili  x   /  AST  13  /  ALT  7   /  AlkPhos  104        Serum Pro-Brain Natriuretic Peptide: 585 pg/mL (21 @ 15:29)    Trop <0.01, CKMB --, CK --, 21 @ 16:36        RADIOLOGY & ADDITIONAL TESTS:    < from: Xray Chest 1 View-PORTABLE IMMEDIATE (Xray Chest 1 View-PORTABLE IMMEDIATE .) (21 @ 17:30) >    Impression:    No consolidation, effusion or pneumothorax.    < end of copied text >  < from: [facility] Duplex Lower Ext Vein Scan, Bilat (21 @ 17:03) >    Impression:    No evidence of deep venous thrombosis or superficial thrombophlebitis in the bilateral lower extremities.    < end of copied text >  < from: CT Angio Chest PE Protocol w/ IV Cont (21 @ 04:54) >    IMPRESSION:  Since May 28, 2020;  1.  No pulmonary embolus.  2.  Stable 6 cm heterogeneous lesion extending from the inferior right thyroid lobe into the superior mediastinum. Based on sonographic findings from May 29, 2020, recommend fine needle aspiration.      < end of copied text >    Imaging or report Personally Reviewed:  [ ] YES  [ ] NO    Medications:  Standing  cyanocobalamin 1000 MICROGram(s) Oral daily  escitalopram 5 milliGRAM(s) Oral daily  haloperidol     Tablet 1 milliGRAM(s) Oral every 12 hours  heparin   Injectable 5000 Unit(s) SubCutaneous every 12 hours  losartan 50 milliGRAM(s) Oral daily  triamcinolone 0.1% Ointment 1 Application(s) Topical every 12 hours    PRN Meds      Case discussed with resident    Care discussed with pt/family

## 2021-03-24 LAB
ALBUMIN SERPL ELPH-MCNC: 3.3 G/DL — LOW (ref 3.5–5.2)
ALP SERPL-CCNC: 86 U/L — SIGNIFICANT CHANGE UP (ref 30–115)
ALT FLD-CCNC: 5 U/L — SIGNIFICANT CHANGE UP (ref 0–41)
ANION GAP SERPL CALC-SCNC: 11 MMOL/L — SIGNIFICANT CHANGE UP (ref 7–14)
AST SERPL-CCNC: 10 U/L — SIGNIFICANT CHANGE UP (ref 0–41)
BASOPHILS # BLD AUTO: 0.06 K/UL — SIGNIFICANT CHANGE UP (ref 0–0.2)
BASOPHILS NFR BLD AUTO: 1.2 % — HIGH (ref 0–1)
BILIRUB SERPL-MCNC: 0.2 MG/DL — SIGNIFICANT CHANGE UP (ref 0.2–1.2)
BUN SERPL-MCNC: 23 MG/DL — HIGH (ref 10–20)
CALCIUM SERPL-MCNC: 9.5 MG/DL — SIGNIFICANT CHANGE UP (ref 8.5–10.1)
CHLORIDE SERPL-SCNC: 108 MMOL/L — SIGNIFICANT CHANGE UP (ref 98–110)
CO2 SERPL-SCNC: 21 MMOL/L — SIGNIFICANT CHANGE UP (ref 17–32)
CREAT SERPL-MCNC: 1.1 MG/DL — SIGNIFICANT CHANGE UP (ref 0.7–1.5)
EOSINOPHIL # BLD AUTO: 0.31 K/UL — SIGNIFICANT CHANGE UP (ref 0–0.7)
EOSINOPHIL NFR BLD AUTO: 6.1 % — SIGNIFICANT CHANGE UP (ref 0–8)
GLUCOSE BLDC GLUCOMTR-MCNC: 109 MG/DL — HIGH (ref 70–99)
GLUCOSE BLDC GLUCOMTR-MCNC: 115 MG/DL — HIGH (ref 70–99)
GLUCOSE BLDC GLUCOMTR-MCNC: 97 MG/DL — SIGNIFICANT CHANGE UP (ref 70–99)
GLUCOSE SERPL-MCNC: 93 MG/DL — SIGNIFICANT CHANGE UP (ref 70–99)
HCT VFR BLD CALC: 32.2 % — LOW (ref 37–47)
HGB BLD-MCNC: 10 G/DL — LOW (ref 12–16)
IMM GRANULOCYTES NFR BLD AUTO: 0.2 % — SIGNIFICANT CHANGE UP (ref 0.1–0.3)
LYMPHOCYTES # BLD AUTO: 1.43 K/UL — SIGNIFICANT CHANGE UP (ref 1.2–3.4)
LYMPHOCYTES # BLD AUTO: 28.2 % — SIGNIFICANT CHANGE UP (ref 20.5–51.1)
MAGNESIUM SERPL-MCNC: 2.1 MG/DL — SIGNIFICANT CHANGE UP (ref 1.8–2.4)
MCHC RBC-ENTMCNC: 28.6 PG — SIGNIFICANT CHANGE UP (ref 27–31)
MCHC RBC-ENTMCNC: 31.1 G/DL — LOW (ref 32–37)
MCV RBC AUTO: 92 FL — SIGNIFICANT CHANGE UP (ref 81–99)
MONOCYTES # BLD AUTO: 0.48 K/UL — SIGNIFICANT CHANGE UP (ref 0.1–0.6)
MONOCYTES NFR BLD AUTO: 9.5 % — HIGH (ref 1.7–9.3)
NEUTROPHILS # BLD AUTO: 2.78 K/UL — SIGNIFICANT CHANGE UP (ref 1.4–6.5)
NEUTROPHILS NFR BLD AUTO: 54.8 % — SIGNIFICANT CHANGE UP (ref 42.2–75.2)
NRBC # BLD: 0 /100 WBCS — SIGNIFICANT CHANGE UP (ref 0–0)
PLATELET # BLD AUTO: 363 K/UL — SIGNIFICANT CHANGE UP (ref 130–400)
POTASSIUM SERPL-MCNC: 4.9 MMOL/L — SIGNIFICANT CHANGE UP (ref 3.5–5)
POTASSIUM SERPL-SCNC: 4.9 MMOL/L — SIGNIFICANT CHANGE UP (ref 3.5–5)
PROT SERPL-MCNC: 6.5 G/DL — SIGNIFICANT CHANGE UP (ref 6–8)
RBC # BLD: 3.5 M/UL — LOW (ref 4.2–5.4)
RBC # FLD: 13.6 % — SIGNIFICANT CHANGE UP (ref 11.5–14.5)
SODIUM SERPL-SCNC: 140 MMOL/L — SIGNIFICANT CHANGE UP (ref 135–146)
WBC # BLD: 5.07 K/UL — SIGNIFICANT CHANGE UP (ref 4.8–10.8)
WBC # FLD AUTO: 5.07 K/UL — SIGNIFICANT CHANGE UP (ref 4.8–10.8)

## 2021-03-24 PROCEDURE — 99232 SBSQ HOSP IP/OBS MODERATE 35: CPT

## 2021-03-24 RX ADMIN — HALOPERIDOL DECANOATE 1 MILLIGRAM(S): 100 INJECTION INTRAMUSCULAR at 05:58

## 2021-03-24 RX ADMIN — HALOPERIDOL DECANOATE 1 MILLIGRAM(S): 100 INJECTION INTRAMUSCULAR at 17:16

## 2021-03-24 RX ADMIN — HEPARIN SODIUM 5000 UNIT(S): 5000 INJECTION INTRAVENOUS; SUBCUTANEOUS at 05:58

## 2021-03-24 RX ADMIN — Medication 1 APPLICATION(S): at 05:59

## 2021-03-24 RX ADMIN — PREGABALIN 1000 MICROGRAM(S): 225 CAPSULE ORAL at 11:16

## 2021-03-24 RX ADMIN — ESCITALOPRAM OXALATE 5 MILLIGRAM(S): 10 TABLET, FILM COATED ORAL at 11:16

## 2021-03-24 RX ADMIN — Medication 1 APPLICATION(S): at 17:17

## 2021-03-24 RX ADMIN — Medication 20 MILLIGRAM(S): at 05:58

## 2021-03-24 RX ADMIN — HEPARIN SODIUM 5000 UNIT(S): 5000 INJECTION INTRAVENOUS; SUBCUTANEOUS at 17:16

## 2021-03-24 RX ADMIN — LOSARTAN POTASSIUM 50 MILLIGRAM(S): 100 TABLET, FILM COATED ORAL at 05:58

## 2021-03-24 NOTE — PROGRESS NOTE ADULT - SUBJECTIVE AND OBJECTIVE BOX
CHLEA GALLAGHER  80y  Female      Patient is a 80y old  Female with PMH of HTN, pre-DM type 2, schizoaffective disorder, chronic lower extremity edema, basal cell carcinoma of left ear, s/p excision who presents with a chief complaint of Worsening LE swelling (23 Mar 2021 07:31)      INTERVAL HPI/OVERNIGHT EVENTS: No acute overnight events. Pt was sitting comfortably in bed in no acute distress. She denies any chest pain, shortness of breath, orthopnea, PND. She denies any nausea, vomiting or abdominal pain.       REVIEW OF SYSTEMS:  CONSTITUTIONAL: No fever, weight loss, or fatigue  EYES: No eye pain, visual disturbances, or discharge  ENMT:  No difficulty hearing, tinnitus, vertigo; No sinus or throat pain  NECK: No pain or stiffness  BREASTS: No pain, masses, or nipple discharge  RESPIRATORY: No cough, wheezing, chills or hemoptysis; No shortness of breath  CARDIOVASCULAR: No chest pain, palpitations, dizziness, or leg swelling  GASTROINTESTINAL: No abdominal or epigastric pain. No nausea, vomiting, or hematemesis; No diarrhea or constipation. No melena or hematochezia.  GENITOURINARY: No dysuria, frequency, hematuria, or incontinence  NEUROLOGICAL: No headaches, memory loss, loss of strength, numbness, or tremors  SKIN: No itching, burning, rashes, or lesions   LYMPH NODES: No enlarged glands  ENDOCRINE: No heat or cold intolerance; No hair loss  MUSCULOSKELETAL: No joint pain or swelling; No muscle, back, or extremity pain  PSYCHIATRIC: No depression, anxiety, mood swings, or difficulty sleeping  HEME/LYMPH: No easy bruising, or bleeding gums  ALLERY AND IMMUNOLOGIC: No hives or eczema  FAMILY HISTORY:    ICU Vital Signs Last 24 Hrs  T(C): 36.7 (24 Mar 2021 05:49), Max: 36.8 (23 Mar 2021 20:00)  T(F): 98 (24 Mar 2021 05:49), Max: 98.2 (23 Mar 2021 20:00)  HR: 83 (24 Mar 2021 05:49) (83 - 98)  BP: 142/65 (24 Mar 2021 05:49) (120/58 - 142/65)  BP(mean): --  ABP: --  ABP(mean): --  RR: 18 (24 Mar 2021 05:49) (18 - 18)  SpO2: --      PHYSICAL EXAM:  GENERAL: NAD, well-groomed, well-developed  HEAD:  Atraumatic, Normocephalic  EYES: EOMI, PERRLA, conjunctiva and sclera clear  ENMT: No tonsillar erythema, exudates, or enlargement; Moist mucous membranes, Good dentition, No lesions  NECK: Supple, No JVD, thyroid mass  NERVOUS SYSTEM:  Alert & Oriented X3, Good concentration; Motor Strength 5/5 B/L upper and lower extremities; DTRs 2+ intact and symmetric  CHEST/LUNG: Clear to percussion bilaterally; No rales, rhonchi, wheezing, or rubs  HEART: Regular rate and rhythm; No murmurs, rubs, or gallops  ABDOMEN: Soft, Nontender, Nondistended; Bowel sounds present  EXTREMITIES:  2+ Peripheral Pulses, No clubbing, cyanosis, non pitting b/l edema  LYMPH: No lymphadenopathy noted  SKIN: dry skin in lower extremities    Consultant(s) Notes Reviewed:  [x ] YES  [ ] NO  Care Discussed with Consultants/Other Providers [ x] YES  [ ] NO    LABS:                                   10.0   5.07  )-----------( 363      ( 24 Mar 2021 06:21 )             32.2     03-24    140  |  108  |  23<H>  ----------------------------<  93  4.9   |  21  |  1.1    Ca    9.5      24 Mar 2021 06:21  Mg     2.1     03-24    TPro  6.5  /  Alb  3.3<L>  /  TBili  0.2  /  DBili  x   /  AST  10  /  ALT  5   /  AlkPhos  86  03-24        RADIOLOGY & ADDITIONAL TESTS:  < from: Xray Chest 1 View-PORTABLE IMMEDIATE (Xray Chest 1 View-PORTABLE IMMEDIATE .) (03.21.21 @ 17:30) >  Impression:    No consolidation, effusion or pneumothorax.  < from: [facility] Duplex Lower Ext Vein Scan, Bilat (03.21.21 @ 17:03) >  No evidence of deep venous thrombosis or superficial thrombophlebitis in the bilateral lower extremities.    Imaging Personally Reviewed:  [ ] YES  [ ] NO  cyanocobalamin 1000 MICROGram(s) Oral daily  escitalopram 5 milliGRAM(s) Oral daily  furosemide    Tablet 20 milliGRAM(s) Oral daily  haloperidol     Tablet 1 milliGRAM(s) Oral every 12 hours  heparin   Injectable 5000 Unit(s) SubCutaneous every 12 hours  losartan 50 milliGRAM(s) Oral daily  triamcinolone 0.1% Ointment 1 Application(s) Topical every 12 hours      HEALTH ISSUES - PROBLEM Dx:  Schizoaffective disorder    Delirium

## 2021-03-24 NOTE — PROGRESS NOTE ADULT - SUBJECTIVE AND OBJECTIVE BOX
CHELA GALLAGHER  80y Female    CHIEF COMPLAINT:    Patient is a 80y old  Female who presents with a chief complaint of Worsening LE swelling (24 Mar 2021 11:00)      INTERVAL HPI/OVERNIGHT EVENTS:    Patient seen and examined. Feels good. No sob. No sob.     ROS: All other systems are negative.    Vital Signs:    T(F): 98.8 (21 @ 13:37), Max: 98.8 (21 @ 13:37)  HR: 93 (21 @ 13:37) (83 - 93)  BP: 142/60 (21 @ 13:37) (138/63 - 142/65)  RR: 20 (21 @ 13:37) (18 - 20)  SpO2: --  I&O's Summary    23 Mar 2021 07:01  -  24 Mar 2021 07:00  --------------------------------------------------------  IN: 720 mL / OUT: 905 mL / NET: -185 mL    24 Mar 2021 07:01  -  24 Mar 2021 15:30  --------------------------------------------------------  IN: 620 mL / OUT: 550 mL / NET: 70 mL      Daily     Daily Weight in k (24 Mar 2021 05:49)  CAPILLARY BLOOD GLUCOSE      POCT Blood Glucose.: 109 mg/dL (24 Mar 2021 12:03)  POCT Blood Glucose.: 115 mg/dL (24 Mar 2021 07:37)      PHYSICAL EXAM:    GENERAL:  NAD  SKIN: No rashes or lesions  HENT: Atraumatic Normocephalic. PERRL. Moist membranes.  NECK: Supple, No JVD. No lymphadenopathy.  PULMONARY: CTA B/L. No wheezing. No rales  CVS: Normal S1, S2. Rate and Rhythm are regular. No murmurs.  ABDOMEN/GI: Soft, Nontender, Nondistended; BS present  EXTREMITIES: Peripheral pulses intact. No edema B/L LE.  NEUROLOGIC:  No motor or sensory deficit.  PSYCH: Alert & oriented x 3    Consultant(s) Notes Reviewed:  [x ] YES  [ ] NO  Care Discussed with Consultants/Other Providers [ x] YES  [ ] NO    EKG reviewed  Telemetry reviewed    LABS:                        10.0   5.07  )-----------( 363      ( 24 Mar 2021 06:21 )             32.2     03-    140  |  108  |  23<H>  ----------------------------<  93  4.9   |  21  |  1.1    Ca    9.5      24 Mar 2021 06:21  Mg     2.1         TPro  6.5  /  Alb  3.3<L>  /  TBili  0.2  /  DBili  x   /  AST  10  /  ALT  5   /  AlkPhos  86        Serum Pro-Brain Natriuretic Peptide: 585 pg/mL (21 @ 15:29)    Trop <0.01, CKMB --, CK --, 21 @ 16:36        RADIOLOGY & ADDITIONAL TESTS:      Imaging or report Personally Reviewed:  [ ] YES  [ ] NO    Medications:  Standing  cyanocobalamin 1000 MICROGram(s) Oral daily  escitalopram 5 milliGRAM(s) Oral daily  furosemide    Tablet 20 milliGRAM(s) Oral daily  haloperidol     Tablet 1 milliGRAM(s) Oral every 12 hours  heparin   Injectable 5000 Unit(s) SubCutaneous every 12 hours  losartan 50 milliGRAM(s) Oral daily  triamcinolone 0.1% Ointment 1 Application(s) Topical every 12 hours    PRN Meds      Case discussed with resident    Care discussed with pt/family

## 2021-03-24 NOTE — PROGRESS NOTE ADULT - ASSESSMENT
79 YO F with PMH of HTN, DM type II, schizoaffective disorder, chronic LE edema, basal cell carcinoma of Left side of face s/p excision was sent in by family for worsening LE swelling and difficulty in taking care of her at home.     LE edema likely due to chronic venous stasis vs chronic lymphedema  No CHF  DM-2 / HTN  CKD-3  Abnormal EKG  Schizoaffective disorder  Basal cell carcinoma of L side of the face  Thyroid mass            PLAN:    ·	Care D/W the cardiology. No further cardiac W/U  ·	Cont current management. Waiting for placement.   ·	CTA chest is negative for PE  ·	CXR is unremarkable  ·	Venous doppler of LE is negative for PE.   ·	Cont her home meds  ·	Psych eval noted. Haldol PRN  ·	Local wound care of the wound on L side of the face  ·	Out pt endocrine F/U for thyroid biopsy  ·	PT eval  ·	Can D/C from medical standpoint    Progress Note Handoff    Pending (specify):  Consults_________, Tests________, Test Results_______, Other__SNF vs Adult home placement_______  Family discussion:  Disposition: Home___/SNF___/Other________/Unknown at this time________

## 2021-03-24 NOTE — PHYSICAL THERAPY INITIAL EVALUATION ADULT - PERTINENT HX OF CURRENT PROBLEM, REHAB EVAL
pt is an 79 y/o female admitted for 81 YO F with PMHx of HTN, DM type II, schizoaffective disorder, chronic LE edema, basal cell carcinoma of Left side of face s/p excision was sent in by family for worsening LE swelling and difficulty in taking care of her at home.

## 2021-03-24 NOTE — PROGRESS NOTE ADULT - ASSESSMENT
79 YO F with PMHx of HTN, pre-DM type II, schizoaffective disorder, chronic LE edema, basal cell carcinoma of Left side of face s/p excision was sent in by family for worsening LE swelling and difficulty in taking care of her at home.     #Chronic LE swelling with venous stasis dermatitis   - On Lasix 20mg PO QD at home   - . Troponin negative x 1  - Follow LE duplex> no sign of DVT or thrombophlebitis  - Dr Dipesh Razo is her son-in-law and prescribed her lasix  - Will give 20mg IV lasix and start home dose of 20mg PO QD from tomorrow   - ACE wraps and topical steroids     #TWI on EKG  - New TWI in precordial leads and II III and aVF  - Not present on EKG performed 20 days ago  - Denies any active chest pain  - Troponin x2 negative  - Admit to telemetry-No events on telemetry  - Seen by Dr. Gaspar who recommended Lasix 20mg PO and outpatient ischemic cardiac workup    #Schizoaffective disorder  - Patient non-compliant with psych meds and physician follow ups   - As per notes from previous admission will start Lexapro 5mg PO QHS and Haldol 1mg PO BID  - Follow psych consult. Currently AAO x 3 and denies any hallucinations     #Basal cell carcinoma s/p excision   - History of intermittent bleeding as per the family. Current bleeding noted   - Follow dermatology eval     #Right foot lipoma  - Evaluated by podiatry on previous admissions; Aspiration attempted, but no fluid produced. Outpatient follow up recommended     #HTN - Losartan 50mg PO QD    #Prediabetes   - Last HbA1C 5.9 % [05-29-20]   - A1c=6.2%  - Carb consistent diet     #Misc  - DVT Prophylaxis: Heparin   - Diet: DASH/TLC, carb consistent   - GI Prophylaxis: Not indicated   - Activity: AAT  - Code Status: Full code    Dispo: Admit to medicine   Pending: Outpatient cardiac ischemic workup, pending placement   79 YO F with PMHx of HTN, pre-DM type II, schizoaffective disorder, chronic LE edema, basal cell carcinoma of Left side of face s/p excision was sent in by family for worsening LE swelling and difficulty in taking care of her at home.     #Chronic LE swelling with venous stasis dermatitis   - On Lasix 20mg PO QD at home   - . Troponin negative x 1  - Follow LE duplex> no sign of DVT or thrombophlebitis  - Dr Dipesh Razo is her son-in-law and prescribed her lasix  - c/w home dose of 20mg PO QD   - ACE wraps and topical steroids     #TWI on EKG  - New TWI in precordial leads and II III and aVF  - Not present on EKG performed 20 days ago  - Denies any active chest pain  - Troponin x2 negative  - Admit to telemetry-No events on telemetry  - Seen by Dr. Gaspar who recommended Lasix 20mg PO and outpatient ischemic cardiac workup    #Schizoaffective disorder  - Patient non-compliant with psych meds and physician follow ups   - As per notes from previous admission will start Lexapro 5mg PO QHS and Haldol 1mg PO BID  - Follow psych consult. Currently AAO x 3 and denies any hallucinations     #Basal cell carcinoma s/p excision   - History of intermittent bleeding as per the family. Current bleeding noted   - Follow dermatology eval     #Right foot lipoma  - Evaluated by podiatry on previous admissions; Aspiration attempted, but no fluid produced. Outpatient follow up recommended     #HTN - Losartan 50mg PO QD    #Prediabetes   - Last HbA1C 5.9 % [05-29-20]   - A1c=6.2%  - Carb consistent diet     #Misc  - DVT Prophylaxis: Heparin   - Diet: DASH/TLC, carb consistent   - GI Prophylaxis: Not indicated   - Activity: AAT  - Code Status: Full code    Dispo: dc tele  Pending: Outpatient cardiac ischemic workup, pending placement

## 2021-03-25 ENCOUNTER — TRANSCRIPTION ENCOUNTER (OUTPATIENT)
Age: 81
End: 2021-03-25

## 2021-03-25 VITALS — OXYGEN SATURATION: 96 %

## 2021-03-25 LAB
GLUCOSE BLDC GLUCOMTR-MCNC: 105 MG/DL — HIGH (ref 70–99)
GLUCOSE BLDC GLUCOMTR-MCNC: 98 MG/DL — SIGNIFICANT CHANGE UP (ref 70–99)
RAPID RVP RESULT: SIGNIFICANT CHANGE UP
SARS-COV-2 RNA SPEC QL NAA+PROBE: SIGNIFICANT CHANGE UP

## 2021-03-25 PROCEDURE — 99238 HOSP IP/OBS DSCHRG MGMT 30/<: CPT

## 2021-03-25 RX ADMIN — HALOPERIDOL DECANOATE 1 MILLIGRAM(S): 100 INJECTION INTRAMUSCULAR at 17:14

## 2021-03-25 RX ADMIN — Medication 20 MILLIGRAM(S): at 06:08

## 2021-03-25 RX ADMIN — Medication 1 APPLICATION(S): at 06:08

## 2021-03-25 RX ADMIN — HEPARIN SODIUM 5000 UNIT(S): 5000 INJECTION INTRAVENOUS; SUBCUTANEOUS at 17:14

## 2021-03-25 RX ADMIN — Medication 1 APPLICATION(S): at 17:15

## 2021-03-25 RX ADMIN — HEPARIN SODIUM 5000 UNIT(S): 5000 INJECTION INTRAVENOUS; SUBCUTANEOUS at 06:08

## 2021-03-25 RX ADMIN — ESCITALOPRAM OXALATE 5 MILLIGRAM(S): 10 TABLET, FILM COATED ORAL at 11:47

## 2021-03-25 RX ADMIN — LOSARTAN POTASSIUM 50 MILLIGRAM(S): 100 TABLET, FILM COATED ORAL at 06:08

## 2021-03-25 RX ADMIN — PREGABALIN 1000 MICROGRAM(S): 225 CAPSULE ORAL at 11:47

## 2021-03-25 RX ADMIN — HALOPERIDOL DECANOATE 1 MILLIGRAM(S): 100 INJECTION INTRAMUSCULAR at 06:08

## 2021-03-25 NOTE — DISCHARGE NOTE PROVIDER - PROVIDER TOKENS
PROVIDER:[TOKEN:[[id number]],FOLLOWUP:[2 weeks]],PROVIDER:[TOKEN:[[id number]],FOLLOWUP:[2 weeks]]

## 2021-03-25 NOTE — PROGRESS NOTE ADULT - ASSESSMENT
81 YO F with PMH of HTN, DM type II, schizoaffective disorder, chronic LE edema, basal cell carcinoma of Left side of face s/p excision was sent in by family for worsening LE swelling and difficulty in taking care of her at home.     LE edema likely due to chronic venous stasis vs chronic lymphedema  No CHF  DM-2 - controlled with diet  HTN - controlled  CKD-3  Abnormal EKG  Schizoaffective disorder  Basal cell carcinoma of Left side of the face  Thyroid mass            PLAN:    ·	no further inpt workup per cardio  ·	Continue current management.   ·	Waiting for placement - STR vs long term  ·	Psych eval noted. Haldol PRN  ·	Outpt endocrine F/U for thyroid biopsy  ·	OOB and ambulate  ·	medically stable for discharge          PROGRESS NOTE HANDOFF    Pending: placement    Disposition: placement

## 2021-03-25 NOTE — DISCHARGE NOTE NURSING/CASE MANAGEMENT/SOCIAL WORK - PATIENT PORTAL LINK FT
You can access the FollowMyHealth Patient Portal offered by [facility] by registering at the following website: [email or web address]. By joining [facility]’s FollowMyHealth portal, you will also be able to view your health information using other applications (apps) compatible with our system.

## 2021-03-25 NOTE — DISCHARGE NOTE PROVIDER - NSDCMRMEDTOKEN_GEN_ALL_CORE_FT
aspirin 81 mg oral tablet, chewable: 1 tab(s) orally once a day  cyanocobalamin 1000 mcg oral tablet: 1 tab(s) orally once a day  furosemide 20 mg oral tablet: 1 tab(s) orally once a day  haloperidol 1 mg oral tablet: 1 tab(s) orally every 12 hours  Lexapro 5 mg oral tablet: 1 tab(s) orally once a day  losartan 50 mg oral tablet: 1 tab(s) orally once a day

## 2021-03-25 NOTE — PROGRESS NOTE ADULT - SUBJECTIVE AND OBJECTIVE BOX
AILEEN CHELA  80y Female    INTERVAL HPI/OVERNIGHT EVENTS:    No complaints. Lying comfortably in bed.    T(F): 97.4 (03-25-21 @ 10:14), Max: 99 (03-25-21 @ 06:08)  HR: 70 (03-25-21 @ 10:14) (70 - 93)  BP: 113/55 (03-25-21 @ 10:14) (113/55 - 142/60)  RR: 18 (03-25-21 @ 10:14) (18 - 20)  SpO2: 95% (03-25-21 @ 10:14) (95% - 95%) on RA    I&O's Summary    24 Mar 2021 07:01  -  25 Mar 2021 07:00  --------------------------------------------------------  IN: 1170 mL / OUT: 1350 mL / NET: -180 mL      CAPILLARY BLOOD GLUCOSE      POCT Blood Glucose.: 105 mg/dL (25 Mar 2021 11:17)  POCT Blood Glucose.: 98 mg/dL (25 Mar 2021 07:24)  POCT Blood Glucose.: 97 mg/dL (24 Mar 2021 16:54)  POCT Blood Glucose.: 109 mg/dL (24 Mar 2021 12:03)        PHYSICAL EXAM:  GENERAL: NAD  HEAD:  Normocephalic  EYES:  conjunctiva and sclera clear  ENMT: Moist mucous membranes  NECK: Supple  NERVOUS SYSTEM:  Alert, awake, Good concentration  CHEST/LUNG: Clear to percussion bilaterally  HEART: Regular rate and rhythm  ABDOMEN: Soft, Nontender, Nondistended  EXTREMITIES:  LE wrapped with ACE wraps      Consultant(s) Notes Reviewed:  [x ] YES  [ ] NO  Care Discussed with Consultants/Other Providers [ x] YES  [ ] NO    MEDICATIONS  (STANDING):  cyanocobalamin 1000 MICROGram(s) Oral daily  escitalopram 5 milliGRAM(s) Oral daily  furosemide    Tablet 20 milliGRAM(s) Oral daily  haloperidol     Tablet 1 milliGRAM(s) Oral every 12 hours  heparin   Injectable 5000 Unit(s) SubCutaneous every 12 hours  losartan 50 milliGRAM(s) Oral daily  triamcinolone 0.1% Ointment 1 Application(s) Topical every 12 hours    MEDICATIONS  (PRN):      LABS:                        10.0   5.07  )-----------( 363      ( 24 Mar 2021 06:21 )             32.2     03-24    140  |  108  |  23<H>  ----------------------------<  93  4.9   |  21  |  1.1    Ca    9.5      24 Mar 2021 06:21  Mg     2.1     03-24    TPro  6.5  /  Alb  3.3<L>  /  TBili  0.2  /  DBili  x   /  AST  10  /  ALT  5   /  AlkPhos  86  03-24          < from: Transthoracic Echocardiogram (05.29.20 @ 09:46) >  Summary:   1. Normal global left ventricular systolic function.   2. LV Ejection Fraction by Pond's Method with a biplane EF of 57 %.   3. Normal left ventricular internal cavity size.   4. Spectral Doppler shows impaired relaxation pattern of left ventricular myocardial filling (Grade I diastolic dysfunction).   5. Normal right atrial size.   6. There is no evidence of pericardial effusion.   7. Mild mitral annular calcification.   8. Mild thickening and calcification of the anterior and posterior mitral valve leaflets.   9. Mild aortic regurgitation.    < end of copied text >      Case discussed with PA    Care discussed with pt

## 2021-03-25 NOTE — DISCHARGE NOTE PROVIDER - NSDCCPCAREPLAN_GEN_ALL_CORE_FT
PRINCIPAL DISCHARGE DIAGNOSIS  Diagnosis: Edema  Assessment and Plan of Treatment: Bilateral LE  chronic  ace wrap      SECONDARY DISCHARGE DIAGNOSES  Diagnosis: S/P thyroid biopsy  Assessment and Plan of Treatment: follow up endocrinology    Diagnosis: Abnormal EKG  Assessment and Plan of Treatment: new T wave inversions  follow up Dr Razo outpatient for nuclear stress test  begin aspirin 81 mg daily    Diagnosis: Schizoaffective disorder  Assessment and Plan of Treatment: lexapro 5 mg daily  haldol 1 mg PO twice daily    Diagnosis: Ambulatory dysfunction  Assessment and Plan of Treatment: physical therapy     PRINCIPAL DISCHARGE DIAGNOSIS  Diagnosis: Edema  Assessment and Plan of Treatment: Bilateral LE  chronic  ace wrap      SECONDARY DISCHARGE DIAGNOSES  Diagnosis: Thyroid mass  Assessment and Plan of Treatment: follow up with Endocrinology for thyroid biopsy    Diagnosis: Abnormal EKG  Assessment and Plan of Treatment: new T wave inversions  follow up Dr Razo outpatient for nuclear stress test  begin aspirin 81 mg daily    Diagnosis: Schizoaffective disorder  Assessment and Plan of Treatment: lexapro 5 mg daily  haldol 1 mg PO twice daily    Diagnosis: Ambulatory dysfunction  Assessment and Plan of Treatment: physical therapy

## 2021-03-25 NOTE — DISCHARGE NOTE PROVIDER - CARE PROVIDER_API CALL
Dipesh Razo)  Cardiovascular Disease; Interventional Cardiology  [address] [place] [address]  Phone: [phone]  Fax: [phone]  Follow Up Time: 2 weeks    Austyn Alejandro)  Family Medicine  [address] [place] [address]  Phone: [phone]  Fax: [phone]  Follow Up Time: 2 weeks

## 2021-03-25 NOTE — DISCHARGE NOTE PROVIDER - HOSPITAL COURSE
81 YO F with PMHx of HTN, DM type II, schizoaffective disorder, chronic LE edema, basal cell carcinoma of Left side of face s/p excision was sent in by family for worsening LE swelling and difficulty in taking care of her at home.     #Chronic LE swelling with venous stasis dermatitis   - dc with home dose lasix 20 mg po qd  - follow up outpatient cardiology for nuclear stress test  - LE duplex neg for VTE  - ACE wraps and topical steroids     #TWI on EKG  - New TWI in precordial leads and II III and aVF  - Not present on EKG performed 20 days ago  - Denies any active chest pain  - Troponins negative  - continue aspirin  - Cardio consult: fu outpatient for nuclear stress test    #Schizoaffective disorder  - Patient non-compliant with psych meds and physician follow ups   - As per notes from previous admission will start Lexapro 5mg PO QHS and Haldol 1mg PO BID  - cleared from psychiatry, no need for IPP  - FU outpatient psych    #Basal cell carcinoma s/p excision   - History of intermittent bleeding as per the family. Current bleeding has stopped    #Right foot lipoma  - Evaluated by podiatry on previous admissions; Aspiration attempted, but no fluid produced. Outpatient follow up recommended     #HTN - Losartan 50mg PO QD    #Prediabetes   - Last HbA1C 5.9 % [05-29-20]   - repeat a1c 6.2  - Carb consistent diet   - FU outpatient PMD   79 YO F with PMHx of HTN, DM type II, schizoaffective disorder, chronic LE edema, basal cell carcinoma of Left side of face s/p excision was sent in by family for worsening LE swelling and difficulty in taking care of her at home.     #Chronic LE swelling with venous stasis dermatitis   - dc with home dose lasix 20 mg po qd  - follow up outpatient cardiology for nuclear stress test  - LE duplex neg for VTE  - ACE wraps and topical steroids     #TWI on EKG  - New TWI in precordial leads and II III and aVF  - Not present on EKG performed 20 days ago  - Denies any active chest pain  - Troponins negative  - continue aspirin  - Cardio consult: f/u outpatient for nuclear stress test    #Schizoaffective disorder  - Patient non-compliant with psych meds and physician follow ups   - As per notes from previous admission will start Lexapro 5mg PO QHS and Haldol 1mg PO BID  - cleared from psychiatry, no need for IPP  - FU outpatient psych    #Basal cell carcinoma s/p excision   - History of intermittent bleeding as per the family. Current bleeding has stopped    #Right foot lipoma  - Evaluated by podiatry on previous admissions; Aspiration attempted, but no fluid produced. Outpatient follow up recommended     #HTN - Losartan 50mg PO QD    #Prediabetes   - Last HbA1C 5.9 % [05-29-20]   - repeat a1c 6.2  - Carb consistent diet   - FU outpatient PMD

## 2021-03-25 NOTE — PROGRESS NOTE ADULT - REASON FOR ADMISSION
Worsening LE swelling

## 2021-04-03 NOTE — CDI QUERY NOTE - NSCDIOTHERTXTBX_GEN_ALL_CORE_HH
H/P 81 YO F with a PMH of obesity, HTN, DM2, HFpEF,  03/22 Attending note- HFpEF    03/22 Cardiology  PREVIOUS DIAGNOSTIC TESTING:    ECHO  FINDINGS: < from: Transthoracic Echocardiogram (05.29.20 @ 09:46) >  1. Normal global left ventricular systolic function.  2. LV Ejection Fraction by Pond's Method with a biplane EF of 57 %.  3. Normal left ventricular internal cavity size.  4. Spectral Doppler shows impaired relaxation pattern of left ventricular myocardial filling (Grade I diastolic dysfunction).  5. Normal right atrial size.  6. There is no evidence of pericardial effusion.  7. Mild mitral annular calcification.  8. Mild thickening and calcification of the anterior and posterior mitral valve leaflets.  9. Mild aortic regurgitation.    leg edema, possibly due to stasis and HTN? no evidence of pulmonary edema normal chest x-ray normal troponin  abnormal ECG, some new change of T inversion in inferolateral leads  non-compliant with meds, f/u  iv Lasix for today  then switch to oral, possibly will benefit from Aldactone    ?????????????? ?????? attending assessment:    No CHF.  - On Lasix 20mg PO QD at home   - . Troponin negative x 1    On pervious admission 05/28- 06.06 2020,  acc# [id number], - patient was diagnosed with Chronic diastolic CHF and treated with ACE andLasix      Clarification  Based on your clinical judgment and consideration of these clinical indicators, please clarify conflicting documentation regarding CHF:  • Chronic diastolic CHF  •   HFpEF  •  No CHF  • Other (please specify).  • Unable to determine

## 2021-04-06 DIAGNOSIS — F25.9 SCHIZOAFFECTIVE DISORDER, UNSPECIFIED: ICD-10-CM

## 2021-04-06 DIAGNOSIS — R26.2 DIFFICULTY IN WALKING, NOT ELSEWHERE CLASSIFIED: ICD-10-CM

## 2021-04-06 DIAGNOSIS — F03.90 UNSPECIFIED DEMENTIA, UNSPECIFIED SEVERITY, WITHOUT BEHAVIORAL DISTURBANCE, PSYCHOTIC DISTURBANCE, MOOD DISTURBANCE, AND ANXIETY: ICD-10-CM

## 2021-04-06 DIAGNOSIS — I25.10 ATHEROSCLEROTIC HEART DISEASE OF NATIVE CORONARY ARTERY WITHOUT ANGINA PECTORIS: ICD-10-CM

## 2021-04-06 DIAGNOSIS — C44.319 BASAL CELL CARCINOMA OF SKIN OF OTHER PARTS OF FACE: ICD-10-CM

## 2021-04-06 DIAGNOSIS — E53.8 DEFICIENCY OF OTHER SPECIFIED B GROUP VITAMINS: ICD-10-CM

## 2021-04-06 DIAGNOSIS — Z91.14 PATIENT'S OTHER NONCOMPLIANCE WITH MEDICATION REGIMEN: ICD-10-CM

## 2021-04-06 DIAGNOSIS — N18.30 CHRONIC KIDNEY DISEASE, STAGE 3 UNSPECIFIED: ICD-10-CM

## 2021-04-06 DIAGNOSIS — I87.2 VENOUS INSUFFICIENCY (CHRONIC) (PERIPHERAL): ICD-10-CM

## 2021-04-06 DIAGNOSIS — M79.89 OTHER SPECIFIED SOFT TISSUE DISORDERS: ICD-10-CM

## 2021-04-06 DIAGNOSIS — I12.9 HYPERTENSIVE CHRONIC KIDNEY DISEASE WITH STAGE 1 THROUGH STAGE 4 CHRONIC KIDNEY DISEASE, OR UNSPECIFIED CHRONIC KIDNEY DISEASE: ICD-10-CM

## 2021-04-06 DIAGNOSIS — D64.9 ANEMIA, UNSPECIFIED: ICD-10-CM

## 2021-04-06 DIAGNOSIS — R41.0 DISORIENTATION, UNSPECIFIED: ICD-10-CM

## 2021-04-06 DIAGNOSIS — E07.89 OTHER SPECIFIED DISORDERS OF THYROID: ICD-10-CM

## 2021-04-06 DIAGNOSIS — D17.23 BENIGN LIPOMATOUS NEOPLASM OF SKIN AND SUBCUTANEOUS TISSUE OF RIGHT LEG: ICD-10-CM

## 2021-04-06 DIAGNOSIS — R94.31 ABNORMAL ELECTROCARDIOGRAM [ECG] [EKG]: ICD-10-CM

## 2021-04-06 DIAGNOSIS — E11.22 TYPE 2 DIABETES MELLITUS WITH DIABETIC CHRONIC KIDNEY DISEASE: ICD-10-CM

## 2021-04-06 DIAGNOSIS — E66.9 OBESITY, UNSPECIFIED: ICD-10-CM

## 2021-04-07 NOTE — ED PROVIDER NOTE - PRINCIPAL DIAGNOSIS
How Severe Are They?: moderate Is This A New Presentation, Or A Follow-Up?: Scar Additional History: Patient reports piercing in the area in the past. Right foot pain

## 2021-04-28 NOTE — BEHAVIORAL HEALTH ASSESSMENT NOTE - ACCESS TO FIREARM
[General Appearance - Alert] : alert [General Appearance - In No Acute Distress] : in no acute distress [Sclera] : the sclera and conjunctiva were normal [Outer Ear] : the ears and nose were normal in appearance [Both Tympanic Membranes Were Examined] : both tympanic membranes were normal [Neck Appearance] : the appearance of the neck was normal [Respiration, Rhythm And Depth] : normal respiratory rhythm and effort [Heart Rate And Rhythm] : heart rate was normal and rhythm regular [Heart Sounds] : normal S1 and S2 [Abdomen Soft] : soft [Abdomen Tenderness] : non-tender [Cervical Lymph Nodes Enlarged Anterior Bilaterally] : anterior cervical [Supraclavicular Lymph Nodes Enlarged Bilaterally] : supraclavicular [No CVA Tenderness] : no ~M costovertebral angle tenderness [Motor Tone] : muscle strength and tone were normal [] : no rash [No Focal Deficits] : no focal deficits [Impaired Insight] : insight and judgment were intact [Mood] : the mood was normal [FreeTextEntry1] : tenderness Rt hand 2-5PIP; Lt hand 2-3PIP No

## 2021-07-01 NOTE — DISCHARGE NOTE PROVIDER - NSDCFUADDINST_GEN_ALL_CORE_FT
"    Assessment & Plan     Skin lesions  Patient is a 60-year-old female who presents to clinic due to 2 new lesions, one at left aspect of forehead and one at posterior aspect of right arm.  Patient has not been evaluated by dermatology or had skin check in the past but notes significant sun exposure earlier in life.  Vital signs normal.    Based on patient history, desire for skin check, and 2 new skin lesions, referral placed to dermatology for skin check as well as further evaluation of lesions.  Return precautions provided.  - ADULT DERMATOLOGY REFERRAL; Future    See Patient Instructions    Return if symptoms worsen or fail to improve.    Monique Govea PA-C  [facility] PATSY Solis is a 60 year old who presents for the following health issues     HPI     Dark spot the size of a pin head on right side of forehead, near hair line. She notes that she has also noticed a dark spot on right back side of arm.  Patient notes both areas are new.  No swelling, pain, bleeding, itching, drainage. No known changes in size, shape or color.     Patient notes that as a teenage she sun bathed often. She has had a few severe sunburns in the past. She often wears sunscreen now. She has never had a skin check and would like to schedule this. No personal or family history of skin cancers.     Review of Systems   Constitutional: Negative for chills, fever and unexpected weight change.   Respiratory: Negative for cough and shortness of breath.    Cardiovascular: Negative for chest pain.   Gastrointestinal: Negative for abdominal pain and nausea.   Skin: Negative for rash.   Neurological: Negative for light-headedness.   Psychiatric/Behavioral: The patient is not nervous/anxious.             Objective    /74   Pulse 74   Temp 97.9  F (36.6  C) (Tympanic)   Resp 14   Ht 1.651 m (5' 5\")   Wt 71.8 kg (158 lb 4 oz)   LMP 11/01/2007 (LMP Unknown)   SpO2 99%   Breastfeeding No   BMI 26.33 kg/m  "   Body mass index is 26.33 kg/m .  Physical Exam  Vitals signs and nursing note reviewed.   Constitutional:       General: She is not in acute distress.     Appearance: Normal appearance.   HENT:      Head: Normocephalic and atraumatic.      Mouth/Throat:      Mouth: Mucous membranes are moist.      Pharynx: Oropharynx is clear.   Eyes:      Extraocular Movements: Extraocular movements intact.      Pupils: Pupils are equal, round, and reactive to light.   Neck:      Musculoskeletal: Normal range of motion.   Cardiovascular:      Rate and Rhythm: Normal rate and regular rhythm.      Heart sounds: Normal heart sounds.   Pulmonary:      Effort: Pulmonary effort is normal.      Breath sounds: Normal breath sounds.   Musculoskeletal: Normal range of motion.   Skin:     General: Skin is warm and dry.      Comments: Approximately 0.5 cm irregularly shaped patchy light brown lesion on posterior aspect of right upper arm without erythema, swelling, tenderness palpation, discharge.    Approximately 0.25  Centimeter irregularly shaped darker brown lesion at left aspect of forehead along hairline without erythema, swelling, tenderness palpation, discharge.   Neurological:      General: No focal deficit present.      Mental Status: She is alert.   Psychiatric:         Mood and Affect: Mood normal.         Behavior: Behavior normal.                     FOLLOW UP ENDOCRINOLOGY  FOLLOW UP PODIATRY

## 2021-07-15 ENCOUNTER — EMERGENCY (EMERGENCY)
Facility: HOSPITAL | Age: 81
LOS: 0 days | Discharge: NURSING, REHAB OR LONG-TERM CARE | End: 2021-07-15
Attending: EMERGENCY MEDICINE | Admitting: EMERGENCY MEDICINE
Payer: MEDICARE

## 2021-07-15 VITALS
RESPIRATION RATE: 18 BRPM | HEART RATE: 72 BPM | DIASTOLIC BLOOD PRESSURE: 63 MMHG | HEIGHT: 65 IN | SYSTOLIC BLOOD PRESSURE: 139 MMHG | TEMPERATURE: 97 F | OXYGEN SATURATION: 95 %

## 2021-07-15 VITALS
RESPIRATION RATE: 18 BRPM | TEMPERATURE: 98 F | OXYGEN SATURATION: 97 % | HEART RATE: 73 BPM | SYSTOLIC BLOOD PRESSURE: 150 MMHG | DIASTOLIC BLOOD PRESSURE: 67 MMHG

## 2021-07-15 DIAGNOSIS — M79.661 PAIN IN RIGHT LOWER LEG: ICD-10-CM

## 2021-07-15 DIAGNOSIS — F20.9 SCHIZOPHRENIA, UNSPECIFIED: ICD-10-CM

## 2021-07-15 DIAGNOSIS — Z79.899 OTHER LONG TERM (CURRENT) DRUG THERAPY: ICD-10-CM

## 2021-07-15 DIAGNOSIS — M79.662 PAIN IN LEFT LOWER LEG: ICD-10-CM

## 2021-07-15 DIAGNOSIS — M79.10 MYALGIA, UNSPECIFIED SITE: ICD-10-CM

## 2021-07-15 DIAGNOSIS — Z86.39 PERSONAL HISTORY OF OTHER ENDOCRINE, NUTRITIONAL AND METABOLIC DISEASE: ICD-10-CM

## 2021-07-15 DIAGNOSIS — E11.9 TYPE 2 DIABETES MELLITUS WITHOUT COMPLICATIONS: ICD-10-CM

## 2021-07-15 DIAGNOSIS — I10 ESSENTIAL (PRIMARY) HYPERTENSION: ICD-10-CM

## 2021-07-15 DIAGNOSIS — F03.90 UNSPECIFIED DEMENTIA WITHOUT BEHAVIORAL DISTURBANCE: ICD-10-CM

## 2021-07-15 DIAGNOSIS — Z79.82 LONG TERM (CURRENT) USE OF ASPIRIN: ICD-10-CM

## 2021-07-15 LAB
ALBUMIN SERPL ELPH-MCNC: 4.1 G/DL — SIGNIFICANT CHANGE UP (ref 3.5–5.2)
ALP SERPL-CCNC: 106 U/L — SIGNIFICANT CHANGE UP (ref 30–115)
ALT FLD-CCNC: 8 U/L — SIGNIFICANT CHANGE UP (ref 0–41)
ANION GAP SERPL CALC-SCNC: 8 MMOL/L — SIGNIFICANT CHANGE UP (ref 7–14)
AST SERPL-CCNC: 12 U/L — SIGNIFICANT CHANGE UP (ref 0–41)
BASOPHILS # BLD AUTO: 0.06 K/UL — SIGNIFICANT CHANGE UP (ref 0–0.2)
BASOPHILS NFR BLD AUTO: 0.7 % — SIGNIFICANT CHANGE UP (ref 0–1)
BILIRUB SERPL-MCNC: <0.2 MG/DL — SIGNIFICANT CHANGE UP (ref 0.2–1.2)
BUN SERPL-MCNC: 34 MG/DL — HIGH (ref 10–20)
CALCIUM SERPL-MCNC: 10 MG/DL — SIGNIFICANT CHANGE UP (ref 8.5–10.1)
CHLORIDE SERPL-SCNC: 106 MMOL/L — SIGNIFICANT CHANGE UP (ref 98–110)
CO2 SERPL-SCNC: 25 MMOL/L — SIGNIFICANT CHANGE UP (ref 17–32)
CREAT SERPL-MCNC: 1.2 MG/DL — SIGNIFICANT CHANGE UP (ref 0.7–1.5)
EOSINOPHIL # BLD AUTO: 0.21 K/UL — SIGNIFICANT CHANGE UP (ref 0–0.7)
EOSINOPHIL NFR BLD AUTO: 2.3 % — SIGNIFICANT CHANGE UP (ref 0–8)
GLUCOSE SERPL-MCNC: 75 MG/DL — SIGNIFICANT CHANGE UP (ref 70–99)
HCT VFR BLD CALC: 29.3 % — LOW (ref 37–47)
HGB BLD-MCNC: 8.8 G/DL — LOW (ref 12–16)
IMM GRANULOCYTES NFR BLD AUTO: 0.3 % — SIGNIFICANT CHANGE UP (ref 0.1–0.3)
LYMPHOCYTES # BLD AUTO: 1.41 K/UL — SIGNIFICANT CHANGE UP (ref 1.2–3.4)
LYMPHOCYTES # BLD AUTO: 15.7 % — LOW (ref 20.5–51.1)
MAGNESIUM SERPL-MCNC: 2 MG/DL — SIGNIFICANT CHANGE UP (ref 1.8–2.4)
MCHC RBC-ENTMCNC: 27.4 PG — SIGNIFICANT CHANGE UP (ref 27–31)
MCHC RBC-ENTMCNC: 30 G/DL — LOW (ref 32–37)
MCV RBC AUTO: 91.3 FL — SIGNIFICANT CHANGE UP (ref 81–99)
MONOCYTES # BLD AUTO: 0.89 K/UL — HIGH (ref 0.1–0.6)
MONOCYTES NFR BLD AUTO: 9.9 % — HIGH (ref 1.7–9.3)
NEUTROPHILS # BLD AUTO: 6.4 K/UL — SIGNIFICANT CHANGE UP (ref 1.4–6.5)
NEUTROPHILS NFR BLD AUTO: 71.1 % — SIGNIFICANT CHANGE UP (ref 42.2–75.2)
NRBC # BLD: 0 /100 WBCS — SIGNIFICANT CHANGE UP (ref 0–0)
PLATELET # BLD AUTO: 324 K/UL — SIGNIFICANT CHANGE UP (ref 130–400)
POTASSIUM SERPL-MCNC: 4.6 MMOL/L — SIGNIFICANT CHANGE UP (ref 3.5–5)
POTASSIUM SERPL-SCNC: 4.6 MMOL/L — SIGNIFICANT CHANGE UP (ref 3.5–5)
PROT SERPL-MCNC: 7.5 G/DL — SIGNIFICANT CHANGE UP (ref 6–8)
RBC # BLD: 3.21 M/UL — LOW (ref 4.2–5.4)
RBC # FLD: 13.2 % — SIGNIFICANT CHANGE UP (ref 11.5–14.5)
SODIUM SERPL-SCNC: 139 MMOL/L — SIGNIFICANT CHANGE UP (ref 135–146)
WBC # BLD: 9 K/UL — SIGNIFICANT CHANGE UP (ref 4.8–10.8)
WBC # FLD AUTO: 9 K/UL — SIGNIFICANT CHANGE UP (ref 4.8–10.8)

## 2021-07-15 PROCEDURE — 99284 EMERGENCY DEPT VISIT MOD MDM: CPT

## 2021-07-15 NOTE — ED ADULT TRIAGE NOTE - CHIEF COMPLAINT QUOTE
pt BIBA from [facility] she called 911 herself c/o left arm numbness, pt states she gets numbness in her arms and legs for "sometime now" pt has no deficits in triage. Pt with psych hx non compliant with meds, multiple complaints about NH, currently denies SI/HI.

## 2021-07-15 NOTE — ED PROVIDER NOTE - CLINICAL SUMMARY MEDICAL DECISION MAKING FREE TEXT BOX
81 yo female PMH thyroid disease, mild dementia, HTN, DM, schizoaffective disorder, lives in a NH  came to ED c/o  cramping in her legs and arm, says the cramp was son bad this morning she cried out.  This has resolved since,, denies any acute/new complaints, states she likes food in NH and most of the staff.  No HA, neck pain, CP, SOB, abdominal pain, no urinary complaints, no fever or chills.   Well-appearing well-nourished elderly vemale resting on a stretcher, smiling and in NAD, head AT/NC, PERRL, pink conjunctivae,  mmm, nml oropharynx, nml phonation without drooling or trismus, supple neck without midline spine ttp, nml work of breathing, lungs CTA b/l, equal air entry, RRR, well-perfused extremities, distal pulses intact, abdomen obese soft, NT/ND, BS present in all quadrants, no midline spine or CVA ttp, minimal lower leg edema without unilateral calf swelling,  painless ROM at all joints, chronic cyst on the dorsum of the rt foot, A&Ox3, no focal neuro deficits, nml mood and affect.  Patient wants something to eat.  Will check labs, feed, plan d/c back to NH.  Patient is happy with the plan. 81 yo female PMH thyroid disease, mild dementia, HTN, DM, schizoaffective disorder, lives in a NH  came to ED c/o  cramping in her legs and arm, says the cramp was son bad this morning she cried out.  This has resolved since,, denies any acute/new complaints, states she likes food in NH and most of the staff.  No HA, neck pain, CP, SOB, abdominal pain, no black or bloody stools, no urinary complaints, no fever or chills.   Well-appearing well-nourished elderly vemale resting on a stretcher, smiling and in NAD, head AT/NC, PERRL, pink conjunctivae,  mmm, nml oropharynx, nml phonation without drooling or trismus, supple neck without midline spine ttp, nml work of breathing, lungs CTA b/l, equal air entry, RRR, well-perfused extremities, distal pulses intact, abdomen obese soft, NT/ND, BS present in all quadrants, no midline spine or CVA ttp, minimal lower leg edema without unilateral calf swelling,  painless ROM at all joints, chronic cyst on the dorsum of the rt foot, A&Ox3, no focal neuro deficits, nml mood and affect.  Patient wants something to eat.  Will check labs, feed, plan d/c back to NH.  Patient is happy with the plan.

## 2021-07-15 NOTE — ED PROVIDER NOTE - ATTENDING CONTRIBUTION TO CARE
81 yo female PMH thyroid disease, mild dementia, HTN, DM, schizoaffective disorder, lives in a NH  came to ED c/o  cramping in her legs and arm, says the cramp was son bad this morning she cried out.  This has resolved since,, denies any acute/new complaints, states she likes food in NH and most of the staff.  No HA, neck pain, CP, SOB, abdominal pain, no urinary complaints, no fever or chills.   Well-appearing well-nourished elderly vemale resting on a stretcher, smiling and in NAD, head AT/NC, PERRL, pink conjunctivae,  mmm, nml oropharynx, nml phonation without drooling or trismus, supple neck without midline spine ttp, nml work of breathing, lungs CTA b/l, equal air entry, RRR, well-perfused extremities, distal pulses intact, abdomen obese soft, NT/ND, BS present in all quadrants, no midline spine or CVA ttp, minimal lower leg edema without unilateral calf swelling,  painless ROM at all joints, chronic cyst on the dorsum of the rt foot, A&Ox3, no focal neuro deficits, nml mood and affect.  Patient wants something to eat.  Will check labs, feed, plan d/c back to NH.  Patient is happy with the plan. 81 yo female PMH thyroid disease, mild dementia, HTN, DM, schizoaffective disorder, lives in a NH  came to ED c/o  cramping in her legs and arm, says the cramp was son bad this morning she cried out.  This has resolved since,, denies any acute/new complaints, states she likes food in NH and most of the staff.  No HA, neck pain, CP, SOB, abdominal pain,  no diarrhea, lack or bloody stools, no urinary complaints, no fever or chills.   Well-appearing well-nourished elderly female resting on a stretcher, smiling and in NAD, head AT/NC, PERRL, pink conjunctivae,  mmm, nml oropharynx, nml phonation without drooling or trismus, supple neck without midline spine ttp, nml work of breathing, lungs CTA b/l, equal air entry, RRR, well-perfused extremities, distal pulses intact, abdomen obese soft, NT/ND, BS present in all quadrants, no midline spine or CVA ttp, minimal lower leg edema without unilateral calf swelling,  painless ROM at all joints, chronic cyst on the dorsum of the rt foot, A&Ox3, no focal neuro deficits, nml mood and affect.  Patient wants something to eat.  Will check labs, feed, plan d/c back to NH.  Patient is happy with the plan.

## 2021-07-15 NOTE — ED ADULT NURSE NOTE - CHIEF COMPLAINT QUOTE
pt BIBA from [place] she called 911 herself c/o left arm numbness, pt states she gets numbness in her arms and legs for "sometime now" pt has no deficits in triage. Pt with psych hx non compliant with meds, multiple complaints about NH, currently denies SI/HI.

## 2021-07-15 NOTE — ED PROVIDER NOTE - PROGRESS NOTE DETAILS
The patient appears very well, electrolytes are WNL, stable for d/c back to NH. Patient to be discharged from ED. Any available test results were discussed with patient and/or family. Verbal instructions given, including instructions to return to ED immediately for any new, worsening, or concerning symptoms. Patient endorsed understanding. Written discharge instructions additionally given, including follow-up plan.  Patient was given opportunity to ask questions.

## 2021-07-15 NOTE — ED PROVIDER NOTE - NSFOLLOWUPINSTRUCTIONS_ED_ALL_ED_FT
Leg Cramps    WHAT YOU NEED TO KNOW:    A leg cramp is a sudden, painful squeeze in your calf (lower leg) or thigh (upper leg) muscles. The muscle may twitch under the skin or feel hard. Your leg may feel sore long after the muscles relax.    DISCHARGE INSTRUCTIONS:    To stretch your leg: Warm up your muscles before you stretch. Take a short walk or run slowly in place. If you get leg cramps while you sleep, you may also want to stretch before bedtime. The following exercises stretch the calves and thighs to help stop or prevent leg cramps:   •To stretch your calf and heel: ?Stand up and place the palms of your hands against a wall.      ?Lean into the wall, with one leg behind the other. Bend the front leg and keep the back leg straight.      ?Press the heel of your back leg into the floor.      ?Hold for 15 to 30 seconds, then switch sides.      •To stretch the back of your knee and thigh: ?Sit on the floor with your legs straight in front of you. Do not point your toes.      ?Place the palms of your hands at your sides on the floor. Slide your hands past your hips toward your ankles.      ?Move toward your ankles until you feel a stretch in the back of your legs. Do not try to touch your head to your knees or round your back.      ?Hold for 30 seconds.        Drink plenty of liquids during exercise: Water and other liquids help prevent cramps by replacing fluids lost in sweat. Drink more liquids when you are active in hot weather.    To stop a leg cramp if it happens again:   •Stretch and massage your muscle to stop the cramp.      •Apply heat or ice packs to the cramp. A heating pad or hot pack may help relieve tight muscles. An ice pack or crushed ice in a bag, wrapped in a towel can soothe tender or sore muscles.      Take your medicine as directed: Call your healthcare provider if you think your medicine is not helping or if you have side effects. Tell him if you are taking any vitamins, herbs, or other medicines. Keep a list of the medicines you take. Include the amounts, and when and why you take them. Bring the list or the pill bottles to follow-up visits.    Follow up with your healthcare provider as directed: Write down any questions you have so you remember to ask them in your follow-up visits.    Contact your healthcare provider if:   •Your leg cramps get worse or happen more often.      •Your leg feels numb.      •Your leg cramps do not go away with stretching or massage.      •You feel dizzy, lightheaded, or confused when you exercise in hot weather. You may have a headache or blurred vision.         © Copyright [employer] 2021           back to top                          © Copyright [employer] 2021

## 2021-07-15 NOTE — ED ADULT NURSE NOTE - OBJECTIVE STATEMENT
pt BIBA from [facility] she called 911 herself c/o left arm numbness, pt states she gets numbness in her arms and legs for "sometime now" pt has no deficits in triage. Pt with psych hx non compliant with meds, multiple complaints about NH, currently denies SI/HI.

## 2021-07-15 NOTE — ED PROVIDER NOTE - OBJECTIVE STATEMENT
80 y.o. female with a PMH of schizoaffective d/o, HTN, and DM presented to the ER c/o brief episode of bilateral lower extremity cramps which resolved.  Pt feels fine now and is without complaints.  Denies fever, chills, chest pain, dizziness, SOB, extremity swelling.  No complaints.

## 2021-07-15 NOTE — ED PROVIDER NOTE - SKIN, MLM
(+)  cyst dorsum Right foot (chronic), pedal pulses intact, FROM, no motor or sensory deficit, no calf tenderness or edema bilaterally

## 2021-09-02 NOTE — ED PROVIDER NOTE - DISCHARGE DATE
Will ask MA to get imaging from [facility].      You do not have openings until end of November.  Will need to know where to add at [place] if needs to be seen ASAP?   01-Mar-2021

## 2021-10-07 ENCOUNTER — APPOINTMENT (OUTPATIENT)
Dept: PLASTIC SURGERY | Facility: CLINIC | Age: 81
End: 2021-10-07
Payer: MEDICARE

## 2021-10-07 VITALS — HEIGHT: 65 IN | BODY MASS INDEX: 31.65 KG/M2 | WEIGHT: 190 LBS

## 2021-10-07 PROCEDURE — 99203 OFFICE O/P NEW LOW 30 MIN: CPT

## 2021-10-07 NOTE — HISTORY OF PRESENT ILLNESS
[FreeTextEntry1] : 80 yr old woman\par schizzoaffective disorder\par longstanding left preauricular lesion--known BCC from biopsy 2019\par \par reisdent of [facility]

## 2021-10-07 NOTE — PHYSICAL EXAM
[NI] : Normal [de-identified] : large ulcerative lesion left preauricular region occupying cheek, tragus, and partial conchal bowl\par measure approx 4cm x 5cm  extending into preauricular canal; [de-identified] : no LAD appreciated

## 2021-10-07 NOTE — ASSESSMENT
[FreeTextEntry1] : left VM for Dr Razo\par \par ordered max/fac Ct w IV contrast\par \par would need subtotal auriculectomy / cheek excision and flap reconstruction if CT clear (ie no temporal bone involvement)\par \par need CT scan prior to definitive surgical plan being made\par \par RT remains a very reasonable option in this 80 yr old patient who has by history provided by family been recalcitrant to much medical advice re intervening on this left facial BCC when it was much smaller\par \par definitive plan pending\par \par Regarding the reconstruction after skin cancer  surgery, we discussed scarring, risk of repeat procedure, poor wound healing, need for additional revisionary surgery,asymmetry, dissatisfaction with the outcome, and unplanned surgery in the future.  All questions were answered.  All risks were well understood by the patient.\par \par Due to COVID 19, pre-visit patient instructions were explained to the patient and their symptoms were checked upon arrival.  \par Masks were used by the health care providers and staff and the examination room was cleaned after the patient visit was completed.\par \par Photos taken with patient permission.\par

## 2021-10-14 ENCOUNTER — APPOINTMENT (OUTPATIENT)
Dept: OTOLARYNGOLOGY | Facility: CLINIC | Age: 81
End: 2021-10-14
Payer: MEDICARE

## 2021-10-14 PROCEDURE — 99204 OFFICE O/P NEW MOD 45 MIN: CPT

## 2021-10-14 RX ORDER — RISPERIDONE 1 MG/1
1 TABLET ORAL
Refills: 0 | Status: ACTIVE | COMMUNITY

## 2021-10-14 RX ORDER — ACETAMINOPHEN 325 MG/1
325 TABLET ORAL
Refills: 0 | Status: ACTIVE | COMMUNITY

## 2021-10-14 RX ORDER — FOLIC ACID 1 MG/1
1 TABLET ORAL
Refills: 0 | Status: ACTIVE | COMMUNITY

## 2021-10-14 RX ORDER — ASPIRIN 81 MG
81 TABLET, DELAYED RELEASE (ENTERIC COATED) ORAL
Refills: 0 | Status: ACTIVE | COMMUNITY

## 2021-10-14 RX ORDER — ESCITALOPRAM OXALATE 5 MG/1
5 TABLET, FILM COATED ORAL
Refills: 0 | Status: ACTIVE | COMMUNITY

## 2021-10-14 RX ORDER — CYANOCOBALAMIN (VITAMIN B-12) 1000 MCG
TABLET ORAL
Refills: 0 | Status: ACTIVE | COMMUNITY

## 2021-10-14 RX ORDER — LOSARTAN POTASSIUM 50 MG/1
50 TABLET, FILM COATED ORAL
Refills: 0 | Status: ACTIVE | COMMUNITY

## 2021-10-14 NOTE — PHYSICAL EXAM
[Normal] : mucosa is normal [Midline] : trachea located in midline position [de-identified] : bcc of left ear/neck [de-identified] : tumor left ear lobe extended to neck area, bloody and crusted in appearance.

## 2021-10-14 NOTE — HISTORY OF PRESENT ILLNESS
[de-identified] : PAtient presents today c/o skin cancer near left ear .  . Patient was referred by Dr. Méndez dermatologist.  No procedure was performed at there office. no biopsy was performed.  Pain only when lesion is washed , at times bleeds.  Has had it for a year , has been growing in size .  No hearing loss, She uses hydrogen to clean ears.

## 2021-10-27 ENCOUNTER — APPOINTMENT (OUTPATIENT)
Dept: HEMATOLOGY ONCOLOGY | Facility: CLINIC | Age: 81
End: 2021-10-27
Payer: MEDICARE

## 2021-10-27 VITALS
HEART RATE: 92 BPM | BODY MASS INDEX: 36.65 KG/M2 | TEMPERATURE: 97.6 F | DIASTOLIC BLOOD PRESSURE: 60 MMHG | WEIGHT: 220 LBS | SYSTOLIC BLOOD PRESSURE: 128 MMHG | HEIGHT: 65 IN

## 2021-10-27 PROCEDURE — 99205 OFFICE O/P NEW HI 60 MIN: CPT

## 2021-10-27 NOTE — PHYSICAL EXAM
[Normal] : affect appropriate [de-identified] : uses wheelchair  [de-identified] : left ear lesion with bleeding ; right foot cyst

## 2021-10-27 NOTE — HISTORY OF PRESENT ILLNESS
[de-identified] : 81 yo F with hx of HTN, schizoaffective disorder, with hx of left ear lesion since 4 years. Pt had a biopsy done with dermatology in 2017, which was diagnosed as basal cell cancer. 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 \par Now the lesion has progressed and bleeds very frequently, so the family wants to pursue the treatment. \par Pt was seen by plastic surgery Dr Van, who offered surgery to the mpt, but wants to get a CT scan first to evaluate the extent of the disease. Pt was also seen by ENT, and a PET scan was ordered and pt was referred to medical oncology. \par \par Pt today in a wheelchair, [facility] Pt feels well. Denied any complaints today. Pt does endorse the lesion to be easily bleeding. \par \par Of note, pt also has a 6cm  heterogeneous lesion extending from the inferior right thyroid lobe into the superior mediastinum, for which no workup was done except US thyroid. \par \par CT Angio chest 3/2021\par IMPRESSION:\par Since May 28, 2020;\par 1.  No pulmonary embolus.\par 2.  Stable 6 cm heterogeneous lesion extending from the inferior right thyroid lobe into the superior mediastinum. Based on sonographic findings from May 29, 2020, recommend fine needle aspiration.\par \par US thyroid/parathyroid 5/2020\par \par Impression: Multiple thyroid nodules, the largest of which measures up to 6.9 cm with mediastinal extension, for which an ultrasound-guided fine-needle aspiration is recommended.\par Nodule 1: 2.6 cm, right lower pole, TR 4 - FNA if = 1.5cm, follow-up if 1 - 1.4 cm.\par Recommendation: Ultrasound-guided fine-needle aspiration.\par Nodule 2: 0.8 cm, right midpole, TR 4 - FNA if = 1.5cm, follow-up if 1 - 1.4 cm.\par Recommendation: No further follow-up.\par Nodule 3: 6.9 cm, right lower pole, TR 5 - FNA if = 1cm, follow-up if 0.5 - 0.9 cm.\par Recommendation: Ultrasound-guided fine-needle aspiration.\par \par CT C/A/P 5/2020\par \par IMPRESSION:\par 1.  No CT evidence of acute traumatic injury to the chest, abdomen or pelvis.\par 2.  Hyperdensities within the gallbladder and CBD with associated mild pericholecystic fat stranding. Findings are consistent with cholelithiasis and choledocholithiasis. The CBD measures 7 mm (series 5/309). If there is clinical concern for acute cholecystitis, right upper quadrant ultrasound may be obtained.\par 3.  6.1 cm heterogeneous lesion within the superior mediastinum, possibly arising from the right thyroid gland, suspicious for malignancy or goiter. Recommend a thyroid ultrasound for further evaluation.\par \par CT Head 5/2020\par IMPRESSION: \par 1.  No evidence of acute intracranial pathology.\par 2.  Moderate chronic microvascular changes.\par 3.  Left sphenoid sinus opacification as described.\par \par Biopsy of skin below left ear\par   Received:  12/20/17\par   INDICATION(S) FOR PROCEDURE(S): BCC\par SURGICAL PROCEDURE:: Shave biopsy\par  SPECIMEN: Skin below left ear\par GROSS DESCRIPTION:\par  * * FINAL DIAGNOSIS * *:\par SKIN BELOW LEFT EAR, SHAVE BIOPSY:\par : -  BASAL CELL CARCINOMA EXTENDING TO THE DEEP RESECTION MARGIN.\par   : PERIPHERAL MARGINS ARE FREE.\par : -    SKIN SHOWS ULCERATION, ACUTE INFLAMMATION AND CRUST FORMATION\par  [de-identified] : Basal cell carcinoma

## 2021-10-27 NOTE — REVIEW OF SYSTEMS
[Negative] : Heme/Lymph [FreeTextEntry4] : left ear pain on washing [de-identified] : foot cyst right

## 2021-10-27 NOTE — ASSESSMENT
[FreeTextEntry1] : 81 yo F with hx of HTN, schizoaffective disorder, with hx of left ear lesion since 4 years. Pt had a biopsy done with dermatology in 2017, which was diagnosed as basal cell cancer. 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 \par Now the lesion has progressed and bleeds very frequently, so the family wants to pursue the treatment. \par Pt was seen by plastic surgery Dr Van, who offered surgery to the mpt, but wants to get a CT scan first to evaluate the extent of the disease. Pt was also seen by ENT, and a PET scan was ordered and pt was referred to medical oncology. \par \par #Basal cell carcinoma of the left ear and auricle -High risk\par -pt seen by plastic surgery and ENT -recommending CT scan and PET scan to evaluate the extent of the disease\par -Pt needs to follow up with Plastic surgery post the imaging, to see if she is a a candidate for surgical resection as that would be the 1st line treatment\par -IF pt is deemed not to be a surgical candidate, then she would need to be evalauted by rad/onc for possible RT. \par -If pt is deemed to be not curative, and cannot do surgery and RT, we can offer systemic therapy with Vismodegib or Sonidegib. \par \par #6cm heterogenous lesion extending from the inferior right thyroid lobe into the superior mediastinum\par -No workup done\par -Spoke with daughter about getting endocrinology eval for possible FNA\par \par RTC 5 weeks with cbc, bmp, lfts

## 2021-10-27 NOTE — REASON FOR VISIT
[Initial Consultation] : an initial consultation [Formal Caregiver] : formal caregiver [FreeTextEntry2] : basal cell carcinoma

## 2021-10-28 ENCOUNTER — NON-APPOINTMENT (OUTPATIENT)
Age: 81
End: 2021-10-28

## 2021-11-02 ENCOUNTER — OUTPATIENT (OUTPATIENT)
Dept: OUTPATIENT SERVICES | Facility: HOSPITAL | Age: 81
LOS: 1 days | Discharge: HOME | End: 2021-11-02
Payer: MEDICARE

## 2021-11-02 ENCOUNTER — RESULT REVIEW (OUTPATIENT)
Age: 81
End: 2021-11-02

## 2021-11-02 DIAGNOSIS — H66.90 OTITIS MEDIA, UNSPECIFIED, UNSPECIFIED EAR: ICD-10-CM

## 2021-11-02 PROCEDURE — 70543 MRI ORBT/FAC/NCK W/O &W/DYE: CPT | Mod: 26

## 2021-11-17 ENCOUNTER — OUTPATIENT (OUTPATIENT)
Dept: OUTPATIENT SERVICES | Facility: HOSPITAL | Age: 81
LOS: 1 days | Discharge: HOME | End: 2021-11-17
Payer: MEDICARE

## 2021-11-17 ENCOUNTER — RESULT REVIEW (OUTPATIENT)
Age: 81
End: 2021-11-17

## 2021-11-17 DIAGNOSIS — C44.310 BASAL CELL CARCINOMA OF SKIN OF UNSPECIFIED PARTS OF FACE: ICD-10-CM

## 2021-11-17 LAB — GLUCOSE BLDC GLUCOMTR-MCNC: 116 MG/DL — HIGH (ref 70–99)

## 2021-11-17 PROCEDURE — 78815 PET IMAGE W/CT SKULL-THIGH: CPT | Mod: 26,PI

## 2021-11-23 ENCOUNTER — APPOINTMENT (OUTPATIENT)
Dept: OTOLARYNGOLOGY | Facility: CLINIC | Age: 81
End: 2021-11-23
Payer: MEDICARE

## 2021-11-23 DIAGNOSIS — H93.8X2 OTHER SPECIFIED DISORDERS OF LEFT EAR: ICD-10-CM

## 2021-11-23 DIAGNOSIS — E07.89 OTHER SPECIFIED DISORDERS OF THYROID: ICD-10-CM

## 2021-11-23 PROCEDURE — 99213 OFFICE O/P EST LOW 20 MIN: CPT

## 2021-11-23 NOTE — ASSESSMENT
[FreeTextEntry1] : Will discuss with multidisciplinary tumor, will pursue surgical intervention as primary treatment.

## 2021-11-23 NOTE — PHYSICAL EXAM
[Normal] : normal appearance, well groomed, well nourished, and in no acute distress [de-identified] : bcc of ear neck [de-identified] : tumor left ear lobe extended to neck area.

## 2021-11-23 NOTE — HISTORY OF PRESENT ILLNESS
[FreeTextEntry1] : Patient returns today following up on  ear mass, basal cell carcinoma . She had MRi and Pet performed, here to discuss results .

## 2021-11-23 NOTE — REASON FOR VISIT
[Subsequent Evaluation] : a subsequent evaluation for [FreeTextEntry2] : ear mass, basal cell carcinoma

## 2021-11-23 NOTE — DATA REVIEWED
[de-identified] : EXAM:  MR ORBIT FACE AND ORNECK WAWIC\par \par \par PROCEDURE DATE:  11/02/2021\par \par \par \par \par INTERPRETATION:  INDICATION:  Basal cell carcinoma left side of face.\par \par TECHNIQUE:  Multiplanar T1 weighted and STIR images were obtained before contrast.  Diffusion-weighted images were obtained. Following intravenous gadolinium, multiplanar T1 weighted fat suppressed images were obtained. 9 cc of Gadavist were administered. 1 cc were discarded.\par \par COMPARISON EXAMINATION:  Correlation with CT of the cervical spine dated 5/28/2020 as well as CT of the chest dated 3/1/2021.\par \par FINDINGS:\par \par NECK SOFT TISSUES: There is a 2.9 x 1.6 x 2.5 cm (AP x TR x CC) enhancing soft tissue mass which is centered in the infra-auricular skin with involvement of the lower aspect of the ear. The mass appears to directly abut the superficial lobe of the parotid without invasion.\par AERODIGESTIVE TRACT: There is no abnormal nodular or masslike enhancement along the aerodigestive tract.\par LYMPH NODES:  No adenopathy.\par PAROTID GLANDS:  A 6 mm T2 bright nodule is noted involving the deep lobe of the parotid likely representing a intraparotid lymph node. Parotid glands are otherwise unremarkable\par SUBMANDIBULAR GLANDS:  Normal.\par THYROID GLAND:  The right thyroid lobe is partially visualized though appears enlarged..\par BONES:  Normal.\par SINONASAL CAVITY: There is opacification of the left sphenoid sinus. Paranasal sinuses are otherwise unremarkable.\par MISCELLANEOUS:  None.\par \par IMPRESSION:\par \par Enhancing lobulated soft tissue mass centered within the left infra-auricular skin with involvement of the ear lobule consistent with clinical history of basal cell carcinoma. No evidence of parotid invasion.\par \par No lymphadenopathy.\par \par Partially visualized enlarged right thyroid lobe. Further evaluation with thyroid ultrasound can be obtained.\par \par --- End of Report ---\par  EXAM:  PETCT SKUL-THI ONC FDG INIT\par \par \par PROCEDURE DATE:  11/17/2021\par \par \par \par \par INTERPRETATION:  FDG PET CT STUDY, (EXTRA HEAD / NECK ACQUISITION) INITIAL TREATMENT STRATEGY\par REASON: TUMOR IMAGING - PET with concurrently acquired CT for attenuation correction and anatomic localization; skull base to mid - thigh / [id number]\par \par CLINICAL HISTORY / REASON FOR EXAM: 81-year-old female with basal cell carcinoma of the left ear, initially diagnosed in 2017 (not treated), now with lesion progression and frequent bleeding.\par \par TECHNIQUE:  Blood glucose pre injection 116 mg/dL.  Approximately 45 minutes after the intravenous administration of 10.9 mCi 18-Fluorine FDG, whole body PET images were acquired from base of skull to mid - thigh. Repeat PET acquisition over the head and neck were acquired with the patient?s head in a ?head - cloud?, and special instructions to avoid motion.  In addition, non-contrast, low dose, non - diagnostic CT was acquired for attenuation correction and anatomic correlation purposes only, for both sets of acquisitions.\par \par COMPARISON: Correlation made with MRI orbits dated November 2, 2021, which demonstrated a enhancing lobulated soft tissue mass centered in the left infra-auricular skin involving the ear lobule; no parotid invasion or lymphadenopathy. Thyroid ultrasound dated May 29, 2020\par \par FINDINGS:\par \par HEAD/FACE/NECK: Intensely FDG avid 3.0 x 2.3 x 1.6 cm mass centered in the left ear infra-auricular skin, involving the ear lobe and abutting the parotid gland (max SUV 21.2). No pathologically FDG avid cervical lymph nodes.\par \par Large heterogeneous right thyroid lobe goiter extending into the superior mediastinum with FDG avid poorly defined lobulated calcified nodule/s, likely corresponding to suspicious nodules seen on prior ultrasound (max SUV 7.4).\par \par Physiologic FDG uptake is seen in the visualized regions of the brain, large salivary glands and oropharynx.\par Physiologic FDG uptake is seen in neck muscles.\par \par CHEST: Physiologic FDG avidity is seen in mediastinal blood pool, myocardium.\par \par LUNGS: No abnormal uptake.\par \par PLEURA/PERICARDIUM: No abnormal uptake.\par \par THORACIC NODES: FDG avid mediastinal and bilateral hilar lymph nodes. For example:\par -Precarinal 2.2 x 1.4 cm lymph node (max SUV 6.2)\par -Multiple left hilar lymph nodes (max SUV 9.4)\par -Right infrahilar 1.5 x 1.2 cm lymph node (max SUV 10.1)\par \par HEPATOBILIARY: No abnormal uptake.\par \par SPLEEN: No abnormal uptake.\par \par PANCREAS: No abnormal uptake.\par \par ADRENAL GLANDS: No abnormal uptake.\par \par KIDNEYS/URETERS/BLADDER: Excreted activity is seen.\par \par ABDOMINOPELVIC NODES: No abnormal uptake.\par \par BOWEL/PERITONEUM/MESENTERY: No abnormal uptake.\par \par PELVIC ORGANS: No abnormal uptake.\par \par BONES/SOFT TISSUES: No abnormal uptake.\par \par OTHER: Bibasilar dependent atelectasis. Cholelithiasis and probable choledocholithiasis. Small hiatal hernia. Colonic diverticulosis. Atherosclerotic vascular calcifications.\par \par IMPRESSION:\par 1.  Intensely FDG avid 3 cm left infra-auricular mass involving the ear lobe and abutting the parotid gland, compatible with known basal cell carcinoma (max SUV 21.2); no FDG avid bilateral cervical lymphadenopathy.\par \par 2.  Nonspecific FDG avid mediastinal and bilateral hilar lymph nodes (max SUV 10.1 in a right infrahilar lymph node). Differential includes neoplastic or inflammatory etiologies.\par \par 3.  Large heterogeneous right thyroid lobe goiter extending into the superior mediastinum with sites of FDG uptake, likely corresponding to suspicious nodules seen on prior ultrasound (max SUV 7.4). Based on sonographic findings from May 29, 2020, fine-needle aspiration of these nodules recommended.\par \par 4.  No additional sites of abnormal FDG uptake.\par \par --- End of Report ---\par \par  \par

## 2021-11-24 ENCOUNTER — RESULT REVIEW (OUTPATIENT)
Age: 81
End: 2021-11-24

## 2021-12-01 ENCOUNTER — INPATIENT (INPATIENT)
Facility: HOSPITAL | Age: 81
LOS: 3 days | Discharge: NURSING, REHAB OR LONG-TERM CARE | End: 2021-12-05
Attending: HOSPITALIST | Admitting: HOSPITALIST
Payer: MEDICARE

## 2021-12-01 ENCOUNTER — NON-APPOINTMENT (OUTPATIENT)
Age: 81
End: 2021-12-01

## 2021-12-01 ENCOUNTER — LABORATORY RESULT (OUTPATIENT)
Age: 81
End: 2021-12-01

## 2021-12-01 ENCOUNTER — APPOINTMENT (OUTPATIENT)
Dept: HEMATOLOGY ONCOLOGY | Facility: CLINIC | Age: 81
End: 2021-12-01
Payer: MEDICARE

## 2021-12-01 ENCOUNTER — OUTPATIENT (OUTPATIENT)
Dept: OUTPATIENT SERVICES | Facility: HOSPITAL | Age: 81
LOS: 1 days | Discharge: HOME | End: 2021-12-01

## 2021-12-01 VITALS
SYSTOLIC BLOOD PRESSURE: 88 MMHG | WEIGHT: 226 LBS | DIASTOLIC BLOOD PRESSURE: 44 MMHG | HEIGHT: 65 IN | HEART RATE: 91 BPM | BODY MASS INDEX: 37.65 KG/M2 | TEMPERATURE: 97.1 F

## 2021-12-01 VITALS
RESPIRATION RATE: 18 BRPM | HEIGHT: 65 IN | SYSTOLIC BLOOD PRESSURE: 121 MMHG | OXYGEN SATURATION: 100 % | TEMPERATURE: 98 F | DIASTOLIC BLOOD PRESSURE: 72 MMHG | HEART RATE: 73 BPM

## 2021-12-01 DIAGNOSIS — C44.310 BASAL CELL CARCINOMA OF SKIN OF UNSPECIFIED PARTS OF FACE: ICD-10-CM

## 2021-12-01 DIAGNOSIS — E07.89 OTHER SPECIFIED DISORDERS OF THYROID: ICD-10-CM

## 2021-12-01 LAB
ABO RH CONFIRMATION: SIGNIFICANT CHANGE UP
ALBUMIN SERPL ELPH-MCNC: 3.5 G/DL — SIGNIFICANT CHANGE UP (ref 3.5–5.2)
ALP SERPL-CCNC: 105 U/L — SIGNIFICANT CHANGE UP (ref 30–115)
ALT FLD-CCNC: 7 U/L — SIGNIFICANT CHANGE UP (ref 0–41)
ANION GAP SERPL CALC-SCNC: 14 MMOL/L — SIGNIFICANT CHANGE UP (ref 7–14)
ANISOCYTOSIS BLD QL: SIGNIFICANT CHANGE UP
APTT BLD: 29.3 SEC — SIGNIFICANT CHANGE UP (ref 27–39.2)
AST SERPL-CCNC: 8 U/L — SIGNIFICANT CHANGE UP (ref 0–41)
BASOPHILS # BLD AUTO: 0.11 K/UL — SIGNIFICANT CHANGE UP (ref 0–0.2)
BASOPHILS NFR BLD AUTO: 0.9 % — SIGNIFICANT CHANGE UP (ref 0–1)
BILIRUB SERPL-MCNC: <0.2 MG/DL — SIGNIFICANT CHANGE UP (ref 0.2–1.2)
BLD GP AB SCN SERPL QL: SIGNIFICANT CHANGE UP
BUN SERPL-MCNC: 25 MG/DL — HIGH (ref 10–20)
CALCIUM SERPL-MCNC: 9.3 MG/DL — SIGNIFICANT CHANGE UP (ref 8.5–10.1)
CHLORIDE SERPL-SCNC: 102 MMOL/L — SIGNIFICANT CHANGE UP (ref 98–110)
CO2 SERPL-SCNC: 19 MMOL/L — SIGNIFICANT CHANGE UP (ref 17–32)
CREAT SERPL-MCNC: 1.4 MG/DL — SIGNIFICANT CHANGE UP (ref 0.7–1.5)
EOSINOPHIL # BLD AUTO: 0.11 K/UL — SIGNIFICANT CHANGE UP (ref 0–0.7)
EOSINOPHIL NFR BLD AUTO: 0.9 % — SIGNIFICANT CHANGE UP (ref 0–8)
GIANT PLATELETS BLD QL SMEAR: PRESENT — SIGNIFICANT CHANGE UP
GLUCOSE SERPL-MCNC: 104 MG/DL — HIGH (ref 70–99)
HCT VFR BLD CALC: 18.1 % — LOW (ref 37–47)
HCT VFR BLD CALC: 18.4 %
HGB BLD-MCNC: 4.8 G/DL — CRITICAL LOW (ref 12–16)
HGB BLD-MCNC: 4.9 G/DL
HYPOCHROMIA BLD QL: SIGNIFICANT CHANGE UP
INR BLD: 1.04 RATIO — SIGNIFICANT CHANGE UP (ref 0.65–1.3)
LYMPHOCYTES # BLD AUTO: 1.78 K/UL — SIGNIFICANT CHANGE UP (ref 1.2–3.4)
LYMPHOCYTES # BLD AUTO: 14.8 % — LOW (ref 20.5–51.1)
MANUAL SMEAR VERIFICATION: SIGNIFICANT CHANGE UP
MCHC RBC-ENTMCNC: 19.4 PG — LOW (ref 27–31)
MCHC RBC-ENTMCNC: 19.6 PG
MCHC RBC-ENTMCNC: 26.5 G/DL — LOW (ref 32–37)
MCHC RBC-ENTMCNC: 26.6 G/DL
MCV RBC AUTO: 73 FL — LOW (ref 81–99)
MCV RBC AUTO: 73.6 FL
MICROCYTES BLD QL: SIGNIFICANT CHANGE UP
MONOCYTES # BLD AUTO: 0.63 K/UL — HIGH (ref 0.1–0.6)
MONOCYTES NFR BLD AUTO: 5.2 % — SIGNIFICANT CHANGE UP (ref 1.7–9.3)
NEUTROPHILS # BLD AUTO: 9.41 K/UL — HIGH (ref 1.4–6.5)
NEUTROPHILS NFR BLD AUTO: 78.2 % — HIGH (ref 42.2–75.2)
OVALOCYTES BLD QL SMEAR: SLIGHT — SIGNIFICANT CHANGE UP
PLAT MORPH BLD: NORMAL — SIGNIFICANT CHANGE UP
PLATELET # BLD AUTO: 390 K/UL
PLATELET # BLD AUTO: 436 K/UL — HIGH (ref 130–400)
PMV BLD: 8.3 FL
POIKILOCYTOSIS BLD QL AUTO: SLIGHT — SIGNIFICANT CHANGE UP
POLYCHROMASIA BLD QL SMEAR: SIGNIFICANT CHANGE UP
POTASSIUM SERPL-MCNC: 4.8 MMOL/L — SIGNIFICANT CHANGE UP (ref 3.5–5)
POTASSIUM SERPL-SCNC: 4.8 MMOL/L — SIGNIFICANT CHANGE UP (ref 3.5–5)
PROT SERPL-MCNC: 7.3 G/DL — SIGNIFICANT CHANGE UP (ref 6–8)
PROTHROM AB SERPL-ACNC: 11.9 SEC — SIGNIFICANT CHANGE UP (ref 9.95–12.87)
RBC # BLD: 2.48 M/UL — LOW (ref 4.2–5.4)
RBC # BLD: 2.5 M/UL
RBC # FLD: 17.2 % — HIGH (ref 11.5–14.5)
RBC # FLD: 17.4 %
RBC BLD AUTO: SIGNIFICANT CHANGE UP
SARS-COV-2 RNA SPEC QL NAA+PROBE: SIGNIFICANT CHANGE UP
SCHISTOCYTES BLD QL AUTO: SLIGHT — SIGNIFICANT CHANGE UP
SODIUM SERPL-SCNC: 135 MMOL/L — SIGNIFICANT CHANGE UP (ref 135–146)
WBC # BLD: 12.03 K/UL — HIGH (ref 4.8–10.8)
WBC # FLD AUTO: 10.43 K/UL
WBC # FLD AUTO: 12.03 K/UL — HIGH (ref 4.8–10.8)

## 2021-12-01 PROCEDURE — 71045 X-RAY EXAM CHEST 1 VIEW: CPT | Mod: 26

## 2021-12-01 PROCEDURE — 99284 EMERGENCY DEPT VISIT MOD MDM: CPT

## 2021-12-01 PROCEDURE — 93010 ELECTROCARDIOGRAM REPORT: CPT

## 2021-12-01 PROCEDURE — 99221 1ST HOSP IP/OBS SF/LOW 40: CPT | Mod: GC

## 2021-12-01 PROCEDURE — 99214 OFFICE O/P EST MOD 30 MIN: CPT

## 2021-12-01 RX ORDER — LOSARTAN POTASSIUM 100 MG/1
50 TABLET, FILM COATED ORAL DAILY
Refills: 0 | Status: DISCONTINUED | OUTPATIENT
Start: 2021-12-01 | End: 2021-12-05

## 2021-12-01 RX ORDER — HALOPERIDOL DECANOATE 100 MG/ML
1 INJECTION INTRAMUSCULAR EVERY 12 HOURS
Refills: 0 | Status: DISCONTINUED | OUTPATIENT
Start: 2021-12-01 | End: 2021-12-02

## 2021-12-01 RX ORDER — ASPIRIN/CALCIUM CARB/MAGNESIUM 324 MG
81 TABLET ORAL DAILY
Refills: 0 | Status: DISCONTINUED | OUTPATIENT
Start: 2021-12-01 | End: 2021-12-05

## 2021-12-01 RX ORDER — ESCITALOPRAM OXALATE 10 MG/1
5 TABLET, FILM COATED ORAL DAILY
Refills: 0 | Status: DISCONTINUED | OUTPATIENT
Start: 2021-12-01 | End: 2021-12-05

## 2021-12-01 NOTE — H&P ADULT - NSHPLABSRESULTS_GEN_ALL_CORE
VITALS:   T(F): 98.2  HR: 73  BP: 121/72  RR: 18  SpO2: 100%    LABS:                        4.8    12.03 )-----------( 436      ( 01 Dec 2021 20:05 )             18.1     12-01    135  |  102  |  25<H>  ----------------------------<  104<H>  4.8   |  19  |  1.4    Ca    9.3      01 Dec 2021 20:05    TPro  7.3  /  Alb  3.5  /  TBili  <0.2  /  DBili  x   /  AST  8   /  ALT  7   /  AlkPhos  105  12-01    PT/INR - ( 01 Dec 2021 20:05 )   PT: 11.90 sec;   INR: 1.04 ratio         PTT - ( 01 Dec 2021 20:05 )  PTT:29.3 sec

## 2021-12-01 NOTE — PHYSICAL EXAM
[Normal] : normoactive bowel sounds, soft and nontender, no hepatosplenomegaly or masses appreciated [de-identified] : left ear with bandage on it, no visible bleeding [de-identified] : uses wheelchair

## 2021-12-01 NOTE — ASSESSMENT
[FreeTextEntry1] : 79 yo F with hx of HTN, schizoaffective disorder, with hx of left ear lesion since 4 years. Pt had a biopsy done with dermatology in 2017, which was diagnosed as basal cell cancer. 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 \par Now the lesion has progressed and bleeds very frequently, so the family wants to pursue the treatment. \par Pt was seen by plastic surgery Dr Van, who offered surgery to the mpt, but wants to get a CT scan first to evaluate the extent of the disease. Pt was also seen by ENT, and a PET scan was ordered and pt was referred to medical oncology. \par \par #Basal cell carcinoma of the left ear and auricle -High risk\par -pt seen by plastic surgery and ENT -s/p MRI and PET \par -Per ENT: Case to be discussed in tumor board, and surgery will be the primary treatment likely. \par - If surgery not primary treatment, then then she would need to be evalauted by rad/onc for possible RT. \par -If pt is deemed to be not curative, and cannot do surgery and RT, we can offer systemic therapy with Vismodegib or Sonidegib, but no role of medical oncology at this time\par \par ***#6cm heterogenous lesion extending from the inferior right thyroid lobe into the superior mediastinum\par -No workup done\par -Spoke with daughter during last visit about getting endocrinology eval for possible FNA\par \par Get cbc, bmp, lfts today. RTC prn. \par

## 2021-12-01 NOTE — H&P ADULT - ASSESSMENT
81 YO F with PMHx of HTN, DM type II, schizoaffective disorder, Anemia (B12 def), chronic LE edema, basal cell carcinoma presents due to low Hb 81 YO F with PMHx of HTN, DM type II, schizoaffective disorder, Anemia (B12 def), chronic LE edema, basal cell carcinoma presents due to low Hb.    Symptomatic anemia   #Basal Cell Carcinoma   # Loculated tissue mass within the left infra-auricular skin  #Hx of B12 deficiency/ folate deficiency    #Hx of Macrocytic anemia   - Hematologist- Dr. Ordonez   - Hb 4.8  - giving 2 units prbc  - active T + S,  keep Hb > 7  - f/u iron studies, b12, folate, hemolytic workup   - heme/onc consult   - c/w B12, folate     #Hypertension  - controlled  - c/w losartan     #Schizoaffective disorder  - c/w risperdol , lexapro     #Chronic LE Edema   - c/w lasix     #DVT PPX: sequentials   #GI PPX: Protonix  #activity as tolerated  #Diet: DASH   #Dispo: acute

## 2021-12-01 NOTE — ED ADULT NURSE NOTE - OBJECTIVE STATEMENT
81 year old female sent by MD for low hemoglobin. Pt denies fever, nausea, vomiting, diarrhea, chest pain, sob.

## 2021-12-01 NOTE — H&P ADULT - ATTENDING COMMENTS
HPI:  79 YO F with PMHx of HTN, DM type II, schizoaffective disorder, Anemia (B12 def), chronic LE edema, basal cell carcinoma presents due to low Hb. Was fund to have a Hb of 4.9 outpatient, when arriving to the ED Hb 4.8. Patient is a poor historian, info obtained from charts and family (Dr. Razo). Patient has a soft tissue mass within the left infra-auricular skin that has been bleeding for the past few days. Denies any current symptoms. Denies CP, SOB, abdominal pain, changes in bowel movements or urination.  In ED VITALS: Temp 98.2F, HR 73, / 72, RR 18 SpO2 100%. COVID negative. Hb 4.8, giving 2 units of PRBC. Admitting for symptomatic anemia.    (01 Dec 2021 22:58)    REVIEW OF SYSTEMS:  CONSTITUTIONAL: No weakness, fevers or chills  EYES/ENT: No visual changes;  No vertigo or throat pain   NECK: No pain or stiffness  RESPIRATORY: No cough, wheezing, hemoptysis; No shortness of breath  CARDIOVASCULAR: No chest pain or palpitations  GASTROINTESTINAL: No abdominal or epigastric pain. No nausea, vomiting, or hematemesis; No diarrhea or constipation. No melena or hematochezia.  GENITOURINARY: No dysuria, frequency or hematuria  NEUROLOGICAL: No numbness or weakness  SKIN: No itching, rashes    Physical Exam:  General: WN/WD NAD  Neurology: A&Ox3, nonfocal, follows commands  Eyes: PERRLA/ EOMI  ENT/Neck: Neck supple, trachea midline, No JVD  Respiratory: CTA B/L, No wheezing, rales, rhonchi  CV: Normal rate regular rhythm, S1S2, no murmurs, rubs or gallops  Abdominal: Soft, NT, ND +BS,   Extremities: No edema, + peripheral pulses  Skin: No Rashes, Hematoma, Ecchymosis  Incisions:   Tubes: HPI:  79 YO F with PMHx of HTN, DM type II, schizoaffective disorder, Anemia (B12 def), chronic LE edema, basal cell carcinoma presents due to low Hb. Was fund to have a Hb of 4.9 outpatient, when arriving to the ED Hb 4.8. Patient is a poor historian, info obtained from charts and family (Dr. Razo). Patient has a soft tissue mass within the left infra-auricular skin that has been bleeding for the past few days. Denies any current symptoms. Denies CP, SOB, abdominal pain, changes in bowel movements or urination.  In ED VITALS: Temp 98.2F, HR 73, / 72, RR 18 SpO2 100%. COVID negative. Hb 4.8, giving 2 units of PRBC. Admitting for symptomatic anemia.    (01 Dec 2021 22:58)    REVIEW OF SYSTEMS:  CONSTITUTIONAL: No weakness, fevers or chills  EYES/ENT: No visual changes;  No vertigo or throat pain   NECK: No pain or stiffness  RESPIRATORY: No cough, wheezing, hemoptysis; No shortness of breath  CARDIOVASCULAR: No chest pain or palpitations  GASTROINTESTINAL: No abdominal or epigastric pain. No nausea, vomiting, or hematemesis; No diarrhea or constipation. No melena or hematochezia.  GENITOURINARY: No dysuria, frequency or hematuria  NEUROLOGICAL: No numbness or weakness  SKIN: No itching, rashes, L sided facial lesion which is oozing blood     Physical Exam:  General: WN/WD NAD  Neurology: A&Ox3, nonfocal, follows commands  Eyes: PERRLA/ EOMI  ENT/Neck: Neck supple, trachea midline, No JVD  Respiratory: CTA B/L, No wheezing, rales, rhonchi  CV: Normal rate regular rhythm, S1S2, no murmurs, rubs or gallops  Abdominal: Soft, NT, ND +BS,   Extremities: No edema, + peripheral pulses  Skin: No Rashes, Hematoma, Ecchymosis, L sided facial (ant auricular) lesion/mass which is oozing blood  Incisions:   Tubes:    A/p  Severe anemia w/o etiology for loss vs. decreased production   Hemodynamically stable / compensated   -IV fluid resuscitation   -PRBC and serial CBC   -GI eval and Hematology eval   -agree w/ elemental def vs. production decrease vs. hemolysis w/u    L infra-auricular mass   -local dressing    HTN -stable   -c/w losartan     Schizoaffective   -c/w Risperidone and    Chronic LE edema - lasix     DVT prophylaxis   -SCD to bilateral LE

## 2021-12-01 NOTE — H&P ADULT - NSHPPHYSICALEXAM_GEN_ALL_CORE
PHYSICAL EXAM:  GENERAL: NAD, speaks in full sentences, no signs of respiratory distress  HEAD: Atraumatic  NECK: Supple  CHEST/LUNG: Clear to auscultation bilaterally; No wheeze or crackles  HEART: S1, S2; RRR; No murmurs, rubs, or gallops  ABDOMEN: BS+; Soft, Non-tender, Non-distended  EXTREMITIES:  2+ Peripheral Pulses, No clubbing, cyanosis, or edema  PSYCH: AAOx3  NEUROLOGY: non-focal  SKIN: No rashes or lesions PHYSICAL EXAM:  GENERAL: NAD, speaks in full sentences, no signs of respiratory distress  HEAD: Atraumatic  NECK: Supple  CHEST/LUNG: Clear to auscultation bilaterally  HEART: S1, S2; RRR; No murmurs, rubs, or gallops  ABDOMEN: BS+; Soft, Non-tender, Non-distended  EXTREMITIES:  2+ Peripheral Pulses, 1+ pitting edema   PSYCH: AAOx3  NEUROLOGY: non-focal  SKIN: No rashes or lesions

## 2021-12-01 NOTE — RESULTS/DATA
[FreeTextEntry1] : PET 11/2021\par IMPRESSION:\par 1.  Intensely FDG avid 3 cm left infra-auricular mass involving the ear lobe and abutting the parotid gland, compatible with known basal cell carcinoma (max SUV 21.2); no FDG avid bilateral cervical lymphadenopathy.\par \par 2.  Nonspecific FDG avid mediastinal and bilateral hilar lymph nodes (max SUV 10.1 in a right infrahilar lymph node). Differential includes neoplastic or inflammatory etiologies.\par \par 3.  Large heterogeneous right thyroid lobe goiter extending into the superior mediastinum with sites of FDG uptake, likely corresponding to suspicious nodules seen on prior ultrasound (max SUV 7.4). Based on sonographic findings from May 29, 2020, fine-needle aspiration of these nodules recommended.\par \par 4.  No additional sites of abnormal FDG uptake.\par \par

## 2021-12-01 NOTE — HISTORY OF PRESENT ILLNESS
[de-identified] : 81 yo F with hx of HTN, schizoaffective disorder, with hx of left ear lesion since 4 years. Pt had a biopsy done with dermatology in 2017, which was diagnosed as basal cell cancer. 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 \par Now the lesion has progressed and bleeds very frequently, so the family wants to pursue the treatment. \par Pt was seen by plastic surgery Dr Van, who offered surgery to the mpt, but wants to get a CT scan first to evaluate the extent of the disease. Pt was also seen by ENT, and a PET scan was ordered and pt was referred to medical oncology. \par \par Pt today in a wheelchair, [facility] Pt feels well. Denied any complaints today. Pt does endorse the lesion to be easily bleeding. \par \par Of note, pt also has a 6cm heterogeneous lesion extending from the inferior right thyroid lobe into the superior mediastinum, for which no workup was done except US thyroid. \par \par CT Angio chest 3/2021\par IMPRESSION:\par Since May 28, 2020;\par 1. No pulmonary embolus.\par 2. Stable 6 cm heterogeneous lesion extending from the inferior right thyroid lobe into the superior mediastinum. Based on sonographic findings from May 29, 2020, recommend fine needle aspiration.\par \par US thyroid/parathyroid 5/2020\par \par Impression: Multiple thyroid nodules, the largest of which measures up to 6.9 cm with mediastinal extension, for which an ultrasound-guided fine-needle aspiration is recommended.\par Nodule 1: 2.6 cm, right lower pole, TR 4 - FNA if = 1.5cm, follow-up if 1 - 1.4 cm.\par Recommendation: Ultrasound-guided fine-needle aspiration.\par Nodule 2: 0.8 cm, right midpole, TR 4 - FNA if = 1.5cm, follow-up if 1 - 1.4 cm.\par Recommendation: No further follow-up.\par Nodule 3: 6.9 cm, right lower pole, TR 5 - FNA if = 1cm, follow-up if 0.5 - 0.9 cm.\par Recommendation: Ultrasound-guided fine-needle aspiration.\par \par CT C/A/P 5/2020\par \par IMPRESSION:\par 1. No CT evidence of acute traumatic injury to the chest, abdomen or pelvis.\par 2. Hyperdensities within the gallbladder and CBD with associated mild pericholecystic fat stranding. Findings are consistent with cholelithiasis and choledocholithiasis. The CBD measures 7 mm (series 5/309). If there is clinical concern for acute cholecystitis, right upper quadrant ultrasound may be obtained.\par 3. 6.1 cm heterogeneous lesion within the superior mediastinum, possibly arising from the right thyroid gland, suspicious for malignancy or goiter. Recommend a thyroid ultrasound for further evaluation.\par \par CT Head 5/2020\par IMPRESSION: \par 1. No evidence of acute intracranial pathology.\par 2. Moderate chronic microvascular changes.\par 3. Left sphenoid sinus opacification as described.\par \par Biopsy of skin below left ear\par  Received: 12/20/17\par  INDICATION(S) FOR PROCEDURE(S): BCC\par SURGICAL PROCEDURE:: Shave biopsy\par  SPECIMEN: Skin below left ear\par GROSS DESCRIPTION:\par  * * FINAL DIAGNOSIS * *:\par SKIN BELOW LEFT EAR, SHAVE BIOPSY:\par : - BASAL CELL CARCINOMA EXTENDING TO THE DEEP RESECTION MARGIN.\par  : PERIPHERAL MARGINS ARE FREE.\par : - SKIN SHOWS ULCERATION, ACUTE INFLAMMATION AND CRUST FORMATION\par . \par \par Pathology: Basal cell carcinoma \par \par INTERVAL HISTORY:\par \par 12/1/21\par Pt feels well today, had bandage over left ear. Accompanied by caregiver. Pt denied any complaints. Pt previosuly ahd PET scan done and was seen by ENT in 11/23/21. \par \par PET 11/17/21:\par IMPRESSION:\par 1.  Intensely FDG avid 3 cm left infra-auricular mass involving the ear lobe and abutting the parotid gland, compatible with known basal cell carcinoma (max SUV 21.2); no FDG avid bilateral cervical lymphadenopathy.\par \par 2.  Nonspecific FDG avid mediastinal and bilateral hilar lymph nodes (max SUV 10.1 in a right infrahilar lymph node). Differential includes neoplastic or inflammatory etiologies.\par \par 3.  Large heterogeneous right thyroid lobe goiter extending into the superior mediastinum with sites of FDG uptake, likely corresponding to suspicious nodules seen on prior ultrasound (max SUV 7.4). Based on sonographic findings from May 29, 2020, fine-needle aspiration of these nodules recommended.\par \par 4.  No additional sites of abnormal FDG uptake.\par \par Pt also had MRI done on 11/2/21:\par \par IMPRESSION:\par \par Enhancing lobulated soft tissue mass centered within the left infra-auricular skin with involvement of the ear lobule consistent with clinical history of basal cell carcinoma. No evidence of parotid invasion.\par No lymphadenopathy.\par Partially visualized enlarged right thyroid lobe. Further evaluation with thyroid ultrasound can be obtained.\par \par We dsicussed the case with Dr Womack over the phone as well.  [de-identified] : PET 11/2021\par IMPRESSION:\par 1.  Intensely FDG avid 3 cm left infra-auricular mass involving the ear lobe and abutting the parotid gland, compatible with known basal cell carcinoma (max SUV 21.2); no FDG avid bilateral cervical lymphadenopathy.\par \par 2.  Nonspecific FDG avid mediastinal and bilateral hilar lymph nodes (max SUV 10.1 in a right infrahilar lymph node). Differential includes neoplastic or inflammatory etiologies.\par \par 3.  Large heterogeneous right thyroid lobe goiter extending into the superior mediastinum with sites of FDG uptake, likely corresponding to suspicious nodules seen on prior ultrasound (max SUV 7.4). Based on sonographic findings from May 29, 2020, fine-needle aspiration of these nodules recommended.\par \par 4.  No additional sites of abnormal FDG uptake.\par \par

## 2021-12-01 NOTE — ED PROVIDER NOTE - OBJECTIVE STATEMENT
81 y.o. female with a PMH of schizophrenia, DM, HTN, vitamin B12 deficiency, and basal cell ca. presented to the ER for low H:H today (4.9/18.4).  Pt denies chest pain, dizziness, nausea, palpitations, weakness.  No complaints at all.

## 2021-12-01 NOTE — H&P ADULT - HISTORY OF PRESENT ILLNESS
81 YO F with PMHx of HTN, DM type II, schizoaffective disorder, Anemia (B12 def), chronic LE edema, basal cell carcinoma presents due to low Hb. Was fund to have a Hb of 4.9 outpatient, when arriving to the ED Hb 4.8.  79 YO F with PMHx of HTN, DM type II, schizoaffective disorder, Anemia (B12 def), chronic LE edema, basal cell carcinoma presents due to low Hb. Was fund to have a Hb of 4.9 outpatient, when arriving to the ED Hb 4.8. Patient is a poor historian, info obtained from charts and family (Dr. Razo). Patient has a soft tissue mass within the left infra-auricular skin that has been bleeding for the past few days. Denies any current symptoms. Denies CP, SOB, abdominal pain, changes in bowel movements or urination.  In ED VITALS: Temp 98.2F, HR 73, / 72, RR 18 SpO2 100%. COVID negative. Hb 4.8, giving 2 units of PRBC. Admitting for symptomatic anemia.

## 2021-12-01 NOTE — ED PROVIDER NOTE - ATTENDING CONTRIBUTION TO CARE
80 y/o female with h/o schizoaffective d/o, hypothyroid, dm, htn, L facial basal cell CA, sent to ER for blood transfusion for hgb 4.7 found on outpt lab work.  Pt  has been chronically oozing blood from BCC site, had f/u today and labs showed low hgb.  Pt denies any complaints at this time.  denies any weakness, sob/matthews, palpitations, cp, syncope/near syncope, dizziness.  denies any blood in stool.  PE - nad, nc/at, eomi, perrl, pale conjunctiva,  L pre-auricular area with bandage over BCC site - no blood on bandage, pt refuses dressing removal, cta b/l, no w/r/r, rrr, abd- soft, nt/nd, nabs, from x 4, + b/l LE edema (chronic per pt), A&O x 3, no focal neuro deficits.  -check labs, transfuse as needed.

## 2021-12-02 ENCOUNTER — APPOINTMENT (OUTPATIENT)
Dept: RADIATION ONCOLOGY | Facility: HOSPITAL | Age: 81
End: 2021-12-02

## 2021-12-02 LAB
ALBUMIN SERPL ELPH-MCNC: 3.5 G/DL
ALP BLD-CCNC: 103 U/L
ALT SERPL-CCNC: 7 U/L
ANION GAP SERPL CALC-SCNC: 14 MMOL/L
AST SERPL-CCNC: 9 U/L
BILIRUB DIRECT SERPL-MCNC: <0.2 MG/DL
BILIRUB INDIRECT SERPL-MCNC: NORMAL MG/DL
BILIRUB SERPL-MCNC: <0.2 MG/DL
BUN SERPL-MCNC: 26 MG/DL
CALCIUM SERPL-MCNC: 9.1 MG/DL
CHLORIDE SERPL-SCNC: 102 MMOL/L
CO2 SERPL-SCNC: 18 MMOL/L
CREAT SERPL-MCNC: 1.4 MG/DL
FERRITIN SERPL-MCNC: 6 NG/ML — LOW (ref 15–150)
GLUCOSE SERPL-MCNC: 120 MG/DL
IRON SATN MFR SERPL: 19 UG/DL — LOW (ref 35–150)
IRON SATN MFR SERPL: 5 % — LOW (ref 15–50)
POTASSIUM SERPL-SCNC: 5.5 MMOL/L
PROT SERPL-MCNC: 7.3 G/DL
SODIUM SERPL-SCNC: 134 MMOL/L
TIBC SERPL-MCNC: 403 UG/DL — SIGNIFICANT CHANGE UP (ref 220–430)
TRANSFERRIN SERPL-MCNC: 319 MG/DL — SIGNIFICANT CHANGE UP (ref 200–360)
UIBC SERPL-MCNC: 384 UG/DL — HIGH (ref 110–370)

## 2021-12-02 PROCEDURE — 99223 1ST HOSP IP/OBS HIGH 75: CPT

## 2021-12-02 RX ORDER — FOLIC ACID 0.8 MG
1 TABLET ORAL DAILY
Refills: 0 | Status: DISCONTINUED | OUTPATIENT
Start: 2021-12-02 | End: 2021-12-05

## 2021-12-02 RX ORDER — INFLUENZA VIRUS VACCINE 15; 15; 15; 15 UG/.5ML; UG/.5ML; UG/.5ML; UG/.5ML
0.7 SUSPENSION INTRAMUSCULAR ONCE
Refills: 0 | Status: DISCONTINUED | OUTPATIENT
Start: 2021-12-02 | End: 2021-12-05

## 2021-12-02 RX ORDER — IRON SUCROSE 20 MG/ML
200 INJECTION, SOLUTION INTRAVENOUS EVERY 24 HOURS
Refills: 0 | Status: DISCONTINUED | OUTPATIENT
Start: 2021-12-02 | End: 2021-12-05

## 2021-12-02 RX ORDER — FUROSEMIDE 40 MG
20 TABLET ORAL DAILY
Refills: 0 | Status: DISCONTINUED | OUTPATIENT
Start: 2021-12-02 | End: 2021-12-05

## 2021-12-02 RX ORDER — RISPERIDONE 4 MG/1
0.5 TABLET ORAL
Refills: 0 | Status: DISCONTINUED | OUTPATIENT
Start: 2021-12-02 | End: 2021-12-05

## 2021-12-02 RX ORDER — PANTOPRAZOLE SODIUM 20 MG/1
40 TABLET, DELAYED RELEASE ORAL
Refills: 0 | Status: DISCONTINUED | OUTPATIENT
Start: 2021-12-02 | End: 2021-12-05

## 2021-12-02 RX ORDER — CHLORHEXIDINE GLUCONATE 213 G/1000ML
1 SOLUTION TOPICAL
Refills: 0 | Status: DISCONTINUED | OUTPATIENT
Start: 2021-12-02 | End: 2021-12-05

## 2021-12-02 RX ORDER — PREGABALIN 225 MG/1
1000 CAPSULE ORAL DAILY
Refills: 0 | Status: DISCONTINUED | OUTPATIENT
Start: 2021-12-02 | End: 2021-12-05

## 2021-12-02 RX ADMIN — PREGABALIN 1000 MICROGRAM(S): 225 CAPSULE ORAL at 12:56

## 2021-12-02 RX ADMIN — ESCITALOPRAM OXALATE 5 MILLIGRAM(S): 10 TABLET, FILM COATED ORAL at 11:26

## 2021-12-02 RX ADMIN — LOSARTAN POTASSIUM 50 MILLIGRAM(S): 100 TABLET, FILM COATED ORAL at 07:00

## 2021-12-02 RX ADMIN — Medication 1 MILLIGRAM(S): at 11:29

## 2021-12-02 RX ADMIN — PANTOPRAZOLE SODIUM 40 MILLIGRAM(S): 20 TABLET, DELAYED RELEASE ORAL at 07:00

## 2021-12-02 RX ADMIN — CHLORHEXIDINE GLUCONATE 1 APPLICATION(S): 213 SOLUTION TOPICAL at 05:27

## 2021-12-02 RX ADMIN — Medication 20 MILLIGRAM(S): at 06:59

## 2021-12-02 RX ADMIN — RISPERIDONE 0.5 MILLIGRAM(S): 4 TABLET ORAL at 17:11

## 2021-12-02 RX ADMIN — RISPERIDONE 0.5 MILLIGRAM(S): 4 TABLET ORAL at 07:00

## 2021-12-02 RX ADMIN — IRON SUCROSE 110 MILLIGRAM(S): 20 INJECTION, SOLUTION INTRAVENOUS at 10:48

## 2021-12-02 RX ADMIN — Medication 81 MILLIGRAM(S): at 11:40

## 2021-12-02 NOTE — MEDICAL STUDENT PROGRESS NOTE(EDUCATION) - SUBJECTIVE AND OBJECTIVE BOX
HPI:    81 YO F with PMHx of HTN, DM type II, schizoaffective disorder, Anemia (B12 def), chronic LE edema, basal cell carcinoma presents due to low Hb. Was fund to have a Hb of 4.9 outpatient, when arriving to the ED Hb 4.8. Patient is a poor historian, info obtained from charts and family (Dr. Razo). Patient has a soft tissue mass within the left infra-auricular skin that has been bleeding for the past few days. Denies any current symptoms. Denies CP, SOB, abdominal pain, changes in bowel movements or urination.  In ED VITALS: Temp 98.2F, HR 73, / 72, RR 18 SpO2 100%. COVID negative. Hb 4.8, giving 2 units of PRBC. Admitting for symptomatic anemia.     Subjective:  Overnight events: none  Complaints: none    Objective:    Vital Signs Last 24 Hrs  T(C): 36.8 (02 Dec 2021 05:35), Max: 37.3 (02 Dec 2021 00:38)  T(F): 98.3 (02 Dec 2021 05:35), Max: 99.1 (02 Dec 2021 00:38)  HR: 94 (02 Dec 2021 05:35) (73 - 100)  BP: 128/62 (02 Dec 2021 05:35) (121/72 - 170/75)  BP(mean): --  RR: 18 (02 Dec 2021 05:35) (18 - 18)  SpO2: 98% (02 Dec 2021 05:35) (96% - 100%)    Physical Exam   GENERAL: NAD, speaks in full sentences, no signs of respiratory distress  HEAD: Atraumatic  NECK: Supple  CHEST/LUNG: Clear to auscultation bilaterally  HEART: S1, S2; RRR; No murmurs, rubs, or gallops  ABDOMEN: BS+; Soft, Non-tender, Non-distended  EXTREMITIES:  2+ Peripheral Pulses, 1+ pitting edema   PSYCH: AAOx3  NEUROLOGY: non-focal  SKIN: No rashes or lesions      MEDICATIONS  (STANDING):  aspirin  chewable 81 milliGRAM(s) Oral daily  chlorhexidine 4% Liquid 1 Application(s) Topical <User Schedule>  cyanocobalamin 1000 MICROGram(s) Oral daily  escitalopram 5 milliGRAM(s) Oral daily  folic acid 1 milliGRAM(s) Oral daily  furosemide    Tablet 20 milliGRAM(s) Oral daily  influenza  Vaccine (HIGH DOSE) 0.7 milliLiter(s) IntraMuscular once  losartan 50 milliGRAM(s) Oral daily  pantoprazole    Tablet 40 milliGRAM(s) Oral before breakfast  risperiDONE   Tablet 0.5 milliGRAM(s) Oral two times a day    MEDICATIONS  (PRN): None    Home Medications:  aspirin 81 mg oral tablet, chewable: 1 tab(s) orally once a day (25 Mar 2021 15:10)  folic acid 1 mg oral tablet: 1 tab(s) orally once a day (02 Dec 2021 00:04)  Lexapro 5 mg oral tablet: 1 tab(s) orally once a day (21 Mar 2021 22:39)  losartan 50 mg oral tablet: 1 tab(s) orally once a day (21 Mar 2021 22:38)  RisperDAL 0.5 mg oral tablet: 1 tab(s) orally 2 times a day (02 Dec 2021 00:05)      Social History:  Denies tobacco, Etoh or illicit drug use  Living: home with son     Labs                                     4.8    12.03 )-----------( 436      ( 01 Dec 2021 20:05 )             18.1     CBC Full  -  ( 01 Dec 2021 20:05 )  WBC Count : 12.03 K/uL  RBC Count : 2.48 M/uL  Hemoglobin : 4.8 g/dL  Hematocrit : 18.1 %  Platelet Count - Automated : 436 K/uL  Mean Cell Volume : 73.0 fL  Mean Cell Hemoglobin : 19.4 pg  Mean Cell Hemoglobin Concentration : 26.5 g/dL  Auto Neutrophil # : 9.41 K/uL  Auto Lymphocyte # : 1.78 K/uL  Auto Monocyte # : 0.63 K/uL  Auto Eosinophil # : 0.11 K/uL  Auto Basophil # : 0.11 K/uL  Auto Neutrophil % : 78.2 %  Auto Lymphocyte % : 14.8 %  Auto Monocyte % : 5.2 %  Auto Eosinophil % : 0.9 %  Auto Basophil % : 0.9 %      12-01    135  |  102  |  25<H>  ----------------------------<  104<H>  4.8   |  19  |  1.4    Ca    9.3      01 Dec 2021 20:05    TPro  7.3  /  Alb  3.5  /  TBili  <0.2  /  DBili  x   /  AST  8   /  ALT  7   /  AlkPhos  105  12-01       HPI:    79 YO F with PMHx of HTN, DM type II, schizoaffective disorder, Anemia (B12 def), chronic LE edema, basal cell carcinoma presents due to low Hb. Was fund to have a Hb of 4.9 outpatient, when arriving to the ED Hb 4.8. Patient is a poor historian, info obtained from charts and family (Dr. Razo). Patient has a soft tissue mass within the left infra-auricular skin that has been bleeding for the past few days. Denies any current symptoms. Denies CP, SOB, abdominal pain, changes in bowel movements or urination.  In ED VITALS: Temp 98.2F, HR 73, / 72, RR 18 SpO2 100%. COVID negative. Hb 4.8, giving 2 units of PRBC. Admitting for symptomatic anemia.     This morning patient is resting comfortably in bed. She is awake, alert, and orientated to person, time, and place. Patient is not able to tell me who ordered the Hb level out patient, states she has had "blood problems" before. She denies any pain or discomfort. Per patient she was not excessively fatigued prior to admission, but does admit to taking naps daily. She reports no change in bowel or urination habits, no blood in urine or stool, and no pain with urination or defecation. Patient has a clean bandaged wound on her face that she states has been bleeding off and on for several days.    Subjective:  Overnight events: none  Complaints: none    Objective:    Vital Signs Last 24 Hrs  T(C): 36.8 (02 Dec 2021 05:35), Max: 37.3 (02 Dec 2021 00:38)  T(F): 98.3 (02 Dec 2021 05:35), Max: 99.1 (02 Dec 2021 00:38)  HR: 94 (02 Dec 2021 05:35) (73 - 100)  BP: 128/62 (02 Dec 2021 05:35) (121/72 - 170/75)  BP(mean): --  RR: 18 (02 Dec 2021 05:35) (18 - 18)  SpO2: 98% (02 Dec 2021 05:35) (96% - 100%)    Physical Exam   GENERAL: NAD, speaks in full sentences, no signs of respiratory distress  HEAD: Atraumatic  NECK: Supple  CHEST/LUNG: Clear to auscultation bilaterally  HEART: S1, S2; RRR; No murmurs, rubs, or gallops  ABDOMEN: BS+; Soft, Non-tender, Non-distended  EXTREMITIES:  2+ Peripheral Pulses, 1+ pitting edema   PSYCH: AAOx3  NEUROLOGY: non-focal  SKIN: No rashes or lesions      MEDICATIONS  (STANDING):  aspirin  chewable 81 milliGRAM(s) Oral daily  chlorhexidine 4% Liquid 1 Application(s) Topical <User Schedule>  cyanocobalamin 1000 MICROGram(s) Oral daily  escitalopram 5 milliGRAM(s) Oral daily  folic acid 1 milliGRAM(s) Oral daily  furosemide    Tablet 20 milliGRAM(s) Oral daily  influenza  Vaccine (HIGH DOSE) 0.7 milliLiter(s) IntraMuscular once  losartan 50 milliGRAM(s) Oral daily  pantoprazole    Tablet 40 milliGRAM(s) Oral before breakfast  risperiDONE   Tablet 0.5 milliGRAM(s) Oral two times a day    MEDICATIONS  (PRN): None    Home Medications:  aspirin 81 mg oral tablet, chewable: 1 tab(s) orally once a day (25 Mar 2021 15:10)  folic acid 1 mg oral tablet: 1 tab(s) orally once a day (02 Dec 2021 00:04)  Lexapro 5 mg oral tablet: 1 tab(s) orally once a day (21 Mar 2021 22:39)  losartan 50 mg oral tablet: 1 tab(s) orally once a day (21 Mar 2021 22:38)  RisperDAL 0.5 mg oral tablet: 1 tab(s) orally 2 times a day (02 Dec 2021 00:05)      Social History:  Denies tobacco, Etoh or illicit drug use  Living: home with son     Labs                                     4.8    12.03 )-----------( 436      ( 01 Dec 2021 20:05 )             18.1     CBC Full  -  ( 01 Dec 2021 20:05 )  WBC Count : 12.03 K/uL  RBC Count : 2.48 M/uL  Hemoglobin : 4.8 g/dL  Hematocrit : 18.1 %  Platelet Count - Automated : 436 K/uL  Mean Cell Volume : 73.0 fL  Mean Cell Hemoglobin : 19.4 pg  Mean Cell Hemoglobin Concentration : 26.5 g/dL  Auto Neutrophil # : 9.41 K/uL  Auto Lymphocyte # : 1.78 K/uL  Auto Monocyte # : 0.63 K/uL  Auto Eosinophil # : 0.11 K/uL  Auto Basophil # : 0.11 K/uL  Auto Neutrophil % : 78.2 %  Auto Lymphocyte % : 14.8 %  Auto Monocyte % : 5.2 %  Auto Eosinophil % : 0.9 %  Auto Basophil % : 0.9 %        12-01                        4.8    12.03 )-----------( 436      ( 01 Dec 2021 20:05 )             18.1     CBC Full  -  ( 01 Dec 2021 20:05 )  WBC Count : 12.03 K/uL  RBC Count : 2.48 M/uL  Hemoglobin : 4.8 g/dL  Hematocrit : 18.1 %  Platelet Count - Automated : 436 K/uL  Mean Cell Volume : 73.0 fL  Mean Cell Hemoglobin : 19.4 pg  Mean Cell Hemoglobin Concentration : 26.5 g/dL  Auto Neutrophil # : 9.41 K/uL  Auto Lymphocyte # : 1.78 K/uL  Auto Monocyte # : 0.63 K/uL  Auto Eosinophil # : 0.11 K/uL  Auto Basophil # : 0.11 K/uL  Auto Neutrophil % : 78.2 %  Auto Lymphocyte % : 14.8 %  Auto Monocyte % : 5.2 %  Auto Eosinophil % : 0.9 %  Auto Basophil % : 0.9 %      135  |  102  |  25<H>  ----------------------------<  104<H>  4.8   |  19  |  1.4    Ca    9.3      01 Dec 2021 20:05    TPro  7.3  /  Alb  3.5  /  TBili  <0.2  /  DBili  x   /  AST  8   /  ALT  7   /  AlkPhos  105  12-01

## 2021-12-02 NOTE — CONSULT NOTE ADULT - SUBJECTIVE AND OBJECTIVE BOX
Gastroenterology Consultation:    Patient is a 81y old  Female who presents with a chief complaint of anemia.     Admitted on: 12-01-21  HPI:  79 YO F with PMHx of HTN, DM type II, schizoaffective disorder, Anemia (B12 def), chronic LE edema, basal cell carcinoma presents due to low Hb. Was fund to have a Hb of 4.9 outpatient, when arriving to the ED Hb 4.8. Patient is a poor historian, info obtained from charts and family (Dr. Razo). Patient has a soft tissue mass within the left infra-auricular skin that has been bleeding for the past few days. Denies any current symptoms. Denies CP, SOB, abdominal pain, changes in bowel movements or urination.  In ED VITALS: Temp 98.2F, HR 73, / 72, RR 18 SpO2 100%. COVID negative. Hb 4.8, giving 2 units of PRBC. Admitting for symptomatic anemia.       Prior EGD:   Prior Colonoscopy:      PAST MEDICAL & SURGICAL HISTORY:  Diabetes mellitus    Hypertension    Schizoaffective disorder    Edema  Bilateral LE    Disorder of thyroid, unspecified  Son cannot provide details. States she had &quot;thyroid surgery&quot; decades ago when she was young    No significant past surgical history        FAMILY HISTORY:  No pertinent family history in first degree relatives        Social History:  Tobacco:  Alcohol:  Drugs:    Home Medications:  aspirin 81 mg oral tablet, chewable: 1 tab(s) orally once a day (25 Mar 2021 15:10)  folic acid 1 mg oral tablet: 1 tab(s) orally once a day (02 Dec 2021 00:04)  Lexapro 5 mg oral tablet: 1 tab(s) orally once a day (21 Mar 2021 22:39)  losartan 50 mg oral tablet: 1 tab(s) orally once a day (21 Mar 2021 22:38)  RisperDAL 0.5 mg oral tablet: 1 tab(s) orally 2 times a day (02 Dec 2021 00:05)    MEDICATIONS  (STANDING):  aspirin  chewable 81 milliGRAM(s) Oral daily  chlorhexidine 4% Liquid 1 Application(s) Topical <User Schedule>  cyanocobalamin 1000 MICROGram(s) Oral daily  escitalopram 5 milliGRAM(s) Oral daily  folic acid 1 milliGRAM(s) Oral daily  furosemide    Tablet 20 milliGRAM(s) Oral daily  influenza  Vaccine (HIGH DOSE) 0.7 milliLiter(s) IntraMuscular once  iron sucrose IVPB 200 milliGRAM(s) IV Intermittent every 24 hours  losartan 50 milliGRAM(s) Oral daily  pantoprazole    Tablet 40 milliGRAM(s) Oral before breakfast  risperiDONE   Tablet 0.5 milliGRAM(s) Oral two times a day    MEDICATIONS  (PRN):      Allergies  No Known Allergies      Review of Systems:   Constitutional:  No Fever, No Chills  ENT/Mouth:  No Hearing Changes,  No Difficulty Swallowing  Eyes:  No Eye Pain, No Vision Changes  Cardiovascular:  No Chest Pain, No Palpitations  Respiratory:  No Cough, No Dyspnea  Gastrointestinal:  As described in HPI  Musculoskeletal:  No Joint Swelling, No Back Pain  Skin:  No Skin Lesions, No Jaundice  Neuro:  No Syncope, No Dizziness  Heme/Lymph:  No Bruising, No Bleeding.          Physical Examination:  T(C): 36.8 (12-02-21 @ 05:35), Max: 37.3 (12-02-21 @ 00:38)  HR: 94 (12-02-21 @ 05:35) (73 - 100)  BP: 128/62 (12-02-21 @ 05:35) (121/72 - 170/75)  RR: 18 (12-02-21 @ 05:35) (18 - 18)  SpO2: 98% (12-02-21 @ 05:35) (96% - 100%)  Height (cm): 165.1 (12-01-21 @ 17:16)      Constitutional: No acute distress.  Eyes:. Conjunctivae are clear, Sclera is non-icteric.  Ears Nose and Throat: The external ears are normal appearing,  Oral mucosa is pink and moist.  Respiratory:  No signs of respiratory distress. Lung sounds are clear bilaterally.  Cardiovascular:  S1 S2, Regular rate and rhythm.  GI: Abdomen is soft, symmetric, and non-tender without distention. There are no visible lesions or scars. Bowel sounds are present and normoactive in all four quadrants. No masses, hepatomegaly, or splenomegaly are noted.   Neuro: No Tremor, No involuntary movements  Skin: No rashes, No Jaundice.          Data:                        4.8    12.03 )-----------( 436      ( 01 Dec 2021 20:05 )             18.1     Hgb Trend:  4.8  12-01-21 @ 20:05      12-01    135  |  102  |  25<H>  ----------------------------<  104<H>  4.8   |  19  |  1.4    Ca    9.3      01 Dec 2021 20:05    TPro  7.3  /  Alb  3.5  /  TBili  <0.2  /  DBili  x   /  AST  8   /  ALT  7   /  AlkPhos  105  12-01    Liver panel trend:  TBili <0.2   /   AST 8   /   ALT 7   /   AlkP 105   /   Tptn 7.3   /   Alb 3.5    /   DBili --      12-01      PT/INR - ( 01 Dec 2021 20:05 )   PT: 11.90 sec;   INR: 1.04 ratio         PTT - ( 01 Dec 2021 20:05 )  PTT:29.3 sec        Radiology:       Gastroenterology Consultation:    Patient is a 81y old  Female who presents with a chief complaint of anemia.     Admitted on: 12-01-21  HPI:  81 YO F with PMHx of HTN, DM type II, schizoaffective disorder, Anemia (B12 def), chronic LE edema, basal cell carcinoma presents due to low Hb. Was fund to have a Hb of 4.9 outpatient, when arriving to the ED Hb 4.8. Patient is a poor historian, info obtained from charts and family (Dr. Razo). Patient has a soft tissue mass within the left infra-auricular skin that has been bleeding for the past few days. Denies any current symptoms. Denies CP, SOB, abdominal pain, changes in bowel movements or urination.  In ED VITALS: Temp 98.2F, HR 73, / 72, RR 18 SpO2 100%. COVID negative. Hb 4.8, giving 2 units of PRBC. Admitting for symptomatic anemia.       Prior EGD: 30 years ago normal as per patient  Prior Colonoscopy: 30 years ago normal as per patient       PAST MEDICAL & SURGICAL HISTORY:  Diabetes mellitus    Hypertension    Schizoaffective disorder    Edema  Bilateral LE    Disorder of thyroid, unspecified  Son cannot provide details. States she had &quot;thyroid surgery&quot; decades ago when she was young    No significant past surgical history        FAMILY HISTORY:  No pertinent family history in first degree relatives        Social History:  Tobacco: N  Alcohol: N  Drugs: N    Home Medications:  aspirin 81 mg oral tablet, chewable: 1 tab(s) orally once a day (25 Mar 2021 15:10)  folic acid 1 mg oral tablet: 1 tab(s) orally once a day (02 Dec 2021 00:04)  Lexapro 5 mg oral tablet: 1 tab(s) orally once a day (21 Mar 2021 22:39)  losartan 50 mg oral tablet: 1 tab(s) orally once a day (21 Mar 2021 22:38)  RisperDAL 0.5 mg oral tablet: 1 tab(s) orally 2 times a day (02 Dec 2021 00:05)    MEDICATIONS  (STANDING):  aspirin  chewable 81 milliGRAM(s) Oral daily  chlorhexidine 4% Liquid 1 Application(s) Topical <User Schedule>  cyanocobalamin 1000 MICROGram(s) Oral daily  escitalopram 5 milliGRAM(s) Oral daily  folic acid 1 milliGRAM(s) Oral daily  furosemide    Tablet 20 milliGRAM(s) Oral daily  influenza  Vaccine (HIGH DOSE) 0.7 milliLiter(s) IntraMuscular once  iron sucrose IVPB 200 milliGRAM(s) IV Intermittent every 24 hours  losartan 50 milliGRAM(s) Oral daily  pantoprazole    Tablet 40 milliGRAM(s) Oral before breakfast  risperiDONE   Tablet 0.5 milliGRAM(s) Oral two times a day    MEDICATIONS  (PRN):      Allergies  No Known Allergies      Review of Systems:   Constitutional:  No Fever, No Chills  ENT/Mouth:  No Hearing Changes,  No Difficulty Swallowing  Eyes:  No Eye Pain, No Vision Changes  Cardiovascular:  No Chest Pain, No Palpitations  Respiratory:  No Cough, No Dyspnea  Gastrointestinal:  As described in HPI  Musculoskeletal:  No Joint Swelling, No Back Pain  Skin:  No Skin Lesions, No Jaundice  Neuro:  No Syncope, No Dizziness  Heme/Lymph:  No Bruising, No Bleeding.          Physical Examination:  T(C): 36.8 (12-02-21 @ 05:35), Max: 37.3 (12-02-21 @ 00:38)  HR: 94 (12-02-21 @ 05:35) (73 - 100)  BP: 128/62 (12-02-21 @ 05:35) (121/72 - 170/75)  RR: 18 (12-02-21 @ 05:35) (18 - 18)  SpO2: 98% (12-02-21 @ 05:35) (96% - 100%)  Height (cm): 165.1 (12-01-21 @ 17:16)      Constitutional: No acute distress.  Eyes:. Conjunctivae are clear, Sclera is non-icteric.  Ears Nose and Throat: The external ears are normal appearing,  Oral mucosa is pink and moist.  Respiratory:  No signs of respiratory distress. Lung sounds are clear bilaterally.  Cardiovascular:  S1 S2, Regular rate and rhythm.  GI: Abdomen is soft, symmetric, and non-tender without distention. There are no visible lesions or scars. Bowel sounds are present and normoactive in all four quadrants. No masses, hepatomegaly, or splenomegaly are noted.   Neuro: No Tremor, No involuntary movements  Skin: No rashes, No Jaundice.          Data:                        4.8    12.03 )-----------( 436      ( 01 Dec 2021 20:05 )             18.1     Hgb Trend:  4.8  12-01-21 @ 20:05      12-01    135  |  102  |  25<H>  ----------------------------<  104<H>  4.8   |  19  |  1.4    Ca    9.3      01 Dec 2021 20:05    TPro  7.3  /  Alb  3.5  /  TBili  <0.2  /  DBili  x   /  AST  8   /  ALT  7   /  AlkPhos  105  12-01    Liver panel trend:  TBili <0.2   /   AST 8   /   ALT 7   /   AlkP 105   /   Tptn 7.3   /   Alb 3.5    /   DBili --      12-01      PT/INR - ( 01 Dec 2021 20:05 )   PT: 11.90 sec;   INR: 1.04 ratio         PTT - ( 01 Dec 2021 20:05 )  PTT:29.3 sec        Radiology:

## 2021-12-02 NOTE — PATIENT PROFILE ADULT - FALL HARM RISK - HARM RISK INTERVENTIONS
Assistance with ambulation/Assistance OOB with selected safe patient handling equipment/Communicate Risk of Fall with Harm to all staff/Monitor for mental status changes/Monitor gait and stability/Provide patient with walking aids - walker, cane, crutches/Reinforce activity limits and safety measures with patient and family/Review medications for side effects contributing to fall risk/Tailored Fall Risk Interventions/Visual Cue: Yellow wristband and red socks/Bed in lowest position, wheels locked, appropriate side rails in place/Call bell, personal items and telephone in reach/Instruct patient to call for assistance before getting out of bed or chair/Non-slip footwear when patient is out of bed/[place] to call system/Physically safe environment - no spills, clutter or unnecessary equipment/Purposeful Proactive Rounding/Room/bathroom lighting operational, light cord in reach

## 2021-12-02 NOTE — MEDICAL STUDENT PROGRESS NOTE(EDUCATION) - NS MD HP STUD ASPLAN ASSES FT
79 YO F with PMHx of HTN, DM type II, schizoaffective disorder, Anemia (B12 def), chronic LE edema, basal cell carcinoma presents due to low Hb.

## 2021-12-02 NOTE — MEDICAL STUDENT PROGRESS NOTE(EDUCATION) - NS MD HP STUD ASPLAN PLAN FT
Symptomatic anemia   #Basal Cell Carcinoma   # Loculated tissue mass within the left infra-auricular skin  #Hx of B12 deficiency/ folate deficiency    #Hx of Macrocytic anemia   - Hematologist- Dr. Ordonez   - Hb 4.8  - gave 2 units prbc - finished around 7 am on 12/2  - active T + S,  keep Hb > 7  - f/u iron studies, b12, folate, hemolytic workup - called lab to add on to ED blood work   - heme/onc consult - pending  - c/w B12, folate     #Hypertension  - controlled  - c/w losartan     #Schizoaffective disorder  - c/w risperdol , lexapro     #Chronic LE Edema   - c/w lasix     #DVT PPX: sequentials   #GI PPX: Protonix  #activity as tolerated  #Diet: DASH   #Dispo: acute    Symptomatic anemia   #Basal Cell Carcinoma   # Loculated tissue mass within the left infra-auricular skin  #Hx of B12 deficiency/ folate deficiency    #Hx of Macrocytic anemia   - Hematologist- Dr. Ordonez   - Hb 4.8  - gave 2 units prbc - finished around 7 am on 12/2  - active T + S,  keep Hb > 7  - f/u iron studies, b12, folate, hemolytic workup - called lab to add on to ED blood work   - heme/onc consult - pending  - c/w B12, folate   - Per GI: no endoscopy, can start iron PO    #Hypertension  - controlled  - c/w losartan     #Schizoaffective disorder  - c/w risperdol , lexapro     #Chronic LE Edema   - c/w lasix     #DVT PPX: sequentials   #GI PPX: Protonix  #activity as tolerated  #Diet: DASH   #Dispo: acute

## 2021-12-02 NOTE — CHART NOTE - NSCHARTNOTEFT_GEN_A_CORE
Patient seen by nocturnist in AM.   Patient seen and examined. Patient has no complaints.  Labs showing iron deficiency anemia, will start Venofer and GI consult to rule out GI blood loss.   Rest of plan as H&P.

## 2021-12-02 NOTE — CONSULT NOTE ADULT - SUBJECTIVE AND OBJECTIVE BOX
Patient is a 81y old  Female who presents with a chief complaint of     HPI:  79 YO F with PMHx of HTN, DM type II, schizoaffective disorder, Anemia (B12 def), chronic LE edema, basal cell carcinoma presents due to low Hb. Was fund to have a Hb of 4.9 outpatient, when arriving to the ED Hb 4.8. Patient is a poor historian, info obtained from charts and family (Dr. Razo). Patient has a soft tissue mass within the left infra-auricular skin that has been bleeding for the past few days. Denies any current symptoms. Denies CP, SOB, abdominal pain, changes in bowel movements or urination.  In ED VITALS: Temp 98.2F, HR 73, / 72, RR 18 SpO2 100%. COVID negative. Hb 4.8, giving 2 units of PRBC. Admitting for symptomatic anemia.    (01 Dec 2021 22:58)         PAST MEDICAL & SURGICAL HISTORY:  Diabetes mellitus    Hypertension    Schizoaffective disorder    Edema  Bilateral LE    Disorder of thyroid, unspecified  Son cannot provide details. States she had &quot;thyroid surgery&quot; decades ago when she was young    No significant past surgical history        SOCIAL HISTORY:    FAMILY HISTORY:  No pertinent family history in first degree relatives      Allergies    No Known Allergies    Intolerances      ROS:  Negative except for:        Height (cm): 165.1 (12-01-21 @ 17:16)      HOME MEDICATIONS:  aspirin 81 mg oral tablet, chewable: 1 tab(s) orally once a day (25 Mar 2021 15:10)  folic acid 1 mg oral tablet: 1 tab(s) orally once a day (02 Dec 2021 00:04)  Lexapro 5 mg oral tablet: 1 tab(s) orally once a day (21 Mar 2021 22:39)  losartan 50 mg oral tablet: 1 tab(s) orally once a day (21 Mar 2021 22:38)  RisperDAL 0.5 mg oral tablet: 1 tab(s) orally 2 times a day (02 Dec 2021 00:05)      Vital Signs Last 24 Hrs  T(C): 36.8 (02 Dec 2021 05:35), Max: 37.3 (02 Dec 2021 00:38)  T(F): 98.3 (02 Dec 2021 05:35), Max: 99.1 (02 Dec 2021 00:38)  HR: 94 (02 Dec 2021 05:35) (73 - 100)  BP: 128/62 (02 Dec 2021 05:35) (121/72 - 170/75)  BP(mean): --  RR: 18 (02 Dec 2021 05:35) (18 - 18)  SpO2: 98% (02 Dec 2021 05:35) (96% - 100%)    PHYSICAL EXAM  General: adult in NAD  HEENT: clear oropharynx, anicteric sclera, pink conjunctiva  Neck: supple  CV: normal S1/S2 with no murmur rubs or gallops  Lungs: positive air movement b/l ant lungs,clear to auscultation, no wheezes, no rales  Abdomen: soft non-tender non-distended, no hepatosplenomegaly  Ext: no clubbing cyanosis or edema  Skin: no rashes and no petechiae  Neuro: alert and oriented X 4, no focal deficits    MEDICATIONS  (STANDING):  aspirin  chewable 81 milliGRAM(s) Oral daily  chlorhexidine 4% Liquid 1 Application(s) Topical <User Schedule>  cyanocobalamin 1000 MICROGram(s) Oral daily  escitalopram 5 milliGRAM(s) Oral daily  folic acid 1 milliGRAM(s) Oral daily  furosemide    Tablet 20 milliGRAM(s) Oral daily  influenza  Vaccine (HIGH DOSE) 0.7 milliLiter(s) IntraMuscular once  losartan 50 milliGRAM(s) Oral daily  pantoprazole    Tablet 40 milliGRAM(s) Oral before breakfast  risperiDONE   Tablet 0.5 milliGRAM(s) Oral two times a day    MEDICATIONS  (PRN):      LABS:                          4.8    12.03 )-----------( 436      ( 01 Dec 2021 20:05 )             18.1         Mean Cell Volume : 73.0 fL  Mean Cell Hemoglobin : 19.4 pg  Mean Cell Hemoglobin Concentration : 26.5 g/dL  Auto Neutrophil # : 9.41 K/uL  Auto Lymphocyte # : 1.78 K/uL  Auto Monocyte # : 0.63 K/uL  Auto Eosinophil # : 0.11 K/uL  Auto Basophil # : 0.11 K/uL  Auto Neutrophil % : 78.2 %  Auto Lymphocyte % : 14.8 %  Auto Monocyte % : 5.2 %  Auto Eosinophil % : 0.9 %  Auto Basophil % : 0.9 %      Serial CBC's  12-01 @ 20:05  Hct-18.1 / Hgb-4.8 / Plat-436 / RBC-2.48 / WBC-12.03      12-01    135  |  102  |  25<H>  ----------------------------<  104<H>  4.8   |  19  |  1.4    Ca    9.3      01 Dec 2021 20:05    TPro  7.3  /  Alb  3.5  /  TBili  <0.2  /  DBili  x   /  AST  8   /  ALT  7   /  AlkPhos  105  12-01      PT/INR - ( 01 Dec 2021 20:05 )   PT: 11.90 sec;   INR: 1.04 ratio    PTT - ( 01 Dec 2021 20:05 )  PTT:29.3 sec        RADIOLOGY & ADDITIONAL STUDIES:     Patient is a 81y old  Female who presents with a chief complaint of     HPI:  81 YO F with PMHx of HTN, DM type II, schizoaffective disorder, Anemia (B12 def), chronic LE edema, basal cell carcinoma presents due to low Hb. Was fund to have a Hb of 4.9 outpatient, when arriving to the ED Hb 4.8. Patient is a poor historian, info obtained from charts and family (Dr. Razo). Patient has a soft tissue mass within the left infra-auricular skin that has been bleeding for the past few days. Denies any current symptoms. Denies CP, SOB, abdominal pain, changes in bowel movements or urination.  In ED VITALS: Temp 98.2F, HR 73, / 72, RR 18 SpO2 100%. COVID negative. Hb 4.8, giving 2 units of PRBC. Admitting for symptomatic anemia.    (01 Dec 2021 22:58)         PAST MEDICAL & SURGICAL HISTORY:  Diabetes mellitus  Hypertension  Schizoaffective disorder  Edema -Bilateral LE  Disorder of thyroid, unspecified  Son cannot provide details. States she had thyroid surgery decades ago when she was young    No significant past surgical history    FAMILY HISTORY:  No pertinent family history in first degree relatives      Allergies  No Known Allergies  Intolerances      ROS:  Negative except for:  could not be obtained      Height (cm): 165.1 (12-01-21 @ 17:16)      HOME MEDICATIONS:  aspirin 81 mg oral tablet, chewable: 1 tab(s) orally once a day (25 Mar 2021 15:10)  folic acid 1 mg oral tablet: 1 tab(s) orally once a day (02 Dec 2021 00:04)  Lexapro 5 mg oral tablet: 1 tab(s) orally once a day (21 Mar 2021 22:39)  losartan 50 mg oral tablet: 1 tab(s) orally once a day (21 Mar 2021 22:38)  RisperDAL 0.5 mg oral tablet: 1 tab(s) orally 2 times a day (02 Dec 2021 00:05)      Vital Signs Last 24 Hrs  T(C): 36.8 (02 Dec 2021 05:35), Max: 37.3 (02 Dec 2021 00:38)  T(F): 98.3 (02 Dec 2021 05:35), Max: 99.1 (02 Dec 2021 00:38)  HR: 94 (02 Dec 2021 05:35) (73 - 100)  BP: 128/62 (02 Dec 2021 05:35) (121/72 - 170/75)  RR: 18 (02 Dec 2021 05:35) (18 - 18)  SpO2: 98% (02 Dec 2021 05:35) (96% - 100%)    PHYSICAL EXAM  General: adult in NAD  HEENT: clear oropharynx,  pink conjunctiva, bandage over left ear  Neck: supple  CV: normal S1/S2 with no murmur rubs or gallops  Lungs: positive air movement b/l ant lungs   Abdomen: soft non-tender non-distended   Ext: no clubbing cyanosis or edema  Skin: no rashes and no petechiae  Neuro: alert and oriented X 4, no focal deficits    MEDICATIONS  (STANDING):  aspirin  chewable 81 milliGRAM(s) Oral daily  chlorhexidine 4% Liquid 1 Application(s) Topical <User Schedule>  cyanocobalamin 1000 MICROGram(s) Oral daily  escitalopram 5 milliGRAM(s) Oral daily  folic acid 1 milliGRAM(s) Oral daily  furosemide    Tablet 20 milliGRAM(s) Oral daily  influenza  Vaccine (HIGH DOSE) 0.7 milliLiter(s) IntraMuscular once  losartan 50 milliGRAM(s) Oral daily  pantoprazole    Tablet 40 milliGRAM(s) Oral before breakfast  risperiDONE   Tablet 0.5 milliGRAM(s) Oral two times a day    MEDICATIONS  (PRN):      LABS:                          4.8    12.03 )-----------( 436      ( 01 Dec 2021 20:05 )             18.1         Mean Cell Volume : 73.0 fL  Mean Cell Hemoglobin : 19.4 pg  Mean Cell Hemoglobin Concentration : 26.5 g/dL  Auto Neutrophil # : 9.41 K/uL  Auto Lymphocyte # : 1.78 K/uL  Auto Monocyte # : 0.63 K/uL  Auto Eosinophil # : 0.11 K/uL  Auto Basophil # : 0.11 K/uL  Auto Neutrophil % : 78.2 %  Auto Lymphocyte % : 14.8 %  Auto Monocyte % : 5.2 %  Auto Eosinophil % : 0.9 %  Auto Basophil % : 0.9 %      Serial CBC's  12-01 @ 20:05  Hct-18.1 / Hgb-4.8 / Plat-436 / RBC-2.48 / WBC-12.03      12-01    135  |  102  |  25<H>  ----------------------------<  104<H>  4.8   |  19  |  1.4    Ca    9.3      01 Dec 2021 20:05    TPro  7.3  /  Alb  3.5  /  TBili  <0.2  /  DBili  x   /  AST  8   /  ALT  7   /  AlkPhos  105  12-01    PT/INR - ( 01 Dec 2021 20:05 )   PT: 11.90 sec;   INR: 1.04 ratio    PTT - ( 01 Dec 2021 20:05 )  PTT:29.3 sec    RADIOLOGY & ADDITIONAL STUDIES:    < from: NM PET/CT Onc FDG Skull to Thigh, Inital (11.17.21 @ 10:39) >  FINDINGS:    HEAD/FACE/NECK: Intensely FDG avid 3.0 x 2.3 x 1.6 cm mass centered in the left ear infra-auricular skin, involving the ear lobe and abutting the parotid gland (max SUV 21.2). No pathologically FDG avid cervical lymph nodes.    Large heterogeneous right thyroid lobe goiter extending into the superior mediastinum with FDG avid poorly defined lobulated calcified nodule/s, likely corresponding to suspiciousnodules seen on prior ultrasound (max SUV 7.4).    Physiologic FDG uptake is seen in the visualized regions of the brain, large salivary glands and oropharynx.  Physiologic FDG uptake is seen in neck muscles.    CHEST: Physiologic FDG avidity is seenin mediastinal blood pool, myocardium.    LUNGS: No abnormal uptake.    PLEURA/PERICARDIUM: No abnormal uptake.    THORACIC NODES: FDG avid mediastinal and bilateral hilar lymph nodes. For example:    -Precarinal 2.2 x 1.4 cm lymph node (max SUV 6.2)    -Multiple left hilar lymph nodes (max SUV 9.4)   -Right infrahilar 1.5 x 1.2 cm lymph node (max SUV 10.1)    HEPATOBILIARY: No abnormal uptake.    SPLEEN: No abnormal uptake.    PANCREAS: No abnormal uptake.    ADRENAL GLANDS: No abnormal uptake.    KIDNEYS/URETERS/BLADDER: Excreted activity is seen.    ABDOMINOPELVIC NODES: No abnormal uptake.    BOWEL/PERITONEUM/MESENTERY: No abnormal uptake.    PELVIC ORGANS: No abnormal uptake.    BONES/SOFT TISSUES: No abnormal uptake.    OTHER: Bibasilardependent atelectasis. Cholelithiasis and probable choledocholithiasis. Small hiatal hernia. Colonic diverticulosis. Atherosclerotic vascular calcifications.    IMPRESSION:  1.  Intensely FDG avid 3 cm left infra-auricular mass involving the ear lobeand abutting the parotid gland, compatible with known basal cell carcinoma (max SUV 21.2); no FDG avid bilateral cervical lymphadenopathy.    2.  Nonspecific FDG avid mediastinal and bilateral hilar lymph nodes (max SUV 10.1 in a right infrahilar lymph node). Differential includes neoplastic or inflammatory etiologies.    3.  Large heterogeneous right thyroid lobe goiter extending into the superior mediastinum with sites of FDG uptake, likely corresponding to suspicious nodules seen on prior ultrasound (max SUV 7.4). Based on sonographic findings from May 29, 2020, fine-needle aspiration of these nodules recommended.    4.  No additional sites of abnormal FDG uptake.    < from: MR Orbit, Face, and/or Neck w/wo IV Cont (11.02.21 @ 13:20) >    IMPRESSION:    Enhancing lobulated soft tissue mass centered within the left infra-auricular skin with involvement of the ear lobule consistent with clinical history of basal cell carcinoma. No evidence of parotid invasion.    No lymphadenopathy.  Partially visualized enlarged right thyroid lobe. Further evaluation with thyroid ultrasound can be obtained.    < from: CT Angio Chest PE Protocol w/ IV Cont (03.01.21 @ 04:54) >    IMPRESSION:  Since May 28, 2020;  1.  No pulmonary embolus.  2.  Stable 6 cm heterogeneous lesion extending from the inferior right thyroid lobe into the superior mediastinum. Based on sonographic findings from May 29, 2020, recommend fine needle aspiration.    Biopsy of skin below left ear   Received: 12/20/17   INDICATION(S) FOR PROCEDURE(S): BCC  SURGICAL PROCEDURE:: Shave biopsy   SPECIMEN: Skin below left ear  GROSS DESCRIPTION:   * * FINAL DIAGNOSIS * *:  SKIN BELOW LEFT EAR, SHAVE BIOPSY:  : - BASAL CELL CARCINOMA EXTENDING TO THE DEEP RESECTION MARGIN.   : PERIPHERAL MARGINS ARE FREE.  : - SKIN SHOWS ULCERATION, ACUTE INFLAMMATION AND CRUST FORMATION  .     Pathology: Basal cell carcinoma

## 2021-12-02 NOTE — CONSULT NOTE ADULT - ASSESSMENT
81 yo F with hx of HTN, schizoaffective disorder, with hx of left ear lesion since 4 years. Pt had a biopsy done with dermatology in 2017, which was diagnosed as basal cell cancer. 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 Now the lesion has progressed and bleeds very frequently, so the family wants to pursue the treatment.     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 Please call ENT to evaluate the pt as the bleeding is most likely from the lesion.   -If surgery not primary treatment, then she would need to be evaluated by rad/onc for possible RT.   -If pt is deemed to be not curative, and cannot do surgery and RT, we can offer systemic therapy with Vismodegib or Sonidegib, but no role of medical oncology at this time    #6cm heterogenous lesion extending from the inferior right thyroid lobe into the superior mediastinum  -No workup done recently; pt had surgery in the past? unclear from history  -Outpatient endocrinology eval for possible FNA     81 yo F with hx of HTN, schizoaffective disorder, with hx of left ear lesion since 4 years. Pt had a biopsy done with dermatology in 2017, which was diagnosed as basal cell cancer. 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 Now the lesion has progressed and bleeds very frequently, so the family wants to pursue the treatment.     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 Please call ENT to evaluate the pt as the bleeding is most likely from the lesion.   -If surgery not primary treatment, then she would need to be evaluated by rad/onc for possible RT.   -If pt is deemed to be not curative, and cannot do surgery and RT, we can offer systemic therapy with Vismodegib or Sonidegib, but no role of medical oncology at this time    #6cm heterogenous lesion extending from the inferior right thyroid lobe into the superior mediastinum  -No workup done recently; pt had surgery in the past? unclear from history  -Outpatient endocrinology eval for possible FNA       81 yo F with hx of HTN, schizoaffective disorder, with hx of left ear lesion since 4 years. Pt had a biopsy done with dermatology in 2017, which was diagnosed as basal cell cancer. 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 Now the lesion has progressed and bleeds very frequently, so the family wants to pursue the treatment.     #Acute on chronic anemia, with hx of CKD  - Baseline Hb around 11; Hb on presentation 4.8 (microcytic)  - likely secondary to iron deficiency from chronic blood loss from the basal cell cancer   - s/p 2U PRBC on 12/1/21   - Check iron studies (including ferritin), Vit B12m folate, Reticulocyte count, LDH, haptoglobin   - If evidence of MANNY- start Venofer  200mg every 24-48 hours for 5 doses.   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 Please call ENT to evaluate the pt as the bleeding is most likely from the lesion.   -If surgery not primary treatment, then she would need to be evaluated by rad/onc for possible RT.   -If pt is deemed to be not curative, and cannot do surgery and RT, we can offer systemic therapy with Vismodegib or Sonidegib, but no role of medical oncology at this time    #6cm heterogenous lesion extending from the inferior right thyroid lobe into the superior mediastinum  -No workup done recently; pt had surgery in the past? unclear from history  -Outpatient endocrinology eval for possible FNA

## 2021-12-02 NOTE — CONSULT NOTE ADULT - ASSESSMENT
#)Acute on chronic microcytic anemia iron deficiency  - Baseline Hb around 10; Hb on presentation 4.8 (microcytic)  - likely secondary to iron deficiency from chronic blood loss from the basal cell cancer   - s/p 2U PRBC on 12/1/21         Recs:   2 18 gauge  active type and screen   Trend CBC keep Hb>7             #)Acute on chronic microcytic anemia iron deficiency  - Baseline Hb around 10; Hb on presentation 4.8 (microcytic)  - likely secondary to iron deficiency from chronic blood loss from the basal cell cancer   - s/p 2U PRBC on 12/1/21   -No abd pain, weight loss, dysphagia, odynophagia.  -BEATRIZ brown stool  -Last EGD and colonoscopy 30 years ago at [place] no reports in one content    Recs:   Repeat labs today  2 18 gauge  active type and screen   Trend CBC keep Hb>7  No endoscopic intervention at this time  can start on iron PO  Follow hem/onc recs  EGD and colonoscopy as an OP    will discuss with attending          79 YO F with PMHx of HTN, DM type II, schizoaffective disorder, Anemia (B12 def), chronic LE edema, basal cell carcinoma presents due to low Hb. Was fund to have a Hb of 4.9 outpatient, when arriving to the ED Hb 4.8. Patient is a poor historian, info obtained from charts and family (Dr. Razo). Patient has a soft tissue mass within the left infra-auricular skin that has been bleeding for the past few days. Denies any current symptoms. Denies CP, SOB, abdominal pain, changes in bowel movements or urination.    #)Acute on chronic microcytic anemia iron deficiency  - Baseline Hb around 10; Hb on presentation 4.8 (microcytic)  - likely secondary to iron deficiency from chronic blood loss from the basal cell cancer   - s/p 2U PRBC on 12/1/21   -No abd pain, weight loss, dysphagia, odynophagia.  -BEATRIZ brown stool  -Last EGD and colonoscopy 30 years ago at [place] no reports in one content    Recs:   Repeat labs today  2 18 gauge  active type and screen   Trend CBC keep Hb>7  No endoscopic intervention at this time  can start on iron PO  Follow hem/onc recs  EGD and colonoscopy as an OP    will discuss with attending          79 YO F with PMHx of HTN, DM type II, schizoaffective disorder, Anemia (B12 def), chronic LE edema, basal cell carcinoma presents due to low Hb. Was fund to have a Hb of 4.9 outpatient, when arriving to the ED Hb 4.8. Patient is a poor historian, info obtained from charts and family (Dr. Razo). Patient has a soft tissue mass within the left infra-auricular skin that has been bleeding for the past few days. Denies any current symptoms. Denies CP, SOB, abdominal pain, changes in bowel movements or urination.    #)Acute on chronic microcytic anemia iron deficiency  - Baseline Hb around 10; Hb on presentation 4.8 (microcytic)  - likely secondary to iron deficiency from chronic blood loss from the basal cell cancer   - s/p 2U PRBC on 12/1/21   -No abd pain, weight loss, dysphagia, odynophagia.  -BEATRIZ brown stool  -Last EGD and colonoscopy 30 years ago at [place] no reports in one content    Recs:   Repeat labs today  2 18 gauge  active type and screen   Trend CBC keep Hb>7  No endoscopic intervention at this time  can start on iron PO  Follow hem/onc recs  EGD and colonoscopy as an OP    recall as needed         79 YO F with PMHx of HTN, DM type II, schizoaffective disorder, Anemia (B12 def), chronic LE edema, basal cell carcinoma presents due to low Hb. Was fund to have a Hb of 4.9 outpatient, when arriving to the ED Hb 4.8. Patient is a poor historian, info obtained from charts and family (Dr. Razo). Patient has a soft tissue mass within the left infra-auricular skin that has been bleeding for the past few days. Denies any current symptoms. Denies CP, SOB, abdominal pain, changes in bowel movements or urination.    #)Acute on chronic microcytic anemia iron deficiency  - Baseline Hb around 10; Hb on presentation 4.8 (microcytic)  - likely secondary to iron deficiency from chronic blood loss from the basal cell cancer   - s/p 2U PRBC on 12/1/21   -No abd pain, weight loss, dysphagia, odynophagia.  -BEATRIZ brown stool  -Last EGD and colonoscopy 30 years ago at [place] no reports in one content    Recs:   Repeat labs today  2 18 gauge  active type and screen   Trend CBC keep Hb>7  No endoscopic intervention at this time  can start on iron PO  Follow hem/onc recs  EGD and colonoscopy as an OP

## 2021-12-02 NOTE — CONSULT NOTE ADULT - ATTENDING COMMENTS
The patient was seen. Agree with above.  79 yo female was admitted for severe anemia with Hb 4.8 due to chronic bleeding from left ear lesion. She had a biopsy done with dermatology in 2017, which was diagnosed as basal cell cancer.     -- Transfuse PRBC to keep Hb > 8.0  -- May give Venofer iv for iron deficiency.  -- Left ear BCC. Followup with ENT. If patient can not have surgery, should call radiation oncology consult.
Microcytic anemia (likely MANNY of ear basal cell cancer bleeding) no overt GI bleed. transfuse. Will need hematology evaluationa and subsequent endoscopic work up

## 2021-12-03 ENCOUNTER — TRANSCRIPTION ENCOUNTER (OUTPATIENT)
Age: 81
End: 2021-12-03

## 2021-12-03 LAB
ALBUMIN SERPL ELPH-MCNC: 3.3 G/DL — LOW (ref 3.5–5.2)
ALP SERPL-CCNC: 106 U/L — SIGNIFICANT CHANGE UP (ref 30–115)
ALT FLD-CCNC: 6 U/L — SIGNIFICANT CHANGE UP (ref 0–41)
ANION GAP SERPL CALC-SCNC: 15 MMOL/L — HIGH (ref 7–14)
AST SERPL-CCNC: 8 U/L — SIGNIFICANT CHANGE UP (ref 0–41)
BILIRUB SERPL-MCNC: 0.3 MG/DL — SIGNIFICANT CHANGE UP (ref 0.2–1.2)
BUN SERPL-MCNC: 20 MG/DL — SIGNIFICANT CHANGE UP (ref 10–20)
CALCIUM SERPL-MCNC: 9.7 MG/DL — SIGNIFICANT CHANGE UP (ref 8.5–10.1)
CHLORIDE SERPL-SCNC: 105 MMOL/L — SIGNIFICANT CHANGE UP (ref 98–110)
CO2 SERPL-SCNC: 21 MMOL/L — SIGNIFICANT CHANGE UP (ref 17–32)
CREAT SERPL-MCNC: 1.2 MG/DL — SIGNIFICANT CHANGE UP (ref 0.7–1.5)
GLUCOSE SERPL-MCNC: 86 MG/DL — SIGNIFICANT CHANGE UP (ref 70–99)
HCT VFR BLD CALC: 26 % — LOW (ref 37–47)
HGB BLD-MCNC: 7.4 G/DL — LOW (ref 12–16)
MAGNESIUM SERPL-MCNC: 2 MG/DL — SIGNIFICANT CHANGE UP (ref 1.8–2.4)
MCHC RBC-ENTMCNC: 21.8 PG — LOW (ref 27–31)
MCHC RBC-ENTMCNC: 28.5 G/DL — LOW (ref 32–37)
MCV RBC AUTO: 76.7 FL — LOW (ref 81–99)
NRBC # BLD: 0 /100 WBCS — SIGNIFICANT CHANGE UP (ref 0–0)
PHOSPHATE SERPL-MCNC: 3.4 MG/DL — SIGNIFICANT CHANGE UP (ref 2.1–4.9)
PLATELET # BLD AUTO: 415 K/UL — HIGH (ref 130–400)
POTASSIUM SERPL-MCNC: 4.9 MMOL/L — SIGNIFICANT CHANGE UP (ref 3.5–5)
POTASSIUM SERPL-SCNC: 4.9 MMOL/L — SIGNIFICANT CHANGE UP (ref 3.5–5)
PROT SERPL-MCNC: 7 G/DL — SIGNIFICANT CHANGE UP (ref 6–8)
RBC # BLD: 3.39 M/UL — LOW (ref 4.2–5.4)
RBC # FLD: 18.3 % — HIGH (ref 11.5–14.5)
SODIUM SERPL-SCNC: 141 MMOL/L — SIGNIFICANT CHANGE UP (ref 135–146)
WBC # BLD: 8.56 K/UL — SIGNIFICANT CHANGE UP (ref 4.8–10.8)
WBC # FLD AUTO: 8.56 K/UL — SIGNIFICANT CHANGE UP (ref 4.8–10.8)

## 2021-12-03 PROCEDURE — 99233 SBSQ HOSP IP/OBS HIGH 50: CPT

## 2021-12-03 RX ORDER — FERROUS SULFATE 325(65) MG
1 TABLET ORAL
Qty: 30 | Refills: 0 | DISCHARGE
Start: 2021-12-03

## 2021-12-03 RX ORDER — FERROUS SULFATE 325(65) MG
1 TABLET ORAL
Qty: 30 | Refills: 0
Start: 2021-12-03

## 2021-12-03 RX ADMIN — IRON SUCROSE 110 MILLIGRAM(S): 20 INJECTION, SOLUTION INTRAVENOUS at 10:01

## 2021-12-03 RX ADMIN — Medication 20 MILLIGRAM(S): at 05:18

## 2021-12-03 RX ADMIN — PANTOPRAZOLE SODIUM 40 MILLIGRAM(S): 20 TABLET, DELAYED RELEASE ORAL at 05:18

## 2021-12-03 RX ADMIN — Medication 81 MILLIGRAM(S): at 11:08

## 2021-12-03 RX ADMIN — RISPERIDONE 0.5 MILLIGRAM(S): 4 TABLET ORAL at 05:18

## 2021-12-03 RX ADMIN — RISPERIDONE 0.5 MILLIGRAM(S): 4 TABLET ORAL at 17:04

## 2021-12-03 RX ADMIN — ESCITALOPRAM OXALATE 5 MILLIGRAM(S): 10 TABLET, FILM COATED ORAL at 11:08

## 2021-12-03 RX ADMIN — LOSARTAN POTASSIUM 50 MILLIGRAM(S): 100 TABLET, FILM COATED ORAL at 05:18

## 2021-12-03 RX ADMIN — PREGABALIN 1000 MICROGRAM(S): 225 CAPSULE ORAL at 11:08

## 2021-12-03 RX ADMIN — Medication 1 MILLIGRAM(S): at 11:08

## 2021-12-03 NOTE — DISCHARGE NOTE PROVIDER - PROVIDER TOKENS
PROVIDER:[TOKEN:[[id number]],FOLLOWUP:[1 month]] PROVIDER:[TOKEN:[[id number]],FOLLOWUP:[1 month]],PROVIDER:[TOKEN:[[id number]],FOLLOWUP:[2 weeks]]

## 2021-12-03 NOTE — MEDICAL STUDENT PROGRESS NOTE(EDUCATION) - NS MD HP STUD ASPLAN ASSES FT
79 YO F with PMHx of HTN, DM type II, schizoaffective disorder, Anemia (B12 def), chronic LE edema, basal cell carcinoma presents due to low Hb. 79 YO F with PMHx of HTN, DM type II, schizoaffective disorder, Anemia (B12 def), chronic LE edema, basal cell carcinoma presented due to low Hb. Admitted for symptomatic anemia.

## 2021-12-03 NOTE — DISCHARGE NOTE PROVIDER - NSDCMRMEDTOKEN_GEN_ALL_CORE_FT
aspirin 81 mg oral tablet, chewable: 1 tab(s) orally once a day  cyanocobalamin 1000 mcg oral tablet: 1 tab(s) orally once a day  folic acid 1 mg oral tablet: 1 tab(s) orally once a day  furosemide 20 mg oral tablet: 1 tab(s) orally once a day  Lexapro 5 mg oral tablet: 1 tab(s) orally once a day  losartan 50 mg oral tablet: 1 tab(s) orally once a day  RisperDAL 0.5 mg oral tablet: 1 tab(s) orally 2 times a day   aspirin 81 mg oral tablet, chewable: 1 tab(s) orally once a day  cyanocobalamin 1000 mcg oral tablet: 1 tab(s) orally once a day  ferrous sulfate 325 mg (65 mg elemental iron) oral delayed release tablet: 1 tab(s) orally once a day   folic acid 1 mg oral tablet: 1 tab(s) orally once a day  furosemide 20 mg oral tablet: 1 tab(s) orally once a day  Lexapro 5 mg oral tablet: 1 tab(s) orally once a day  losartan 50 mg oral tablet: 1 tab(s) orally once a day  RisperDAL 0.5 mg oral tablet: 1 tab(s) orally 2 times a day

## 2021-12-03 NOTE — DISCHARGE NOTE PROVIDER - HOSPITAL COURSE
81 YO F with PMHx of HTN, DM type II, schizoaffective disorder, Anemia (B12 def), chronic LE edema, basal cell carcinoma presents due to low Hb. Was fund to have a Hb of 4.9 outpatient, when arriving to the ED Hb 4.8. Patient is a poor historian, info obtained from charts and family (Dr. Razo). Patient has a soft tissue mass within the left infra-auricular skin that has been bleeding for the past few days. Denies any current symptoms. Denies CP, SOB, abdominal pain, changes in bowel movements or urination.  In ED VITALS: Temp 98.2F, HR 73, / 72, RR 18 SpO2 100%. COVID negative. Hb 4.8, giving 2 units of PRBC. Admitting for symptomatic anemia.       While admitted patient was seen by heme/onc who's recommendation included Venofer 200 mg every 24 hours for 5 days, and out patient GI workup to screen for malignancy, out patient follow up with   endocrinology for thyroid   Patient was seen by GI who recommended 79 YO F with PMHx of HTN, DM type II, schizoaffective disorder, Anemia (B12 def), chronic LE edema, basal cell carcinoma presents due to low Hb. Was fund to have a Hb of 4.9 outpatient, when arriving to the ED Hb 4.8. Patient is a poor historian, info obtained from charts and family (Dr. Razo). Patient has a soft tissue mass within the left infra-auricular skin that has been bleeding for the past few days. Denies any current symptoms. Denies CP, SOB, abdominal pain, changes in bowel movements or urination.  In ED VITALS: Temp 98.2F, HR 73, / 72, RR 18 SpO2 100%. COVID negative. Hb 4.8, giving 2 units of PRBC. Admitting for symptomatic anemia.       While admitted patient was seen by heme/onc who's recommendation included Venofer 200 mg every 24 hours for 5 days, and out patient GI workup to screen for malignancy, out patient follow up with   endocrinology for thyroid   Patient is medically cleared for discharge 79 YO F with PMHx of HTN, DM type II, schizoaffective disorder, Anemia (B12 def), chronic LE edema, basal cell carcinoma presents due to low Hb. Was fund to have a Hb of 4.9 outpatient, when arriving to the ED Hb 4.8. Patient is a poor historian, info obtained from charts and family (Dr. Razo). Patient has a soft tissue mass within the left infra-auricular skin that has been bleeding for the past few days. Denies any current symptoms. Denies CP, SOB, abdominal pain, changes in bowel movements or urination.  In ED VITALS: Temp 98.2F, HR 73, / 72, RR 18 SpO2 100%. COVID negative. Hb 4.8, giving 2 units of PRBC. Admitting for symptomatic anemia.       While admitted patient was seen by heme/onc who's recommendation included Venofer 200 mg every 24 hours for 5 days, and out patient GI workup to screen for malignancy, out patient follow up with   endocrinology for thyroid   Patient is medically cleared for discharge       Attending addendum: Patient seen and examined. Patient is stable for discharge. Med rec reviewed.

## 2021-12-03 NOTE — DISCHARGE NOTE PROVIDER - CARE PROVIDERS DIRECT ADDRESSES
[email or web address],DirectAddress_Unknown

## 2021-12-03 NOTE — CONSULT NOTE ADULT - SUBJECTIVE AND OBJECTIVE BOX
Pt is a 82yo F w/PMH of HTN, DM type II, schizoaffective disorder, Anemia (B12 def), chronic LE edema, h/o thyroid lesson, basal cell carcinoma of the face presents due to low Hb. Was fund to have a Hb of 4.9 outpatient, when arriving to the ED Hb 4.8 currently admitted for symptomatic anemia s/p blood transfusion 2 Units of PRBC 12/1/21.  ENT called to evaluate Pt. for: Basal cell carcinoma, patient known to ENT team( Dr. Womack), patient likely has symptomatic anemia bleeding from site.   Pt. seen and examined at bedside in NAD, appears to be A&Ox 3, but poor historian. Left ear lesion with + blood( dry and red blood noted) in left naz + 4x 4 gauze as a dressing covering left ear. lesion extending to left face    Spoke with Pt's son Madelin Yanes over the phone, Pt's son unable to provide medical history insist on further discussion of medical history with Dr. Razo.   Case d/w Dr. Razo,  Pt. is awaiting to undergo resection of  Basal cell carcinoma vs ? Radiation Therapy as an outpatient      PAST MEDICAL & SURGICAL HISTORY:  Diabetes mellitus    Hypertension    Schizoaffective disorder    Edema  Bilateral LE    Disorder of thyroid, unspecified  Son cannot provide details. States she had thyroid surgery decades ago when she was young    No significant past surgical history      MEDICATIONS  (STANDING):  aspirin  chewable 81 milliGRAM(s) Oral daily  chlorhexidine 4% Liquid 1 Application(s) Topical <User Schedule>  cyanocobalamin 1000 MICROGram(s) Oral daily  escitalopram 5 milliGRAM(s) Oral daily  folic acid 1 milliGRAM(s) Oral daily  furosemide    Tablet 20 milliGRAM(s) Oral daily  influenza  Vaccine (HIGH DOSE) 0.7 milliLiter(s) IntraMuscular once  iron sucrose IVPB 200 milliGRAM(s) IV Intermittent every 24 hours  losartan 50 milliGRAM(s) Oral daily  pantoprazole    Tablet 40 milliGRAM(s) Oral before breakfast  risperiDONE   Tablet 0.5 milliGRAM(s) Oral two times a day       Allergies: NKDA        SOCIAL HISTORY: no pertinent family hx     FAMILY HISTORY:  No pertinent family history in first degree relatives      REVIEW OF SYSTEMS   [x] A ten-point review of systems was otherwise negative except as noted.     Vital Signs Last 24 Hrs  T(C): 36.3 (03 Dec 2021 12:29), Max: 37.2 (02 Dec 2021 21:23)  T(F): 97.3 (03 Dec 2021 12:29), Max: 99 (02 Dec 2021 21:23)  HR: 80 (03 Dec 2021 12:29) (80 - 83)  BP: 142/66 (03 Dec 2021 12:29) (132/76 - 142/66)  RR: 18 (03 Dec 2021 12:29) (18 - 18)  SpO2: 97% (02 Dec 2021 20:14) (97% - 97%)    GEN: NAD, awake and alert. No drooling or pooling of secretions. No stridor or stertor. Good vocal quality, no hoarseness.   SKIN: Good color, non diaphoretic.  HEENT: NC/AT; Oral mucosa prink and moist. No erythema or edema noted to buccal mucosa, tongue, FOM, uvula or posterior oropharynx. Uvula midline.  Ears: Left ear with + ear lobe lesion extended to the neck area,  + bloody , crusted area noted, area irrigated with NS. EAC  dry blood noted , TM intact. Xeroform applied to area of bleeding, pressure applied, covered with 4x 4 gauze and    covered with Margie head dressing to keep dressing in place and avoid Pt. to touch area.     Right ear - WNL.   NECK: Trachea midline, Neck supple, no TTP to B/L lateral neck, no cervical LAD.  RESP: No dyspnea, non-labored breathing. No use of accessory muscles.   CARDIO: +S1/S2  ABDO: Soft, NT.  EXT: MARCUM x 4       LABS:                        7.4    8.56  )-----------( 415      ( 03 Dec 2021 04:30 )             26.0     12-03    141  |  105  |  20  ----------------------------<  86  4.9   |  21  |  1.2    Ca    9.7      03 Dec 2021 04:30  Phos  3.4     12-03  Mg     2.0     12-03    TPro  7.0  /  Alb  3.3<L>  /  TBili  0.3  /  DBili  x   /  AST  8   /  ALT  6   /  AlkPhos  106  12-03    PT/INR - ( 01 Dec 2021 20:05 )   PT: 11.90 sec;   INR: 1.04 ratio         PTT - ( 01 Dec 2021 20:05 )  PTT:29.3 sec     Biopsy of skin below left ear  Received: 12/20/17  INDICATION(S) FOR PROCEDURE(S): BCC  SURGICAL PROCEDURE:: Shave biopsy  SPECIMEN: Skin below left ear  GROSS DESCRIPTION:  * * FINAL DIAGNOSIS * *:  SKIN BELOW LEFT EAR, SHAVE BIOPSY:  : - BASAL CELL CARCINOMA EXTENDING TO THE DEEP RESECTION MARGIN.  : PERIPHERAL MARGINS ARE FREE.  : - SKIN SHOWS ULCERATION, ACUTE INFLAMMATION AND CRUST FORMATION     Pathology: Basal cell carcinoma      < from: MR Orbit, Face, and/or Neck w/wo IV Cont (11.02.21 @ 13:20) >    EXAM:  MR ORBIT FACE AND ORNECK [facility]            PROCEDURE DATE:  11/02/2021      INTERPRETATION:  INDICATION:  Basal cell carcinoma left side of face.    TECHNIQUE:  Multiplanar T1 weighted and STIR images were obtained before contrast.  Diffusion-weighted images were obtained. Following intravenous gadolinium, multiplanar T1 weighted fat suppressed images were obtained. 9 cc of Gadavist were administered. 1 cc were discarded.    COMPARISON EXAMINATION:  Correlation with CT of the cervical spine dated 5/28/2020 as well as CT of the chest dated 3/1/2021.    FINDINGS:    NECK SOFT TISSUES: There is a 2.9 x 1.6 x 2.5 cm (AP x TR x CC) enhancing soft tissue mass which is centered in the infra-auricular skin with involvement of the lower aspect of the ear. The mass appears to directly abut the superficial lobe of the parotid without invasion.  AERODIGESTIVE TRACT: There is no abnormal nodular or masslike enhancement along the aerodigestive tract.  LYMPH NODES:  No adenopathy.  PAROTIDGLANDS:  A 6 mm T2 bright nodule is noted involving the deep lobe of the parotid likely representing a intraparotid lymph node. Parotid glands are otherwise unremarkable  SUBMANDIBULAR GLANDS:  Normal.  THYROID GLAND:  The right thyroid lobe is partially visualized though appears enlarged..  BONES:  Normal.  SINONASAL CAVITY: There is opacification of the left sphenoid sinus. Paranasal sinuses are otherwise unremarkable.  MISCELLANEOUS:  None.    IMPRESSION:    Enhancing lobulated soft tissue mass centered within the left infra-auricular skin with involvement of the ear lobule consistent with clinical history of basal cell carcinoma. No evidence of parotid invasion.    No lymphadenopathy.    Partially visualized enlarged right thyroid lobe. Further evaluation with thyroid ultrasound can be obtained.    --- End of Report ---    STEPHANIE COFFMAN MD; Attending Radiologist  This document has been electronically signed. Nov 2 2021  1:56PM    < end of copied text >    EXAM:  PETCT PACO ONC FDG INIT            PROCEDURE DATE:  11/17/2021            INTERPRETATION:  FDG PET CT STUDY, (EXTRA HEAD / NECK ACQUISITION) INITIAL TREATMENT STRATEGY  REASON: TUMOR IMAGING - PET with concurrently acquired CT for attenuation correction and anatomic localization; skull base to mid - thigh / [id number]    CLINICAL HISTORY / REASON FOR EXAM: 81-year-old female with basal cell carcinoma of the left ear, initially diagnosed in 2017 (not treated), now with lesion progression and frequent bleeding.    TECHNIQUE:  Blood glucose pre injection 116 mg/dL.  Approximately 45 minutes after the intravenous administration of 10.9 mCi 18-Fluorine FDG, whole body PET images were acquired from base of skull to mid - thigh. Repeat PET acquisition over the head and neck were acquired with the patient?s head in a ?head - cloud?, and special instructions to avoid motion.  In addition, non-contrast, low dose, non - diagnostic CT was acquired for attenuation correction and anatomic correlation purposes only, for both sets of acquisitions.    COMPARISON: Correlation made with MRI orbits dated November 2, 2021, which demonstrated a enhancing lobulated soft tissue mass centered in the left infra-auricular skin involving the ear lobule;no parotid invasion or lymphadenopathy. Thyroid ultrasound dated May 29, 2020    FINDINGS:    HEAD/FACE/NECK: Intensely FDG avid 3.0 x 2.3 x 1.6 cm mass centered in the left ear infra-auricular skin, involving the ear lobe and abutting the parotid gland (max SUV 21.2). No pathologically FDG avid cervical lymph nodes.    Large heterogeneous right thyroid lobe goiter extending into the superior mediastinum with FDG avid poorly defined lobulated calcified nodule/s, likely corresponding to suspiciousnodules seen on prior ultrasound (max SUV 7.4).    Physiologic FDG uptake is seen in the visualized regions of the brain, large salivary glands and oropharynx.  Physiologic FDG uptake is seen in neck muscles.    CHEST: Physiologic FDG avidity is seenin mediastinal blood pool, myocardium.    LUNGS: No abnormal uptake.    PLEURA/PERICARDIUM: No abnormal uptake.    THORACIC NODES: FDG avid mediastinal and bilateral hilar lymph nodes. For example:    -Precarinal 2.2 x 1.4 cm lymph node (max SUV 6.2)    -Multiple left hilar lymph nodes (max SUV 9.4)   -Right infrahilar 1.5 x 1.2 cm lymph node (max SUV 10.1)    HEPATOBILIARY: No abnormal uptake.    SPLEEN: No abnormal uptake.    PANCREAS: No abnormal uptake.    ADRENAL GLANDS: No abnormal uptake.    KIDNEYS/URETERS/BLADDER: Excreted activity is seen.    ABDOMINOPELVIC NODES: No abnormal uptake.    BOWEL/PERITONEUM/MESENTERY: No abnormal uptake.    PELVIC ORGANS: No abnormal uptake.    BONES/SOFT TISSUES: No abnormal uptake.    OTHER: Bibasilardependent atelectasis. Cholelithiasis and probable choledocholithiasis. Small hiatal hernia. Colonic diverticulosis. Atherosclerotic vascular calcifications.    IMPRESSION:  1.  Intensely FDG avid 3 cm left infra-auricular mass involving the ear lobeand abutting the parotid gland, compatible with known basal cell carcinoma (max SUV 21.2); no FDG avid bilateral cervical lymphadenopathy.    2.  Nonspecific FDG avid mediastinal and bilateral hilar lymph nodes (max SUV 10.1 in a right infrahilar lymph node). Differential includes neoplastic or inflammatory etiologies.    3.  Large heterogeneous right thyroid lobe goiter extending into the superior mediastinum with sites of FDG uptake, likely corresponding to suspicious nodules seen on prior ultrasound (max SUV 7.4). Based on sonographic findings from May 29, 2020, fine-needle aspiration of these nodules recommended.    4.  No additional sites of abnormal FDG uptake.    --- End of Report ---        CASSANDRA MCCONNELL MD; Attending Radiologist  This document has been electronically signed. Nov 17 2021 11:37AM     Pt is a 80yo F w/PMH of HTN, DM type II, schizoaffective disorder, Anemia (B12 def), chronic LE edema, h/o thyroid lesson, basal cell carcinoma of the face presents due to low Hb. Was fund to have a Hb of 4.9 outpatient, when arriving to the ED Hb 4.8 currently admitted for symptomatic anemia s/p blood transfusion 2 Units of PRBC 12/1/21.  ENT called to evaluate Pt. for: Basal cell carcinoma, patient known to ENT team( Dr. Womack), patient likely has symptomatic anemia bleeding from site.   Pt. seen and examined at bedside in NAD, appears to be A&Ox 3, but poor historian. Left ear lesion with + blood( dry and red blood noted) in left naz + 4x 4 gauze as a dressing covering left ear. lesion extending to left face    Spoke with Pt's son Madelin Yanes over the phone, Pt's son unable to provide medical history insist on further discussion of medical history with Dr. Razo.   Case d/w Dr. Razo,  Pt. is awaiting to undergo resection of  Basal cell carcinoma vs ? Radiation Therapy as an outpatient      PAST MEDICAL & SURGICAL HISTORY:  Diabetes mellitus    Hypertension    Schizoaffective disorder    Edema  Bilateral LE    Disorder of thyroid, unspecified  Son cannot provide details. States she had thyroid surgery decades ago when she was young    No significant past surgical history      MEDICATIONS  (STANDING):  aspirin  chewable 81 milliGRAM(s) Oral daily  chlorhexidine 4% Liquid 1 Application(s) Topical <User Schedule>  cyanocobalamin 1000 MICROGram(s) Oral daily  escitalopram 5 milliGRAM(s) Oral daily  folic acid 1 milliGRAM(s) Oral daily  furosemide    Tablet 20 milliGRAM(s) Oral daily  influenza  Vaccine (HIGH DOSE) 0.7 milliLiter(s) IntraMuscular once  iron sucrose IVPB 200 milliGRAM(s) IV Intermittent every 24 hours  losartan 50 milliGRAM(s) Oral daily  pantoprazole    Tablet 40 milliGRAM(s) Oral before breakfast  risperiDONE   Tablet 0.5 milliGRAM(s) Oral two times a day       Allergies: NKDA        SOCIAL HISTORY: no pertinent family hx     FAMILY HISTORY:  No pertinent family history in first degree relatives      REVIEW OF SYSTEMS   [x] A ten-point review of systems was otherwise negative except as noted.     Vital Signs Last 24 Hrs  T(C): 36.3 (03 Dec 2021 12:29), Max: 37.2 (02 Dec 2021 21:23)  T(F): 97.3 (03 Dec 2021 12:29), Max: 99 (02 Dec 2021 21:23)  HR: 80 (03 Dec 2021 12:29) (80 - 83)  BP: 142/66 (03 Dec 2021 12:29) (132/76 - 142/66)  RR: 18 (03 Dec 2021 12:29) (18 - 18)  SpO2: 97% (02 Dec 2021 20:14) (97% - 97%)    GEN: NAD, awake and alert. No drooling or pooling of secretions. No stridor or stertor. Good vocal quality, no hoarseness.   SKIN: Good color, non diaphoretic.  HEENT: NC/AT; Oral mucosa prink and moist. No erythema or edema noted to buccal mucosa, tongue, FOM, uvula or posterior oropharynx. Uvula midline.  Ears: Left ear with + ear lobe lesion extended to the neck area, and postauricular area,  + bloody w crusted area noted, area irrigated with NS. EAC  dry blood noted , TM intact. Surgicel Fibrillar placed at the site of bleeding covered with Xeroform and covered with 4x 4 gauze, Margie head dressing applied just to keep dressing in place and avoid Pt. to touch area.     Right ear - WNL.   NECK: Trachea midline, Neck supple, no TTP to B/L lateral neck, no cervical LAD.  RESP: No dyspnea, non-labored breathing. No use of accessory muscles.   CARDIO: +S1/S2  ABDO: Soft, NT.  EXT: MARCUM x 4       LABS:                        7.4    8.56  )-----------( 415      ( 03 Dec 2021 04:30 )             26.0     12-03    141  |  105  |  20  ----------------------------<  86  4.9   |  21  |  1.2    Ca    9.7      03 Dec 2021 04:30  Phos  3.4     12-03  Mg     2.0     12-03    TPro  7.0  /  Alb  3.3<L>  /  TBili  0.3  /  DBili  x   /  AST  8   /  ALT  6   /  AlkPhos  106  12-03    PT/INR - ( 01 Dec 2021 20:05 )   PT: 11.90 sec;   INR: 1.04 ratio         PTT - ( 01 Dec 2021 20:05 )  PTT:29.3 sec     Biopsy of skin below left ear  Received: 12/20/17  INDICATION(S) FOR PROCEDURE(S): BCC  SURGICAL PROCEDURE:: Shave biopsy  SPECIMEN: Skin below left ear  GROSS DESCRIPTION:  * * FINAL DIAGNOSIS * *:  SKIN BELOW LEFT EAR, SHAVE BIOPSY:  : - BASAL CELL CARCINOMA EXTENDING TO THE DEEP RESECTION MARGIN.  : PERIPHERAL MARGINS ARE FREE.  : - SKIN SHOWS ULCERATION, ACUTE INFLAMMATION AND CRUST FORMATION     Pathology: Basal cell carcinoma      < from: MR Orbit, Face, and/or Neck w/wo IV Cont (11.02.21 @ 13:20) >    EXAM:  MR ORBIT FACE AND ORNECK [facility]            PROCEDURE DATE:  11/02/2021      INTERPRETATION:  INDICATION:  Basal cell carcinoma left side of face.    TECHNIQUE:  Multiplanar T1 weighted and STIR images were obtained before contrast.  Diffusion-weighted images were obtained. Following intravenous gadolinium, multiplanar T1 weighted fat suppressed images were obtained. 9 cc of Gadavist were administered. 1 cc were discarded.    COMPARISON EXAMINATION:  Correlation with CT of the cervical spine dated 5/28/2020 as well as CT of the chest dated 3/1/2021.    FINDINGS:    NECK SOFT TISSUES: There is a 2.9 x 1.6 x 2.5 cm (AP x TR x CC) enhancing soft tissue mass which is centered in the infra-auricular skin with involvement of the lower aspect of the ear. The mass appears to directly abut the superficial lobe of the parotid without invasion.  AERODIGESTIVE TRACT: There is no abnormal nodular or masslike enhancement along the aerodigestive tract.  LYMPH NODES:  No adenopathy.  PAROTIDGLANDS:  A 6 mm T2 bright nodule is noted involving the deep lobe of the parotid likely representing a intraparotid lymph node. Parotid glands are otherwise unremarkable  SUBMANDIBULAR GLANDS:  Normal.  THYROID GLAND:  The right thyroid lobe is partially visualized though appears enlarged..  BONES:  Normal.  SINONASAL CAVITY: There is opacification of the left sphenoid sinus. Paranasal sinuses are otherwise unremarkable.  MISCELLANEOUS:  None.    IMPRESSION:    Enhancing lobulated soft tissue mass centered within the left infra-auricular skin with involvement of the ear lobule consistent with clinical history of basal cell carcinoma. No evidence of parotid invasion.    No lymphadenopathy.    Partially visualized enlarged right thyroid lobe. Further evaluation with thyroid ultrasound can be obtained.    --- End of Report ---    STEPHANIE COFFMAN MD; Attending Radiologist  This document has been electronically signed. Nov 2 2021  1:56PM    < end of copied text >    EXAM:  PETCT PACO ONC FDG INIT            PROCEDURE DATE:  11/17/2021            INTERPRETATION:  FDG PET CT STUDY, (EXTRA HEAD / NECK ACQUISITION) INITIAL TREATMENT STRATEGY  REASON: TUMOR IMAGING - PET with concurrently acquired CT for attenuation correction and anatomic localization; skull base to mid - thigh / [id number]    CLINICAL HISTORY / REASON FOR EXAM: 81-year-old female with basal cell carcinoma of the left ear, initially diagnosed in 2017 (not treated), now with lesion progression and frequent bleeding.    TECHNIQUE:  Blood glucose pre injection 116 mg/dL.  Approximately 45 minutes after the intravenous administration of 10.9 mCi 18-Fluorine FDG, whole body PET images were acquired from base of skull to mid - thigh. Repeat PET acquisition over the head and neck were acquired with the patient?s head in a ?head - cloud?, and special instructions to avoid motion.  In addition, non-contrast, low dose, non - diagnostic CT was acquired for attenuation correction and anatomic correlation purposes only, for both sets of acquisitions.    COMPARISON: Correlation made with MRI orbits dated November 2, 2021, which demonstrated a enhancing lobulated soft tissue mass centered in the left infra-auricular skin involving the ear lobule;no parotid invasion or lymphadenopathy. Thyroid ultrasound dated May 29, 2020    FINDINGS:    HEAD/FACE/NECK: Intensely FDG avid 3.0 x 2.3 x 1.6 cm mass centered in the left ear infra-auricular skin, involving the ear lobe and abutting the parotid gland (max SUV 21.2). No pathologically FDG avid cervical lymph nodes.    Large heterogeneous right thyroid lobe goiter extending into the superior mediastinum with FDG avid poorly defined lobulated calcified nodule/s, likely corresponding to suspiciousnodules seen on prior ultrasound (max SUV 7.4).    Physiologic FDG uptake is seen in the visualized regions of the brain, large salivary glands and oropharynx.  Physiologic FDG uptake is seen in neck muscles.    CHEST: Physiologic FDG avidity is seenin mediastinal blood pool, myocardium.    LUNGS: No abnormal uptake.    PLEURA/PERICARDIUM: No abnormal uptake.    THORACIC NODES: FDG avid mediastinal and bilateral hilar lymph nodes. For example:    -Precarinal 2.2 x 1.4 cm lymph node (max SUV 6.2)    -Multiple left hilar lymph nodes (max SUV 9.4)   -Right infrahilar 1.5 x 1.2 cm lymph node (max SUV 10.1)    HEPATOBILIARY: No abnormal uptake.    SPLEEN: No abnormal uptake.    PANCREAS: No abnormal uptake.    ADRENAL GLANDS: No abnormal uptake.    KIDNEYS/URETERS/BLADDER: Excreted activity is seen.    ABDOMINOPELVIC NODES: No abnormal uptake.    BOWEL/PERITONEUM/MESENTERY: No abnormal uptake.    PELVIC ORGANS: No abnormal uptake.    BONES/SOFT TISSUES: No abnormal uptake.    OTHER: Bibasilardependent atelectasis. Cholelithiasis and probable choledocholithiasis. Small hiatal hernia. Colonic diverticulosis. Atherosclerotic vascular calcifications.    IMPRESSION:  1.  Intensely FDG avid 3 cm left infra-auricular mass involving the ear lobeand abutting the parotid gland, compatible with known basal cell carcinoma (max SUV 21.2); no FDG avid bilateral cervical lymphadenopathy.    2.  Nonspecific FDG avid mediastinal and bilateral hilar lymph nodes (max SUV 10.1 in a right infrahilar lymph node). Differential includes neoplastic or inflammatory etiologies.    3.  Large heterogeneous right thyroid lobe goiter extending into the superior mediastinum with sites of FDG uptake, likely corresponding to suspicious nodules seen on prior ultrasound (max SUV 7.4). Based on sonographic findings from May 29, 2020, fine-needle aspiration of these nodules recommended.    4.  No additional sites of abnormal FDG uptake.    --- End of Report ---        CASSANDRA MCCONNELL MD; Attending Radiologist  This document has been electronically signed. Nov 17 2021 11:37AM

## 2021-12-03 NOTE — DISCHARGE NOTE PROVIDER - NSDCCPCAREPLAN_GEN_ALL_CORE_FT
PRINCIPAL DISCHARGE DIAGNOSIS  Diagnosis: Anemia  Assessment and Plan of Treatment:        PRINCIPAL DISCHARGE DIAGNOSIS  Diagnosis: Anemia  Assessment and Plan of Treatment: You came to the hospital due to low blood levels. It is likely that your blood loss is from the bleeding that you have from the wound in the area around your left earlobe that has been bleeding for the past few days. You were given two units of blood and your blood levels are now stable. You were given IV iron and will continue to take iron pills which should help your blood levels at home. You will follow up with the surgical team for surgical intervention of your basal cell carcinoma. You are medically cleared for discharge.       PRINCIPAL DISCHARGE DIAGNOSIS  Diagnosis: Anemia  Assessment and Plan of Treatment: You came to the hospital due to low blood levels. It is likely that your blood loss is from the bleeding that you have from the wound in the area around your left earlobe that has been bleeding for the past few days. You were given two units of blood and your blood levels are now stable. You were given IV iron and will continue to take iron pills which should help your blood levels at home. You will follow up with the surgical team for surgical intervention of your basal cell carcinoma. You were also seen by gastroenterologist and they recommend repeat EGD + colonoscopy outpatient. You are medically cleared for discharge.

## 2021-12-03 NOTE — CONSULT NOTE ADULT - ASSESSMENT
Pt is a 82yo F w/PMH of HTN, DM type II, schizoaffective disorder, Anemia (B12 def), chronic LE edema, h/o thyroid lesson, basal cell carcinoma of the face presents due to low Hb. Was fund to have a Hb of 4.9 outpatient, when arriving to the ED Hb 4.8 currently admitted for symptomatic anemia s/p blood transfusion 2 Units of PRBC 12/1/21.  ENT called to evaluate Pt. for: Basal cell carcinoma, patient known to ENT team( Dr. Womack), patient likely has symptomatic anemia bleeding from site.   Pt. seen and examined at bedside in NAD, appears to be A&Ox 3, but poor historian. Left ear lesion with + blood( dry and red blood noted) in left naz + 4x 4 gauze as a dressing covering left ear. lesion extending to left face    Spoke with Pt's son Madelin Yanes over the phone, Pt's son unable to provide medical history insist on further discussion of medical history with Dr. Razo.   Case d/w Dr. Razo,  Pt. is awaiting to undergo resection of  Basal cell carcinoma vs ? Radiation Therapy as an outpatient      Plan:  No acute surgical intervention needed at this time   Keep Surgicel Fibrillar and Xeroform covered with gauze  dressing in place   Will re-evaluate Pt. tomorrow for bleeding from left ear lesion.  Monitor H/H, transfuse as needed.  Analgesia prn  x pain     If stable, f/u as an outpatient with    Pt. will need F/U as an outpatient for management of basal cell carcinoma   Case d/w Dr. Razo, Dr. Womack.

## 2021-12-03 NOTE — PROGRESS NOTE ADULT - ASSESSMENT
79 YO F with PMHx of HTN, DM type II, schizoaffective disorder, Anemia (B12 def), chronic LE edema, basal cell carcinoma presented due to low Hb. Admitted for symptomatic anemia.    #Symptomatic iron deficiency anemia 2/2 chronic blood loss from [facility]   #Hx of B12 deficiency/ folate deficiency    - Hb 4.8 on admission s/p 2 units prbc   - active T + S,  keep Hb > 7.4  - iron sat 5%, TIBC 403 ug/dL, iron total 19 ug/dL (12/1)  - Per heme/onc - follow up with ENT inpatient, GI outpatient   - Per GI: no endoscopy,  iron PO. Colonoscopy outpatient   - c/w iron sucrose 200 mg every 24 hours for 5 days, ferrous sulfate 325 PO qd on discharge     #Hypertension  - c/w losartan     #Schizoaffective disorder  - c/w risperidone and Lexapro     #Chronic LE Edema   - c/w Lasix     #DVT PPX: sequentials   #GI PPX: Protonix     #Diet: DASH     Pending: ENT consult

## 2021-12-03 NOTE — DISCHARGE NOTE PROVIDER - CARE PROVIDER_API CALL
Austyn Alejandro)  Family Medicine  [address] [place] [address]  Phone: [phone]  Fax: [phone]  Follow Up Time: 1 month   Austyn Alejandro)  Family Medicine  [address] [place] [address]  Phone: [phone]  Fax: [phone]  Follow Up Time: 1 month    Semaj Ramirez)  Gastroenterology; Internal Medicine  [address] [place] [address]  Phone: [phone]  Fax: [phone]  Follow Up Time: 2 weeks

## 2021-12-03 NOTE — MEDICAL STUDENT PROGRESS NOTE(EDUCATION) - SUBJECTIVE AND OBJECTIVE BOX
HPI:  81 YO F with PMHx of HTN, DM type II, schizoaffective disorder, Anemia (B12 def), chronic LE edema, basal cell carcinoma presents due to low Hb. Was fund to have a Hb of 4.9 outpatient, when arriving to the ED Hb 4.8. Patient is a poor historian, info obtained from charts and family (Dr. Razo). Patient has a soft tissue mass within the left infra-auricular skin that has been bleeding for the past few days. Denies any current symptoms. Denies CP, SOB, abdominal pain, changes in bowel movements or urination.  In ED VITALS: Temp 98.2F, HR 73, / 72, RR 18 SpO2 100%. COVID negative. Hb 4.8, giving 2 units of PRBC. Admitting for symptomatic anemia.     This morning patient is resting comfortably in bed. She is awake, alert, and orientated to person, time, and place. She is eating breakfast and reports she had a bowel movement this morning, denies any blood in her stool or pain with defecation. She has no complaints at this time and is pleasant to talk to.     Subjective:  Overnight events: none  Complaints: none    Objective:        PHYSICAL EXAM:  Enter: Physical    MEDICATIONS  (STANDING):    MEDICATIONS  (PRN):    Home Medications:    Social History:  Denies tobacco, Etoh or illicit drug use  Living:     Labs                 Enter: CBC Fish bone    Enter: CBC Full    Copy: Full Labs    Enter: EKG   HPI:  81 YO F with PMHx of HTN, DM type II, schizoaffective disorder, Anemia (B12 def), chronic LE edema, basal cell carcinoma presents due to low Hb. Was fund to have a Hb of 4.9 outpatient, when arriving to the ED Hb 4.8. Patient is a poor historian, info obtained from charts and family (Dr. Razo). Patient has a soft tissue mass within the left infra-auricular skin that has been bleeding for the past few days. Denies any current symptoms. Denies CP, SOB, abdominal pain, changes in bowel movements or urination.  In ED VITALS: Temp 98.2F, HR 73, / 72, RR 18 SpO2 100%. COVID negative. Hb 4.8, giving 2 units of PRBC. Admitting for symptomatic anemia.     This morning patient is resting comfortably in bed. She is awake, alert, and orientated to person, time, and place. She is eating breakfast and reports she had a bowel movement this morning, denies any blood in her stool or pain with defecation. She has no complaints at this time and is pleasant to talk to.     Subjective:  Overnight events: none  Complaints: none    Objective:    Vital Signs Last 24 Hrs  T(C): 36.3 (03 Dec 2021 12:29), Max: 37.3 (02 Dec 2021 14:07)  T(F): 97.3 (03 Dec 2021 12:29), Max: 99.1 (02 Dec 2021 14:07)  HR: 80 (03 Dec 2021 12:29) (80 - 83)  BP: 142/66 (03 Dec 2021 12:29) (132/76 - 143/86)  BP(mean): --  RR: 18 (03 Dec 2021 12:29) (18 - 18)  SpO2: 97% (02 Dec 2021 20:14) (97% - 97%)      PHYSICAL EXAM:  GENERAL: NAD, speaks in full sentences, no signs of respiratory distress  HEAD: Atraumatic  NECK: Supple  CHEST/LUNG: Clear to auscultation bilaterally  HEART: S1, S2; RRR; No murmurs, rubs, or gallops  ABDOMEN: BS+; Soft, Non-tender, Non-distended  EXTREMITIES:  2+ Peripheral Pulses, 1+ pitting edema   PSYCH: AAOx3  NEUROLOGY: non-focal  SKIN: No rashes or lesions    MEDICATIONS  (STANDING):  aspirin  chewable 81 milliGRAM(s) Oral daily  chlorhexidine 4% Liquid 1 Application(s) Topical <User Schedule>  cyanocobalamin 1000 MICROGram(s) Oral daily  escitalopram 5 milliGRAM(s) Oral daily  folic acid 1 milliGRAM(s) Oral daily  furosemide    Tablet 20 milliGRAM(s) Oral daily  influenza  Vaccine (HIGH DOSE) 0.7 milliLiter(s) IntraMuscular once  iron sucrose IVPB 200 milliGRAM(s) IV Intermittent every 24 hours  losartan 50 milliGRAM(s) Oral daily  pantoprazole    Tablet 40 milliGRAM(s) Oral before breakfast  risperiDONE   Tablet 0.5 milliGRAM(s) Oral two times a day    MEDICATIONS  (PRN):      Home Medications:  aspirin 81 mg oral tablet, chewable: 1 tab(s) orally once a day (25 Mar 2021 15:10)  folic acid 1 mg oral tablet: 1 tab(s) orally once a day (02 Dec 2021 00:04)  Lexapro 5 mg oral tablet: 1 tab(s) orally once a day (21 Mar 2021 22:39)  losartan 50 mg oral tablet: 1 tab(s) orally once a day (21 Mar 2021 22:38)  RisperDAL 0.5 mg oral tablet: 1 tab(s) orally 2 times a day (02 Dec 2021 00:05)      Social History:  Denies tobacco, Etoh or illicit drug use  Living: assisted living facility     Labs                            7.4    8.56  )-----------( 415      ( 03 Dec 2021 04:30 )             26.0     CBC Full  -  ( 03 Dec 2021 04:30 )  WBC Count : 8.56 K/uL  RBC Count : 3.39 M/uL  Hemoglobin : 7.4 g/dL  Hematocrit : 26.0 %  Platelet Count - Automated : 415 K/uL  Mean Cell Volume : 76.7 fL  Mean Cell Hemoglobin : 21.8 pg  Mean Cell Hemoglobin Concentration : 28.5 g/dL  Auto Neutrophil # : x  Auto Lymphocyte # : x  Auto Monocyte # : x  Auto Eosinophil # : x  Auto Basophil # : x  Auto Neutrophil % : x  Auto Lymphocyte % : x  Auto Monocyte % : x  Auto Eosinophil % : x  Auto Basophil % : x      12-03    141  |  105  |  20  ----------------------------<  86  4.9   |  21  |  1.2    Ca    9.7      03 Dec 2021 04:30  Phos  3.4     12-03  Mg     2.0     12-03    TPro  7.0  /  Alb  3.3<L>  /  TBili  0.3  /  DBili  x   /  AST  8   /  ALT  6   /  AlkPhos  106  12-03    Iron with Total Binding Capacity in AM (12.02.21 @ 08:20)   Iron - Total Binding Capacity.: 403 ug/dL   % Saturation, Iron: 5 %   Iron Total, Serum: 19 ug/dL   Unsaturated Iron Binding Capacity: 384 ug/dL       Radiology     EXAM:  [facility]            PROCEDURE DATE:  11/17/2021        INTERPRETATION:  FDG PET CT STUDY, (EXTRA HEAD / NECK ACQUISITION) INITIAL TREATMENT STRATEGY  REASON: TUMOR IMAGING - PET with concurrently acquired CT for attenuation correction and anatomic localization; skull base to mid - thigh / [id number]    CLINICAL HISTORY / REASON FOR EXAM: 81-year-old female with basal cell carcinoma of the left ear, initially diagnosed in 2017 (not treated), now with lesion progression and frequent bleeding.    TECHNIQUE:  Blood glucose pre injection 116 mg/dL.  Approximately 45 minutes after the intravenous administration of 10.9 mCi 18-Fluorine FDG, whole body PET images were acquired from base of skull to mid - thigh. Repeat PET acquisition over the head and neck were acquired with the patient?s head in a ?head - cloud?, and special instructions to avoid motion.  In addition, non-contrast, low dose, non - diagnostic CT was acquired for attenuation correction and anatomic correlation purposes only, for both sets of acquisitions.    COMPARISON: Correlation made with MRI orbits dated November 2, 2021, which demonstrated a enhancing lobulated soft tissue mass centered in the left infra-auricular skin involving the ear lobule;no parotid invasion or lymphadenopathy. Thyroid ultrasound dated May 29, 2020    FINDINGS:    HEAD/FACE/NECK: Intensely FDG avid 3.0 x 2.3 x 1.6 cm mass centered in the left ear infra-auricular skin, involving the ear lobe and abutting the parotid gland (max SUV 21.2). No pathologically FDG avid cervical lymph nodes.    Large heterogeneous right thyroid lobe goiter extending into the superior mediastinum with FDG avid poorly defined lobulated calcified nodule/s, likely corresponding to suspiciousnodules seen on prior ultrasound (max SUV 7.4).    Physiologic FDG uptake is seen in the visualized regions of the brain, large salivary glands and oropharynx.  Physiologic FDG uptake is seen in neck muscles.    CHEST: Physiologic FDG avidity is seenin mediastinal blood pool, myocardium.    LUNGS: No abnormal uptake.    PLEURA/PERICARDIUM: No abnormal uptake.    THORACIC NODES: FDG avid mediastinal and bilateral hilar lymph nodes. For example:    -Precarinal 2.2 x 1.4 cm lymph node (max SUV 6.2)    -Multiple left hilar lymph nodes (max SUV 9.4)   -Right infrahilar 1.5 x 1.2 cm lymph node (max SUV 10.1)    HEPATOBILIARY: No abnormal uptake.    SPLEEN: No abnormal uptake.    PANCREAS: No abnormal uptake.    ADRENAL GLANDS: No abnormal uptake.    KIDNEYS/URETERS/BLADDER: Excreted activity is seen.    ABDOMINOPELVIC NODES: No abnormal uptake.    BOWEL/PERITONEUM/MESENTERY: No abnormal uptake.    PELVIC ORGANS: No abnormal uptake.    BONES/SOFT TISSUES: No abnormal uptake.    OTHER: Bibasilardependent atelectasis. Cholelithiasis and probable choledocholithiasis. Small hiatal hernia. Colonic diverticulosis. Atherosclerotic vascular calcifications.    IMPRESSION:  1.  Intensely FDG avid 3 cm left infra-auricular mass involving the ear lobeand abutting the parotid gland, compatible with known basal cell carcinoma (max SUV 21.2); no FDG avid bilateral cervical lymphadenopathy.    2.  Nonspecific FDG avid mediastinal and bilateral hilar lymph nodes (max SUV 10.1 in a right infrahilar lymph node). Differential includes neoplastic or inflammatory etiologies.    3.  Large heterogeneous right thyroid lobe goiter extending into the superior mediastinum with sites of FDG uptake, likely corresponding to suspicious nodules seen on prior ultrasound (max SUV 7.4). Based on sonographic findings from May 29, 2020, fine-needle aspiration of these nodules recommended.    4.  No additional sites of abnormal FDG uptake.    --- End of Report ---          CASSANDRA MCCONNELL MD; Attending Radiologist  This document has been electronically signed. Nov 17 2021 11:37AM   HPI:  81 YO F with PMHx of HTN, DM type II, schizoaffective disorder, Anemia (B12 def), chronic LE edema, basal cell carcinoma presents due to low Hb. Was fund to have a Hb of 4.9 outpatient, when arriving to the ED Hb 4.8. Patient is a poor historian, info obtained from charts and family (Dr. Razo). Patient has a soft tissue mass within the left infra-auricular skin that has been bleeding for the past few days. Denies any current symptoms. Denies CP, SOB, abdominal pain, changes in bowel movements or urination.  In ED VITALS: Temp 98.2F, HR 73, / 72, RR 18 SpO2 100%. COVID negative. Hb 4.8, giving 2 units of PRBC. Admitting for symptomatic anemia.     This morning patient is resting comfortably in bed. She is awake, alert, and orientated to person, time, and place. She is eating breakfast and reports she had a bowel movement this morning, denies any blood in her stool or pain with defecation. She has no complaints at this time and is pleasant to talk to.     Subjective:  Overnight events: none  Complaints: none    Objective:    Vital Signs Last 24 Hrs  T(C): 36.3 (03 Dec 2021 12:29), Max: 37.3 (02 Dec 2021 14:07)  T(F): 97.3 (03 Dec 2021 12:29), Max: 99.1 (02 Dec 2021 14:07)  HR: 80 (03 Dec 2021 12:29) (80 - 83)  BP: 142/66 (03 Dec 2021 12:29) (132/76 - 143/86)  BP(mean): --  RR: 18 (03 Dec 2021 12:29) (18 - 18)  SpO2: 97% (02 Dec 2021 20:14) (97% - 97%)      PHYSICAL EXAM:  GENERAL: NAD, speaks in full sentences, no signs of respiratory distress  HEAD: Atraumatic  NECK: Supple  CHEST/LUNG: Clear to auscultation bilaterally  HEART: S1, S2; RRR; No murmurs, rubs, or gallops  ABDOMEN: BS+; Soft, Non-tender, Non-distended  EXTREMITIES:  2+ Peripheral Pulses, 1+ pitting edema   PSYCH: AAOx3  NEUROLOGY: non-focal  SKIN: No rashes or lesions    MEDICATIONS  (STANDING):  aspirin  chewable 81 milliGRAM(s) Oral daily  chlorhexidine 4% Liquid 1 Application(s) Topical <User Schedule>  cyanocobalamin 1000 MICROGram(s) Oral daily  escitalopram 5 milliGRAM(s) Oral daily  folic acid 1 milliGRAM(s) Oral daily  furosemide    Tablet 20 milliGRAM(s) Oral daily  influenza  Vaccine (HIGH DOSE) 0.7 milliLiter(s) IntraMuscular once  iron sucrose IVPB 200 milliGRAM(s) IV Intermittent every 24 hours  losartan 50 milliGRAM(s) Oral daily  pantoprazole    Tablet 40 milliGRAM(s) Oral before breakfast  risperiDONE   Tablet 0.5 milliGRAM(s) Oral two times a day    MEDICATIONS  (PRN):      Home Medications:  aspirin 81 mg oral tablet, chewable: 1 tab(s) orally once a day (25 Mar 2021 15:10)  folic acid 1 mg oral tablet: 1 tab(s) orally once a day (02 Dec 2021 00:04)  Lexapro 5 mg oral tablet: 1 tab(s) orally once a day (21 Mar 2021 22:39)  losartan 50 mg oral tablet: 1 tab(s) orally once a day (21 Mar 2021 22:38)  RisperDAL 0.5 mg oral tablet: 1 tab(s) orally 2 times a day (02 Dec 2021 00:05)      Social History:  Denies tobacco, Etoh or illicit drug use  Living: assisted living facility     Labs                            7.4    8.56  )-----------( 415      ( 03 Dec 2021 04:30 )             26.0     CBC Full  -  ( 03 Dec 2021 04:30 )  WBC Count : 8.56 K/uL  RBC Count : 3.39 M/uL  Hemoglobin : 7.4 g/dL  Hematocrit : 26.0 %  Platelet Count - Automated : 415 K/uL  Mean Cell Volume : 76.7 fL  Mean Cell Hemoglobin : 21.8 pg  Mean Cell Hemoglobin Concentration : 28.5 g/dL  Auto Neutrophil # : x  Auto Lymphocyte # : x  Auto Monocyte # : x  Auto Eosinophil # : x  Auto Basophil # : x  Auto Neutrophil % : x  Auto Lymphocyte % : x  Auto Monocyte % : x  Auto Eosinophil % : x  Auto Basophil % : x      12-03    141  |  105  |  20  ----------------------------<  86  4.9   |  21  |  1.2    Ca    9.7      03 Dec 2021 04:30  Phos  3.4     12-03  Mg     2.0     12-03    TPro  7.0  /  Alb  3.3<L>  /  TBili  0.3  /  DBili  x   /  AST  8   /  ALT  6   /  AlkPhos  106  12-03    Iron with Total Binding Capacity in AM (12.02.21 @ 08:20)   Iron - Total Binding Capacity.: 403 ug/dL   % Saturation, Iron: 5 %   Iron Total, Serum: 19 ug/dL   Unsaturated Iron Binding Capacity: 384 ug/dL       Radiology     EXAM:  [facility]CT [facility] FDG INIT            PROCEDURE DATE:  11/17/2021        INTERPRETATION:  FDG PET CT STUDY, (EXTRA HEAD / NECK ACQUISITION) INITIAL TREATMENT STRATEGY  REASON: TUMOR IMAGING - PET with concurrently acquired CT for attenuation correction and anatomic localization; skull base to mid - thigh / [id number]    CLINICAL HISTORY / REASON FOR EXAM: 81-year-old female with basal cell carcinoma of the left ear, initially diagnosed in 2017 (not treated), now with lesion progression and frequent bleeding.    TECHNIQUE:  Blood glucose pre injection 116 mg/dL.  Approximately 45 minutes after the intravenous administration of 10.9 mCi 18-Fluorine FDG, whole body PET images were acquired from base of skull to mid - thigh. Repeat PET acquisition over the head and neck were acquired with the patient?s head in a ?head - cloud?, and special instructions to avoid motion.  In addition, non-contrast, low dose, non - diagnostic CT was acquired for attenuation correction and anatomic correlation purposes only, for both sets of acquisitions.    COMPARISON: Correlation made with MRI orbits dated November 2, 2021, which demonstrated a enhancing lobulated soft tissue mass centered in the left infra-auricular skin involving the ear lobule;no parotid invasion or lymphadenopathy. Thyroid ultrasound dated May 29, 2020    FINDINGS:    HEAD/FACE/NECK: Intensely FDG avid 3.0 x 2.3 x 1.6 cm mass centered in the left ear infra-auricular skin, involving the ear lobe and abutting the parotid gland (max SUV 21.2). No pathologically FDG avid cervical lymph nodes.    Large heterogeneous right thyroid lobe goiter extending into the superior mediastinum with FDG avid poorly defined lobulated calcified nodule/s, likely corresponding to suspiciousnodules seen on prior ultrasound (max SUV 7.4).    Physiologic FDG uptake is seen in the visualized regions of the brain, large salivary glands and oropharynx.  Physiologic FDG uptake is seen in neck muscles.    CHEST: Physiologic FDG avidity is seenin mediastinal blood pool, myocardium.    LUNGS: No abnormal uptake.    PLEURA/PERICARDIUM: No abnormal uptake.    THORACIC NODES: FDG avid mediastinal and bilateral hilar lymph nodes. For example:    -Precarinal 2.2 x 1.4 cm lymph node (max SUV 6.2)    -Multiple left hilar lymph nodes (max SUV 9.4)   -Right infrahilar 1.5 x 1.2 cm lymph node (max SUV 10.1)    HEPATOBILIARY: No abnormal uptake.    SPLEEN: No abnormal uptake.    PANCREAS: No abnormal uptake.    ADRENAL GLANDS: No abnormal uptake.    KIDNEYS/URETERS/BLADDER: Excreted activity is seen.    ABDOMINOPELVIC NODES: No abnormal uptake.    BOWEL/PERITONEUM/MESENTERY: No abnormal uptake.    PELVIC ORGANS: No abnormal uptake.    BONES/SOFT TISSUES: No abnormal uptake.    OTHER: Bibasilardependent atelectasis. Cholelithiasis and probable choledocholithiasis. Small hiatal hernia. Colonic diverticulosis. Atherosclerotic vascular calcifications.    IMPRESSION:  1.  Intensely FDG avid 3 cm left infra-auricular mass involving the ear lobeand abutting the parotid gland, compatible with known basal cell carcinoma (max SUV 21.2); no FDG avid bilateral cervical lymphadenopathy.    2.  Nonspecific FDG avid mediastinal and bilateral hilar lymph nodes (max SUV 10.1 in a right infrahilar lymph node). Differential includes neoplastic or inflammatory etiologies.    3.  Large heterogeneous right thyroid lobe goiter extending into the superior mediastinum with sites of FDG uptake, likely corresponding to suspicious nodules seen on prior ultrasound (max SUV 7.4). Based on sonographic findings from May 29, 2020, fine-needle aspiration of these nodules recommended.    4.  No additional sites of abnormal FDG uptake.    --- End of Report ---

## 2021-12-03 NOTE — MEDICAL STUDENT PROGRESS NOTE(EDUCATION) - NS MD HP STUD ASPLAN PLAN FT
Symptomatic anemia   #Basal Cell Carcinoma   # Loculated tissue mass within the left infra-auricular skin  #Hx of B12 deficiency/ folate deficiency    #Hx of Macrocytic anemia   - Hematologist- Dr. Ordonez   - Hb 4.8  - gave 2 units prbc - finished around 7 am on 12/2  - active T + S,  keep Hb > 7  - iron sat 5%, TIBC 403 ug/dL, iron total 19 ug/dL (12/1)  - Hb - 7.4 (12/3)   - Per heme/onc - follow up with ENT inpatient, GI outpatient, endocrinology out patient   - Per GI: no endoscopy, can start iron PO. colonoscopy outpatient   - c/w B12, folate   - c/w iron sucrose 200 mg every 24 hours   - ENT consult placed, f/u - needs to be seen for basal cell carcinoma on left infra auricular area before d/c    #Hypertension  - controlled  - c/w losartan     #Schizoaffective disorder  - c/w risperdol , lexapro     #Chronic LE Edema   - c/w lasix     #DVT PPX: sequentials   #GI PPX: Protonix  #activity as tolerated  #Diet: DASH   #Dispo: possible d/c back to assisted living tomorrow, pending ENT recs       Symptomatic anemia   #Basal Cell Carcinoma   # Loculated tissue mass within the left infra-auricular skin  #Hx of B12 deficiency/ folate deficiency    #Hx of Macrocytic anemia   - Hematologist- Dr. Ordonez   - Hb 4.8  - gave 2 units prbc - finished around 7 am on 12/2  - active T + S,  keep Hb > 7  - iron sat 5%, TIBC 403 ug/dL, iron total 19 ug/dL (12/1)  - Hb - 7.4 (12/3)   - Per heme/onc - follow up with ENT inpatient, GI outpatient, endocrinology out patient   - Per GI: no endoscopy, can start iron PO. colonoscopy outpatient   - c/w B12, folate   - c/w iron sucrose 200 mg every 24 hours   - B12 and folate labs ordered   - ENT consult placed, f/u - needs to be seen for basal cell carcinoma on left infra auricular area before d/c    #Hypertension  - controlled  - c/w losartan     #Schizoaffective disorder  - c/w risperdol , lexapro     #Chronic LE Edema   - c/w lasix     #DVT PPX: sequentials   #GI PPX: Protonix  #activity as tolerated  #Diet: DASH   #Dispo: possible d/c back to assisted living tomorrow, pending ENT recs       Symptomatic anemia   #Basal Cell Carcinoma   # Loculated tissue mass within the left infra-auricular skin  #Hx of B12 deficiency/ folate deficiency    #Hx of Macrocytic anemia   - Hematologist- Dr. rOdonez   - Hb 4.8-->  - s/p 2 units prbc - on 12/2  - active T + S,  keep Hb > 7.4  - iron sat 5%, TIBC 403 ug/dL, iron total 19 ug/dL (12/1)  - Hb - 7.4 (12/3)   - Per heme/onc - follow up with ENT inpatient, GI outpatient, endocrinology out patient   - Per GI: no endoscopy, can start iron PO. colonoscopy outpatient   - c/w B12, folate   - c/w iron sucrose 200 mg every 24 hours   - B12 and folate labs ordered   - ENT consult placed, f/u - needs to be seen for basal cell carcinoma on left infra auricular area before d/c    #Hypertension  - controlled  - c/w losartan     #Schizoaffective disorder  - c/w risperdol , lexapro     #Chronic LE Edema   - c/w lasix     #DVT PPX: sequentials   #GI PPX: Protonix  #activity as tolerated  #Diet: DASH   #Dispo: possible d/c back to assisted living tomorrow, pending ENT recs       Symptomatic anemia   #Basal Cell Carcinoma   # Loculated tissue mass within the left infra-auricular skin  #Hx of B12 deficiency/ folate deficiency    #Hx of Macrocytic anemia   - Hematologist- Dr. Ordonez   - Hb 4.8-->  - s/p 2 units prbc - on 12/2  - active T + S,  keep Hb > 7.4  - iron sat 5%, TIBC 403 ug/dL, iron total 19 ug/dL (12/1)  - Hb - 7.4 (12/3)   - Per heme/onc - follow up with ENT inpatient, GI outpatient, endocrinology out patient   - Per GI: no endoscopy, can start iron PO. colonoscopy outpatient   - c/w B12, folate   - c/w iron sucrose 200 mg every 24 hours for 5 days, ferrous sulfate 325 PO qd on discharge  - B12 and folate labs ordered   - ENT consult placed, f/u - needs to be seen for basal cell carcinoma on left infra auricular area before d/c    #Hypertension  - controlled  - c/w losartan     #Schizoaffective disorder  - c/w risperdol , lexapro     #Chronic LE Edema   - c/w lasix     #DVT PPX: sequentials   #GI PPX: Protonix  #activity as tolerated  #Diet: DASH   #Dispo: possible d/c back to assisted living tomorrow, pending ENT recs

## 2021-12-03 NOTE — PROGRESS NOTE ADULT - SUBJECTIVE AND OBJECTIVE BOX
Pt seen and examined at bedside. Patient denies any complaints.       VITAL SIGNS (Last 24 hrs):  T(C): 36.3 (12-03-21 @ 12:29), Max: 37.2 (12-02-21 @ 21:23)  HR: 80 (12-03-21 @ 12:29) (80 - 83)  BP: 142/66 (12-03-21 @ 12:29) (132/76 - 142/66)  RR: 18 (12-03-21 @ 12:29) (18 - 18)  SpO2: 97% (12-02-21 @ 20:14) (97% - 97%)            PHYSICAL EXAM:  GENERAL: NAD   HEAD:  Atraumatic, Normocephalic  EYES: conjunctiva and sclera clear  NECK: Supple, No JVD  CHEST/LUNG: Clear to auscultation bilaterally; No wheeze  HEART: Regular rate and rhythm; No murmurs, rubs, or gallops  ABDOMEN: Soft, Nontender, Nondistended; Bowel sounds present  EXTREMITIES:  2+ Peripheral Pulses, No clubbing, cyanosis, or edema  PSYCH: AAOx3  NEUROLOGY: non-focal  SKIN:  Loculated tissue mass within the left infra-auricular skin    Labs Reviewed       CBC Full  -  ( 03 Dec 2021 04:30 )  WBC Count : 8.56 K/uL  Hemoglobin : 7.4 g/dL  Hematocrit : 26.0 %  Platelet Count - Automated : 415 K/uL  Mean Cell Volume : 76.7 fL  Mean Cell Hemoglobin : 21.8 pg  Mean Cell Hemoglobin Concentration : 28.5 g/dL  Auto Neutrophil # : x  Auto Lymphocyte # : x  Auto Monocyte # : x  Auto Eosinophil # : x  Auto Basophil # : x  Auto Neutrophil % : x  Auto Lymphocyte % : x  Auto Monocyte % : x  Auto Eosinophil % : x  Auto Basophil % : x    BMP:    12-03 @ 04:30    Blood Urea Nitrogen - 20  Calcium - 9.7  Carbon Dioxide - 21  Chloride - 105  Creatinine - 1.2  Glucose - 86  Potassium - 4.9  Sodium - 141       PT/INR - ( 01 Dec 2021 20:05 )   PT: 11.90 sec;   INR: 1.04 ratio         PTT - ( 01 Dec 2021 20:05 )  PTT:29.3 sec       MEDICATIONS  (STANDING):  aspirin  chewable 81 milliGRAM(s) Oral daily  chlorhexidine 4% Liquid 1 Application(s) Topical <User Schedule>  cyanocobalamin 1000 MICROGram(s) Oral daily  escitalopram 5 milliGRAM(s) Oral daily  folic acid 1 milliGRAM(s) Oral daily  furosemide    Tablet 20 milliGRAM(s) Oral daily  influenza  Vaccine (HIGH DOSE) 0.7 milliLiter(s) IntraMuscular once  iron sucrose IVPB 200 milliGRAM(s) IV Intermittent every 24 hours  losartan 50 milliGRAM(s) Oral daily  pantoprazole    Tablet 40 milliGRAM(s) Oral before breakfast  risperiDONE   Tablet 0.5 milliGRAM(s) Oral two times a day    MEDICATIONS  (PRN):

## 2021-12-03 NOTE — DISCHARGE NOTE PROVIDER - NSDCHC_MEDRECSTATUS_GEN_ALL_CORE
Admission Reconciliation is Completed  Discharge Reconciliation is Not Complete Admission Reconciliation is Completed  Discharge Reconciliation is Completed Calcipotriene Pregnancy And Lactation Text: This medication has not been proven safe during pregnancy. It is unknown if this medication is excreted in breast milk.

## 2021-12-04 LAB
ALBUMIN SERPL ELPH-MCNC: 3.3 G/DL — LOW (ref 3.5–5.2)
ALP SERPL-CCNC: 99 U/L — SIGNIFICANT CHANGE UP (ref 30–115)
ALT FLD-CCNC: 6 U/L — SIGNIFICANT CHANGE UP (ref 0–41)
ANION GAP SERPL CALC-SCNC: 15 MMOL/L — HIGH (ref 7–14)
AST SERPL-CCNC: 9 U/L — SIGNIFICANT CHANGE UP (ref 0–41)
BILIRUB SERPL-MCNC: <0.2 MG/DL — SIGNIFICANT CHANGE UP (ref 0.2–1.2)
BUN SERPL-MCNC: 21 MG/DL — HIGH (ref 10–20)
CALCIUM SERPL-MCNC: 9.6 MG/DL — SIGNIFICANT CHANGE UP (ref 8.5–10.1)
CHLORIDE SERPL-SCNC: 106 MMOL/L — SIGNIFICANT CHANGE UP (ref 98–110)
CO2 SERPL-SCNC: 19 MMOL/L — SIGNIFICANT CHANGE UP (ref 17–32)
CREAT SERPL-MCNC: 1.2 MG/DL — SIGNIFICANT CHANGE UP (ref 0.7–1.5)
FOLATE SERPL-MCNC: >20 NG/ML — SIGNIFICANT CHANGE UP
GLUCOSE SERPL-MCNC: 92 MG/DL — SIGNIFICANT CHANGE UP (ref 70–99)
HCT VFR BLD CALC: 26.1 % — LOW (ref 37–47)
HGB BLD-MCNC: 7.5 G/DL — LOW (ref 12–16)
MAGNESIUM SERPL-MCNC: 2 MG/DL — SIGNIFICANT CHANGE UP (ref 1.8–2.4)
MCHC RBC-ENTMCNC: 21.9 PG — LOW (ref 27–31)
MCHC RBC-ENTMCNC: 28.7 G/DL — LOW (ref 32–37)
MCV RBC AUTO: 76.1 FL — LOW (ref 81–99)
NRBC # BLD: 0 /100 WBCS — SIGNIFICANT CHANGE UP (ref 0–0)
PHOSPHATE SERPL-MCNC: 3.5 MG/DL — SIGNIFICANT CHANGE UP (ref 2.1–4.9)
PLATELET # BLD AUTO: 383 K/UL — SIGNIFICANT CHANGE UP (ref 130–400)
POTASSIUM SERPL-MCNC: 5.1 MMOL/L — HIGH (ref 3.5–5)
POTASSIUM SERPL-SCNC: 5.1 MMOL/L — HIGH (ref 3.5–5)
PROT SERPL-MCNC: 6.9 G/DL — SIGNIFICANT CHANGE UP (ref 6–8)
RBC # BLD: 3.43 M/UL — LOW (ref 4.2–5.4)
RBC # FLD: 19 % — HIGH (ref 11.5–14.5)
SODIUM SERPL-SCNC: 140 MMOL/L — SIGNIFICANT CHANGE UP (ref 135–146)
VIT B12 SERPL-MCNC: 1077 PG/ML — SIGNIFICANT CHANGE UP (ref 232–1245)
WBC # BLD: 8.53 K/UL — SIGNIFICANT CHANGE UP (ref 4.8–10.8)
WBC # FLD AUTO: 8.53 K/UL — SIGNIFICANT CHANGE UP (ref 4.8–10.8)

## 2021-12-04 PROCEDURE — 99233 SBSQ HOSP IP/OBS HIGH 50: CPT

## 2021-12-04 RX ADMIN — PANTOPRAZOLE SODIUM 40 MILLIGRAM(S): 20 TABLET, DELAYED RELEASE ORAL at 06:14

## 2021-12-04 RX ADMIN — LOSARTAN POTASSIUM 50 MILLIGRAM(S): 100 TABLET, FILM COATED ORAL at 06:15

## 2021-12-04 RX ADMIN — RISPERIDONE 0.5 MILLIGRAM(S): 4 TABLET ORAL at 06:15

## 2021-12-04 RX ADMIN — ESCITALOPRAM OXALATE 5 MILLIGRAM(S): 10 TABLET, FILM COATED ORAL at 11:14

## 2021-12-04 RX ADMIN — Medication 1 MILLIGRAM(S): at 11:15

## 2021-12-04 RX ADMIN — PREGABALIN 1000 MICROGRAM(S): 225 CAPSULE ORAL at 11:52

## 2021-12-04 RX ADMIN — IRON SUCROSE 110 MILLIGRAM(S): 20 INJECTION, SOLUTION INTRAVENOUS at 10:24

## 2021-12-04 RX ADMIN — RISPERIDONE 0.5 MILLIGRAM(S): 4 TABLET ORAL at 17:04

## 2021-12-04 RX ADMIN — CHLORHEXIDINE GLUCONATE 1 APPLICATION(S): 213 SOLUTION TOPICAL at 06:14

## 2021-12-04 RX ADMIN — Medication 20 MILLIGRAM(S): at 06:14

## 2021-12-04 RX ADMIN — Medication 81 MILLIGRAM(S): at 11:16

## 2021-12-04 NOTE — PROGRESS NOTE ADULT - ASSESSMENT
Pt is a 82yo F w/PMH of HTN, DM type II, schizoaffective disorder, Anemia (B12 def), chronic LE edema, h/o thyroid lesson, basal cell carcinoma of the face presents due to low Hb. Was fund to have a Hb of 4.9 outpatient, when arriving to the ED Hb 4.8 currently admitted for symptomatic anemia s/p blood transfusion 2 Units of PRBC 12/1/21. ENT called to evaluate Pt. for: Basal cell carcinoma, patient known to ENT team( Dr. Womack), patient likely has symptomatic anemia bleeding from site s/p Surgicel fibrillar application to bleeding area with achieved hemostasis.   Pt. seen and examined at bedside in NAD, left ear lesion covered with Surgicel fibrillar and xeroform, no evidence of active bleeding. New clean dressing applied to left ear.         Plan:  No acute ENT intervention needed at this time   Avoid left ear touching, keep area clean and dry, cont daily dressings changes with xeroform and 4x4 gauze.   F/U with Dr. Farfan as an outpatient  Pt. will need F/U as an outpatient for management of basal cell carcinoma   Cont. current medical management as per primary medical care.   Will d/w ENT attending

## 2021-12-04 NOTE — PROGRESS NOTE ADULT - ASSESSMENT
79 YO F with PMHx of HTN, DM type II, schizoaffective disorder, Anemia (B12 def), chronic LE edema, basal cell carcinoma presented due to low Hb. Admitted for symptomatic anemia.    #Symptomatic iron deficiency anemia 2/2 chronic blood loss from BCC   #Hx of B12 deficiency/ folate deficiency    - Hb 4.8 on admission s/p 2 units prbc   - active T + S,  keep Hb > 7.4  - iron sat 5%, TIBC 403 ug/dL, iron total 19 ug/dL (12/1)  - Per heme/onc - follow up with ENT inpatient, GI outpatient   - Per GI: no endoscopy,  iron PO. Colonoscopy outpatient   - c/w iron sucrose 200 mg every 24 hours for 5 days, ferrous sulfate 325 PO qd on discharge     #Hypertension  - c/w losartan     #Schizoaffective disorder  - c/w risperidone and Lexapro     #Chronic LE Edema   - c/w Lasix     #DVT PPX: sequentials   #GI PPX: Protonix     #Diet: DASH     Pending: ENT clearance   SNF long term resident - [facility]

## 2021-12-04 NOTE — PROGRESS NOTE ADULT - SUBJECTIVE AND OBJECTIVE BOX
Pt seen and examined at bedside. No complaints.     VITAL SIGNS (Last 24 hrs):  T(C): 36.3 (12-04-21 @ 12:13), Max: 36.8 (12-03-21 @ 19:53)  HR: 83 (12-04-21 @ 12:13) (73 - 83)  BP: 126/73 (12-04-21 @ 12:13) (126/73 - 143/65)  RR: 20 (12-04-21 @ 12:13) (18 - 20)  SpO2: --  Wt(kg): --  Daily     Daily     I&O's Summary      PHYSICAL EXAM:  GENERAL: NAD   HEAD:  Atraumatic, Normocephalic  EYES: conjunctiva and sclera clear  NECK: Supple, No JVD  CHEST/LUNG: Clear to auscultation bilaterally; No wheeze  HEART: Regular rate and rhythm; No murmurs, rubs, or gallops  ABDOMEN: Soft, Nontender, Nondistended; Bowel sounds present  EXTREMITIES:  2+ Peripheral Pulses, No clubbing, cyanosis, or edema  PSYCH: AAOx3  NEUROLOGY: non-focal       Labs Reviewed       CBC Full  -  ( 04 Dec 2021 04:30 )  WBC Count : 8.53 K/uL  Hemoglobin : 7.5 g/dL  Hematocrit : 26.1 %  Platelet Count - Automated : 383 K/uL  Mean Cell Volume : 76.1 fL  Mean Cell Hemoglobin : 21.9 pg  Mean Cell Hemoglobin Concentration : 28.7 g/dL  Auto Neutrophil # : x  Auto Lymphocyte # : x  Auto Monocyte # : x  Auto Eosinophil # : x  Auto Basophil # : x  Auto Neutrophil % : x  Auto Lymphocyte % : x  Auto Monocyte % : x  Auto Eosinophil % : x  Auto Basophil % : x    BMP:    12-04 @ 04:30    Blood Urea Nitrogen - 21  Calcium - 9.6  Carbond Dioxide - 19  Chloride - 106  Creatinine - 1.2  Glucose - 92  Potassium - 5.1  Sodium - 140       PT/INR - ( 01 Dec 2021 20:05 )   PT: 11.90 sec;   INR: 1.04 ratio         PTT - ( 01 Dec 2021 20:05 )  PTT:29.3 sec     MEDICATIONS  (STANDING):  aspirin  chewable 81 milliGRAM(s) Oral daily  chlorhexidine 4% Liquid 1 Application(s) Topical <User Schedule>  cyanocobalamin 1000 MICROGram(s) Oral daily  escitalopram 5 milliGRAM(s) Oral daily  folic acid 1 milliGRAM(s) Oral daily  furosemide    Tablet 20 milliGRAM(s) Oral daily  influenza  Vaccine (HIGH DOSE) 0.7 milliLiter(s) IntraMuscular once  iron sucrose IVPB 200 milliGRAM(s) IV Intermittent every 24 hours  losartan 50 milliGRAM(s) Oral daily  pantoprazole    Tablet 40 milliGRAM(s) Oral before breakfast  risperiDONE   Tablet 0.5 milliGRAM(s) Oral two times a day    MEDICATIONS  (PRN):

## 2021-12-04 NOTE — PROGRESS NOTE ADULT - SUBJECTIVE AND OBJECTIVE BOX
ENT DAILY PROGRESS NOTE  Pt is a 82yo F w/PMH of HTN, DM type II, schizoaffective disorder, Anemia (B12 def), chronic LE edema, h/o thyroid lesson, basal cell carcinoma of the face presents due to low Hb. Was fund to have a Hb of 4.9 outpatient, when arriving to the ED Hb 4.8 currently admitted for symptomatic anemia s/p blood transfusion 2 Units of PRBC 12/1/21. ENT called to evaluate Pt. for: Basal cell carcinoma, patient known to ENT team( Dr. Womack), patient likely has symptomatic anemia bleeding from site s/p Surgicel fibrillar application to bleeding area with achieved hemostasis.   Pt. seen and examined at bedside in NAD, left ear lesion covered with Surgicel fibrillar and xeroform, no evidence of active bleeding. new clean dressing applied.        REVIEW OF SYSTEMS   [x] A ten-point review of systems was otherwise negative except as noted.       Allergies: NKDA             MEDICATIONS:  aspirin  chewable 81 milliGRAM(s) Oral daily  chlorhexidine 4% Liquid 1 Application(s) Topical <User Schedule>  cyanocobalamin 1000 MICROGram(s) Oral daily  escitalopram 5 milliGRAM(s) Oral daily  folic acid 1 milliGRAM(s) Oral daily  furosemide    Tablet 20 milliGRAM(s) Oral daily  influenza  Vaccine (HIGH DOSE) 0.7 milliLiter(s) IntraMuscular once  iron sucrose IVPB 200 milliGRAM(s) IV Intermittent every 24 hours  losartan 50 milliGRAM(s) Oral daily  pantoprazole    Tablet 40 milliGRAM(s) Oral before breakfast  risperiDONE   Tablet 0.5 milliGRAM(s) Oral two times a day      Vital Signs Last 24 Hrs  T(C): 36.3 (04 Dec 2021 12:13), Max: 36.8 (03 Dec 2021 19:53)  T(F): 97.4 (04 Dec 2021 12:13), Max: 98.3 (03 Dec 2021 19:53)  HR: 83 (04 Dec 2021 12:13) (73 - 83)  BP: 126/73 (04 Dec 2021 12:13) (126/73 - 143/65)  RR: 20 (04 Dec 2021 12:13) (18 - 20)          PHYSICAL EXAM:  GEN: NAD, awake and alert. No drooling or pooling of secretions. No stridor or stertor. Good vocal quality, no hoarseness.   SKIN: Good color, non diaphoretic.  HEENT: NC/AT; Oral mucosa prink and moist. No erythema or edema noted to buccal mucosa, tongue, FOM, uvula or posterior oropharynx. Uvula midline.  Ears: Left ear with + ear lobe lesion extended to the neck area, and postauricular area, + Surgicel Fibrillar with achieved hemostasis at the site of bleeding covered with Xeroform and covered with 4x 4 gauze, Margie head dressing. Clean and dry dressing applied.   Right ear - WNL.   NECK: Trachea midline, Neck supple, no TTP to B/L lateral neck, no cervical LAD.  RESP: No dyspnea, non-labored breathing. No use of accessory muscles.   CARDIO: +S1/S2  ABDO: Soft, NT.  EXT: MARCUM x 4       LABS:  CBC-                        7.5    8.53  )-----------( 383      ( 04 Dec 2021 04:30 )             26.1     BMP/CMP-  04 Dec 2021 04:30    140    |  106    |  21     ----------------------------<  92     5.1     |  19     |  1.2      Ca    9.6        04 Dec 2021 04:30  Phos  3.5       04 Dec 2021 04:30  Mg     2.0       04 Dec 2021 04:30    TPro  6.9    /  Alb  3.3    /  TBili  <0.2   /  DBili  x      /  AST  9      /  ALT  6      /  AlkPhos  99     04 Dec 2021 04:30    Coagulation Studies-    Endocrine Panel-  Calcium, Total Serum: 9.6 mg/dL (12-04 @ 04:30)

## 2021-12-05 ENCOUNTER — TRANSCRIPTION ENCOUNTER (OUTPATIENT)
Age: 81
End: 2021-12-05

## 2021-12-05 VITALS
TEMPERATURE: 97 F | HEART RATE: 82 BPM | SYSTOLIC BLOOD PRESSURE: 135 MMHG | RESPIRATION RATE: 20 BRPM | DIASTOLIC BLOOD PRESSURE: 61 MMHG

## 2021-12-05 LAB
ALBUMIN SERPL ELPH-MCNC: 3.1 G/DL — LOW (ref 3.5–5.2)
ALP SERPL-CCNC: 99 U/L — SIGNIFICANT CHANGE UP (ref 30–115)
ALT FLD-CCNC: 5 U/L — SIGNIFICANT CHANGE UP (ref 0–41)
ANION GAP SERPL CALC-SCNC: 15 MMOL/L — HIGH (ref 7–14)
AST SERPL-CCNC: 8 U/L — SIGNIFICANT CHANGE UP (ref 0–41)
BILIRUB SERPL-MCNC: <0.2 MG/DL — SIGNIFICANT CHANGE UP (ref 0.2–1.2)
BUN SERPL-MCNC: 28 MG/DL — HIGH (ref 10–20)
CALCIUM SERPL-MCNC: 9.5 MG/DL — SIGNIFICANT CHANGE UP (ref 8.5–10.1)
CHLORIDE SERPL-SCNC: 106 MMOL/L — SIGNIFICANT CHANGE UP (ref 98–110)
CO2 SERPL-SCNC: 19 MMOL/L — SIGNIFICANT CHANGE UP (ref 17–32)
CREAT SERPL-MCNC: 1.1 MG/DL — SIGNIFICANT CHANGE UP (ref 0.7–1.5)
GLUCOSE SERPL-MCNC: 84 MG/DL — SIGNIFICANT CHANGE UP (ref 70–99)
HCT VFR BLD CALC: 25.9 % — LOW (ref 37–47)
HGB BLD-MCNC: 7.3 G/DL — LOW (ref 12–16)
MAGNESIUM SERPL-MCNC: 2 MG/DL — SIGNIFICANT CHANGE UP (ref 1.8–2.4)
MCHC RBC-ENTMCNC: 22.1 PG — LOW (ref 27–31)
MCHC RBC-ENTMCNC: 28.2 G/DL — LOW (ref 32–37)
MCV RBC AUTO: 78.2 FL — LOW (ref 81–99)
NRBC # BLD: 0 /100 WBCS — SIGNIFICANT CHANGE UP (ref 0–0)
PHOSPHATE SERPL-MCNC: 3.9 MG/DL — SIGNIFICANT CHANGE UP (ref 2.1–4.9)
PLATELET # BLD AUTO: 360 K/UL — SIGNIFICANT CHANGE UP (ref 130–400)
POTASSIUM SERPL-MCNC: 5.2 MMOL/L — HIGH (ref 3.5–5)
POTASSIUM SERPL-SCNC: 5.2 MMOL/L — HIGH (ref 3.5–5)
PROT SERPL-MCNC: 6.6 G/DL — SIGNIFICANT CHANGE UP (ref 6–8)
RBC # BLD: 3.31 M/UL — LOW (ref 4.2–5.4)
RBC # FLD: 20 % — HIGH (ref 11.5–14.5)
SARS-COV-2 RNA SPEC QL NAA+PROBE: SIGNIFICANT CHANGE UP
SODIUM SERPL-SCNC: 140 MMOL/L — SIGNIFICANT CHANGE UP (ref 135–146)
WBC # BLD: 8.72 K/UL — SIGNIFICANT CHANGE UP (ref 4.8–10.8)
WBC # FLD AUTO: 8.72 K/UL — SIGNIFICANT CHANGE UP (ref 4.8–10.8)

## 2021-12-05 PROCEDURE — 99238 HOSP IP/OBS DSCHRG MGMT 30/<: CPT

## 2021-12-05 RX ADMIN — PREGABALIN 1000 MICROGRAM(S): 225 CAPSULE ORAL at 11:27

## 2021-12-05 RX ADMIN — LOSARTAN POTASSIUM 50 MILLIGRAM(S): 100 TABLET, FILM COATED ORAL at 05:48

## 2021-12-05 RX ADMIN — RISPERIDONE 0.5 MILLIGRAM(S): 4 TABLET ORAL at 17:21

## 2021-12-05 RX ADMIN — Medication 20 MILLIGRAM(S): at 05:48

## 2021-12-05 RX ADMIN — Medication 1 MILLIGRAM(S): at 11:27

## 2021-12-05 RX ADMIN — PANTOPRAZOLE SODIUM 40 MILLIGRAM(S): 20 TABLET, DELAYED RELEASE ORAL at 06:11

## 2021-12-05 RX ADMIN — CHLORHEXIDINE GLUCONATE 1 APPLICATION(S): 213 SOLUTION TOPICAL at 05:48

## 2021-12-05 RX ADMIN — RISPERIDONE 0.5 MILLIGRAM(S): 4 TABLET ORAL at 05:48

## 2021-12-05 RX ADMIN — IRON SUCROSE 110 MILLIGRAM(S): 20 INJECTION, SOLUTION INTRAVENOUS at 10:13

## 2021-12-05 RX ADMIN — ESCITALOPRAM OXALATE 5 MILLIGRAM(S): 10 TABLET, FILM COATED ORAL at 11:27

## 2021-12-05 RX ADMIN — Medication 81 MILLIGRAM(S): at 11:27

## 2021-12-05 NOTE — PROGRESS NOTE ADULT - SUBJECTIVE AND OBJECTIVE BOX
HPI:  81 YO F with PMHx of HTN, DM type II, schizoaffective disorder, Anemia (B12 def), chronic LE edema, basal cell carcinoma presents due to low Hb. Was fund to have a Hb of 4.9 outpatient, when arriving to the ED Hb 4.8. Patient is a poor historian, info obtained from charts and family (Dr. Razo). Patient has a soft tissue mass within the left infra-auricular skin that has been bleeding for the past few days. Denies any current symptoms. Denies CP, SOB, abdominal pain, changes in bowel movements or urination.  In ED VITALS: Temp 98.2F, HR 73, / 72, RR 18 SpO2 100%. COVID negative. Hb 4.8, giving 2 units of PRBC. Admitting for symptomatic anemia.     This morning patient is resting comfortably in bed.     ENT: no surgical intervention at this time, pending attending signature, likely DC today, if Hb remains stable    Subjective:  Overnight events: none  Complaints: none    Objective:  Vital Signs Last 24 Hrs  T(C): 36.1 (05 Dec 2021 05:09), Max: 36.8 (04 Dec 2021 20:02)  T(F): 96.9 (05 Dec 2021 05:09), Max: 98.2 (04 Dec 2021 20:02)  HR: 82 (05 Dec 2021 05:09) (82 - 87)  BP: 156/67 (05 Dec 2021 05:09) (116/56 - 156/67)  BP(mean): 80 (04 Dec 2021 20:02) (80 - 80)  RR: 18 (05 Dec 2021 05:09) (18 - 20)  SpO2: --    PHYSICAL EXAM:  GENERAL: NAD, speaks in full sentences, no signs of respiratory distress  HEAD: Atraumatic  NECK: Supple  CHEST/LUNG: Clear to auscultation bilaterally  HEART: S1, S2; RRR; No murmurs, rubs, or gallops  ABDOMEN: BS+; Soft, Non-tender, Non-distended  EXTREMITIES:  2+ Peripheral Pulses, 1+ pitting edema   PSYCH: AAOx3  NEUROLOGY: non-focal  SKIN: No rashes or lesions    MEDICATIONS  (STANDING):  aspirin  chewable 81 milliGRAM(s) Oral daily  chlorhexidine 4% Liquid 1 Application(s) Topical <User Schedule>  cyanocobalamin 1000 MICROGram(s) Oral daily  escitalopram 5 milliGRAM(s) Oral daily  folic acid 1 milliGRAM(s) Oral daily  furosemide    Tablet 20 milliGRAM(s) Oral daily  influenza  Vaccine (HIGH DOSE) 0.7 milliLiter(s) IntraMuscular once  iron sucrose IVPB 200 milliGRAM(s) IV Intermittent every 24 hours  losartan 50 milliGRAM(s) Oral daily  pantoprazole    Tablet 40 milliGRAM(s) Oral before breakfast  risperiDONE   Tablet 0.5 milliGRAM(s) Oral two times a day    MEDICATIONS  (PRN):        Home Medications:  aspirin 81 mg oral tablet, chewable: 1 tab(s) orally once a day (25 Mar 2021 15:10)  folic acid 1 mg oral tablet: 1 tab(s) orally once a day (02 Dec 2021 00:04)  Lexapro 5 mg oral tablet: 1 tab(s) orally once a day (21 Mar 2021 22:39)  losartan 50 mg oral tablet: 1 tab(s) orally once a day (21 Mar 2021 22:38)  RisperDAL 0.5 mg oral tablet: 1 tab(s) orally 2 times a day (02 Dec 2021 00:05)      Social History:  Denies tobacco, Etoh or illicit drug use  Living: assisted living facility     Labs                                       7.5    8.53  )-----------( 383      ( 04 Dec 2021 04:30 )             26.1     12-04    140  |  106  |  21<H>  ----------------------------<  92  5.1<H>   |  19  |  1.2    Ca    9.6      04 Dec 2021 04:30  Phos  3.5     12-04  Mg     2.0     12-04    TPro  6.9  /  Alb  3.3<L>  /  TBili  <0.2  /  DBili  x   /  AST  9   /  ALT  6   /  AlkPhos  99  12-04          Radiology     INTERPRETATION:  FDG PET CT STUDY, (EXTRA HEAD / NECK ACQUISITION) INITIAL TREATMENT STRATEGY  REASON: TUMOR IMAGING - PET with concurrently acquired CT for attenuation correction and anatomic localization; skull base to mid - thigh / [id number]    IMPRESSION:  1.  Intensely FDG avid 3 cm left infra-auricular mass involving the ear lobeand abutting the parotid gland, compatible with known basal cell carcinoma (max SUV 21.2); no FDG avid bilateral cervical lymphadenopathy.    2.  Nonspecific FDG avid mediastinal and bilateral hilar lymph nodes (max SUV 10.1 in a right infrahilar lymph node). Differential includes neoplastic or inflammatory etiologies.    3.  Large heterogeneous right thyroid lobe goiter extending into the superior mediastinum with sites of FDG uptake, likely corresponding to suspicious nodules seen on prior ultrasound (max SUV 7.4). Based on sonographic findings from May 29, 2020, fine-needle aspiration of these nodules recommended.    4.  No additional sites of abnormal FDG uptake.        Assessment and Plan:   Assessment/Plan:  · Assessment	81 YO F with PMHx of HTN, DM type II, schizoaffective disorder, Anemia (B12 def), chronic LE edema, basal cell carcinoma presented due to low Hb. Admitted for symptomatic anemia.  · Plan	  Symptomatic anemia   #Basal Cell Carcinoma   # Loculated tissue mass within the left infra-auricular skin  #Hx of B12 deficiency/ folate deficiency    #Hx of Macrocytic anemia   - Hematologist- Dr. Ordonez   - Hb 4.8-->7.5  - s/p 2 units prbc - on 12/2  - active T + S,  keep Hb > 7.4  - iron sat 5%, TIBC 403 ug/dL, iron total 19 ug/dL (12/1)  - Hb - 7.4 (12/3)   - Per heme/onc - follow up with ENT inpatient, GI outpatient, endocrinology out patient   - Per GI: no endoscopy, can start iron PO. colonoscopy outpatient   - c/w B12, folate   - c/w iron sucrose 200 mg every 24 hours for 5 days, ferrous sulfate 325 PO qd on discharge  - B12 and folate labs ordered   - ENT consult placed, no surgical intervention at this time    #Hypertension  - controlled  - c/w losartan     #Schizoaffective disorder  - c/w risperdol , lexapro     #Chronic LE Edema   - c/w lasix     #DVT PPX: sequentials   #GI PPX: Protonix  #activity as tolerated  #Diet: DASH   #Dispo: DC to assisted living as early as today

## 2021-12-05 NOTE — DISCHARGE NOTE NURSING/CASE MANAGEMENT/SOCIAL WORK - NSDCVIVACCINE_GEN_ALL_CORE_FT
Tdap; 28-May-2020 05:58; Case Guillen); [employer]; [id number] (Exp. Date: 28-Apr-2022); IntraMuscular; Deltoid Left.; 0.5 milliLiter(s); VIS (VIS Published: 09-May-2013, VIS Presented: 28-May-2020);

## 2021-12-05 NOTE — CHART NOTE - NSCHARTNOTEFT_GEN_A_CORE
ENT PA note  Pt. seen and examined in NAD, awaiting D/C home.  Left ear w/o evidence of active bleeding , Surgicel Fibrillar re- applied, xeroform and 4x4 applied to affected area.  D/w medical team. Pt. had been awaiting d/c home.

## 2021-12-05 NOTE — DISCHARGE NOTE NURSING/CASE MANAGEMENT/SOCIAL WORK - PATIENT PORTAL LINK FT
Self You can access the FollowMyHealth Patient Portal offered by [facility] by registering at the following website: [email or web address]. By joining [facility]’s FollowMyHealth portal, you will also be able to view your health information using other applications (apps) compatible with our system.

## 2021-12-05 NOTE — DISCHARGE NOTE NURSING/CASE MANAGEMENT/SOCIAL WORK - NSDCPEFALRISK_GEN_ALL_CORE
For information on Fall & Injury Prevention, visit: [email or web address] OR  [email or web address] OR  https://www.cdc.gov/steadi/patient.html

## 2021-12-06 NOTE — H&P ADULT - NSHPLABSRESULTS_GEN_ALL_CORE
present and adequate
LABS:                        10.2   8.60  )-----------( 346      ( 28 May 2020 05:35 )             33.0         140  |  107  |  16  ----------------------------<  138<H>  4.9   |  23  |  1.1    Ca    9.7      28 May 2020 05:35    TPro  6.7  /  Alb  3.5  /  TBili  0.2  /  DBili  x   /  AST  11  /  ALT  7   /  AlkPhos  87      PT/INR - ( 28 May 2020 05:35 )   PT: 11.00 sec;   INR: 0.96 ratio         PTT - ( 28 May 2020 05:35 )  PTT:28.3 sec  Urinalysis Basic - ( 28 May 2020 06:20 )    Color: Colorless / Appearance: Clear / S.006 / pH: x  Gluc: x / Ketone: Negative  / Bili: Negative / Urobili: <2 mg/dL   Blood: x / Protein: Negative / Nitrite: Negative   Leuk Esterase: Small / RBC: 0 /HPF / WBC 4 /HPF   Sq Epi: x / Non Sq Epi: 3 /HPF / Bacteria: Many  Lactate, Blood: 0.8 mmol/L (20 @ 05:35)  Creatine Kinase, Serum: 118 U/L (20 @ 05:35)  CARDIAC MARKERS ( 28 May 2020 05:35 )  x     / x     / 118 U/L / x     / x    < from: US Abdomen Limited (20 @ 13:05) >  MPRESSION:  Cholelithiasis with gallbladder wall thickening.  Nonobstructing right-sided nephrolithiasis.  < end of copied text >    < from: CT Chest w/ IV Cont (20 @ 09:38) >  IMPRESSION:  1.  No CT evidence of acute traumatic injury to the chest, abdomen or pelvis.  2.  Hyperdensities within the gallbladder and CBD with associated mild pericholecystic fat stranding. Findings are consistent with cholelithiasis and choledocholithiasis. The CBD measures 7 mm (series 5/309). If there is clinical concern for acute cholecystitis, right upper quadrant ultrasound may be obtained.  3.  6.1 cm heterogeneous lesion within the superior mediastinum, possibly arising from the right thyroid gland, suspicious for malignancy or goiter. Recommend a thyroid ultrasound for further evaluation.  < end of copied text >    < from: CT Abdomen and Pelvis w/ IV Cont (20 @ 09:38) >  IMPRESSION:  1.  NoCT evidence of acute traumatic injury to the chest, abdomen or pelvis.  2.  Hyperdensities within the gallbladder and CBD with associated mild pericholecystic fat stranding. Findings are consistent with cholelithiasis and choledocholithiasis. The CBD measures 7 mm (series 5/309). If there is clinical concern for acute cholecystitis, right upper quadrant ultrasound may be obtained.  3.  6.1 cm heterogeneous lesion within the superior mediastinum, possibly arising from the right thyroid gland, suspicious for malignancy or goiter. Recommend a thyroid ultrasound for further evaluation.  < end of copied text >    < from: CT Cervical Spine No Cont (20 @ 07:28) >  IMPRESSION:  1.  No evidence of acute cervical spine fracture or subluxation.  2.  Diffuse osteopenia and mild degenerative changes as described.  3.  Large right paratracheal/superior mediastinal mass measuring 6 cm, incompletely visualized on this exam. Diagnostic considerations include an exophytic thyroid mass. Recommend further evaluation with thyroid sonogram.  < end of copied text >      < from: CT Head No Cont (20 @ 07:28) >  IMPRESSION:   1.  No evidence of acute intracranial pathology.  2.  moderate chronic microvascular changes.  3.  Lft sphenoid sinus opacification as described.  < end of copied text >    < from: Xray Elbow AP + Lateral + Oblique, Left (20 @ 06:27) >    Impression:  No evidence of acute fracture or dislocation.  < end of copied text >    < from: Xray Foot AP + Lateral + Oblique, Right (20 @ 06:27) >  Findings/  impression:  Large dorsal foot soft tissue hematoma.  Generalized osteopenia without evidence for acute fracture or dislocation.  Degenerative changes throughout the foot.  Calcaneal bony spur.

## 2021-12-09 DIAGNOSIS — D72.829 ELEVATED WHITE BLOOD CELL COUNT, UNSPECIFIED: ICD-10-CM

## 2021-12-09 DIAGNOSIS — D64.9 ANEMIA, UNSPECIFIED: ICD-10-CM

## 2021-12-09 DIAGNOSIS — R58 HEMORRHAGE, NOT ELSEWHERE CLASSIFIED: ICD-10-CM

## 2021-12-09 DIAGNOSIS — C44.219 BASAL CELL CARCINOMA OF SKIN OF LEFT EAR AND EXTERNAL AURICULAR CANAL: ICD-10-CM

## 2021-12-09 DIAGNOSIS — N18.9 CHRONIC KIDNEY DISEASE, UNSPECIFIED: ICD-10-CM

## 2021-12-09 DIAGNOSIS — F25.9 SCHIZOAFFECTIVE DISORDER, UNSPECIFIED: ICD-10-CM

## 2021-12-09 DIAGNOSIS — E07.89 OTHER SPECIFIED DISORDERS OF THYROID: ICD-10-CM

## 2021-12-09 DIAGNOSIS — R60.0 LOCALIZED EDEMA: ICD-10-CM

## 2021-12-09 DIAGNOSIS — E11.22 TYPE 2 DIABETES MELLITUS WITH DIABETIC CHRONIC KIDNEY DISEASE: ICD-10-CM

## 2021-12-09 DIAGNOSIS — D50.0 IRON DEFICIENCY ANEMIA SECONDARY TO BLOOD LOSS (CHRONIC): ICD-10-CM

## 2021-12-09 DIAGNOSIS — I12.9 HYPERTENSIVE CHRONIC KIDNEY DISEASE WITH STAGE 1 THROUGH STAGE 4 CHRONIC KIDNEY DISEASE, OR UNSPECIFIED CHRONIC KIDNEY DISEASE: ICD-10-CM

## 2021-12-09 DIAGNOSIS — D75.839 THROMBOCYTOSIS, UNSPECIFIED: ICD-10-CM

## 2021-12-09 DIAGNOSIS — E53.8 DEFICIENCY OF OTHER SPECIFIED B GROUP VITAMINS: ICD-10-CM

## 2021-12-11 ENCOUNTER — EMERGENCY (EMERGENCY)
Facility: HOSPITAL | Age: 81
LOS: 0 days | Discharge: NURSING, REHAB OR LONG-TERM CARE | End: 2021-12-12
Attending: EMERGENCY MEDICINE | Admitting: EMERGENCY MEDICINE
Payer: MEDICARE

## 2021-12-11 VITALS
DIASTOLIC BLOOD PRESSURE: 56 MMHG | HEART RATE: 78 BPM | RESPIRATION RATE: 18 BRPM | SYSTOLIC BLOOD PRESSURE: 120 MMHG | TEMPERATURE: 98 F | HEIGHT: 65 IN | WEIGHT: 240.08 LBS | OXYGEN SATURATION: 97 %

## 2021-12-11 VITALS
DIASTOLIC BLOOD PRESSURE: 66 MMHG | HEART RATE: 84 BPM | TEMPERATURE: 99 F | OXYGEN SATURATION: 98 % | SYSTOLIC BLOOD PRESSURE: 139 MMHG | RESPIRATION RATE: 18 BRPM

## 2021-12-11 DIAGNOSIS — D64.9 ANEMIA, UNSPECIFIED: ICD-10-CM

## 2021-12-11 DIAGNOSIS — Z79.82 LONG TERM (CURRENT) USE OF ASPIRIN: ICD-10-CM

## 2021-12-11 DIAGNOSIS — I10 ESSENTIAL (PRIMARY) HYPERTENSION: ICD-10-CM

## 2021-12-11 DIAGNOSIS — E11.9 TYPE 2 DIABETES MELLITUS WITHOUT COMPLICATIONS: ICD-10-CM

## 2021-12-11 LAB
ALBUMIN SERPL ELPH-MCNC: 3.5 G/DL — SIGNIFICANT CHANGE UP (ref 3.5–5.2)
ALP SERPL-CCNC: 104 U/L — SIGNIFICANT CHANGE UP (ref 30–115)
ALT FLD-CCNC: 7 U/L — SIGNIFICANT CHANGE UP (ref 0–41)
ANION GAP SERPL CALC-SCNC: 10 MMOL/L — SIGNIFICANT CHANGE UP (ref 7–14)
ANISOCYTOSIS BLD QL: SIGNIFICANT CHANGE UP
AST SERPL-CCNC: 9 U/L — SIGNIFICANT CHANGE UP (ref 0–41)
BASOPHILS # BLD AUTO: 0.08 K/UL — SIGNIFICANT CHANGE UP (ref 0–0.2)
BASOPHILS NFR BLD AUTO: 0.9 % — SIGNIFICANT CHANGE UP (ref 0–1)
BILIRUB SERPL-MCNC: <0.2 MG/DL — SIGNIFICANT CHANGE UP (ref 0.2–1.2)
BLD GP AB SCN SERPL QL: SIGNIFICANT CHANGE UP
BUN SERPL-MCNC: 22 MG/DL — HIGH (ref 10–20)
CALCIUM SERPL-MCNC: 9.6 MG/DL — SIGNIFICANT CHANGE UP (ref 8.5–10.1)
CHLORIDE SERPL-SCNC: 104 MMOL/L — SIGNIFICANT CHANGE UP (ref 98–110)
CO2 SERPL-SCNC: 25 MMOL/L — SIGNIFICANT CHANGE UP (ref 17–32)
CREAT SERPL-MCNC: 1.2 MG/DL — SIGNIFICANT CHANGE UP (ref 0.7–1.5)
EOSINOPHIL # BLD AUTO: 0.25 K/UL — SIGNIFICANT CHANGE UP (ref 0–0.7)
EOSINOPHIL NFR BLD AUTO: 2.8 % — SIGNIFICANT CHANGE UP (ref 0–8)
GLUCOSE SERPL-MCNC: 101 MG/DL — HIGH (ref 70–99)
HCT VFR BLD CALC: 27.6 % — LOW (ref 37–47)
HGB BLD-MCNC: 7.8 G/DL — LOW (ref 12–16)
HYPOCHROMIA BLD QL: SIGNIFICANT CHANGE UP
IMM GRANULOCYTES NFR BLD AUTO: 0.3 % — SIGNIFICANT CHANGE UP (ref 0.1–0.3)
LYMPHOCYTES # BLD AUTO: 1.37 K/UL — SIGNIFICANT CHANGE UP (ref 1.2–3.4)
LYMPHOCYTES # BLD AUTO: 15.5 % — LOW (ref 20.5–51.1)
MCHC RBC-ENTMCNC: 23.1 PG — LOW (ref 27–31)
MCHC RBC-ENTMCNC: 28.3 G/DL — LOW (ref 32–37)
MCV RBC AUTO: 81.9 FL — SIGNIFICANT CHANGE UP (ref 81–99)
MONOCYTES # BLD AUTO: 0.71 K/UL — HIGH (ref 0.1–0.6)
MONOCYTES NFR BLD AUTO: 8 % — SIGNIFICANT CHANGE UP (ref 1.7–9.3)
NEUTROPHILS # BLD AUTO: 6.41 K/UL — SIGNIFICANT CHANGE UP (ref 1.4–6.5)
NEUTROPHILS NFR BLD AUTO: 72.5 % — SIGNIFICANT CHANGE UP (ref 42.2–75.2)
NRBC # BLD: 0 /100 WBCS — SIGNIFICANT CHANGE UP (ref 0–0)
OVALOCYTES BLD QL SMEAR: SLIGHT — SIGNIFICANT CHANGE UP
PLAT MORPH BLD: NORMAL — SIGNIFICANT CHANGE UP
PLATELET # BLD AUTO: 350 K/UL — SIGNIFICANT CHANGE UP (ref 130–400)
PLATELET CLUMP BLD QL SMEAR: SLIGHT
POIKILOCYTOSIS BLD QL AUTO: SLIGHT — SIGNIFICANT CHANGE UP
POLYCHROMASIA BLD QL SMEAR: SLIGHT — SIGNIFICANT CHANGE UP
POTASSIUM SERPL-MCNC: 5.4 MMOL/L — HIGH (ref 3.5–5)
POTASSIUM SERPL-SCNC: 5.4 MMOL/L — HIGH (ref 3.5–5)
PROT SERPL-MCNC: 7.3 G/DL — SIGNIFICANT CHANGE UP (ref 6–8)
RBC # BLD: 3.37 M/UL — LOW (ref 4.2–5.4)
RBC # FLD: 24.1 % — HIGH (ref 11.5–14.5)
RBC BLD AUTO: ABNORMAL
SODIUM SERPL-SCNC: 139 MMOL/L — SIGNIFICANT CHANGE UP (ref 135–146)
WBC # BLD: 8.85 K/UL — SIGNIFICANT CHANGE UP (ref 4.8–10.8)
WBC # FLD AUTO: 8.85 K/UL — SIGNIFICANT CHANGE UP (ref 4.8–10.8)

## 2021-12-11 PROCEDURE — 99285 EMERGENCY DEPT VISIT HI MDM: CPT

## 2021-12-11 NOTE — ED PROVIDER NOTE - PHYSICAL EXAMINATION
CONSTITUTIONAL: Well-appearing; well-nourished; in no apparent distress.   EYES: PERRL; EOM intact.   ENT: normal nose; no rhinorrhea; normal pharynx with no tonsillar hypertrophy.   NECK: Supple; non-tender; no cervical lymphadenopathy.   CARDIOVASCULAR: Normal S1, S2; no murmurs, rubs, or gallops.   RESPIRATORY: Normal chest excursion with respiration; breath sounds clear and equal bilaterally; no wheezes, rhonchi, or rales.  GI/: Normal bowel sounds; non-distended; non-tender; no palpable organomegaly.   MS: No evidence of trauma or deformity.  Normal ROM in all four extremities; non-tender to palpation; distal pulses are normal.   SKIN: Normal for age and race; warm; dry; good turgor; no apparent lesions or exudate.   NEURO/PSYCH: A & O x 3; grossly unremarkable. mood and manner are appropriate.

## 2021-12-11 NOTE — ED PROVIDER NOTE - CLINICAL SUMMARY MEDICAL DECISION MAKING FREE TEXT BOX
Chart reviewed.  Acknowledged sign out from Dr. Estrada.  Given 2 units PRBC.  No adverse reaction.  Will D/C to follow up with PCP.

## 2021-12-11 NOTE — ED PROVIDER NOTE - PATIENT PORTAL LINK FT
You can access the FollowMyHealth Patient Portal offered by [facility] by registering at the following website: [email or web address]. By joining [employer]’s FollowMyHealth portal, you will also be able to view your health information using other applications (apps) compatible with our system.

## 2021-12-11 NOTE — ED PROVIDER NOTE - NS ED ROS FT
Constitutional: no fever, chills, no recent weight loss, change in appetite or malaise  Eyes: no redness/discharge/pain/vision changes  ENT: no rhinorrhea/ear pain/sore throat  Cardiac: No chest pain, SOB or edema.  Respiratory: No cough or respiratory distress  GI: No nausea, vomiting, diarrhea or abdominal pain.  : No dysuria, frequency, urgency or hematuria  MS: no pain to back or extremities, no loss of ROM, no weakness  Neuro: No headache or weakness. No LOC.  Skin: No skin rash.  Endocrine: + hx of diabetes.  Except as documented in the HPI, all other systems are negative.

## 2021-12-11 NOTE — ED PROVIDER NOTE - PROGRESS NOTE DETAILS
I personally evaluated the patient. I reviewed the Resident’s or Physician Assistant’s note (as assigned above), and agree with the findings and plan except as documented in my note.  80 y/o F presents sent from [facility] for a blood transfusion with hemoglobin of 7.4. Pt has been here in the past for transfusions. Pt denies any sxs or pain. No fever, chills, CP, SOB, n/v, numbness, weakness or tingling. Pt reports she recently got accidentally clipped with a car door on her left ear and is scheduled to follow up with plastic surgery. On exam: Pt pale- appearing. +left ear bandaged with partial avulsion, no signs of infection. PERRLA, EOMI. OP clear. Lungs CTAB. RRR, S1S2 noted. Radial pulses 2+ and equal, pedal pulses 2+ and equal. Abdomen soft, NT/ND, no rebound or guarding. FROM x4 extremities. No focal neuro deficits. A/P: will transfuse pt.

## 2021-12-11 NOTE — ED PROVIDER NOTE - NSFOLLOWUPINSTRUCTIONS_ED_ALL_ED_FT
**Follow up with hematologist and primary care doctor 1-3 days**    Anemia    WHAT YOU NEED TO KNOW:    Anemia is a low number of red blood cells or a low amount of hemoglobin in your red blood cells. Hemoglobin is a protein that helps carry oxygen throughout your body. Red blood cells use iron to create hemoglobin. Anemia may develop if your body does not have enough iron. It may also develop if your body does not make enough red blood cells or they die faster than your body can make them.     DISCHARGE INSTRUCTIONS:    Call 911 or have someone call 911 for any of the following:     You lose consciousness.      You have severe chest pain.    Return to the emergency department if:     You have dark or bloody bowel movements.        Contact your healthcare provider if:     Your symptoms are worse, even after treatment.      You have questions or concerns about your condition or care.    Medicines:     Iron or folic acid supplements help increase your red blood cell and hemoglobin levels.       Vitamin B12 injections may help boost your red blood cell level and decrease your symptoms. Ask your healthcare provider how to inject B12.      Take your medicine as directed. Contact your healthcare provider if you think your medicine is not helping or if you have side effects. Tell him of her if you are allergic to any medicine. Keep a list of the medicines, vitamins, and herbs you take. Include the amounts, and when and why you take them. Bring the list or the pill bottles to follow-up visits. Carry your medicine list with you in case of an emergency.    Prevent anemia: Eat healthy foods rich in iron and vitamin C. Nuts, meat, dark leafy green vegetables, and beans are high in iron and protein. Vitamin C helps your body absorb iron. Foods rich in vitamin C include oranges and other citrus fruits. Ask your healthcare provider for a list of other foods that are high in iron or vitamin C. Ask if you need to be on a special diet.     Follow up with your healthcare provider as directed: Write down your questions so you remember to ask them during your visits.        © Copyright [employer] 2019 All illustrations and images included in CareNotes are the copyrighted property of [employer] or [employer]

## 2021-12-11 NOTE — ED ADULT NURSE NOTE - CHIEF COMPLAINT QUOTE
Pt brought in by ambulance from [facility]. As per EMT "She's here for a blood transfusion."

## 2021-12-11 NOTE — ED ADULT TRIAGE NOTE - CHIEF COMPLAINT QUOTE
Pt brought in by ambulance from [facility]. As per EMT "She's here for a blood transfusion."

## 2021-12-11 NOTE — ED ADULT NURSE NOTE - FINAL NURSING ELECTRONIC SIGNATURE
Lurdes Kirk is requesting a refill on the following medication(s):  Requested Prescriptions     Pending Prescriptions Disp Refills    atorvastatin (LIPITOR) 20 MG tablet [Pharmacy Med Name: ATORVASTATIN 20 MG TABLET] 90 tablet 3     Sig: take 1 tablet by mouth once daily    escitalopram (LEXAPRO) 10 MG tablet [Pharmacy Med Name: ESCITALOPRAM 10 MG TABLET] 90 tablet 3     Sig: take 1 tablet by mouth once daily       Last Visit Date (If Applicable):  03/33/0847    Next Visit Date:    4/15/2020
11-Dec-2021 21:01

## 2021-12-11 NOTE — ED ADULT NURSE NOTE - NSIMPLEMENTINTERV_GEN_ALL_ED
Implemented All Fall with Harm Risk Interventions:  [place] to call system. Call bell, personal items and telephone within reach. Instruct patient to call for assistance. Room bathroom lighting operational. Non-slip footwear when patient is off stretcher. Physically safe environment: no spills, clutter or unnecessary equipment. Stretcher in lowest position, wheels locked, appropriate side rails in place. Provide visual cue, wrist band, yellow gown, etc. Monitor gait and stability. Monitor for mental status changes and reorient to person, place, and time. Review medications for side effects contributing to fall risk. Reinforce activity limits and safety measures with patient and family. Provide visual clues: red socks.

## 2021-12-11 NOTE — ED PROVIDER NOTE - OBJECTIVE STATEMENT
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 As per NH records pts hemoglobin found to be 7. Pt on baby asa and otherwise denies any acute complaints. No weakness, sob, chest pain, worsening Le swelling, bloody stools, hematuria. Sts occasionally has bleeding from L ear/bcc but none today.

## 2021-12-13 NOTE — ASU PATIENT PROFILE, ADULT - FALL HARM RISK - RISK INTERVENTIONS

## 2021-12-14 ENCOUNTER — INPATIENT (INPATIENT)
Facility: HOSPITAL | Age: 81
LOS: 5 days | Discharge: NURSING, REHAB OR LONG-TERM CARE | End: 2021-12-20
Attending: OTOLARYNGOLOGY | Admitting: OTOLARYNGOLOGY
Payer: MEDICARE

## 2021-12-14 ENCOUNTER — RESULT REVIEW (OUTPATIENT)
Age: 81
End: 2021-12-14

## 2021-12-14 VITALS
OXYGEN SATURATION: 96 % | DIASTOLIC BLOOD PRESSURE: 54 MMHG | WEIGHT: 240.08 LBS | RESPIRATION RATE: 16 BRPM | TEMPERATURE: 99 F | HEART RATE: 67 BPM | SYSTOLIC BLOOD PRESSURE: 115 MMHG | HEIGHT: 65 IN

## 2021-12-14 DIAGNOSIS — F25.9 SCHIZOAFFECTIVE DISORDER, UNSPECIFIED: ICD-10-CM

## 2021-12-14 DIAGNOSIS — C44.219 BASAL CELL CARCINOMA OF SKIN OF LEFT EAR AND EXTERNAL AURICULAR CANAL: ICD-10-CM

## 2021-12-14 DIAGNOSIS — N18.30 CHRONIC KIDNEY DISEASE, STAGE 3 UNSPECIFIED: ICD-10-CM

## 2021-12-14 DIAGNOSIS — E83.52 HYPERCALCEMIA: ICD-10-CM

## 2021-12-14 DIAGNOSIS — Z79.82 LONG TERM (CURRENT) USE OF ASPIRIN: ICD-10-CM

## 2021-12-14 DIAGNOSIS — E87.5 HYPERKALEMIA: ICD-10-CM

## 2021-12-14 DIAGNOSIS — I12.9 HYPERTENSIVE CHRONIC KIDNEY DISEASE WITH STAGE 1 THROUGH STAGE 4 CHRONIC KIDNEY DISEASE, OR UNSPECIFIED CHRONIC KIDNEY DISEASE: ICD-10-CM

## 2021-12-14 DIAGNOSIS — C44.319 BASAL CELL CARCINOMA OF SKIN OF OTHER PARTS OF FACE: ICD-10-CM

## 2021-12-14 DIAGNOSIS — D51.9 VITAMIN B12 DEFICIENCY ANEMIA, UNSPECIFIED: ICD-10-CM

## 2021-12-14 DIAGNOSIS — E11.22 TYPE 2 DIABETES MELLITUS WITH DIABETIC CHRONIC KIDNEY DISEASE: ICD-10-CM

## 2021-12-14 DIAGNOSIS — N17.9 ACUTE KIDNEY FAILURE, UNSPECIFIED: ICD-10-CM

## 2021-12-14 DIAGNOSIS — E87.1 HYPO-OSMOLALITY AND HYPONATREMIA: ICD-10-CM

## 2021-12-14 DIAGNOSIS — E87.2 ACIDOSIS: ICD-10-CM

## 2021-12-14 LAB
ANION GAP SERPL CALC-SCNC: 14 MMOL/L — SIGNIFICANT CHANGE UP (ref 7–14)
BLD GP AB SCN SERPL QL: SIGNIFICANT CHANGE UP
BUN SERPL-MCNC: 28 MG/DL — HIGH (ref 10–20)
CALCIUM SERPL-MCNC: 8.6 MG/DL — SIGNIFICANT CHANGE UP (ref 8.5–10.1)
CHLORIDE SERPL-SCNC: 105 MMOL/L — SIGNIFICANT CHANGE UP (ref 98–110)
CK MB CFR SERPL CALC: 3.7 NG/ML — SIGNIFICANT CHANGE UP (ref 0.6–6.3)
CK SERPL-CCNC: 79 U/L — SIGNIFICANT CHANGE UP (ref 0–225)
CO2 SERPL-SCNC: 18 MMOL/L — SIGNIFICANT CHANGE UP (ref 17–32)
CREAT SERPL-MCNC: 1.3 MG/DL — SIGNIFICANT CHANGE UP (ref 0.7–1.5)
GLUCOSE BLDC GLUCOMTR-MCNC: 131 MG/DL — HIGH (ref 70–99)
GLUCOSE SERPL-MCNC: 119 MG/DL — HIGH (ref 70–99)
HCT VFR BLD CALC: 38.8 % — SIGNIFICANT CHANGE UP (ref 37–47)
HGB BLD-MCNC: 11.5 G/DL — LOW (ref 12–16)
MAGNESIUM SERPL-MCNC: 1.8 MG/DL — SIGNIFICANT CHANGE UP (ref 1.8–2.4)
MCHC RBC-ENTMCNC: 24.8 PG — LOW (ref 27–31)
MCHC RBC-ENTMCNC: 29.6 G/DL — LOW (ref 32–37)
MCV RBC AUTO: 83.8 FL — SIGNIFICANT CHANGE UP (ref 81–99)
NRBC # BLD: 0 /100 WBCS — SIGNIFICANT CHANGE UP (ref 0–0)
PHOSPHATE SERPL-MCNC: 3.6 MG/DL — SIGNIFICANT CHANGE UP (ref 2.1–4.9)
PLATELET # BLD AUTO: 364 K/UL — SIGNIFICANT CHANGE UP (ref 130–400)
POTASSIUM SERPL-MCNC: 5.3 MMOL/L — HIGH (ref 3.5–5)
POTASSIUM SERPL-SCNC: 5.3 MMOL/L — HIGH (ref 3.5–5)
RBC # BLD: 4.63 M/UL — SIGNIFICANT CHANGE UP (ref 4.2–5.4)
RBC # FLD: 22.8 % — HIGH (ref 11.5–14.5)
SODIUM SERPL-SCNC: 137 MMOL/L — SIGNIFICANT CHANGE UP (ref 135–146)
TROPONIN T SERPL-MCNC: <0.01 NG/ML — SIGNIFICANT CHANGE UP
WBC # BLD: 14.46 K/UL — HIGH (ref 4.8–10.8)
WBC # FLD AUTO: 14.46 K/UL — HIGH (ref 4.8–10.8)

## 2021-12-14 PROCEDURE — 15733 MUSC MYOQ/FSCQ FLP H&N PEDCL: CPT

## 2021-12-14 PROCEDURE — 69310 REBUILD OUTER EAR CANAL: CPT | Mod: LT

## 2021-12-14 PROCEDURE — 93010 ELECTROCARDIOGRAM REPORT: CPT

## 2021-12-14 PROCEDURE — 88305 TISSUE EXAM BY PATHOLOGIST: CPT | Mod: 26

## 2021-12-14 PROCEDURE — 99291 CRITICAL CARE FIRST HOUR: CPT

## 2021-12-14 PROCEDURE — 42415 EXCISE PAROTID GLAND/LESION: CPT | Mod: 80,LT

## 2021-12-14 PROCEDURE — 88311 DECALCIFY TISSUE: CPT | Mod: 26

## 2021-12-14 PROCEDURE — 88309 TISSUE EXAM BY PATHOLOGIST: CPT | Mod: 26

## 2021-12-14 PROCEDURE — 88307 TISSUE EXAM BY PATHOLOGIST: CPT | Mod: 26

## 2021-12-14 PROCEDURE — 88304 TISSUE EXAM BY PATHOLOGIST: CPT | Mod: 26

## 2021-12-14 RX ORDER — MORPHINE SULFATE 50 MG/1
2 CAPSULE, EXTENDED RELEASE ORAL
Refills: 0 | Status: DISCONTINUED | OUTPATIENT
Start: 2021-12-14 | End: 2021-12-14

## 2021-12-14 RX ORDER — LOSARTAN POTASSIUM 100 MG/1
50 TABLET, FILM COATED ORAL DAILY
Refills: 0 | Status: DISCONTINUED | OUTPATIENT
Start: 2021-12-14 | End: 2021-12-16

## 2021-12-14 RX ORDER — FUROSEMIDE 40 MG
20 TABLET ORAL DAILY
Refills: 0 | Status: DISCONTINUED | OUTPATIENT
Start: 2021-12-14 | End: 2021-12-16

## 2021-12-14 RX ORDER — HEPARIN SODIUM 5000 [USP'U]/ML
5000 INJECTION INTRAVENOUS; SUBCUTANEOUS EVERY 8 HOURS
Refills: 0 | Status: DISCONTINUED | OUTPATIENT
Start: 2021-12-14 | End: 2021-12-20

## 2021-12-14 RX ORDER — FERROUS SULFATE 325(65) MG
325 TABLET ORAL DAILY
Refills: 0 | Status: DISCONTINUED | OUTPATIENT
Start: 2021-12-14 | End: 2021-12-20

## 2021-12-14 RX ORDER — BACITRACIN ZINC 500 UNIT/G
1 OINTMENT IN PACKET (EA) TOPICAL EVERY 12 HOURS
Refills: 0 | Status: DISCONTINUED | OUTPATIENT
Start: 2021-12-14 | End: 2021-12-20

## 2021-12-14 RX ORDER — OXYCODONE AND ACETAMINOPHEN 5; 325 MG/1; MG/1
1 TABLET ORAL EVERY 6 HOURS
Refills: 0 | Status: DISCONTINUED | OUTPATIENT
Start: 2021-12-14 | End: 2021-12-14

## 2021-12-14 RX ORDER — ONDANSETRON 8 MG/1
4 TABLET, FILM COATED ORAL EVERY 8 HOURS
Refills: 0 | Status: DISCONTINUED | OUTPATIENT
Start: 2021-12-14 | End: 2021-12-20

## 2021-12-14 RX ORDER — ACETAMINOPHEN 500 MG
650 TABLET ORAL EVERY 4 HOURS
Refills: 0 | Status: DISCONTINUED | OUTPATIENT
Start: 2021-12-14 | End: 2021-12-20

## 2021-12-14 RX ORDER — FOLIC ACID 0.8 MG
1 TABLET ORAL DAILY
Refills: 0 | Status: DISCONTINUED | OUTPATIENT
Start: 2021-12-14 | End: 2021-12-20

## 2021-12-14 RX ORDER — CHLORHEXIDINE GLUCONATE 213 G/1000ML
1 SOLUTION TOPICAL
Refills: 0 | Status: DISCONTINUED | OUTPATIENT
Start: 2021-12-14 | End: 2021-12-15

## 2021-12-14 RX ORDER — MORPHINE SULFATE 50 MG/1
2 CAPSULE, EXTENDED RELEASE ORAL EVERY 4 HOURS
Refills: 0 | Status: DISCONTINUED | OUTPATIENT
Start: 2021-12-14 | End: 2021-12-15

## 2021-12-14 RX ORDER — CEFAZOLIN SODIUM 1 G
1000 VIAL (EA) INJECTION EVERY 8 HOURS
Refills: 0 | Status: DISCONTINUED | OUTPATIENT
Start: 2021-12-14 | End: 2021-12-17

## 2021-12-14 RX ORDER — SODIUM CHLORIDE 9 MG/ML
1000 INJECTION, SOLUTION INTRAVENOUS
Refills: 0 | Status: DISCONTINUED | OUTPATIENT
Start: 2021-12-14 | End: 2021-12-14

## 2021-12-14 RX ORDER — RISPERIDONE 4 MG/1
0.5 TABLET ORAL
Refills: 0 | Status: DISCONTINUED | OUTPATIENT
Start: 2021-12-14 | End: 2021-12-20

## 2021-12-14 RX ORDER — INFLUENZA VIRUS VACCINE 15; 15; 15; 15 UG/.5ML; UG/.5ML; UG/.5ML; UG/.5ML
0.7 SUSPENSION INTRAMUSCULAR ONCE
Refills: 0 | Status: DISCONTINUED | OUTPATIENT
Start: 2021-12-14 | End: 2021-12-15

## 2021-12-14 RX ORDER — OXYCODONE AND ACETAMINOPHEN 5; 325 MG/1; MG/1
1 TABLET ORAL ONCE
Refills: 0 | Status: DISCONTINUED | OUTPATIENT
Start: 2021-12-14 | End: 2021-12-14

## 2021-12-14 RX ORDER — OXYCODONE HYDROCHLORIDE 5 MG/1
5 TABLET ORAL EVERY 6 HOURS
Refills: 0 | Status: DISCONTINUED | OUTPATIENT
Start: 2021-12-14 | End: 2021-12-19

## 2021-12-14 RX ORDER — PREGABALIN 225 MG/1
1000 CAPSULE ORAL DAILY
Refills: 0 | Status: DISCONTINUED | OUTPATIENT
Start: 2021-12-14 | End: 2021-12-20

## 2021-12-14 RX ORDER — ESCITALOPRAM OXALATE 10 MG/1
5 TABLET, FILM COATED ORAL DAILY
Refills: 0 | Status: DISCONTINUED | OUTPATIENT
Start: 2021-12-14 | End: 2021-12-20

## 2021-12-14 RX ADMIN — RISPERIDONE 0.5 MILLIGRAM(S): 4 TABLET ORAL at 19:20

## 2021-12-14 RX ADMIN — Medication 20 MILLIGRAM(S): at 16:09

## 2021-12-14 RX ADMIN — Medication 100 MILLIGRAM(S): at 22:44

## 2021-12-14 RX ADMIN — ONDANSETRON 4 MILLIGRAM(S): 8 TABLET, FILM COATED ORAL at 22:26

## 2021-12-14 RX ADMIN — HEPARIN SODIUM 5000 UNIT(S): 5000 INJECTION INTRAVENOUS; SUBCUTANEOUS at 22:26

## 2021-12-14 RX ADMIN — Medication 1 APPLICATION(S): at 19:20

## 2021-12-14 RX ADMIN — ESCITALOPRAM OXALATE 5 MILLIGRAM(S): 10 TABLET, FILM COATED ORAL at 16:09

## 2021-12-14 RX ADMIN — LOSARTAN POTASSIUM 50 MILLIGRAM(S): 100 TABLET, FILM COATED ORAL at 16:07

## 2021-12-14 NOTE — PATIENT PROFILE ADULT - SURGICAL SITE DRAINAGE DESCRIPTION
Progress Notes by Spring Ramey at 06/02/17 02:56 PM     Author:  Spring Ramey Service:  (none) Author Type:  Patient      Filed:  06/02/17 02:56 PM Encounter Date:  6/2/2017 Status:  Signed     :  Spring Ramey (Patient )            Forms signed and faxed back[TT1.1M]       Revision History        User Key Date/Time User Provider Type Action    > TT1.1 06/02/17 02:56 PM Spring Ramey Patient  Sign    M - Manual            
Progress Notes by Spring Ramey at 06/02/17 12:13 PM     Author:  Spring Ramey Service:  (none) Author Type:  Patient      Filed:  06/02/17 12:14 PM Encounter Date:  6/2/2017 Status:  Signed     :  Spring Ramey (Patient )            Received fax/ team meeting update from [facility][TT1.1C] dated  06/01/17[TT1.1M]   Forwarded to Dr Wilkerson for signature[TT1.1C]         Revision History        User Key Date/Time User Provider Type Action    > TT1.1 06/02/17 12:14 PM Spring Ramey Patient  Sign    C - Copied, M - Manual            
RAIMUNDO to left neck

## 2021-12-14 NOTE — PATIENT PROFILE ADULT - FALL HARM RISK - HARM RISK INTERVENTIONS
Assistance with ambulation/Assistance OOB with selected safe patient handling equipment/Communicate Risk of Fall with Harm to all staff/Discuss with provider need for PT consult/Monitor gait and stability/Provide patient with walking aids - walker, cane, crutches/Reinforce activity limits and safety measures with patient and family/Sit up slowly, dangle for a short time, stand at bedside before walking/Tailored Fall Risk Interventions/Use of alarms - bed, chair and/or voice tab/Visual Cue: Yellow wristband and red socks/Bed in lowest position, wheels locked, appropriate side rails in place/Call bell, personal items and telephone in reach/Instruct patient to call for assistance before getting out of bed or chair/Non-slip footwear when patient is out of bed/Nursery to call system/Physically safe environment - no spills, clutter or unnecessary equipment/Purposeful Proactive Rounding/Room/bathroom lighting operational, light cord in reach

## 2021-12-14 NOTE — BRIEF OPERATIVE NOTE - OPERATION/FINDINGS
wide local excision basal cell carcinoma, all frozen sections with negative marrgins  superficial parotidectomy  partial auriculectomy   submental island flap

## 2021-12-14 NOTE — CONSULT NOTE ADULT - SUBJECTIVE AND OBJECTIVE BOX
SICU Consultation Note  =====================================================  HPI:  81F with PMHx of HTN, DM II, B12 anemia, schizoaffective disorder, edema of B/L LE and basal cell carcinoma presents for scheduled facial composite resection, parotidectomy, and submental island flap reconstruction.     No complaints at this time. Denies SOB/chest pain/fever/chills.         Surgery Information  OR time: 5 hr     EBL: 200        IV Fluids: 2400 cc      Blood Products: 2 units PRBCs  UOP: 3000         PAST MEDICAL & SURGICAL HISTORY:  Diabetes mellitus    Hypertension    Schizoaffective disorder    Edema  Bilateral LE    Disorder of thyroid, unspecified  Son cannot provide details. States she had thyroid surgery "decades ago" when she was young    No significant past surgical history      Home Meds: Home Medications:  aspirin 81 mg oral tablet, chewable: 1 tab(s) orally once a day (14 Dec 2021 07:53)  folic acid 1 mg oral tablet: 1 tab(s) orally once a day (14 Dec 2021 07:53)  Keflex 500 mg oral capsule: tid (14 Dec 2021 07:53)  Lexapro 5 mg oral tablet: 1 tab(s) orally once a day (14 Dec 2021 07:53)  losartan 50 mg oral tablet: 1 tab(s) orally once a day (14 Dec 2021 07:53)  RisperDAL 0.5 mg oral tablet: 1 tab(s) orally 2 times a day (14 Dec 2021 07:53)    Allergies: No Known Allergies    Advanced Directives: Presumed Full Code     REVIEW OF SYSTEMS    [X] A ten-point review of systems was otherwise negative except as noted.  [ ] Due to altered mental status/intubation, subjective information were not able to be obtained from the patient. History was obtained, to the extent possible, from review of the chart and collateral sources of information.      CURRENT MEDICATIONS:   --------------------------------------------------------------------------------------  Neurologic Medications  acetaminophen     Tablet .. 650 milliGRAM(s) Oral every 4 hours PRN Temp greater or equal to 38C (100.4F), Mild Pain (1 - 3)  escitalopram 5 milliGRAM(s) Oral daily  morphine  - Injectable 2 milliGRAM(s) IV Push every 4 hours PRN Severe Pain (7 - 10)  morphine  - Injectable 2 milliGRAM(s) IV Push every 10 minutes PRN Severe Pain (7 - 10)  ondansetron Injectable 4 milliGRAM(s) IV Push every 8 hours  oxycodone    5 mG/acetaminophen 325 mG 1 Tablet(s) Oral once PRN Moderate Pain (4 - 6)  oxycodone    5 mG/acetaminophen 325 mG 1 Tablet(s) Oral every 6 hours PRN Moderate Pain (4 - 6)  risperiDONE   Tablet 0.5 milliGRAM(s) Oral two times a day    Respiratory Medications    Cardiovascular Medications  furosemide    Tablet 20 milliGRAM(s) Oral daily  losartan 50 milliGRAM(s) Oral daily    Gastrointestinal Medications  cyanocobalamin 1000 MICROGram(s) Oral daily  ferrous    sulfate 325 milliGRAM(s) Oral daily  folic acid 1 milliGRAM(s) Oral daily  lactated ringers. 1000 milliLiter(s) IV Continuous <Continuous>    Genitourinary Medications    Hematologic/Oncologic Medications  heparin   Injectable 5000 Unit(s) SubCutaneous every 8 hours    Antimicrobial/Immunologic Medications  ceFAZolin   IVPB 1000 milliGRAM(s) IV Intermittent every 8 hours    Endocrine/Metabolic Medications    Topical/Other Medications  BACItracin   Ointment 1 Application(s) Topical every 12 hours    --------------------------------------------------------------------------------------    VITAL SIGNS, INS/OUTS (last 24 hours):  --------------------------------------------------------------------------------------  ICU Vital Signs Last 24 Hrs  T(C): 36.5 (14 Dec 2021 14:00), Max: 37 (14 Dec 2021 07:40)  T(F): 97.7 (14 Dec 2021 14:00), Max: 98.6 (14 Dec 2021 07:40)  HR: 89 (14 Dec 2021 16:00) (67 - 89)  BP: 152/69 (14 Dec 2021 18:00) (115/54 - 167/71)  BP(mean): --  ABP: 160/75 (14 Dec 2021 17:00) (149/66 - 165/68)  ABP(mean): 108 (14 Dec 2021 14:10) (108 - 108)  RR: 18 (14 Dec 2021 18:00) (16 - 21)  SpO2: 98% (14 Dec 2021 18:00) (96% - 98%)    I&O's Summary    14 Dec 2021 07:01  -  14 Dec 2021 18:53  --------------------------------------------------------  IN: 100 mL / OUT: 614 mL / NET: -514 mL      --------------------------------------------------------------------------------------    EXAM:  General/Neuro  RASS:   GCS:   Exam: Normal, NAD, alert, oriented x 3, no focal deficits. PERRLA  ***    Respiratory  Exam: Lungs clear to auscultation, Normal expansion/effort.  ***  [] Tracheostomy   [] Intubated  Mechanical Ventilation:     Cardiovascular  Exam: S1, S2.  Regular rate and rhythm.  Peripheral edema  ***  Cardiac Rhythm: Normal Sinus Rhythm  ECHO:     GI  Exam: Abdomen soft, Non-tender, Non-distended.  Gastrostomy / Jejunostomy tube in place.  Nasogastric tube in place.  Colostomy / Ileostomy.  ***  Wound:   ***  Current Diet:  NPO***    Extremities  Exam: Extremities warm, pink, well-perfused.        Derm:  Exam: Good skin turgor, no skin breakdown.      :   Exam: Hagen catheter in place.     Tubes/Lines/Drains  ***  [x] Peripheral IV  [] Central Venous Line     	[] R	[] L	[] IJ	[] Fem	[] SC        Type:	    Date Placed:   [] Arterial Line		[] R	[] L	[] Fem	[] Rad	[] Ax	Date Placed:   [] PICC:         	[] Midline		[] Mediport           [] Urinary Catheter		Date Placed:   --------------------------------------------------------------------------------------  Labs:                            11.5   14.46 )-----------( 364      ( 14 Dec 2021 14:17 )             38.8         12-14    137  |  105  |  28<H>  ----------------------------<  119<H>  5.3<H>   |  18  |  1.3      Calcium, Total Serum: 8.6 mg/dL (12-14-21 @ 14:17)      CARDIAC MARKERS ( 14 Dec 2021 14:17 )  x     / <0.01 ng/mL / 79 U/L / x     / 3.7 ng/mL                --------------------------------------------------------------------------------------    OTHER LABS    IMAGING RESULTS      ASSESSMENT:  81y Female ***    PLAN:   Neurologic:   Respiratory:   Cardiovascular:   Gastrointestinal/Nutrition:   Renal/Genitourinary:   Hematologic:   Infectious Disease:   Lines/Tubes:  Endocrine:   Disposition:     --------------------------------------------------------------------------------------    Critical Care Diagnoses:   SICU Consultation Note  =====================================================  HPI:  81F with PMHx of HTN, DM II, B12 anemia, schizoaffective disorder, edema of B/L LE and basal cell carcinoma presents for scheduled facial composite resection, parotidectomy, and submental island flap reconstruction.     Surgical course: wide local excision basal cell carcinoma, all frozen sections with negative marrgins, superficial parotidectomy, partial auriculectomy, submental island flap. Patient remained HD stable during the procedure.     SICU consulted for hemodynamic monitoring. Patient has no complaints at this time. Patient is awake, A&O x 3 in NAD. Denies SOB/chest pain/chills.       Surgery Information  OR time: 5 hr     EBL: 200        IV Fluids: 2400 cc      Blood Products: 2 units PRBCs  UOP: 3000         PAST MEDICAL & SURGICAL HISTORY:  Diabetes mellitus  Hypertension  Schizoaffective disorder  Chronic Edema of Bilateral LE  Disorder of thyroid, unspecified  - Son cannot provide details. States she had thyroid surgery "decades ago" when she was young    No significant past surgical history      Home Meds: Home Medications:  aspirin 81 mg oral tablet, chewable: 1 tab(s) orally once a day (14 Dec 2021 07:53)  folic acid 1 mg oral tablet: 1 tab(s) orally once a day (14 Dec 2021 07:53)  Keflex 500 mg oral capsule: tid (14 Dec 2021 07:53)  Lexapro 5 mg oral tablet: 1 tab(s) orally once a day (14 Dec 2021 07:53)  losartan 50 mg oral tablet: 1 tab(s) orally once a day (14 Dec 2021 07:53)  RisperDAL 0.5 mg oral tablet: 1 tab(s) orally 2 times a day (14 Dec 2021 07:53)    Allergies: No Known Allergies    Advanced Directives: Presumed Full Code     REVIEW OF SYSTEMS  [X] A ten-point review of systems was otherwise negative except as noted.  [ ] Due to altered mental status/intubation, subjective information were not able to be obtained from the patient. History was obtained, to the extent possible, from review of the chart and collateral sources of information.      CURRENT MEDICATIONS:   --------------------------------------------------------------------------------------  Neurologic Medications  acetaminophen     Tablet .. 650 milliGRAM(s) Oral every 4 hours PRN Temp greater or equal to 38C (100.4F), Mild Pain (1 - 3)  escitalopram 5 milliGRAM(s) Oral daily  morphine  - Injectable 2 milliGRAM(s) IV Push every 4 hours PRN Severe Pain (7 - 10)  morphine  - Injectable 2 milliGRAM(s) IV Push every 10 minutes PRN Severe Pain (7 - 10)  ondansetron Injectable 4 milliGRAM(s) IV Push every 8 hours  oxycodone    5 mG/acetaminophen 325 mG 1 Tablet(s) Oral once PRN Moderate Pain (4 - 6)  oxycodone    5 mG/acetaminophen 325 mG 1 Tablet(s) Oral every 6 hours PRN Moderate Pain (4 - 6)  risperiDONE   Tablet 0.5 milliGRAM(s) Oral two times a day    Respiratory Medications    Cardiovascular Medications  furosemide    Tablet 20 milliGRAM(s) Oral daily  losartan 50 milliGRAM(s) Oral daily    Gastrointestinal Medications  cyanocobalamin 1000 MICROGram(s) Oral daily  ferrous    sulfate 325 milliGRAM(s) Oral daily  folic acid 1 milliGRAM(s) Oral daily  lactated ringers. 1000 milliLiter(s) IV Continuous <Continuous>    Genitourinary Medications    Hematologic/Oncologic Medications  heparin   Injectable 5000 Unit(s) SubCutaneous every 8 hours    Antimicrobial/Immunologic Medications  ceFAZolin   IVPB 1000 milliGRAM(s) IV Intermittent every 8 hours    Endocrine/Metabolic Medications    Topical/Other Medications  BACItracin   Ointment 1 Application(s) Topical every 12 hours    --------------------------------------------------------------------------------------    VITAL SIGNS, INS/OUTS (last 24 hours):  --------------------------------------------------------------------------------------  ICU Vital Signs Last 24 Hrs  T(C): 36.5 (14 Dec 2021 14:00), Max: 37 (14 Dec 2021 07:40)  T(F): 97.7 (14 Dec 2021 14:00), Max: 98.6 (14 Dec 2021 07:40)  HR: 89 (14 Dec 2021 16:00) (67 - 89)  BP: 152/69 (14 Dec 2021 18:00) (115/54 - 167/71)  BP(mean): --  ABP: 160/75 (14 Dec 2021 17:00) (149/66 - 165/68)  ABP(mean): 108 (14 Dec 2021 14:10) (108 - 108)  RR: 18 (14 Dec 2021 18:00) (16 - 21)  SpO2: 98% (14 Dec 2021 18:00) (96% - 98%)    I&O's Summary    14 Dec 2021 07:01  -  14 Dec 2021 18:53  --------------------------------------------------------  IN: 100 mL / OUT: 614 mL / NET: -514 mL      --------------------------------------------------------------------------------------  EXAM:  General/Neuro  - RASS: 0  - GCS: 15  - Normal, NAD, alert & oriented x 3, no focal deficits.     ENT  - Left preauricular dressing in place. Incision wound of L ear clean, soft, intact with mild blood dripping from site. RAIMUNDO drain in place.    Respiratory  - Lungs clear to auscultation, Normal expansion/effort on NC.     Cardiovascular  - S1, S2.  Regular rate and rhythm.   - EKG: Normal Sinus Rhythm, left axis deviation, 85 bpm, QTc 445  - ECHO [5/29/2020]      - EF 57%      - Impaired relaxation pattern of left ventricular myocardial filling (Grade I diastolic dysfunction)      - Left atrial enlargement      - Mild thickening and calcification of the anterior and posterior mitral valve leaflets. There is mild mitral annular calcification.      - Mild AR      - Thickened pulmonic valve    GI  - Abdomen soft, non-tender, non-distended      Extremities  - 2+ edema B/L LE. B/L UE and LE sensation and strength intact.    Derm:  - No skin breakdown    :   - Pt voiding post-op    Tubes/Lines/Drains   [x] Peripheral IV  [x] L radial Arterial Line			  --------------------------------------------------------------------------------------  Labs:                        11.5   14.46 )-----------( 364      ( 14 Dec 2021 14:17 )             38.8         12-14    137  |  105  |  28<H>  ----------------------------<  119<H>  5.3<H>   |  18  |  1.3      Calcium, Total Serum: 8.6 mg/dL (12-14-21 @ 14:17)      CARDIAC MARKERS ( 14 Dec 2021 14:17 )  x     / <0.01 ng/mL / 79 U/L / x     / 3.7 ng/mL      --------------------------------------------------------------------------------------  ASSESSMENT:  81y Female with PMHx of HTN, DM II, B12 anemia, schizoaffective disorder, edema of B/L LE and basal cell carcinoma presents for scheduled facial composite resection, parotidectomy, and submental island flap reconstruction.     PLAN:   Neurologic:   - A&O x 3  - Pain management with Tylenol, Oxy, Morphine  #Schizoaffective d/o  - Risperidone, Escitalopram    Respiratory:   - Satting well on NC  - AM CXR    Cardiovascular:   #HTN  - Losartan  - Maintain normopressive    Gastrointestinal/Nutrition:   -      Labs        - Na 137, K 5.3    Renal/Genitourinary:   -      Labs        - BUN/Cr: 28/1.3            Hematologic:   - DVT ppx: Heparin  #B12 anemia  - Cyanocobalamin     Infectious Disease:   - Afebrile  - Monitor temps  - WBC 14.46    Lines/Tubes:  - PIV  - L radial line    Endocrine:   #DM II  - Monitor finger sticks  - glucose, serum 119    Disposition: SICU       SICU Consultation Note  =====================================================  HPI:  81F with PMHx of HTN, DM II, B12 anemia, schizoaffective disorder, edema of B/L LE and basal cell carcinoma presents for scheduled facial composite resection, parotidectomy, and submental island flap reconstruction. Mallampati class II.     Surgical course: wide local excision basal cell carcinoma, all frozen sections with negative marrgins, superficial parotidectomy, partial auriculectomy, submental island flap. Patient remained HD stable during the procedure.     SICU consulted for hemodynamic monitoring. Patient has no complaints at this time. Patient is awake, A&O x 3 in NAD. Denies SOB/chest pain/chills.       Surgery Information  OR time: 5 hr     EBL: 200 cc      IV Fluids: 2400 cc      Blood Products: 2 units PRBCs  UOP: 3000         PAST MEDICAL & SURGICAL HISTORY:  Diabetes mellitus  Hypertension  Schizoaffective disorder  Chronic Edema of Bilateral LE  Disorder of thyroid, unspecified  - Son cannot provide details. States she had thyroid surgery "decades ago" when she was young    No significant past surgical history      Home Meds: Home Medications:  aspirin 81 mg oral tablet, chewable: 1 tab(s) orally once a day (14 Dec 2021 07:53)  folic acid 1 mg oral tablet: 1 tab(s) orally once a day (14 Dec 2021 07:53)  Keflex 500 mg oral capsule: tid (14 Dec 2021 07:53)  Lexapro 5 mg oral tablet: 1 tab(s) orally once a day (14 Dec 2021 07:53)  losartan 50 mg oral tablet: 1 tab(s) orally once a day (14 Dec 2021 07:53)  RisperDAL 0.5 mg oral tablet: 1 tab(s) orally 2 times a day (14 Dec 2021 07:53)    Allergies: No Known Allergies    Advanced Directives: Presumed Full Code     REVIEW OF SYSTEMS  [X] A ten-point review of systems was otherwise negative except as noted.  [ ] Due to altered mental status/intubation, subjective information were not able to be obtained from the patient. History was obtained, to the extent possible, from review of the chart and collateral sources of information.      CURRENT MEDICATIONS:   --------------------------------------------------------------------------------------  Neurologic Medications  acetaminophen     Tablet .. 650 milliGRAM(s) Oral every 4 hours PRN Temp greater or equal to 38C (100.4F), Mild Pain (1 - 3)  escitalopram 5 milliGRAM(s) Oral daily  morphine  - Injectable 2 milliGRAM(s) IV Push every 4 hours PRN Severe Pain (7 - 10)  morphine  - Injectable 2 milliGRAM(s) IV Push every 10 minutes PRN Severe Pain (7 - 10)  ondansetron Injectable 4 milliGRAM(s) IV Push every 8 hours  oxycodone    5 mG/acetaminophen 325 mG 1 Tablet(s) Oral once PRN Moderate Pain (4 - 6)  oxycodone    5 mG/acetaminophen 325 mG 1 Tablet(s) Oral every 6 hours PRN Moderate Pain (4 - 6)  risperiDONE   Tablet 0.5 milliGRAM(s) Oral two times a day    Respiratory Medications    Cardiovascular Medications  furosemide    Tablet 20 milliGRAM(s) Oral daily  losartan 50 milliGRAM(s) Oral daily    Gastrointestinal Medications  cyanocobalamin 1000 MICROGram(s) Oral daily  ferrous    sulfate 325 milliGRAM(s) Oral daily  folic acid 1 milliGRAM(s) Oral daily  lactated ringers. 1000 milliLiter(s) IV Continuous <Continuous>    Genitourinary Medications    Hematologic/Oncologic Medications  heparin   Injectable 5000 Unit(s) SubCutaneous every 8 hours    Antimicrobial/Immunologic Medications  ceFAZolin   IVPB 1000 milliGRAM(s) IV Intermittent every 8 hours    Endocrine/Metabolic Medications    Topical/Other Medications  BACItracin   Ointment 1 Application(s) Topical every 12 hours    --------------------------------------------------------------------------------------    VITAL SIGNS, INS/OUTS (last 24 hours):  --------------------------------------------------------------------------------------  ICU Vital Signs Last 24 Hrs  T(C): 36.5 (14 Dec 2021 14:00), Max: 37 (14 Dec 2021 07:40)  T(F): 97.7 (14 Dec 2021 14:00), Max: 98.6 (14 Dec 2021 07:40)  HR: 89 (14 Dec 2021 16:00) (67 - 89)  BP: 152/69 (14 Dec 2021 18:00) (115/54 - 167/71)  BP(mean): --  ABP: 160/75 (14 Dec 2021 17:00) (149/66 - 165/68)  ABP(mean): 108 (14 Dec 2021 14:10) (108 - 108)  RR: 18 (14 Dec 2021 18:00) (16 - 21)  SpO2: 98% (14 Dec 2021 18:00) (96% - 98%)    I&O's Summary    14 Dec 2021 07:01  -  14 Dec 2021 18:53  --------------------------------------------------------  IN: 100 mL / OUT: 614 mL / NET: -514 mL      --------------------------------------------------------------------------------------  EXAM:  General/Neuro  - RASS: 0  - GCS: 15  - Normal, NAD, alert & oriented x 3, no focal deficits.     ENT  - Left preauricular dressing in place. Incision wound of L ear clean, soft, intact with mild blood dripping from site. RAIMUNDO drain in place. No buccal mucosa swelling.    Respiratory  - Lungs clear to auscultation, Normal expansion/effort on NC 3L.    Cardiovascular  - S1, S2.  Regular rate and rhythm.   - EKG: Normal Sinus Rhythm, left axis deviation, 85 bpm, QTc 445  - ECHO [5/29/2020]      - EF 57%      - Impaired relaxation pattern of left ventricular myocardial filling (Grade I diastolic dysfunction)      - Left atrial enlargement      - Mild thickening and calcification of the anterior and posterior mitral valve leaflets. There is mild mitral annular calcification.      - Mild AR      - Thickened pulmonic valve    GI  - Abdomen soft, non-tender, non-distended      Extremities  - 2+ edema B/L LE. B/L UE and LE sensation and strength intact.    Derm:  - No skin breakdown    :   - Pt voiding post-op    Tubes/Lines/Drains   [x] Peripheral IV  [x] L radial Arterial Line			  --------------------------------------------------------------------------------------  Labs:                        11.5   14.46 )-----------( 364      ( 14 Dec 2021 14:17 )             38.8         12-14    137  |  105  |  28<H>  ----------------------------<  119<H>  5.3<H>   |  18  |  1.3      Calcium, Total Serum: 8.6 mg/dL (12-14-21 @ 14:17)      CARDIAC MARKERS ( 14 Dec 2021 14:17 )  x     / <0.01 ng/mL / 79 U/L / x     / 3.7 ng/mL      --------------------------------------------------------------------------------------  ASSESSMENT:  81y Female with PMHx of HTN, DM II, B12 anemia, schizoaffective disorder, edema of B/L LE and basal cell carcinoma presents for scheduled facial composite resection, parotidectomy, and submental island flap reconstruction.     PLAN:   Neurologic:   - A&O x 3  - Pain management with Tylenol, Percocet, Morphine  - Zofran PRN for nausea  #Schizoaffective d/o  - Risperidone, Escitalopram    ENT:  - Flap checks for softness and color q4  - Bacitracin to area q12  - Monitor RAIMUNDO drain output    Respiratory:   - Satting well on NC 3L  - AM CXR  - Incentive spirometry encouraged    Cardiovascular:   - Lasix for B/L leg edema  - Monitor pulse ox and wean to RA as tolerated  #HTN  - Losartan  - Maintain normopressive    Gastrointestinal/Nutrition:   - Diet: NPO except meds    Renal/Genitourinary:   - Monitor I&Os q1h  - F/u Mg, Phos and BMP for elevated K     Labs        - BUN/Cr: 28/1.3        - Na 137, K 5.3            Hematologic:   - DVT ppx: Heparin  - F/u CBC  #B12 anemia  - Cyanocobalamin   - Ferrous sulfate  - Folic acid    Infectious Disease:   - Cefazolin post-op  - Afebrile  - Monitor temps  - WBC 14.46    Lines/Tubes:  - PIV  - L radial line    Endocrine:   #DM II  - Monitor finger sticks  - glucose, serum 119  #Thyroid disorder, unknown  - F/u TSH    Disposition: SICU

## 2021-12-14 NOTE — CONSULT NOTE ADULT - ATTENDING COMMENTS
81y Female with PMHx of HTN, DM II, B12 anemia, schizoaffective disorder, edema of B/L LE and basal cell carcinoma presents for scheduled facial composite resection, parotidectomy, and submental island flap reconstruction.     - A&O x 3  - post op Pain-  management with Tylenol, Percocet, Morphine  - Zofran PRN for nausea        Acute blood loss anemia  - PRBCS given  - high risk for post op bleeding       - Flap checks for softness and color q4  - Bacitracin to area q12  - Monitor RAIMUNDO drain output     respiratory insufficiency  - nc 3L wean to saturation > 92%  - Lasix for B/L leg edema  - Monitor pulse ox and wean to RA as tolerated       HTN   - Losartan  - Maintain normopressive       - Diet: NPO except meds        - Monitor I&Os q1h  - F/u Mg, Phos and BMP for elevated K     Labs        - BUN/Cr: 28/1.3        - Na 137, K 5.3             #B12 anemia  - Cyanocobalamin   - Ferrous sulfate  - Folic acid    Infectious Disease:   - Cefazolin post-op  - Afebrile  - Monitor temps  - WBC 14.46    Lines/Tubes:  - PIV  - L radial line    Endocrine:   #DM II  - Monitor finger sticks  - glucose, serum 119  #Thyroid disorder, unknown  - F/u TSH    Disposition: SICU

## 2021-12-14 NOTE — H&P ADULT - HISTORY OF PRESENT ILLNESS
81F with basal cell carcinoma presents for scheduled facial composite resection, parotidectomy, and submental island flap reconstruction. No complaints at this time. Denies SOB/chest pain/fever/chills.     PMHx: HTN, DM, basal cell carcinoma schizoaffective disorder Satisfactory

## 2021-12-14 NOTE — H&P ADULT - NSHPPHYSICALEXAM_GEN_ALL_CORE
Gen: AAOx3, NAD  HEENT: NCAT, EOMI, nares patent, no facial swelling, left preauricular area dressing in place, VILMA: 40 mm, intraoral exam: mucous membranes pink, no intraoral swelling  Resp: NLB on RA  CV: RRR  Abd: Soft, NT, ND  Ext: +2 edema b/l LE

## 2021-12-14 NOTE — PRE-ANESTHESIA EVALUATION ADULT - SPO2 (%)
[Fully active, able to carry on all pre-disease performance without restriction] : Status 0 - Fully active, able to carry on all pre-disease performance without restriction [Normal] : affect appropriate 96

## 2021-12-14 NOTE — PROGRESS NOTE ADULT - SUBJECTIVE AND OBJECTIVE BOX
ENT DAILY PROGRESS NOTE    Pt is a 81y Female s/p WLE of skin lesion (lobe/pre-auricular area), superficial parotidectomy, partial auriculotomy, meatoplasty with submental flap, POD #0 - seen and examined at bedside. Pt doing well, no complaints. PT has not voided since the OR, pt does feel urge to void- has prima fit. Pt placed on bed pan (didnt want to void in bed, voided 500cc). Pt denies any other issues, no SOB/diff breathing.       REVIEW OF SYSTEMS   [x] A ten-point review of systems was otherwise negative except as noted.    Allergies    No Known Allergies    Intolerances      MEDICATIONS:  acetaminophen     Tablet .. 650 milliGRAM(s) Oral every 4 hours PRN  BACItracin   Ointment 1 Application(s) Topical every 12 hours  ceFAZolin   IVPB 1000 milliGRAM(s) IV Intermittent every 8 hours  cyanocobalamin 1000 MICROGram(s) Oral daily  escitalopram 5 milliGRAM(s) Oral daily  ferrous    sulfate 325 milliGRAM(s) Oral daily  folic acid 1 milliGRAM(s) Oral daily  furosemide    Tablet 20 milliGRAM(s) Oral daily  heparin   Injectable 5000 Unit(s) SubCutaneous every 8 hours  lactated ringers. 1000 milliLiter(s) IV Continuous <Continuous>  losartan 50 milliGRAM(s) Oral daily  morphine  - Injectable 2 milliGRAM(s) IV Push every 4 hours PRN  morphine  - Injectable 2 milliGRAM(s) IV Push every 10 minutes PRN  ondansetron Injectable 4 milliGRAM(s) IV Push every 8 hours  oxycodone    5 mG/acetaminophen 325 mG 1 Tablet(s) Oral once PRN  oxycodone    5 mG/acetaminophen 325 mG 1 Tablet(s) Oral every 6 hours PRN  risperiDONE   Tablet 0.5 milliGRAM(s) Oral two times a day      Vital Signs Last 24 Hrs  T(C): 36.5 (14 Dec 2021 14:00), Max: 37 (14 Dec 2021 07:40)  T(F): 97.7 (14 Dec 2021 14:00), Max: 98.6 (14 Dec 2021 07:40)  HR: 88 (14 Dec 2021 19:00) (67 - 89)  BP: 154/66 (14 Dec 2021 19:00) (115/54 - 167/71)  RR: 18 (14 Dec 2021 19:00) (16 - 21)  SpO2: 98% (14 Dec 2021 19:00) (96% - 98%)      12-14 @ 07:01  -  12-14 @ 19:41  --------------------------------------------------------  IN:    Lactated Ringers: 100 mL  Total IN: 100 mL    OUT:    Bulb (mL): 74 mL    Voided (mL): 550 mL  Total OUT: 624 mL    Total NET: -524 mL      PHYSICAL EXAM:    GEN: NAD, awake and alert. No drooling or pooling of secretions. No stridor or stertor. Good vocal quality, no hoarseness.   SKIN: Good color, non diaphoretic  HEENT: + facial incision C/D/I, mild dependent edema to the left pre-auricular area, no ecchymosis. + skin island intact, soft, warm, good color. no TTP over incision. FN grossly intact. Oral mucosa pink and moist. No erythema or edema noted to buccal mucosa, tongue, FOM, uvula or posterior oropharynx. Uvula midline; + RAIMUNDO in place, draining bloody fluid, 35cc.  NECK:  Trachea midline. Neck supple, no TTP to B/L lateral neck, no cervical LAD.  RESP: No dyspnea, non-labored breathing. No use of accessory muscles.  CARDIO: +S1/S2  ABDO: Soft, NT.  EXT: MARCUM x 4    LABS:  CBC-                        11.5   14.46 )-----------( 364      ( 14 Dec 2021 14:17 )             38.8     BMP/CMP-  14 Dec 2021 14:17    137    |  105    |  28     ----------------------------<  119    5.3     |  18     |  1.3      Ca    8.6        14 Dec 2021 14:17  Phos  3.6       14 Dec 2021 14:17  Mg     1.8       14 Dec 2021 14:17      Coagulation Studies-    Endocrine Panel-  Calcium, Total Serum: 8.6 mg/dL (12-14 @ 14:17)

## 2021-12-14 NOTE — H&P ADULT - ASSESSMENT
81F with basal cell carcinoma presents for facial composite resection, parotidectomy, submental island flap.  -Proceed to OR

## 2021-12-14 NOTE — ASU PREOP CHECKLIST - BMI (KG/M2)
Procedure Date:  08/13/2018   

Accession Number:  [id number] / [id number]                    

Procedure:  DEX - Dexa Spine and/or Hip CPT Code:  

 

FULL RESULT:

 

 

EXAM: Dexa Spine and/or Hip

 

DATE: 8/13/2018 8:49 AM

 

CLINICAL HISTORY: POSTMENOPAUSAL STATUS

 

TECHNIQUE: Dual energy x-ray absorptiometry (DXA) was performed on a [employer] System.  Regions measured are the AP Spine, femoral neck, and if 

needed forearm.

 

COMPARISON: None.

 

In accordance with the International Society for Clinical Densitometry 

(ISCD) guidelines, data from previous exams may be reanalyzed using 

current recommendations and techniques.  This is done to allow a more 

accurate basis for comparison with the current study.

 

FINDINGS: The data for the lumbar spine is as follows:

 

                 BMD (g/cm/cm)         T-SCORE          Z-SCORE

 REGION

 L1                   1.224                   0.8                    1.5

 L2                   1.107                   -0.8                   0.0

 L3                   1.081                   -1.0                   -0.2

 L4                   1.142                   -0.5                   0.3

TOTAL              1.135                   -0.4                 0.4

 

NOTE: All evaluable vertebrae are used for classification

 

The data for the hip is as follows:

 

                 BMD (g/cm/cm)         T-SCORE          Z-SCORE

 REGION

 Neck             0.697                       -2.4              -1.5

TOTAL            0.753                       -2.0            -1.4

 

NOTE: The femoral neck or total proximal femur, whichever is lowest, is 

used for classification.

 

IMPRESSION: THE WHO CLASSIFICATION BASED ON THE INTERNATIONAL REFERENCE 

STANDARD IS OSTEOPENIA.  THE FRACTURE RISK IS HIGH.

 

RECOMMENDATION: Patients with diagnosis of osteoporosis or osteopenia 

should have regular bone mineral density assessment.  For those eligible 

for Medicare, routine testing is allowed once every 2 years.  Testing 

frequency can be increased for patients who have rapidly progressing 

disease or for those who are receiving medical therapy to restore bone 

mass.

 

COMMENT:  World Health Organization (WHO) definitions for osteoporosis 

and osteopenia:

NORMAL BMD:  T-score at -1.0 or higher, fracture risk is low

OSTEOPENIA BMD:  T-score between -1.0 and -2.5, fracture risk is 

increased.

OSTEOPOROSIS BMD:  T-score at -2.5 or lower, fracture risk is high.

 

National Osteoporosis Foundation recommends:

1. Obtain adequate dietary calcium (at least 1200 mg per day) and vitamin 

D (400-800 international units per day).

2. Participate, as appropriate, in regular weightbearing and 

muscle-strengthening exercise.

3. Avoid tobacco use and reduce alcohol and caffeine intake.

4. For more detailed information see the website at www.NOF.org.
40

## 2021-12-14 NOTE — PROGRESS NOTE ADULT - ASSESSMENT
Pt is a 81y Female s/p WLE of skin lesion (lobe/pre-auricular area), superficial parotidectomy, partial auriculotomy, meatoplasty with submental flap, POD #0 - stable. S/P 2 units PRBC, 2.5L IVF, 3500cc UO intraop    ·	NPO except meds; losartan, lasix already given in RR  ·	hold home ASA for now, ok to start on Thursday  ·	ok for SC heparin  ·	Ancef IV until drain removed  ·	f/u drain output  ·	flap checks q4 hrs by RN -> check flap color, warmth and if area soft (NO NEED for doppler or pin pricks); can change to q6h when downgraded  ·	lines per SICU  ·	f/u 11:30pm labs  ·	cont home meds  ·	FS qAC & HS  ·	w/d with khang

## 2021-12-14 NOTE — BRIEF OPERATIVE NOTE - NSICDXBRIEFPROCEDURE_GEN_ALL_CORE_FT
PROCEDURES:  Superficial parotidectomy 14-Dec-2021 13:59:23  Tyesha Perez  Partial auriculectomy 14-Dec-2021 13:59:34  Tyesha Perez  Reconstruction, using submental island pedicle flap 14-Dec-2021 13:59:44  Tyesha Perez

## 2021-12-14 NOTE — H&P ADULT - ATTENDING COMMENTS
81F with basal cell carcinoma. Plan for facial composite resection, parotidectomy with facial nerve monitoring, and submental island flap.

## 2021-12-15 LAB
ANION GAP SERPL CALC-SCNC: 16 MMOL/L — HIGH (ref 7–14)
ANION GAP SERPL CALC-SCNC: 17 MMOL/L — HIGH (ref 7–14)
ANION GAP SERPL CALC-SCNC: 19 MMOL/L — HIGH (ref 7–14)
BASOPHILS # BLD AUTO: 0.05 K/UL — SIGNIFICANT CHANGE UP (ref 0–0.2)
BASOPHILS # BLD AUTO: 0.06 K/UL — SIGNIFICANT CHANGE UP (ref 0–0.2)
BASOPHILS NFR BLD AUTO: 0.5 % — SIGNIFICANT CHANGE UP (ref 0–1)
BASOPHILS NFR BLD AUTO: 0.5 % — SIGNIFICANT CHANGE UP (ref 0–1)
BUN SERPL-MCNC: 25 MG/DL — HIGH (ref 10–20)
BUN SERPL-MCNC: 26 MG/DL — HIGH (ref 10–20)
BUN SERPL-MCNC: 27 MG/DL — HIGH (ref 10–20)
BUN SERPL-MCNC: 28 MG/DL — HIGH (ref 10–20)
BUN SERPL-MCNC: 33 MG/DL — HIGH (ref 10–20)
CALCIUM SERPL-MCNC: 9.2 MG/DL — SIGNIFICANT CHANGE UP (ref 8.5–10.1)
CALCIUM SERPL-MCNC: 9.2 MG/DL — SIGNIFICANT CHANGE UP (ref 8.5–10.1)
CALCIUM SERPL-MCNC: 9.4 MG/DL — SIGNIFICANT CHANGE UP (ref 8.5–10.1)
CALCIUM SERPL-MCNC: 9.6 MG/DL — SIGNIFICANT CHANGE UP (ref 8.5–10.1)
CALCIUM SERPL-MCNC: 9.8 MG/DL — SIGNIFICANT CHANGE UP (ref 8.5–10.1)
CHLORIDE SERPL-SCNC: 103 MMOL/L — SIGNIFICANT CHANGE UP (ref 98–110)
CHLORIDE SERPL-SCNC: 104 MMOL/L — SIGNIFICANT CHANGE UP (ref 98–110)
CHLORIDE SERPL-SCNC: 105 MMOL/L — SIGNIFICANT CHANGE UP (ref 98–110)
CHLORIDE SERPL-SCNC: 105 MMOL/L — SIGNIFICANT CHANGE UP (ref 98–110)
CHLORIDE SERPL-SCNC: 106 MMOL/L — SIGNIFICANT CHANGE UP (ref 98–110)
CK MB CFR SERPL CALC: 3.8 NG/ML — SIGNIFICANT CHANGE UP (ref 0.6–6.3)
CK SERPL-CCNC: 76 U/L — SIGNIFICANT CHANGE UP (ref 0–225)
CO2 SERPL-SCNC: 15 MMOL/L — LOW (ref 17–32)
CO2 SERPL-SCNC: 17 MMOL/L — SIGNIFICANT CHANGE UP (ref 17–32)
CO2 SERPL-SCNC: 17 MMOL/L — SIGNIFICANT CHANGE UP (ref 17–32)
CO2 SERPL-SCNC: 18 MMOL/L — SIGNIFICANT CHANGE UP (ref 17–32)
CO2 SERPL-SCNC: 19 MMOL/L — SIGNIFICANT CHANGE UP (ref 17–32)
CREAT SERPL-MCNC: 1.4 MG/DL — SIGNIFICANT CHANGE UP (ref 0.7–1.5)
CREAT SERPL-MCNC: 1.4 MG/DL — SIGNIFICANT CHANGE UP (ref 0.7–1.5)
CREAT SERPL-MCNC: 1.5 MG/DL — SIGNIFICANT CHANGE UP (ref 0.7–1.5)
CREAT SERPL-MCNC: 1.6 MG/DL — HIGH (ref 0.7–1.5)
CREAT SERPL-MCNC: 1.9 MG/DL — HIGH (ref 0.7–1.5)
EOSINOPHIL # BLD AUTO: 0.03 K/UL — SIGNIFICANT CHANGE UP (ref 0–0.7)
EOSINOPHIL # BLD AUTO: 0.08 K/UL — SIGNIFICANT CHANGE UP (ref 0–0.7)
EOSINOPHIL NFR BLD AUTO: 0.2 % — SIGNIFICANT CHANGE UP (ref 0–8)
EOSINOPHIL NFR BLD AUTO: 0.8 % — SIGNIFICANT CHANGE UP (ref 0–8)
GLUCOSE BLDC GLUCOMTR-MCNC: 106 MG/DL — HIGH (ref 70–99)
GLUCOSE BLDC GLUCOMTR-MCNC: 96 MG/DL — SIGNIFICANT CHANGE UP (ref 70–99)
GLUCOSE SERPL-MCNC: 100 MG/DL — HIGH (ref 70–99)
GLUCOSE SERPL-MCNC: 106 MG/DL — HIGH (ref 70–99)
GLUCOSE SERPL-MCNC: 108 MG/DL — HIGH (ref 70–99)
GLUCOSE SERPL-MCNC: 122 MG/DL — HIGH (ref 70–99)
GLUCOSE SERPL-MCNC: 133 MG/DL — HIGH (ref 70–99)
HCT VFR BLD CALC: 35.9 % — LOW (ref 37–47)
HCT VFR BLD CALC: 40.3 % — SIGNIFICANT CHANGE UP (ref 37–47)
HGB BLD-MCNC: 10.5 G/DL — LOW (ref 12–16)
HGB BLD-MCNC: 12 G/DL — SIGNIFICANT CHANGE UP (ref 12–16)
IMM GRANULOCYTES NFR BLD AUTO: 0.3 % — SIGNIFICANT CHANGE UP (ref 0.1–0.3)
IMM GRANULOCYTES NFR BLD AUTO: 0.3 % — SIGNIFICANT CHANGE UP (ref 0.1–0.3)
LYMPHOCYTES # BLD AUTO: 1.05 K/UL — LOW (ref 1.2–3.4)
LYMPHOCYTES # BLD AUTO: 1.2 K/UL — SIGNIFICANT CHANGE UP (ref 1.2–3.4)
LYMPHOCYTES # BLD AUTO: 12.2 % — LOW (ref 20.5–51.1)
LYMPHOCYTES # BLD AUTO: 8.4 % — LOW (ref 20.5–51.1)
MAGNESIUM SERPL-MCNC: 1.8 MG/DL — SIGNIFICANT CHANGE UP (ref 1.8–2.4)
MAGNESIUM SERPL-MCNC: 2.1 MG/DL — SIGNIFICANT CHANGE UP (ref 1.8–2.4)
MCHC RBC-ENTMCNC: 25.3 PG — LOW (ref 27–31)
MCHC RBC-ENTMCNC: 25.3 PG — LOW (ref 27–31)
MCHC RBC-ENTMCNC: 29.2 G/DL — LOW (ref 32–37)
MCHC RBC-ENTMCNC: 29.8 G/DL — LOW (ref 32–37)
MCV RBC AUTO: 84.8 FL — SIGNIFICANT CHANGE UP (ref 81–99)
MCV RBC AUTO: 86.5 FL — SIGNIFICANT CHANGE UP (ref 81–99)
MONOCYTES # BLD AUTO: 0.85 K/UL — HIGH (ref 0.1–0.6)
MONOCYTES # BLD AUTO: 0.86 K/UL — HIGH (ref 0.1–0.6)
MONOCYTES NFR BLD AUTO: 6.8 % — SIGNIFICANT CHANGE UP (ref 1.7–9.3)
MONOCYTES NFR BLD AUTO: 8.7 % — SIGNIFICANT CHANGE UP (ref 1.7–9.3)
NEUTROPHILS # BLD AUTO: 10.47 K/UL — HIGH (ref 1.4–6.5)
NEUTROPHILS # BLD AUTO: 7.62 K/UL — HIGH (ref 1.4–6.5)
NEUTROPHILS NFR BLD AUTO: 77.5 % — HIGH (ref 42.2–75.2)
NEUTROPHILS NFR BLD AUTO: 83.8 % — HIGH (ref 42.2–75.2)
NRBC # BLD: 0 /100 WBCS — SIGNIFICANT CHANGE UP (ref 0–0)
NRBC # BLD: 0 /100 WBCS — SIGNIFICANT CHANGE UP (ref 0–0)
PHOSPHATE SERPL-MCNC: 3.3 MG/DL — SIGNIFICANT CHANGE UP (ref 2.1–4.9)
PHOSPHATE SERPL-MCNC: 4.1 MG/DL — SIGNIFICANT CHANGE UP (ref 2.1–4.9)
PLATELET # BLD AUTO: 298 K/UL — SIGNIFICANT CHANGE UP (ref 130–400)
PLATELET # BLD AUTO: 351 K/UL — SIGNIFICANT CHANGE UP (ref 130–400)
POTASSIUM SERPL-MCNC: 5.5 MMOL/L — HIGH (ref 3.5–5)
POTASSIUM SERPL-MCNC: 5.5 MMOL/L — HIGH (ref 3.5–5)
POTASSIUM SERPL-MCNC: 5.6 MMOL/L — HIGH (ref 3.5–5)
POTASSIUM SERPL-MCNC: 5.9 MMOL/L — HIGH (ref 3.5–5)
POTASSIUM SERPL-MCNC: 5.9 MMOL/L — HIGH (ref 3.5–5)
POTASSIUM SERPL-SCNC: 5.5 MMOL/L — HIGH (ref 3.5–5)
POTASSIUM SERPL-SCNC: 5.5 MMOL/L — HIGH (ref 3.5–5)
POTASSIUM SERPL-SCNC: 5.6 MMOL/L — HIGH (ref 3.5–5)
POTASSIUM SERPL-SCNC: 5.9 MMOL/L — HIGH (ref 3.5–5)
POTASSIUM SERPL-SCNC: 5.9 MMOL/L — HIGH (ref 3.5–5)
RBC # BLD: 4.15 M/UL — LOW (ref 4.2–5.4)
RBC # BLD: 4.75 M/UL — SIGNIFICANT CHANGE UP (ref 4.2–5.4)
RBC # FLD: 22.7 % — HIGH (ref 11.5–14.5)
RBC # FLD: 23.4 % — HIGH (ref 11.5–14.5)
SODIUM SERPL-SCNC: 138 MMOL/L — SIGNIFICANT CHANGE UP (ref 135–146)
SODIUM SERPL-SCNC: 138 MMOL/L — SIGNIFICANT CHANGE UP (ref 135–146)
SODIUM SERPL-SCNC: 139 MMOL/L — SIGNIFICANT CHANGE UP (ref 135–146)
SODIUM SERPL-SCNC: 140 MMOL/L — SIGNIFICANT CHANGE UP (ref 135–146)
SODIUM SERPL-SCNC: 140 MMOL/L — SIGNIFICANT CHANGE UP (ref 135–146)
TROPONIN T SERPL-MCNC: <0.01 NG/ML — SIGNIFICANT CHANGE UP
TSH SERPL-MCNC: 0.51 UIU/ML — SIGNIFICANT CHANGE UP (ref 0.27–4.2)
WBC # BLD: 12.5 K/UL — HIGH (ref 4.8–10.8)
WBC # BLD: 9.84 K/UL — SIGNIFICANT CHANGE UP (ref 4.8–10.8)
WBC # FLD AUTO: 12.5 K/UL — HIGH (ref 4.8–10.8)
WBC # FLD AUTO: 9.84 K/UL — SIGNIFICANT CHANGE UP (ref 4.8–10.8)

## 2021-12-15 PROCEDURE — 99291 CRITICAL CARE FIRST HOUR: CPT | Mod: 25,24

## 2021-12-15 PROCEDURE — 71045 X-RAY EXAM CHEST 1 VIEW: CPT | Mod: 26

## 2021-12-15 RX ORDER — INSULIN HUMAN 100 [IU]/ML
10 INJECTION, SOLUTION SUBCUTANEOUS ONCE
Refills: 0 | Status: COMPLETED | OUTPATIENT
Start: 2021-12-15 | End: 2021-12-15

## 2021-12-15 RX ORDER — MAGNESIUM SULFATE 500 MG/ML
2 VIAL (ML) INJECTION ONCE
Refills: 0 | Status: COMPLETED | OUTPATIENT
Start: 2021-12-15 | End: 2021-12-15

## 2021-12-15 RX ORDER — GABAPENTIN 400 MG/1
100 CAPSULE ORAL THREE TIMES A DAY
Refills: 0 | Status: DISCONTINUED | OUTPATIENT
Start: 2021-12-15 | End: 2021-12-17

## 2021-12-15 RX ORDER — PANTOPRAZOLE SODIUM 20 MG/1
40 TABLET, DELAYED RELEASE ORAL DAILY
Refills: 0 | Status: DISCONTINUED | OUTPATIENT
Start: 2021-12-15 | End: 2021-12-17

## 2021-12-15 RX ORDER — HYDROMORPHONE HYDROCHLORIDE 2 MG/ML
0.5 INJECTION INTRAMUSCULAR; INTRAVENOUS; SUBCUTANEOUS EVERY 4 HOURS
Refills: 0 | Status: DISCONTINUED | OUTPATIENT
Start: 2021-12-15 | End: 2021-12-19

## 2021-12-15 RX ORDER — CALCIUM GLUCONATE 100 MG/ML
2 VIAL (ML) INTRAVENOUS ONCE
Refills: 0 | Status: COMPLETED | OUTPATIENT
Start: 2021-12-15 | End: 2021-12-15

## 2021-12-15 RX ORDER — DEXTROSE 50 % IN WATER 50 %
50 SYRINGE (ML) INTRAVENOUS ONCE
Refills: 0 | Status: COMPLETED | OUTPATIENT
Start: 2021-12-15 | End: 2021-12-15

## 2021-12-15 RX ADMIN — Medication 325 MILLIGRAM(S): at 12:49

## 2021-12-15 RX ADMIN — HEPARIN SODIUM 5000 UNIT(S): 5000 INJECTION INTRAVENOUS; SUBCUTANEOUS at 21:11

## 2021-12-15 RX ADMIN — INSULIN HUMAN 10 UNIT(S): 100 INJECTION, SOLUTION SUBCUTANEOUS at 06:54

## 2021-12-15 RX ADMIN — Medication 1 MILLIGRAM(S): at 12:49

## 2021-12-15 RX ADMIN — ONDANSETRON 4 MILLIGRAM(S): 8 TABLET, FILM COATED ORAL at 21:11

## 2021-12-15 RX ADMIN — RISPERIDONE 0.5 MILLIGRAM(S): 4 TABLET ORAL at 18:23

## 2021-12-15 RX ADMIN — Medication 100 MILLIGRAM(S): at 21:13

## 2021-12-15 RX ADMIN — LOSARTAN POTASSIUM 50 MILLIGRAM(S): 100 TABLET, FILM COATED ORAL at 05:18

## 2021-12-15 RX ADMIN — Medication 100 MILLIGRAM(S): at 05:14

## 2021-12-15 RX ADMIN — ESCITALOPRAM OXALATE 5 MILLIGRAM(S): 10 TABLET, FILM COATED ORAL at 12:49

## 2021-12-15 RX ADMIN — Medication 1 APPLICATION(S): at 07:00

## 2021-12-15 RX ADMIN — Medication 100 MILLIGRAM(S): at 14:30

## 2021-12-15 RX ADMIN — RISPERIDONE 0.5 MILLIGRAM(S): 4 TABLET ORAL at 05:16

## 2021-12-15 RX ADMIN — CHLORHEXIDINE GLUCONATE 1 APPLICATION(S): 213 SOLUTION TOPICAL at 05:18

## 2021-12-15 RX ADMIN — GABAPENTIN 100 MILLIGRAM(S): 400 CAPSULE ORAL at 05:14

## 2021-12-15 RX ADMIN — HEPARIN SODIUM 5000 UNIT(S): 5000 INJECTION INTRAVENOUS; SUBCUTANEOUS at 15:56

## 2021-12-15 RX ADMIN — GABAPENTIN 100 MILLIGRAM(S): 400 CAPSULE ORAL at 21:12

## 2021-12-15 RX ADMIN — Medication 2000 GRAM(S): at 06:54

## 2021-12-15 RX ADMIN — Medication 20 MILLIGRAM(S): at 05:16

## 2021-12-15 RX ADMIN — Medication 1 APPLICATION(S): at 18:24

## 2021-12-15 RX ADMIN — HEPARIN SODIUM 5000 UNIT(S): 5000 INJECTION INTRAVENOUS; SUBCUTANEOUS at 05:14

## 2021-12-15 RX ADMIN — GABAPENTIN 100 MILLIGRAM(S): 400 CAPSULE ORAL at 15:56

## 2021-12-15 RX ADMIN — Medication 50 MILLILITER(S): at 08:04

## 2021-12-15 RX ADMIN — ONDANSETRON 4 MILLIGRAM(S): 8 TABLET, FILM COATED ORAL at 05:14

## 2021-12-15 RX ADMIN — PREGABALIN 1000 MICROGRAM(S): 225 CAPSULE ORAL at 21:46

## 2021-12-15 RX ADMIN — ONDANSETRON 4 MILLIGRAM(S): 8 TABLET, FILM COATED ORAL at 15:57

## 2021-12-15 RX ADMIN — PANTOPRAZOLE SODIUM 40 MILLIGRAM(S): 20 TABLET, DELAYED RELEASE ORAL at 12:49

## 2021-12-15 NOTE — PHARMACOTHERAPY INTERVENTION NOTE - COMMENTS
Inquired if standing ondansetron needed; ondansetron and citalopram can both cause QTC prolongation. Last QTc was 449

## 2021-12-15 NOTE — PROGRESS NOTE ADULT - SUBJECTIVE AND OBJECTIVE BOX
81F POD1 s/p wide local excision of basal cell carcinoma left face, partial auriculectomy, superficial parotidectomy, and reconstruction with submental island flap. Seen and examined at bedside in ICU. Reports her pain is controlled and she is doing well. Reports she is hungry and wants to eat. Hagen removed and passed TOV yesterday. Denies nausea/vomiting/SOB/fever/chills.     MEDICATIONS  (STANDING):  BACItracin   Ointment 1 Application(s) Topical every 12 hours  ceFAZolin   IVPB 1000 milliGRAM(s) IV Intermittent every 8 hours  cyanocobalamin 1000 MICROGram(s) Oral daily  escitalopram 5 milliGRAM(s) Oral daily  ferrous    sulfate 325 milliGRAM(s) Oral daily  folic acid 1 milliGRAM(s) Oral daily  furosemide    Tablet 20 milliGRAM(s) Oral daily  gabapentin 100 milliGRAM(s) Oral three times a day  heparin   Injectable 5000 Unit(s) SubCutaneous every 8 hours  losartan 50 milliGRAM(s) Oral daily  ondansetron Injectable 4 milliGRAM(s) IV Push every 8 hours  pantoprazole  Injectable 40 milliGRAM(s) IV Push daily  risperiDONE   Tablet 0.5 milliGRAM(s) Oral two times a day    MEDICATIONS  (PRN):  acetaminophen     Tablet .. 650 milliGRAM(s) Oral every 4 hours PRN Temp greater or equal to 38C (100.4F), Mild Pain (1 - 3)  HYDROmorphone  Injectable 0.5 milliGRAM(s) IV Push every 4 hours PRN Severe Pain (7 - 10)  oxyCODONE    IR 5 milliGRAM(s) Oral every 6 hours PRN Moderate Pain (4 - 6)    Vital Signs Last 24 Hrs  T(C): 36.5 (15 Dec 2021 07:17), Max: 36.8 (15 Dec 2021 00:00)  T(F): 97.7 (15 Dec 2021 07:17), Max: 98.3 (15 Dec 2021 00:00)  HR: 67 (15 Dec 2021 10:00) (65 - 89)  BP: 117/57 (15 Dec 2021 10:00) (114/55 - 167/71)  BP(mean): 80 (15 Dec 2021 10:00) (79 - 100)  RR: 18 (14 Dec 2021 20:00) (18 - 21)  SpO2: 99% (15 Dec 2021 10:00) (97% - 100%)    Physical Exam:  Gen: AAOX3, NAD  HEENT: left preauricular incision with sutures intact, no evidence drainage/bleeding/dehiscence, CNVII grossly intact b/l, flap soft and warm, well perfused, Intraoral exam: VILMA: 40 mm, mucous membranes pink and moist, FOM soft and non-elevated, tongue FROM, OP clear  Resp: NLB on 3LNC, SpO2: 99%  CV: non-tachycardic  Abd: soft, NT ND    Labs:                        12.0   12.50 )-----------( 298      ( 15 Dec 2021 04:15 )             40.3   12-15    140  |  106  |  25<H>  ----------------------------<  106<H>  5.6<H>   |  15<L>  |  1.4    Ca    9.2      15 Dec 2021 04:15  Phos  4.1     12-15  Mg     2.1     12-15        A/P: 81F POD1 s/p wide local excision of basal cell carcinoma left face, partial auriculectomy, superficial parotidectomy, and reconstruction with submental island flap. AFVSS. Recovering well.  -Begin soft diet  -D/C A-line  -Monitor RAIMUNDO drain output  -C/w Ancef until RAIMUNDO drain removal  -Bacitracin to incisions  -Flap checks Q4H  -Pain control     81F POD1 s/p wide local excision of basal cell carcinoma left face, partial auriculectomy, superficial parotidectomy, and reconstruction with submental island flap. Seen and examined at bedside in ICU. Reports her pain is controlled and she is doing well. Reports she is hungry and wants to eat. Hagen removed and passed TOV yesterday. Denies nausea/vomiting/SOB/fever/chills.     MEDICATIONS  (STANDING):  BACItracin   Ointment 1 Application(s) Topical every 12 hours  ceFAZolin   IVPB 1000 milliGRAM(s) IV Intermittent every 8 hours  cyanocobalamin 1000 MICROGram(s) Oral daily  escitalopram 5 milliGRAM(s) Oral daily  ferrous    sulfate 325 milliGRAM(s) Oral daily  folic acid 1 milliGRAM(s) Oral daily  furosemide    Tablet 20 milliGRAM(s) Oral daily  gabapentin 100 milliGRAM(s) Oral three times a day  heparin   Injectable 5000 Unit(s) SubCutaneous every 8 hours  losartan 50 milliGRAM(s) Oral daily  ondansetron Injectable 4 milliGRAM(s) IV Push every 8 hours  pantoprazole  Injectable 40 milliGRAM(s) IV Push daily  risperiDONE   Tablet 0.5 milliGRAM(s) Oral two times a day    MEDICATIONS  (PRN):  acetaminophen     Tablet .. 650 milliGRAM(s) Oral every 4 hours PRN Temp greater or equal to 38C (100.4F), Mild Pain (1 - 3)  HYDROmorphone  Injectable 0.5 milliGRAM(s) IV Push every 4 hours PRN Severe Pain (7 - 10)  oxyCODONE    IR 5 milliGRAM(s) Oral every 6 hours PRN Moderate Pain (4 - 6)    Vital Signs Last 24 Hrs  T(C): 36.5 (15 Dec 2021 07:17), Max: 36.8 (15 Dec 2021 00:00)  T(F): 97.7 (15 Dec 2021 07:17), Max: 98.3 (15 Dec 2021 00:00)  HR: 67 (15 Dec 2021 10:00) (65 - 89)  BP: 117/57 (15 Dec 2021 10:00) (114/55 - 167/71)  BP(mean): 80 (15 Dec 2021 10:00) (79 - 100)  RR: 18 (14 Dec 2021 20:00) (18 - 21)  SpO2: 99% (15 Dec 2021 10:00) (97% - 100%)    Physical Exam:  Gen: AAOX3, NAD  HEENT: left preauricular incision with sutures intact, no evidence drainage/bleeding/dehiscence, CNVII grossly intact b/l, flap soft and warm, well perfused, Intraoral exam: VILMA: 40 mm, mucous membranes pink and moist, FOM soft and non-elevated, tongue FROM, OP clear, RAIMUNDO drain x1 in place draining SS fluid (total output 120 cc since insertion)  Resp: NLB on 3LNC, SpO2: 99%  CV: non-tachycardic  Abd: soft, NT ND    Labs:                        12.0   12.50 )-----------( 298      ( 15 Dec 2021 04:15 )             40.3   12-15    140  |  106  |  25<H>  ----------------------------<  106<H>  5.6<H>   |  15<L>  |  1.4    Ca    9.2      15 Dec 2021 04:15  Phos  4.1     12-15  Mg     2.1     12-15        A/P: 81F POD1 s/p wide local excision of basal cell carcinoma left face, partial auriculectomy, superficial parotidectomy, and reconstruction with submental island flap. AFVSS. Recovering well.  -Begin soft diet  -D/C A-line  -Monitor RAIMUNDO drain output  -C/w Ancef until RAIMUNDO drain removal  -Bacitracin to incisions  -Flap checks Q4H  -Pain control

## 2021-12-15 NOTE — PROGRESS NOTE ADULT - ASSESSMENT
81y Female s/p WLE of skin lesion (lobe/pre-auricular area), superficial parotidectomy, partial auriculotomy, meatoplasty with submental flap, POD #1 - pt doing well, stable.     Plan:  ·	case discussed with Dr. Lopez & Dr. Rai plan as follows  ·	can start soft diet   ·	cont SC heparin, ASA to start on Thursday   ·	cont Ancef IV until RAIMUNDO drain removed  ·	cont to f/u left RAIMUNDO drain output  ·	flap checks q4 hrs by RN -> check flap color, warmth and if area soft (NO NEED for doppler or pin pricks); can change to q6h when downgraded  ·	A-line can be d/c'd as per Dr. Lopez   ·	cont home meds  ·	f/u TSH, repeat BMP    81y Female s/p WLE of skin lesion (lobe/pre-auricular area), superficial parotidectomy, partial auriculotomy, meatoplasty with submental flap, POD #1 - pt doing well, stable.     Plan:  ·	case discussed with Dr. Lopez & Dr. Rai plan as follows  ·	can start soft diet   ·	RN to keep suture lines and skin clean, cont bacitracin to incisions   ·	cont SC heparin, ASA to start on Thursday   ·	cont Ancef IV until RAIMUNDO drain removed  ·	cont to f/u left RAIMUNDO drain output  ·	flap checks q4 hrs by RN -> check flap color, warmth and if area soft (NO NEED for doppler or pin pricks); can change to q6h when downgraded  ·	A-line can be d/c'd as per Dr. Lopez   ·	cont home meds  ·	f/u TSH, repeat BMP

## 2021-12-15 NOTE — PROGRESS NOTE ADULT - SUBJECTIVE AND OBJECTIVE BOX
CHELA GALLAGHER  [id number]  81y Female    Indication for ICU admission:     Admit Date:12-14-21  ICU Date: 12/14  OR Date: 12/14    No Known Allergies    PAST MEDICAL & SURGICAL HISTORY:  Diabetes mellitus  Hypertension  Schizoaffective disorder  Edema  Bilateral LE  Disorder of thyroid, unspecified  Son cannot provide details. States she had &quot;thyroid surgery&quot; decades ago when she was young  No significant past surgical history    Home Medications:  aspirin 81 mg oral tablet, chewable: 1 tab(s) orally once a day (14 Dec 2021 07:53)  folic acid 1 mg oral tablet: 1 tab(s) orally once a day (14 Dec 2021 07:53)  Keflex 500 mg oral capsule: tid (14 Dec 2021 07:53)  Lexapro 5 mg oral tablet: 1 tab(s) orally once a day (14 Dec 2021 07:53)  losartan 50 mg oral tablet: 1 tab(s) orally once a day (14 Dec 2021 07:53)  RisperDAL 0.5 mg oral tablet: 1 tab(s) orally 2 times a day (14 Dec 2021 07:53)    24HRS EVENT:  12/14  Night  -flap checks stable throughout night  - CE(-) x1  - f/u repeat CE    DVT PTX: HSQ q8  GI PTX: PPI IV    ***Tubes/Lines/Drains  ***  Peripheral IV  R rad A line    REVIEW OF SYSTEMS  [x ] A ten-point review of systems was otherwise negative except as noted.  [ ] Due to altered mental status/intubation, subjective information were not able to be obtained from the patient. History was obtained, to the extent possible, from review of the chart and collateral sources of information.         CHELA GALLAGHER  [id number]  81y Female    Indication for ICU admission:     Admit Date:12-14-21  ICU Date: 12/14  OR Date: 12/14    No Known Allergies    PAST MEDICAL & SURGICAL HISTORY:  Diabetes mellitus  Hypertension  Schizoaffective disorder  Edema  Bilateral LE  Disorder of thyroid, unspecified  Son cannot provide details. States she had &quot;thyroid surgery&quot; decades ago when she was young  No significant past surgical history    Home Medications:  aspirin 81 mg oral tablet, chewable: 1 tab(s) orally once a day (14 Dec 2021 07:53)  folic acid 1 mg oral tablet: 1 tab(s) orally once a day (14 Dec 2021 07:53)  Keflex 500 mg oral capsule: tid (14 Dec 2021 07:53)  Lexapro 5 mg oral tablet: 1 tab(s) orally once a day (14 Dec 2021 07:53)  losartan 50 mg oral tablet: 1 tab(s) orally once a day (14 Dec 2021 07:53)  RisperDAL 0.5 mg oral tablet: 1 tab(s) orally 2 times a day (14 Dec 2021 07:53)    24HRS EVENT:  12/14  Night  -flap checks stable throughout night  - CE(-) x1  - f/u repeat CE    DVT PTX: HSQ q8  GI PTX: PPI IV    ***Tubes/Lines/Drains  ***  Peripheral IV  R rad A line    REVIEW OF SYSTEMS  [x ] A ten-point review of systems was otherwise negative except as noted.  [ ] Due to altered mental status/intubation, subjective information were not able to be obtained from the patient. History was obtained, to the extent possible, from review of the chart and collateral sources of information.      Daily Height in cm: 165.1 (14 Dec 2021 08:14)    Daily     Diet, NPO:   Except Medications (12-14-21 @ 14:16)      CURRENT MEDS:  Neurologic Medications  acetaminophen     Tablet .. 650 milliGRAM(s) Oral every 4 hours PRN Temp greater or equal to 38C (100.4F), Mild Pain (1 - 3)  escitalopram 5 milliGRAM(s) Oral daily  gabapentin 100 milliGRAM(s) Oral three times a day  HYDROmorphone  Injectable 0.5 milliGRAM(s) IV Push every 4 hours PRN Severe Pain (7 - 10)  ondansetron Injectable 4 milliGRAM(s) IV Push every 8 hours  oxyCODONE    IR 5 milliGRAM(s) Oral every 6 hours PRN Moderate Pain (4 - 6)  risperiDONE   Tablet 0.5 milliGRAM(s) Oral two times a day    Respiratory Medications    Cardiovascular Medications  furosemide    Tablet 20 milliGRAM(s) Oral daily  losartan 50 milliGRAM(s) Oral daily    Gastrointestinal Medications  cyanocobalamin 1000 MICROGram(s) Oral daily  ferrous    sulfate 325 milliGRAM(s) Oral daily  folic acid 1 milliGRAM(s) Oral daily  pantoprazole  Injectable 40 milliGRAM(s) IV Push daily    Genitourinary Medications    Hematologic/Oncologic Medications  heparin   Injectable 5000 Unit(s) SubCutaneous every 8 hours  influenza  Vaccine (HIGH DOSE) 0.7 milliLiter(s) IntraMuscular once    Antimicrobial/Immunologic Medications  ceFAZolin   IVPB 1000 milliGRAM(s) IV Intermittent every 8 hours    Endocrine/Metabolic Medications  dextrose 50% Injectable 50 milliLiter(s) IV Push once    Topical/Other Medications  BACItracin   Ointment 1 Application(s) Topical every 12 hours  chlorhexidine 4% Liquid 1 Application(s) Topical <User Schedule>      ICU Vital Signs Last 24 Hrs  T(C): 36.8 (15 Dec 2021 00:00), Max: 37 (14 Dec 2021 08:14)  T(F): 98.3 (15 Dec 2021 00:00), Max: 98.3 (15 Dec 2021 00:00)  HR: 72 (15 Dec 2021 07:00) (67 - 89)  BP: 129/63 (15 Dec 2021 07:00) (114/55 - 167/71)  BP(mean): 86 (15 Dec 2021 07:00) (79 - 100)  ABP: 113/74 (15 Dec 2021 07:00) (97/61 - 165/68)  ABP(mean): 91 (15 Dec 2021 07:00) (77 - 120)  RR: 18 (14 Dec 2021 20:00) (16 - 21)  SpO2: 100% (15 Dec 2021 07:00) (96% - 100%)      Adult Advanced Hemodynamics Last 24 Hrs  CVP(mm Hg): --  CVP(cm H2O): --  CO: --  CI: --  PA: --  PA(mean): --  PCWP: --  SVR: --  SVRI: --  PVR: --  PVRI: --          I&O's Summary    14 Dec 2021 07:01  -  15 Dec 2021 07:00  --------------------------------------------------------  IN: 600 mL / OUT: 2244 mL / NET: -1644 mL      I&O's Detail    14 Dec 2021 07:01  -  15 Dec 2021 07:00  --------------------------------------------------------  IN:    IV PiggyBack: 200 mL    Lactated Ringers: 400 mL  Total IN: 600 mL    OUT:    Bulb (mL): 94 mL    Voided (mL): 2150 mL  Total OUT: 2244 mL    Total NET: -1644 mL        PHYSICAL EXAM:    General/Neuro  RASS:  0           alert & oriented x 3, no focal deficits    ENT: Incision mildly bloody, RAIMUNDO drain in place with serosanguinous fluid. Incision intact. Flap soft with no hematoma. Cap refill <2 seconds    Lungs: clear to auscultation, normal expansion/effort b/l on NC 3L    Cardiovascular : S1, S2.  Regular rate and rhythm.   Cardiac Rhythm: Normal Sinus Rhythm    GI: Abdomen soft, Non-tender, Non-distended.      Extremities: b/l leg edema, warm    Derm: Good skin turgor, no skin breakdown.      : No escobar, voiding      CXR:  < from: Xray Chest 1 View- PORTABLE-Routine (Xray Chest 1 View- PORTABLE-Routine in AM.) (12.15.21 @ 06:25) >  Low lung volume.    No acute infiltrates.      < end of copied text >        LABS:    CAPILLARY BLOOD GLUCOSE      POCT Blood Glucose.: 106 mg/dL (15 Dec 2021 06:47)  POCT Blood Glucose.: 131 mg/dL (14 Dec 2021 23:09)                          12.0   12.50 )-----------( 298      ( 15 Dec 2021 04:15 )             40.3       12-15    140  |  106  |  25<H>  ----------------------------<  106<H>  5.6<H>   |  15<L>  |  1.4    Ca    9.2      15 Dec 2021 04:15  Phos  4.1     12-15  Mg     2.1     12-15          CARDIAC MARKERS ( 15 Dec 2021 04:15 )  x     / <0.01 ng/mL / 76 U/L / x     / 3.8 ng/mL  CARDIAC MARKERS ( 14 Dec 2021 14:17 )  x     / <0.01 ng/mL / 79 U/L / x     / 3.7 ng/mL             CHELA GALLAGHER  [id number]  81y Female    Indication for ICU admission:     Admit Date:12-14-21  ICU Date: 12/14  OR Date: 12/14    No Known Allergies    PAST MEDICAL & SURGICAL HISTORY:  Diabetes mellitus  Hypertension  Schizoaffective disorder  Edema  Bilateral LE  Disorder of thyroid, unspecified  Son cannot provide details. States she had &quot;thyroid surgery&quot; decades ago when she was young  No significant past surgical history    Home Medications:  aspirin 81 mg oral tablet, chewable: 1 tab(s) orally once a day (14 Dec 2021 07:53)  folic acid 1 mg oral tablet: 1 tab(s) orally once a day (14 Dec 2021 07:53)  Keflex 500 mg oral capsule: tid (14 Dec 2021 07:53)  Lexapro 5 mg oral tablet: 1 tab(s) orally once a day (14 Dec 2021 07:53)  losartan 50 mg oral tablet: 1 tab(s) orally once a day (14 Dec 2021 07:53)  RisperDAL 0.5 mg oral tablet: 1 tab(s) orally 2 times a day (14 Dec 2021 07:53)    24HRS EVENT:  12/14  Night  -flap checks stable throughout night  - CE(-) x1  - f/u repeat CE    DVT PTX: HSQ q8  GI PTX: PPI IV    ***Tubes/Lines/Drains  ***  Peripheral IV  R rad A line    REVIEW OF SYSTEMS  [x ] A ten-point review of systems was otherwise negative except as noted.  [ ] Due to altered mental status/intubation, subjective information were not able to be obtained from the patient. History was obtained, to the extent possible, from review of the chart and collateral sources of information.      Daily Height in cm: 165.1 (14 Dec 2021 08:14)    Daily     Diet, NPO:   Except Medications (12-14-21 @ 14:16)      CURRENT MEDS:  Neurologic Medications  acetaminophen     Tablet .. 650 milliGRAM(s) Oral every 4 hours PRN Temp greater or equal to 38C (100.4F), Mild Pain (1 - 3)  escitalopram 5 milliGRAM(s) Oral daily  gabapentin 100 milliGRAM(s) Oral three times a day  HYDROmorphone  Injectable 0.5 milliGRAM(s) IV Push every 4 hours PRN Severe Pain (7 - 10)  ondansetron Injectable 4 milliGRAM(s) IV Push every 8 hours  oxyCODONE    IR 5 milliGRAM(s) Oral every 6 hours PRN Moderate Pain (4 - 6)  risperiDONE   Tablet 0.5 milliGRAM(s) Oral two times a day    Respiratory Medications    Cardiovascular Medications  furosemide    Tablet 20 milliGRAM(s) Oral daily  losartan 50 milliGRAM(s) Oral daily    Gastrointestinal Medications  cyanocobalamin 1000 MICROGram(s) Oral daily  ferrous    sulfate 325 milliGRAM(s) Oral daily  folic acid 1 milliGRAM(s) Oral daily  pantoprazole  Injectable 40 milliGRAM(s) IV Push daily    Genitourinary Medications    Hematologic/Oncologic Medications  heparin   Injectable 5000 Unit(s) SubCutaneous every 8 hours  influenza  Vaccine (HIGH DOSE) 0.7 milliLiter(s) IntraMuscular once    Antimicrobial/Immunologic Medications  ceFAZolin   IVPB 1000 milliGRAM(s) IV Intermittent every 8 hours    Endocrine/Metabolic Medications  dextrose 50% Injectable 50 milliLiter(s) IV Push once    Topical/Other Medications  BACItracin   Ointment 1 Application(s) Topical every 12 hours  chlorhexidine 4% Liquid 1 Application(s) Topical <User Schedule>      ICU Vital Signs Last 24 Hrs  T(C): 36.8 (15 Dec 2021 00:00), Max: 37 (14 Dec 2021 08:14)  T(F): 98.3 (15 Dec 2021 00:00), Max: 98.3 (15 Dec 2021 00:00)  HR: 72 (15 Dec 2021 07:00) (67 - 89)  BP: 129/63 (15 Dec 2021 07:00) (114/55 - 167/71)  BP(mean): 86 (15 Dec 2021 07:00) (79 - 100)  ABP: 113/74 (15 Dec 2021 07:00) (97/61 - 165/68)  ABP(mean): 91 (15 Dec 2021 07:00) (77 - 120)  RR: 18 (14 Dec 2021 20:00) (16 - 21)  SpO2: 100% (15 Dec 2021 07:00) (96% - 100%)      Adult Advanced Hemodynamics Last 24 Hrs  CVP(mm Hg): --  CVP(cm H2O): --  CO: --  CI: --  PA: --  PA(mean): --  PCWP: --  SVR: --  SVRI: --  PVR: --  PVRI: --          I&O's Summary    14 Dec 2021 07:01  -  15 Dec 2021 07:00  --------------------------------------------------------  IN: 600 mL / OUT: 2244 mL / NET: -1644 mL      I&O's Detail    14 Dec 2021 07:01  -  15 Dec 2021 07:00  --------------------------------------------------------  IN:    IV PiggyBack: 200 mL    Lactated Ringers: 400 mL  Total IN: 600 mL    OUT:    Bulb (mL): 94 mL    Voided (mL): 2150 mL  Total OUT: 2244 mL    Total NET: -1644 mL        PHYSICAL EXAM:    General/Neuro  RASS:  0           alert & oriented x 3, no focal deficits    ENT: Incision mildly bloody, RAIMUNDO drain in place with serosanguinous fluid. Incision intact. Mild edema to left ear. Flap soft and warm with no hematoma. No buccal mucosa swelling.     Lungs: clear to auscultation, normal expansion/effort b/l on NC 3L    Cardiovascular : S1, S2.  Regular rate and rhythm.     GI: Abdomen soft, Non-tender, Non-distended.      Extremities: b/l leg edema, warm, moves all extremities    Derm: Good skin turgor, no skin breakdown.      : No escobar, voiding      CXR:  < from: Xray Chest 1 View- PORTABLE-Routine (Xray Chest 1 View- PORTABLE-Routine in AM.) (12.15.21 @ 06:25) >  Low lung volume.    No acute infiltrates.      < end of copied text >        LABS:    CAPILLARY BLOOD GLUCOSE      POCT Blood Glucose.: 106 mg/dL (15 Dec 2021 06:47)  POCT Blood Glucose.: 131 mg/dL (14 Dec 2021 23:09)                          12.0   12.50 )-----------( 298      ( 15 Dec 2021 04:15 )             40.3       12-15    140  |  106  |  25<H>  ----------------------------<  106<H>  5.6<H>   |  15<L>  |  1.4    Ca    9.2      15 Dec 2021 04:15  Phos  4.1     12-15  Mg     2.1     12-15          CARDIAC MARKERS ( 15 Dec 2021 04:15 )  x     / <0.01 ng/mL / 76 U/L / x     / 3.8 ng/mL  CARDIAC MARKERS ( 14 Dec 2021 14:17 )  x     / <0.01 ng/mL / 79 U/L / x     / 3.7 ng/mL

## 2021-12-15 NOTE — CHART NOTE - NSCHARTNOTEFT_GEN_A_CORE
SICU Transfer Note    Transfer from: SICU  Transfer to:   Accepting physician:    SHERI  81F with PMHx of HTN, DM II, B12 anemia, schizoaffective disorder, edema of B/L LE and basal cell carcinoma presents for scheduled facial composite resection, parotidectomy, and submental island flap reconstruction. Mallampati class II.  Surgical course: wide local excision basal cell carcinoma, all frozen sections with negative margins, superficial parotidectomy, partial auriculectomy, submental island flap. Patient remained HD stable during the procedure.     SICU was consulted for hemodynamic monitoring. Patient's pain has been well controlled. Incision region is intact, warm and soft with no hematoma. RAIMUNDO drain in place draining serosanguinous fluid. Patient's potassium was elevated overnight at 5.6 and was treated at that time. Follow up BMP.       ASSESSMENT & PLAN:   81y Female with PMHx of HTN, DM II, B12 anemia, schizoaffective disorder, edema of B/L LE and basal cell carcinoma presents for scheduled facial composite resection, parotidectomy, and submental island flap reconstruction.     PLAN:   Neurologic:   - A&O x 3  - Pain management with Tylenol, gabapentin, oxycodone 5mg PRN q6, dilaudid 0.5mg PRN q4  - Zofran PRN for nausea    #Schizoaffective d/o  - Risperidone, Escitalopram    ENT:  - Flap checks for softness and color q6 - adequate cap refill < 2 second, no congestion, ecchymosis  - Keep suture lines and skin clean, continue applying Bacitracin to area q12  - RAIMUNDO drain output (94 ml) - continue to monitor qshift            Respiratory:   - Satting well on NC 3L  - Incentive spirometry encouraged    Cardiovascular:   - Lasix for B/L leg edema  #HTN  - Continue home med Losartan  - Maintain normopressive    Gastrointestinal/Nutrition:   - Diet: Soft and bite sized    Renal/Genitourinary:   - Pt voiding, continue to monitor UOP  - Volume Status:  12-14-21 @ 07:01  -  12-15-21 @ 02:48  --------------------------------------------------------  IN: 450 mL / OUT: 644 mL / NET: -194 mL    Labs:          BUN/Cr - 28/1.3 --> 25/1.4          Electrolytes - Na 137 // K 5.3 // Mg 1.8 //  Phos 3.6 (12-14 @ 14:17)  - K treated with Ca gluconate, D50 & insulin, f/u BMP 11am labs    Hematologic:   - DVT prophylaxis - heparin, SCDs  - Start ASA Thurs     Labs: Hb/Hct:  11.5/38.8  --> 12/40.3                   Plts:  364  --> 298    #B12 anemia  - Cyanocobalamin   - Ferrous sulfate  - Folic acid    Infectious Disease:   - WBC- 14.46  ---> 12.50  - Temp trend- 24hrs T(F): 98.3 (12-15 @ 00:00), Max: 98.6 (12-14 @ 07:40)  - Ancef q8 - until RAIMUNDO drain removed  - influenza Vaccine administered    Lines/Tubes:  - PIV  - L radial line    Endocrine:   #DM II  - Monitor finger sticks  - glucose, serum 106    #Thyroid disorder, unknown  - F/u TSH      Vital Signs Last 24 Hrs  T(C): 36.5 (15 Dec 2021 07:17), Max: 36.8 (15 Dec 2021 00:00)  T(F): 97.7 (15 Dec 2021 07:17), Max: 98.3 (15 Dec 2021 00:00)  HR: 67 (15 Dec 2021 10:00) (65 - 89)  BP: 117/57 (15 Dec 2021 10:00) (114/55 - 167/71)  BP(mean): 80 (15 Dec 2021 10:00) (79 - 100)  RR: 18 (14 Dec 2021 20:00) (18 - 21)  SpO2: 99% (15 Dec 2021 10:00) (97% - 100%)  I&O's Summary    14 Dec 2021 07:01  -  15 Dec 2021 07:00  --------------------------------------------------------  IN: 600 mL / OUT: 2244 mL / NET: -1644 mL    15 Dec 2021 07:01  -  15 Dec 2021 11:03  --------------------------------------------------------  IN: 0 mL / OUT: 20 mL / NET: -20 mL          MEDICATIONS  (STANDING):  BACItracin   Ointment 1 Application(s) Topical every 12 hours  ceFAZolin   IVPB 1000 milliGRAM(s) IV Intermittent every 8 hours  cyanocobalamin 1000 MICROGram(s) Oral daily  escitalopram 5 milliGRAM(s) Oral daily  ferrous    sulfate 325 milliGRAM(s) Oral daily  folic acid 1 milliGRAM(s) Oral daily  furosemide    Tablet 20 milliGRAM(s) Oral daily  gabapentin 100 milliGRAM(s) Oral three times a day  heparin   Injectable 5000 Unit(s) SubCutaneous every 8 hours  losartan 50 milliGRAM(s) Oral daily  ondansetron Injectable 4 milliGRAM(s) IV Push every 8 hours  pantoprazole  Injectable 40 milliGRAM(s) IV Push daily  risperiDONE   Tablet 0.5 milliGRAM(s) Oral two times a day    MEDICATIONS  (PRN):  acetaminophen     Tablet .. 650 milliGRAM(s) Oral every 4 hours PRN Temp greater or equal to 38C (100.4F), Mild Pain (1 - 3)  HYDROmorphone  Injectable 0.5 milliGRAM(s) IV Push every 4 hours PRN Severe Pain (7 - 10)  oxyCODONE    IR 5 milliGRAM(s) Oral every 6 hours PRN Moderate Pain (4 - 6)        LABS                                            12.0                  Neurophils% (auto):   83.8   (12-15 @ 04:15):    12.50)-----------(298          Lymphocytes% (auto):  8.4                                           40.3                   Eosinphils% (auto):   0.2      Manual%: Neutrophils x    ; Lymphocytes x    ; Eosinophils x    ; Bands%: x    ; Blasts x                                    140    |  106    |  25                  Calcium: 9.2   / iCa: x      (12-15 @ 04:15)    ----------------------------<  106       Magnesium: 2.1                              5.6     |  15     |  1.4              Phosphorous: 4.1 SICU Transfer Note    Transfer from: SICU  Transfer to:   Accepting physician:    SHERI  81F with PMHx of HTN, DM II, B12 anemia, schizoaffective disorder, edema of B/L LE and basal cell carcinoma presents for scheduled facial composite resection, parotidectomy, and submental island flap reconstruction. Mallampati class II.  Surgical course: wide local excision basal cell carcinoma, all frozen sections with negative margins, superficial parotidectomy, partial auriculectomy, submental island flap. Patient remained HD stable during the procedure.     SICU was consulted for hemodynamic monitoring. Patient has been HD stable, pain well controlled. Incision region is intact, warm and soft with no hematoma. RAIMUNDO drain in place draining serosanguinous fluid. Patient's potassium was elevated overnight at 5.6 with no EKG changes and was treated at that time. Follow up 11am BMP.       ASSESSMENT & PLAN:   81y Female with PMHx of HTN, DM II, B12 anemia, schizoaffective disorder, edema of B/L LE and basal cell carcinoma presents for scheduled facial composite resection, parotidectomy, and submental island flap reconstruction.     PLAN:   Neurologic:   - A&O x 3  - Pain management with Tylenol, gabapentin, oxycodone 5mg PRN q6, dilaudid 0.5mg PRN q4  - Zofran PRN for nausea    #Schizoaffective d/o  - Risperidone, Escitalopram    ENT:  - Flap checks for softness and color q6 - adequate cap refill < 2 second, no congestion, ecchymosis  - Keep suture lines and skin clean, continue applying Bacitracin to area q12  - RAIMUNDO drain output (94 ml) - continue to monitor qshift            Respiratory:   - Satting well on NC 3L  - Incentive spirometry encouraged    Cardiovascular:   - Lasix for B/L leg edema  #HTN  - Continue home med Losartan  - Maintain normopressive    Gastrointestinal/Nutrition:   - Diet: Soft and bite sized    Renal/Genitourinary:   - Pt voiding, continue to monitor UOP  - Volume Status:  12-14-21 @ 07:01  -  12-15-21 @ 02:48  --------------------------------------------------------  IN: 450 mL / OUT: 644 mL / NET: -194 mL    Labs:          BUN/Cr - 28/1.3 --> 25/1.4          Electrolytes - Na 137 // K 5.3 // Mg 1.8 //  Phos 3.6 (12-14 @ 14:17)  - K treated with Ca gluconate, D50 & insulin, f/u BMP 11am labs    Hematologic:   - DVT prophylaxis - heparin, SCDs  - Start ASA Thurs     Labs: Hb/Hct:  11.5/38.8  --> 12/40.3                   Plts:  364  --> 298    #B12 anemia  - Cyanocobalamin   - Ferrous sulfate  - Folic acid    Infectious Disease:   - WBC- 14.46  ---> 12.50  - Temp trend- 24hrs T(F): 98.3 (12-15 @ 00:00), Max: 98.6 (12-14 @ 07:40)  - Ancef q8 - until RIAMUNDO drain removed  - influenza Vaccine administered    Lines/Tubes:  - PIV  - L radial line    Endocrine:   #DM II  - Monitor finger sticks  - glucose, serum 106    #Thyroid disorder, unknown  - F/u TSH      Vital Signs Last 24 Hrs  T(C): 36.5 (15 Dec 2021 07:17), Max: 36.8 (15 Dec 2021 00:00)  T(F): 97.7 (15 Dec 2021 07:17), Max: 98.3 (15 Dec 2021 00:00)  HR: 67 (15 Dec 2021 10:00) (65 - 89)  BP: 117/57 (15 Dec 2021 10:00) (114/55 - 167/71)  BP(mean): 80 (15 Dec 2021 10:00) (79 - 100)  RR: 18 (14 Dec 2021 20:00) (18 - 21)  SpO2: 99% (15 Dec 2021 10:00) (97% - 100%)  I&O's Summary    14 Dec 2021 07:01  -  15 Dec 2021 07:00  --------------------------------------------------------  IN: 600 mL / OUT: 2244 mL / NET: -1644 mL    15 Dec 2021 07:01  -  15 Dec 2021 11:03  --------------------------------------------------------  IN: 0 mL / OUT: 20 mL / NET: -20 mL          MEDICATIONS  (STANDING):  BACItracin   Ointment 1 Application(s) Topical every 12 hours  ceFAZolin   IVPB 1000 milliGRAM(s) IV Intermittent every 8 hours  cyanocobalamin 1000 MICROGram(s) Oral daily  escitalopram 5 milliGRAM(s) Oral daily  ferrous    sulfate 325 milliGRAM(s) Oral daily  folic acid 1 milliGRAM(s) Oral daily  furosemide    Tablet 20 milliGRAM(s) Oral daily  gabapentin 100 milliGRAM(s) Oral three times a day  heparin   Injectable 5000 Unit(s) SubCutaneous every 8 hours  losartan 50 milliGRAM(s) Oral daily  ondansetron Injectable 4 milliGRAM(s) IV Push every 8 hours  pantoprazole  Injectable 40 milliGRAM(s) IV Push daily  risperiDONE   Tablet 0.5 milliGRAM(s) Oral two times a day    MEDICATIONS  (PRN):  acetaminophen     Tablet .. 650 milliGRAM(s) Oral every 4 hours PRN Temp greater or equal to 38C (100.4F), Mild Pain (1 - 3)  HYDROmorphone  Injectable 0.5 milliGRAM(s) IV Push every 4 hours PRN Severe Pain (7 - 10)  oxyCODONE    IR 5 milliGRAM(s) Oral every 6 hours PRN Moderate Pain (4 - 6)        LABS                                            12.0                  Neurophils% (auto):   83.8   (12-15 @ 04:15):    12.50)-----------(298          Lymphocytes% (auto):  8.4                                           40.3                   Eosinphils% (auto):   0.2      Manual%: Neutrophils x    ; Lymphocytes x    ; Eosinophils x    ; Bands%: x    ; Blasts x                                    140    |  106    |  25                  Calcium: 9.2   / iCa: x      (12-15 @ 04:15)    ----------------------------<  106       Magnesium: 2.1                              5.6     |  15     |  1.4              Phosphorous: 4.1 SICU Transfer Note    Transfer from: SICU  Transfer to:   Accepting physician:    SHERI  81F with PMHx of HTN, DM II, B12 anemia, schizoaffective disorder, edema of B/L LE and basal cell carcinoma presents for scheduled facial composite resection, parotidectomy, and submental island flap reconstruction. Mallampati class II.  Surgical course: wide local excision basal cell carcinoma, all frozen sections with negative margins, superficial parotidectomy, partial auriculectomy, submental island flap. Patient remained HD stable during the procedure.     SICU was consulted for hemodynamic monitoring. Patient has been HD stable, pain well controlled. Incision region is intact, warm and soft with no hematoma. RAIMUNDO drain in place draining serosanguinous fluid. Patient's potassium was elevated overnight at 5.6 with no EKG changes and was treated at that time. Follow up 11am BMP.       ASSESSMENT & PLAN:   81y Female with PMHx of HTN, DM II, B12 anemia, schizoaffective disorder, edema of B/L LE and basal cell carcinoma presents for scheduled facial composite resection, parotidectomy, and submental island flap reconstruction.     PLAN:   Neurologic:   - A&O x 3  - Pain management with Tylenol, gabapentin, oxycodone 5mg PRN q6, Dilaudid 0.5mg PRN q4  - Zofran PRN for nausea    #Schizoaffective d/o  - Risperidone, Escitalopram    ENT:  - Flap checks for softness and color q6 - adequate cap refill < 2 second, no congestion, ecchymosis  - Keep suture lines and skin clean, continue applying Bacitracin to area q12  - RAIMUNDO drain output (94 ml) - continue to monitor qshift            Respiratory:   - Satting well on NC 3L  - Incentive spirometry encouraged    Cardiovascular:   - Lasix for B/L leg edema    #HTN  - Continue home med Losartan  - Maintain normopressive    Gastrointestinal/Nutrition:   - Diet: Soft and bite sized    Renal/Genitourinary:   - Pt voiding, continue to monitor UOP  - Volume Status:  12-14-21 @ 07:01  -  12-15-21 @ 02:48  --------------------------------------------------------  IN: 450 mL / OUT: 644 mL / NET: -194 mL    Labs:          BUN/Cr - 28/1.3 --> 25/1.4          Electrolytes - Na 137 // K 5.3 // Mg 1.8 //  Phos 3.6 (12-14 @ 14:17)  - K treated with Ca gluconate, D50 & insulin, f/u BMP 11am labs    Hematologic:   - DVT prophylaxis - heparin, SCDs  - Start ASA Thurs     Labs: Hb/Hct:  11.5/38.8  --> 12/40.3                   Plts:  364  --> 298    #B12 anemia  - Cyanocobalamin   - Ferrous sulfate  - Folic acid    Infectious Disease:   - WBC- 14.46  ---> 12.50  - Temp trend- 24hrs T(F): 98.3 (12-15 @ 00:00), Max: 98.6 (12-14 @ 07:40)  - Ancef q8 - until RAIMUNDO drain removed  - influenza Vaccine administered    Lines/Tubes:  - PIV  - L radial line    Endocrine:   #DM II  - Monitor finger sticks  - glucose, serum 106    #Thyroid disorder, unknown  - TSH 0.51      Vital Signs Last 24 Hrs  T(C): 36.5 (15 Dec 2021 07:17), Max: 36.8 (15 Dec 2021 00:00)  T(F): 97.7 (15 Dec 2021 07:17), Max: 98.3 (15 Dec 2021 00:00)  HR: 67 (15 Dec 2021 10:00) (65 - 89)  BP: 117/57 (15 Dec 2021 10:00) (114/55 - 167/71)  BP(mean): 80 (15 Dec 2021 10:00) (79 - 100)  RR: 18 (14 Dec 2021 20:00) (18 - 21)  SpO2: 99% (15 Dec 2021 10:00) (97% - 100%)  I&O's Summary    14 Dec 2021 07:01  -  15 Dec 2021 07:00  --------------------------------------------------------  IN: 600 mL / OUT: 2244 mL / NET: -1644 mL    15 Dec 2021 07:01  -  15 Dec 2021 11:03  --------------------------------------------------------  IN: 0 mL / OUT: 20 mL / NET: -20 mL          MEDICATIONS  (STANDING):  BACItracin   Ointment 1 Application(s) Topical every 12 hours  ceFAZolin   IVPB 1000 milliGRAM(s) IV Intermittent every 8 hours  cyanocobalamin 1000 MICROGram(s) Oral daily  escitalopram 5 milliGRAM(s) Oral daily  ferrous    sulfate 325 milliGRAM(s) Oral daily  folic acid 1 milliGRAM(s) Oral daily  furosemide    Tablet 20 milliGRAM(s) Oral daily  gabapentin 100 milliGRAM(s) Oral three times a day  heparin   Injectable 5000 Unit(s) SubCutaneous every 8 hours  losartan 50 milliGRAM(s) Oral daily  ondansetron Injectable 4 milliGRAM(s) IV Push every 8 hours  pantoprazole  Injectable 40 milliGRAM(s) IV Push daily  risperiDONE   Tablet 0.5 milliGRAM(s) Oral two times a day    MEDICATIONS  (PRN):  acetaminophen     Tablet .. 650 milliGRAM(s) Oral every 4 hours PRN Temp greater or equal to 38C (100.4F), Mild Pain (1 - 3)  HYDROmorphone  Injectable 0.5 milliGRAM(s) IV Push every 4 hours PRN Severe Pain (7 - 10)  oxyCODONE    IR 5 milliGRAM(s) Oral every 6 hours PRN Moderate Pain (4 - 6)        LABS                                            12.0                  Neurophils% (auto):   83.8   (12-15 @ 04:15):    12.50)-----------(298          Lymphocytes% (auto):  8.4                                           40.3                   Eosinphils% (auto):   0.2      Manual%: Neutrophils x    ; Lymphocytes x    ; Eosinophils x    ; Bands%: x    ; Blasts x                                    140    |  106    |  25                  Calcium: 9.2   / iCa: x      (12-15 @ 04:15)    ----------------------------<  106       Magnesium: 2.1                              5.6     |  15     |  1.4              Phosphorous: 4.1 SICU Transfer Note    Transfer from: SICU  Transfer to: 4C/4B  Accepting physician: Dr. Lopez, s/o to    (12/16 12:)    HPI  81F with PMHx of HTN, DM II, B12 anemia, schizoaffective disorder, edema of B/L LE and basal cell carcinoma presents for scheduled facial composite resection, parotidectomy, and submental island flap reconstruction. Mallampati class II. Surgical course: wide local excision basal cell carcinoma, all frozen sections with negative margins, superficial parotidectomy, partial auriculectomy, submental island flap. Patient remained HD stable during the procedure.     SICU was consulted for hemodynamic monitoring. Patient has been HD stable, pain well controlled. Incision region is intact, warm and soft with no hematoma. RAIMUNDO drain in place draining serosanguinous fluid. Escobar placed overnight for no UOP >7 hrs, bladder scan >300cc. 400 cc out s/p escobar insertion. Now producing 30-50cc/hr. Patient's potassium was elevated and treated yesterday/overnight with no EKG changes. On follow up VBG, potassium is 4.9 (12/16 9:24). SICU monitoring no longer needed.       ASSESSMENT & PLAN:   81y Female with PMHx of HTN, DM II, B12 anemia, schizoaffective disorder, edema of B/L LE and basal cell carcinoma presents for scheduled facial composite resection, parotidectomy, and submental island flap reconstruction.     PLAN:   Neurologic:   - A&O x 3  - Pain management with Tylenol, oxycodone , Dilaudid   - Zofran PRN for nausea    #Schizoaffective d/o  - Continue home meds: Risperidone, Escitalopram    ENT:  - Flap checks for softness and color q6 - incision clean, warm, soft and intact with adequate cap refill < 2 second, no congestion, ecchymosis, or hematoma  - Keep suture lines and skin clean, continue applying Bacitracin to area q12  - RAIMUNDO drain output (50 ml) - continue to monitor qshift            Respiratory:   - Satting well on NC 3L  - Incentive spirometry encouraged    Cardiovascular:   - Lasix for B/L leg edema - being held for BRYAN    #HTN  - Home med Losartan - being held for BRYAN  - Maintain normopressive    Gastrointestinal/Nutrition:   - Diet: Mechanical soft    Renal/Genitourinary:   - Escobar placed overnight, 30-50 cc / hour - continue to monitor UOP   - F/u BMP, VBG  - Volume Status:    16 Dec 2021 07:01  -  16 Dec 2021 11:20  --------------------------------------------------------  IN: 0 mL / OUT: 120 mL / NET: -120 mL    Labs:          BUN/Cr - 36/2.2 - FeNa calculated --> intrinsic BRYAN          Electrolytes - Na 135 // K 4.9 // Mg 1.8 //  Phos 3.3    Hematologic:   - DVT prophylaxis - heparin, SCDs     Labs: Hb/Hct:  10.5/35.9             Plts:  351    #B12 anemia  - Cyanocobalamin   - Ferrous sulfate  - Folic acid    Infectious Disease:   - WBC- 12.50 --> 9.84  - Afebrile  - Ancef q8 - until RAIMUNDO drain removed    Lines/Tubes:  - L cephalic midline  - L radial line    Endocrine:   #DM II  - Monitor finger sticks  - glucose, serum 106    #Thyroid disorder, unknown  - TSH 0.51      ICU Vital Signs Last 24 Hrs  T(C): 36.3 (16 Dec 2021 07:52), Max: 37.6 (15 Dec 2021 15:50)  T(F): 97.3 (16 Dec 2021 07:52), Max: 99.7 (15 Dec 2021 15:50)  HR: 70 (16 Dec 2021 08:00) (57 - 100)  BP: 122/60 (16 Dec 2021 08:00) (79/44 - 154/71)  BP(mean): 86 (16 Dec 2021 08:00) (58 - 102)  ABP: 102/65 (15 Dec 2021 12:00) (102/65 - 102/65)  ABP(mean): 82 (15 Dec 2021 12:00) (82 - 82)  RR: 22 (16 Dec 2021 08:00) (11 - 22)  SpO2: 97% (16 Dec 2021 08:00) (94% - 100%)    I&O's Summary    15 Dec 2021 07:01  -  16 Dec 2021 07:00  --------------------------------------------------------  IN: 0 mL / OUT: 2500 mL / NET: -2500 mL    16 Dec 2021 07:01  -  16 Dec 2021 11:20  --------------------------------------------------------  IN: 0 mL / OUT: 120 mL / NET: -120 mL          MEDICATIONS  (STANDING):  BACItracin   Ointment 1 Application(s) Topical every 12 hours  ceFAZolin   IVPB 1000 milliGRAM(s) IV Intermittent every 8 hours  cyanocobalamin 1000 MICROGram(s) Oral daily  escitalopram 5 milliGRAM(s) Oral daily  ferrous    sulfate 325 milliGRAM(s) Oral daily  folic acid 1 milliGRAM(s) Oral daily  furosemide    Tablet 20 milliGRAM(s) Oral daily  gabapentin 100 milliGRAM(s) Oral three times a day  heparin   Injectable 5000 Unit(s) SubCutaneous every 8 hours  losartan 50 milliGRAM(s) Oral daily  ondansetron Injectable 4 milliGRAM(s) IV Push every 8 hours  pantoprazole  Injectable 40 milliGRAM(s) IV Push daily  risperiDONE   Tablet 0.5 milliGRAM(s) Oral two times a day    MEDICATIONS  (PRN):  acetaminophen     Tablet .. 650 milliGRAM(s) Oral every 4 hours PRN Temp greater or equal to 38C (100.4F), Mild Pain (1 - 3)  HYDROmorphone  Injectable 0.5 milliGRAM(s) IV Push every 4 hours PRN Severe Pain (7 - 10)  oxyCODONE    IR 5 milliGRAM(s) Oral every 6 hours PRN Moderate Pain (4 - 6)      LABS:    POCT Blood Glucose.: 200 mg/dL (16 Dec 2021 10:49)                          10.5   9.84  )-----------( 351      ( 15 Dec 2021 23:03 )             35.9       12-16    140  |  105  |  36<H>  ----------------------------<  95  5.6<H>   |  22  |  2.2<H>    Ca    9.0      16 Dec 2021 05:30  Phos  3.3     12-15  Mg     1.8     12-15      CARDIAC MARKERS ( 15 Dec 2021 04:15 )  x     / <0.01 ng/mL / 76 U/L / x     / 3.8 ng/mL  CARDIAC MARKERS ( 14 Dec 2021 14:17 )  x     / <0.01 ng/mL / 79 U/L / x     / 3.7 ng/mL SICU Transfer Note    Transfer from: SICU  Transfer to: 4C/4B  Accepting physician: Dr. Lopez, s/o to [place] (12/16 12:30)    HPI  81F with PMHx of HTN, DM II, B12 anemia, schizoaffective disorder, edema of B/L LE and basal cell carcinoma presents for scheduled facial composite resection, parotidectomy, and submental island flap reconstruction. Mallampati class II. Surgical course: wide local excision basal cell carcinoma, all frozen sections with negative margins, superficial parotidectomy, partial auriculectomy, submental island flap. Patient remained HD stable during the procedure.     SICU was consulted for hemodynamic monitoring. Patient has been HD stable, pain well controlled. Incision region is intact, warm and soft with no hematoma. RAIMUNDO drain in place draining serosanguinous fluid. Escobar placed overnight for no UOP >7 hrs, bladder scan >300cc. 400 cc out s/p escobar insertion. Now producing 30-50cc/hr. Patient's potassium was elevated and treated yesterday/overnight with no EKG changes. On follow up VBG, potassium is 4.9 (12/16 9:24). SICU monitoring no longer needed.       ASSESSMENT & PLAN:   81y Female with PMHx of HTN, DM II, B12 anemia, schizoaffective disorder, edema of B/L LE and basal cell carcinoma presents for scheduled facial composite resection, parotidectomy, and submental island flap reconstruction.     PLAN:   Neurologic:   - A&O x 3  - Pain management with Tylenol, oxycodone, Dilaudid   - Zofran PRN for nausea    #Schizoaffective d/o  - Continue home meds: Risperidone, Escitalopram    ENT:  - Flap checks for softness and color q6 - incision clean, warm, soft and intact with adequate cap refill < 2 second, no congestion, ecchymosis, or hematoma  - Keep suture lines and skin clean, continue applying Bacitracin to area q12  - RAIMUNDO drain output (50 ml) - continue to monitor qshift            Respiratory:   - Satting well on NC 3L  - Incentive spirometry encouraged    Cardiovascular:   - Lasix for B/L leg edema - being held for BRYAN    #HTN  - Home med Losartan - being held for BRYAN  - Maintain normopressive    Gastrointestinal/Nutrition:   - Diet: Mechanical soft    Renal/Genitourinary:   - Escobar placed overnight, 30-50 cc / hour - continue to monitor UOP   - F/u BMP, VBG  - Volume Status:    16 Dec 2021 07:01  -  16 Dec 2021 11:20  --------------------------------------------------------  IN: 0 mL / OUT: 120 mL / NET: -120 mL    Labs:          BUN/Cr - 36/2.2 - FeNa calculated --> intrinsic BRYAN          Electrolytes - Na 135 // K 4.9 // Mg 1.8 //  Phos 3.3    Hematologic:   - DVT prophylaxis - heparin, SCDs     Labs: Hb/Hct:  10.5/35.9             Plts:  351    #B12 anemia  - Cyanocobalamin   - Ferrous sulfate  - Folic acid    Infectious Disease:   - WBC- 12.50 --> 9.84  - Afebrile  - Ancef q8 - until RAIMUNDO drain removed    Lines/Tubes:  - L cephalic midline  - L radial line    Endocrine:   #DM II  - Monitor finger sticks  - glucose, serum 106    #Thyroid disorder, unknown  - TSH 0.51      ICU Vital Signs Last 24 Hrs  T(C): 36.3 (16 Dec 2021 07:52), Max: 37.6 (15 Dec 2021 15:50)  T(F): 97.3 (16 Dec 2021 07:52), Max: 99.7 (15 Dec 2021 15:50)  HR: 70 (16 Dec 2021 08:00) (57 - 100)  BP: 122/60 (16 Dec 2021 08:00) (79/44 - 154/71)  BP(mean): 86 (16 Dec 2021 08:00) (58 - 102)  ABP: 102/65 (15 Dec 2021 12:00) (102/65 - 102/65)  ABP(mean): 82 (15 Dec 2021 12:00) (82 - 82)  RR: 22 (16 Dec 2021 08:00) (11 - 22)  SpO2: 97% (16 Dec 2021 08:00) (94% - 100%)    I&O's Summary    15 Dec 2021 07:01  -  16 Dec 2021 07:00  --------------------------------------------------------  IN: 0 mL / OUT: 2500 mL / NET: -2500 mL    16 Dec 2021 07:01  -  16 Dec 2021 11:20  --------------------------------------------------------  IN: 0 mL / OUT: 120 mL / NET: -120 mL          MEDICATIONS  (STANDING):  BACItracin   Ointment 1 Application(s) Topical every 12 hours  ceFAZolin   IVPB 1000 milliGRAM(s) IV Intermittent every 8 hours  cyanocobalamin 1000 MICROGram(s) Oral daily  escitalopram 5 milliGRAM(s) Oral daily  ferrous    sulfate 325 milliGRAM(s) Oral daily  folic acid 1 milliGRAM(s) Oral daily  furosemide    Tablet 20 milliGRAM(s) Oral daily  gabapentin 100 milliGRAM(s) Oral three times a day  heparin   Injectable 5000 Unit(s) SubCutaneous every 8 hours  losartan 50 milliGRAM(s) Oral daily  ondansetron Injectable 4 milliGRAM(s) IV Push every 8 hours  pantoprazole  Injectable 40 milliGRAM(s) IV Push daily  risperiDONE   Tablet 0.5 milliGRAM(s) Oral two times a day    MEDICATIONS  (PRN):  acetaminophen     Tablet .. 650 milliGRAM(s) Oral every 4 hours PRN Temp greater or equal to 38C (100.4F), Mild Pain (1 - 3)  HYDROmorphone  Injectable 0.5 milliGRAM(s) IV Push every 4 hours PRN Severe Pain (7 - 10)  oxyCODONE    IR 5 milliGRAM(s) Oral every 6 hours PRN Moderate Pain (4 - 6)      LABS:    POCT Blood Glucose.: 200 mg/dL (16 Dec 2021 10:49)                          10.5   9.84  )-----------( 351      ( 15 Dec 2021 23:03 )             35.9       12-16    140  |  105  |  36<H>  ----------------------------<  95  5.6<H>   |  22  |  2.2<H>    Ca    9.0      16 Dec 2021 05:30  Phos  3.3     12-15  Mg     1.8     12-15      CARDIAC MARKERS ( 15 Dec 2021 04:15 )  x     / <0.01 ng/mL / 76 U/L / x     / 3.8 ng/mL  CARDIAC MARKERS ( 14 Dec 2021 14:17 )  x     / <0.01 ng/mL / 79 U/L / x     / 3.7 ng/mL

## 2021-12-15 NOTE — PROGRESS NOTE ADULT - SUBJECTIVE AND OBJECTIVE BOX
ENT PROGRESS NOTE:    Pt is a 81y Female s/p WLE of skin lesion (lobe/pre-auricular area), superficial parotidectomy, partial auriculotomy, meatoplasty with submental flap, POD #1. Patient seen and examined at bedside this morning with Dr. Rai. Pt lying in bed comfortably, offering no complaints. Pt admits she is hungry and wishes to eat breakfast this morning. As per covering RN at bedside, flap checks q 4hours have been soft, warm, and with good color. Pt denies fever, chills, N/V, abdominal pain, SOB, or chest pain.     REVIEW OF SYSTEMS   [x] A ten-point review of systems was otherwise negative except as noted      MEDICATIONS  (STANDING):  BACItracin   Ointment 1 Application(s) Topical every 12 hours  ceFAZolin   IVPB 1000 milliGRAM(s) IV Intermittent every 8 hours  chlorhexidine 4% Liquid 1 Application(s) Topical <User Schedule>  cyanocobalamin 1000 MICROGram(s) Oral daily  escitalopram 5 milliGRAM(s) Oral daily  ferrous    sulfate 325 milliGRAM(s) Oral daily  folic acid 1 milliGRAM(s) Oral daily  furosemide    Tablet 20 milliGRAM(s) Oral daily  gabapentin 100 milliGRAM(s) Oral three times a day  heparin   Injectable 5000 Unit(s) SubCutaneous every 8 hours  influenza  Vaccine (HIGH DOSE) 0.7 milliLiter(s) IntraMuscular once  losartan 50 milliGRAM(s) Oral daily  ondansetron Injectable 4 milliGRAM(s) IV Push every 8 hours  pantoprazole  Injectable 40 milliGRAM(s) IV Push daily  risperiDONE   Tablet 0.5 milliGRAM(s) Oral two times a day    MEDICATIONS  (PRN):  acetaminophen     Tablet .. 650 milliGRAM(s) Oral every 4 hours PRN Temp greater or equal to 38C (100.4F), Mild Pain (1 - 3)  HYDROmorphone  Injectable 0.5 milliGRAM(s) IV Push every 4 hours PRN Severe Pain (7 - 10)  oxyCODONE    IR 5 milliGRAM(s) Oral every 6 hours PRN Moderate Pain (4 - 6)    Allergies  No Known Allergies        Vital Signs Last 24 Hrs  T(C): 36.5 (15 Dec 2021 07:17), Max: 36.8 (15 Dec 2021 00:00)  T(F): 97.7 (15 Dec 2021 07:17), Max: 98.3 (15 Dec 2021 00:00)  HR: 72 (15 Dec 2021 07:00) (68 - 89)  BP: 129/63 (15 Dec 2021 07:00) (114/55 - 167/71)  BP(mean): 86 (15 Dec 2021 07:00) (79 - 100)  RR: 18 (14 Dec 2021 20:00) (18 - 21)  SpO2: 100% (15 Dec 2021 07:00) (97% - 100%)    PHYSICAL EXAM  Gen: NAD, awake & alert. No drooling or pooling of secretions. No respiratory distress. No stridor or stertor. good vocal quality   Skin: good color, Non diaphoretic  HEENT: + left sided facial incision C/D/I, mild dependent edema to the left pre-auricular area, no ecchymosis. + skin island intact, soft, warm, good color. no TTP over incisions. Facial nerve grossly intact. Oral mucosa moist/pink, uvula midline, posterior oropharynx without edema or erythema  + left RAIMUNDO in place 30cc/94cc serosanguinous fluid   Neck: Trachea midline. Neck supple, no TTP to posterior or B/L lateral neck, no cervical LAD.  Resp: Breathing easily, no accessory muscle use, on 3L NC SpO2 99%   GI: Soft, nontender    Musculoskeletal: moving all extremities x 4        LABS:                        12.0   12.50 )-----------( 298      ( 15 Dec 2021 04:15 )             40.3    12-15    140  |  106  |  25<H>  ----------------------------<  106<H>  5.6<H>   |  15<L>  |  1.4    Ca    9.2      15 Dec 2021 04:15  Phos  4.1     12-15  Mg     2.1     12-15    I&O's Detail    14 Dec 2021 07:01  -  15 Dec 2021 07:00  --------------------------------------------------------  IN:    IV PiggyBack: 200 mL    Lactated Ringers: 400 mL  Total IN: 600 mL    OUT:    Bulb (mL): 94 mL    Voided (mL): 2150 mL  Total OUT: 2244 mL    Total NET: -1644 mL       IMAGING/ADDITIONAL STUDIES:   < from: Xray Chest 1 View- PORTABLE-Routine (Xray Chest 1 View- PORTABLE-Routine in AM.) (12.15.21 @ 06:25) >    ACC: [id number] EXAM:  XR CHEST PORTABLE ROUTINE 1V                          PROCEDURE DATE:  12/15/2021          INTERPRETATION:  Clinical History / Reason for exam: Follow-up.    Comparison : Chest radiograph December 1, 2021.    Technique/Positioning: Low lung volume.    Findings:    Support devices: None.    Cardiac/mediastinum/hilum: Magnified.    Lung parenchyma/Pleura: Within normal limits.    Skeleton/soft tissues: Stable    Impression:    Low lung volume.    No acute infiltrates.    --- End of Report ---            DAE GONZALEZ MD; Attending Radiologist  This document has been electronically signed. Dec 15 2021  6:12AM    < end of copied text >

## 2021-12-15 NOTE — PROGRESS NOTE ADULT - ASSESSMENT
ASSESSMENT:  81y Female with PMHx of HTN, DM II, B12 anemia, schizoaffective disorder, edema of B/L LE and basal cell carcinoma presents for scheduled facial composite resection, parotidectomy, and submental island flap reconstruction.     PLAN:   Neurologic:   - A&O x 3  - Pain management with Tylenol, gabapentin, oxycodone 5mg PRN q6, dilaudid 0.5mg PRN q4  - Zofran PRN for nausea    #Schizoaffective d/o  - Risperidone, Escitalopram    ENT:  - Flap checks for softness and color q4  - adequate cap refill < 2 second, no congestion, ecchymosis  - Bacitracin to area q12  - Monitor RAIMUNDO drain output    Respiratory:   - Satting well on NC 3L  - AM CXR  - Incentive spirometry encouraged    Cardiovascular:   - Lasix for B/L leg edema  - Monitor pulse ox and wean to RA as tolerated    #HTN  - Losartan  - Maintain normopressive    Gastrointestinal/Nutrition:   - Diet: NPO except meds    Renal/Genitourinary:   Monitor UO-escobar in place  Volume Status:  12-14-21 @ 07:01  -  12-15-21 @ 02:48  --------------------------------------------------------  IN: 450 mL / OUT: 644 mL / NET: -194 mL    Labs:          BUN/Cr- 28/1.3  -->          Electrolytes-Na 137 // K 5.3 // Mg 1.8 //  Phos 3.6 (12-14 @ 14:17)    Hematologic:     DVT prophylaxis-heparin   Injectable  , SCDs    Labs: Hb/Hct:  11.5/38.8  -->                      Plts:  364  -->       #B12 anemia  - Cyanocobalamin   - Ferrous sulfate  - Folic acid    Infectious Disease:   WBC- 14.46  --->>  Temp trend- 24hrs T(F): 98.3 (12-15 @ 00:00), Max: 98.6 (12-14 @ 07:40)  Antibiotics-ceFAZolin   IVPB 1000 every 8 hours  influenza  Vaccine (HIGH DOSE) 0.7 once    Lines/Tubes:  - PIV  - L radial line    Endocrine:   #DM II  - Monitor finger sticks  - glucose, serum 119    #Thyroid disorder, unknown  - F/u TSH    Disposition: SICU ASSESSMENT:  81y Female with PMHx of HTN, DM II, B12 anemia, schizoaffective disorder, edema of B/L LE and basal cell carcinoma presents for scheduled facial composite resection, parotidectomy, and submental island flap reconstruction.     PLAN:   Neurologic:   - A&O x 3  - Pain management with Tylenol, gabapentin, oxycodone 5mg PRN q6, dilaudid 0.5mg PRN q4  - Zofran PRN for nausea    #Schizoaffective d/o  - Risperidone, Escitalopram    ENT:  - Flap checks for softness and color q4  - adequate cap refill < 2 second, no congestion, ecchymosis  - Bacitracin to area q12  - Monitor RAIMUNDO drain output    Respiratory:   - Satting well on NC 3L  - AM CXR  - Incentive spirometry encouraged    Cardiovascular:   - Lasix for B/L leg edema  - Monitor pulse ox and wean to RA as tolerated    #HTN  - Losartan  - Maintain normopressive    Gastrointestinal/Nutrition:   - Diet: NPO except meds    Renal/Genitourinary:   Monitor UO  Volume Status:  12-14-21 @ 07:01  -  12-15-21 @ 02:48  --------------------------------------------------------  IN: 450 mL / OUT: 644 mL / NET: -194 mL    Labs:          BUN/Cr- 28/1.3  -->          Electrolytes-Na 137 // K 5.3 // Mg 1.8 //  Phos 3.6 (12-14 @ 14:17)  - K treated, F/u BMP    Hematologic:     DVT prophylaxis-heparin   Injectable  , SCDs    Labs: Hb/Hct:  11.5/38.8  -->                      Plts:  364  -->       #B12 anemia  - Cyanocobalamin   - Ferrous sulfate  - Folic acid    Infectious Disease:   WBC- 14.46  --->>  Temp trend- 24hrs T(F): 98.3 (12-15 @ 00:00), Max: 98.6 (12-14 @ 07:40)  Antibiotics-ceFAZolin   IVPB 1000 every 8 hours  influenza  Vaccine (HIGH DOSE) 0.7 once    Lines/Tubes:  - PIV  - L radial line    Endocrine:   #DM II  - Monitor finger sticks  - glucose, serum 119    #Thyroid disorder, unknown  - F/u TSH    Disposition: SICU ASSESSMENT:  81y Female with PMHx of HTN, DM II, B12 anemia, schizoaffective disorder, edema of B/L LE and basal cell carcinoma presents for scheduled facial composite resection, parotidectomy, and submental island flap reconstruction.     PLAN:   Neurologic:   - A&O x 3  - Pain management with Tylenol, gabapentin, oxycodone 5mg PRN q6, dilaudid 0.5mg PRN q4  - Zofran PRN for nausea    #Schizoaffective d/o  - Risperidone, Escitalopram    ENT:  - Flap checks for softness and color q6- adequate cap refill < 2 second, no congestion, ecchymosis  - Bacitracin to area q12  - RAIMUNDO drain output (94 ml) - continue to monitor            Respiratory:   - Satting well on NC 3L  - Incentive spirometry encouraged    Cardiovascular:   - Lasix for B/L leg edema  - Monitor pulse ox and wean to RA as tolerated    #HTN  - Losartan  - Maintain normopressive    Gastrointestinal/Nutrition:   - Diet: Softs    Renal/Genitourinary:   Monitor UO  Volume Status:  12-14-21 @ 07:01  -  12-15-21 @ 02:48  --------------------------------------------------------  IN: 450 mL / OUT: 644 mL / NET: -194 mL    Labs:          BUN/Cr- 28/1.3  --> 25/1.4          Electrolytes-Na 137 // K 5.3 // Mg 1.8 //  Phos 3.6 (12-14 @ 14:17)  - K treated, F/u BMP 11am labs    Hematologic:   - DVT prophylaxis-heparin   Injectable  - SCDs  - Start ASA Thurs    Labs: Hb/Hct:  11.5/38.8  -->  12/40.3                    Plts:  364  -->   298    #B12 anemia  - Cyanocobalamin   - Ferrous sulfate  - Folic acid    Infectious Disease:   WBC- 14.46  --->> 12.50  Temp trend- 24hrs T(F): 98.3 (12-15 @ 00:00), Max: 98.6 (12-14 @ 07:40)  Antibiotics-ceFAZolin   IVPB 1000 every 8 hours - until RAIMUNDO drain removed  influenza  Vaccine (HIGH DOSE) 0.7 once    Lines/Tubes:  - PIV  - L radial line    Endocrine:   #DM II  - Monitor finger sticks  - glucose, serum 106    #Thyroid disorder, unknown  - F/u TSH    - D/C arterial line  - Plan for downgrade

## 2021-12-16 LAB
ANION GAP SERPL CALC-SCNC: 13 MMOL/L — SIGNIFICANT CHANGE UP (ref 7–14)
ANION GAP SERPL CALC-SCNC: 14 MMOL/L — SIGNIFICANT CHANGE UP (ref 7–14)
ANION GAP SERPL CALC-SCNC: 15 MMOL/L — HIGH (ref 7–14)
ANION GAP SERPL CALC-SCNC: 18 MMOL/L — HIGH (ref 7–14)
BUN SERPL-MCNC: 36 MG/DL — HIGH (ref 10–20)
BUN SERPL-MCNC: 37 MG/DL — HIGH (ref 10–20)
BUN SERPL-MCNC: 37 MG/DL — HIGH (ref 10–20)
BUN SERPL-MCNC: 38 MG/DL — HIGH (ref 10–20)
CALCIUM SERPL-MCNC: 10 MG/DL — SIGNIFICANT CHANGE UP (ref 8.5–10.1)
CALCIUM SERPL-MCNC: 9 MG/DL — SIGNIFICANT CHANGE UP (ref 8.5–10.1)
CALCIUM SERPL-MCNC: 9.3 MG/DL — SIGNIFICANT CHANGE UP (ref 8.5–10.1)
CALCIUM SERPL-MCNC: 9.3 MG/DL — SIGNIFICANT CHANGE UP (ref 8.5–10.1)
CHLORIDE SERPL-SCNC: 103 MMOL/L — SIGNIFICANT CHANGE UP (ref 98–110)
CHLORIDE SERPL-SCNC: 105 MMOL/L — SIGNIFICANT CHANGE UP (ref 98–110)
CO2 SERPL-SCNC: 14 MMOL/L — LOW (ref 17–32)
CO2 SERPL-SCNC: 20 MMOL/L — SIGNIFICANT CHANGE UP (ref 17–32)
CO2 SERPL-SCNC: 22 MMOL/L — SIGNIFICANT CHANGE UP (ref 17–32)
CO2 SERPL-SCNC: 22 MMOL/L — SIGNIFICANT CHANGE UP (ref 17–32)
CREAT ?TM UR-MCNC: 64 MG/DL — SIGNIFICANT CHANGE UP
CREAT SERPL-MCNC: 1.8 MG/DL — HIGH (ref 0.7–1.5)
CREAT SERPL-MCNC: 1.9 MG/DL — HIGH (ref 0.7–1.5)
CREAT SERPL-MCNC: 2 MG/DL — HIGH (ref 0.7–1.5)
CREAT SERPL-MCNC: 2.2 MG/DL — HIGH (ref 0.7–1.5)
GAS PNL BLDV: SIGNIFICANT CHANGE UP
GLUCOSE BLDC GLUCOMTR-MCNC: 184 MG/DL — HIGH (ref 70–99)
GLUCOSE BLDC GLUCOMTR-MCNC: 200 MG/DL — HIGH (ref 70–99)
GLUCOSE SERPL-MCNC: 120 MG/DL — HIGH (ref 70–99)
GLUCOSE SERPL-MCNC: 140 MG/DL — HIGH (ref 70–99)
GLUCOSE SERPL-MCNC: 95 MG/DL — SIGNIFICANT CHANGE UP (ref 70–99)
GLUCOSE SERPL-MCNC: 99 MG/DL — SIGNIFICANT CHANGE UP (ref 70–99)
OSMOLALITY UR: 396 MOS/KG — SIGNIFICANT CHANGE UP (ref 50–1200)
POTASSIUM SERPL-MCNC: 5.2 MMOL/L — HIGH (ref 3.5–5)
POTASSIUM SERPL-MCNC: 5.6 MMOL/L — HIGH (ref 3.5–5)
POTASSIUM SERPL-MCNC: 5.8 MMOL/L — HIGH (ref 3.5–5)
POTASSIUM SERPL-MCNC: 6.9 MMOL/L — CRITICAL HIGH (ref 3.5–5)
POTASSIUM SERPL-SCNC: 5.2 MMOL/L — HIGH (ref 3.5–5)
POTASSIUM SERPL-SCNC: 5.6 MMOL/L — HIGH (ref 3.5–5)
POTASSIUM SERPL-SCNC: 5.8 MMOL/L — HIGH (ref 3.5–5)
POTASSIUM SERPL-SCNC: 6.9 MMOL/L — CRITICAL HIGH (ref 3.5–5)
SODIUM SERPL-SCNC: 137 MMOL/L — SIGNIFICANT CHANGE UP (ref 135–146)
SODIUM SERPL-SCNC: 137 MMOL/L — SIGNIFICANT CHANGE UP (ref 135–146)
SODIUM SERPL-SCNC: 140 MMOL/L — SIGNIFICANT CHANGE UP (ref 135–146)
SODIUM SERPL-SCNC: 142 MMOL/L — SIGNIFICANT CHANGE UP (ref 135–146)
SODIUM UR-SCNC: 107 MMOL/L — SIGNIFICANT CHANGE UP
UUN UR-MCNC: 295 MG/DL — SIGNIFICANT CHANGE UP

## 2021-12-16 PROCEDURE — 93010 ELECTROCARDIOGRAM REPORT: CPT | Mod: 77

## 2021-12-16 PROCEDURE — 93010 ELECTROCARDIOGRAM REPORT: CPT

## 2021-12-16 PROCEDURE — 99291 CRITICAL CARE FIRST HOUR: CPT | Mod: 25,24

## 2021-12-16 RX ORDER — INSULIN HUMAN 100 [IU]/ML
10 INJECTION, SOLUTION SUBCUTANEOUS ONCE
Refills: 0 | Status: COMPLETED | OUTPATIENT
Start: 2021-12-16 | End: 2021-12-16

## 2021-12-16 RX ORDER — CALCIUM GLUCONATE 100 MG/ML
2 VIAL (ML) INTRAVENOUS ONCE
Refills: 0 | Status: COMPLETED | OUTPATIENT
Start: 2021-12-16 | End: 2021-12-16

## 2021-12-16 RX ORDER — DEXTROSE 50 % IN WATER 50 %
50 SYRINGE (ML) INTRAVENOUS ONCE
Refills: 0 | Status: COMPLETED | OUTPATIENT
Start: 2021-12-16 | End: 2021-12-16

## 2021-12-16 RX ORDER — SODIUM CHLORIDE 9 MG/ML
1000 INJECTION INTRAMUSCULAR; INTRAVENOUS; SUBCUTANEOUS
Refills: 0 | Status: DISCONTINUED | OUTPATIENT
Start: 2021-12-16 | End: 2021-12-17

## 2021-12-16 RX ORDER — SODIUM ZIRCONIUM CYCLOSILICATE 10 G/10G
10 POWDER, FOR SUSPENSION ORAL EVERY 8 HOURS
Refills: 0 | Status: COMPLETED | OUTPATIENT
Start: 2021-12-16 | End: 2021-12-18

## 2021-12-16 RX ORDER — ASPIRIN/CALCIUM CARB/MAGNESIUM 324 MG
81 TABLET ORAL DAILY
Refills: 0 | Status: DISCONTINUED | OUTPATIENT
Start: 2021-12-16 | End: 2021-12-20

## 2021-12-16 RX ORDER — SODIUM ZIRCONIUM CYCLOSILICATE 10 G/10G
10 POWDER, FOR SUSPENSION ORAL EVERY 8 HOURS
Refills: 0 | Status: DISCONTINUED | OUTPATIENT
Start: 2021-12-16 | End: 2021-12-16

## 2021-12-16 RX ADMIN — INSULIN HUMAN 10 UNIT(S): 100 INJECTION, SOLUTION SUBCUTANEOUS at 00:05

## 2021-12-16 RX ADMIN — INSULIN HUMAN 10 UNIT(S): 100 INJECTION, SOLUTION SUBCUTANEOUS at 21:54

## 2021-12-16 RX ADMIN — RISPERIDONE 0.5 MILLIGRAM(S): 4 TABLET ORAL at 05:41

## 2021-12-16 RX ADMIN — Medication 1 APPLICATION(S): at 17:41

## 2021-12-16 RX ADMIN — INSULIN HUMAN 10 UNIT(S): 100 INJECTION, SOLUTION SUBCUTANEOUS at 10:07

## 2021-12-16 RX ADMIN — Medication 50 MILLILITER(S): at 10:08

## 2021-12-16 RX ADMIN — Medication 1 APPLICATION(S): at 05:47

## 2021-12-16 RX ADMIN — ONDANSETRON 4 MILLIGRAM(S): 8 TABLET, FILM COATED ORAL at 05:42

## 2021-12-16 RX ADMIN — GABAPENTIN 100 MILLIGRAM(S): 400 CAPSULE ORAL at 21:08

## 2021-12-16 RX ADMIN — Medication 100 MILLIGRAM(S): at 14:17

## 2021-12-16 RX ADMIN — Medication 25 GRAM(S): at 00:09

## 2021-12-16 RX ADMIN — INSULIN HUMAN 10 UNIT(S): 100 INJECTION, SOLUTION SUBCUTANEOUS at 14:08

## 2021-12-16 RX ADMIN — PREGABALIN 1000 MICROGRAM(S): 225 CAPSULE ORAL at 11:55

## 2021-12-16 RX ADMIN — Medication 200 GRAM(S): at 10:08

## 2021-12-16 RX ADMIN — Medication 325 MILLIGRAM(S): at 11:54

## 2021-12-16 RX ADMIN — Medication 50 MILLILITER(S): at 21:54

## 2021-12-16 RX ADMIN — PANTOPRAZOLE SODIUM 40 MILLIGRAM(S): 20 TABLET, DELAYED RELEASE ORAL at 11:55

## 2021-12-16 RX ADMIN — Medication 2000 GRAM(S): at 22:00

## 2021-12-16 RX ADMIN — GABAPENTIN 100 MILLIGRAM(S): 400 CAPSULE ORAL at 05:41

## 2021-12-16 RX ADMIN — RISPERIDONE 0.5 MILLIGRAM(S): 4 TABLET ORAL at 17:41

## 2021-12-16 RX ADMIN — ONDANSETRON 4 MILLIGRAM(S): 8 TABLET, FILM COATED ORAL at 21:08

## 2021-12-16 RX ADMIN — Medication 50 MILLILITER(S): at 00:05

## 2021-12-16 RX ADMIN — Medication 100 MILLIGRAM(S): at 21:08

## 2021-12-16 RX ADMIN — HEPARIN SODIUM 5000 UNIT(S): 5000 INJECTION INTRAVENOUS; SUBCUTANEOUS at 21:08

## 2021-12-16 RX ADMIN — Medication 1 MILLIGRAM(S): at 11:54

## 2021-12-16 RX ADMIN — Medication 100 MILLIGRAM(S): at 05:42

## 2021-12-16 RX ADMIN — HEPARIN SODIUM 5000 UNIT(S): 5000 INJECTION INTRAVENOUS; SUBCUTANEOUS at 13:20

## 2021-12-16 RX ADMIN — ONDANSETRON 4 MILLIGRAM(S): 8 TABLET, FILM COATED ORAL at 13:20

## 2021-12-16 RX ADMIN — GABAPENTIN 100 MILLIGRAM(S): 400 CAPSULE ORAL at 13:21

## 2021-12-16 RX ADMIN — ESCITALOPRAM OXALATE 5 MILLIGRAM(S): 10 TABLET, FILM COATED ORAL at 11:55

## 2021-12-16 RX ADMIN — Medication 50 MILLILITER(S): at 14:08

## 2021-12-16 RX ADMIN — HEPARIN SODIUM 5000 UNIT(S): 5000 INJECTION INTRAVENOUS; SUBCUTANEOUS at 05:44

## 2021-12-16 NOTE — PHYSICAL THERAPY INITIAL EVALUATION ADULT - DID THE PATIENT HAVE SURGERY?
Superficial parotidectomy, Partial auriculectomy, Reconstruction, using submental island pedicle flap/yes

## 2021-12-16 NOTE — PROGRESS NOTE ADULT - SUBJECTIVE AND OBJECTIVE BOX
CHELA GALLAGHER  [id number]  81y Female    Indication for ICU admission:     Admit Date:12-14-21  ICU Date: 12/14  OR Date: 12/14    No Known Allergies    PAST MEDICAL & SURGICAL HISTORY:  Diabetes mellitus  Hypertension  Schizoaffective disorder  Edema  Bilateral LE  Disorder of thyroid, unspecified  Son cannot provide details. States she had &quot;thyroid surgery&quot; decades ago when she was young  No significant past surgical history    Home Medications:  aspirin 81 mg oral tablet, chewable: 1 tab(s) orally once a day (14 Dec 2021 07:53)  folic acid 1 mg oral tablet: 1 tab(s) orally once a day (14 Dec 2021 07:53)  Keflex 500 mg oral capsule: tid (14 Dec 2021 07:53)  Lexapro 5 mg oral tablet: 1 tab(s) orally once a day (14 Dec 2021 07:53)  losartan 50 mg oral tablet: 1 tab(s) orally once a day (14 Dec 2021 07:53)  RisperDAL 0.5 mg oral tablet: 1 tab(s) orally 2 times a day (14 Dec 2021 07:53)    24HRS EVENT:  12/15  Night  -lost PIV, midline placed  -repeat BMP: K 5.9 --> treated  -Cr 1.9 < 1.6 (BL 1.1)  -bladder scan >300, escobar placed --> 400 out  -holding lasix 20QD and losartan 50 QD  -Ulytes ordered  -mag repleted  -EKG: NSR     Day  - Advanced diet to mechanical soft  - Repeat K elevated and hemolyzed x3, pt is refusing further lab draws at this time      DVT PTX: HSQ q8  GI PTX: PPI IV    ***Tubes/Lines/Drains  ***  Peripheral IV  R rad A line- removed, not working    REVIEW OF SYSTEMS  [x ] A ten-point review of systems was otherwise negative except as noted.  [ ] Due to altered mental status/intubation, subjective information were not able to be obtained from the patient. History was obtained, to the extent possible, from review of the chart and collateral sources of information.         CHELA GALLAGHER  [id number]  81y Female    Indication for ICU admission:     Admit Date:21  ICU Date:   OR Date:     No Known Allergies    PAST MEDICAL & SURGICAL HISTORY:  Diabetes mellitus  Hypertension  Schizoaffective disorder  Edema  Bilateral LE  Disorder of thyroid, unspecified  Son cannot provide details. States she had &quot;thyroid surgery&quot; decades ago when she was young  No significant past surgical history    Home Medications:  aspirin 81 mg oral tablet, chewable: 1 tab(s) orally once a day (14 Dec 2021 07:53)  folic acid 1 mg oral tablet: 1 tab(s) orally once a day (14 Dec 2021 07:53)  Keflex 500 mg oral capsule: tid (14 Dec 2021 07:53)  Lexapro 5 mg oral tablet: 1 tab(s) orally once a day (14 Dec 2021 07:53)  losartan 50 mg oral tablet: 1 tab(s) orally once a day (14 Dec 2021 07:53)  RisperDAL 0.5 mg oral tablet: 1 tab(s) orally 2 times a day (14 Dec 2021 07:53)    24HRS EVENT:  12/15  Night  -lost PIV, midline placed  -repeat BMP: K 5.9 --> treated  -Cr 1.9 < 1.6 (BL 1.1)  -bladder scan >300, escobar placed --> 400 out  -holding lasix 20QD and losartan 50 QD  -Ulytes ordered  -mag repleted  -EKG: NSR     Day  - Advanced diet to mechanical soft  - Repeat K elevated and hemolyzed x3, pt is refusing further lab draws at this time      DVT PTX: HSQ q8  GI PTX: PPI IV    ***Tubes/Lines/Drains  ***  Peripheral IV  R rad A line- removed, not working    REVIEW OF SYSTEMS  [x ] A ten-point review of systems was otherwise negative except as noted.  [ ] Due to altered mental status/intubation, subjective information were not able to be obtained from the patient. History was obtained, to the extent possible, from review of the chart and collateral sources of information.      Daily     Daily Weight in k (16 Dec 2021 06:00)        CURRENT MEDS:  Neurologic Medications  acetaminophen     Tablet .. 650 milliGRAM(s) Oral every 4 hours PRN Temp greater or equal to 38C (100.4F), Mild Pain (1 - 3)  escitalopram 5 milliGRAM(s) Oral daily  gabapentin 100 milliGRAM(s) Oral three times a day  HYDROmorphone  Injectable 0.5 milliGRAM(s) IV Push every 4 hours PRN Severe Pain (7 - 10)  ondansetron Injectable 4 milliGRAM(s) IV Push every 8 hours  oxyCODONE    IR 5 milliGRAM(s) Oral every 6 hours PRN Moderate Pain (4 - 6)  risperiDONE   Tablet 0.5 milliGRAM(s) Oral two times a day    Gastrointestinal Medications  cyanocobalamin 1000 MICROGram(s) Oral daily  ferrous    sulfate 325 milliGRAM(s) Oral daily  folic acid 1 milliGRAM(s) Oral daily  pantoprazole  Injectable 40 milliGRAM(s) IV Push daily      Hematologic/Oncologic Medications  heparin   Injectable 5000 Unit(s) SubCutaneous every 8 hours    Antimicrobial/Immunologic Medications  ceFAZolin   IVPB 1000 milliGRAM(s) IV Intermittent every 8 hours      Topical/Other Medications  BACItracin   Ointment 1 Application(s) Topical every 12 hours      ICU Vital Signs Last 24 Hrs  T(C): 36.3 (16 Dec 2021 07:52), Max: 37.6 (15 Dec 2021 15:50)  T(F): 97.3 (16 Dec 2021 07:52), Max: 99.7 (15 Dec 2021 15:50)  HR: 70 (16 Dec 2021 08:00) (57 - 100)  BP: 122/60 (16 Dec 2021 08:00) (79/44 - 154/71)  BP(mean): 86 (16 Dec 2021 08:00) (58 - 102)  ABP: 102/65 (15 Dec 2021 12:00) (102/65 - 102/65)  ABP(mean): 82 (15 Dec 2021 12:00) (82 - 82)  RR: 22 (16 Dec 2021 08:00) (11 - 22)  SpO2: 97% (16 Dec 2021 08:00) (94% - 100%)      Adult Advanced Hemodynamics Last 24 Hrs  CVP(mm Hg): --  CVP(cm H2O): --  CO: --  CI: --  PA: --  PA(mean): --  PCWP: --  SVR: --  SVRI: --  PVR: --  PVRI: --          I&O's Summary    15 Dec 2021 07:  -  16 Dec 2021 07:00  --------------------------------------------------------  IN: 0 mL / OUT: 2500 mL / NET: -2500 mL    16 Dec 2021 07:  -  16 Dec 2021 11:20  --------------------------------------------------------  IN: 0 mL / OUT: 120 mL / NET: -120 mL      I&O's Detail    15 Dec 2021 07:  -  16 Dec 2021 07:00  --------------------------------------------------------  IN:  Total IN: 0 mL    OUT:    Bulb (mL): 50 mL    Indwelling Catheter - Urethral (mL): 650 mL    Voided (mL): 1800 mL  Total OUT: 2500 mL    Total NET: -2500 mL      16 Dec 2021 07:  -  16 Dec 2021 11:20  --------------------------------------------------------  IN:  Total IN: 0 mL    OUT:    Indwelling Catheter - Urethral (mL): 120 mL  Total OUT: 120 mL    Total NET: -120 mL          PHYSICAL EXAM:    General/Neuro     - NAD, alert & oriented x 3, no focal deficits      ENT  - RAIMUNDO drain in place by L ear draining serosanguinous fluid (50 cc in past 24hr)  - Incision clean, intact, warm and soft. No hematoma or ecchymosis.      Lungs:  - clear to auscultation b/l, Normal expansion/effort. Satting well on 3L NC.      Cardiovascular   - S1, S2.  Regular rate and rhythm.    - EKG: Normal Sinus Rhythm      GI:  - Abdomen soft, Non-tender, Non-distended.        Extremities:  -  Extremities warm, chronic edema b/l      Derm:  - Good skin turgor, no skin breakdown.        :  - Escobar catheter in place  - UOP: 30-50cc/hr      CXR:     LABS:  CAPILLARY BLOOD GLUCOSE      POCT Blood Glucose.: 200 mg/dL (16 Dec 2021 10:49)                          10.5   9.84  )-----------( 351      ( 15 Dec 2021 23:03 )             35.9       12-16    140  |  105  |  36<H>  ----------------------------<  95  5.6<H>   |  22  |  2.2<H>    Ca    9.0      16 Dec 2021 05:30  Phos  3.3     12-15  Mg     1.8     12-15          CARDIAC MARKERS ( 15 Dec 2021 04:15 )  x     / <0.01 ng/mL / 76 U/L / x     / 3.8 ng/mL  CARDIAC MARKERS ( 14 Dec 2021 14:17 )  x     / <0.01 ng/mL / 79 U/L / x     / 3.7 ng/mL

## 2021-12-16 NOTE — PROGRESS NOTE ADULT - ASSESSMENT
ASSESSMENT:  81y Female with PMHx of HTN, DM II, B12 anemia, schizoaffective disorder, edema of B/L LE and basal cell carcinoma presents for scheduled facial composite resection, parotidectomy, and submental island flap reconstruction.     PLAN:   Neurologic:   - A&O x 3  - Pain management with Tylenol, Oxycodone, Dilaudid  - Zofran PRN for nausea  #Schizoaffective d/o  - Risperidone, Escitalopram    ENT:  - Flap checks for softness and color q4  - Bacitracin to area q12  - Monitor RAIMUNDO drain output    Respiratory:   - Satting well on NC 3L  - AM CXR  - Incentive spirometry encouraged    Cardiovascular:   - Lasix for B/L leg edema hjeld due to BRYAN  - Monitor pulse ox and wean to RA as tolerated  #HTN  - Losartan held due to BRYAN  - Maintain normopressive    Gastrointestinal/Nutrition:   - Diet- Mechanical soft    Renal/Genitourinary:   - Monitor I&Os q1h    - Cr uptrending > home losartan / lasix held    - no urine >7hrs, Bladder scan > 300cc    - escobar inserted with 400cc UOP    - FeUrea labs pending    - f/u AM BMP  - F/u Mg, Phos and BMP for elevated K  - K 5.9, treated with D50/insulin, f/u repeat K  Monitor UO-escobar in place  Volume Status:  12-14-21 @ 07:01  -  12-15-21 @ 07:00  --------------------------------------------------------  IN: 600 mL / OUT: 2244 mL / NET: -1644 mL    12-15-21 @ 07:01  -  12-16-21 @ 02:54  --------------------------------------------------------  IN: 0 mL / OUT: 1830 mL / NET: -1830 mL    Labs:          BUN/Cr- 27/1.5  -->,  28/1.6  -->,  33/1.9  -->          Electrolytes-Na 140 // K 5.9 // Mg 1.8 //  Phos 3.3 (12-15 @ 23:03)      Hematologic:     DVT prophylaxis-heparin   Injectable  , SCDs    Labs: Hb/Hct:  12.0/40.3  -->,  10.5/35.9  -->                      Plts:  351  -->,  298  -->                 PTT/INR:                  T&S: (12-14)    #B12 anemia  - Cyanocobalamin   - Ferrous sulfate  - Folic acid    Infectious Disease:   WBC- 14.46  --->>,  12.50  --->>,  9.84  --->>  Temp trend- 24hrs T(F): 99.5 (12-15 @ 23:00), Max: 99.7 (12-15 @ 15:50)  Antibiotics-ceFAZolin   IVPB 1000 every 8 hours  - Cefazolin post-op (until RAIMUNDO d/c)  - Afebrile  - Monitor temps  - WBC 14.46    Lines/Tubes:  - PIV    Endocrine:   #DM II  - Monitor finger sticks  - glucose,  ASSESSMENT:  81y Female with PMHx of HTN, DM II, B12 anemia, schizoaffective disorder, edema of B/L LE and basal cell carcinoma presents for scheduled facial composite resection, parotidectomy, and submental island flap reconstruction.     PLAN:   Neurologic:   - A&O x 3  - Pain management with Tylenol, Oxycodone, Dilaudid  - Zofran PRN for nausea  #Schizoaffective d/o  - Risperidone, Escitalopram    ENT:  - Flap checks for softness and color q6  - Bacitracin to area q12  - Monitor RAIMUNDO drain output - 50 cc (past 24h)    Respiratory:   - Satting well on NC 3L  - AM CXR  - Incentive spirometry encouraged    Cardiovascular:   - Lasix for B/L leg edema held due to BRYAN  - Monitor pulse ox and wean to RA as tolerated  #HTN  - Losartan held due to BRYAN  - Maintain normopressive    Gastrointestinal/Nutrition:   - Diet- Mechanical soft    Renal/Genitourinary:   - Monitor I&Os q1h    - Cr uptrending > home losartan / lasix held    - no urine >7hrs, Bladder scan > 300cc    - escobar inserted with 400cc UOP    - FeUrea labs pending    - f/u AM BMP  - F/u Mg, Phos and BMP for elevated K  - K 5.6, treated with D50/insulin, f/u repeat K  - Monitor UO-escobar in place  - F/u VBG    Volume Status:  12-14-21 @ 07:01  -  12-15-21 @ 07:00  --------------------------------------------------------  IN: 600 mL / OUT: 2244 mL / NET: -1644 mL    12-15-21 @ 07:01  -  12-16-21 @ 02:54  --------------------------------------------------------  IN: 0 mL / OUT: 1830 mL / NET: -1830 mL    Labs:          BUN/Cr- 27/1.5  -->,  28/1.6  -->,  33/1.9  --> 36/2.2          Electrolytes-Na 140 // K 5.6 // Mg 1.8 //  Phos 3.3      Hematologic:     DVT prophylaxis-heparin   Injectable  , SCDs    Labs: Hb/Hct:  12.0/40.3  -->,  10.5/35.9  --> 10.5/35.9                     Plts:  351  -->,  298  -->  351     #B12 anemia  - Cyanocobalamin   - Ferrous sulfate  - Folic acid    Infectious Disease:   WBC- 14.46  --->>,  12.50  --->>,  9.84    Temp trend- 24hrs T(F): 99.5 (12-15 @ 23:00), Max: 99.7 (12-15 @ 15:50)  Antibiotics-ceFAZolin   IVPB 1000 every 8 hours  - Cefazolin post-op (until RAIMUNDO d/c)  - Afebrile  - Monitor temps  - WBC 9.84    Lines/Tubes:  - PIV    Endocrine:   #DM II  - Monitor finger sticks  - FS -

## 2021-12-16 NOTE — PHYSICAL THERAPY INITIAL EVALUATION ADULT - PERTINENT HX OF CURRENT PROBLEM, REHAB EVAL
81y Female with PMHx of HTN, DM II, B12 anemia, schizoaffective disorder, edema of B/L LE and basal cell carcinoma presents for scheduled facial composite resection, parotidectomy, and submental island flap reconstruction.

## 2021-12-16 NOTE — PHYSICAL THERAPY INITIAL EVALUATION ADULT - GAIT TRAINING, PT EVAL
Pt will ambulate using RW or least restrictive AD for 200 ft with supervision by discharge to facilitate return to PLOF.

## 2021-12-16 NOTE — PHYSICAL THERAPY INITIAL EVALUATION ADULT - GENERAL OBSERVATIONS, REHAB EVAL
8:05-8:30. chart reviewed. Pt received semi-jane at B/S, alert, oriented, able to follow multi-step instructions and agreeable to PT evaluation.  + IV, + foly, + o2 2 L via NC SPO2 97%, + RAIMUNDO drain, denies pain or discomfort, VSS, NAD.

## 2021-12-16 NOTE — PROGRESS NOTE ADULT - SUBJECTIVE AND OBJECTIVE BOX
ENT DAILY PROGRESS NOTE:    Pt is a 81y Female s/p WLE of skin lesion (lobe/pre-auricular area), superficial parotidectomy, partial auriculotomy, meatoplasty with submental flap, POD #2. Patient seen and examined at bedside this morning. Pt resting in bed comfortably, offering no complaints. Pt admits to tolerating diet without issues. Pt's RN at bedside reports pt was retaining urine overnight and escobar catheter was replaced with 400cc of urine output. Pt admits to getting out of bed to chair yesterday. Patient denies fever, chills, N/V, abdominal pain, neck pain, SOB, or chest pain.       REVIEW OF SYSTEMS   [x] A ten-point review of systems was otherwise negative except as noted      MEDICATIONS  (STANDING):  BACItracin   Ointment 1 Application(s) Topical every 12 hours  calcium gluconate IVPB 2 Gram(s) IV Intermittent once  ceFAZolin   IVPB 1000 milliGRAM(s) IV Intermittent every 8 hours  cyanocobalamin 1000 MICROGram(s) Oral daily  dextrose 50% Injectable 50 milliLiter(s) IV Push once  escitalopram 5 milliGRAM(s) Oral daily  ferrous    sulfate 325 milliGRAM(s) Oral daily  folic acid 1 milliGRAM(s) Oral daily  gabapentin 100 milliGRAM(s) Oral three times a day  heparin   Injectable 5000 Unit(s) SubCutaneous every 8 hours  insulin regular  human recombinant 10 Unit(s) IV Push once  ondansetron Injectable 4 milliGRAM(s) IV Push every 8 hours  pantoprazole  Injectable 40 milliGRAM(s) IV Push daily  risperiDONE   Tablet 0.5 milliGRAM(s) Oral two times a day    MEDICATIONS  (PRN):  acetaminophen     Tablet .. 650 milliGRAM(s) Oral every 4 hours PRN Temp greater or equal to 38C (100.4F), Mild Pain (1 - 3)  HYDROmorphone  Injectable 0.5 milliGRAM(s) IV Push every 4 hours PRN Severe Pain (7 - 10)  oxyCODONE    IR 5 milliGRAM(s) Oral every 6 hours PRN Moderate Pain (4 - 6)    Allergies  No Known Allergies        Vital Signs Last 24 Hrs  T(C): 36.3 (16 Dec 2021 07:52), Max: 37.6 (15 Dec 2021 15:50)  T(F): 97.3 (16 Dec 2021 07:52), Max: 99.7 (15 Dec 2021 15:50)  HR: 73 (16 Dec 2021 07:00) (57 - 100)  BP: 133/60 (16 Dec 2021 07:00) (79/44 - 154/71)  BP(mean): 87 (16 Dec 2021 07:00) (58 - 102)  RR: 18 (16 Dec 2021 07:00) (11 - 22)  SpO2: 100% (16 Dec 2021 07:00) (94% - 100%)    PHYSICAL EXAM  Gen: NAD, No drooling or pooling of secretions. No respiratory distress. No stridor or stertor. good vocal quality   Skin: good color, Non diaphoretic  HEENT: + left sided facial incision clean, dry, & intact, mild dependent edema to the left submandibular region, no ecchymosis. + skin island intact, soft & warm to touch, good color. no TTP over incisions. Oral mucosa moist/pink, uvula midline, posterior oropharynx & FOM without edema or erythema  + left RAIMUNDO in place 20cc/50cc serosanguinous fluid   Neck: Trachea midline. Neck supple, no TTP to posterior or B/L lateral neck, no cervical LAD.  Resp: Breathing easily, no accessory muscle use, on 3L NC SpO2 100%   GI: Soft, nontender    : escobar in place draining clear yellow urine  Musculoskeletal: moving all extremities x 4        LABS:                        10.5   9.84  )-----------( 351      ( 15 Dec 2021 23:03 )             35.9    12-16    140  |  105  |  36<H>  ----------------------------<  95  5.6<H>   |  22  |  2.2<H>    Ca    9.0      16 Dec 2021 05:30  Phos  3.3     12-15  Mg     1.8     12-15    I&O's Detail    15 Dec 2021 07:01  -  16 Dec 2021 07:00  --------------------------------------------------------  IN:  Total IN: 0 mL    OUT:    Bulb (mL): 50 mL    Indwelling Catheter - Urethral (mL): 590 mL    Voided (mL): 1800 mL  Total OUT: 2440 mL    Total NET: -2440 mL        IMAGING/ADDITIONAL STUDIES:   < from: Xray Chest 1 View- PORTABLE-Routine (Xray Chest 1 View- PORTABLE-Routine in AM.) (12.15.21 @ 06:25) >    ACC: [id number] EXAM:  XR CHEST PORTABLE ROUTINE 1V                          PROCEDURE DATE:  12/15/2021          INTERPRETATION:  Clinical History / Reason for exam: Follow-up.    Comparison : Chest radiograph December 1, 2021.    Technique/Positioning: Low lung volume.    Findings:    Support devices: None.    Cardiac/mediastinum/hilum: Magnified.    Lung parenchyma/Pleura: Within normal limits.    Skeleton/soft tissues: Stable    Impression:    Low lung volume.    No acute infiltrates.    --- End of Report ---        DAE GONZALEZ MD; Attending Radiologist  This document has been electronically signed. Dec 15 2021  6:12AM    < end of copied text >

## 2021-12-16 NOTE — PROGRESS NOTE ADULT - ASSESSMENT
81y Female s/p WLE of skin lesion (lobe/pre-auricular area), superficial parotidectomy, partial auriculotomy, meatoplasty with submental flap, POD #2- pt stable, escobar replaced overnight    Plan:  ·	cont soft diet  ·	encouraged to get out of bed to chair, f/u with PT   ·	RN to keep suture lines and skin clean  ·	cont bacitracin to incisions   ·	cont SC heparin, ASA to start today   ·	cont Ancef IV until left RAIMUNDO drain removed  ·	cont to f/u RAIMUNDO drain output  ·	flap checks q4 hrs by RN -> check flap color, warmth and if area soft (NO NEED for doppler or pin pricks); can change to q6h when downgraded  ·	A-line d/c'd yesterday    ·	Lasix held yesterday due to BRYAN  ·	Ca gluconate, D50 & insulin ordered for high K levels, 5.6>>5.9>>5.6, f/u repeat BMP   ·	consider renal consult   ·	will discuss with attending  81y Female s/p WLE of skin lesion (lobe/pre-auricular area), superficial parotidectomy, partial auriculotomy, meatoplasty with submental flap, POD #2- pt stable, escobar replaced overnight    Plan:  ·	cont soft diet  ·	encouraged to get out of bed to chair, f/u with PT   ·	RN to keep suture lines and skin clean  ·	cont bacitracin to incisions   ·	cont SC heparin, ASA to start today   ·	cont Ancef IV until left RAIMUNDO drain removed  ·	cont to f/u RAIMUNDO drain output  ·	flap checks q4 hrs by RN -> check flap color, warmth and if area soft (NO NEED for doppler or pin pricks); can change to q6h when downgraded  ·	A-line d/c'd yesterday    ·	Lasix held yesterday due to BRYAN  ·	Ca gluconate, D50 & insulin ordered for high K levels, 5.6>>5.9>>5.6, f/u repeat BMP   ·	consider renal consult   ·	f/u SICU recs   ·	case discussed with Dr. Rai

## 2021-12-17 LAB
ANION GAP SERPL CALC-SCNC: 13 MMOL/L — SIGNIFICANT CHANGE UP (ref 7–14)
ANION GAP SERPL CALC-SCNC: 14 MMOL/L — SIGNIFICANT CHANGE UP (ref 7–14)
ANION GAP SERPL CALC-SCNC: 15 MMOL/L — HIGH (ref 7–14)
ANION GAP SERPL CALC-SCNC: 16 MMOL/L — HIGH (ref 7–14)
BASOPHILS # BLD AUTO: 0.06 K/UL — SIGNIFICANT CHANGE UP (ref 0–0.2)
BASOPHILS NFR BLD AUTO: 0.6 % — SIGNIFICANT CHANGE UP (ref 0–1)
BUN SERPL-MCNC: 32 MG/DL — HIGH (ref 10–20)
BUN SERPL-MCNC: 34 MG/DL — HIGH (ref 10–20)
BUN SERPL-MCNC: 35 MG/DL — HIGH (ref 10–20)
BUN SERPL-MCNC: 36 MG/DL — HIGH (ref 10–20)
CALCIUM SERPL-MCNC: 10 MG/DL — SIGNIFICANT CHANGE UP (ref 8.5–10.1)
CALCIUM SERPL-MCNC: 10.9 MG/DL — HIGH (ref 8.5–10.1)
CALCIUM SERPL-MCNC: 9.4 MG/DL — SIGNIFICANT CHANGE UP (ref 8.5–10.1)
CALCIUM SERPL-MCNC: 9.9 MG/DL — SIGNIFICANT CHANGE UP (ref 8.5–10.1)
CHLORIDE SERPL-SCNC: 103 MMOL/L — SIGNIFICANT CHANGE UP (ref 98–110)
CHLORIDE SERPL-SCNC: 104 MMOL/L — SIGNIFICANT CHANGE UP (ref 98–110)
CHLORIDE SERPL-SCNC: 104 MMOL/L — SIGNIFICANT CHANGE UP (ref 98–110)
CHLORIDE SERPL-SCNC: 107 MMOL/L — SIGNIFICANT CHANGE UP (ref 98–110)
CO2 SERPL-SCNC: 17 MMOL/L — SIGNIFICANT CHANGE UP (ref 17–32)
CO2 SERPL-SCNC: 17 MMOL/L — SIGNIFICANT CHANGE UP (ref 17–32)
CO2 SERPL-SCNC: 19 MMOL/L — SIGNIFICANT CHANGE UP (ref 17–32)
CO2 SERPL-SCNC: 20 MMOL/L — SIGNIFICANT CHANGE UP (ref 17–32)
CREAT SERPL-MCNC: 1.5 MG/DL — SIGNIFICANT CHANGE UP (ref 0.7–1.5)
CREAT SERPL-MCNC: 1.7 MG/DL — HIGH (ref 0.7–1.5)
CREAT SERPL-MCNC: 1.8 MG/DL — HIGH (ref 0.7–1.5)
CREAT SERPL-MCNC: 2 MG/DL — HIGH (ref 0.7–1.5)
EOSINOPHIL # BLD AUTO: 0.2 K/UL — SIGNIFICANT CHANGE UP (ref 0–0.7)
EOSINOPHIL NFR BLD AUTO: 1.9 % — SIGNIFICANT CHANGE UP (ref 0–8)
GLUCOSE BLDC GLUCOMTR-MCNC: 104 MG/DL — HIGH (ref 70–99)
GLUCOSE BLDC GLUCOMTR-MCNC: 153 MG/DL — HIGH (ref 70–99)
GLUCOSE BLDC GLUCOMTR-MCNC: 155 MG/DL — HIGH (ref 70–99)
GLUCOSE SERPL-MCNC: 120 MG/DL — HIGH (ref 70–99)
GLUCOSE SERPL-MCNC: 164 MG/DL — HIGH (ref 70–99)
GLUCOSE SERPL-MCNC: 212 MG/DL — HIGH (ref 70–99)
GLUCOSE SERPL-MCNC: 95 MG/DL — SIGNIFICANT CHANGE UP (ref 70–99)
HCT VFR BLD CALC: 34.3 % — LOW (ref 37–47)
HCT VFR BLD CALC: 36 % — LOW (ref 37–47)
HGB BLD-MCNC: 10.3 G/DL — LOW (ref 12–16)
HGB BLD-MCNC: 9.9 G/DL — LOW (ref 12–16)
IMM GRANULOCYTES NFR BLD AUTO: 0.4 % — HIGH (ref 0.1–0.3)
LYMPHOCYTES # BLD AUTO: 1.08 K/UL — LOW (ref 1.2–3.4)
LYMPHOCYTES # BLD AUTO: 10.5 % — LOW (ref 20.5–51.1)
MAGNESIUM SERPL-MCNC: 1.9 MG/DL — SIGNIFICANT CHANGE UP (ref 1.8–2.4)
MCHC RBC-ENTMCNC: 25 PG — LOW (ref 27–31)
MCHC RBC-ENTMCNC: 25.3 PG — LOW (ref 27–31)
MCHC RBC-ENTMCNC: 28.6 G/DL — LOW (ref 32–37)
MCHC RBC-ENTMCNC: 28.9 G/DL — LOW (ref 32–37)
MCV RBC AUTO: 87.4 FL — SIGNIFICANT CHANGE UP (ref 81–99)
MCV RBC AUTO: 87.7 FL — SIGNIFICANT CHANGE UP (ref 81–99)
MONOCYTES # BLD AUTO: 0.98 K/UL — HIGH (ref 0.1–0.6)
MONOCYTES NFR BLD AUTO: 9.5 % — HIGH (ref 1.7–9.3)
NEUTROPHILS # BLD AUTO: 7.91 K/UL — HIGH (ref 1.4–6.5)
NEUTROPHILS NFR BLD AUTO: 77.1 % — HIGH (ref 42.2–75.2)
NRBC # BLD: 0 /100 WBCS — SIGNIFICANT CHANGE UP (ref 0–0)
NRBC # BLD: 0 /100 WBCS — SIGNIFICANT CHANGE UP (ref 0–0)
PHOSPHATE SERPL-MCNC: 3.3 MG/DL — SIGNIFICANT CHANGE UP (ref 2.1–4.9)
PLATELET # BLD AUTO: 326 K/UL — SIGNIFICANT CHANGE UP (ref 130–400)
PLATELET # BLD AUTO: 335 K/UL — SIGNIFICANT CHANGE UP (ref 130–400)
POTASSIUM SERPL-MCNC: 5.2 MMOL/L — HIGH (ref 3.5–5)
POTASSIUM SERPL-MCNC: 5.3 MMOL/L — HIGH (ref 3.5–5)
POTASSIUM SERPL-MCNC: 5.4 MMOL/L — HIGH (ref 3.5–5)
POTASSIUM SERPL-MCNC: 6.2 MMOL/L — CRITICAL HIGH (ref 3.5–5)
POTASSIUM SERPL-SCNC: 5.2 MMOL/L — HIGH (ref 3.5–5)
POTASSIUM SERPL-SCNC: 5.3 MMOL/L — HIGH (ref 3.5–5)
POTASSIUM SERPL-SCNC: 5.4 MMOL/L — HIGH (ref 3.5–5)
POTASSIUM SERPL-SCNC: 6.2 MMOL/L — CRITICAL HIGH (ref 3.5–5)
RBC # BLD: 3.91 M/UL — LOW (ref 4.2–5.4)
RBC # BLD: 4.12 M/UL — LOW (ref 4.2–5.4)
RBC # FLD: 23.1 % — HIGH (ref 11.5–14.5)
RBC # FLD: 23.3 % — HIGH (ref 11.5–14.5)
SODIUM SERPL-SCNC: 135 MMOL/L — SIGNIFICANT CHANGE UP (ref 135–146)
SODIUM SERPL-SCNC: 136 MMOL/L — SIGNIFICANT CHANGE UP (ref 135–146)
SODIUM SERPL-SCNC: 138 MMOL/L — SIGNIFICANT CHANGE UP (ref 135–146)
SODIUM SERPL-SCNC: 140 MMOL/L — SIGNIFICANT CHANGE UP (ref 135–146)
WBC # BLD: 10.27 K/UL — SIGNIFICANT CHANGE UP (ref 4.8–10.8)
WBC # BLD: 7.04 K/UL — SIGNIFICANT CHANGE UP (ref 4.8–10.8)
WBC # FLD AUTO: 10.27 K/UL — SIGNIFICANT CHANGE UP (ref 4.8–10.8)
WBC # FLD AUTO: 7.04 K/UL — SIGNIFICANT CHANGE UP (ref 4.8–10.8)

## 2021-12-17 PROCEDURE — 99291 CRITICAL CARE FIRST HOUR: CPT

## 2021-12-17 PROCEDURE — 71045 X-RAY EXAM CHEST 1 VIEW: CPT | Mod: 26

## 2021-12-17 RX ORDER — CALCIUM GLUCONATE 100 MG/ML
2 VIAL (ML) INTRAVENOUS ONCE
Refills: 0 | Status: COMPLETED | OUTPATIENT
Start: 2021-12-17 | End: 2021-12-17

## 2021-12-17 RX ORDER — SODIUM ZIRCONIUM CYCLOSILICATE 10 G/10G
10 POWDER, FOR SUSPENSION ORAL ONCE
Refills: 0 | Status: COMPLETED | OUTPATIENT
Start: 2021-12-17 | End: 2021-12-17

## 2021-12-17 RX ORDER — SENNA PLUS 8.6 MG/1
1 TABLET ORAL ONCE
Refills: 0 | Status: DISCONTINUED | OUTPATIENT
Start: 2021-12-17 | End: 2021-12-20

## 2021-12-17 RX ORDER — INSULIN HUMAN 100 [IU]/ML
10 INJECTION, SOLUTION SUBCUTANEOUS ONCE
Refills: 0 | Status: COMPLETED | OUTPATIENT
Start: 2021-12-17 | End: 2021-12-17

## 2021-12-17 RX ORDER — LACTULOSE 10 G/15ML
20 SOLUTION ORAL EVERY 12 HOURS
Refills: 0 | Status: DISCONTINUED | OUTPATIENT
Start: 2021-12-17 | End: 2021-12-18

## 2021-12-17 RX ORDER — PANTOPRAZOLE SODIUM 20 MG/1
40 TABLET, DELAYED RELEASE ORAL
Refills: 0 | Status: DISCONTINUED | OUTPATIENT
Start: 2021-12-17 | End: 2021-12-20

## 2021-12-17 RX ORDER — GABAPENTIN 400 MG/1
100 CAPSULE ORAL EVERY 8 HOURS
Refills: 0 | Status: DISCONTINUED | OUTPATIENT
Start: 2021-12-17 | End: 2021-12-20

## 2021-12-17 RX ORDER — SODIUM CHLORIDE 9 MG/ML
500 INJECTION, SOLUTION INTRAVENOUS ONCE
Refills: 0 | Status: COMPLETED | OUTPATIENT
Start: 2021-12-17 | End: 2021-12-17

## 2021-12-17 RX ORDER — MAGNESIUM SULFATE 500 MG/ML
2 VIAL (ML) INJECTION ONCE
Refills: 0 | Status: COMPLETED | OUTPATIENT
Start: 2021-12-17 | End: 2021-12-17

## 2021-12-17 RX ORDER — DEXTROSE 50 % IN WATER 50 %
50 SYRINGE (ML) INTRAVENOUS ONCE
Refills: 0 | Status: COMPLETED | OUTPATIENT
Start: 2021-12-17 | End: 2021-12-17

## 2021-12-17 RX ORDER — SODIUM CHLORIDE 9 MG/ML
1000 INJECTION INTRAMUSCULAR; INTRAVENOUS; SUBCUTANEOUS
Refills: 0 | Status: DISCONTINUED | OUTPATIENT
Start: 2021-12-17 | End: 2021-12-18

## 2021-12-17 RX ADMIN — ESCITALOPRAM OXALATE 5 MILLIGRAM(S): 10 TABLET, FILM COATED ORAL at 12:28

## 2021-12-17 RX ADMIN — INSULIN HUMAN 10 UNIT(S): 100 INJECTION, SOLUTION SUBCUTANEOUS at 04:27

## 2021-12-17 RX ADMIN — LACTULOSE 20 GRAM(S): 10 SOLUTION ORAL at 12:40

## 2021-12-17 RX ADMIN — SODIUM CHLORIDE 50 MILLILITER(S): 9 INJECTION INTRAMUSCULAR; INTRAVENOUS; SUBCUTANEOUS at 21:26

## 2021-12-17 RX ADMIN — GABAPENTIN 100 MILLIGRAM(S): 400 CAPSULE ORAL at 05:25

## 2021-12-17 RX ADMIN — Medication 50 MILLILITER(S): at 04:27

## 2021-12-17 RX ADMIN — PANTOPRAZOLE SODIUM 40 MILLIGRAM(S): 20 TABLET, DELAYED RELEASE ORAL at 12:29

## 2021-12-17 RX ADMIN — INSULIN HUMAN 10 UNIT(S): 100 INJECTION, SOLUTION SUBCUTANEOUS at 15:17

## 2021-12-17 RX ADMIN — RISPERIDONE 0.5 MILLIGRAM(S): 4 TABLET ORAL at 18:20

## 2021-12-17 RX ADMIN — Medication 100 MILLIGRAM(S): at 05:26

## 2021-12-17 RX ADMIN — GABAPENTIN 100 MILLIGRAM(S): 400 CAPSULE ORAL at 14:02

## 2021-12-17 RX ADMIN — SODIUM CHLORIDE 500 MILLILITER(S): 9 INJECTION, SOLUTION INTRAVENOUS at 05:24

## 2021-12-17 RX ADMIN — Medication 325 MILLIGRAM(S): at 12:28

## 2021-12-17 RX ADMIN — HEPARIN SODIUM 5000 UNIT(S): 5000 INJECTION INTRAVENOUS; SUBCUTANEOUS at 21:27

## 2021-12-17 RX ADMIN — Medication 50 MILLILITER(S): at 07:04

## 2021-12-17 RX ADMIN — HEPARIN SODIUM 5000 UNIT(S): 5000 INJECTION INTRAVENOUS; SUBCUTANEOUS at 14:01

## 2021-12-17 RX ADMIN — ONDANSETRON 4 MILLIGRAM(S): 8 TABLET, FILM COATED ORAL at 21:26

## 2021-12-17 RX ADMIN — Medication 50 MILLILITER(S): at 15:16

## 2021-12-17 RX ADMIN — INSULIN HUMAN 10 UNIT(S): 100 INJECTION, SOLUTION SUBCUTANEOUS at 07:04

## 2021-12-17 RX ADMIN — Medication 1 MILLIGRAM(S): at 12:28

## 2021-12-17 RX ADMIN — GABAPENTIN 100 MILLIGRAM(S): 400 CAPSULE ORAL at 21:26

## 2021-12-17 RX ADMIN — Medication 200 GRAM(S): at 04:37

## 2021-12-17 RX ADMIN — ONDANSETRON 4 MILLIGRAM(S): 8 TABLET, FILM COATED ORAL at 14:00

## 2021-12-17 RX ADMIN — Medication 1 APPLICATION(S): at 05:26

## 2021-12-17 RX ADMIN — PREGABALIN 1000 MICROGRAM(S): 225 CAPSULE ORAL at 12:28

## 2021-12-17 RX ADMIN — HEPARIN SODIUM 5000 UNIT(S): 5000 INJECTION INTRAVENOUS; SUBCUTANEOUS at 05:25

## 2021-12-17 RX ADMIN — SODIUM ZIRCONIUM CYCLOSILICATE 10 GRAM(S): 10 POWDER, FOR SUSPENSION ORAL at 01:04

## 2021-12-17 RX ADMIN — RISPERIDONE 0.5 MILLIGRAM(S): 4 TABLET ORAL at 05:25

## 2021-12-17 RX ADMIN — SODIUM ZIRCONIUM CYCLOSILICATE 10 GRAM(S): 10 POWDER, FOR SUSPENSION ORAL at 18:20

## 2021-12-17 RX ADMIN — LACTULOSE 20 GRAM(S): 10 SOLUTION ORAL at 18:20

## 2021-12-17 RX ADMIN — Medication 100 MILLIGRAM(S): at 14:01

## 2021-12-17 RX ADMIN — PANTOPRAZOLE SODIUM 40 MILLIGRAM(S): 20 TABLET, DELAYED RELEASE ORAL at 21:30

## 2021-12-17 RX ADMIN — Medication 200 GRAM(S): at 07:04

## 2021-12-17 RX ADMIN — Medication 25 GRAM(S): at 03:57

## 2021-12-17 RX ADMIN — Medication 81 MILLIGRAM(S): at 12:28

## 2021-12-17 RX ADMIN — SODIUM ZIRCONIUM CYCLOSILICATE 10 GRAM(S): 10 POWDER, FOR SUSPENSION ORAL at 12:28

## 2021-12-17 NOTE — CHART NOTE - NSCHARTNOTEFT_GEN_A_CORE
SICU Transfer PA Note:    Transfer from: SICU  Transfer to:  ( x ) Surgery    (  ) Telemetry    (  ) Medicine    (  ) Palliative    (  ) Stroke Unit    (  ) _______________      SICU COURSE:  81F with PMHx of HTN, DM II, B12 anemia, schizoaffective disorder, edema of B/L LE and basal cell carcinoma presents for scheduled facial composite resection, parotidectomy, and submental island flap reconstruction. Mallampati class II.     Surgical course: wide local excision basal cell carcinoma, all frozen sections with negative marrgins, superficial parotidectomy, partial auriculectomy, submental island flap. Patient remained HD stable during the procedure.     SICU consulted for hemodynamic monitoring. Patient has no complaints at this time. Patient is awake, A&O x 3 in NAD. Denies SOB/chest pain/chills.     While in SICU issues with hyperkalemia and BRYAN, which was treated multiple times, BRYAN now improving, Cr down to 1.7 (peak 2.2), making great urine, last K 5.3.      Surgery Information  OR time: 5 hr     EBL: 200 cc      IV Fluids: 2400 cc      Blood Products: 2 units PRBCs  UOP: 3000         PAST MEDICAL & SURGICAL HISTORY:  Diabetes mellitus    Hypertension    Schizoaffective disorder    Edema  Bilateral LE    Disorder of thyroid, unspecified  Son cannot provide details. States she had &quot;thyroid surgery&quot; decades ago when she was young    No significant past surgical history      Allergies    No Known Allergies    Intolerances      MEDICATIONS  (STANDING):  aspirin enteric coated 81 milliGRAM(s) Oral daily  BACItracin   Ointment 1 Application(s) Topical every 12 hours  cyanocobalamin 1000 MICROGram(s) Oral daily  escitalopram 5 milliGRAM(s) Oral daily  ferrous    sulfate 325 milliGRAM(s) Oral daily  folic acid 1 milliGRAM(s) Oral daily  gabapentin 100 milliGRAM(s) Oral every 8 hours  heparin   Injectable 5000 Unit(s) SubCutaneous every 8 hours  lactulose Syrup 20 Gram(s) Oral every 12 hours  ondansetron Injectable 4 milliGRAM(s) IV Push every 8 hours  pantoprazole    Tablet 40 milliGRAM(s) Oral before breakfast  risperiDONE   Tablet 0.5 milliGRAM(s) Oral two times a day  sodium zirconium cyclosilicate 10 Gram(s) Oral every 8 hours    MEDICATIONS  (PRN):  acetaminophen     Tablet .. 650 milliGRAM(s) Oral every 4 hours PRN Temp greater or equal to 38C (100.4F), Mild Pain (1 - 3)  HYDROmorphone  Injectable 0.5 milliGRAM(s) IV Push every 4 hours PRN Severe Pain (7 - 10)  oxyCODONE    IR 5 milliGRAM(s) Oral every 6 hours PRN Moderate Pain (4 - 6)        Vital Signs Last 24 Hrs  T(C): 36.6 (17 Dec 2021 07:26), Max: 36.9 (17 Dec 2021 04:00)  T(F): 97.8 (17 Dec 2021 07:26), Max: 98.5 (17 Dec 2021 04:00)  HR: 91 (17 Dec 2021 09:00) (71 - 95)  BP: 142/87 (17 Dec 2021 09:00) (100/53 - 162/66)  BP(mean): 110 (17 Dec 2021 09:00) (71 - 110)  RR: 12 (17 Dec 2021 09:00) (12 - 29)  SpO2: 98% (17 Dec 2021 09:00) (95% - 100%)  I&O's Summary    16 Dec 2021 07:01  -  17 Dec 2021 07:00  --------------------------------------------------------  IN: 1150 mL / OUT: 1465 mL / NET: -315 mL    17 Dec 2021 07:01  -  17 Dec 2021 14:19  --------------------------------------------------------  IN: 240 mL / OUT: 960 mL / NET: -720 mL        LABS                                            9.9                   Neurophils% (auto):   x      (12-17 @ 05:30):    7.04 )-----------(326          Lymphocytes% (auto):  x                                             34.3                   Eosinphils% (auto):   x        Manual%: Neutrophils x    ; Lymphocytes x    ; Eosinophils x    ; Bands%: x    ; Blasts x                                    138    |  104    |  34                  Calcium: 10.9  / iCa: x      (12-17 @ 12:30)    ----------------------------<  120       Magnesium: x                                5.3     |  20     |  1.7              Phosphorous: x            PLAN:     Neurologic:   - A&O x 3  - Pain management with Tylenol, Oxycodone, Dilaudid prn  #Schizoaffective d/o  - Risperidone, Escitalopram    ENT:  - Flap checks for softness and color q6  - Bacitracin to area q12  - RAIMUNDO drain d/c'd today 12/17    Respiratory:   - Satting well on NC 2-3L  - Wean to room air as tolerated  - AM CXR  - Incentive spirometry encouraged    Cardiovascular:   - Lasix for B/L leg edema - held d/t BRYAN  - Monitor pulse ox   #HTN  - Losartan - held d/t BRYAN  - Maintain normotensive    Gastrointestinal/Nutrition:   - Diet- Mechanical soft  -continue PPI  -no BM x 3 day, started Lactulose 20g q12h    Renal/Genitourinary:   - Monitor I&Os q4hr  -UOP: /hr  -BRYAN improving, Cr down to 1.7 (peak 2.2)    - home losartan / lasix still on hold    - Hagen d/c today --> f/up TOV     Labs        - BUN/Cr: 34/1.7        - Na 138, K 5.3, Mag 1.9, Phos 3.3 -- treated  -f/up Nephrology c/s for hyperkalemia  -on Lokelma 10mg q8, K 5.3 tx again with Insulin/D50, f/up 1600 BMP    Hematologic:   - DVT ppx: Heparin, SCDs  - H&H: 9.9/34.3  - Platelets: 326  #B12 anemia  - Cyanocobalamin   - Ferrous sulfate  - Folic acid    Infectious Disease:   - Cefazolin d/c'd today 12/17 since RAIMUNDO has been removed  - Afebrile  - Monitor temps  - WBC 7    Lines/Tubes:  - PIV  - L cephalic midline    Endocrine:   #DM II  - Monitor finger sticks  - glucose, 120  #Thyroid disorder, unknown  - TSH 0.51    Disposition: downgrade to floor   -Sign-out given to SAMANTHA Kellogg from ENT on 12/17/21 @ 2:35pm

## 2021-12-17 NOTE — PROGRESS NOTE ADULT - SUBJECTIVE AND OBJECTIVE BOX
81F POD3 s/p wide local excision of basal cell carcinoma left face, partial auriculectomy, superficial parotidectomy, and reconstruction with submental island flap. Seen and examined at bedside in ICU. Reports she feels well. Pain is controlled. Tolerating soft diet. Ambulating with PT. Hagen was reinserted yesterday due to urinary retention with 400 cc return. Potassium elevated to 6.2 on AM labs. Received 10U Insulin, 2g calcium gluc. Repeat K: 5.4. Started on Lokelma. Denies nausea/vomiting/SOB/CP/fever/chills.     MEDICATIONS  (STANDING):  aspirin enteric coated 81 milliGRAM(s) Oral daily  BACItracin   Ointment 1 Application(s) Topical every 12 hours  ceFAZolin   IVPB 1000 milliGRAM(s) IV Intermittent every 8 hours  cyanocobalamin 1000 MICROGram(s) Oral daily  escitalopram 5 milliGRAM(s) Oral daily  ferrous    sulfate 325 milliGRAM(s) Oral daily  folic acid 1 milliGRAM(s) Oral daily  gabapentin 100 milliGRAM(s) Oral three times a day  heparin   Injectable 5000 Unit(s) SubCutaneous every 8 hours  ondansetron Injectable 4 milliGRAM(s) IV Push every 8 hours  pantoprazole  Injectable 40 milliGRAM(s) IV Push daily  risperiDONE   Tablet 0.5 milliGRAM(s) Oral two times a day  sodium chloride 0.9%. 1000 milliLiter(s) (40 mL/Hr) IV Continuous <Continuous>  sodium zirconium cyclosilicate 10 Gram(s) Oral every 8 hours    MEDICATIONS  (PRN):  acetaminophen     Tablet .. 650 milliGRAM(s) Oral every 4 hours PRN Temp greater or equal to 38C (100.4F), Mild Pain (1 - 3)  HYDROmorphone  Injectable 0.5 milliGRAM(s) IV Push every 4 hours PRN Severe Pain (7 - 10)  oxyCODONE    IR 5 milliGRAM(s) Oral every 6 hours PRN Moderate Pain (4 - 6)      Vital Signs Last 24 Hrs  T(C): 36.6 (17 Dec 2021 07:26), Max: 36.9 (17 Dec 2021 04:00)  T(F): 97.8 (17 Dec 2021 07:26), Max: 98.5 (17 Dec 2021 04:00)  HR: 91 (17 Dec 2021 09:00) (71 - 95)  BP: 142/87 (17 Dec 2021 09:00) (100/53 - 162/66)  BP(mean): 110 (17 Dec 2021 09:00) (71 - 110)  RR: 12 (17 Dec 2021 09:00) (12 - 29)  SpO2: 98% (17 Dec 2021 09:00) (95% - 100%)    Physical Exam:  Gen: AAOX3, NAD  HEENT: left preauricular incision with sutures intact, no evidence drainage/bleeding/dehiscence, CNVII grossly intact b/l, flap soft and warm, well perfused, Intraoral exam: VILMA: 40 mm, mucous membranes pink and moist, FOM soft and non-elevated, tongue FROM, OP clear, RAIMUNDO drain x1 in place draining SS fluid (15 cc/24 hours)  Resp: NLB on 3LNC, SpO2: 99%  CV: non-tachycardic  Abd: soft, NT ND  : Hagen in place draining clear yellow urine    Labs:                                   9.9    7.04  )-----------( 326      ( 17 Dec 2021 05:30 )             34.3   12-17    135  |  103  |  35<H>  ----------------------------<  212<H>  5.4<H>   |  19  |  1.8<H>    Ca    9.4      17 Dec 2021 05:30  Phos  3.3     12-17  Mg     1.9     12-17        A/P: 81F POD3 s/p wide local excision of basal cell carcinoma left face, partial auriculectomy, superficial parotidectomy, and reconstruction with submental island flap. AFVSS. Recovering well.  -C/w soft diet  -Pain control  -Monitor RAIMUNDO drain output  -C/w Ancef until RAIMUNDO drain removed  -Bacitracin to incisions  -Flap checks Q4H  -OOBTC and encourage ambulation with PT

## 2021-12-17 NOTE — CONSULT NOTE ADULT - SUBJECTIVE AND OBJECTIVE BOX
NEPHROLOGY CONSULTATION NOTE    81F with basal cell carcinoma presents for scheduled facial composite resection, parotidectomy, and submental island flap reconstruction. No complaints at this time. Denies SOB/chest pain/fever/chills.     PMHx: HTN, DM, basal cell carcinoma schizoaffective disorder  Renal was called for BRYAN; creat .0 from 1.2 on admission, K 6.0-5.4 on LR 40 cc/hr  Good po intake, vomited today    PAST MEDICAL & SURGICAL HISTORY:  Diabetes mellitus    Hypertension    Schizoaffective disorder    Edema  Bilateral LE    Disorder of thyroid, unspecified  Son cannot provide details. States she had &quot;thyroid surgery&quot; decades ago when she was young    No significant past surgical history      Allergies:  No Known Allergies    Home Medications Reviewed  Hospital Medications:   MEDICATIONS  (STANDING):  aspirin enteric coated 81 milliGRAM(s) Oral daily  BACItracin   Ointment 1 Application(s) Topical every 12 hours  cyanocobalamin 1000 MICROGram(s) Oral daily  dextrose 50% Injectable 50 milliLiter(s) IV Push once  escitalopram 5 milliGRAM(s) Oral daily  ferrous    sulfate 325 milliGRAM(s) Oral daily  folic acid 1 milliGRAM(s) Oral daily  gabapentin 100 milliGRAM(s) Oral every 8 hours  heparin   Injectable 5000 Unit(s) SubCutaneous every 8 hours  insulin regular  human recombinant 10 Unit(s) IV Push once  lactulose Syrup 20 Gram(s) Oral every 12 hours  ondansetron Injectable 4 milliGRAM(s) IV Push every 8 hours  pantoprazole    Tablet 40 milliGRAM(s) Oral before breakfast  risperiDONE   Tablet 0.5 milliGRAM(s) Oral two times a day  sodium zirconium cyclosilicate 10 Gram(s) Oral every 8 hours      SOCIAL HISTORY:  Denies ETOH,Smoking,   FAMILY HISTORY:  No pertinent family history in first degree relatives          REVIEW OF SYSTEMS:  CONSTITUTIONAL: No weakness, fevers or chills  RESPIRATORY: No cough. No shortness of breath  CARDIOVASCULAR: No chest pain or palpitations.  GASTROINTESTINAL: No abdominal or epigastric pain. Vomited once today  VASCULAR: No bilateral lower extremity edema.   All other review of systems is negative unless indicated above.    VITALS:  T(F): 97.8 (12-17-21 @ 07:26), Max: 98.5 (12-17-21 @ 04:00)  HR: 91 (12-17-21 @ 09:00)  BP: 142/87 (12-17-21 @ 09:00)  RR: 12 (12-17-21 @ 09:00)  SpO2: 98% (12-17-21 @ 09:00)    12-16 @ 07:01  -  12-17 @ 07:00  --------------------------------------------------------  IN: 1150 mL / OUT: 1465 mL / NET: -315 mL    12-17 @ 07:01  -  12-17 @ 15:08  --------------------------------------------------------  IN: 240 mL / OUT: 960 mL / NET: -720 mL          12-16-21 @ 07:01  -  12-17-21 @ 07:00  --------------------------------------------------------  IN: 0 mL / OUT: 1450 mL / NET: -1450 mL    12-17-21 @ 07:01  -  12-17-21 @ 15:08  --------------------------------------------------------  IN: 0 mL / OUT: 950 mL / NET: -950 mL      I&O's Detail    16 Dec 2021 07:01  -  17 Dec 2021 07:00  --------------------------------------------------------  IN:    IV PiggyBack: 250 mL    Lactated Ringers Bolus: 500 mL    sodium chloride 0.9%: 400 mL  Total IN: 1150 mL    OUT:    Bulb (mL): 15 mL    Indwelling Catheter - Urethral (mL): 1450 mL  Total OUT: 1465 mL    Total NET: -315 mL      17 Dec 2021 07:01  -  17 Dec 2021 15:08  --------------------------------------------------------  IN:    sodium chloride 0.9%: 240 mL  Total IN: 240 mL    OUT:    Bulb (mL): 10 mL    Indwelling Catheter - Urethral (mL): 950 mL  Total OUT: 960 mL    Total NET: -720 mL            PHYSICAL EXAM:  Constitutional: NAD  HEENT: anicteric sclera, post op scare on face/neck  Respiratory: CTA  Cardiovascular: S1, S2, RRR  Gastrointestinal: BS+, soft, NT/ND  Extremities:  No peripheral edema  Neurological: A/wake alert  Psychiatric: Normal mood, normal affect  : has  escobar.   Skin: No rashes  Vascular Access:    LABS:  12-17    138  |  104  |  34<H>  ----------------------------<  120<H>  5.3<H>   |  20  |  1.7<H>    Ca    10.9<H>      17 Dec 2021 12:30  Phos  3.3     12-17  Mg     1.9     12-17      Creatinine Trend: 1.7 <--, 1.8 <--, 2.0 <--, 1.8 <--, 2.0 <--, 1.9 <--, 2.2 <--, 1.9 <--, 1.6 <--, 1.5 <--, 1.4 <--, 1.4 <--, 1.3 <--, 1.2 <--, 1.1 <--, 1.2 <--, 1.2 <--, 1.4 <--                        9.9    7.04  )-----------( 326      ( 17 Dec 2021 05:30 )             34.3     Urine Studies:    Creatinine, Random Urine: 64 mg/dL (12-16 @ 00:45)  Sodium, Random Urine: 107.0 mmoL/L (12-16 @ 00:45)  Osmolality, Random Urine: 396 mos/kg (12-16 @ 00:45)            RADIOLOGY & ADDITIONAL STUDIES:    < from: Xray Chest 1 View- PORTABLE-Routine (Xray Chest 1 View- PORTABLE-Routine in AM.) (12.17.21 @ 05:51) >  Low lung volume.    Possible left basilar opacity.    < end of copied text >

## 2021-12-17 NOTE — PROGRESS NOTE ADULT - SUBJECTIVE AND OBJECTIVE BOX
ENT DAILY PROGRESS NOTE    Pt is a 81y F POD#3 s/p WLE of skin lesion (lobe/pre-auricular area), superficial parotidectomy, partial auriculotomy, meatoplasty with submental flap. Patient seen and examined at bedside this morning. Pt resting in bed comfortably. Pt admits to tolerating soft diet yesterday without issues. Pt was started on Lokelma yesterday. 2am K+ resulted 6.2; given 2g Ca gluc, D50, 10U insulin; repeat K+ 5.4. Patient denies fever, chills, N/V, abdominal pain, neck pain, SOB, or chest pain.     REVIEW OF SYSTEMS   [x] A ten-point review of systems was otherwise negative except as noted.    Allergies  No Known Allergies    MEDICATIONS:  acetaminophen     Tablet .. 650 milliGRAM(s) Oral every 4 hours PRN  aspirin enteric coated 81 milliGRAM(s) Oral daily  BACItracin   Ointment 1 Application(s) Topical every 12 hours  ceFAZolin   IVPB 1000 milliGRAM(s) IV Intermittent every 8 hours  cyanocobalamin 1000 MICROGram(s) Oral daily  escitalopram 5 milliGRAM(s) Oral daily  ferrous    sulfate 325 milliGRAM(s) Oral daily  folic acid 1 milliGRAM(s) Oral daily  gabapentin 100 milliGRAM(s) Oral three times a day  heparin   Injectable 5000 Unit(s) SubCutaneous every 8 hours  HYDROmorphone  Injectable 0.5 milliGRAM(s) IV Push every 4 hours PRN  ondansetron Injectable 4 milliGRAM(s) IV Push every 8 hours  oxyCODONE    IR 5 milliGRAM(s) Oral every 6 hours PRN  pantoprazole  Injectable 40 milliGRAM(s) IV Push daily  risperiDONE   Tablet 0.5 milliGRAM(s) Oral two times a day  sodium chloride 0.9%. 1000 milliLiter(s) IV Continuous <Continuous>  sodium zirconium cyclosilicate 10 Gram(s) Oral every 8 hours    Vital Signs Last 24 Hrs  T(C): 36.6 (17 Dec 2021 07:26), Max: 36.9 (17 Dec 2021 04:00)  T(F): 97.8 (17 Dec 2021 07:26), Max: 98.5 (17 Dec 2021 04:00)  HR: 71 (17 Dec 2021 07:00) (64 - 95)  BP: 101/53 (17 Dec 2021 07:00) (100/53 - 162/66)  BP(mean): 73 (17 Dec 2021 07:00) (58 - 95)  RR: 24 (17 Dec 2021 07:00) (14 - 29)  SpO2: 95% (17 Dec 2021 07:00) (95% - 100%)      12-16 @ 07:01  -  12-17 @ 07:00  --------------------------------------------------------  IN:    IV PiggyBack: 250 mL    Lactated Ringers Bolus: 500 mL    sodium chloride 0.9%: 400 mL  Total IN: 1150 mL    OUT:    Bulb (mL): 15 mL    Indwelling Catheter - Urethral (mL): 1450 mL  Total OUT: 1465 mL    Total NET: -315 mL    PHYSICAL EXAM:  GEN:  NAD, awake and alert  SKIN: Good color, non diaphoretic  HEENT: NC/AT; + left sided facial incision C/D/I; mild dependent edema to the left submandibular region, no ecchymosis. + skin island intact, soft & warm to touch, good color. No TTP over incisions. +RAIMUNDO in place with minimal SS output (10cc/shift)  RESP: Non-labored breathing. +NC in place   CARDIO: +S1/S2  ABDO: Soft, NT   : +14Fr escobar in place     LABS:  CBC-                        9.9    7.04  )-----------( 326      ( 17 Dec 2021 05:30 )             34.3     BMP/CMP-  17 Dec 2021 05:30    135    |  103    |  35     ----------------------------<  212    5.4     |  19     |  1.8      Ca    9.4        17 Dec 2021 05:30  Phos  3.3       17 Dec 2021 02:00  Mg     1.9       17 Dec 2021 02:00    Endocrine Panel-  Calcium, Total Serum: 9.4 mg/dL (12-17 @ 05:30)  Calcium, Total Serum: 9.9 mg/dL (12-17 @ 02:00)  Calcium, Total Serum: 9.3 mg/dL (12-16 @ 18:34)  Calcium, Total Serum: 9.3 mg/dL (12-16 @ 16:43)  Calcium, Total Serum: 10.0 mg/dL (12-16 @ 11:47)

## 2021-12-17 NOTE — PROGRESS NOTE ADULT - ATTENDING COMMENTS
Examined bedside. Flap healthy, continue to follow potassium and renal function, f/u nephrology input. Okay to d/c RAIMUNDO, transfer to floor when SICU okay
Critical Care: [id number]   This patient has a high probability of sudden, clinically significant deterioration, which requires the highest level of physician preparedness to intervene urgently. I managed/supervised life or organ supporting interventions that required frequent physician assessment. I devoted my full attention in the ICU to the direct care of this patient for the period of time indicated below. Time I spent with the family or surrogate(s) is included only if the patient was incapable of providing the necessary information or participating in medical decision making. Time devoted to teaching and to any procedures I billed separately is not included.     CHELA GALLAGHER 81y Female admitted to [x ] SICU /[ ] SDU with high risk for hemodynamic instability and airway obstruction. S/P neck dissection   Patient is examined and evaluated at the bedside with SICU team. Treatment plan discussed with SICU team, nurses and primary team.   Chest X-ray and all relevant studies reviewed during rounds.  Will continue hemodynamic monitoring as per protocol in SICU.    Neuro:  GCS [15 ]   [ x]  Neurovascular checks as per SICU protocol                 [ ] 3% NaCl        Paralysis [ ] Yes  [ x]  No      Sedated/Pain control with                 [ ] Dilaudid drip, [ ]  Ketamine, [ ] Fentanyl, [ ] Propofol, [ ] Precedex, [ ] Versed, [ ] Ativan,                           [ ] OxyContin standing,  [ ] OxyContin PRN, [ x] Dilaudid PRN pushes, [ ] Fentanyl PRN pushes, [ ] PCA,                [x ] Tylenol IV/PO, [x ] Gabapentin, [ ]  Ketorolac,  [ ] Lidoderm Patch       Other Medications               [ ] Seroquel, [ ] Haldol, [ ] Zyprexa,  [ ] Clonazepam [ ] Xanax, [ ] Versed/Ativan PRN, [ ] Valium [x ] None               [ ] Robaxin   [ ] Baclofen  [ ] Flexeril               [ ] Keppra  [ ] Lamictal  [ ] Depakote  [ ] Dilantin               [ ] CIWA (Ativan/Valium/ Librium)  CV: continue to monitor  On pressors [ ]  Yes  [x ]  No          [ ]  Levophed, [ ] Reilly-Synephrine, [ ] Vasopressin, [ ]  Epinephrine          [ ] Dobutamine, [ ] Milrinone, [ ]  Midodrine,  [ ] Others    Other Cardiac Meds          [ ] Amiodarone IV/PO, [ ] Digoxin, [ ] Cardizem drip, [ ] Cleviprex drip, [ ] Esmolol drip    Respiratory: Acute respiratory insufficiency -> continue to monitor                        None Invasive Support  [x ] Incentive Spirometer                                  [ ] BiPAP   [ ] CPAP/NIV   [ ] HFNC   [ ] NR Face Mask   [x ] NC  [ ] Trach Caller                        Ventilatory support  [ ] Yes [x ] No   [ ] SBT                                  [ ] PC    [ ] VC   [ ] AC   [ ] BiVent/APRV   [ ]CPAP   GI  [x ]  bowel regiment  [x ] BM none [x ] Flatus+        Prophylaxis [ x] PPI  [ ] H2 Blockers  [ ] Others  Nutrition: continue   [x ] Diet soft mechanical  [ ] TPN/PPN   [ ] calories count   [ ]  Tube Feeds     Renal: Continue I&Os monitoring, Hagen catheter  [x ] Yes,  [ ]   No ,  [x ] Consideration for discontinuation [ ] Taxes cath/PrimaFit  [ ] TOV       Lytes/Acid-base: replete hypokalemia, hypomagnesemia, hypocalcemia, hypophosphatemia         hyperkalemia -> will continue to monitor and correct       IV Fluids   [x ] LR@35/hr,  [ ] NS  [ ] D5W1/2NS [ ] Albumin   ID: leukocytosis -> continue to monitor:         IV Abx [x ] Yes marco-OP, [ ] No;  ID consulted [ ] Yes, [x ] No        Cultures send  [ ] Respiratory   [ ] Blood   [ ] Urine  [ ] Fluids  [ ] Tissue  [ x]  None  Lines:   [ ] Right   [ ] Left  [ ] Bilateral                     [ ] Subclavian TLC        [ ]  Internal Jugular TLC     [ ]  Femoral TLC                     [ ] Subclavian Cordis    [ ] Internal Jugular Cordis   [ ] Femoral Cordis                     [ ] HD catheter    [ ] PICC     [ ]  Midline   [x ] Peripheral IVs                                                 [ ] Right   [ ] Left   [ x] None                     [ ] Radial A-Line    [ ] Femoral A-Line   [ ] Axillary A-Line               Heme: continue to evaluate for acute blood loss anemia- trend Hg/Hct                     AC Reversal Indicated [ ] Yes  [x ] No                    Transfused  indicated  [ ] Yes, [x ] No    [ ] PRBCs   [ ] Platelets   [ ] FFPs   [ ] Cryoprecipitate                    Should be started on or continued with  following  [ ] Yes,  [ x] No                               [ ] Lovenox  [ ] Coumadin  [ ] Heparin drip  [ ] NOVACs  [ ] ASA  [ ] Antiplatelets   Endocrine: Prevent and treat hyperglycemia with insulin as needed,                                 Insuline drip [ ] Yes, [x ] No   PV: follow pulse exam  Skin: decub precautions  DVT Prophylaxis:  [x ] SCDs  [ ] Heparin SQ  [ x] Lovenox  SQ  Stress Gastritis Prophylaxis: PPI/H2 Blockers if indicated  Mobility: patient is evaluated at the bedside with mobility team and the goals for today are discussed with PT [x ]    PATIENT/FAMILY/SURROGATE CONFERENCE:  [x ] Yes with patient at the bedside. [ ] No  PURPOSE: To obtain necessary information, To discuss treatment options under consideration today.    I saw and evaluated the patient personally. I have reviewed and agree with note above. Treatment plan discussed with SICU team, nurses and primary team at the time of the multidisciplinary rounds. The above note is NOT written at the time of rounds and will reflect all changes throughout management of the patient for the day note is written for.    Sunita Mittal MD, FACS  Trauma/ACS/SCC Attending
Critical Care: [id number]   This patient has a high probability of sudden, clinically significant deterioration, which requires the highest level of physician preparedness to intervene urgently. I managed/supervised life or organ supporting interventions that required frequent physician assessment. I devoted my full attention in the ICU to the direct care of this patient for the period of time indicated below. Time I spent with the family or surrogate(s) is included only if the patient was incapable of providing the necessary information or participating in medical decision making. Time devoted to teaching and to any procedures I billed separately is not included.     CHELA GALLAGHER 81y Female admitted to [x ] SICU /[ ] SDU with high risk for hemodynamic instability and airway obstruction. S/P neck dissection   Patient is examined and evaluated at the bedside with SICU team. Treatment plan discussed with SICU team, nurses and primary team.   Chest X-ray and all relevant studies reviewed during rounds.  Will continue hemodynamic monitoring as per protocol in SICU.    Neuro:  GCS [15 ]   [ x]  Neurovascular checks as per SICU protocol                 [ ] 3% NaCl        Paralysis [ ] Yes  [ x]  No      Sedated/Pain control with                 [ ] Dilaudid drip, [ ]  Ketamine, [ ] Fentanyl, [ ] Propofol, [ ] Precedex, [ ] Versed, [ ] Ativan,                           [ ] OxyContin standing,  [ ] OxyContin PRN, [ x] Dilaudid PRN pushes, [ ] Fentanyl PRN pushes, [ ] PCA,                [x ] Tylenol IV/PO, [x ] Gabapentin, [ ]  Ketorolac,  [ ] Lidoderm Patch       Other Medications               [ ] Seroquel, [ ] Haldol, [ ] Zyprexa,  [ ] Clonazepam [ ] Xanax, [ ] Versed/Ativan PRN, [ ] Valium [x ] None               [ ] Robaxin   [ ] Baclofen  [ ] Flexeril               [ ] Keppra  [ ] Lamictal  [ ] Depakote  [ ] Dilantin               [ ] CIWA (Ativan/Valium/ Librium)  CV: continue to monitor  On pressors [ ]  Yes  [x ]  No          [ ]  Levophed, [ ] Reilly-Synephrine, [ ] Vasopressin, [ ]  Epinephrine          [ ] Dobutamine, [ ] Milrinone, [ ]  Midodrine,  [ ] Others    Other Cardiac Meds          [ ] Amiodarone IV/PO, [ ] Digoxin, [ ] Cardizem drip, [ ] Cleviprex drip, [ ] Esmolol drip    Respiratory: Acute respiratory insufficiency -> continue to monitor                        None Invasive Support  [x ] Incentive Spirometer                                  [ ] BiPAP   [ ] CPAP/NIV   [ ] HFNC   [ ] NR Face Mask   [x ] NC  [ ] Trach Caller                        Ventilatory support  [ ] Yes [x ] No   [ ] SBT                                  [ ] PC    [ ] VC   [ ] AC   [ ] BiVent/APRV   [ ]CPAP   GI  [x ]  bowel regiment  [x ] BM none [x ] Flatus+        Prophylaxis [ x] PPI  [ ] H2 Blockers  [ ] Others  Nutrition: continue   [x ] Diet NPO  [ ] TPN/PPN   [ ] calories count   [ ]  Tube Feeds     Renal: Continue I&Os monitoring, Hagen catheter  [x ] Yes,  [ ]   No ,  [x ] Consideration for discontinuation [ ] Taxes cath/PrimaFit  [ ] TOV       Lytes/Acid-base: replete hypokalemia, hypomagnesemia, hypocalcemia, hypophosphatemia        IV Fluids   [x ] LR@75/hr,  [ ] NS  [ ] D5W1/2NS [ ] Albumin   ID: leukocytosis -> continue to monitor:         IV Abx [x ] Yes marco-OP, [ ] No;  ID consulted [ ] Yes, [x ] No        Cultures send  [ ] Respiratory   [ ] Blood   [ ] Urine  [ ] Fluids  [ ] Tissue  [ x]  None  Lines:   [ ] Right   [ ] Left  [ ] Bilateral                     [ ] Subclavian TLC        [ ]  Internal Jugular TLC     [ ]  Femoral TLC                     [ ] Subclavian Cordis    [ ] Internal Jugular Cordis   [ ] Femoral Cordis                     [ ] HD catheter    [ ] PICC     [ ]  Midline   [x ] Peripheral IVs                                                 [ ] Right   [ ] Left   [ x] None                     [ ] Radial A-Line    [ ] Femoral A-Line   [ ] Axillary A-Line               Heme: continue to evaluate for acute blood loss anemia- trend Hg/Hct                     AC Reversal Indicated [ ] Yes  [x ] No                    Transfused  indicated  [ ] Yes, [x ] No    [ ] PRBCs   [ ] Platelets   [ ] FFPs   [ ] Cryoprecipitate                    Should be started on or continued with  following  [ ] Yes,  [ x] No                               [ ] Lovenox  [ ] Coumadin  [ ] Heparin drip  [ ] NOVACs  [ ] ASA  [ ] Antiplatelets   Endocrine: Prevent and treat hyperglycemia with insulin as needed,                                 Insuline drip [ ] Yes, [x ] No   PV: follow pulse exam  Skin: decub precautions  DVT Prophylaxis:  [x ] SCDs  [ ] Heparin SQ  [ x] Lovenox  SQ  Stress Gastritis Prophylaxis: PPI/H2 Blockers if indicated  Mobility: patient is evaluated at the bedside with mobility team and the goals for today are discussed with PT [x ]    PATIENT/FAMILY/SURROGATE CONFERENCE:  [x ] Yes with patient at the bedside. [ ] No  PURPOSE: To obtain necessary information, To discuss treatment options under consideration today.    I saw and evaluated the patient personally. I have reviewed and agree with note above. Treatment plan discussed with SICU team, nurses and primary team at the time of the multidisciplinary rounds. The above note is NOT written at the time of rounds and will reflect all changes throughout management of the patient for the day note is written for.    Sunita Mittal MD, FACS  Trauma/ACS/SCC Attending
Critical Care: [id number]   This patient has a high probability of sudden, clinically significant deterioration, which requires the highest level of physician preparedness to intervene urgently. I managed/supervised life or organ supporting interventions that required frequent physician assessment. I devoted my full attention in the ICU to the direct care of this patient for the period of time indicated below. Time I spent with the family or surrogate(s) is included only if the patient was incapable of providing the necessary information or participating in medical decision making. Time devoted to teaching and to any procedures I billed separately is not included.     CHELA GALLAGHER 81y Female admitted to [x ] SICU /[ ] SDU with high risk for hemodynamic instability and airway obstruction. S/P neck dissection   Patient is examined and evaluated at the bedside with SICU team. Treatment plan discussed with SICU team, nurses and primary team.   Chest X-ray and all relevant studies reviewed during rounds.  Will continue hemodynamic monitoring as per protocol in SICU.    Neuro:  GCS [15 ]   [ x]  Neurovascular checks as per SICU protocol                 [ ] 3% NaCl        Paralysis [ ] Yes  [ x]  No      Sedated/Pain control with                 [ ] Dilaudid drip, [ ]  Ketamine, [ ] Fentanyl, [ ] Propofol, [ ] Precedex, [ ] Versed, [ ] Ativan,                           [ ] OxyContin standing,  [ ] OxyContin PRN, [ x] Dilaudid PRN pushes, [ ] Fentanyl PRN pushes, [ ] PCA,                [x ] Tylenol IV/PO, [x ] Gabapentin, [ ]  Ketorolac,  [ ] Lidoderm Patch       Other Medications               [ ] Seroquel, [ ] Haldol, [ ] Zyprexa,  [ ] Clonazepam [ ] Xanax, [ ] Versed/Ativan PRN, [ ] Valium [x ] None               [ ] Robaxin   [ ] Baclofen  [ ] Flexeril               [ ] Keppra  [ ] Lamictal  [ ] Depakote  [ ] Dilantin               [ ] CIWA (Ativan/Valium/ Librium)  CV: continue to monitor  On pressors [ ]  Yes  [x ]  No          [ ]  Levophed, [ ] Reilly-Synephrine, [ ] Vasopressin, [ ]  Epinephrine          [ ] Dobutamine, [ ] Milrinone, [ ]  Midodrine,  [ ] Others    Other Cardiac Meds          [ ] Amiodarone IV/PO, [ ] Digoxin, [ ] Cardizem drip, [ ] Cleviprex drip, [ ] Esmolol drip  Respiratory: Acute respiratory insufficiency -> continue to monitor                        None Invasive Support  [x ] Incentive Spirometer                                  [ ] BiPAP   [ ] CPAP/NIV   [ ] HFNC   [ ] NR Face Mask   [x ] NC  [ ] Trach Caller                        Ventilatory support  [ ] Yes [x ] No   [ ] SBT                                  [ ] PC    [ ] VC   [ ] AC   [ ] BiVent/APRV   [ ]CPAP   GI  [x ]  bowel regiment  [x ] BM none [x ] Flatus+        Prophylaxis [ x] PPI  [ ] H2 Blockers  [ ] Others  Nutrition: continue   [x ] Diet soft mechanical  [ ] TPN/PPN   [ ] calories count   [ ]  Tube Feeds     Renal: Continue I&Os monitoring, Hagen catheter  [x ] Yes,  [ ]   No ,  [x ] Consideration for discontinuation [ ] Taxes cath/PrimaFit  [ ] TOV       Lytes/Acid-base: replete hypokalemia, hypomagnesemia, hypocalcemia, hypophosphatemia         hyperkalemia -> will continue to monitor and correct       IV Fluids   [x ] LR@35/hr,  [ ] NS  [ ] D5W1/2NS [ ] Albumin   ID: leukocytosis -> continue to monitor:         IV Abx [x ] Yes marco-OP, [ ] No;  ID consulted [ ] Yes, [x ] No        Cultures send  [ ] Respiratory   [ ] Blood   [ ] Urine  [ ] Fluids  [ ] Tissue  [ x]  None  Lines:   [ ] Right   [ ] Left  [ ] Bilateral                     [ ] Subclavian TLC        [ ]  Internal Jugular TLC     [ ]  Femoral TLC                     [ ] Subclavian Cordis    [ ] Internal Jugular Cordis   [ ] Femoral Cordis                     [ ] HD catheter    [ ] PICC     [ ]  Midline   [x ] Peripheral IVs                                                 [ ] Right   [ ] Left   [ x] None                     [ ] Radial A-Line    [ ] Femoral A-Line   [ ] Axillary A-Line               Heme: continue to evaluate for acute blood loss anemia- trend Hg/Hct                     AC Reversal Indicated [ ] Yes  [x ] No                    Transfused  indicated  [ ] Yes, [x ] No    [ ] PRBCs   [ ] Platelets   [ ] FFPs   [ ] Cryoprecipitate                    Should be started on or continued with  following  [ ] Yes,  [ x] No                               [ ] Lovenox  [ ] Coumadin  [ ] Heparin drip  [ ] NOVACs  [ ] ASA  [ ] Antiplatelets   Endocrine: Prevent and treat hyperglycemia with insulin as needed,                                 Insuline drip [ ] Yes, [x ] No   PV: follow pulse exam  Skin: decub precautions  DVT Prophylaxis:  [x ] SCDs  [ ] Heparin SQ  [ x] Lovenox  SQ  Stress Gastritis Prophylaxis: PPI/H2 Blockers if indicated  Mobility: patient is evaluated at the bedside with mobility team and the goals for today are discussed with PT [x ]    PATIENT/FAMILY/SURROGATE CONFERENCE:  [x ] Yes with patient at the bedside. [ ] No  PURPOSE: To obtain necessary information, To discuss treatment options under consideration today.    I saw and evaluated the patient personally. I have reviewed and agree with note above. Treatment plan discussed with SICU team, nurses and primary team at the time of the multidisciplinary rounds. The above note is NOT written at the time of rounds and will reflect all changes throughout management of the patient for the day note is written for.    Sunita Mittal MD, FACS  Trauma/ACS/SCC Attending

## 2021-12-17 NOTE — PROGRESS NOTE ADULT - SUBJECTIVE AND OBJECTIVE BOX
CHELA GALLAGHER  [id number]  81y Female    Indication for ICU admission:     Admit Date:12-14-21  ICU Date: 12/14  OR Date: 12/14    No Known Allergies    PAST MEDICAL & SURGICAL HISTORY:  Diabetes mellitus  Hypertension  Schizoaffective disorder  Edema  Bilateral LE  Disorder of thyroid, unspecified  Son cannot provide details. States she had &quot;thyroid surgery&quot; decades ago when she was young  No significant past surgical history    Home Medications:  aspirin 81 mg oral tablet, chewable: 1 tab(s) orally once a day (14 Dec 2021 07:53)  folic acid 1 mg oral tablet: 1 tab(s) orally once a day (14 Dec 2021 07:53)  Keflex 500 mg oral capsule: tid (14 Dec 2021 07:53)  Lexapro 5 mg oral tablet: 1 tab(s) orally once a day (14 Dec 2021 07:53)  losartan 50 mg oral tablet: 1 tab(s) orally once a day (14 Dec 2021 07:53)  RisperDAL 0.5 mg oral tablet: 1 tab(s) orally 2 times a day (14 Dec 2021 07:53)    24HRS EVENT:  Night  -lokelma  -K 5.8 terated -->   -Mag repleted  -UOP: 60-75/hr  -Cr 2.0 --> 1.8 -->     Day  -Potassium 6.9 on CMP but hemolyzed. F/u stat ABG and BMP   - K on VBG 4.9, on BMP 5.2 --> treated and repeat @4pm      DVT PTX: HSQ q8  GI PTX: PPI IV    ***Tubes/Lines/Drains  ***  Peripheral IV  R rad A line- removed, not working    REVIEW OF SYSTEMS  [x ] A ten-point review of systems was otherwise negative except as noted.  [ ] Due to altered mental status/intubation, subjective information were not able to be obtained from the patient. History was obtained, to the extent possible, from review of the chart and collateral sources of information.     CHELA GALLAGHER  [id number]  81y Female    Indication for ICU admission:     Admit Date:21  ICU Date:   OR Date:     No Known Allergies    PAST MEDICAL & SURGICAL HISTORY:  Diabetes mellitus  Hypertension  Schizoaffective disorder  Edema  Bilateral LE  Disorder of thyroid, unspecified  Son cannot provide details. States she had &quot;thyroid surgery&quot; decades ago when she was young  No significant past surgical history    Home Medications:  aspirin 81 mg oral tablet, chewable: 1 tab(s) orally once a day (14 Dec 2021 07:53)  folic acid 1 mg oral tablet: 1 tab(s) orally once a day (14 Dec 2021 07:53)  Keflex 500 mg oral capsule: tid (14 Dec 2021 07:53)  Lexapro 5 mg oral tablet: 1 tab(s) orally once a day (14 Dec 2021 07:53)  losartan 50 mg oral tablet: 1 tab(s) orally once a day (14 Dec 2021 07:53)  RisperDAL 0.5 mg oral tablet: 1 tab(s) orally 2 times a day (14 Dec 2021 07:53)    24HRS EVENT:  Night  -lokelma  -K 5.8 terated -->   -Mag repleted  -UOP: 60-75/hr  -Cr 2.0 --> 1.8 -->     Day  -Potassium 6.9 on CMP but hemolyzed. F/u stat ABG and BMP   - K on VBG 4.9, on BMP 5.2 --> treated and repeat @4pm      DVT PTX: HSQ q8  GI PTX: PPI IV    ***Tubes/Lines/Drains  ***  Peripheral IV  R rad A line- removed, not working    REVIEW OF SYSTEMS  [x ] A ten-point review of systems was otherwise negative except as noted.  [ ] Due to altered mental status/intubation, subjective information were not able to be obtained from the patient. History was obtained, to the extent possible, from review of the chart and collateral sources of information.      Daily     Daily Weight in k.4 (17 Dec 2021 06:00)    CURRENT MEDS:  Neurologic Medications  acetaminophen     Tablet .. 650 milliGRAM(s) Oral every 4 hours PRN Temp greater or equal to 38C (100.4F), Mild Pain (1 - 3)  escitalopram 5 milliGRAM(s) Oral daily  gabapentin 100 milliGRAM(s) Oral three times a day  HYDROmorphone  Injectable 0.5 milliGRAM(s) IV Push every 4 hours PRN Severe Pain (7 - 10)  ondansetron Injectable 4 milliGRAM(s) IV Push every 8 hours  oxyCODONE    IR 5 milliGRAM(s) Oral every 6 hours PRN Moderate Pain (4 - 6)  risperiDONE   Tablet 0.5 milliGRAM(s) Oral two times a day    Respiratory Medications    Cardiovascular Medications    Gastrointestinal Medications  cyanocobalamin 1000 MICROGram(s) Oral daily  ferrous    sulfate 325 milliGRAM(s) Oral daily  folic acid 1 milliGRAM(s) Oral daily  lactulose Syrup 20 Gram(s) Oral every 12 hours  pantoprazole  Injectable 40 milliGRAM(s) IV Push daily  sodium chloride 0.9%. 1000 milliLiter(s) IV Continuous <Continuous>    Genitourinary Medications    Hematologic/Oncologic Medications  aspirin enteric coated 81 milliGRAM(s) Oral daily  heparin   Injectable 5000 Unit(s) SubCutaneous every 8 hours    Antimicrobial/Immunologic Medications  ceFAZolin   IVPB 1000 milliGRAM(s) IV Intermittent every 8 hours    Endocrine/Metabolic Medications    Topical/Other Medications  BACItracin   Ointment 1 Application(s) Topical every 12 hours  sodium zirconium cyclosilicate 10 Gram(s) Oral every 8 hours      ICU Vital Signs Last 24 Hrs  T(C): 36.6 (17 Dec 2021 07:26), Max: 36.9 (17 Dec 2021 04:00)  T(F): 97.8 (17 Dec 2021 07:26), Max: 98.5 (17 Dec 2021 04:00)  HR: 91 (17 Dec 2021 09:00) (71 - 95)  BP: 142/87 (17 Dec 2021 09:00) (100/53 - 162/66)  BP(mean): 110 (17 Dec 2021 09:00) (71 - 110)  ABP: --  ABP(mean): --  RR: 12 (17 Dec 2021 09:00) (12 - 29)  SpO2: 98% (17 Dec 2021 09:00) (95% - 100%)        I&O's Detail    16 Dec 2021 07:01  -  17 Dec 2021 07:00  --------------------------------------------------------  IN:    IV PiggyBack: 250 mL    Lactated Ringers Bolus: 500 mL    sodium chloride 0.9%: 400 mL  Total IN: 1150 mL    OUT:    Bulb (mL): 15 mL    Indwelling Catheter - Urethral (mL): 1450 mL  Total OUT: 1465 mL    Total NET: -315 mL      17 Dec 2021 07:  -  17 Dec 2021 12:30  --------------------------------------------------------  IN:    sodium chloride 0.9%: 200 mL  Total IN: 200 mL    OUT:    Bulb (mL): 10 mL    Indwelling Catheter - Urethral (mL): 700 mL  Total OUT: 710 mL    Total NET: -510 mL        I&O's Summary    16 Dec 2021 07:  -  17 Dec 2021 07:00  --------------------------------------------------------  IN: 1150 mL / OUT: 1465 mL / NET: -315 mL    17 Dec 2021 07:01  -  17 Dec 2021 12:30  --------------------------------------------------------  IN: 200 mL / OUT: 710 mL / NET: -510 mL        LABS:    CAPILLARY BLOOD GLUCOSE      POCT Blood Glucose.: 104 mg/dL (17 Dec 2021 04:26)  POCT Blood Glucose.: 184 mg/dL (16 Dec 2021 16:53)                          9.9    7.04  )-----------( 326      ( 17 Dec 2021 05:30 )             34.3       Auto Neutrophil %: 77.1 % (21 @ 02:00)  Auto Immature Granulocyte %: 0.4 % (21 @ 02:00)        135  |  103  |  35<H>  ----------------------------<  212<H>  5.4<H>   |  19  |  1.8<H>      Calcium, Total Serum: 9.4 mg/dL (21 @ 05:30)      LFTs:     Blood Gas Venous - Lactate: 1.90 mmol/L (21 @ 09:24)              PHYSICAL EXAM:    General/Neuro     - NAD, alert & oriented x 3, no focal deficits    ENT  - RAIMUNDO drain in place by L ear draining serosanguinous fluid  - Incision clean, intact, warm and soft. No hematoma or ecchymosis.    Lungs:  - clear to auscultation b/l, Normal expansion/effort. Satting well on 3L NC.    Cardiovascular   - S1, S2.  Regular rate and rhythm.    - EKG: Normal Sinus Rhythm    GI:  - Abdomen soft, Non-tender, Non-distended.      Extremities:  -  Extremities warm, chronic edema b/l    Derm:  - Good skin turgor, no skin breakdown.      :  - Hagen catheter in place

## 2021-12-17 NOTE — CONSULT NOTE ADULT - ASSESSMENT
Pt with BRYAN on CKD3, DM, HTN, admitted for sx for facial resection of basal Cell ca.   Developed post op BRYAN, Hyperkalemia, mild met acidosis  -likely due to volume depletion, agree with gentle IVF - change LR to NS 50 cc/hr  -creat is improving  -renal low K diet, cont Lokelma 10 gr q8h  Met acidosis - improving, no need for Na bicarb for now  BP - well controlled  Hypercalcemia iatrogenic, Ca gluc given  will cont to monitor  strict Is and OS.

## 2021-12-17 NOTE — PROGRESS NOTE ADULT - ASSESSMENT
Pt is a 81y F POD#3 s/p WLE of skin lesion (lobe/pre-auricular area), superficial parotidectomy, partial auriculotomy, meatoplasty with submental flap.  Pt started on Lokelma yesterday. 2am K+ resulted 6.2; given 2g Ca gluc, D50, 10U insulin, repeat K+ 5.4.   Cr 1.8    ·	Possible RAIMUNDO d/c today    ·	f/u Nephro recs  ·	TOV   ·	Cont with q4 hrs flap checks by RN -> check flap color, warmth and if area soft (no need for doppler or pin pricks)  ·	Cont with bacitracin to incisions   ·	Cont with soft diet  ·	Encouraged OOB to chair; incentive spirometry   ·	Cont SC heparin, ASA   ·	f/u SICU recs   ·	d/w Dr. Rai, Dr. Lopez

## 2021-12-17 NOTE — PROGRESS NOTE ADULT - ASSESSMENT
ASSESSMENT:  81y Female with PMHx of HTN, DM II, B12 anemia, schizoaffective disorder, edema of B/L LE and basal cell carcinoma presents for scheduled facial composite resection, parotidectomy, and submental island flap reconstruction.     PLAN:   Neurologic:   - A&O x 3  - Pain management with Tylenol, Oxycodone, Dilaudid  - Zofran PRN for nausea  #Schizoaffective d/o  - Risperidone, Escitalopram    ENT:  - Flap checks for softness and color q4  - Bacitracin to area q12  - Monitor RAIMUNDO drain output    Respiratory:   - Satting well on NC 2-3L  - Wean to room air as tolerated  - AM CXR  - Incentive spirometry encouraged    Cardiovascular:   - Lasix for B/L leg edema - held d/t BRYAN  - Monitor pulse ox   #HTN  - Losartan - held d/t BRYAN  - Maintain normopressive    Gastrointestinal/Nutrition:   - Diet- Mechanical soft    Renal/Genitourinary:   - Monitor I&Os q1h    - Cr uptrending > home losartan / lasix held    - Hagen placed 12/16  UOP: 60-75/hr     Labs        - BUN/Cr: 38/1.8        - Na 137, K 5.8, Mag 1.9, Phos 3.3 -- treated    Hematologic:   - DVT ppx: Heparin, SCDs  - H&H: 10.3/36  - Platelets: 335  #B12 anemia  - Cyanocobalamin   - Ferrous sulfate  - Folic acid    Infectious Disease:   - Cefazolin post-op (until RAIMUNDO d/c)  - Afebrile  - Monitor temps  - WBC 10.2    Lines/Tubes:  - PIV  - L cephalic midline    Endocrine:   #DM II  - Monitor finger sticks  - glucose, 120  #Thyroid disorder, unknown  - TSH 0.51    Disposition: SICU   ASSESSMENT:  81y Female with PMHx of HTN, DM II, B12 anemia, schizoaffective disorder, edema of B/L LE and basal cell carcinoma presents for scheduled facial composite resection, parotidectomy, and submental island flap reconstruction.     PLAN:   Neurologic:   - A&O x 3  - Pain management with Tylenol, Oxycodone, Dilaudid  - Zofran PRN for nausea  #Schizoaffective d/o  - Risperidone, Escitalopram    ENT:  - Flap checks for softness and color q4  - Bacitracin to area q12  - Monitor RAIMUDNO drain output    Respiratory:   - Satting well on NC 2-3L  - Wean to room air as tolerated  - AM CXR  - Incentive spirometry encouraged    Cardiovascular:   - Lasix for B/L leg edema - held d/t BRYAN  - Monitor pulse ox   #HTN  - Losartan - held d/t BRYAN  - Maintain normopressive    Gastrointestinal/Nutrition:   - Diet- Mechanical soft    Renal/Genitourinary:   - Monitor I&Os q1h    - Cr uptrending > home losartan / lasix held    - Hagen placed 12/16  UOP: /hr     Labs        - BUN/Cr: 35/1.8        - Na 137, K 5.4, Mag 1.9, Phos 3.3 -- treated    Hematologic:   - DVT ppx: Heparin, SCDs  - H&H: 10.3/36  - Platelets: 335  #B12 anemia  - Cyanocobalamin   - Ferrous sulfate  - Folic acid    Infectious Disease:   - Cefazolin post-op (until RAIMUNDO d/c)  - Afebrile  - Monitor temps  - WBC 10.2    Lines/Tubes:  - PIV  - L cephalic midline    Endocrine:   #DM II  - Monitor finger sticks  - glucose, 120  #Thyroid disorder, unknown  - TSH 0.51    Disposition: possible SDU vs the floor pending 1100 BMP

## 2021-12-17 NOTE — CHART NOTE - NSCHARTNOTEFT_GEN_A_CORE
The patient is a 77y Female complaining of weakness. d/w , Dr. Lopez.     L neck drain removed at bedside.   <5cc SS output in bulb.  Site covered with 4x4 and tape.     s/w covering RN.

## 2021-12-18 ENCOUNTER — TRANSCRIPTION ENCOUNTER (OUTPATIENT)
Age: 81
End: 2021-12-18

## 2021-12-18 LAB
ANION GAP SERPL CALC-SCNC: 11 MMOL/L — SIGNIFICANT CHANGE UP (ref 7–14)
ANION GAP SERPL CALC-SCNC: 13 MMOL/L — SIGNIFICANT CHANGE UP (ref 7–14)
BASOPHILS # BLD AUTO: 0.04 K/UL — SIGNIFICANT CHANGE UP (ref 0–0.2)
BASOPHILS NFR BLD AUTO: 0.5 % — SIGNIFICANT CHANGE UP (ref 0–1)
BUN SERPL-MCNC: 30 MG/DL — HIGH (ref 10–20)
BUN SERPL-MCNC: 30 MG/DL — HIGH (ref 10–20)
CALCIUM SERPL-MCNC: 9.8 MG/DL — SIGNIFICANT CHANGE UP (ref 8.5–10.1)
CALCIUM SERPL-MCNC: 9.9 MG/DL — SIGNIFICANT CHANGE UP (ref 8.5–10.1)
CHLORIDE SERPL-SCNC: 103 MMOL/L — SIGNIFICANT CHANGE UP (ref 98–110)
CHLORIDE SERPL-SCNC: 107 MMOL/L — SIGNIFICANT CHANGE UP (ref 98–110)
CO2 SERPL-SCNC: 19 MMOL/L — SIGNIFICANT CHANGE UP (ref 17–32)
CO2 SERPL-SCNC: 19 MMOL/L — SIGNIFICANT CHANGE UP (ref 17–32)
CREAT SERPL-MCNC: 1.4 MG/DL — SIGNIFICANT CHANGE UP (ref 0.7–1.5)
CREAT SERPL-MCNC: 1.4 MG/DL — SIGNIFICANT CHANGE UP (ref 0.7–1.5)
EOSINOPHIL # BLD AUTO: 0.15 K/UL — SIGNIFICANT CHANGE UP (ref 0–0.7)
EOSINOPHIL NFR BLD AUTO: 1.9 % — SIGNIFICANT CHANGE UP (ref 0–8)
GLUCOSE BLDC GLUCOMTR-MCNC: 104 MG/DL — HIGH (ref 70–99)
GLUCOSE BLDC GLUCOMTR-MCNC: 110 MG/DL — HIGH (ref 70–99)
GLUCOSE BLDC GLUCOMTR-MCNC: 119 MG/DL — HIGH (ref 70–99)
GLUCOSE BLDC GLUCOMTR-MCNC: 137 MG/DL — HIGH (ref 70–99)
GLUCOSE SERPL-MCNC: 104 MG/DL — HIGH (ref 70–99)
GLUCOSE SERPL-MCNC: 130 MG/DL — HIGH (ref 70–99)
HCT VFR BLD CALC: 34.9 % — LOW (ref 37–47)
HCT VFR BLD CALC: 35.2 % — LOW (ref 37–47)
HGB BLD-MCNC: 10.1 G/DL — LOW (ref 12–16)
HGB BLD-MCNC: 10.2 G/DL — LOW (ref 12–16)
IMM GRANULOCYTES NFR BLD AUTO: 0.2 % — SIGNIFICANT CHANGE UP (ref 0.1–0.3)
LYMPHOCYTES # BLD AUTO: 1.35 K/UL — SIGNIFICANT CHANGE UP (ref 1.2–3.4)
LYMPHOCYTES # BLD AUTO: 16.8 % — LOW (ref 20.5–51.1)
MAGNESIUM SERPL-MCNC: 2.1 MG/DL — SIGNIFICANT CHANGE UP (ref 1.8–2.4)
MCHC RBC-ENTMCNC: 25.3 PG — LOW (ref 27–31)
MCHC RBC-ENTMCNC: 25.5 PG — LOW (ref 27–31)
MCHC RBC-ENTMCNC: 28.9 G/DL — LOW (ref 32–37)
MCHC RBC-ENTMCNC: 29 G/DL — LOW (ref 32–37)
MCV RBC AUTO: 87.3 FL — SIGNIFICANT CHANGE UP (ref 81–99)
MCV RBC AUTO: 88 FL — SIGNIFICANT CHANGE UP (ref 81–99)
MONOCYTES # BLD AUTO: 0.66 K/UL — HIGH (ref 0.1–0.6)
MONOCYTES NFR BLD AUTO: 8.2 % — SIGNIFICANT CHANGE UP (ref 1.7–9.3)
NEUTROPHILS # BLD AUTO: 5.81 K/UL — SIGNIFICANT CHANGE UP (ref 1.4–6.5)
NEUTROPHILS NFR BLD AUTO: 72.4 % — SIGNIFICANT CHANGE UP (ref 42.2–75.2)
NRBC # BLD: 0 /100 WBCS — SIGNIFICANT CHANGE UP (ref 0–0)
NRBC # BLD: 0 /100 WBCS — SIGNIFICANT CHANGE UP (ref 0–0)
PHOSPHATE SERPL-MCNC: 3.1 MG/DL — SIGNIFICANT CHANGE UP (ref 2.1–4.9)
PLATELET # BLD AUTO: 306 K/UL — SIGNIFICANT CHANGE UP (ref 130–400)
PLATELET # BLD AUTO: 307 K/UL — SIGNIFICANT CHANGE UP (ref 130–400)
POTASSIUM SERPL-MCNC: 5 MMOL/L — SIGNIFICANT CHANGE UP (ref 3.5–5)
POTASSIUM SERPL-MCNC: 5.1 MMOL/L — HIGH (ref 3.5–5)
POTASSIUM SERPL-SCNC: 5 MMOL/L — SIGNIFICANT CHANGE UP (ref 3.5–5)
POTASSIUM SERPL-SCNC: 5.1 MMOL/L — HIGH (ref 3.5–5)
RBC # BLD: 4 M/UL — LOW (ref 4.2–5.4)
RBC # BLD: 4 M/UL — LOW (ref 4.2–5.4)
RBC # FLD: 22.8 % — HIGH (ref 11.5–14.5)
RBC # FLD: 22.9 % — HIGH (ref 11.5–14.5)
SODIUM SERPL-SCNC: 133 MMOL/L — LOW (ref 135–146)
SODIUM SERPL-SCNC: 139 MMOL/L — SIGNIFICANT CHANGE UP (ref 135–146)
WBC # BLD: 7.27 K/UL — SIGNIFICANT CHANGE UP (ref 4.8–10.8)
WBC # BLD: 8.03 K/UL — SIGNIFICANT CHANGE UP (ref 4.8–10.8)
WBC # FLD AUTO: 7.27 K/UL — SIGNIFICANT CHANGE UP (ref 4.8–10.8)
WBC # FLD AUTO: 8.03 K/UL — SIGNIFICANT CHANGE UP (ref 4.8–10.8)

## 2021-12-18 PROCEDURE — 71045 X-RAY EXAM CHEST 1 VIEW: CPT | Mod: 26

## 2021-12-18 RX ADMIN — SODIUM ZIRCONIUM CYCLOSILICATE 10 GRAM(S): 10 POWDER, FOR SUSPENSION ORAL at 00:13

## 2021-12-18 RX ADMIN — Medication 1 APPLICATION(S): at 17:08

## 2021-12-18 RX ADMIN — Medication 1 APPLICATION(S): at 05:40

## 2021-12-18 RX ADMIN — Medication 1 MILLIGRAM(S): at 11:30

## 2021-12-18 RX ADMIN — HEPARIN SODIUM 5000 UNIT(S): 5000 INJECTION INTRAVENOUS; SUBCUTANEOUS at 21:16

## 2021-12-18 RX ADMIN — GABAPENTIN 100 MILLIGRAM(S): 400 CAPSULE ORAL at 21:16

## 2021-12-18 RX ADMIN — Medication 81 MILLIGRAM(S): at 11:30

## 2021-12-18 RX ADMIN — SODIUM ZIRCONIUM CYCLOSILICATE 10 GRAM(S): 10 POWDER, FOR SUSPENSION ORAL at 15:51

## 2021-12-18 RX ADMIN — GABAPENTIN 100 MILLIGRAM(S): 400 CAPSULE ORAL at 15:51

## 2021-12-18 RX ADMIN — PREGABALIN 1000 MICROGRAM(S): 225 CAPSULE ORAL at 11:30

## 2021-12-18 RX ADMIN — HEPARIN SODIUM 5000 UNIT(S): 5000 INJECTION INTRAVENOUS; SUBCUTANEOUS at 05:40

## 2021-12-18 RX ADMIN — ONDANSETRON 4 MILLIGRAM(S): 8 TABLET, FILM COATED ORAL at 05:36

## 2021-12-18 RX ADMIN — ONDANSETRON 4 MILLIGRAM(S): 8 TABLET, FILM COATED ORAL at 15:51

## 2021-12-18 RX ADMIN — PANTOPRAZOLE SODIUM 40 MILLIGRAM(S): 20 TABLET, DELAYED RELEASE ORAL at 05:42

## 2021-12-18 RX ADMIN — SODIUM ZIRCONIUM CYCLOSILICATE 10 GRAM(S): 10 POWDER, FOR SUSPENSION ORAL at 08:31

## 2021-12-18 RX ADMIN — LACTULOSE 20 GRAM(S): 10 SOLUTION ORAL at 05:40

## 2021-12-18 RX ADMIN — GABAPENTIN 100 MILLIGRAM(S): 400 CAPSULE ORAL at 05:35

## 2021-12-18 RX ADMIN — ESCITALOPRAM OXALATE 5 MILLIGRAM(S): 10 TABLET, FILM COATED ORAL at 11:30

## 2021-12-18 RX ADMIN — RISPERIDONE 0.5 MILLIGRAM(S): 4 TABLET ORAL at 05:35

## 2021-12-18 RX ADMIN — ONDANSETRON 4 MILLIGRAM(S): 8 TABLET, FILM COATED ORAL at 21:15

## 2021-12-18 RX ADMIN — RISPERIDONE 0.5 MILLIGRAM(S): 4 TABLET ORAL at 17:07

## 2021-12-18 RX ADMIN — HEPARIN SODIUM 5000 UNIT(S): 5000 INJECTION INTRAVENOUS; SUBCUTANEOUS at 15:51

## 2021-12-18 RX ADMIN — Medication 325 MILLIGRAM(S): at 11:30

## 2021-12-18 NOTE — PROGRESS NOTE ADULT - SUBJECTIVE AND OBJECTIVE BOX
ENT DAILY PROGRESS NOTE    Pt is a 81y Female POD#4 s/p WLE of skin lesion (lobe/pre-auricular area), superficial parotidectomy, partial auriculotomy, meatoplasty with submental flap. Patient seen and examined at bedside this morning. PT doing well, no complaints this AM. Pt voided and had a BM as per the patient & RN, unable to measure as pt flushed prior. Pt ambulating to the bathroom, PT saw pt 12/16.      REVIEW OF SYSTEMS   [x] A ten-point review of systems was otherwise negative except as noted.      Allergies    No Known Allergies    Intolerances    MEDICATIONS:  acetaminophen     Tablet .. 650 milliGRAM(s) Oral every 4 hours PRN  aspirin enteric coated 81 milliGRAM(s) Oral daily  BACItracin   Ointment 1 Application(s) Topical every 12 hours  cyanocobalamin 1000 MICROGram(s) Oral daily  escitalopram 5 milliGRAM(s) Oral daily  ferrous    sulfate 325 milliGRAM(s) Oral daily  folic acid 1 milliGRAM(s) Oral daily  gabapentin 100 milliGRAM(s) Oral every 8 hours  heparin   Injectable 5000 Unit(s) SubCutaneous every 8 hours  HYDROmorphone  Injectable 0.5 milliGRAM(s) IV Push every 4 hours PRN  ondansetron Injectable 4 milliGRAM(s) IV Push every 8 hours  oxyCODONE    IR 5 milliGRAM(s) Oral every 6 hours PRN  pantoprazole    Tablet 40 milliGRAM(s) Oral before breakfast  risperiDONE   Tablet 0.5 milliGRAM(s) Oral two times a day  senna 1 Tablet(s) Oral once PRN  sodium zirconium cyclosilicate 10 Gram(s) Oral every 8 hours      Vital Signs Last 24 Hrs  T(C): 36.6 (18 Dec 2021 08:12), Max: 37 (17 Dec 2021 16:04)  T(F): 97.9 (18 Dec 2021 08:12), Max: 98.6 (17 Dec 2021 16:04)  HR: 92 (18 Dec 2021 08:12) (75 - 92)  BP: 172/74 (18 Dec 2021 08:12) (123/60 - 172/74)  RR: 18 (18 Dec 2021 08:12) (18 - 20)  SpO2: 93% (17 Dec 2021 22:00) (90% - 93%)      12-17 @ 07:01  -  12-18 @ 07:00  --------------------------------------------------------  IN:    sodium chloride 0.9%: 280 mL  Total IN: 280 mL    OUT:    Bulb (mL): 10 mL    Indwelling Catheter - Urethral (mL): 1250 mL    Voided (mL): 200 mL  Total OUT: 1460 mL    Total NET: -1180 mL    PHYSICAL EXAM:    GEN: NAD, awake and alert. No drooling or pooling of secretions. No stridor or stertor. Good vocal quality, no hoarseness.   SKIN: Good color, non diaphoretic  HEENT: + left facial incision C/D/I, no erythema or edema, no TTP over area. mild warmth to the dependent portion of the incision, adjacent to the skin island. no dehiscence noted. no drainage. + xeroform noted to the ear. Oral mucosa pink and moist. No erythema or edema noted to buccal mucosa, tongue, FOM, uvula or posterior oropharynx. Uvula midline.   NECK:  Trachea midline. Neck supple, no TTP to B/L lateral neck, no cervical LAD. + right EJ  RESP: No dyspnea, non-labored breathing. No use of accessory muscles.  CARDIO: +S1/S2  ABDO: Soft, NT.  EXT: MARCUM x 4, midline, LUE.    LABS:  CBC-                        10.2   7.27  )-----------( 307      ( 18 Dec 2021 04:30 )             35.2     BMP/CMP-  18 Dec 2021 04:30    139    |  107    |  30     ----------------------------<  104    5.1     |  19     |  1.4      Ca    9.8        18 Dec 2021 04:30  Phos  3.1       17 Dec 2021 23:30  Mg     2.1       17 Dec 2021 23:30      Coagulation Studies-    Endocrine Panel-  Calcium, Total Serum: 9.8 mg/dL (12-18 @ 04:30)  Calcium, Total Serum: 9.9 mg/dL (12-17 @ 23:30)  Calcium, Total Serum: 10.0 mg/dL (12-17 @ 16:01)  Calcium, Total Serum: 10.9 mg/dL (12-17 @ 12:30)

## 2021-12-18 NOTE — DISCHARGE NOTE PROVIDER - NSDCFUSCHEDAPPT_GEN_ALL_CORE_FT
CHELA GALLAGHER ; 12/29/2021 ; [facility] [address] CHELA GALLAGHER ; 12/27/2021 ; NPP Otol[facility] [address] CHELA Sloan ; 12/29/2021 ; NPP [facility] [address]

## 2021-12-18 NOTE — DISCHARGE NOTE PROVIDER - NSDCFUADDINST_GEN_ALL_CORE_FT
Ok to bathe and shower, ok to get incision wet. Pat area dry, do not rub. Keep ear canal dry. No qtip use.

## 2021-12-18 NOTE — DISCHARGE NOTE PROVIDER - HOSPITAL COURSE
Patient is an 81 y.o female with BCC of the ear s/p WLE of skin lesion (lobe/pre-auricular area), superficial parotidectomy, partial auriculotomy, meatoplasty with submental flap on 12/14. Pt was admitted to the SICU due to age, length of surgery and large incision area - stayed in the SICU as patient developed BRYAN, hyponatremia and hyperkalemia. Pt was started on Lokelma as well as gentle hydration, escobar was placed (initially had primafit in place). Pt's electrolytes and BRYAN resolved, patient was sent to the floor in stable condition on 12/17. Pt was assessed by Physical Therapy, pt previously in [facility] re[facility] facility - would benefit from returning to facility upon discharge. Pt given TOV, voided freely. Pt remained stable and was discharged to SNF on 12/20. Patient is an 81 y.o female with BCC of the ear s/p WLE of skin lesion (lobe/pre-auricular area), superficial parotidectomy, partial auriculotomy, meatoplasty with submental flap on 12/14/21. Pt was admitted to the SICU due to age, length of surgery and large incision area - stayed in the SICU as patient developed BRYAN, hyponatremia and hyperkalemia. Pt was started on Lokelma as well as gentle hydration, escobar was placed (initially had primafit in place). Pt's electrolytes and BRYAN resolved, patient was sent to the floor in stable condition on 12/17/21. Pt was assessed by Physical Therapy, pt previously in [place] [facility] rehab facility - would benefit from returning to facility upon discharge. Pt given TOV, voided freely. Pt remained stable and was discharged to SNF on 12/20/21.

## 2021-12-18 NOTE — PROGRESS NOTE ADULT - ASSESSMENT
Pt is a 81y Female POD#4 s/p WLE of skin lesion (lobe/pre-auricular area), superficial parotidectomy, partial auriculotomy, meatoplasty with submental flap - doing well.    ·	ok to D/C RN flap checks   ·	D/C'd IVF as Na normal  ·	cont lokelma  ·	cont soft diet, low K  ·	BRYAN resolving  ·	Hagen D/C'd, pt voided - prima fit in place  ·	cont baci to wound  ·	ambulate, OOB -> f/u PT  ·	cont home meds  ·	w/d with attng

## 2021-12-18 NOTE — PROGRESS NOTE ADULT - ASSESSMENT
Pt with BRYAN on CKD3, DM, HTN, admitted for sx for facial resection of basal Cell ca.   Developed post op BRYAN, Hyperkalemia, mild met acidosis  -likely due to volume depletion  -creat is improving/ check repeat   -renal low K diet, cont Lokelma 10 gr q8h, can d/c if repeated k < 5   Met acidosis - improving, no need for Na bicarb for now  BP - well controlled  hyponatremia ? due to hypovolemia, u osm 396, check plasma osm , if sodium improving d/c iv fluids   will cont to monitor  will follow

## 2021-12-18 NOTE — DISCHARGE NOTE PROVIDER - NSDCCPCAREPLAN_GEN_ALL_CORE_FT
PRINCIPAL DISCHARGE DIAGNOSIS  Diagnosis: Basal cell carcinoma, ear  Assessment and Plan of Treatment: s/p wide local excision, left superficial parotidectomy partial auriculectomy, meatoplasty and submental flap

## 2021-12-18 NOTE — DISCHARGE NOTE PROVIDER - NSDCCPTREATMENT_GEN_ALL_CORE_FT
PRINCIPAL PROCEDURE  Procedure: Superficial parotidectomy  Findings and Treatment: with partial auriculectomy, meatoplasty and submental island pedicle flap

## 2021-12-18 NOTE — DISCHARGE NOTE PROVIDER - NSDCFUADDAPPT_GEN_ALL_CORE_FT
Call to make an appointment to be seen by Dr Lopez and Dr Rai in 1 week.  Call to make an appointment to be seen by Dr Lopez  Please report to your f/u appointment with Dr. Rai on 12/27/21 at 10AM

## 2021-12-18 NOTE — DISCHARGE NOTE PROVIDER - CARE PROVIDER_API CALL
Sergio Lopez; DDS)  Dentistry  [address] [place] [address], [place] [address]  Phone: [phone]  Fax: [phone]  Follow Up Time:     Case Rai)  [facility]  [address] [place] [address]  Phone: [phone]  Fax: [phone]  Follow Up Time:    Sergio Lopez; DDS)  Dentistry  [address] [place] [address], [place] [address]  Phone: [phone]  Fax: [phone]  Follow Up Time:     Case Rai)  [facility]  [address] [place] [address]  Phone: [phone]  Fax: [phone]  Established Patient  Scheduled Appointment: 12/27/2021 10:00 AM

## 2021-12-18 NOTE — DISCHARGE NOTE PROVIDER - NSDCMRMEDTOKEN_GEN_ALL_CORE_FT
aspirin 81 mg oral tablet, chewable: 1 tab(s) orally once a day  cyanocobalamin 1000 mcg oral tablet: 1 tab(s) orally once a day  ferrous sulfate 325 mg (65 mg elemental iron) oral delayed release tablet: 1 tab(s) orally once a day   folic acid 1 mg oral tablet: 1 tab(s) orally once a day  furosemide 20 mg oral tablet: 1 tab(s) orally once a day  Keflex 500 mg oral capsule: tid  Lexapro 5 mg oral tablet: 1 tab(s) orally once a day  losartan 50 mg oral tablet: 1 tab(s) orally once a day  RisperDAL 0.5 mg oral tablet: 1 tab(s) orally 2 times a day   aspirin 81 mg oral tablet, chewable: 1 tab(s) orally once a day  cyanocobalamin 1000 mcg oral tablet: 1 tab(s) orally once a day  ferrous sulfate 325 mg (65 mg elemental iron) oral delayed release tablet: 1 tab(s) orally once a day   folic acid 1 mg oral tablet: 1 tab(s) orally once a day  furosemide 20 mg oral tablet: 1 tab(s) orally once a day  Keflex 500 mg oral capsule: tid  Lexapro 5 mg oral tablet: 1 tab(s) orally once a day  Lokelma 5 g oral powder for reconstitution: 5 gram(s) orally every 12 hours MDD:10 grams  losartan 50 mg oral tablet: 1 tab(s) orally once a day  RisperDAL 0.5 mg oral tablet: 1 tab(s) orally 2 times a day

## 2021-12-18 NOTE — PROGRESS NOTE ADULT - SUBJECTIVE AND OBJECTIVE BOX
seen and examined  24 h events noted         PAST HISTORY  --------------------------------------------------------------------------------  No significant changes to PMH, PSH, FHx, SHx, unless otherwise noted    ALLERGIES & MEDICATIONS  --------------------------------------------------------------------------------  Allergies    No Known Allergies    Intolerances      Standing Inpatient Medications  aspirin enteric coated 81 milliGRAM(s) Oral daily  BACItracin   Ointment 1 Application(s) Topical every 12 hours  cyanocobalamin 1000 MICROGram(s) Oral daily  escitalopram 5 milliGRAM(s) Oral daily  ferrous    sulfate 325 milliGRAM(s) Oral daily  folic acid 1 milliGRAM(s) Oral daily  gabapentin 100 milliGRAM(s) Oral every 8 hours  heparin   Injectable 5000 Unit(s) SubCutaneous every 8 hours  lactulose Syrup 20 Gram(s) Oral every 12 hours  ondansetron Injectable 4 milliGRAM(s) IV Push every 8 hours  pantoprazole    Tablet 40 milliGRAM(s) Oral before breakfast  risperiDONE   Tablet 0.5 milliGRAM(s) Oral two times a day  sodium chloride 0.9%. 1000 milliLiter(s) IV Continuous <Continuous>  sodium zirconium cyclosilicate 10 Gram(s) Oral every 8 hours    PRN Inpatient Medications  acetaminophen     Tablet .. 650 milliGRAM(s) Oral every 4 hours PRN  HYDROmorphone  Injectable 0.5 milliGRAM(s) IV Push every 4 hours PRN  oxyCODONE    IR 5 milliGRAM(s) Oral every 6 hours PRN  senna 1 Tablet(s) Oral once PRN          VITALS/PHYSICAL EXAM  --------------------------------------------------------------------------------  T(C): 36.6 (12-18-21 @ 00:00), Max: 37 (12-17-21 @ 16:04)  HR: 79 (12-18-21 @ 00:00) (71 - 91)  BP: 124/60 (12-18-21 @ 00:00) (101/53 - 142/87)  RR: 19 (12-18-21 @ 00:00) (12 - 24)  SpO2: 93% (12-17-21 @ 22:00) (90% - 99%)  Wt(kg): --  Height (cm): 165.1 (12-17-21 @ 15:12)  Weight (kg): 106.8 (12-17-21 @ 15:12)  BMI (kg/m2): 39.2 (12-17-21 @ 15:12)  BSA (m2): 2.12 (12-17-21 @ 15:12)      12-16-21 @ 07:01  -  12-17-21 @ 07:00  --------------------------------------------------------  IN: 1150 mL / OUT: 1465 mL / NET: -315 mL    12-17-21 @ 07:01  -  12-18-21 @ 05:35  --------------------------------------------------------  IN: 280 mL / OUT: 1260 mL / NET: -980 mL      Physical Exam:  	Gen: NAD  	Pulm: decrease BS  B/L  	CV:  S1S2; no rub  	Abd: +distended  	    LABS/STUDIES  --------------------------------------------------------------------------------              10.1   8.03  >-----------<  306      [12-17-21 @ 23:30]              34.9     133  |  103  |  30  ----------------------------<  130      [12-17-21 @ 23:30]  5.0   |  19  |  1.4        Ca     9.9     [12-17-21 @ 23:30]      Mg     2.1     [12-17-21 @ 23:30]      Phos  3.1     [12-17-21 @ 23:30]    Creatinine Trend:  SCr 1.4 [12-17 @ 23:30]  SCr 1.5 [12-17 @ 16:01]  SCr 1.7 [12-17 @ 12:30]  SCr 1.8 [12-17 @ 05:30]  SCr 2.0 [12-17 @ 02:00]      Urine Creatinine 64      [12-16-21 @ 00:45]  Urine Sodium 107.0      [12-16-21 @ 00:45]  Urine Urea Nitrogen 295      [12-16-21 @ 00:45]  Urine Osmolality 396      [12-16-21 @ 00:45]    Iron 19, TIBC 403, %sat 5      [12-02-21 @ 08:20]  Ferritin 6      [12-02-21 @ 08:20]  TSH 0.51      [12-15-21 @ 07:20]  Lipid: chol 195, , HDL 52, LDL --      [03-22-21 @ 06:03]

## 2021-12-18 NOTE — DISCHARGE NOTE PROVIDER - PROVIDER TOKENS
PROVIDER:[TOKEN:[[id number]]],PROVIDER:[TOKEN:[[id number]]] PROVIDER:[TOKEN:[[id number]]],PROVIDER:[TOKEN:[[id number]],SCHEDULEDAPPT:[12/27/2021],SCHEDULEDAPPTTIME:[10:00 AM],ESTABLISHEDPATIENT:[T]]

## 2021-12-19 LAB
ANION GAP SERPL CALC-SCNC: 12 MMOL/L — SIGNIFICANT CHANGE UP (ref 7–14)
BUN SERPL-MCNC: 27 MG/DL — HIGH (ref 10–20)
CALCIUM SERPL-MCNC: 9.3 MG/DL — SIGNIFICANT CHANGE UP (ref 8.5–10.1)
CHLORIDE SERPL-SCNC: 109 MMOL/L — SIGNIFICANT CHANGE UP (ref 98–110)
CO2 SERPL-SCNC: 20 MMOL/L — SIGNIFICANT CHANGE UP (ref 17–32)
CREAT SERPL-MCNC: 1.3 MG/DL — SIGNIFICANT CHANGE UP (ref 0.7–1.5)
GLUCOSE BLDC GLUCOMTR-MCNC: 108 MG/DL — HIGH (ref 70–99)
GLUCOSE SERPL-MCNC: 106 MG/DL — HIGH (ref 70–99)
HCT VFR BLD CALC: 34.9 % — LOW (ref 37–47)
HGB BLD-MCNC: 10 G/DL — LOW (ref 12–16)
MCHC RBC-ENTMCNC: 25.4 PG — LOW (ref 27–31)
MCHC RBC-ENTMCNC: 28.7 G/DL — LOW (ref 32–37)
MCV RBC AUTO: 88.6 FL — SIGNIFICANT CHANGE UP (ref 81–99)
NRBC # BLD: 0 /100 WBCS — SIGNIFICANT CHANGE UP (ref 0–0)
PLATELET # BLD AUTO: 284 K/UL — SIGNIFICANT CHANGE UP (ref 130–400)
POTASSIUM SERPL-MCNC: 5 MMOL/L — SIGNIFICANT CHANGE UP (ref 3.5–5)
POTASSIUM SERPL-SCNC: 5 MMOL/L — SIGNIFICANT CHANGE UP (ref 3.5–5)
RBC # BLD: 3.94 M/UL — LOW (ref 4.2–5.4)
RBC # FLD: 22.7 % — HIGH (ref 11.5–14.5)
SARS-COV-2 RNA SPEC QL NAA+PROBE: SIGNIFICANT CHANGE UP
SODIUM SERPL-SCNC: 141 MMOL/L — SIGNIFICANT CHANGE UP (ref 135–146)
WBC # BLD: 7.53 K/UL — SIGNIFICANT CHANGE UP (ref 4.8–10.8)
WBC # FLD AUTO: 7.53 K/UL — SIGNIFICANT CHANGE UP (ref 4.8–10.8)

## 2021-12-19 RX ADMIN — ONDANSETRON 4 MILLIGRAM(S): 8 TABLET, FILM COATED ORAL at 22:46

## 2021-12-19 RX ADMIN — GABAPENTIN 100 MILLIGRAM(S): 400 CAPSULE ORAL at 22:45

## 2021-12-19 RX ADMIN — HEPARIN SODIUM 5000 UNIT(S): 5000 INJECTION INTRAVENOUS; SUBCUTANEOUS at 22:46

## 2021-12-19 RX ADMIN — PREGABALIN 1000 MICROGRAM(S): 225 CAPSULE ORAL at 11:29

## 2021-12-19 RX ADMIN — Medication 325 MILLIGRAM(S): at 11:29

## 2021-12-19 RX ADMIN — Medication 81 MILLIGRAM(S): at 11:30

## 2021-12-19 RX ADMIN — RISPERIDONE 0.5 MILLIGRAM(S): 4 TABLET ORAL at 06:25

## 2021-12-19 RX ADMIN — GABAPENTIN 100 MILLIGRAM(S): 400 CAPSULE ORAL at 06:26

## 2021-12-19 RX ADMIN — PANTOPRAZOLE SODIUM 40 MILLIGRAM(S): 20 TABLET, DELAYED RELEASE ORAL at 06:26

## 2021-12-19 RX ADMIN — ESCITALOPRAM OXALATE 5 MILLIGRAM(S): 10 TABLET, FILM COATED ORAL at 11:29

## 2021-12-19 RX ADMIN — HEPARIN SODIUM 5000 UNIT(S): 5000 INJECTION INTRAVENOUS; SUBCUTANEOUS at 06:25

## 2021-12-19 RX ADMIN — GABAPENTIN 100 MILLIGRAM(S): 400 CAPSULE ORAL at 14:33

## 2021-12-19 RX ADMIN — ONDANSETRON 4 MILLIGRAM(S): 8 TABLET, FILM COATED ORAL at 06:25

## 2021-12-19 RX ADMIN — Medication 1 APPLICATION(S): at 06:26

## 2021-12-19 RX ADMIN — RISPERIDONE 0.5 MILLIGRAM(S): 4 TABLET ORAL at 17:08

## 2021-12-19 RX ADMIN — Medication 1 APPLICATION(S): at 17:05

## 2021-12-19 RX ADMIN — Medication 1 MILLIGRAM(S): at 11:29

## 2021-12-19 RX ADMIN — HEPARIN SODIUM 5000 UNIT(S): 5000 INJECTION INTRAVENOUS; SUBCUTANEOUS at 14:33

## 2021-12-19 NOTE — CHART NOTE - NSCHARTNOTEFT_GEN_A_CORE
Right EJ pulled at bedside, pt tolerated well. Pressure held over area and pressure dressing was placed.

## 2021-12-19 NOTE — PROGRESS NOTE ADULT - ASSESSMENT
Pt is a 81y Female POD#5 s/p WLE of skin lesion (lobe/pre-auricular area), superficial parotidectomy, partial auriculotomy, meatoplasty with submental flap. Pt doing well, labs stable.     ·	cont current care  ·	baci to incision  ·	ambulate as tolerated, PT f/u  ·	cont low K+ diet, cont lokelma - will f/u with renal in regards to D/C'ing pt with it  ·	will remove EJ today  ·	COVID swab ordered for today, RN aware - will swab  ·	will f/u case mgmt regarding [place] bed  ·	w/d with attng

## 2021-12-19 NOTE — PROGRESS NOTE ADULT - ASSESSMENT
Pt with Skin Cancer , BRYAN but creatinine now stable at 1.3 , Na+ 141, responded to fluids , bicarb 20 , continue replacement

## 2021-12-19 NOTE — PROGRESS NOTE ADULT - SUBJECTIVE AND OBJECTIVE BOX
[facility] FOLLOW UP NOTE  --------------------------------------------------------------------------------  Chief Complaint:    24 hour events/subjective:        PAST HISTORY  --------------------------------------------------------------------------------  No significant changes to PMH, PSH, FHx, SHx, unless otherwise noted    ALLERGIES & MEDICATIONS  --------------------------------------------------------------------------------  Allergies    No Known Allergies    Intolerances      Standing Inpatient Medications  aspirin enteric coated 81 milliGRAM(s) Oral daily  BACItracin   Ointment 1 Application(s) Topical every 12 hours  cyanocobalamin 1000 MICROGram(s) Oral daily  escitalopram 5 milliGRAM(s) Oral daily  ferrous    sulfate 325 milliGRAM(s) Oral daily  folic acid 1 milliGRAM(s) Oral daily  gabapentin 100 milliGRAM(s) Oral every 8 hours  heparin   Injectable 5000 Unit(s) SubCutaneous every 8 hours  ondansetron Injectable 4 milliGRAM(s) IV Push every 8 hours  pantoprazole    Tablet 40 milliGRAM(s) Oral before breakfast  risperiDONE   Tablet 0.5 milliGRAM(s) Oral two times a day    PRN Inpatient Medications  acetaminophen     Tablet .. 650 milliGRAM(s) Oral every 4 hours PRN  senna 1 Tablet(s) Oral once PRN      REVIEW OF SYSTEMS  --------------------------------------------------------------------------------  Gen: No weight changes, fatigue, fevers/chills, weakness  Skin: No rashes  Head/Eyes/Ears/Mouth: No headache; Normal hearing; Normal vision w/o blurriness; No sinus pain/discomfort, sore throat  Respiratory: No dyspnea, cough, wheezing, hemoptysis  CV: No chest pain, PND, orthopnea  GI: No abdominal pain, diarrhea, constipation, nausea, vomiting, melena, hematochezia  : No increased frequency, dysuria, hematuria, nocturia  MSK: No joint pain/swelling; no back pain; no edema  Neuro: No dizziness/lightheadedness, weakness, seizures, numbness, tingling  Heme: No easy bruising or bleeding  Endo: No heat/cold intolerance  Psych: No significant nervousness, anxiety, stress, depression    All other systems were reviewed and are negative, except as noted.    VITALS/PHYSICAL EXAM  --------------------------------------------------------------------------------  T(C): 36.1 (12-19-21 @ 07:51), Max: 36.8 (12-19-21 @ 00:00)  HR: 78 (12-19-21 @ 07:51) (78 - 87)  BP: 125/59 (12-19-21 @ 07:51) (112/70 - 125/59)  RR: 18 (12-19-21 @ 07:51) (18 - 19)  SpO2: 100% (12-19-21 @ 07:51) (100% - 100%)  Wt(kg): --  Height (cm): 165.1 (12-17-21 @ 15:12)  Weight (kg): 106.8 (12-17-21 @ 15:12)  BMI (kg/m2): 39.2 (12-17-21 @ 15:12)  BSA (m2): 2.12 (12-17-21 @ 15:12)      12-18-21 @ 07:01  -  12-19-21 @ 07:00  --------------------------------------------------------  IN: 360 mL / OUT: 1075 mL / NET: -715 mL      Physical Exam:  	Gen: NAD, well-appearing  	HEENT: PERRL, supple neck, clear oropharynx  	Pulm: CTA B/L  	CV: RRR, S1S2; no rub  	Back: No spinal or CVA tenderness; no sacral edema  	Abd: +BS, soft, nontender/nondistended  	: No suprapubic tenderness  	UE: Warm, FROM, no clubbing, intact strength; no edema; no asterixis  	LE: Warm, FROM, no clubbing, intact strength; no edema  	Neuro: No focal deficits, intact gait  	Psych: Normal affect and mood  	Skin: Warm, without rashes  	Vascular access:    LABS/STUDIES  --------------------------------------------------------------------------------              10.0   7.53  >-----------<  284      [12-19-21 @ 04:30]              34.9     141  |  109  |  27  ----------------------------<  106      [12-19-21 @ 04:30]  5.0   |  20  |  1.3        Ca     9.3     [12-19-21 @ 04:30]      Mg     2.1     [12-17-21 @ 23:30]      Phos  3.1     [12-17-21 @ 23:30]            Creatinine Trend:  SCr 1.3 [12-19 @ 04:30]  SCr 1.4 [12-18 @ 04:30]  SCr 1.4 [12-17 @ 23:30]  SCr 1.5 [12-17 @ 16:01]  SCr 1.7 [12-17 @ 12:30]      Urine Creatinine 64      [12-16-21 @ 00:45]  Urine Sodium 107.0      [12-16-21 @ 00:45]  Urine Urea Nitrogen 295      [12-16-21 @ 00:45]  Urine Osmolality 396      [12-16-21 @ 00:45]    Iron 19, TIBC 403, %sat 5      [12-02-21 @ 08:20]  Ferritin 6      [12-02-21 @ 08:20]  TSH 0.51      [12-15-21 @ 07:20]  Lipid: chol 195, , HDL 52, LDL --      [03-22-21 @ 06:03]

## 2021-12-19 NOTE — PROGRESS NOTE ADULT - SUBJECTIVE AND OBJECTIVE BOX
ENT DAILY PROGRESS NOTE    Pt is a 81y Female POD#5 s/p WLE of skin lesion (lobe/pre-auricular area), superficial parotidectomy, partial auriculotomy, meatoplasty with submental flap. Patient seen and examined at bedside this morning. PT doing well, no complaints this AM. Pt possible D/C to [facility] tomorrow if bed available.       REVIEW OF SYSTEMS   [x] A ten-point review of systems was otherwise negative except as noted.    Allergies    No Known Allergies    Intolerances      MEDICATIONS:  acetaminophen     Tablet .. 650 milliGRAM(s) Oral every 4 hours PRN  aspirin enteric coated 81 milliGRAM(s) Oral daily  BACItracin   Ointment 1 Application(s) Topical every 12 hours  cyanocobalamin 1000 MICROGram(s) Oral daily  escitalopram 5 milliGRAM(s) Oral daily  ferrous    sulfate 325 milliGRAM(s) Oral daily  folic acid 1 milliGRAM(s) Oral daily  gabapentin 100 milliGRAM(s) Oral every 8 hours  heparin   Injectable 5000 Unit(s) SubCutaneous every 8 hours  ondansetron Injectable 4 milliGRAM(s) IV Push every 8 hours  pantoprazole    Tablet 40 milliGRAM(s) Oral before breakfast  risperiDONE   Tablet 0.5 milliGRAM(s) Oral two times a day  senna 1 Tablet(s) Oral once PRN      Vital Signs Last 24 Hrs  T(C): 36.1 (19 Dec 2021 07:51), Max: 36.8 (19 Dec 2021 00:00)  T(F): 97 (19 Dec 2021 07:51), Max: 98.2 (19 Dec 2021 00:00)  HR: 78 (19 Dec 2021 07:51) (78 - 87)  BP: 125/59 (19 Dec 2021 07:51) (112/70 - 125/59)  RR: 18 (19 Dec 2021 07:51) (18 - 19)  SpO2: 100% (19 Dec 2021 07:51) (100% - 100%)      12-18 @ 07:01  -  12-19 @ 07:00  --------------------------------------------------------  IN:    Oral Fluid: 360 mL  Total IN: 360 mL    OUT:    Voided (mL): 1075 mL  Total OUT: 1075 mL    Total NET: -715 mL    PHYSICAL EXAM:    GEN: NAD, awake and alert. No drooling or pooling of secretions. No stridor or stertor. Good vocal quality, no hoarseness.   SKIN: Good color, non diaphoretic  HEENT: + left facial incision C/D/I, no erythema or edema, no TTP over area. mild warmth to the dependent portion of the incision, adjacent to the skin island. + new island erythema to the dependent portion, mildly fluctuant. no dehiscence noted. no drainage. + xeroform noted to the ear. Oral mucosa pink and moist. No erythema or edema noted to buccal mucosa, tongue, FOM, uvula or posterior oropharynx. Uvula midline.   NECK:  Trachea midline. Neck supple, no TTP to B/L lateral neck, no cervical LAD. + right EJ  RESP: No dyspnea, non-labored breathing. No use of accessory muscles.  CARDIO: +S1/S2  ABDO: Soft, NT.  EXT: MARCUM x 4, midline, LUE    LABS:  CBC-                        10.0   7.53  )-----------( 284      ( 19 Dec 2021 04:30 )             34.9     BMP/CMP-  19 Dec 2021 04:30    141    |  109    |  27     ----------------------------<  106    5.0     |  20     |  1.3      Ca    9.3        19 Dec 2021 04:30  Phos  3.1       17 Dec 2021 23:30  Mg     2.1       17 Dec 2021 23:30      Coagulation Studies-    Endocrine Panel-  Calcium, Total Serum: 9.3 mg/dL (12-19 @ 04:30)

## 2021-12-20 ENCOUNTER — TRANSCRIPTION ENCOUNTER (OUTPATIENT)
Age: 81
End: 2021-12-20

## 2021-12-20 VITALS
DIASTOLIC BLOOD PRESSURE: 59 MMHG | SYSTOLIC BLOOD PRESSURE: 126 MMHG | RESPIRATION RATE: 19 BRPM | TEMPERATURE: 98 F | HEART RATE: 85 BPM

## 2021-12-20 LAB
ANION GAP SERPL CALC-SCNC: 13 MMOL/L — SIGNIFICANT CHANGE UP (ref 7–14)
BUN SERPL-MCNC: 27 MG/DL — HIGH (ref 10–20)
CALCIUM SERPL-MCNC: 9.8 MG/DL — SIGNIFICANT CHANGE UP (ref 8.5–10.1)
CHLORIDE SERPL-SCNC: 107 MMOL/L — SIGNIFICANT CHANGE UP (ref 98–110)
CO2 SERPL-SCNC: 20 MMOL/L — SIGNIFICANT CHANGE UP (ref 17–32)
CREAT SERPL-MCNC: 1.1 MG/DL — SIGNIFICANT CHANGE UP (ref 0.7–1.5)
GLUCOSE BLDC GLUCOMTR-MCNC: 106 MG/DL — HIGH (ref 70–99)
GLUCOSE BLDC GLUCOMTR-MCNC: 109 MG/DL — HIGH (ref 70–99)
GLUCOSE SERPL-MCNC: 115 MG/DL — HIGH (ref 70–99)
POTASSIUM SERPL-MCNC: 4.7 MMOL/L — SIGNIFICANT CHANGE UP (ref 3.5–5)
POTASSIUM SERPL-SCNC: 4.7 MMOL/L — SIGNIFICANT CHANGE UP (ref 3.5–5)
SODIUM SERPL-SCNC: 140 MMOL/L — SIGNIFICANT CHANGE UP (ref 135–146)
SURGICAL PATHOLOGY STUDY: SIGNIFICANT CHANGE UP

## 2021-12-20 RX ORDER — SODIUM ZIRCONIUM CYCLOSILICATE 10 G/10G
5 POWDER, FOR SUSPENSION ORAL
Qty: 70 | Refills: 0
Start: 2021-12-20 | End: 2021-12-26

## 2021-12-20 RX ADMIN — RISPERIDONE 0.5 MILLIGRAM(S): 4 TABLET ORAL at 06:10

## 2021-12-20 RX ADMIN — Medication 1 APPLICATION(S): at 05:03

## 2021-12-20 RX ADMIN — PREGABALIN 1000 MICROGRAM(S): 225 CAPSULE ORAL at 11:08

## 2021-12-20 RX ADMIN — ONDANSETRON 4 MILLIGRAM(S): 8 TABLET, FILM COATED ORAL at 05:02

## 2021-12-20 RX ADMIN — HEPARIN SODIUM 5000 UNIT(S): 5000 INJECTION INTRAVENOUS; SUBCUTANEOUS at 05:02

## 2021-12-20 RX ADMIN — Medication 81 MILLIGRAM(S): at 11:08

## 2021-12-20 RX ADMIN — Medication 1 MILLIGRAM(S): at 11:08

## 2021-12-20 RX ADMIN — Medication 325 MILLIGRAM(S): at 11:08

## 2021-12-20 RX ADMIN — PANTOPRAZOLE SODIUM 40 MILLIGRAM(S): 20 TABLET, DELAYED RELEASE ORAL at 05:02

## 2021-12-20 RX ADMIN — GABAPENTIN 100 MILLIGRAM(S): 400 CAPSULE ORAL at 14:19

## 2021-12-20 RX ADMIN — GABAPENTIN 100 MILLIGRAM(S): 400 CAPSULE ORAL at 05:02

## 2021-12-20 RX ADMIN — ESCITALOPRAM OXALATE 5 MILLIGRAM(S): 10 TABLET, FILM COATED ORAL at 11:08

## 2021-12-20 RX ADMIN — HEPARIN SODIUM 5000 UNIT(S): 5000 INJECTION INTRAVENOUS; SUBCUTANEOUS at 14:19

## 2021-12-20 NOTE — PROGRESS NOTE ADULT - ASSESSMENT
Pt with BRYAN on CKD3, DM, HTN, admitted for sx for facial resection of basal Cell ca.   Developed post op BRYAN, Hyperkalemia, mild met acidosis  -likely due to volume depletion  -creat is improving/  -renal low K diet, if K around 5.5 resume lokelma at 5 g po q12h   BP - well controlled  hyponatremia ? due to hypovolemia, u osm 396, check plasma osm , Na better     will sign off recall PRN / call using TEAMS or on [phone]

## 2021-12-20 NOTE — DISCHARGE NOTE NURSING/CASE MANAGEMENT/SOCIAL WORK - NSDCPEFALRISK_GEN_ALL_CORE
For information on Fall & Injury Prevention, visit: [email or web address] OR  [email or web address] OR  https://www.cdc.gov/steadi/patient.html

## 2021-12-20 NOTE — DISCHARGE NOTE NURSING/CASE MANAGEMENT/SOCIAL WORK - PATIENT PORTAL LINK FT
You can access the FollowMyHealth Patient Portal offered by [facility] by registering at the following website: [email or web address]. By joining [facility]’s FollowMyHealth portal, you will also be able to view your health information using other applications (apps) compatible with our system.

## 2021-12-20 NOTE — DISCHARGE NOTE NURSING/CASE MANAGEMENT/SOCIAL WORK - NSDCFUADDAPPT_GEN_ALL_CORE_FT
Call to make an appointment to be seen by Dr Lopez  Please report to your f/u appointment with Dr. Rai on 12/27/21 at 10AM

## 2021-12-20 NOTE — PROGRESS NOTE ADULT - ASSESSMENT
81y Female POD#6 s/p WLE of skin lesion (lobe/pre-auricular area), superficial parotidectomy, partial auriculotomy, meatoplasty with submental flap. Pt doing well, labs stable.     ·	Patient seen and examined at bedside with Dr. Rai, plan is as follows  ·	cont current care  ·	baci to incision  ·	ambulate as tolerated, PT f/u  ·	cont low K+ diet, cont lokelma - will f/u with renal in regards to D/C'ing pt with it  ·	COVID negative   ·	will f/u case mgmt regarding [place] bed

## 2021-12-20 NOTE — PROGRESS NOTE ADULT - SUBJECTIVE AND OBJECTIVE BOX
Nephrology progress note    THIS IS AN INCOMPLETE NOTE . FULL NOTE TO FOLLOW SHORTLY    Patient is seen and examined, events over the last 24 h noted .    Allergies:  No Known Allergies    Hospital Medications:   MEDICATIONS  (STANDING):  aspirin enteric coated 81 milliGRAM(s) Oral daily  BACItracin   Ointment 1 Application(s) Topical every 12 hours  cyanocobalamin 1000 MICROGram(s) Oral daily  escitalopram 5 milliGRAM(s) Oral daily  ferrous    sulfate 325 milliGRAM(s) Oral daily  folic acid 1 milliGRAM(s) Oral daily  gabapentin 100 milliGRAM(s) Oral every 8 hours  heparin   Injectable 5000 Unit(s) SubCutaneous every 8 hours  ondansetron Injectable 4 milliGRAM(s) IV Push every 8 hours  pantoprazole    Tablet 40 milliGRAM(s) Oral before breakfast  risperiDONE   Tablet 0.5 milliGRAM(s) Oral two times a day        VITALS:  T(F): 98.2 (12-20-21 @ 07:00), Max: 98.2 (12-20-21 @ 00:00)  HR: 85 (12-20-21 @ 07:00)  BP: 126/59 (12-20-21 @ 07:00)  RR: 19 (12-20-21 @ 07:00)  SpO2: 94% (12-20-21 @ 00:00)  Wt(kg): --    12-18 @ 07:01  -  12-19 @ 07:00  --------------------------------------------------------  IN: 360 mL / OUT: 1075 mL / NET: -715 mL    12-19 @ 07:01  -  12-20 @ 07:00  --------------------------------------------------------  IN: 300 mL / OUT: 0 mL / NET: 300 mL          PHYSICAL EXAM:  Constitutional: NAD  HEENT: anicteric sclera, oropharynx clear, MMM  Neck: No JVD  Respiratory: CTAB, no wheezes, rales or rhonchi  Cardiovascular: S1, S2, RRR  Gastrointestinal: BS+, soft, NT/ND  Extremities: No cyanosis or clubbing. No peripheral edema  :  No escobar.   Skin: No rashes    LABS:  12-19    141  |  109  |  27<H>  ----------------------------<  106<H>  5.0   |  20  |  1.3    Ca    9.3      19 Dec 2021 04:30                            10.0   7.53  )-----------( 284      ( 19 Dec 2021 04:30 )             34.9       Urine Studies:    Creatinine, Random Urine: 64 mg/dL (12-16 @ 00:45)  Sodium, Random Urine: 107.0 mmoL/L (12-16 @ 00:45)  Osmolality, Random Urine: 396 mos/kg (12-16 @ 00:45)    RADIOLOGY & ADDITIONAL STUDIES:   Nephrology progress note  Patient is seen and examined, events over the last 24 h noted .  sitting in chair comfortable     Allergies:  No Known Allergies    Hospital Medications:   MEDICATIONS  (STANDING):  aspirin enteric coated 81 milliGRAM(s) Oral daily  BACItracin   Ointment 1 Application(s) Topical every 12 hours  cyanocobalamin 1000 MICROGram(s) Oral daily  escitalopram 5 milliGRAM(s) Oral daily  ferrous    sulfate 325 milliGRAM(s) Oral daily  folic acid 1 milliGRAM(s) Oral daily  gabapentin 100 milliGRAM(s) Oral every 8 hours  heparin   Injectable 5000 Unit(s) SubCutaneous every 8 hours  ondansetron Injectable 4 milliGRAM(s) IV Push every 8 hours  pantoprazole    Tablet 40 milliGRAM(s) Oral before breakfast  risperiDONE   Tablet 0.5 milliGRAM(s) Oral two times a day        VITALS:  T(F): 98.2 (12-20-21 @ 07:00), Max: 98.2 (12-20-21 @ 00:00)  HR: 85 (12-20-21 @ 07:00)  BP: 126/59 (12-20-21 @ 07:00)  RR: 19 (12-20-21 @ 07:00)  SpO2: 94% (12-20-21 @ 00:00)      12-18 @ 07:01  -  12-19 @ 07:00  --------------------------------------------------------  IN: 360 mL / OUT: 1075 mL / NET: -715 mL    12-19 @ 07:01  -  12-20 @ 07:00  --------------------------------------------------------  IN: 300 mL / OUT: 0 mL / NET: 300 mL          PHYSICAL EXAM:  Constitutional: NAD  Neck: No JVD  Respiratory: CTAB, no wheezes, rales or rhonchi  Cardiovascular: S1, S2, RRR  Gastrointestinal: BS+, soft, NT/ND  Extremities: No cyanosis or clubbing. plus one edema   :  No escobar.   Skin: No rashes    LABS:        12-19    141  |  109  |  27<H>  ----------------------------<  106<H>  5.0   |  20  |  1.3    Ca    9.3      19 Dec 2021 04:30                            10.0   7.53  )-----------( 284      ( 19 Dec 2021 04:30 )             34.9       Urine Studies:    Creatinine, Random Urine: 64 mg/dL (12-16 @ 00:45)  Sodium, Random Urine: 107.0 mmoL/L (12-16 @ 00:45)  Osmolality, Random Urine: 396 mos/kg (12-16 @ 00:45)    RADIOLOGY & ADDITIONAL STUDIES:

## 2021-12-20 NOTE — PROGRESS NOTE ADULT - SUBJECTIVE AND OBJECTIVE BOX
ENT Progress Note: 81y Female POD#5 s/p WLE of skin lesion (lobe/pre-auricular area), superficial parotidectomy, partial auriculotomy, meatoplasty with submental flap. Patient seen and examined at bedside with Dr. Rai this morning. PT doing well, no complaints this AM. Anticipating D/C to [facility] today if bed available.     [ x ] A 10 Point Review of Systems was negative except where noted.    No Known Allergies    MEDICATIONS  (STANDING):  aspirin enteric coated 81 milliGRAM(s) Oral daily  BACItracin   Ointment 1 Application(s) Topical every 12 hours  cyanocobalamin 1000 MICROGram(s) Oral daily  escitalopram 5 milliGRAM(s) Oral daily  ferrous    sulfate 325 milliGRAM(s) Oral daily  folic acid 1 milliGRAM(s) Oral daily  gabapentin 100 milliGRAM(s) Oral every 8 hours  heparin   Injectable 5000 Unit(s) SubCutaneous every 8 hours  ondansetron Injectable 4 milliGRAM(s) IV Push every 8 hours  pantoprazole    Tablet 40 milliGRAM(s) Oral before breakfast  risperiDONE   Tablet 0.5 milliGRAM(s) Oral two times a day    MEDICATIONS  (PRN):  acetaminophen     Tablet .. 650 milliGRAM(s) Oral every 4 hours PRN Temp greater or equal to 38C (100.4F), Mild Pain (1 - 3)  senna 1 Tablet(s) Oral once PRN Constipation    Vital Signs Last 24 Hrs  T(C): 36.8 (20 Dec 2021 07:00), Max: 36.8 (20 Dec 2021 00:00)  T(F): 98.2 (20 Dec 2021 07:00), Max: 98.2 (20 Dec 2021 00:00)  HR: 85 (20 Dec 2021 07:00) (78 - 85)  BP: 126/59 (20 Dec 2021 07:00) (118/60 - 126/59)  RR: 19 (20 Dec 2021 07:00) (19 - 19)  SpO2: 94% (20 Dec 2021 00:00) (94% - 94%)    PHYSICAL EXAM:  GEN: NAD, awake and alert. No drooling or pooling of secretions. No stridor or stertor. Good vocal quality, no hoarseness.   SKIN: Good color, non diaphoretic  HEENT: + left facial incision C/D/I, no erythema or edema, no TTP over area. Erythema to new island improved, no dehiscence noted. no drainage. + xeroform noted to the ear. Oral mucosa pink and moist. No erythema or edema noted to buccal mucosa, tongue, FOM, uvula or posterior oropharynx. Uvula midline.   NECK:  Trachea midline. Neck supple, no TTP to B/L lateral neck, no cervical LAD.  RESP: No dyspnea, non-labored breathing. No use of accessory muscles.  CARDIO: +S1/S2  ABDO: Soft, NT.  EXT: MARCUM x 4, midline, LUE    LABS:                      10.0   7.53  )-----------( 284      ( 19 Dec 2021 04:30 )             34.9     12-19    141  |  109  |  27<H>  ----------------------------<  106<H>  5.0   |  20  |  1.3    Ca    9.3      19 Dec 2021 04:30

## 2021-12-20 NOTE — DISCHARGE NOTE NURSING/CASE MANAGEMENT/SOCIAL WORK - NSDCVIVACCINE_GEN_ALL_CORE_FT
Tdap; 28-May-2020 05:58; Case Guillen); [employer]; [id number] (Exp. Date: 28-Apr-2022); IntraMuscular; Deltoid Left.; 0.5 milliLiter(s); VIS (VIS Published: 09-May-2013, VIS Presented: 28-May-2020);

## 2021-12-27 ENCOUNTER — APPOINTMENT (OUTPATIENT)
Dept: OTOLARYNGOLOGY | Facility: CLINIC | Age: 81
End: 2021-12-27
Payer: MEDICARE

## 2021-12-27 PROCEDURE — 99024 POSTOP FOLLOW-UP VISIT: CPT

## 2021-12-29 NOTE — ASSESSMENT
[FreeTextEntry1] : 1st POV s/p surgery 12/14\par Sutures removed, doing well\par Pathology reviewed, margins negative, no need for adjuvant treatment\par RV 3 months for oncologic surveillance

## 2021-12-29 NOTE — HISTORY OF PRESENT ILLNESS
[de-identified] : Advanced BCC left cheek/auricle\par 1. Surgery 12/14/2021, partial auriculectomy and cheek resection, submental flap reconstruction, meatoplasty\par \par Here for first post-op visit, no issues. Post-op course in hospital complicated by BRYAN and hyperkalemia, resolved prior to discharge.

## 2021-12-29 NOTE — DATA REVIEWED
[de-identified] : Pathology results 12/14 reviewed.\par \par Margins negative, closest 3mm on deep.\par

## 2021-12-29 NOTE — PHYSICAL EXAM
[Normal] : no mass and no adenopathy [FreeTextEntry2] : Sutures removed from neck and ear. Flap healthy, some excess bulk in inferior portion related to muscle transposition. Left solomon paretic but moving. Left EAC patent, gelfoam suctioned, hearing excellent

## 2022-02-15 ENCOUNTER — APPOINTMENT (OUTPATIENT)
Dept: HEMATOLOGY ONCOLOGY | Facility: CLINIC | Age: 82
End: 2022-02-15

## 2022-02-15 NOTE — BEHAVIORAL HEALTH ASSESSMENT NOTE - BEHAVIOR
It is important to see your primary physician as well as the physicians noted below within the next week to perform a comprehensive medical review.  Call their offices for an appointment as soon as you leave the hospital.  You will also need to see them for renewal of your medications.  If you do not have a primary physician, contact the [facility] Physician Referral Service at [phone].  To obtain your results, you can access the [email or web address] Patient Portal at [email or web address].  Your medical issues appear to be stable at this time, but if your symptoms recur or worsen, contact your physicians and/or return to the hospital if necessary.  If you encounter any issues or questions with your medication, call your physicians before stopping the medication.   Cooperative

## 2022-03-10 ENCOUNTER — APPOINTMENT (OUTPATIENT)
Dept: OTOLARYNGOLOGY | Facility: CLINIC | Age: 82
End: 2022-03-10

## 2022-04-08 ENCOUNTER — APPOINTMENT (OUTPATIENT)
Dept: OTOLARYNGOLOGY | Facility: CLINIC | Age: 82
End: 2022-04-08
Payer: MEDICARE

## 2022-04-08 DIAGNOSIS — H61.22 IMPACTED CERUMEN, LEFT EAR: ICD-10-CM

## 2022-04-08 PROCEDURE — 99213 OFFICE O/P EST LOW 20 MIN: CPT | Mod: 25

## 2022-04-08 PROCEDURE — 69210 REMOVE IMPACTED EAR WAX UNI: CPT

## 2022-04-08 NOTE — REASON FOR VISIT
[Subsequent Evaluation] : a subsequent evaluation for [FreeTextEntry2] : s/p partial auriculectomy and cheek resection, submental flap reconstruction meatoplasty

## 2022-04-08 NOTE — HISTORY OF PRESENT ILLNESS
[FreeTextEntry1] : Patient here today s/p partial auriculectomy and cheek resection submental flap reconstruction for basal carcinoma.  Patient is feeling well .  She denies any pain or symptoms.

## 2022-04-08 NOTE — PHYSICAL EXAM
[de-identified] : Flap well healed, no masses or lesions [de-identified] : Good meatoplasty AS, cerumen removed with microinstruments [Normal] : mucosa is normal [Midline] : trachea located in midline position [FreeTextEntry2] : Sutures removed from neck and ear. Flap healthy, some excess bulk in inferior portion related to muscle transposition. Left solomon paretic but moving. Left EAC patent, gelfoam suctioned, hearing excellent

## 2022-04-08 NOTE — ASSESSMENT
[FreeTextEntry1] : CARLOS EDUARDO\par Flap healed well\par Cerumen removed\par RV 3 months oncologic surveillance

## 2022-04-12 ENCOUNTER — APPOINTMENT (OUTPATIENT)
Dept: HEMATOLOGY ONCOLOGY | Facility: CLINIC | Age: 82
End: 2022-04-12
Payer: MEDICARE

## 2022-04-12 ENCOUNTER — OUTPATIENT (OUTPATIENT)
Dept: OUTPATIENT SERVICES | Facility: HOSPITAL | Age: 82
LOS: 1 days | Discharge: HOME | End: 2022-04-12

## 2022-04-12 VITALS
BODY MASS INDEX: 38.49 KG/M2 | HEART RATE: 97 BPM | TEMPERATURE: 97.7 F | RESPIRATION RATE: 18 BRPM | WEIGHT: 231 LBS | HEIGHT: 65 IN | DIASTOLIC BLOOD PRESSURE: 72 MMHG | SYSTOLIC BLOOD PRESSURE: 166 MMHG

## 2022-04-12 PROCEDURE — 99214 OFFICE O/P EST MOD 30 MIN: CPT

## 2022-04-12 NOTE — PHYSICAL EXAM
[Normal] : normoactive bowel sounds, soft and nontender, no hepatosplenomegaly or masses appreciated [de-identified] : left ear with bandage on it, no visible bleeding [de-identified] : uses wheelchair

## 2022-04-12 NOTE — HISTORY OF PRESENT ILLNESS
[de-identified] : 81 yo F with hx of HTN, schizoaffective disorder, with hx of left ear lesion since 4 years. Pt had a biopsy done with dermatology in 2017, which was diagnosed as basal cell cancer. 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 \par Now the lesion has progressed and bleeds very frequently, so the family wants to pursue the treatment. \par Pt was seen by plastic surgery Dr Van, who offered surgery to the mpt, but wants to get a CT scan first to evaluate the extent of the disease. Pt was also seen by ENT, and a PET scan was ordered and pt was referred to medical oncology. \par \par Pt today in a wheelchair, [facility] feels well. Denied any complaints today. Pt does endorse the lesion to be easily bleeding. \par \par Of note, pt also has a 6cm heterogeneous lesion extending from the inferior right thyroid lobe into the superior mediastinum, for which no workup was done except US thyroid. \par \par CT Angio chest 3/2021\par IMPRESSION:\par Since May 28, 2020;\par 1. No pulmonary embolus.\par 2. Stable 6 cm heterogeneous lesion extending from the inferior right thyroid lobe into the superior mediastinum. Based on sonographic findings from May 29, 2020, recommend fine needle aspiration.\par \par US thyroid/parathyroid 5/2020\par \par Impression: Multiple thyroid nodules, the largest of which measures up to 6.9 cm with mediastinal extension, for which an ultrasound-guided fine-needle aspiration is recommended.\par Nodule 1: 2.6 cm, right lower pole, TR 4 - FNA if = 1.5cm, follow-up if 1 - 1.4 cm.\par Recommendation: Ultrasound-guided fine-needle aspiration.\par Nodule 2: 0.8 cm, right midpole, TR 4 - FNA if = 1.5cm, follow-up if 1 - 1.4 cm.\par Recommendation: No further follow-up.\par Nodule 3: 6.9 cm, right lower pole, TR 5 - FNA if = 1cm, follow-up if 0.5 - 0.9 cm.\par Recommendation: Ultrasound-guided fine-needle aspiration.\par \par CT C/A/P 5/2020\par \par IMPRESSION:\par 1. No CT evidence of acute traumatic injury to the chest, abdomen or pelvis.\par 2. Hyperdensities within the gallbladder and CBD with associated mild pericholecystic fat stranding. Findings are consistent with cholelithiasis and choledocholithiasis. The CBD measures 7 mm (series 5/309). If there is clinical concern for acute cholecystitis, right upper quadrant ultrasound may be obtained.\par 3. 6.1 cm heterogeneous lesion within the superior mediastinum, possibly arising from the right thyroid gland, suspicious for malignancy or goiter. Recommend a thyroid ultrasound for further evaluation.\par \par CT Head 5/2020\par IMPRESSION: \par 1. No evidence of acute intracranial pathology.\par 2. Moderate chronic microvascular changes.\par 3. Left sphenoid sinus opacification as described.\par \par Biopsy of skin below left ear\par  Received: 12/20/17\par  INDICATION(S) FOR PROCEDURE(S): BCC\par SURGICAL PROCEDURE:: Shave biopsy\par  SPECIMEN: Skin below left ear\par GROSS DESCRIPTION:\par  * * FINAL DIAGNOSIS * *:\par SKIN BELOW LEFT EAR, SHAVE BIOPSY:\par : - BASAL CELL CARCINOMA EXTENDING TO THE DEEP RESECTION MARGIN.\par  : PERIPHERAL MARGINS ARE FREE.\par : - SKIN SHOWS ULCERATION, ACUTE INFLAMMATION AND CRUST FORMATION\par . \par \par Pathology: Basal cell carcinoma \par \par INTERVAL HISTORY:\par \par 12/1/21\par Pt feels well today, had bandage over left ear. Accompanied by caregiver. Pt denied any complaints. Pt previosuly ahd PET scan done and was seen by ENT in 11/23/21. \par \par PET 11/17/21:\par IMPRESSION:\par 1.  Intensely FDG avid 3 cm left infra-auricular mass involving the ear lobe and abutting the parotid gland, compatible with known basal cell carcinoma (max SUV 21.2); no FDG avid bilateral cervical lymphadenopathy.\par \par 2.  Nonspecific FDG avid mediastinal and bilateral hilar lymph nodes (max SUV 10.1 in a right infrahilar lymph node). Differential includes neoplastic or inflammatory etiologies.\par \par 3.  Large heterogeneous right thyroid lobe goiter extending into the superior mediastinum with sites of FDG uptake, likely corresponding to suspicious nodules seen on prior ultrasound (max SUV 7.4). Based on sonographic findings from May 29, 2020, fine-needle aspiration of these nodules recommended.\par \par 4.  No additional sites of abnormal FDG uptake.\par \par Pt also had MRI done on 11/2/21:\par \par IMPRESSION:\par \par Enhancing lobulated soft tissue mass centered within the left infra-auricular skin with involvement of the ear lobule consistent with clinical history of basal cell carcinoma. No evidence of parotid invasion.\par No lymphadenopathy.\par Partially visualized enlarged right thyroid lobe. Further evaluation with thyroid ultrasound can be obtained.\par \par We dsicussed the case with Dr Womack over the phone as well.  [de-identified] : PET 11/2021\par IMPRESSION:\par 1.  Intensely FDG avid 3 cm left infra-auricular mass involving the ear lobe and abutting the parotid gland, compatible with known basal cell carcinoma (max SUV 21.2); no FDG avid bilateral cervical lymphadenopathy.\par \par 2.  Nonspecific FDG avid mediastinal and bilateral hilar lymph nodes (max SUV 10.1 in a right infrahilar lymph node). Differential includes neoplastic or inflammatory etiologies.\par \par 3.  Large heterogeneous right thyroid lobe goiter extending into the superior mediastinum with sites of FDG uptake, likely corresponding to suspicious nodules seen on prior ultrasound (max SUV 7.4). Based on sonographic findings from May 29, 2020, fine-needle aspiration of these nodules recommended.\par \par 4.  No additional sites of abnormal FDG uptake.\par \par  12/14/2021 10:53 EST\par Received Date/Time:                    12/14/2021 11:28 EST\par \par Surgical Pathology Report - Auth (Verified)\par \par Specimen(s) Submitted\par 1  Left facial composite resection; partial partoidectomy, partial\par auricolectomy\par 2  Deep margin\par 3  Cutaneous margin at 12 o'clock\par 4  Ear canal\par 5  Cutaneous margin at 3 o'clock\par 6  Cutaneous margin at 6 o'clock\par 7  Cutaneous margin at 9 o'clock\par 8  Medial trigal cartilage\par 9  Left submandibular bone\par \par \par Final Diagnosis\par 1. Skin, left facial composite resection, partial partoidectomy, parital\par auricolectomy:\par - Basal cell carcinoma, nodular and infiltrating type.\par - Resection margins, negative for carcinoma.\par Distance of carcinoma from specimen resection margins:\par 12:00 margin: 1.7 cm\par 3:00 margin: 0.5 cm\par 6:00 margin: 1.2 cm\par 9:00 margin: 0.9 cm\par Deep margin: 0.3 cm\par - Unremarkable parotid gland.\par \par 2. Deep margin:\par - Negative for carcinoma.\par \par 3. Cutaneous margin at 12 o'clock:\par - Negative for carcinoma.\par \par 4. Ear canal:\par - Negative for carcinoma.\par \par 5. Cutaneous margin at 3 o'clock:\par - Negative for carcinoma.\par \par 6. Cutaneous margin at 6 o'clock:\par - Negative for carcinoma.\par \par 7. Cutaneous margin at 9 o'clock:\par - Negative for carcinoma.\par \par 8. Medial trigal cartilage:\par - Unremarkable cartilage.\par - Negative for carcinoma.\par 9. Submitted as "left submandibular bone", resection:\par - Unremarkable seromucinous (submandibular) salivary gland.\par - One reactive lymph node.\par - Negative for carcinoma.\par \par Verified by: Semaj Mann M.D\par (Electronic Signature)\par Reported on: 12/20/21 10:47 EST, [facility],\par [address] [place] [address]\par Phone: [phone]   Fax: [phone]\par _________________________________________________________________\par \par Comment\par Note: This case was reviewed in   intradepartmental  QA conference and the\par diagnosis represents a consensus opinion.\par \par \par Intraoperative Consultation\par The specimen is submitted for INTRAOPERATIVE CONSULTATION and the FROZEN\par SECTION diagnosis is reported at [facility]   [place] [address] as:\par \par 2. Deep margin negative for carcinoma\par \par 3. Cutaneous margin 12 o'clock: negative for carcinoma\par \par 4. Ear canal: Negative for carcinoma\par \par 5. Cutaneous margin 3 o'clock: Negative for carcinoma\par \par 6. Cutaneous margin 6 o'clock: Negative for carcinoma\par \par 7. Cutaneous margin 9 o'clock: Negative for carcinoma\par \par Received at: 11:28 am\par Verbally reported at: 12:16 pm by: Dr. STEFANY Mann to: Dr. STEFANY Lopez\par \par Clinical Information\par Left facial composite resection with facial nerve monitoring\par \par Perioperative Diagnosis\par \par Basal cell carcinoma left face\par \par Gross Description\par 1.  The specimen is received in formalin, labeled "left facial composite\par resection partial  parotidectomy  partial  auricolectomy, long suture -\par 12:00, short suture - 9:00" and consists of tan pink tissue with a long\par suture marking the 12:00 margin and the short suture marking the 9:00\par margin that measures 6.5 cm from 12:00 to 6:00 x 5.2 cm from 9:00 to\par 3:00 with subcutaneous tissue extending 0.7 cm deep.  There is a central\par brown  ulcerative area with raised tan white borders (rodent ulcer)\par measuring 3.4 x 3 cm and extending into the internal    auditory  meatus.\par There is a rough tan area, measuring 2 x 1.8 cm deep to the external\par area with the ulcer. The ulcer is located 1.6 cm from the 12:00 margin,\par 0.6 cm from the 3:00 margin, 1.5 cm from the 6:00 margin, and 0.7 cm\par from the 9:00 margin. The specimen is inked as follows: 12 to 3:00 -\par green, 3 to 6:00 - red, 6 to 9:00 - blue, 9 to 12:00 - orange and deep -\par black.  Representative sections are submitted.\par \par Summary of Sections:\par 1A- ulcer to 12:00 margin -1\par 1B- ulcer to 3:00 margin -1\par 1C- ulcer to 6:00 margin -1\par 1D- ulcer to 9:00 margin -1\par 1E-1F-1G- sections of ulcer -3\par 1H-1I- ulcer to auditory canal -2\par \par Total: 9 blocks\par \par 2.  The specimen is received fresh, labeled "deep margin" and consists\par of two pink to red soft tissue fragments measuring 0.9 x 0.5 x 0.3 cm\par and 0.7 x 0.3 x 0.3 cm. The specimen is entirely submitted for frozen\par section. (1 block)\par \par 3.  The specimen is received fresh, labeled "cutaneous margin 2 o'clock"\par and consists of a fragment of tan white skin with attached subcutaneous\par tissue measuring 1.5 x 0.4 x 0.3 cm. The specimen is entirely submitted\par for frozen section. (1 block)\par \par 4.  The specimen is received fresh, labeled "ear canal" and consists of tan\par white skin with attached subcutaneous tissue measuring 0.8 x 0.2 x 0.2\par cm. The specimen is entirely submitted.    (1 block)\par \par \par 5.  The specimen is received fresh, labeled "cutaneous margin 3 o'clock"\par and consists of two fragments of skin with attached subcutaneous tissue\par measuring 2.2 x 0.2 x 0.1 cm and 0.7 x 0.3 x 0.2 cm. The specimen is\par entirely submitted. (1 block)\par \par 6.  The specimen is received fresh, labeled "cutaneous margin 6 o'clock"\par and consists of tan white skin with attached subcutaneous tissue\par measuring 2.3 x 0.3 x 0.2 cm.   The specimen is submitted entirely. (1\par block)\par \par 7.  The specimen is received fresh, labeled "cutaneous margin 9 o'clock"\par and consists of an elongated fragment of skin measuring 1.5x 0.2 x 0.2\par cm.  The specimen is submitted entirely.\par (1 block)\par \par 8.  The specimen is received in formalin, labeled "medial   triagle\par cartilage" and consists of a tan brown irregular tissue fragment,\par measuring 1.1 x 0.3 x 0.2 cm.   The specimen is submitted entirely.\par (1 block)\par \par 9.  The specimen is received in formalin, labeled "left submandicular bone"\par and consists of an irregular gray to brown soft tissue measuring 3 x\par 2.5 x 1.5 cm. On sectioning cut surface is light brown and soft. No bone\par identified grossly.   The specimen is submitted entirely. (8 blocks)\par \par Specimen was received and underwent gross examination at [facility], [address] [place] [address].\par \par 12/14/2021 14:39:43 EST nk/rc

## 2022-04-12 NOTE — ED ADULT NURSE NOTE - CHIEF COMPLAINT QUOTE
Keflex filled here and given to patient-discharge orders gone over with patient and daughter.   pt BIBA for increased weakness, HTN and edema, pt with history of cardiac hx. Denies SOB/CP

## 2022-04-12 NOTE — ASSESSMENT
[FreeTextEntry1] : 80 yo woman with basal cell carcinoma s/p resection\par followed by ENT\par ECOG 3-4\par CARLOS EDUARDO\par Spoke to pt and her family: no need for further oncology follow up

## 2022-04-13 DIAGNOSIS — C44.310 BASAL CELL CARCINOMA OF SKIN OF UNSPECIFIED PARTS OF FACE: ICD-10-CM

## 2022-07-07 ENCOUNTER — APPOINTMENT (OUTPATIENT)
Dept: OTOLARYNGOLOGY | Facility: CLINIC | Age: 82
End: 2022-07-07

## 2022-07-07 DIAGNOSIS — T16.1XXA FOREIGN BODY IN RIGHT EAR, INITIAL ENCOUNTER: ICD-10-CM

## 2022-07-07 PROCEDURE — 99213 OFFICE O/P EST LOW 20 MIN: CPT | Mod: 25

## 2022-07-07 PROCEDURE — 69200 CLEAR OUTER EAR CANAL: CPT | Mod: RT

## 2022-07-07 NOTE — PHYSICAL EXAM
[de-identified] : Flap reconstruction well healed. there is some fullness of the flap, likely excess bulk but mildly tender. No skin changes [de-identified] : meatoplasty patent AS. Right EAC occluded with foreign body - q tip, removed [Normal] : mucosa is normal [Midline] : trachea located in midline position

## 2022-07-07 NOTE — REASON FOR VISIT
[Subsequent Evaluation] : a subsequent evaluation for [FreeTextEntry2] : s/p partial auriculectomy and cheek resection , submental flap reconstruction  meatoplasty

## 2022-07-07 NOTE — HISTORY OF PRESENT ILLNESS
[FreeTextEntry1] : Patient here today following up on s/p partial auriculectomy and cheek resection, submental flap reconstruction.  Patient states she is feeling well .  She denies any complaints

## 2022-07-07 NOTE — ASSESSMENT
[FreeTextEntry1] : FB AD removed\par Flap reconstruction with some tender fullness deep to this; will check CT to eval for recurrent disease\par RV after scan

## 2022-07-11 LAB
ANION GAP SERPL CALC-SCNC: 8 MMOL/L
BUN SERPL-MCNC: 18 MG/DL
CALCIUM SERPL-MCNC: 9.5 MG/DL
CHLORIDE SERPL-SCNC: 107 MMOL/L
CO2 SERPL-SCNC: 27 MMOL/L
CREAT SERPL-MCNC: 1 MG/DL
EGFR: 56 ML/MIN/1.73M2
GLUCOSE SERPL-MCNC: 102 MG/DL
POTASSIUM SERPL-SCNC: 4.3 MMOL/L
SODIUM SERPL-SCNC: 142 MMOL/L

## 2022-08-03 ENCOUNTER — OUTPATIENT (OUTPATIENT)
Dept: OUTPATIENT SERVICES | Facility: HOSPITAL | Age: 82
LOS: 1 days | Discharge: HOME | End: 2022-08-03

## 2022-08-03 DIAGNOSIS — R22.1 LOCALIZED SWELLING, MASS AND LUMP, NECK: ICD-10-CM

## 2022-08-03 PROCEDURE — 70491 CT SOFT TISSUE NECK W/DYE: CPT | Mod: 26,MH

## 2022-08-11 ENCOUNTER — APPOINTMENT (OUTPATIENT)
Dept: OTOLARYNGOLOGY | Facility: CLINIC | Age: 82
End: 2022-08-11

## 2022-09-22 ENCOUNTER — APPOINTMENT (OUTPATIENT)
Dept: OTOLARYNGOLOGY | Facility: CLINIC | Age: 82
End: 2022-09-22

## 2022-09-22 DIAGNOSIS — J38.01 PARALYSIS OF VOCAL CORDS AND LARYNX, UNILATERAL: ICD-10-CM

## 2022-09-22 PROCEDURE — 31575 DIAGNOSTIC LARYNGOSCOPY: CPT

## 2022-09-22 PROCEDURE — 99214 OFFICE O/P EST MOD 30 MIN: CPT | Mod: 25

## 2022-09-22 NOTE — HISTORY OF PRESENT ILLNESS
[FreeTextEntry1] : Patient here today following up on s/p partial auriculectomy and cheek resection, submental flap reconstruction.  Patient is here to review results of CT Neck 08/03/2022. Patient is accompanied today by her aide.

## 2022-09-22 NOTE — ASSESSMENT
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 Will have her see Dr. Womack for an opinion

## 2022-09-22 NOTE — PHYSICAL EXAM
[de-identified] : well healed flap [Nasal Endoscopy Performed] : nasal endoscopy was performed, see procedure section for findings [Normal] : mucosa is normal [Midline] : trachea located in midline position

## 2022-09-22 NOTE — PROCEDURE
[None] : none [Flexible Endoscope] : examined with the flexible endoscope [de-identified] : Flexible laryngoscopy performed. Nasal cavity, nasopharynx, oropharynx, hypopharynx, and larynx evaluated. No masses or lesions, bilateral true vocal folds symmetric and\par \par Left TVF paralysis, no masses. In the left sphenoethmoidal recess, the sphenoid sinus is draining a thin stream of mucus, no inflammation

## 2022-09-22 NOTE — DATA REVIEWED
[de-identified] : CT neck reviewed and interpreted. Nothing to suggest locoregional recurrence of her advanced BCC left cheek. Findings suggestive of left sphenoid sinusitis fungal and left TVF paralysis

## 2022-10-12 ENCOUNTER — APPOINTMENT (OUTPATIENT)
Dept: OTOLARYNGOLOGY | Facility: CLINIC | Age: 82
End: 2022-10-12

## 2022-10-12 VITALS — BODY MASS INDEX: 38.49 KG/M2 | HEIGHT: 65 IN | WEIGHT: 231 LBS

## 2022-10-12 DIAGNOSIS — J34.89 OTHER SPECIFIED DISORDERS OF NOSE AND NASAL SINUSES: ICD-10-CM

## 2022-10-12 PROCEDURE — 31231 NASAL ENDOSCOPY DX: CPT

## 2022-10-12 PROCEDURE — 99214 OFFICE O/P EST MOD 30 MIN: CPT | Mod: 25

## 2022-10-12 NOTE — DATA REVIEWED
[de-identified] : CT sinus- complete opacification of left sphenoid sinus with erosive changes including posterior clivus.

## 2022-10-12 NOTE — HISTORY OF PRESENT ILLNESS
[FreeTextEntry1] : Patient presents today for follow up after seeing Dr. Rai for fungal sinusitis of sphenoid with erosive changes posteriorly. Lesion was found incidentally on scan. She is feeling a lot better since her last visit. CT sinus was reviewed in depth . NO other neurological symptoms. s/p basal cell excision. ENT notes reviewed.

## 2022-10-12 NOTE — PROCEDURE
[Flexible Endoscope] : examined with the flexible endoscope [Congested] : congested [Shilpa] : shilpa [Normal] : the paranasal sinuses had no abnormalities

## 2022-11-14 NOTE — ED ADULT NURSE NOTE - DISCHARGE DATE/TIME
[facility]    OUTPATIENT Physical Therapy ORTHOPEDIC EVALUATION  PLAN OF TREATMENT FOR OUTPATIENT REHABILITATION  (COMPLETE FOR INITIAL CLAIMS ONLY)  Patient's Last Name, First Name, M.I.  YOB: 1958  Kian Cortez    Provider s Name:  [facility]   Medical Record No.  [id number]   Start of Care Date:  08/24/22   Onset Date:   08/19/22 (MD order)   Treatment Diagnosis:  LBP with radiating pain Medical Diagnosis:  Lumbar radiculopathy       Goals:     11/14/22 0500   Body Part   Goals listed below are for Low Back & Left Hip   Goal #1   Goal #1 ambulation   Previous Functional Level Minutes patient could walk   Performance Level 20-30 for household and community walking   Current Functional Level Minutes patient can walk   Performance Level 30 minutes slowly with up to 3/10 pain   STG Target Performance Minutes patient will be able to walk   Performance Level 10-15 with <4/10 LBP   Rationale for safe household ambulation;for safe community ambulation   Due Date 10/05/22   Date Goal Met 11/14/22    LTG Target Performance Minutes patient will be able to  walk   Performance Level 20   Rationale for safe household ambulation;for safe community ambulation   Due Date 12/12/22   If goal not met, Why? extend, slow progress & lapse in PT visits       Therapy Frequency:  1x/week  Predicted Duration of Therapy Intervention:  12 weeks (with 1 month gap for pt to travel)    Laura Jarquin, PT                 I CERTIFY THE NEED FOR THESE SERVICES FURNISHED UNDER        THIS PLAN OF TREATMENT AND WHILE UNDER MY CARE .             Physician Signature               Date    X_____________________________________________________                         Certification Date From:  11/14/22   Certification Date To:  01/14/23    Referring Provider:  Pedro Back  Assessment        See Epic Evaluation SOC Date: 08/24/22                                                        11-Dec-2021 20:59

## 2022-12-01 NOTE — DISCHARGE NOTE PROVIDER - [id number]_GEN_ALL_CORE
Problem: Skin/Tissue Integrity  Goal: Absence of new skin breakdown  Description: 1. Monitor for areas of redness and/or skin breakdown  2. Assess vascular access sites hourly  3. Every 4-6 hours minimum:  Change oxygen saturation probe site  4. Every 4-6 hours:  If on nasal continuous positive airway pressure, respiratory therapy assess nares and determine need for appliance change or resting period.   Outcome: Progressing   Progressing This document is complete and the patient is ready for discharge.

## 2023-01-12 ENCOUNTER — APPOINTMENT (OUTPATIENT)
Dept: OTOLARYNGOLOGY | Facility: CLINIC | Age: 83
End: 2023-01-12

## 2023-01-13 ENCOUNTER — APPOINTMENT (OUTPATIENT)
Dept: OTOLARYNGOLOGY | Facility: CLINIC | Age: 83
End: 2023-01-13
Payer: MEDICARE

## 2023-01-13 VITALS — HEIGHT: 65 IN | WEIGHT: 231 LBS | BODY MASS INDEX: 38.49 KG/M2

## 2023-01-13 DIAGNOSIS — R93.0 ABNORMAL FINDINGS ON DIAGNOSTIC IMAGING OF SKULL AND HEAD, NOT ELSEWHERE CLASSIFIED: ICD-10-CM

## 2023-01-13 PROCEDURE — 99213 OFFICE O/P EST LOW 20 MIN: CPT

## 2023-01-13 NOTE — REASON FOR VISIT
[Subsequent Evaluation] : a subsequent evaluation for [FreeTextEntry2] : sphenoid sinusitis , sphenoid  mass

## 2023-01-13 NOTE — HISTORY OF PRESENT ILLNESS
[de-identified] : s/p left cheek resection/parotidectomy and submental flap reconstruction 12/2021 for BCC [FreeTextEntry1] : Patient here today s/p sphenoidectomy done 12/20/2022.  Patient states she is feeling well. She has no complaints .

## 2023-01-13 NOTE — PHYSICAL EXAM
[de-identified] : well healed flap [Nasal Endoscopy Performed] : nasal endoscopy was performed, see procedure section for findings [Normal] : mucosa is normal [Midline] : trachea located in midline position

## 2023-01-24 ENCOUNTER — RESULT REVIEW (OUTPATIENT)
Age: 83
End: 2023-01-24

## 2023-01-24 ENCOUNTER — OUTPATIENT (OUTPATIENT)
Dept: OUTPATIENT SERVICES | Facility: HOSPITAL | Age: 83
LOS: 1 days | Discharge: HOME | End: 2023-01-24
Payer: MEDICARE

## 2023-01-24 DIAGNOSIS — J34.89 OTHER SPECIFIED DISORDERS OF NOSE AND NASAL SINUSES: ICD-10-CM

## 2023-01-24 PROCEDURE — 70543 MRI ORBT/FAC/NCK W/O &W/DYE: CPT | Mod: 26,MH

## 2023-03-06 NOTE — PHARMACOTHERAPY INTERVENTION NOTE - INTERVENTION CATEGORIES
What Is The Reason For Today's Visit?: Annual Full Body Skin Examination with No Concerns
What Is The Reason For Today's Visit? (Being Monitored For X): the risk of recurrence of previously treated lesion(s)
Monitoring

## 2023-03-08 NOTE — ED ADULT NURSE NOTE - DISCHARGE DATE/TIME
09-Sep-2020 12:11 Area M Indication Text: Tumors in this location are included in Area M (cheek, forehead, scalp, neck, jawline and pretibial skin).  Mohs surgery is indicated for tumors in these anatomic locations.

## 2023-04-10 ENCOUNTER — APPOINTMENT (OUTPATIENT)
Dept: OTOLARYNGOLOGY | Facility: CLINIC | Age: 83
End: 2023-04-10
Payer: MEDICARE

## 2023-04-10 VITALS — BODY MASS INDEX: 38.49 KG/M2 | HEIGHT: 65 IN | WEIGHT: 231 LBS

## 2023-04-10 PROCEDURE — 31231 NASAL ENDOSCOPY DX: CPT

## 2023-04-10 PROCEDURE — 99214 OFFICE O/P EST MOD 30 MIN: CPT | Mod: 25

## 2023-04-10 NOTE — PROCEDURE
[Anterior rhinoscopy insufficient to account for symptoms] : anterior rhinoscopy insufficient to account for symptoms [None] : none [Rigid Endoscope] : examined with a rigid endoscope [FreeTextEntry6] : Bilateral nasal endoscopy performed. No mucosal masses or lesions. Bilateral ostiomeatal complexes are clear without discharge.\par \par Nasal septal perforation - stable

## 2023-04-10 NOTE — HISTORY OF PRESENT ILLNESS
[de-identified] : BCC, sphenoid sinusitis , sphenoid mass [FreeTextEntry1] : Patient following up today on BCC, sphenoid sinusitis ,  sphenoid mass.  Patient Had a MRI orbit face and neck done 01/29/2023. Multiple lesions on face, patient reports she "scratched" herself with blood from right ear

## 2023-04-10 NOTE — PHYSICAL EXAM
[de-identified] : well healed flap [Nasal Endoscopy Performed] : nasal endoscopy was performed, see procedure section for findings [Normal] : mucosa is normal [Midline] : trachea located in midline position [de-identified] : Crusted ulcer x3 right forehead and right antitragus - no obvious malignancy but surrounding telangiectasias

## 2023-04-10 NOTE — REASON FOR VISIT
[Subsequent Evaluation] : a subsequent evaluation for [FreeTextEntry2] : BCC, sphenoid sinusitis , sphenoid mass

## 2023-04-10 NOTE — ASSESSMENT
[FreeTextEntry1] : Imaging reviewed - sphenoid sinusitis, no mass\par New cutaneous lesions right forehead and antitragus - referral to Dr. Jang of dermatology provided\par RV 6 months

## 2023-06-23 NOTE — PROGRESS NOTE BEHAVIORAL HEALTH - BODY HABITUS
Patient Name: Gera Chavez  MRN: [id number]  Admission Date: 6/4/2023  Hospital Length of Stay: 19 days  Attending Provider: Salvador Sauer MD  Primary Care Provider: Primary Doctor No  Follow-up For: Procedure(s) (LRB):  ORIF, FRACTURE, RADIUS, DISTAL (Right)      Subjective:           Interval History:  POD 11 s/p ORIF right distal radius. POD 9 ORIF L acetabulum fracture. He is 18 days out from fixation of his pelvic fractures with Dr. Zepeda. POD 6 ORIF L olecranon with Dr. Stein. No new ortho issues. Pain controlled. States he has been working with therapy.     Review of patient's allergies indicates:   Allergen Reactions    Iodine     Trazodone        Current Facility-Administered Medications   Medication    acetaminophen tablet 650 mg    albuterol-ipratropium 2.5 mg-0.5 mg/3 mL nebulizer solution 3 mL    amLODIPine tablet 5 mg    [START ON 6/27/2023] ARIPiprazole lauroxil 882 mg/3.2 mL injection 882 mg    ARIPiprazole lauroxil,submicr. 675 mg/2.4 mL injection 675 mg    ARIPiprazole tablet 5 mg    atorvastatin tablet 20 mg    calcium-vitamin D3 500 mg-5 mcg (200 unit) per tablet 1 tablet    enoxaparin injection 40 mg    FLUoxetine capsule 40 mg    gabapentin capsule 300 mg    guaiFENesin 12 hr tablet 600 mg    hydrALAZINE injection 10 mg    lamoTRIgine tablet 25 mg    lisinopriL tablet 40 mg    mirtazapine tablet 15 mg    morphine injection 4 mg    oxyCODONE immediate release tablet 5 mg    oxyCODONE immediate release tablet Tab 10 mg    propranoloL tablet 40 mg    QUEtiapine tablet 100 mg    QUEtiapine tablet 50 mg     Objective:     Vital Signs (Most Recent):  Temp: 99 °F (37.2 °C) (06/23/23 0410)  Pulse: 109 (06/23/23 0845)  Resp: 20 (06/23/23 0845)  BP: 137/82 (06/23/23 0410)  SpO2: (!) 94 % (06/23/23 0845) Vital Signs (24h Range):  Temp:  [98.3 °F (36.8 °C)-99.5 °F (37.5 °C)] 99 °F (37.2 °C)  Pulse:  [] 109  Resp:  [17-30] 20  SpO2:  [90 %-99 %] 94 %  BP: (137-163)/(80-91) 137/82     Weight: 
"121 kg (266 lb 12.1 oz)  Height: 5' 10" (177.8 cm)  Body mass index is 38.28 kg/m².      Intake/Output Summary (Last 24 hours) at 6/23/2023 0857  Last data filed at 6/23/2023 0315  Gross per 24 hour   Intake 240 ml   Output 1250 ml   Net -1010 ml         Physical Exam:   Ortho/SPM Exam    General: awake, alert    Musculoskeletal Exam:  Right upper extremity:  dressing to wrist is clean, dry, and intact. Compartments are soft and compressible. Velcro brace in place.Palpable radial pulse. Brisk cap refill to digits. Motor intact to digits.     Left lower extremity: incision to hip clean, dry, intact. Tolerates gentle passive hip circumduction. Compartments soft and compressible. Palpable DP pulse. Brisk cap refill distally. Dorsi/plantar flexes foot.     Right lower extremity: Palpable DP pulse. Brisk cap refill distally. Dorsi/plantar flexes foot.     Left upper extremity: splint clean, dry, intact. Palpable RP. Motor intact to digits. Brisk cap refill distally.    Diagnostic Findings:   Significant Labs: BMP:   Recent Labs   Lab 06/23/23 0237      K 3.5   CO2 24   BUN 10.9   CREATININE 0.71*   CALCIUM 8.1*       CBC:   Recent Labs   Lab 06/22/23 0131 06/23/23 0237   WBC 10.81 10.04   HGB 9.6* 10.2*   HCT 31.3* 32.6*   * 581*       CMP:   Recent Labs   Lab 06/22/23 0131 06/23/23 0237   * 144   K 3.7 3.5   CO2 23 24   BUN 13.2 10.9   CREATININE 0.73 0.71*   CALCIUM 8.2* 8.1*   ALBUMIN 2.7* 2.9*   BILITOT 1.9* 1.9*   ALKPHOS 261* 300*   AST 33 29   ALT 34 33       All pertinent labs within the past 24 hours have been reviewed.        Significant Imaging: I have reviewed and interpreted all pertinent imaging results/findings.     Assessment/Plan:     Active Diagnoses:    Diagnosis Date Noted POA    PRINCIPAL PROBLEM:  Closed olecranon fracture, left, initial encounter [S52.022A] 06/17/2023 No    Closed pelvic ring fracture [S32.810A] 06/05/2023 Yes    Displaced fracture of posterior column "
(ilioischial) of left acetabulum, initial encounter for closed fracture [S32.442A] 06/05/2023 Yes      Problems Resolved During this Admission:       NWB R wrist, ok for platform walker. Continue velcro brace.daily dry dressing changes to incisions. Sutures will need to come out next week. He will need a second operation in 10-12 weeks for removal of dorsal spanning plate.     Continue daily dry dressing changes to hip incision incision. Staples out next week.  NWB L LE, full ROM. He may use the R LE to pivot/transfer but no hopping/ambulation.    Continue splint and NWB L UE.     Lovenox for dvt ppx. Continue pain control. Will f/u next week for wound checks and staple/suture removal.    Please call with any questions or concerns.     The above findings, diagnosis, and treatment plan were discussed with Dr. Compa Bernal who is in agreement.        
Well nourished

## 2023-08-14 NOTE — PHYSICAL THERAPY INITIAL EVALUATION ADULT - SPECIFY REASON(S)
Duplicate message   Pt daughter called in stated earliest appointment for mammogram and ultrasound was Sept 5th   She is scheduled for the 5th so they will just keep it .   Pt is on bedrest. PT will f/u as appropriate. Implemented All Universal Safety Interventions:  [place] to call system. Call bell, personal items and telephone within reach. Instruct patient to call for assistance. Room bathroom lighting operational. Non-slip footwear when patient is off stretcher. Physically safe environment: no spills, clutter or unnecessary equipment. Stretcher in lowest position, wheels locked, appropriate side rails in place.

## 2023-10-10 ENCOUNTER — APPOINTMENT (OUTPATIENT)
Dept: OTOLARYNGOLOGY | Facility: CLINIC | Age: 83
End: 2023-10-10
Payer: MEDICARE

## 2023-10-10 VITALS — WEIGHT: 231 LBS | BODY MASS INDEX: 38.44 KG/M2

## 2023-10-10 DIAGNOSIS — R04.0 EPISTAXIS: ICD-10-CM

## 2023-10-10 DIAGNOSIS — R22.0 LOCALIZED SWELLING, MASS AND LUMP, HEAD: ICD-10-CM

## 2023-10-10 DIAGNOSIS — J34.89 OTHER SPECIFIED DISORDERS OF NOSE AND NASAL SINUSES: ICD-10-CM

## 2023-10-10 DIAGNOSIS — J32.3 CHRONIC SPHENOIDAL SINUSITIS: ICD-10-CM

## 2023-10-10 DIAGNOSIS — C44.310 BASAL CELL CARCINOMA OF SKIN OF UNSPECIFIED PARTS OF FACE: ICD-10-CM

## 2023-10-10 PROCEDURE — 31231 NASAL ENDOSCOPY DX: CPT

## 2023-10-10 PROCEDURE — 99213 OFFICE O/P EST LOW 20 MIN: CPT | Mod: 25

## 2023-12-04 NOTE — ED PROVIDER NOTE - ATTENDING CONTRIBUTION TO CARE
Pt calling about his Cialis refill being sent to [facility] [facility] since he will be at the hospital tomorrow and unavailable. I was present for and supervised the key and critical aspects of the procedures performed during the care of the patient. I personally evaluated the patient. I reviewed the Resident’s or Physician Assistant’s note (as assigned above), and agree with the findings and plan except as documented in my note.  80 y/o F presents sent from [facility] for a blood transfusion with hemoglobin of 7.4. Pt has been here in the past for transfusions. Pt denies any sxs or pain. No fever, chills, CP, SOB, n/v, numbness, weakness or tingling. Pt reports she recently got accidentally clipped with a car door on her left ear and is scheduled to follow up with plastic surgery. On exam: Pt pale- appearing. +left ear bandaged with partial avulsion, no signs of infection. PERRLA, EOMI. OP clear. Lungs CTAB. RRR, S1S2 noted. Radial pulses 2+ and equal, pedal pulses 2+ and equal. Abdomen soft, NT/ND, no rebound or guarding. FROM x4 extremities. No focal neuro deficits. A/P: will transfuse pt.

## 2024-02-09 NOTE — H&P ADULT - NSHPPOASURGSITEINCISION_GEN_ALL_CORE
Suki Sevilla (2005) 18 y.o. female here for evaluation of the following chief complaint(s):      HPI:  Chief Complaint   Patient presents with    Leg Pain     Patient has been experiencing right leg/calf pain for the past week. Patient states that it is now traveling to her ankle and causing swelling.       1 week felt a pop in back of her leg/knee.  Pain initial but nothing that lasted.    A couple of days ago felt a stretch in the leg and since then having calf pain with walking.  Affecting as she is walking and now ankle it bothering her.  No swelling.  No bruising.  No color change.     Very active.  No recent travel or surgery.  No OC.   No hx of DVT.     Vitals:    02/09/24 0911   BP: 122/74   Pulse: 85   Resp: 16   SpO2: 99%       Patient Active Problem List   Diagnosis    Enlarged tonsils    Chronic serous otitis media    Childhood obesity, BMI  percentile    Fever in pediatric patient    Headache    Generalized abdominal pain    Hyperinsulinemia    Sprain of right ankle       SUBJECTIVE/OBJECTIVE:  Review of Systems   Constitutional: Negative.    Respiratory: Negative.     Cardiovascular: Negative.    Musculoskeletal:  Positive for myalgias.       Physical Exam  Constitutional:       General: She is not in acute distress.     Appearance: She is not ill-appearing.   Cardiovascular:      Rate and Rhythm: Normal rate and regular rhythm.      Pulses: Normal pulses.      Heart sounds: Normal heart sounds.   Musculoskeletal:      Right lower leg: Tenderness (calf tenderness,  calf pain with dorsiflexion foot) present. No swelling or bony tenderness. No edema.      Right ankle: No swelling. No tenderness. Normal range of motion.      Right Achilles Tendon: No tenderness or defects.   Neurological:      Mental Status: She is alert.           ASSESSMENT/PLAN:   Diagnosis Orders   1. Strain of right calf muscle  naproxen (NAPROSYN) 500 MG tablet            MDM:  Low concern for DVT  Heat, muscle rub,  no

## 2024-02-16 ENCOUNTER — OUTPATIENT (OUTPATIENT)
Dept: OUTPATIENT SERVICES | Facility: HOSPITAL | Age: 84
LOS: 1 days | End: 2024-02-16
Payer: MEDICARE

## 2024-02-16 DIAGNOSIS — R05.2 SUBACUTE COUGH: ICD-10-CM

## 2024-02-16 DIAGNOSIS — Z00.8 ENCOUNTER FOR OTHER GENERAL EXAMINATION: ICD-10-CM

## 2024-02-16 PROCEDURE — 71260 CT THORAX DX C+: CPT | Mod: 26,MH

## 2024-02-16 PROCEDURE — 71260 CT THORAX DX C+: CPT

## 2024-02-17 DIAGNOSIS — R05.2 SUBACUTE COUGH: ICD-10-CM

## 2024-03-12 ENCOUNTER — APPOINTMENT (OUTPATIENT)
Dept: SPEECH THERAPY | Facility: CLINIC | Age: 84
End: 2024-03-12

## 2024-03-12 ENCOUNTER — OUTPATIENT (OUTPATIENT)
Dept: OUTPATIENT SERVICES | Facility: HOSPITAL | Age: 84
LOS: 1 days | End: 2024-03-12
Payer: MEDICARE

## 2024-03-12 DIAGNOSIS — H91.23 SUDDEN IDIOPATHIC HEARING LOSS, BILATERAL: ICD-10-CM

## 2024-03-12 DIAGNOSIS — H90.3 SENSORINEURAL HEARING LOSS, BILATERAL: ICD-10-CM

## 2024-03-12 PROCEDURE — 92557 COMPREHENSIVE HEARING TEST: CPT

## 2024-03-12 PROCEDURE — 92550 TYMPANOMETRY & REFLEX THRESH: CPT

## 2024-03-29 ENCOUNTER — INPATIENT (INPATIENT)
Facility: HOSPITAL | Age: 84
LOS: 7 days | Discharge: HOME | DRG: 308 | End: 2024-04-06
Attending: HOSPITALIST | Admitting: STUDENT IN AN ORGANIZED HEALTH CARE EDUCATION/TRAINING PROGRAM
Payer: MEDICARE

## 2024-03-29 VITALS
DIASTOLIC BLOOD PRESSURE: 62 MMHG | HEIGHT: 65 IN | WEIGHT: 279.99 LBS | SYSTOLIC BLOOD PRESSURE: 128 MMHG | RESPIRATION RATE: 18 BRPM | TEMPERATURE: 98 F | OXYGEN SATURATION: 96 % | HEART RATE: 99 BPM

## 2024-03-29 DIAGNOSIS — I48.91 UNSPECIFIED ATRIAL FIBRILLATION: ICD-10-CM

## 2024-03-29 LAB
ALBUMIN SERPL ELPH-MCNC: 3.3 G/DL — LOW (ref 3.5–5.2)
ALP SERPL-CCNC: 100 U/L — SIGNIFICANT CHANGE UP (ref 30–115)
ALT FLD-CCNC: 17 U/L — SIGNIFICANT CHANGE UP (ref 0–41)
ANION GAP SERPL CALC-SCNC: 4 MMOL/L — LOW (ref 7–14)
ANION GAP SERPL CALC-SCNC: 7 MMOL/L — SIGNIFICANT CHANGE UP (ref 7–14)
APPEARANCE UR: CLEAR — SIGNIFICANT CHANGE UP
AST SERPL-CCNC: 29 U/L — SIGNIFICANT CHANGE UP (ref 0–41)
BASE EXCESS BLDA CALC-SCNC: 7.8 MMOL/L — HIGH (ref -2–3)
BASOPHILS # BLD AUTO: 0.04 K/UL — SIGNIFICANT CHANGE UP (ref 0–0.2)
BASOPHILS NFR BLD AUTO: 0.4 % — SIGNIFICANT CHANGE UP (ref 0–1)
BILIRUB SERPL-MCNC: 0.2 MG/DL — SIGNIFICANT CHANGE UP (ref 0.2–1.2)
BILIRUB UR-MCNC: NEGATIVE — SIGNIFICANT CHANGE UP
BUN SERPL-MCNC: 22 MG/DL — HIGH (ref 10–20)
BUN SERPL-MCNC: 23 MG/DL — HIGH (ref 10–20)
CALCIUM SERPL-MCNC: 9.2 MG/DL — SIGNIFICANT CHANGE UP (ref 8.4–10.5)
CALCIUM SERPL-MCNC: 9.4 MG/DL — SIGNIFICANT CHANGE UP (ref 8.4–10.5)
CHLORIDE SERPL-SCNC: 101 MMOL/L — SIGNIFICANT CHANGE UP (ref 98–110)
CHLORIDE SERPL-SCNC: 102 MMOL/L — SIGNIFICANT CHANGE UP (ref 98–110)
CO2 SERPL-SCNC: 32 MMOL/L — SIGNIFICANT CHANGE UP (ref 17–32)
CO2 SERPL-SCNC: 34 MMOL/L — HIGH (ref 17–32)
COLOR SPEC: YELLOW — SIGNIFICANT CHANGE UP
CREAT SERPL-MCNC: 1.2 MG/DL — SIGNIFICANT CHANGE UP (ref 0.7–1.5)
CREAT SERPL-MCNC: 1.3 MG/DL — SIGNIFICANT CHANGE UP (ref 0.7–1.5)
DIFF PNL FLD: NEGATIVE — SIGNIFICANT CHANGE UP
EGFR: 40 ML/MIN/1.73M2 — LOW
EGFR: 44 ML/MIN/1.73M2 — LOW
EOSINOPHIL # BLD AUTO: 0.03 K/UL — SIGNIFICANT CHANGE UP (ref 0–0.7)
EOSINOPHIL NFR BLD AUTO: 0.3 % — SIGNIFICANT CHANGE UP (ref 0–8)
GAS PNL BLDA: SIGNIFICANT CHANGE UP
GLUCOSE BLDC GLUCOMTR-MCNC: 106 MG/DL — HIGH (ref 70–99)
GLUCOSE BLDC GLUCOMTR-MCNC: 90 MG/DL — SIGNIFICANT CHANGE UP (ref 70–99)
GLUCOSE BLDC GLUCOMTR-MCNC: 91 MG/DL — SIGNIFICANT CHANGE UP (ref 70–99)
GLUCOSE SERPL-MCNC: 127 MG/DL — HIGH (ref 70–99)
GLUCOSE SERPL-MCNC: 142 MG/DL — HIGH (ref 70–99)
GLUCOSE UR QL: >=1000 MG/DL
HCO3 BLDA-SCNC: 35 MMOL/L — HIGH (ref 21–28)
HCT VFR BLD CALC: 33.1 % — LOW (ref 37–47)
HGB BLD-MCNC: 9.8 G/DL — LOW (ref 12–16)
HOROWITZ INDEX BLDA+IHG-RTO: 21 — SIGNIFICANT CHANGE UP
IMM GRANULOCYTES NFR BLD AUTO: 0.4 % — HIGH (ref 0.1–0.3)
KETONES UR-MCNC: NEGATIVE MG/DL — SIGNIFICANT CHANGE UP
LEUKOCYTE ESTERASE UR-ACNC: NEGATIVE — SIGNIFICANT CHANGE UP
LIDOCAIN IGE QN: 32 U/L — SIGNIFICANT CHANGE UP (ref 7–60)
LYMPHOCYTES # BLD AUTO: 0.75 K/UL — LOW (ref 1.2–3.4)
LYMPHOCYTES # BLD AUTO: 7.5 % — LOW (ref 20.5–51.1)
MCHC RBC-ENTMCNC: 28.2 PG — SIGNIFICANT CHANGE UP (ref 27–31)
MCHC RBC-ENTMCNC: 29.6 G/DL — LOW (ref 32–37)
MCV RBC AUTO: 95.1 FL — SIGNIFICANT CHANGE UP (ref 81–99)
MONOCYTES # BLD AUTO: 0.55 K/UL — SIGNIFICANT CHANGE UP (ref 0.1–0.6)
MONOCYTES NFR BLD AUTO: 5.5 % — SIGNIFICANT CHANGE UP (ref 1.7–9.3)
NEUTROPHILS # BLD AUTO: 8.54 K/UL — HIGH (ref 1.4–6.5)
NEUTROPHILS NFR BLD AUTO: 85.9 % — HIGH (ref 42.2–75.2)
NITRITE UR-MCNC: NEGATIVE — SIGNIFICANT CHANGE UP
NRBC # BLD: 0 /100 WBCS — SIGNIFICANT CHANGE UP (ref 0–0)
PCO2 BLDA: 65 MMHG — HIGH (ref 32–45)
PH BLDA: 7.34 — LOW (ref 7.35–7.45)
PH UR: 7 — SIGNIFICANT CHANGE UP (ref 5–8)
PLATELET # BLD AUTO: 285 K/UL — SIGNIFICANT CHANGE UP (ref 130–400)
PMV BLD: 10 FL — SIGNIFICANT CHANGE UP (ref 7.4–10.4)
PO2 BLDA: 108 MMHG — SIGNIFICANT CHANGE UP (ref 83–108)
POTASSIUM SERPL-MCNC: 5.1 MMOL/L — HIGH (ref 3.5–5)
POTASSIUM SERPL-MCNC: 5.9 MMOL/L — HIGH (ref 3.5–5)
POTASSIUM SERPL-SCNC: 5.1 MMOL/L — HIGH (ref 3.5–5)
POTASSIUM SERPL-SCNC: 5.9 MMOL/L — HIGH (ref 3.5–5)
PROT SERPL-MCNC: 6.9 G/DL — SIGNIFICANT CHANGE UP (ref 6–8)
PROT UR-MCNC: NEGATIVE MG/DL — SIGNIFICANT CHANGE UP
RBC # BLD: 3.48 M/UL — LOW (ref 4.2–5.4)
RBC # FLD: 14.4 % — SIGNIFICANT CHANGE UP (ref 11.5–14.5)
SAO2 % BLDA: 99.8 % — HIGH (ref 94–98)
SODIUM SERPL-SCNC: 139 MMOL/L — SIGNIFICANT CHANGE UP (ref 135–146)
SODIUM SERPL-SCNC: 141 MMOL/L — SIGNIFICANT CHANGE UP (ref 135–146)
SP GR SPEC: 1.03 — SIGNIFICANT CHANGE UP (ref 1–1.03)
TROPONIN T, HIGH SENSITIVITY RESULT: 32 NG/L — HIGH (ref 6–13)
TROPONIN T, HIGH SENSITIVITY RESULT: 34 NG/L — HIGH (ref 6–13)
UROBILINOGEN FLD QL: 1 MG/DL — SIGNIFICANT CHANGE UP (ref 0.2–1)
WBC # BLD: 9.95 K/UL — SIGNIFICANT CHANGE UP (ref 4.8–10.8)
WBC # FLD AUTO: 9.95 K/UL — SIGNIFICANT CHANGE UP (ref 4.8–10.8)

## 2024-03-29 PROCEDURE — 85025 COMPLETE CBC W/AUTO DIFF WBC: CPT

## 2024-03-29 PROCEDURE — 82803 BLOOD GASES ANY COMBINATION: CPT

## 2024-03-29 PROCEDURE — 93010 ELECTROCARDIOGRAM REPORT: CPT

## 2024-03-29 PROCEDURE — 71045 X-RAY EXAM CHEST 1 VIEW: CPT

## 2024-03-29 PROCEDURE — 74177 CT ABD & PELVIS W/CONTRAST: CPT | Mod: 26,MC

## 2024-03-29 PROCEDURE — 93005 ELECTROCARDIOGRAM TRACING: CPT

## 2024-03-29 PROCEDURE — 83605 ASSAY OF LACTIC ACID: CPT

## 2024-03-29 PROCEDURE — 84466 ASSAY OF TRANSFERRIN: CPT

## 2024-03-29 PROCEDURE — 36415 COLL VENOUS BLD VENIPUNCTURE: CPT

## 2024-03-29 PROCEDURE — 80061 LIPID PANEL: CPT

## 2024-03-29 PROCEDURE — 99285 EMERGENCY DEPT VISIT HI MDM: CPT | Mod: GC

## 2024-03-29 PROCEDURE — 87086 URINE CULTURE/COLONY COUNT: CPT

## 2024-03-29 PROCEDURE — 83550 IRON BINDING TEST: CPT

## 2024-03-29 PROCEDURE — 83036 HEMOGLOBIN GLYCOSYLATED A1C: CPT

## 2024-03-29 PROCEDURE — 83735 ASSAY OF MAGNESIUM: CPT

## 2024-03-29 PROCEDURE — 83880 ASSAY OF NATRIURETIC PEPTIDE: CPT

## 2024-03-29 PROCEDURE — 84100 ASSAY OF PHOSPHORUS: CPT

## 2024-03-29 PROCEDURE — 99223 1ST HOSP IP/OBS HIGH 75: CPT

## 2024-03-29 PROCEDURE — 84439 ASSAY OF FREE THYROXINE: CPT

## 2024-03-29 PROCEDURE — 84436 ASSAY OF TOTAL THYROXINE: CPT

## 2024-03-29 PROCEDURE — 81003 URINALYSIS AUTO W/O SCOPE: CPT

## 2024-03-29 PROCEDURE — 82607 VITAMIN B-12: CPT

## 2024-03-29 PROCEDURE — 82746 ASSAY OF FOLIC ACID SERUM: CPT

## 2024-03-29 PROCEDURE — 80162 ASSAY OF DIGOXIN TOTAL: CPT

## 2024-03-29 PROCEDURE — 85027 COMPLETE CBC AUTOMATED: CPT

## 2024-03-29 PROCEDURE — 71045 X-RAY EXAM CHEST 1 VIEW: CPT | Mod: 26

## 2024-03-29 PROCEDURE — 82728 ASSAY OF FERRITIN: CPT

## 2024-03-29 PROCEDURE — 80048 BASIC METABOLIC PNL TOTAL CA: CPT

## 2024-03-29 PROCEDURE — 84443 ASSAY THYROID STIM HORMONE: CPT

## 2024-03-29 PROCEDURE — 83540 ASSAY OF IRON: CPT

## 2024-03-29 PROCEDURE — 93010 ELECTROCARDIOGRAM REPORT: CPT | Mod: 77

## 2024-03-29 PROCEDURE — 93307 TTE W/O DOPPLER COMPLETE: CPT

## 2024-03-29 PROCEDURE — 82962 GLUCOSE BLOOD TEST: CPT

## 2024-03-29 PROCEDURE — 80053 COMPREHEN METABOLIC PANEL: CPT

## 2024-03-29 RX ORDER — CEPHALEXIN 500 MG
0 CAPSULE ORAL
Qty: 0 | Refills: 0 | DISCHARGE

## 2024-03-29 RX ORDER — RISPERIDONE 4 MG/1
0.5 TABLET ORAL DAILY
Refills: 0 | Status: DISCONTINUED | OUTPATIENT
Start: 2024-03-29 | End: 2024-04-06

## 2024-03-29 RX ORDER — SODIUM CHLORIDE 9 MG/ML
1000 INJECTION, SOLUTION INTRAVENOUS
Refills: 0 | Status: DISCONTINUED | OUTPATIENT
Start: 2024-03-29 | End: 2024-04-06

## 2024-03-29 RX ORDER — LANOLIN ALCOHOL/MO/W.PET/CERES
3 CREAM (GRAM) TOPICAL AT BEDTIME
Refills: 0 | Status: DISCONTINUED | OUTPATIENT
Start: 2024-03-29 | End: 2024-04-06

## 2024-03-29 RX ORDER — ONDANSETRON 8 MG/1
4 TABLET, FILM COATED ORAL EVERY 8 HOURS
Refills: 0 | Status: DISCONTINUED | OUTPATIENT
Start: 2024-03-29 | End: 2024-04-06

## 2024-03-29 RX ORDER — INSULIN LISPRO 100/ML
VIAL (ML) SUBCUTANEOUS AT BEDTIME
Refills: 0 | Status: DISCONTINUED | OUTPATIENT
Start: 2024-03-29 | End: 2024-04-06

## 2024-03-29 RX ORDER — SODIUM CHLORIDE 9 MG/ML
1000 INJECTION INTRAMUSCULAR; INTRAVENOUS; SUBCUTANEOUS ONCE
Refills: 0 | Status: COMPLETED | OUTPATIENT
Start: 2024-03-29 | End: 2024-03-29

## 2024-03-29 RX ORDER — DEXTROSE 50 % IN WATER 50 %
25 SYRINGE (ML) INTRAVENOUS ONCE
Refills: 0 | Status: DISCONTINUED | OUTPATIENT
Start: 2024-03-29 | End: 2024-04-06

## 2024-03-29 RX ORDER — RISPERIDONE 4 MG/1
0.5 TABLET ORAL
Refills: 0 | DISCHARGE

## 2024-03-29 RX ORDER — FOLIC ACID 0.8 MG
1 TABLET ORAL DAILY
Refills: 0 | Status: DISCONTINUED | OUTPATIENT
Start: 2024-03-29 | End: 2024-04-06

## 2024-03-29 RX ORDER — INSULIN LISPRO 100/ML
VIAL (ML) SUBCUTANEOUS
Refills: 0 | Status: DISCONTINUED | OUTPATIENT
Start: 2024-03-29 | End: 2024-04-06

## 2024-03-29 RX ORDER — INFLUENZA VIRUS VACCINE 15; 15; 15; 15 UG/.5ML; UG/.5ML; UG/.5ML; UG/.5ML
0.7 SUSPENSION INTRAMUSCULAR ONCE
Refills: 0 | Status: DISCONTINUED | OUTPATIENT
Start: 2024-03-29 | End: 2024-04-06

## 2024-03-29 RX ORDER — ROSUVASTATIN CALCIUM 5 MG/1
1 TABLET ORAL
Refills: 0 | DISCHARGE

## 2024-03-29 RX ORDER — FUROSEMIDE 40 MG
40 TABLET ORAL DAILY
Refills: 0 | Status: DISCONTINUED | OUTPATIENT
Start: 2024-03-29 | End: 2024-04-06

## 2024-03-29 RX ORDER — ASPIRIN/CALCIUM CARB/MAGNESIUM 324 MG
81 TABLET ORAL DAILY
Refills: 0 | Status: DISCONTINUED | OUTPATIENT
Start: 2024-03-29 | End: 2024-03-30

## 2024-03-29 RX ORDER — FOLIC ACID 0.8 MG
1 TABLET ORAL
Qty: 0 | Refills: 0 | DISCHARGE

## 2024-03-29 RX ORDER — ATORVASTATIN CALCIUM 80 MG/1
40 TABLET, FILM COATED ORAL AT BEDTIME
Refills: 0 | Status: DISCONTINUED | OUTPATIENT
Start: 2024-03-29 | End: 2024-04-06

## 2024-03-29 RX ORDER — ENOXAPARIN SODIUM 100 MG/ML
120 INJECTION SUBCUTANEOUS EVERY 12 HOURS
Refills: 0 | Status: DISCONTINUED | OUTPATIENT
Start: 2024-03-29 | End: 2024-04-03

## 2024-03-29 RX ORDER — ACETAMINOPHEN 500 MG
650 TABLET ORAL EVERY 6 HOURS
Refills: 0 | Status: DISCONTINUED | OUTPATIENT
Start: 2024-03-29 | End: 2024-04-06

## 2024-03-29 RX ORDER — SENNA PLUS 8.6 MG/1
2 TABLET ORAL AT BEDTIME
Refills: 0 | Status: DISCONTINUED | OUTPATIENT
Start: 2024-03-29 | End: 2024-04-06

## 2024-03-29 RX ORDER — FERROUS SULFATE 325(65) MG
325 TABLET ORAL DAILY
Refills: 0 | Status: DISCONTINUED | OUTPATIENT
Start: 2024-03-29 | End: 2024-04-06

## 2024-03-29 RX ORDER — SODIUM ZIRCONIUM CYCLOSILICATE 10 G/10G
5 POWDER, FOR SUSPENSION ORAL
Refills: 0 | Status: DISCONTINUED | OUTPATIENT
Start: 2024-03-29 | End: 2024-03-30

## 2024-03-29 RX ORDER — DEXTROSE 50 % IN WATER 50 %
15 SYRINGE (ML) INTRAVENOUS ONCE
Refills: 0 | Status: DISCONTINUED | OUTPATIENT
Start: 2024-03-29 | End: 2024-04-06

## 2024-03-29 RX ORDER — ESCITALOPRAM OXALATE 10 MG/1
1 TABLET, FILM COATED ORAL
Qty: 0 | Refills: 0 | DISCHARGE

## 2024-03-29 RX ORDER — GLUCAGON INJECTION, SOLUTION 0.5 MG/.1ML
1 INJECTION, SOLUTION SUBCUTANEOUS ONCE
Refills: 0 | Status: DISCONTINUED | OUTPATIENT
Start: 2024-03-29 | End: 2024-04-06

## 2024-03-29 RX ORDER — DEXTROSE 50 % IN WATER 50 %
12.5 SYRINGE (ML) INTRAVENOUS ONCE
Refills: 0 | Status: DISCONTINUED | OUTPATIENT
Start: 2024-03-29 | End: 2024-04-06

## 2024-03-29 RX ORDER — LOSARTAN POTASSIUM 100 MG/1
50 TABLET, FILM COATED ORAL DAILY
Refills: 0 | Status: DISCONTINUED | OUTPATIENT
Start: 2024-03-29 | End: 2024-04-05

## 2024-03-29 RX ORDER — RISPERIDONE 4 MG/1
1 TABLET ORAL
Qty: 0 | Refills: 0 | DISCHARGE

## 2024-03-29 RX ADMIN — ENOXAPARIN SODIUM 120 MILLIGRAM(S): 100 INJECTION SUBCUTANEOUS at 14:09

## 2024-03-29 RX ADMIN — SODIUM CHLORIDE 1000 MILLILITER(S): 9 INJECTION INTRAMUSCULAR; INTRAVENOUS; SUBCUTANEOUS at 03:36

## 2024-03-29 RX ADMIN — ATORVASTATIN CALCIUM 40 MILLIGRAM(S): 80 TABLET, FILM COATED ORAL at 21:31

## 2024-03-29 RX ADMIN — SENNA PLUS 2 TABLET(S): 8.6 TABLET ORAL at 21:31

## 2024-03-29 RX ADMIN — SODIUM ZIRCONIUM CYCLOSILICATE 5 GRAM(S): 10 POWDER, FOR SUSPENSION ORAL at 17:14

## 2024-03-29 NOTE — ED PROVIDER NOTE - PHYSICAL EXAMINATION
CONSTITUTIONAL: Well-developed; well-nourished; in no acute distress, nontoxic appearing  SKIN: skin exam is warm and dry,  HEAD: Normocephalic; atraumatic.  EYES: PERRL, 3 mm bilateral, no nystagmus, EOM intact; conjunctiva and sclera clear.  ENT: MMM, no nasal congestion  NECK: Supple; non tender.+ full passive ROM in all directions. No JVD  CARD: S1, S2 normal, no murmur  RESP: No wheezes, rales or rhonchi. Good air movement bilaterally  ABD: soft; non-distended; NON TENDER. No Rebound, No guarding  EXT: Normal ROM. No cyanosis or edema. Dp Pulses intact.   NEURO: awake, alert, following commands, oriented, grossly unremarkable. No Focal deficits. GCS 15.   PSYCH: Cooperative, appropriate.

## 2024-03-29 NOTE — H&P ADULT - ATTENDING COMMENTS
83-year-old female with history of schizophrenia, DM2, HTN, lower extremity edema presents for evaluation of nausea for one day. Patient at baseline is an unreliable historian. Per the patient she was nauseous for the last day, but never vomited. Patient states that it suddenly just came on and was associated with pain throughout her entire body. Per family members patient has a history of bipolar with psychotic episodes, frequently believing people are trying to kill her. At this time patient is feeling better, and would like to eat food.     In the ED patient was hemodynamically stable /62, HR 74-99, Afebrile. EKG was preformed which showed new onset atrial fibrillation, so patient was admitted to telemetry.     Vital Signs Last 24 Hrs  T(F): 97.2 (29 Mar 2024 07:43), Max: 98.4   HR: 74 (29 Mar 2024 07:43) (74 - 99)  BP: 110/59 (29 Mar 2024 07:43) (110/59 - 128/62)  RR: 18 (29 Mar 2024 07:43) (18 - 18)  SpO2: 95% (29 Mar 2024 07:43) (95% - 96%)    Assessment:    # New onset Afib with controlled HR    >> likely precipitated by MANJEET/ OHS    >> associated with Hypercapnic Respiratory failure        possibly Chronic as appears to be compensated by Metabolic Alkalosis.         ABG >> 7.34/65/ 35  >> currently on Bipap   >> f/u TTE, Cardio Input.  >> check TSH, BNP    DM2/HTN >> Sliding scale, Losartan,     DVT/GI ppx.

## 2024-03-29 NOTE — ED ADULT NURSE NOTE - CHIEF COMPLAINT QUOTE
BIBA from [facility] with complaints of sudden loss of appetite and difficulty swallowing. Patient in no respiratory distress and tolerating secretions.

## 2024-03-29 NOTE — PATIENT PROFILE ADULT - FALL HARM RISK - HARM RISK INTERVENTIONS
Assistance with ambulation/Assistance OOB with selected safe patient handling equipment/Communicate Risk of Fall with Harm to all staff/Discuss with provider need for PT consult/Monitor gait and stability/Provide patient with walking aids - walker, cane, crutches/Reinforce activity limits and safety measures with patient and family/Sit up slowly, dangle for a short time, stand at bedside before walking/Tailored Fall Risk Interventions/Use of alarms - bed, chair and/or voice tab/Visual Cue: Yellow wristband and red socks/Bed in lowest position, wheels locked, appropriate side rails in place/Call bell, personal items and telephone in reach/Instruct patient to call for assistance before getting out of bed or chair/Non-slip footwear when patient is out of bed/[place] to call system/Physically safe environment - no spills, clutter or unnecessary equipment/Purposeful Proactive Rounding/Room/bathroom lighting operational, light cord in reach

## 2024-03-29 NOTE — ED PROVIDER NOTE - CLINICAL SUMMARY MEDICAL DECISION MAKING FREE TEXT BOX
Patient presented for evaluation of "feeling unwell".  Patient was found to be in the onset A-fib.  Troponin elevated to 34 pending repeat troponin.  Chest x-ray without acute infiltrate or consolidation.  Patient to be admitted to telemetry for further evaluation and management.

## 2024-03-29 NOTE — PATIENT PROFILE ADULT - FUNCTIONAL ASSESSMENT - BASIC MOBILITY 6.
2-calculated by average/Not able to assess (calculate score using [facility] averaging method)

## 2024-03-29 NOTE — ED ADULT NURSE NOTE - NSFALLHARMRISKINTERV_ED_ALL_ED

## 2024-03-29 NOTE — CONSULT NOTE ADULT - ASSESSMENT
84 yo F w HTN,DM,morbid obesity, chronic LE edema, thyroid mass, medically managed, schizophrenia/bipolar presents with c.o nausea. Found to have new onset AF for which cardiology is consulted.    VSS, EKG AF w controlled VR, LAD   CXr with b/l vascular congestion, L pleural effusion       Impression:   New onset AF, asymptomatic, rate controlled, [id number] score 6  HTN   DM   obesity, ? MANJEET  chronic LE edema     Recs-   -cont lovenox for AC for now, watch H&H or any clinical bleeding ,switch to eliquis on discharge   -can discontinue aspirin as appears no indication for secondary prevention   -TTE   -check TSH, lipid, a1c   -cont atorvastatin 40, losartan 50 mg , lasix 40 mg PO daily  -BiPAP/CPAP for MANJEET/ohs     84 yo F w HTN,DM,morbid obesity, chronic LE edema, thyroid mass, medically managed, schizophrenia/bipolar presents with c.o nausea. Found to have new onset AF for which cardiology is consulted.    VSS, EKG AF w controlled VR, LAD   CXr with b/l vascular congestion, L pleural effusion       Impression:   New onset AF, asymptomatic, rate controlled, [id number] score 6  HTN   DM   obesity, ? MANJEET  chronic LE edema     Recs-   -cont lovenox for AC for now, watch H&H or any clinical bleeding ,switch to eliquis on discharge   Hgb appears to have dropped further today will fu and consider holding long term anticoagulation if continues to fall and will fu for source of anemia.   -can discontinue aspirin as appears no indication for secondary prevention   -TTE   -check TSH, lipid, a1c   -cont atorvastatin 40, losartan 50 mg , lasix 40 mg PO daily  -BiPAP/CPAP for MANJEET/ohs

## 2024-03-29 NOTE — H&P ADULT - ASSESSMENT
83-year-old female with history of schizophrenia, DM2, HTN, lower extremity edema presents for evaluation of nausea for one day, admitted for new onset afib.     #New Onset Atrial Fibrillation   - Patient is a patient of Dr. Razo , consult placed spoke with him  - Rate controlled off medications  - EKG confiming  - Not hypotensive   - Troponin trend 32-->34    Plan:   - Start Lovenox 120mg BID, transition to Eliquis on DC  - Monitor on Tele for 24 hours  - F/u cardiology note   - TTE  - BNP  - Continue Lasix 40mg, Losartan 50mg, Aspirin 81, Crestor 10mg    #Abdominal Pain- Resolved  - Low residual, lactose free diet  - Mayloxx prn    #Bipolar Disorder  - continue Risperdal 0.5mg daily       #Misc  - DVT Prophylaxis: Lovenox   - GI Prophylaxis: None  - Diet: DASH, Carb Consistent, low residual  - Activity: As tolerated  - IV Fluids: None  - Code Status: Full     Dispo: Acute     Joaquín Yusuf (Son): [phone]  Shaneka Razo (Daughter): [phone]     83-year-old female with history of schizophrenia, DM2, HTN, lower extremity edema presents for evaluation of nausea for one day, admitted for new onset afib.     #New Onset Atrial Fibrillation   #Suspect 2/2 OHS   - Patient is a patient of Dr. Razo , consult placed spoke with him  - Rate controlled off medications  - EKG confiming  - Not hypotensive   - Troponin trend 32-->34    Plan:   - Start Lovenox 120mg BID, transition to Eliquis on DC  - Monitor on Tele for 24 hours  - F/u cardiology note   - TTE  - BNP  - ABG, depending on this consider bipap at [facility] follow up outpatient  - Continue Lasix 40mg, Losartan 50mg, Aspirin 81, Crestor 10mg    #Abdominal Pain- Resolved  - Low residual, lactose free diet  - Mayloxx prn    #Bipolar Disorder  - continue Risperdal 0.5mg daily       #Misc  - DVT Prophylaxis: Lovenox   - GI Prophylaxis: None  - Diet: DASH, Carb Consistent, low residual  - Activity: As tolerated  - IV Fluids: None  - Code Status: Full     Dispo: Acute     Joaquín Rustjaylyn (Son): [phone]  Shaneka Razo (Daughter): [phone]

## 2024-03-29 NOTE — ED ADULT TRIAGE NOTE - CHIEF COMPLAINT QUOTE
BIBA from [facility] with complaints of sudden loss of appetite and difficulty swallowing. Patient in no respiratory distress and tolerating secretions.

## 2024-03-29 NOTE — H&P ADULT - HISTORY OF PRESENT ILLNESS
83-year-old female with history of schizophrenia, DM2, HTN, lower extremity edema presents for evaluation of nausea for one day. Patient at baseline is an unreliable historian. Per the patient she was nauseous for the last day, but never vomited. Patient states that it suddenly just came on and was associated with pain throughout her entire body. Per family members patient has a history of bipolar with psychotic episodes, frequently believing people are trying to kill her. At this time patient is feeling better, and would like to eat food.     In the ED patient was hemodynamically stable /62, HR 74-99, Afebrile. EKG was preformed which showed new onset atrial fibrillation, so patient was admitted to telemetry.     Vital Signs Last 24 Hrs  T(C): 36.2 (29 Mar 2024 07:43), Max: 36.9 (29 Mar 2024 02:35)  T(F): 97.2 (29 Mar 2024 07:43), Max: 98.4 (29 Mar 2024 02:35)  HR: 74 (29 Mar 2024 07:43) (74 - 99)  BP: 110/59 (29 Mar 2024 07:43) (110/59 - 128/62)  BP(mean): --  RR: 18 (29 Mar 2024 07:43) (18 - 18)  SpO2: 95% (29 Mar 2024 07:43) (95% - 96%)    Parameters below as of 29 Mar 2024 07:43  Patient On (Oxygen Delivery Method): room air

## 2024-03-29 NOTE — ED ADULT NURSE REASSESSMENT NOTE - NS ED NURSE REASSESS COMMENT FT1
Bruise noted on upper right thigh upon arrival. MD Quijano made aware. Bruise noted on upper right thigh upon arrival. MD made aware.

## 2024-03-29 NOTE — H&P ADULT - NSHPPHYSICALEXAM_GEN_ALL_CORE
PHYSICAL EXAM:  GENERAL: Morbidly obese female   HEAD:  Atraumatic, Normocephalic  EYES: EOMI, PERRLA, conjunctiva and sclera clear  ENMT: No tonsillar erythema, exudates, or enlargement; Moist mucous membranes  NECK: Supple, No JVD, Normal thyroid  HEART: Irregular rhythm, regular rate   RESPIRATORY: CTA B/L, No W/R/R  ABDOMEN: Soft, Nontender, Nondistended; Bowel sounds present  NEUROLOGY: A&Ox2, nonfocal, moving all extremities  EXTREMITIES:  2+ Peripheral Pulses, No clubbing, cyanosis, or edema  SKIN: warm, dry, normal color, no rash or abnormal lesions

## 2024-03-29 NOTE — ED PROVIDER NOTE - OBJECTIVE STATEMENT
83-year-old female with history of schizophrenia, DM2, HTN, lower extremity edema presents for evaluation of 1 day of nausea with associated nonbilious nonbloody vomiting.  Patient denies abdominal pain, fever, diarrhea.  Denies any urinary symptoms.

## 2024-03-30 LAB
A1C WITH ESTIMATED AVERAGE GLUCOSE RESULT: 7.1 % — HIGH (ref 4–5.6)
ALBUMIN SERPL ELPH-MCNC: 3.1 G/DL — LOW (ref 3.5–5.2)
ALP SERPL-CCNC: 95 U/L — SIGNIFICANT CHANGE UP (ref 30–115)
ALT FLD-CCNC: 13 U/L — SIGNIFICANT CHANGE UP (ref 0–41)
ANION GAP SERPL CALC-SCNC: 9 MMOL/L — SIGNIFICANT CHANGE UP (ref 7–14)
AST SERPL-CCNC: 8 U/L — SIGNIFICANT CHANGE UP (ref 0–41)
BASOPHILS # BLD AUTO: 0.05 K/UL — SIGNIFICANT CHANGE UP (ref 0–0.2)
BASOPHILS NFR BLD AUTO: 0.8 % — SIGNIFICANT CHANGE UP (ref 0–1)
BILIRUB SERPL-MCNC: 0.3 MG/DL — SIGNIFICANT CHANGE UP (ref 0.2–1.2)
BUN SERPL-MCNC: 26 MG/DL — HIGH (ref 10–20)
CALCIUM SERPL-MCNC: 9.2 MG/DL — SIGNIFICANT CHANGE UP (ref 8.4–10.5)
CHLORIDE SERPL-SCNC: 105 MMOL/L — SIGNIFICANT CHANGE UP (ref 98–110)
CHOLEST SERPL-MCNC: 120 MG/DL — SIGNIFICANT CHANGE UP
CO2 SERPL-SCNC: 30 MMOL/L — SIGNIFICANT CHANGE UP (ref 17–32)
CREAT SERPL-MCNC: 1.2 MG/DL — SIGNIFICANT CHANGE UP (ref 0.7–1.5)
EGFR: 44 ML/MIN/1.73M2 — LOW
EOSINOPHIL # BLD AUTO: 0.2 K/UL — SIGNIFICANT CHANGE UP (ref 0–0.7)
EOSINOPHIL NFR BLD AUTO: 3 % — SIGNIFICANT CHANGE UP (ref 0–8)
ESTIMATED AVERAGE GLUCOSE: 157 MG/DL — HIGH (ref 68–114)
GLUCOSE BLDC GLUCOMTR-MCNC: 108 MG/DL — HIGH (ref 70–99)
GLUCOSE BLDC GLUCOMTR-MCNC: 112 MG/DL — HIGH (ref 70–99)
GLUCOSE BLDC GLUCOMTR-MCNC: 131 MG/DL — HIGH (ref 70–99)
GLUCOSE BLDC GLUCOMTR-MCNC: 172 MG/DL — HIGH (ref 70–99)
GLUCOSE SERPL-MCNC: 87 MG/DL — SIGNIFICANT CHANGE UP (ref 70–99)
HCT VFR BLD CALC: 30.3 % — LOW (ref 37–47)
HCT VFR BLD CALC: 31.1 % — LOW (ref 37–47)
HDLC SERPL-MCNC: 42 MG/DL — LOW
HGB BLD-MCNC: 8.7 G/DL — LOW (ref 12–16)
HGB BLD-MCNC: 8.9 G/DL — LOW (ref 12–16)
IMM GRANULOCYTES NFR BLD AUTO: 0.3 % — SIGNIFICANT CHANGE UP (ref 0.1–0.3)
IRON SATN MFR SERPL: 15 % — SIGNIFICANT CHANGE UP (ref 15–50)
IRON SATN MFR SERPL: 37 UG/DL — SIGNIFICANT CHANGE UP (ref 35–150)
LIPID PNL WITH DIRECT LDL SERPL: 54 MG/DL — SIGNIFICANT CHANGE UP
LYMPHOCYTES # BLD AUTO: 1.08 K/UL — LOW (ref 1.2–3.4)
LYMPHOCYTES # BLD AUTO: 16.4 % — LOW (ref 20.5–51.1)
MCHC RBC-ENTMCNC: 27.6 PG — SIGNIFICANT CHANGE UP (ref 27–31)
MCHC RBC-ENTMCNC: 27.7 PG — SIGNIFICANT CHANGE UP (ref 27–31)
MCHC RBC-ENTMCNC: 28.6 G/DL — LOW (ref 32–37)
MCHC RBC-ENTMCNC: 28.7 G/DL — LOW (ref 32–37)
MCV RBC AUTO: 96.5 FL — SIGNIFICANT CHANGE UP (ref 81–99)
MCV RBC AUTO: 96.6 FL — SIGNIFICANT CHANGE UP (ref 81–99)
MONOCYTES # BLD AUTO: 0.64 K/UL — HIGH (ref 0.1–0.6)
MONOCYTES NFR BLD AUTO: 9.7 % — HIGH (ref 1.7–9.3)
NEUTROPHILS # BLD AUTO: 4.6 K/UL — SIGNIFICANT CHANGE UP (ref 1.4–6.5)
NEUTROPHILS NFR BLD AUTO: 69.8 % — SIGNIFICANT CHANGE UP (ref 42.2–75.2)
NON HDL CHOLESTEROL: 78 MG/DL — SIGNIFICANT CHANGE UP
NRBC # BLD: 0 /100 WBCS — SIGNIFICANT CHANGE UP (ref 0–0)
NRBC # BLD: 0 /100 WBCS — SIGNIFICANT CHANGE UP (ref 0–0)
NT-PROBNP SERPL-SCNC: 1440 PG/ML — HIGH (ref 0–300)
PLATELET # BLD AUTO: 231 K/UL — SIGNIFICANT CHANGE UP (ref 130–400)
PLATELET # BLD AUTO: 250 K/UL — SIGNIFICANT CHANGE UP (ref 130–400)
PMV BLD: 10.1 FL — SIGNIFICANT CHANGE UP (ref 7.4–10.4)
PMV BLD: 10.7 FL — HIGH (ref 7.4–10.4)
POTASSIUM SERPL-MCNC: 4.9 MMOL/L — SIGNIFICANT CHANGE UP (ref 3.5–5)
POTASSIUM SERPL-SCNC: 4.9 MMOL/L — SIGNIFICANT CHANGE UP (ref 3.5–5)
PROT SERPL-MCNC: 5.8 G/DL — LOW (ref 6–8)
RBC # BLD: 3.14 M/UL — LOW (ref 4.2–5.4)
RBC # BLD: 3.22 M/UL — LOW (ref 4.2–5.4)
RBC # FLD: 14.3 % — SIGNIFICANT CHANGE UP (ref 11.5–14.5)
RBC # FLD: 14.4 % — SIGNIFICANT CHANGE UP (ref 11.5–14.5)
SODIUM SERPL-SCNC: 144 MMOL/L — SIGNIFICANT CHANGE UP (ref 135–146)
TIBC SERPL-MCNC: 244 UG/DL — SIGNIFICANT CHANGE UP (ref 220–430)
TRANSFERRIN SERPL-MCNC: 219 MG/DL — SIGNIFICANT CHANGE UP (ref 200–360)
TRIGL SERPL-MCNC: 121 MG/DL — SIGNIFICANT CHANGE UP
TSH SERPL-MCNC: 0.06 UIU/ML — LOW (ref 0.27–4.2)
UIBC SERPL-MCNC: 207 UG/DL — SIGNIFICANT CHANGE UP (ref 110–370)
WBC # BLD: 6.59 K/UL — SIGNIFICANT CHANGE UP (ref 4.8–10.8)
WBC # BLD: 8.63 K/UL — SIGNIFICANT CHANGE UP (ref 4.8–10.8)
WBC # FLD AUTO: 6.59 K/UL — SIGNIFICANT CHANGE UP (ref 4.8–10.8)
WBC # FLD AUTO: 8.63 K/UL — SIGNIFICANT CHANGE UP (ref 4.8–10.8)

## 2024-03-30 PROCEDURE — 99232 SBSQ HOSP IP/OBS MODERATE 35: CPT

## 2024-03-30 RX ORDER — SODIUM CHLORIDE 9 MG/ML
500 INJECTION, SOLUTION INTRAVENOUS ONCE
Refills: 0 | Status: COMPLETED | OUTPATIENT
Start: 2024-03-30 | End: 2024-03-31

## 2024-03-30 RX ORDER — PANTOPRAZOLE SODIUM 20 MG/1
40 TABLET, DELAYED RELEASE ORAL
Refills: 0 | Status: DISCONTINUED | OUTPATIENT
Start: 2024-03-30 | End: 2024-04-06

## 2024-03-30 RX ADMIN — ATORVASTATIN CALCIUM 40 MILLIGRAM(S): 80 TABLET, FILM COATED ORAL at 21:17

## 2024-03-30 RX ADMIN — SENNA PLUS 2 TABLET(S): 8.6 TABLET ORAL at 21:17

## 2024-03-30 RX ADMIN — ENOXAPARIN SODIUM 120 MILLIGRAM(S): 100 INJECTION SUBCUTANEOUS at 17:31

## 2024-03-30 RX ADMIN — SODIUM ZIRCONIUM CYCLOSILICATE 5 GRAM(S): 10 POWDER, FOR SUSPENSION ORAL at 05:45

## 2024-03-30 RX ADMIN — Medication 1 MILLIGRAM(S): at 12:21

## 2024-03-30 RX ADMIN — Medication 40 MILLIGRAM(S): at 05:45

## 2024-03-30 RX ADMIN — PANTOPRAZOLE SODIUM 40 MILLIGRAM(S): 20 TABLET, DELAYED RELEASE ORAL at 17:31

## 2024-03-30 RX ADMIN — RISPERIDONE 0.5 MILLIGRAM(S): 4 TABLET ORAL at 12:20

## 2024-03-30 RX ADMIN — ENOXAPARIN SODIUM 120 MILLIGRAM(S): 100 INJECTION SUBCUTANEOUS at 05:44

## 2024-03-30 RX ADMIN — LOSARTAN POTASSIUM 50 MILLIGRAM(S): 100 TABLET, FILM COATED ORAL at 05:45

## 2024-03-30 NOTE — PROGRESS NOTE ADULT - SUBJECTIVE AND OBJECTIVE BOX
CHELA GALLAGHER  83y  Female      Patient is a 83y old  Female who presents with a chief complaint of New onset Afib (30 Mar 2024 10:45)      INTERVAL HPI/OVERNIGHT EVENTS:  She feels better today, no abdominal pain, HR is high  Vital Signs Last 24 Hrs  T(C): 36.1 (30 Mar 2024 05:13), Max: 36.6 (29 Mar 2024 20:06)  T(F): 96.9 (30 Mar 2024 05:13), Max: 97.8 (29 Mar 2024 20:06)  HR: 97 (30 Mar 2024 05:13) (70 - 97)  BP: 110/54 (30 Mar 2024 05:13) (110/54 - 126/57)  BP(mean): 82 (29 Mar 2024 15:34) (82 - 82)  RR: 18 (30 Mar 2024 05:13) (18 - 18)  SpO2: 95% (29 Mar 2024 20:06) (94% - 100%)    Parameters below as of 29 Mar 2024 20:06  Patient On (Oxygen Delivery Method): nasal cannula  O2 Flow (L/min): 3        03-29-24 @ 07:01  -  03-30-24 @ 07:00  --------------------------------------------------------  IN: 300 mL / OUT: 500 mL / NET: -200 mL    03-30-24 @ 07:01 - 03-30-24 @ 12:23  --------------------------------------------------------  IN: 450 mL / OUT: 0 mL / NET: 450 mL            Consultant(s) Notes Reviewed:  [x ] YES  [ ] NO          MEDICATIONS  (STANDING):  atorvastatin 40 milliGRAM(s) Oral at bedtime  dextrose 5%. 1000 milliLiter(s) (100 mL/Hr) IV Continuous <Continuous>  dextrose 5%. 1000 milliLiter(s) (50 mL/Hr) IV Continuous <Continuous>  dextrose 50% Injectable 12.5 Gram(s) IV Push once  dextrose 50% Injectable 25 Gram(s) IV Push once  dextrose 50% Injectable 25 Gram(s) IV Push once  enoxaparin Injectable 120 milliGRAM(s) SubCutaneous every 12 hours  ferrous    sulfate 325 milliGRAM(s) Oral daily  folic acid 1 milliGRAM(s) Oral daily  furosemide    Tablet 40 milliGRAM(s) Oral daily  glucagon  Injectable 1 milliGRAM(s) IntraMuscular once  influenza  Vaccine (HIGH DOSE) 0.7 milliLiter(s) IntraMuscular once  insulin lispro (ADMELOG) corrective regimen sliding scale   SubCutaneous at bedtime  insulin lispro (ADMELOG) corrective regimen sliding scale   SubCutaneous three times a day before meals  losartan 50 milliGRAM(s) Oral daily  pantoprazole   Suspension 40 milliGRAM(s) Oral two times a day  risperiDONE   Solution 0.5 milliGRAM(s) Oral daily  senna 2 Tablet(s) Oral at bedtime  sodium zirconium cyclosilicate 5 Gram(s) Oral two times a day    MEDICATIONS  (PRN):  acetaminophen     Tablet .. 650 milliGRAM(s) Oral every 6 hours PRN Temp greater or equal to 38C (100.4F), Mild Pain (1 - 3)  aluminum hydroxide/magnesium hydroxide/simethicone Suspension 30 milliLiter(s) Oral every 4 hours PRN Dyspepsia  dextrose Oral Gel 15 Gram(s) Oral once PRN Blood Glucose LESS THAN 70 milliGRAM(s)/deciliter  melatonin 3 milliGRAM(s) Oral at bedtime PRN Insomnia  ondansetron Injectable 4 milliGRAM(s) IV Push every 8 hours PRN Nausea and/or Vomiting      LABS                          8.9    6.59  )-----------( 231      ( 30 Mar 2024 05:38 )             31.1     03-30    144  |  105  |  26<H>  ----------------------------<  87  4.9   |  30  |  1.2    Ca    9.2      30 Mar 2024 05:38    TPro  5.8<L>  /  Alb  3.1<L>  /  TBili  0.3  /  DBili  x   /  AST  8   /  ALT  13  /  AlkPhos  95  03-30    ABG - ( 29 Mar 2024 14:44 )  pH, Arterial: 7.34  pH, Blood: x     /  pCO2: 65    /  pO2: 108   / HCO3: 35    / Base Excess: 7.8   /  SaO2: 99.8              Urinalysis Basic - ( 30 Mar 2024 05:38 )    Color: x / Appearance: x / SG: x / pH: x  Gluc: 87 mg/dL / Ketone: x  / Bili: x / Urobili: x   Blood: x / Protein: x / Nitrite: x   Leuk Esterase: x / RBC: x / WBC x   Sq Epi: x / Non Sq Epi: x / Bacteria: x        Lactate Trend        CAPILLARY BLOOD GLUCOSE      POCT Blood Glucose.: 112 mg/dL (30 Mar 2024 11:41)        RADIOLOGY & ADDITIONAL TESTS:    Imaging Personally Reviewed:  [ ] YES  [ ] NO    HEALTH ISSUES - PROBLEM Dx:          PHYSICAL EXAM:  GENERAL: NAD, well-developed.  HEAD:  Atraumatic, Normocephalic.  EYES: EOMI, PERRLA, conjunctiva and sclera clear.  NECK: Supple, No JVD.  CHEST/LUNG: Clear to auscultation bilaterally; No wheeze.  HEART: Irregular rate and rhythm; S1 S2.   ABDOMEN: Soft, Nontender, Nondistended; Bowel sounds present.  EXTREMITIES:  2+ Peripheral Pulses, No clubbing, cyanosis, or edema.  PSYCH: AAOx3.  NEUROLOGY: non-focal.  SKIN: No rashes or lesions.

## 2024-03-30 NOTE — PROGRESS NOTE ADULT - ASSESSMENT
83-year-old female with history of schizophrenia, DM2, HTN, lower extremity edema presents for evaluation of nausea for one day, admitted for new onset afib.     A/P;   New Onset Atrial Fibrillation   Tachy-tamy Syndrome:   HR rage from   troponin 32>34  Continue Telemetry  On Lovenox, can switch to Eliquis, not on BB or CCB but her HR is high this morning, can start Metoprolol  may need EP consult for possibly tachy-tamy syndrome.   Sleep study outpatient, may need CPAP.     Anemia:   hb dropped from 9.8 to 8.9  HR is up today. No bowel movement, monitor for any GI bleeding while on anticoagulation.   Start on Pantoprazole.     Abdominal Pain- Resolved  CTAP neg for acute pathology, showed cholelithiasis and bibasilar atelectasis/opacities  Low residual, lactose free diet  - Maalox prn  - PPI po BID  - monitor BM for blood     Bipolar Disorder  Continue Risperdal 0.5mg daily     Morbid Obesity:   low calories diet.   DVT Prophylaxis: Lovenox therapeutic  GI Prophylaxis: PPI po    Code Status: Full   Pending: monitor Hb, HR.

## 2024-03-30 NOTE — PROGRESS NOTE ADULT - ASSESSMENT
83-year-old female with history of schizophrenia, DM2, HTN, lower extremity edema presents for evaluation of nausea for one day, admitted for new onset afib.     #New Onset Atrial Fibrillation   #Suspect 2/2 [facility] vs. [facility]  - Patient is a patient of Dr. Razo   - Rate controlled off medications  - EKG showing A-fib with SVR  - Troponin trend 32-->34  - Start Lovenox 120mg BID, transition to Eliquis on DC  - cardiology recommendations -> lovenox for AC, watch H/H, switch to eliquis on dc, TTE, check TSH  - TTE f/u   - f/u TSH, lipid profile, A1c  - BNP 1140  - ABG, depending on this consider bipap at [facility] follow up outpatient  - Continue Lasix 40mg, Losartan 50mg,  Crestor 10mg  - DC ASA 81mg    #Abdominal Pain- Resolved  - CTAP neg for acute pathology, showed cholelithiasis and bibasilar atelectasis/opacities  - Low residual, lactose free diet  - Maalox prn  - PPI IV BID  - monitor BM for blood   - if hemoglobin dropping, may need to stop lovenox in case of bleed    #Bipolar Disorder  - continue Risperdal 0.5mg daily       #Misc  - DVT Prophylaxis: Lovenox   - GI Prophylaxis: None  - Diet: DASH, Carb Consistent, low residual  - Activity: As tolerated  - Code Status: Full   Dispo: Acute  pend: TSH, iron studies, ferritin, trend CBC for Hgb drop    Joaquín Madelin (Son): [phone]  Shaneka Razo (Daughter): [phone]   83-year-old female with history of schizophrenia, DM2, HTN, lower extremity edema presents for evaluation of nausea for one day, admitted for new onset afib.     #New Onset Atrial Fibrillation   #Suspect 2/2 [facility] vs. [facility]  - Patient is a patient of Dr. Razo   - Rate controlled off medications  - EKG showing A-fib with SVR  - Troponin trend 32-->34  - Start Lovenox 120mg BID, transition to Eliquis on DC  - cardiology recommendations -> lovenox for AC, watch H/H, switch to eliquis on dc, TTE, check TSH  - TTE f/u   - f/u TSH, lipid profile, A1c  - BNP 1140  - ABG, depending on this consider bipap at [facility] follow up outpatient  - Continue Lasix 40mg, Losartan 50mg,  Crestor 10mg  - DC ASA 81mg    #Abdominal Pain- Resolved  - CTAP neg for acute pathology, showed cholelithiasis and bibasilar atelectasis/opacities  - Low residual, lactose free diet  - Maalox prn  - PPI po BID  - monitor BM for blood   - if hemoglobin dropping, may need to stop lovenox in case of bleed    #Bipolar Disorder  - continue Risperdal 0.5mg daily       #Misc  - DVT Prophylaxis: Lovenox therapeutic  - GI Prophylaxis: PPI po  - Diet: DASH, Carb Consistent, low residual  - Activity: As tolerated  - Code Status: Full   pend: TSH, iron studies, ferritin, trend CBC for Hgb drop    Joaquín Madelin (Son): [phone]  Shaneka Razo (Daughter): [phone]   show

## 2024-03-30 NOTE — PROGRESS NOTE ADULT - SUBJECTIVE AND OBJECTIVE BOX
24H events:    Patient is a 83y old Female who presents with a chief complaint of New onset Afib (29 Mar 2024 17:26)    Primary diagnosis of New onset a-fib    Today is hospital day 1d. This morning patient was seen and examined at bedside, resting comfortably in bed.    No acute or major events overnight. Hemodynamically stable, tolerating oral diet, voiding appropriately with appropriate bowel movements.     Code Status: full code      PAST MEDICAL & SURGICAL HISTORY  Diabetes mellitus    Hypertension    Schizoaffective disorder    Edema  Bilateral LE    Disorder of thyroid, unspecified  Son cannot provide details. States she had "thyroid surgery" decades ago when she was young    No significant past surgical history      SOCIAL HISTORY:  Social History:      ALLERGIES:  No Known Allergies    MEDICATIONS:  STANDING MEDICATIONS  atorvastatin 40 milliGRAM(s) Oral at bedtime  dextrose 5%. 1000 milliLiter(s) IV Continuous <Continuous>  dextrose 5%. 1000 milliLiter(s) IV Continuous <Continuous>  dextrose 50% Injectable 12.5 Gram(s) IV Push once  dextrose 50% Injectable 25 Gram(s) IV Push once  dextrose 50% Injectable 25 Gram(s) IV Push once  enoxaparin Injectable 120 milliGRAM(s) SubCutaneous every 12 hours  ferrous    sulfate 325 milliGRAM(s) Oral daily  folic acid 1 milliGRAM(s) Oral daily  furosemide    Tablet 40 milliGRAM(s) Oral daily  glucagon  Injectable 1 milliGRAM(s) IntraMuscular once  influenza  Vaccine (HIGH DOSE) 0.7 milliLiter(s) IntraMuscular once  insulin lispro (ADMELOG) corrective regimen sliding scale   SubCutaneous at bedtime  insulin lispro (ADMELOG) corrective regimen sliding scale   SubCutaneous three times a day before meals  losartan 50 milliGRAM(s) Oral daily  pantoprazole   Suspension 40 milliGRAM(s) Oral two times a day  risperiDONE   Solution 0.5 milliGRAM(s) Oral daily  senna 2 Tablet(s) Oral at bedtime  sodium zirconium cyclosilicate 5 Gram(s) Oral two times a day    PRN MEDICATIONS  acetaminophen     Tablet .. 650 milliGRAM(s) Oral every 6 hours PRN  aluminum hydroxide/magnesium hydroxide/simethicone Suspension 30 milliLiter(s) Oral every 4 hours PRN  dextrose Oral Gel 15 Gram(s) Oral once PRN  melatonin 3 milliGRAM(s) Oral at bedtime PRN  ondansetron Injectable 4 milliGRAM(s) IV Push every 8 hours PRN    VITALS:   T(F): 96.9  HR: 97  BP: 110/54  RR: 18  SpO2: 95%    PHYSICAL EXAM:  GENERAL: NAD,  HEAD: NCAD,   NECK: Supple,  HEART: Irregular rhythm but normal rate, normal S1/S2,  LUNGS: No acute respiratory distress, clear b/l breath sounds  ABDOMEN:  soft, non-tender, non-distented, + BS,   EXTREMITIES: no rashes, extremities warm/dry,  NERVOUS SYSTEM:  A&Ox3, CNII-XII intact, follows commands, answers questions appropriately           LABS:                        8.9    6.59  )-----------( 231      ( 30 Mar 2024 05:38 )             31.1     03-30    144  |  105  |  26<H>  ----------------------------<  87  4.9   |  30  |  1.2    Ca    9.2      30 Mar 2024 05:38    TPro  5.8<L>  /  Alb  3.1<L>  /  TBili  0.3  /  DBili  x   /  AST  8   /  ALT  13  /  AlkPhos  95  03-30      Urinalysis Basic - ( 30 Mar 2024 05:38 )    Color: x / Appearance: x / SG: x / pH: x  Gluc: 87 mg/dL / Ketone: x  / Bili: x / Urobili: x   Blood: x / Protein: x / Nitrite: x   Leuk Esterase: x / RBC: x / WBC x   Sq Epi: x / Non Sq Epi: x / Bacteria: x      ABG - ( 29 Mar 2024 14:44 )  pH, Arterial: 7.34  pH, Blood: x     /  pCO2: 65    /  pO2: 108   / HCO3: 35    / Base Excess: 7.8   /  SaO2: 99.8                      RADIOLOGY:    RADIOLOGY

## 2024-03-31 ENCOUNTER — RESULT REVIEW (OUTPATIENT)
Age: 84
End: 2024-03-31

## 2024-03-31 LAB
ANION GAP SERPL CALC-SCNC: 10 MMOL/L — SIGNIFICANT CHANGE UP (ref 7–14)
BUN SERPL-MCNC: 28 MG/DL — HIGH (ref 10–20)
CALCIUM SERPL-MCNC: 9.2 MG/DL — SIGNIFICANT CHANGE UP (ref 8.4–10.5)
CHLORIDE SERPL-SCNC: 104 MMOL/L — SIGNIFICANT CHANGE UP (ref 98–110)
CO2 SERPL-SCNC: 29 MMOL/L — SIGNIFICANT CHANGE UP (ref 17–32)
CREAT SERPL-MCNC: 1.4 MG/DL — SIGNIFICANT CHANGE UP (ref 0.7–1.5)
CULTURE RESULTS: SIGNIFICANT CHANGE UP
EGFR: 37 ML/MIN/1.73M2 — LOW
FERRITIN SERPL-MCNC: 37 NG/ML — SIGNIFICANT CHANGE UP (ref 13–330)
GLUCOSE BLDC GLUCOMTR-MCNC: 120 MG/DL — HIGH (ref 70–99)
GLUCOSE BLDC GLUCOMTR-MCNC: 127 MG/DL — HIGH (ref 70–99)
GLUCOSE BLDC GLUCOMTR-MCNC: 129 MG/DL — HIGH (ref 70–99)
GLUCOSE BLDC GLUCOMTR-MCNC: 159 MG/DL — HIGH (ref 70–99)
GLUCOSE SERPL-MCNC: 107 MG/DL — HIGH (ref 70–99)
HCT VFR BLD CALC: 31.7 % — LOW (ref 37–47)
HGB BLD-MCNC: 9.1 G/DL — LOW (ref 12–16)
MAGNESIUM SERPL-MCNC: 2.1 MG/DL — SIGNIFICANT CHANGE UP (ref 1.8–2.4)
MCHC RBC-ENTMCNC: 27.6 PG — SIGNIFICANT CHANGE UP (ref 27–31)
MCHC RBC-ENTMCNC: 28.7 G/DL — LOW (ref 32–37)
MCV RBC AUTO: 96.1 FL — SIGNIFICANT CHANGE UP (ref 81–99)
NRBC # BLD: 0 /100 WBCS — SIGNIFICANT CHANGE UP (ref 0–0)
PLATELET # BLD AUTO: 253 K/UL — SIGNIFICANT CHANGE UP (ref 130–400)
PMV BLD: 10.1 FL — SIGNIFICANT CHANGE UP (ref 7.4–10.4)
POTASSIUM SERPL-MCNC: 4.8 MMOL/L — SIGNIFICANT CHANGE UP (ref 3.5–5)
POTASSIUM SERPL-SCNC: 4.8 MMOL/L — SIGNIFICANT CHANGE UP (ref 3.5–5)
RBC # BLD: 3.3 M/UL — LOW (ref 4.2–5.4)
RBC # FLD: 14.1 % — SIGNIFICANT CHANGE UP (ref 11.5–14.5)
SODIUM SERPL-SCNC: 143 MMOL/L — SIGNIFICANT CHANGE UP (ref 135–146)
SPECIMEN SOURCE: SIGNIFICANT CHANGE UP
WBC # BLD: 7.38 K/UL — SIGNIFICANT CHANGE UP (ref 4.8–10.8)
WBC # FLD AUTO: 7.38 K/UL — SIGNIFICANT CHANGE UP (ref 4.8–10.8)

## 2024-03-31 PROCEDURE — 93312 ECHO TRANSESOPHAGEAL: CPT | Mod: 26

## 2024-03-31 PROCEDURE — 93320 DOPPLER ECHO COMPLETE: CPT | Mod: 26

## 2024-03-31 PROCEDURE — 93325 DOPPLER ECHO COLOR FLOW MAPG: CPT | Mod: 26

## 2024-03-31 PROCEDURE — 99223 1ST HOSP IP/OBS HIGH 75: CPT

## 2024-03-31 PROCEDURE — 99233 SBSQ HOSP IP/OBS HIGH 50: CPT

## 2024-03-31 RX ORDER — METOPROLOL TARTRATE 50 MG
12.5 TABLET ORAL
Refills: 0 | Status: DISCONTINUED | OUTPATIENT
Start: 2024-03-31 | End: 2024-04-04

## 2024-03-31 RX ADMIN — LOSARTAN POTASSIUM 50 MILLIGRAM(S): 100 TABLET, FILM COATED ORAL at 05:22

## 2024-03-31 RX ADMIN — Medication 12.5 MILLIGRAM(S): at 17:32

## 2024-03-31 RX ADMIN — ENOXAPARIN SODIUM 120 MILLIGRAM(S): 100 INJECTION SUBCUTANEOUS at 17:32

## 2024-03-31 RX ADMIN — SENNA PLUS 2 TABLET(S): 8.6 TABLET ORAL at 21:01

## 2024-03-31 RX ADMIN — ATORVASTATIN CALCIUM 40 MILLIGRAM(S): 80 TABLET, FILM COATED ORAL at 21:01

## 2024-03-31 RX ADMIN — SODIUM CHLORIDE 500 MILLILITER(S): 9 INJECTION, SOLUTION INTRAVENOUS at 00:22

## 2024-03-31 RX ADMIN — Medication 325 MILLIGRAM(S): at 11:26

## 2024-03-31 RX ADMIN — Medication 1 MILLIGRAM(S): at 11:26

## 2024-03-31 RX ADMIN — PANTOPRAZOLE SODIUM 40 MILLIGRAM(S): 20 TABLET, DELAYED RELEASE ORAL at 05:23

## 2024-03-31 RX ADMIN — PANTOPRAZOLE SODIUM 40 MILLIGRAM(S): 20 TABLET, DELAYED RELEASE ORAL at 17:32

## 2024-03-31 RX ADMIN — Medication 40 MILLIGRAM(S): at 05:22

## 2024-03-31 RX ADMIN — RISPERIDONE 0.5 MILLIGRAM(S): 4 TABLET ORAL at 11:26

## 2024-03-31 RX ADMIN — ENOXAPARIN SODIUM 120 MILLIGRAM(S): 100 INJECTION SUBCUTANEOUS at 05:21

## 2024-03-31 NOTE — CONSULT NOTE ADULT - SUBJECTIVE AND OBJECTIVE BOX
Cardiologist:    HPI:  83-year-old female with history of schizophrenia, DM2, HTN, lower extremity edema presents for evaluation of nausea for one day. Patient at baseline is an unreliable historian. Per the patient she was nauseous for the last day, but never vomited. Patient states that it suddenly just came on and was associated with pain throughout her entire body. Per family members patient has a history of bipolar with psychotic episodes, frequently believing people are trying to kill her. At this time patient is feeling better, and would like to eat food.     In the ED patient was hemodynamically stable /62, HR 74-99, Afebrile. EKG was preformed which showed new onset atrial fibrillation, so patient was admitted to telemetry.     Vital Signs Last 24 Hrs  T(C): 36.2 (29 Mar 2024 07:43), Max: 36.9 (29 Mar 2024 02:35)  T(F): 97.2 (29 Mar 2024 07:43), Max: 98.4 (29 Mar 2024 02:35)  HR: 74 (29 Mar 2024 07:43) (74 - 99)  BP: 110/59 (29 Mar 2024 07:43) (110/59 - 128/62)  BP(mean): --  RR: 18 (29 Mar 2024 07:43) (18 - 18)  SpO2: 95% (29 Mar 2024 07:43) (95% - 96%)    Parameters below as of 29 Mar 2024 07:43  Patient On (Oxygen Delivery Method): room air     (29 Mar 2024 14:02)      Cardiology Consult HPI:  82 yo F w HTN,DM,morbid obesity, chronic LE edema, thyroid mass, medically managed, schizophrenia/bipolar presents with c.o nausea. Found to have new onset AF for which cardiology is consulted. During my assessment, pt is in deep sleep on BiPAP, arousable to vocal stimuli but not participating.     PAST MEDICAL & SURGICAL HISTORY  Diabetes mellitus    Hypertension    Schizoaffective disorder    Edema  Bilateral LE    Disorder of thyroid, unspecified  Son cannot provide details. States she had "thyroid surgery" decades ago when she was young    No significant past surgical history        FAMILY HISTORY:  FAMILY HISTORY:  No pertinent family history in first degree relatives      [ ] no pertinent family history of premature cardiovascular disease in first degree relatives.  Mother:   Father:   Siblings:     SOCIAL HISTORY:  []smoker  []Alcohol  []Drug    ALLERGIES:  No Known Allergies      MEDICATIONS:  MEDICATIONS  (STANDING):  aspirin  chewable 81 milliGRAM(s) Oral daily  atorvastatin 40 milliGRAM(s) Oral at bedtime  dextrose 5%. 1000 milliLiter(s) (100 mL/Hr) IV Continuous <Continuous>  dextrose 5%. 1000 milliLiter(s) (50 mL/Hr) IV Continuous <Continuous>  dextrose 50% Injectable 25 Gram(s) IV Push once  dextrose 50% Injectable 25 Gram(s) IV Push once  dextrose 50% Injectable 12.5 Gram(s) IV Push once  enoxaparin Injectable 120 milliGRAM(s) SubCutaneous every 12 hours  ferrous    sulfate 325 milliGRAM(s) Oral daily  folic acid 1 milliGRAM(s) Oral daily  furosemide    Tablet 40 milliGRAM(s) Oral daily  glucagon  Injectable 1 milliGRAM(s) IntraMuscular once  influenza  Vaccine (HIGH DOSE) 0.7 milliLiter(s) IntraMuscular once  insulin lispro (ADMELOG) corrective regimen sliding scale   SubCutaneous at bedtime  insulin lispro (ADMELOG) corrective regimen sliding scale   SubCutaneous three times a day before meals  losartan 50 milliGRAM(s) Oral daily  risperiDONE   Solution 0.5 milliGRAM(s) Oral daily  senna 2 Tablet(s) Oral at bedtime  sodium zirconium cyclosilicate 5 Gram(s) Oral two times a day    MEDICATIONS  (PRN):  acetaminophen     Tablet .. 650 milliGRAM(s) Oral every 6 hours PRN Temp greater or equal to 38C (100.4F), Mild Pain (1 - 3)  aluminum hydroxide/magnesium hydroxide/simethicone Suspension 30 milliLiter(s) Oral every 4 hours PRN Dyspepsia  dextrose Oral Gel 15 Gram(s) Oral once PRN Blood Glucose LESS THAN 70 milliGRAM(s)/deciliter  melatonin 3 milliGRAM(s) Oral at bedtime PRN Insomnia  ondansetron Injectable 4 milliGRAM(s) IV Push every 8 hours PRN Nausea and/or Vomiting      HOME MEDICATIONS:  Home Medications:  aspirin 81 mg oral tablet, chewable: 1 tab(s) orally once a day (29 Mar 2024 14:12)  cholecalciferol 1250 mcg (50,000 intl units) oral capsule: 1 cap(s) orally once a week (29 Mar 2024 14:12)  Crestor 10 mg oral tablet: 1 tab(s) orally once a day (29 Mar 2024 14:12)  folic acid 1 mg oral tablet: 1 tab(s) orally once a day (29 Mar 2024 14:12)  Lasix 40 mg oral tablet: 1 tab(s) orally once a day (29 Mar 2024 14:12)  losartan 50 mg oral tablet: 1 tab(s) orally once a day (29 Mar 2024 14:12)  RisperDAL 1 mg/mL oral solution: 0.5 milliliter(s) orally once a day (29 Mar 2024 14:12)  Senna-gen 8.6 mg oral tablet: 2 tab(s) orally once a day (at bedtime) (29 Mar 2024 14:12)      VITALS:   T(F): 97.3 (03-29 @ 15:34), Max: 98.4 (03-29 @ 02:35)  HR: 70 (03-29 @ 15:34) (70 - 99)  BP: 126/57 (03-29 @ 15:34) (110/59 - 128/62)  BP(mean): 82 (03-29 @ 15:34) (82 - 82)  RR: 18 (03-29 @ 15:34) (18 - 18)  SpO2: 100% (03-29 @ 15:34) (94% - 100%)    I&O's Summary      REVIEW OF SYSTEMS:    Negative except as mentioned in HPI    CONSTITUTIONAL: No weakness, fevers or chills  EYES: No visual changes  ENT: No vertigo or throat pain   NECK: No pain or stiffness  RESPIRATORY: No cough, wheezing, hemoptysis; No shortness of breath  CARDIOVASCULAR: No chest pain or palpitations  GASTROINTESTINAL: No abdominal or epigastric pain. No nausea, vomiting, or hematemesis; No diarrhea or constipation. No melena or hematochezia.  GENITOURINARY: No dysuria, frequency or hematuria  NEUROLOGICAL: No numbness or weakness  SKIN: No itching, no rashes  MSK: FROM x4    PHYSICAL EXAM:  NEURO: Lethargic but arousable  GEN: Not in acute distress  NECK: No JVD  LUNGS: Clear to auscultation bilaterally   CARDIOVASCULAR: S1/S2 present, irregularly , no murmurs or rubs, no carotid bruits,  + PP bilaterally  ABD: Soft, non-tender, non-distended, +BS  EXT: trace pedal edema bilaterally  SKIN: Intact    LABS:                        9.8    9.95  )-----------( 285      ( 29 Mar 2024 03:40 )             33.1     03-29    139  |  101  |  22<H>  ----------------------------<  127<H>  5.1<H>   |  34<H>  |  1.2    Ca    9.2      29 Mar 2024 07:52    TPro  6.9  /  Alb  3.3<L>  /  TBili  0.2  /  DBili  x   /  AST  29  /  ALT  17  /  AlkPhos  100  03-29              Troponin trend:          RADIOLOGY:  -CXR:  -TTE:  -CCTA:  -STRESS TEST:  -CATHETERIZATION:    ECG:    TELEMETRY EVENTS:  
83-year-old female with history of schizophrenia, DM2, HTN, lower extremity edema presents for evaluation of nausea for one day. Patient at baseline is an unreliable historian. Per the patient she was nauseous for the last day, but never vomited. Patient states that it suddenly just came on and was associated with pain throughout her entire body. Per family members patient has a history of bipolar with psychotic episodes, frequently believing people are trying to kill her.   EP was consulted for a new onset atrial fibrillation, sinus tamy and pauses on telemetry.    Vital Signs Last 24 Hrs  T(C): 36.2 (29 Mar 2024 07:43), Max: 36.9 (29 Mar 2024 02:35)  T(F): 97.2 (29 Mar 2024 07:43), Max: 98.4 (29 Mar 2024 02:35)  HR: 74 (29 Mar 2024 07:43) (74 - 99)  BP: 110/59 (29 Mar 2024 07:43) (110/59 - 128/62)  BP(mean): --  RR: 18 (29 Mar 2024 07:43) (18 - 18)  SpO2: 95% (29 Mar 2024 07:43) (95% - 96%)    REVIEW OF SYSTEMS  [x] A ten-point review of systems was otherwise negative except as noted.  [ ] Due to altered mental status/intubation, subjective information were not able to be obtained from the patient. History was obtained, to the extent possible, from review of the chart and collateral sources of information.      PAST MEDICAL & SURGICAL HISTORY:  Diabetes mellitus  Hypertension  Schizoaffective disorder  Edema  Bilateral LE  Disorder of thyroid, unspecified  Son cannot provide details. States she had "thyroid surgery" decades ago when she was young    No significant past surgical history    Home Medications:  aspirin 81 mg oral tablet, chewable: 1 tab(s) orally once a day (29 Mar 2024 14:12)  cholecalciferol 1250 mcg (50,000 intl units) oral capsule: 1 cap(s) orally once a week (29 Mar 2024 14:12)  Crestor 10 mg oral tablet: 1 tab(s) orally once a day (29 Mar 2024 14:12)  folic acid 1 mg oral tablet: 1 tab(s) orally once a day (29 Mar 2024 14:12)  Lasix 40 mg oral tablet: 1 tab(s) orally once a day (29 Mar 2024 14:12)  losartan 50 mg oral tablet: 1 tab(s) orally once a day (29 Mar 2024 14:12)  RisperDAL 1 mg/mL oral solution: 0.5 milliliter(s) orally once a day (29 Mar 2024 14:12)  Senna-gen 8.6 mg oral tablet: 2 tab(s) orally once a day (at bedtime) (29 Mar 2024 14:12)      Allergies:  No Known Allergies      FAMILY HISTORY:  No pertinent family history in first degree relatives        SOCIAL HISTORY:    CIGARETTES:  ALCOHOL:  MARIJUANA:  ILLICIT DRUGS:        PREVIOUS DIAGNOSTIC TESTING:      ECHO  FINDINGS:    STRESS  FINDINGS:    CATHETERIZATION  FINDINGS:    ELECTROPHYSIOLOGY STUDY  FINDINGS:    CAROTID ULTRASOUND:  FINDINGS    VENOUS DUPLEX SCAN:  FINDINGS:    CHEST CT PULMONARY ANGIO with IV Contrast:  FINDINGS:      MEDICATIONS  (STANDING):  atorvastatin 40 milliGRAM(s) Oral at bedtime  dextrose 5%. 1000 milliLiter(s) (100 mL/Hr) IV Continuous <Continuous>  dextrose 5%. 1000 milliLiter(s) (50 mL/Hr) IV Continuous <Continuous>  dextrose 50% Injectable 25 Gram(s) IV Push once  dextrose 50% Injectable 25 Gram(s) IV Push once  dextrose 50% Injectable 12.5 Gram(s) IV Push once  enoxaparin Injectable 120 milliGRAM(s) SubCutaneous every 12 hours  ferrous    sulfate 325 milliGRAM(s) Oral daily  folic acid 1 milliGRAM(s) Oral daily  furosemide    Tablet 40 milliGRAM(s) Oral daily  glucagon  Injectable 1 milliGRAM(s) IntraMuscular once  influenza  Vaccine (HIGH DOSE) 0.7 milliLiter(s) IntraMuscular once  insulin lispro (ADMELOG) corrective regimen sliding scale   SubCutaneous at bedtime  insulin lispro (ADMELOG) corrective regimen sliding scale   SubCutaneous three times a day before meals  losartan 50 milliGRAM(s) Oral daily  metoprolol tartrate 12.5 milliGRAM(s) Oral two times a day  pantoprazole   Suspension 40 milliGRAM(s) Oral two times a day  risperiDONE   Solution 0.5 milliGRAM(s) Oral daily  senna 2 Tablet(s) Oral at bedtime    MEDICATIONS  (PRN):  acetaminophen     Tablet .. 650 milliGRAM(s) Oral every 6 hours PRN Temp greater or equal to 38C (100.4F), Mild Pain (1 - 3)  aluminum hydroxide/magnesium hydroxide/simethicone Suspension 30 milliLiter(s) Oral every 4 hours PRN Dyspepsia  dextrose Oral Gel 15 Gram(s) Oral once PRN Blood Glucose LESS THAN 70 milliGRAM(s)/deciliter  melatonin 3 milliGRAM(s) Oral at bedtime PRN Insomnia  ondansetron Injectable 4 milliGRAM(s) IV Push every 8 hours PRN Nausea and/or Vomiting      Vital Signs Last 24 Hrs  T(C): 36.9 (31 Mar 2024 05:11), Max: 37.5 (30 Mar 2024 20:04)  T(F): 98.5 (31 Mar 2024 05:11), Max: 99.5 (30 Mar 2024 20:04)  HR: 94 (31 Mar 2024 05:11) (82 - 114)  BP: 112/57 (31 Mar 2024 05:11) (82/36 - 117/74)  BP(mean): 74 (31 Mar 2024 05:11) (52 - 74)  RR: 16 (30 Mar 2024 20:04) (16 - 18)  SpO2: 87% (31 Mar 2024 08:00) (87% - 97%)    Parameters below as of 31 Mar 2024 08:00  Patient On (Oxygen Delivery Method): room air        PHYSICAL EXAM:    GENERAL: In no apparent distress, well nourished, and hydrated.  EYES: EOMI, PERRLA  HEART: Regular rate and rhythm  PULMONARY: Clear to auscultation and perfusion.  EXTREMITIES:  2+ Peripheral Pulses  NEUROLOGICAL: Grossly nonfocal      INTERPRETATION OF TELEMETRY:    EC Lead ECG:   Ventricular Rate 53 BPM    Atrial Rate 59 BPM    QRS Duration 112 ms    Q-T Interval 386 ms    QTC Calculation(Bazett) 362 ms    R Axis -31 degrees    T Axis 22 degrees    Diagnosis Line Atrial fibrillation with slow ventricular response  Nonspecific T wave abnormality  Abnormal ECG    Confirmed by KYE ROBLES, JS ([id number]) on 3/29/2024 11:06:11 PM (24 @ 09:43)  12 Lead ECG:   Ventricular Rate 75 BPM    Atrial Rate 125 BPM    QRS Duration 104 ms    Q-T Interval 380 ms    QTC Calculation(Bazett) 424 ms    R Axis -29 degrees    T Axis 24 degrees    Diagnosis Line Atrial fibrillation  Abnormal ECG    Confirmed by Behuria, Supreeti ([id number]) on 3/29/2024 4:37:41 PM (24 @ 04:50)        LABS:                        9.1    7.38  )-----------( 253      ( 31 Mar 2024 07:06 )             31.7         143  |  104  |  28<H>  ----------------------------<  107<H>  4.8   |  29  |  1.4    Ca    9.2      31 Mar 2024 07:06  Mg     2.1         TPro  5.8<L>  /  Alb  3.1<L>  /  TBili  0.3  /  DBili  x   /  AST  8   /  ALT  13  /  AlkPhos  95            Urinalysis Basic - ( 31 Mar 2024 07:06 )    Color: x / Appearance: x / SG: x / pH: x  Gluc: 107 mg/dL / Ketone: x  / Bili: x / Urobili: x   Blood: x / Protein: x / Nitrite: x   Leuk Esterase: x / RBC: x / WBC x   Sq Epi: x / Non Sq Epi: x / Bacteria: x      BNP  Thyroid Stimulating Hormone, Serum: 0.06 uIU/mL [0.27 - 4.20] (24 @ 12:26)      Culture - Urine (collected 24 @ 07:52)  Source: Clean Catch Clean Catch (Midstream)  Final Report (24 @ 10:01):    >=3 organisms. Probable collection contamination.      RADIOLOGY & ADDITIONAL STUDIES:

## 2024-03-31 NOTE — PROGRESS NOTE ADULT - ASSESSMENT
83-year-old female with history of schizophrenia, DM2, HTN, lower extremity edema presents for evaluation of nausea for one day, admitted for new onset afib.     A/P;   New Onset Atrial Fibrillation   Tachy-tamy event:   Episode of tachycardia, earlier she was chu. Episode of sinus pause 3 second today.   troponin 32>34  Start on Metoprolol 12.5mg BID.   On Lovenox, can switch to Eliquis, not on BB or CCB but her HR is high this morning, can start Metoprolol  EP consult for possibly tachy-tamy syndrome.   TSH is low 0.06, check FT4.   Sleep study outpatient, may need CPAP.     Anemia:   Hb dropped from 9.8 to 8.9, today is stable.   No bowel movement, monitor for any GI bleeding while on anticoagulation.   Start on Pantoprazole.   Iron studies normal, check B12, Folate.     Abdominal Pain- Resolved  CTAP neg for acute pathology, showed cholelithiasis and bibasilar atelectasis/opacities  Low residual, lactose free diet  Continue Maalox prn and Protonix.   Monitor for any signs of GI bleeding.     Bipolar Disorder  Continue Risperdal 0.5mg daily     Morbid Obesity:   low calories diet.   DVT Prophylaxis: Lovenox therapeutic  GI Prophylaxis: PPI po    Code Status: Full   Pending: monitor Hb, HR.   Family discussion:   Deposition: Home vs STR

## 2024-03-31 NOTE — CONSULT NOTE ADULT - ASSESSMENT
84 yo F w HTN,DM,morbid obesity, chronic LE edema, thyroid mass, medically managed, schizophrenia/bipolar presents with c.o nausea. Found to have new onset AF, SB, and pauses on telemetry for which EP was consulted.     New onset AF, asymptomatic, rate controlled, [id number] score 6  HTN   DM   chronic LE edema     - telemetry reviewed episodes of PAF, sinus bradycardia, and conversion pauses ~ 3 sec. Patient has tachy-tamy syndrome and may benefit from PPM implant that would allow rate and rhythm control with medications. As soon as patient heard our recommendation, she screamed back "no no no I don't want any pacemaker".   - c/w Lovenox for AC for now, watch H&H or any clinical bleeding ,switch to Eliquis on discharge.  Hgb is slightly drop initially but appears to be stable the last 3 days. If Hb will drop further please consider work up for source of anemia/ GI consult.  Patient may benefit from LAAO in the future if she does not tolerate systemic anticoagulations.   - c/w Lopressor 12.5 mg BID  - continue telemetry monitoring  - please obtain TTE   - please check TFTs  - recall as needed or if pt changes her mind       82 yo F w HTN,DM,morbid obesity, chronic LE edema, thyroid mass, medically managed, schizophrenia/bipolar presents with c.o nausea. Found to have new onset AF, SB, and pauses on telemetry for which EP was consulted.     New onset AF, asymptomatic, rate controlled, [id number] score 6  HTN   DM   chronic LE edema     - telemetry reviewed episodes of PAF, sinus bradycardia, and conversion pauses ~ 3 sec. Patient with evidence of tachy-tamy syndrome and may benefit from PPM implant that would allow rate and rhythm control with medications. As soon as patient heard our recommendation, she screamed back "no no no I don't want any pacemaker".   - c/w Lovenox for AC for now, watch H&H or any clinical bleeding ,switch to Eliquis on discharge.  Hgb is slightly drop initially but appears to be stable the last 3 days. If Hb will drop further please consider work up for source of anemia/ GI consult.  Patient may benefit from LAAO in the future if she does not tolerate systemic anticoagulations.   - c/w Lopressor 12.5 mg BID  - continue telemetry monitoring  - please obtain TTE   - please check TFTs  - recall as needed or if pt changes her mind

## 2024-03-31 NOTE — PROGRESS NOTE ADULT - SUBJECTIVE AND OBJECTIVE BOX
CHELA GALLAGHER  83y  Female      Patient is a 83y old  Female who presents with a chief complaint of New onset Afib (30 Mar 2024 12:17)      INTERVAL HPI/OVERNIGHT EVENTS:  She feels ok, still with episode of tachycardia, episode of Sinus pause today.   Vital Signs Last 24 Hrs  T(C): 36.9 (31 Mar 2024 05:11), Max: 37.5 (30 Mar 2024 20:04)  T(F): 98.5 (31 Mar 2024 05:11), Max: 99.5 (30 Mar 2024 20:04)  HR: 94 (31 Mar 2024 05:11) (82 - 114)  BP: 112/57 (31 Mar 2024 05:11) (82/36 - 117/74)  BP(mean): 74 (31 Mar 2024 05:11) (52 - 74)  RR: 16 (30 Mar 2024 20:04) (16 - 18)  SpO2: 87% (31 Mar 2024 08:00) (87% - 97%)    Parameters below as of 31 Mar 2024 08:00  Patient On (Oxygen Delivery Method): room air          03-30-24 @ 07:01  -  03-31-24 @ 07:00  --------------------------------------------------------  IN: 878 mL / OUT: 700 mL / NET: 178 mL    03-31-24 @ 07:01  - 03-31-24 @ 12:03  --------------------------------------------------------  IN: 200 mL / OUT: 0 mL / NET: 200 mL            Consultant(s) Notes Reviewed:  [x ] YES  [ ] NO          MEDICATIONS  (STANDING):  atorvastatin 40 milliGRAM(s) Oral at bedtime  dextrose 5%. 1000 milliLiter(s) (100 mL/Hr) IV Continuous <Continuous>  dextrose 5%. 1000 milliLiter(s) (50 mL/Hr) IV Continuous <Continuous>  dextrose 50% Injectable 25 Gram(s) IV Push once  dextrose 50% Injectable 25 Gram(s) IV Push once  dextrose 50% Injectable 12.5 Gram(s) IV Push once  enoxaparin Injectable 120 milliGRAM(s) SubCutaneous every 12 hours  ferrous    sulfate 325 milliGRAM(s) Oral daily  folic acid 1 milliGRAM(s) Oral daily  furosemide    Tablet 40 milliGRAM(s) Oral daily  glucagon  Injectable 1 milliGRAM(s) IntraMuscular once  influenza  Vaccine (HIGH DOSE) 0.7 milliLiter(s) IntraMuscular once  insulin lispro (ADMELOG) corrective regimen sliding scale   SubCutaneous at bedtime  insulin lispro (ADMELOG) corrective regimen sliding scale   SubCutaneous three times a day before meals  losartan 50 milliGRAM(s) Oral daily  metoprolol tartrate 12.5 milliGRAM(s) Oral two times a day  pantoprazole   Suspension 40 milliGRAM(s) Oral two times a day  risperiDONE   Solution 0.5 milliGRAM(s) Oral daily  senna 2 Tablet(s) Oral at bedtime    MEDICATIONS  (PRN):  acetaminophen     Tablet .. 650 milliGRAM(s) Oral every 6 hours PRN Temp greater or equal to 38C (100.4F), Mild Pain (1 - 3)  aluminum hydroxide/magnesium hydroxide/simethicone Suspension 30 milliLiter(s) Oral every 4 hours PRN Dyspepsia  dextrose Oral Gel 15 Gram(s) Oral once PRN Blood Glucose LESS THAN 70 milliGRAM(s)/deciliter  melatonin 3 milliGRAM(s) Oral at bedtime PRN Insomnia  ondansetron Injectable 4 milliGRAM(s) IV Push every 8 hours PRN Nausea and/or Vomiting      LABS                          9.1    7.38  )-----------( 253      ( 31 Mar 2024 07:06 )             31.7     03-31    143  |  104  |  28<H>  ----------------------------<  107<H>  4.8   |  29  |  1.4    Ca    9.2      31 Mar 2024 07:06  Mg     2.1     03-31    TPro  5.8<L>  /  Alb  3.1<L>  /  TBili  0.3  /  DBili  x   /  AST  8   /  ALT  13  /  AlkPhos  95  03-30    ABG - ( 29 Mar 2024 14:44 )  pH, Arterial: 7.34  pH, Blood: x     /  pCO2: 65    /  pO2: 108   / HCO3: 35    / Base Excess: 7.8   /  SaO2: 99.8              Urinalysis Basic - ( 31 Mar 2024 07:06 )    Color: x / Appearance: x / SG: x / pH: x  Gluc: 107 mg/dL / Ketone: x  / Bili: x / Urobili: x   Blood: x / Protein: x / Nitrite: x   Leuk Esterase: x / RBC: x / WBC x   Sq Epi: x / Non Sq Epi: x / Bacteria: x        Lactate Trend        CAPILLARY BLOOD GLUCOSE      POCT Blood Glucose.: 129 mg/dL (31 Mar 2024 11:30)      Culture - Urine (collected 03-29-24 @ 07:52)  Source: Clean Catch Clean Catch (Midstream)  Final Report (03-31-24 @ 10:01):    >=3 organisms. Probable collection contamination.        RADIOLOGY & ADDITIONAL TESTS:    Imaging Personally Reviewed:  [ ] YES  [ ] NO    HEALTH ISSUES - PROBLEM Dx:          PHYSICAL EXAM:  GENERAL: NAD, obese  HEAD:  Atraumatic, Normocephalic.  EYES: EOMI, PERRLA, conjunctiva and sclera clear.  NECK: Supple, No JVD.  CHEST/LUNG: Clear to auscultation bilaterally; No wheeze.  HEART: Irregular rate and rhythm; S1 S2.   ABDOMEN: Soft, Nontender, Nondistended; Bowel sounds present.  EXTREMITIES:  2+ Peripheral Pulses, No clubbing, cyanosis, leg edema 1+  PSYCH: AAOx3.  NEUROLOGY: non-focal.  SKIN: No rashes or lesions.

## 2024-04-01 LAB
ANION GAP SERPL CALC-SCNC: 8 MMOL/L — SIGNIFICANT CHANGE UP (ref 7–14)
BUN SERPL-MCNC: 28 MG/DL — HIGH (ref 10–20)
CALCIUM SERPL-MCNC: 9.1 MG/DL — SIGNIFICANT CHANGE UP (ref 8.4–10.5)
CHLORIDE SERPL-SCNC: 104 MMOL/L — SIGNIFICANT CHANGE UP (ref 98–110)
CO2 SERPL-SCNC: 31 MMOL/L — SIGNIFICANT CHANGE UP (ref 17–32)
CREAT SERPL-MCNC: 1.3 MG/DL — SIGNIFICANT CHANGE UP (ref 0.7–1.5)
EGFR: 40 ML/MIN/1.73M2 — LOW
FOLATE SERPL-MCNC: >20 NG/ML — SIGNIFICANT CHANGE UP
GLUCOSE BLDC GLUCOMTR-MCNC: 128 MG/DL — HIGH (ref 70–99)
GLUCOSE BLDC GLUCOMTR-MCNC: 133 MG/DL — HIGH (ref 70–99)
GLUCOSE BLDC GLUCOMTR-MCNC: 178 MG/DL — HIGH (ref 70–99)
GLUCOSE BLDC GLUCOMTR-MCNC: 198 MG/DL — HIGH (ref 70–99)
GLUCOSE SERPL-MCNC: 108 MG/DL — HIGH (ref 70–99)
HCT VFR BLD CALC: 31.6 % — LOW (ref 37–47)
HGB BLD-MCNC: 9.1 G/DL — LOW (ref 12–16)
MAGNESIUM SERPL-MCNC: 2.1 MG/DL — SIGNIFICANT CHANGE UP (ref 1.8–2.4)
MCHC RBC-ENTMCNC: 27.7 PG — SIGNIFICANT CHANGE UP (ref 27–31)
MCHC RBC-ENTMCNC: 28.8 G/DL — LOW (ref 32–37)
MCV RBC AUTO: 96.3 FL — SIGNIFICANT CHANGE UP (ref 81–99)
NRBC # BLD: 0 /100 WBCS — SIGNIFICANT CHANGE UP (ref 0–0)
PLATELET # BLD AUTO: 249 K/UL — SIGNIFICANT CHANGE UP (ref 130–400)
PMV BLD: 10 FL — SIGNIFICANT CHANGE UP (ref 7.4–10.4)
POTASSIUM SERPL-MCNC: 4.8 MMOL/L — SIGNIFICANT CHANGE UP (ref 3.5–5)
POTASSIUM SERPL-SCNC: 4.8 MMOL/L — SIGNIFICANT CHANGE UP (ref 3.5–5)
RBC # BLD: 3.28 M/UL — LOW (ref 4.2–5.4)
RBC # FLD: 14.2 % — SIGNIFICANT CHANGE UP (ref 11.5–14.5)
SODIUM SERPL-SCNC: 143 MMOL/L — SIGNIFICANT CHANGE UP (ref 135–146)
T4 AB SER-ACNC: 7.2 UG/DL — SIGNIFICANT CHANGE UP (ref 4.6–12)
VIT B12 SERPL-MCNC: 474 PG/ML — SIGNIFICANT CHANGE UP (ref 232–1245)
WBC # BLD: 6.98 K/UL — SIGNIFICANT CHANGE UP (ref 4.8–10.8)
WBC # FLD AUTO: 6.98 K/UL — SIGNIFICANT CHANGE UP (ref 4.8–10.8)

## 2024-04-01 PROCEDURE — 71045 X-RAY EXAM CHEST 1 VIEW: CPT | Mod: 26

## 2024-04-01 PROCEDURE — 99232 SBSQ HOSP IP/OBS MODERATE 35: CPT

## 2024-04-01 RX ADMIN — Medication 1: at 11:33

## 2024-04-01 RX ADMIN — PANTOPRAZOLE SODIUM 40 MILLIGRAM(S): 20 TABLET, DELAYED RELEASE ORAL at 05:28

## 2024-04-01 RX ADMIN — Medication 1 MILLIGRAM(S): at 11:32

## 2024-04-01 RX ADMIN — Medication 12.5 MILLIGRAM(S): at 05:28

## 2024-04-01 RX ADMIN — RISPERIDONE 0.5 MILLIGRAM(S): 4 TABLET ORAL at 11:33

## 2024-04-01 RX ADMIN — ATORVASTATIN CALCIUM 40 MILLIGRAM(S): 80 TABLET, FILM COATED ORAL at 21:46

## 2024-04-01 RX ADMIN — Medication 40 MILLIGRAM(S): at 05:27

## 2024-04-01 RX ADMIN — Medication 325 MILLIGRAM(S): at 11:32

## 2024-04-01 RX ADMIN — PANTOPRAZOLE SODIUM 40 MILLIGRAM(S): 20 TABLET, DELAYED RELEASE ORAL at 17:27

## 2024-04-01 RX ADMIN — Medication 12.5 MILLIGRAM(S): at 17:26

## 2024-04-01 RX ADMIN — LOSARTAN POTASSIUM 50 MILLIGRAM(S): 100 TABLET, FILM COATED ORAL at 05:28

## 2024-04-01 RX ADMIN — ENOXAPARIN SODIUM 120 MILLIGRAM(S): 100 INJECTION SUBCUTANEOUS at 17:27

## 2024-04-01 RX ADMIN — ENOXAPARIN SODIUM 120 MILLIGRAM(S): 100 INJECTION SUBCUTANEOUS at 05:27

## 2024-04-01 NOTE — PROGRESS NOTE ADULT - SUBJECTIVE AND OBJECTIVE BOX
CHELA GALLAGHER  83y  Female      Patient is a 83y old  Female who presents with a chief complaint of New onset Afib (01 Apr 2024 10:02)      INTERVAL HPI/OVERNIGHT EVENTS:  She feels ok, she has no fever, still with episode of tachy-tamy  Vital Signs Last 24 Hrs  T(C): 35.8 (01 Apr 2024 13:03), Max: 36.9 (01 Apr 2024 04:53)  T(F): 96.4 (01 Apr 2024 13:03), Max: 98.5 (01 Apr 2024 04:53)  HR: 99 (01 Apr 2024 13:03) (86 - 105)  BP: 95/43 (01 Apr 2024 13:03) (95/43 - 112/55)  BP(mean): --  RR: 18 (01 Apr 2024 13:03) (18 - 18)  SpO2: 97% (31 Mar 2024 19:36) (97% - 97%)    Parameters below as of 31 Mar 2024 19:36  Patient On (Oxygen Delivery Method): nasal cannula  O2 Flow (L/min): 2        03-31-24 @ 07:01  -  04-01-24 @ 07:00  --------------------------------------------------------  IN: 1095 mL / OUT: 750 mL / NET: 345 mL    04-01-24 @ 07:01  - 04-01-24 @ 15:34  --------------------------------------------------------  IN: 570 mL / OUT: 900 mL / NET: -330 mL            Consultant(s) Notes Reviewed:  [x ] YES  [ ] NO          MEDICATIONS  (STANDING):  atorvastatin 40 milliGRAM(s) Oral at bedtime  dextrose 5%. 1000 milliLiter(s) (100 mL/Hr) IV Continuous <Continuous>  dextrose 5%. 1000 milliLiter(s) (50 mL/Hr) IV Continuous <Continuous>  dextrose 50% Injectable 25 Gram(s) IV Push once  dextrose 50% Injectable 25 Gram(s) IV Push once  dextrose 50% Injectable 12.5 Gram(s) IV Push once  enoxaparin Injectable 120 milliGRAM(s) SubCutaneous every 12 hours  ferrous    sulfate 325 milliGRAM(s) Oral daily  folic acid 1 milliGRAM(s) Oral daily  furosemide    Tablet 40 milliGRAM(s) Oral daily  glucagon  Injectable 1 milliGRAM(s) IntraMuscular once  influenza  Vaccine (HIGH DOSE) 0.7 milliLiter(s) IntraMuscular once  insulin lispro (ADMELOG) corrective regimen sliding scale   SubCutaneous at bedtime  insulin lispro (ADMELOG) corrective regimen sliding scale   SubCutaneous three times a day before meals  losartan 50 milliGRAM(s) Oral daily  metoprolol tartrate 12.5 milliGRAM(s) Oral two times a day  pantoprazole   Suspension 40 milliGRAM(s) Oral two times a day  risperiDONE   Solution 0.5 milliGRAM(s) Oral daily  senna 2 Tablet(s) Oral at bedtime    MEDICATIONS  (PRN):  acetaminophen     Tablet .. 650 milliGRAM(s) Oral every 6 hours PRN Temp greater or equal to 38C (100.4F), Mild Pain (1 - 3)  aluminum hydroxide/magnesium hydroxide/simethicone Suspension 30 milliLiter(s) Oral every 4 hours PRN Dyspepsia  dextrose Oral Gel 15 Gram(s) Oral once PRN Blood Glucose LESS THAN 70 milliGRAM(s)/deciliter  melatonin 3 milliGRAM(s) Oral at bedtime PRN Insomnia  ondansetron Injectable 4 milliGRAM(s) IV Push every 8 hours PRN Nausea and/or Vomiting      LABS                          9.1    6.98  )-----------( 249      ( 01 Apr 2024 05:07 )             31.6     04-01    143  |  104  |  28<H>  ----------------------------<  108<H>  4.8   |  31  |  1.3    Ca    9.1      01 Apr 2024 05:07  Mg     2.1     04-01        Urinalysis Basic - ( 01 Apr 2024 05:07 )    Color: x / Appearance: x / SG: x / pH: x  Gluc: 108 mg/dL / Ketone: x  / Bili: x / Urobili: x   Blood: x / Protein: x / Nitrite: x   Leuk Esterase: x / RBC: x / WBC x   Sq Epi: x / Non Sq Epi: x / Bacteria: x        Lactate Trend        CAPILLARY BLOOD GLUCOSE      POCT Blood Glucose.: 198 mg/dL (01 Apr 2024 11:22)      Culture - Urine (collected 03-29-24 @ 07:52)  Source: Clean Catch Clean Catch (Midstream)  Final Report (03-31-24 @ 10:01):    >=3 organisms. Probable collection contamination.        RADIOLOGY & ADDITIONAL TESTS:    Imaging Personally Reviewed:  [ ] YES  [ ] NO    HEALTH ISSUES - PROBLEM Dx:          PHYSICAL EXAM:  GENERAL: NAD, obese  HEAD:  Atraumatic, Normocephalic.  EYES: EOMI, PERRLA, conjunctiva and sclera clear.  NECK: Supple, left thyroid nodule.   CHEST/LUNG: Clear to auscultation bilaterally; No wheeze.  HEART: Irregular rate and rhythm; S1 S2.   ABDOMEN: Soft, Nontender, Nondistended; Bowel sounds present.  EXTREMITIES:  2+ Peripheral Pulses, No clubbing, cyanosis, leg edema 1+  PSYCH: AAOx3.  NEUROLOGY: non-focal.  SKIN: No rashes or lesions.

## 2024-04-01 NOTE — PROGRESS NOTE ADULT - SUBJECTIVE AND OBJECTIVE BOX
SUBJECTIVE:    Patient is a 83y old Female who presents with a chief complaint of New onset Afib (31 Mar 2024 11:56)    Currently admitted to medicine with the primary diagnosis of New onset a-fib       Today is hospital day 3d. This morning she is resting comfortably in bed and reports no new issues or overnight events.     INTERVAL EVENTS:     PAST MEDICAL & SURGICAL HISTORY  Diabetes mellitus    Hypertension    Schizoaffective disorder    Edema  Bilateral LE    Disorder of thyroid, unspecified  Son cannot provide details. States she had "thyroid surgery" decades ago when she was young    No significant past surgical history        ALLERGIES:  No Known Allergies    MEDICATIONS:  STANDING MEDICATIONS  atorvastatin 40 milliGRAM(s) Oral at bedtime  dextrose 5%. 1000 milliLiter(s) IV Continuous <Continuous>  dextrose 5%. 1000 milliLiter(s) IV Continuous <Continuous>  dextrose 50% Injectable 12.5 Gram(s) IV Push once  dextrose 50% Injectable 25 Gram(s) IV Push once  dextrose 50% Injectable 25 Gram(s) IV Push once  enoxaparin Injectable 120 milliGRAM(s) SubCutaneous every 12 hours  ferrous    sulfate 325 milliGRAM(s) Oral daily  folic acid 1 milliGRAM(s) Oral daily  furosemide    Tablet 40 milliGRAM(s) Oral daily  glucagon  Injectable 1 milliGRAM(s) IntraMuscular once  influenza  Vaccine (HIGH DOSE) 0.7 milliLiter(s) IntraMuscular once  insulin lispro (ADMELOG) corrective regimen sliding scale   SubCutaneous at bedtime  insulin lispro (ADMELOG) corrective regimen sliding scale   SubCutaneous three times a day before meals  losartan 50 milliGRAM(s) Oral daily  metoprolol tartrate 12.5 milliGRAM(s) Oral two times a day  pantoprazole   Suspension 40 milliGRAM(s) Oral two times a day  risperiDONE   Solution 0.5 milliGRAM(s) Oral daily  senna 2 Tablet(s) Oral at bedtime    PRN MEDICATIONS  acetaminophen     Tablet .. 650 milliGRAM(s) Oral every 6 hours PRN  aluminum hydroxide/magnesium hydroxide/simethicone Suspension 30 milliLiter(s) Oral every 4 hours PRN  dextrose Oral Gel 15 Gram(s) Oral once PRN  melatonin 3 milliGRAM(s) Oral at bedtime PRN  ondansetron Injectable 4 milliGRAM(s) IV Push every 8 hours PRN    VITALS:   T(F): 98.5  HR: 86  BP: 112/55  RR: 18  SpO2: 97%    LABS:                        9.1    6.98  )-----------( 249      ( 01 Apr 2024 05:07 )             31.6     04-01    143  |  104  |  28<H>  ----------------------------<  108<H>  4.8   |  31  |  1.3    Ca    9.1      01 Apr 2024 05:07  Mg     2.1     04-01        Urinalysis Basic - ( 01 Apr 2024 05:07 )    Color: x / Appearance: x / SG: x / pH: x  Gluc: 108 mg/dL / Ketone: x  / Bili: x / Urobili: x   Blood: x / Protein: x / Nitrite: x   Leuk Esterase: x / RBC: x / WBC x   Sq Epi: x / Non Sq Epi: x / Bacteria: x                RADIOLOGY:    PHYSICAL EXAM:  GEN: No acute distress  PULM/CHEST: Clear to auscultation bilaterally, no rales, rhonchi or wheezes   CVS: Regular rate and rhythm, S1-S2, no murmurs  ABD: Soft, non-tender, non-distended, +BS  EXT: LE edema  NEURO: AAOx3    Hagen Catheter:

## 2024-04-01 NOTE — PROGRESS NOTE ADULT - ASSESSMENT
83-year-old female with history of schizophrenia, DM2, HTN, lower extremity edema presents for evaluation of nausea for one day, admitted for new onset afib.     A/P;   New Onset Atrial Fibrillation   Tachy-tamy Syndrome:   Episode of tachycardia, earlier she was chu. Episode of sinus pause 3 second today.   troponin 32>34  Start on Metoprolol 12.5mg BID.   On Lovenox, can switch to Eliquis, not on BB or CCB but her HR is high this morning, can start Metoprolol  EP consult recommended Pacemaker, patient refused.   TSH is low 0.06, check FT4.   Sleep study outpatient, may need CPAP.     Thyroid nodule:   TSH is low 0.06  Recent CT chest Feb 2024 showed Stable enlarged multinodular right thyroid lobe with substernal extension measuring approximately 6.2 cm x 5.1 cm x 5.7 cm with leftward mass   effect upon the trachea and esophagus, unchanged since 5/28/2020.    Anemia:   Hb is stable,   Start on Pantoprazole.   Iron studies normal, check B12, Folate.     Abdominal Pain- Resolved  CTAP neg for acute pathology, showed cholelithiasis and bibasilar atelectasis/opacities  Low residual, lactose free diet  Continue Maalox prn and Protonix.   Monitor for any signs of GI bleeding.     Bipolar Disorder  Continue Risperdal 0.5mg daily     Morbid Obesity:   low calories diet.   DVT Prophylaxis: Lovenox therapeutic  GI Prophylaxis: PPI po    Code Status: Full   Pending:   Family discussion:   Deposition: from [facility]

## 2024-04-01 NOTE — PROGRESS NOTE ADULT - ASSESSMENT
83-year-old female with history of schizophrenia, DM2, HTN, lower extremity edema presents for evaluation of nausea for one day, admitted for new onset afib.     #New Onset Atrial Fibrillation   #Suspect 2/2 [facility] vs. [facility]  - Patient is a patient of Dr. Razo   - Rate controlled off medications  - EKG showing A-fib with SVR  - Troponin trend 32-->34  - Start Lovenox 120mg BID, transition to Eliquis on DC  - cardiology recommendations -> lovenox for AC, watch H/H, switch to eliquis on dc, TTE, check TSH  - TTE f/u   - f/u TSH, lipid profile, A1c  - BNP 1140  - ABG, depending on this consider bipap at [facility] follow up outpatient  - Continue Lasix 40mg, Losartan 50mg,  Crestor 10mg  - DC ASA 81mg    #Abdominal Pain- Resolved  - CTAP neg for acute pathology, showed cholelithiasis and bibasilar atelectasis/opacities  - Low residual, lactose free diet  - Maalox prn  - PPI po BID  - monitor BM for blood   - if hemoglobin dropping, may need to stop lovenox in case of bleed    #Bipolar Disorder  - continue Risperdal 0.5mg daily       #Misc  - DVT Prophylaxis: Lovenox therapeutic  - GI Prophylaxis: PPI po  - Diet: DASH, Carb Consistent, low residual  - Activity: As tolerated  - Code Status: Full     pend: T4, b12, folate, trend CBC for Hgb drop

## 2024-04-02 LAB
ALBUMIN SERPL ELPH-MCNC: 3 G/DL — LOW (ref 3.5–5.2)
ALP SERPL-CCNC: 84 U/L — SIGNIFICANT CHANGE UP (ref 30–115)
ALT FLD-CCNC: 13 U/L — SIGNIFICANT CHANGE UP (ref 0–41)
ANION GAP SERPL CALC-SCNC: 13 MMOL/L — SIGNIFICANT CHANGE UP (ref 7–14)
AST SERPL-CCNC: 13 U/L — SIGNIFICANT CHANGE UP (ref 0–41)
BASOPHILS # BLD AUTO: 0.04 K/UL — SIGNIFICANT CHANGE UP (ref 0–0.2)
BASOPHILS NFR BLD AUTO: 0.6 % — SIGNIFICANT CHANGE UP (ref 0–1)
BILIRUB SERPL-MCNC: <0.2 MG/DL — SIGNIFICANT CHANGE UP (ref 0.2–1.2)
BUN SERPL-MCNC: 30 MG/DL — HIGH (ref 10–20)
CALCIUM SERPL-MCNC: 9.3 MG/DL — SIGNIFICANT CHANGE UP (ref 8.4–10.4)
CHLORIDE SERPL-SCNC: 102 MMOL/L — SIGNIFICANT CHANGE UP (ref 98–110)
CO2 SERPL-SCNC: 29 MMOL/L — SIGNIFICANT CHANGE UP (ref 17–32)
CREAT SERPL-MCNC: 1.4 MG/DL — SIGNIFICANT CHANGE UP (ref 0.7–1.5)
EGFR: 37 ML/MIN/1.73M2 — LOW
EOSINOPHIL # BLD AUTO: 0.28 K/UL — SIGNIFICANT CHANGE UP (ref 0–0.7)
EOSINOPHIL NFR BLD AUTO: 4.1 % — SIGNIFICANT CHANGE UP (ref 0–8)
GLUCOSE BLDC GLUCOMTR-MCNC: 137 MG/DL — HIGH (ref 70–99)
GLUCOSE BLDC GLUCOMTR-MCNC: 149 MG/DL — HIGH (ref 70–99)
GLUCOSE BLDC GLUCOMTR-MCNC: 155 MG/DL — HIGH (ref 70–99)
GLUCOSE BLDC GLUCOMTR-MCNC: 169 MG/DL — HIGH (ref 70–99)
GLUCOSE SERPL-MCNC: 109 MG/DL — HIGH (ref 70–99)
HCT VFR BLD CALC: 31.3 % — LOW (ref 37–47)
HGB BLD-MCNC: 9.1 G/DL — LOW (ref 12–16)
IMM GRANULOCYTES NFR BLD AUTO: 0.1 % — SIGNIFICANT CHANGE UP (ref 0.1–0.3)
LYMPHOCYTES # BLD AUTO: 1.14 K/UL — LOW (ref 1.2–3.4)
LYMPHOCYTES # BLD AUTO: 16.7 % — LOW (ref 20.5–51.1)
MAGNESIUM SERPL-MCNC: 2.1 MG/DL — SIGNIFICANT CHANGE UP (ref 1.8–2.4)
MCHC RBC-ENTMCNC: 28 PG — SIGNIFICANT CHANGE UP (ref 27–31)
MCHC RBC-ENTMCNC: 29.1 G/DL — LOW (ref 32–37)
MCV RBC AUTO: 96.3 FL — SIGNIFICANT CHANGE UP (ref 81–99)
MONOCYTES # BLD AUTO: 0.74 K/UL — HIGH (ref 0.1–0.6)
MONOCYTES NFR BLD AUTO: 10.9 % — HIGH (ref 1.7–9.3)
NEUTROPHILS # BLD AUTO: 4.61 K/UL — SIGNIFICANT CHANGE UP (ref 1.4–6.5)
NEUTROPHILS NFR BLD AUTO: 67.6 % — SIGNIFICANT CHANGE UP (ref 42.2–75.2)
NRBC # BLD: 0 /100 WBCS — SIGNIFICANT CHANGE UP (ref 0–0)
PLATELET # BLD AUTO: 241 K/UL — SIGNIFICANT CHANGE UP (ref 130–400)
PMV BLD: 10.6 FL — HIGH (ref 7.4–10.4)
POTASSIUM SERPL-MCNC: 4.6 MMOL/L — SIGNIFICANT CHANGE UP (ref 3.5–5)
POTASSIUM SERPL-SCNC: 4.6 MMOL/L — SIGNIFICANT CHANGE UP (ref 3.5–5)
PROT SERPL-MCNC: 6 G/DL — SIGNIFICANT CHANGE UP (ref 6–8)
RBC # BLD: 3.25 M/UL — LOW (ref 4.2–5.4)
RBC # FLD: 14.1 % — SIGNIFICANT CHANGE UP (ref 11.5–14.5)
SODIUM SERPL-SCNC: 144 MMOL/L — SIGNIFICANT CHANGE UP (ref 135–146)
T4 AB SER-ACNC: 6.7 UG/DL — SIGNIFICANT CHANGE UP (ref 4.6–12)
T4 FREE SERPL-MCNC: 1.4 NG/DL — SIGNIFICANT CHANGE UP (ref 0.9–1.8)
TSH SERPL-MCNC: 1.22 UIU/ML — SIGNIFICANT CHANGE UP (ref 0.27–4.2)
WBC # BLD: 6.82 K/UL — SIGNIFICANT CHANGE UP (ref 4.8–10.8)
WBC # FLD AUTO: 6.82 K/UL — SIGNIFICANT CHANGE UP (ref 4.8–10.8)

## 2024-04-02 PROCEDURE — 99233 SBSQ HOSP IP/OBS HIGH 50: CPT

## 2024-04-02 PROCEDURE — 99232 SBSQ HOSP IP/OBS MODERATE 35: CPT

## 2024-04-02 RX ADMIN — PANTOPRAZOLE SODIUM 40 MILLIGRAM(S): 20 TABLET, DELAYED RELEASE ORAL at 17:49

## 2024-04-02 RX ADMIN — Medication 12.5 MILLIGRAM(S): at 05:41

## 2024-04-02 RX ADMIN — Medication 12.5 MILLIGRAM(S): at 17:49

## 2024-04-02 RX ADMIN — Medication 1 MILLIGRAM(S): at 11:38

## 2024-04-02 RX ADMIN — ATORVASTATIN CALCIUM 40 MILLIGRAM(S): 80 TABLET, FILM COATED ORAL at 21:27

## 2024-04-02 RX ADMIN — Medication 1: at 08:05

## 2024-04-02 RX ADMIN — SENNA PLUS 2 TABLET(S): 8.6 TABLET ORAL at 21:27

## 2024-04-02 RX ADMIN — Medication 40 MILLIGRAM(S): at 05:40

## 2024-04-02 RX ADMIN — PANTOPRAZOLE SODIUM 40 MILLIGRAM(S): 20 TABLET, DELAYED RELEASE ORAL at 05:41

## 2024-04-02 RX ADMIN — LOSARTAN POTASSIUM 50 MILLIGRAM(S): 100 TABLET, FILM COATED ORAL at 05:41

## 2024-04-02 RX ADMIN — ENOXAPARIN SODIUM 120 MILLIGRAM(S): 100 INJECTION SUBCUTANEOUS at 17:49

## 2024-04-02 RX ADMIN — ENOXAPARIN SODIUM 120 MILLIGRAM(S): 100 INJECTION SUBCUTANEOUS at 05:40

## 2024-04-02 RX ADMIN — RISPERIDONE 0.5 MILLIGRAM(S): 4 TABLET ORAL at 11:38

## 2024-04-02 RX ADMIN — Medication 325 MILLIGRAM(S): at 11:38

## 2024-04-02 NOTE — PROGRESS NOTE ADULT - ASSESSMENT
83-year-old female with history of schizophrenia, DM2, HTN, lower extremity edema presents for evaluation of nausea for one day, admitted for new onset afib.     A/P;   New Onset Atrial Fibrillation   Tachy-tamy Syndrome:   Episode of tachycardia, earlier she was chu. Episode of sinus pause 3 second today.   troponin 32>34  Start on Metoprolol 12.5mg BID.   On Lovenox, can switch to Eliquis, not on BB or CCB but her HR is high this morning, can start Metoprolol  EP consult recommended Pacemaker, patient refused. I spoke to her Cardiologist Dr. Jimenez, he will discuss with the patient    TSH is low 0.06, check FT4.   Sleep study outpatient, may need CPAP.     Thyroid nodule:   TSH is low 0.06  Recent CT chest Feb 2024 showed Stable enlarged multinodular right thyroid lobe with substernal extension measuring approximately 6.2 cm x 5.1 cm x 5.7 cm with leftward mass effect upon the trachea and esophagus, unchanged since 5/28/2020.    Anemia:   Hb is stable,   Start on Pantoprazole.   Iron studies normal, check B12, Folate.     Abdominal Pain- Resolved  CTAP neg for acute pathology, showed cholelithiasis and bibasilar atelectasis/opacities  Low residual, lactose free diet  Continue Maalox prn and Protonix.   No evidence of GI bleeding.     Bipolar Disorder  Continue Risperdal 0.5mg daily     Morbid Obesity:   low calories diet.   DVT Prophylaxis: Lovenox therapeutic  GI Prophylaxis: PPI po    Code Status: Full   Pending: cardio follow up.   Family discussion:   Deposition: from [facility]

## 2024-04-02 NOTE — PROGRESS NOTE ADULT - ASSESSMENT
Cards Dr Jimenez    84 yo F w HTN, DM, morbid obesity, chronic LE edema, thyroid mass, medically managed, schizophrenia/bipolar, now with new onset AF, SB, and pauses on telemetry  refuses PPM at this time  TSH 0.06    SSS, tachy- tamy  PAF  pauses  [id number] Score is 5  HTN  DM      con't tele  patient is recommended PPM placement for SSS/tachy-tamy  patient refused despite same recommendation from primary cardiologist  con't Metoprolol 12.5mg bid, careful with up-titration  con't current management  Maintain electrolytes K>4.0 Mg >2.0  TSH 0.06, please check FREE T4  treat as needed  will sign off , recall EP if patient decides to proceed with pacemaker  Cards Dr Razo    82 yo F w HTN, DM, morbid obesity, chronic LE edema, thyroid mass, medically managed, schizophrenia/bipolar, now with new onset AF, SB, and pauses on telemetry  refuses PPM at this time  TSH 0.06    SSS, tachy- tamy  PAF  pauses  [id number] Score is 5  HTN  DM      con't tele  patient is recommended PPM placement for SSS/tachy-tamy  patient refused despite same recommendation from primary cardiologist  con't Metoprolol 12.5mg bid, careful with up-titration  con't current management  Maintain electrolytes K>4.0 Mg >2.0  TSH 0.06, please check FREE T4  treat as needed  will sign off , recall EP if patient decides to proceed with pacemaker

## 2024-04-02 NOTE — PROGRESS NOTE ADULT - SUBJECTIVE AND OBJECTIVE BOX
SUBJECTIVE:  HPI:  83-year-old female with history of schizophrenia, DM2, HTN, lower extremity edema presents for evaluation of nausea for one day. Patient at baseline is an unreliable historian. Per the patient she was nauseous for the last day, but never vomited. Patient states that it suddenly just came on and was associated with pain throughout her entire body. Per family members patient has a history of bipolar with psychotic episodes, frequently believing people are trying to kill her. At this time patient is feeling better, and would like to eat food.     In the ED patient was hemodynamically stable /62, HR 74-99, Afebrile. EKG was preformed which showed new onset atrial fibrillation, so patient was admitted to telemetry.     Vital Signs Last 24 Hrs  T(C): 36.2 (29 Mar 2024 07:43), Max: 36.9 (29 Mar 2024 02:35)  T(F): 97.2 (29 Mar 2024 07:43), Max: 98.4 (29 Mar 2024 02:35)  HR: 74 (29 Mar 2024 07:43) (74 - 99)  BP: 110/59 (29 Mar 2024 07:43) (110/59 - 128/62)  BP(mean): --  RR: 18 (29 Mar 2024 07:43) (18 - 18)  SpO2: 95% (29 Mar 2024 07:43) (95% - 96%)    Parameters below as of 29 Mar 2024 07:43  Patient On (Oxygen Delivery Method): room air     (29 Mar 2024 14:02)      Patient is a 83y old Female who presents with a chief complaint of New onset Afib (01 Apr 2024 15:28)    Currently admitted to medicine with the primary diagnosis of New onset a-fib       Today is hospital day 4d.     PAST MEDICAL & SURGICAL HISTORY  Diabetes mellitus    Hypertension    Schizoaffective disorder    Edema  Bilateral LE    Disorder of thyroid, unspecified  Son cannot provide details. States she had "thyroid surgery" decades ago when she was young    No significant past surgical history        ALLERGIES:  No Known Allergies    MEDICATIONS:  ACTIVE MEDICATIONS  acetaminophen     Tablet .. 650 milliGRAM(s) Oral every 6 hours PRN  aluminum hydroxide/magnesium hydroxide/simethicone Suspension 30 milliLiter(s) Oral every 4 hours PRN  atorvastatin 40 milliGRAM(s) Oral at bedtime  dextrose 5%. 1000 milliLiter(s) IV Continuous <Continuous>  dextrose 5%. 1000 milliLiter(s) IV Continuous <Continuous>  dextrose 50% Injectable 25 Gram(s) IV Push once  dextrose 50% Injectable 25 Gram(s) IV Push once  dextrose 50% Injectable 12.5 Gram(s) IV Push once  dextrose Oral Gel 15 Gram(s) Oral once PRN  enoxaparin Injectable 120 milliGRAM(s) SubCutaneous every 12 hours  ferrous    sulfate 325 milliGRAM(s) Oral daily  folic acid 1 milliGRAM(s) Oral daily  furosemide    Tablet 40 milliGRAM(s) Oral daily  glucagon  Injectable 1 milliGRAM(s) IntraMuscular once  influenza  Vaccine (HIGH DOSE) 0.7 milliLiter(s) IntraMuscular once  insulin lispro (ADMELOG) corrective regimen sliding scale   SubCutaneous at bedtime  insulin lispro (ADMELOG) corrective regimen sliding scale   SubCutaneous three times a day before meals  losartan 50 milliGRAM(s) Oral daily  melatonin 3 milliGRAM(s) Oral at bedtime PRN  metoprolol tartrate 12.5 milliGRAM(s) Oral two times a day  ondansetron Injectable 4 milliGRAM(s) IV Push every 8 hours PRN  pantoprazole   Suspension 40 milliGRAM(s) Oral two times a day  risperiDONE   Solution 0.5 milliGRAM(s) Oral daily  senna 2 Tablet(s) Oral at bedtime      VITALS:   T(F): 98.8  HR: 98  BP: 114/61  RR: 18  SpO2: 97%    LABS:                        9.1    6.82  )-----------( 241      ( 02 Apr 2024 05:57 )             31.3     04-02    144  |  102  |  30<H>  ----------------------------<  109<H>  4.6   |  29  |  1.4    Ca    9.3      02 Apr 2024 05:57  Mg     2.1     04-02    TPro  6.0  /  Alb  3.0<L>  /  TBili  <0.2  /  DBili  x   /  AST  13  /  ALT  13  /  AlkPhos  84  04-02      Urinalysis Basic - ( 02 Apr 2024 05:57 )    Color: x / Appearance: x / SG: x / pH: x  Gluc: 109 mg/dL / Ketone: x  / Bili: x / Urobili: x   Blood: x / Protein: x / Nitrite: x   Leuk Esterase: x / RBC: x / WBC x   Sq Epi: x / Non Sq Epi: x / Bacteria: x                    PHYSICAL EXAM:  GEN: No acute distress  LUNGS: Clear to auscultation bilaterally   HEART: S1/S2 present.    ABD: Soft, non-tender, non-distended.   EXT: No pedal edema  NEURO: AAOX3

## 2024-04-02 NOTE — PROGRESS NOTE ADULT - SUBJECTIVE AND OBJECTIVE BOX
CHELA GALLAGHER  83y  Female      Patient is a 83y old  Female who presents with a chief complaint of New onset Afib (02 Apr 2024 12:20)      INTERVAL HPI/OVERNIGHT EVENTS:  She feels ok, still with episodes of sinus pause, tachy-tamy episodes   Vital Signs Last 24 Hrs  T(C): 37.1 (02 Apr 2024 12:03), Max: 37.2 (01 Apr 2024 19:42)  T(F): 98.8 (02 Apr 2024 12:03), Max: 98.9 (01 Apr 2024 19:42)  HR: 98 (02 Apr 2024 12:03) (81 - 98)  BP: 114/61 (02 Apr 2024 12:03) (114/61 - 129/59)  BP(mean): --  RR: 18 (02 Apr 2024 12:03) (18 - 18)  SpO2: 97% (01 Apr 2024 19:42) (97% - 97%)    Parameters below as of 01 Apr 2024 19:42  Patient On (Oxygen Delivery Method): nasal cannula  O2 Flow (L/min): 2        04-01-24 @ 07:01  -  04-02-24 @ 07:00  --------------------------------------------------------  IN: 806 mL / OUT: 1420 mL / NET: -614 mL    04-02-24 @ 07:01  -  04-02-24 @ 14:20  --------------------------------------------------------  IN: 210 mL / OUT: 0 mL / NET: 210 mL            Consultant(s) Notes Reviewed:  [x ] YES  [ ] NO          MEDICATIONS  (STANDING):  atorvastatin 40 milliGRAM(s) Oral at bedtime  dextrose 5%. 1000 milliLiter(s) (100 mL/Hr) IV Continuous <Continuous>  dextrose 5%. 1000 milliLiter(s) (50 mL/Hr) IV Continuous <Continuous>  dextrose 50% Injectable 12.5 Gram(s) IV Push once  dextrose 50% Injectable 25 Gram(s) IV Push once  dextrose 50% Injectable 25 Gram(s) IV Push once  enoxaparin Injectable 120 milliGRAM(s) SubCutaneous every 12 hours  ferrous    sulfate 325 milliGRAM(s) Oral daily  folic acid 1 milliGRAM(s) Oral daily  furosemide    Tablet 40 milliGRAM(s) Oral daily  glucagon  Injectable 1 milliGRAM(s) IntraMuscular once  influenza  Vaccine (HIGH DOSE) 0.7 milliLiter(s) IntraMuscular once  insulin lispro (ADMELOG) corrective regimen sliding scale   SubCutaneous at bedtime  insulin lispro (ADMELOG) corrective regimen sliding scale   SubCutaneous three times a day before meals  losartan 50 milliGRAM(s) Oral daily  metoprolol tartrate 12.5 milliGRAM(s) Oral two times a day  pantoprazole   Suspension 40 milliGRAM(s) Oral two times a day  risperiDONE   Solution 0.5 milliGRAM(s) Oral daily  senna 2 Tablet(s) Oral at bedtime    MEDICATIONS  (PRN):  acetaminophen     Tablet .. 650 milliGRAM(s) Oral every 6 hours PRN Temp greater or equal to 38C (100.4F), Mild Pain (1 - 3)  aluminum hydroxide/magnesium hydroxide/simethicone Suspension 30 milliLiter(s) Oral every 4 hours PRN Dyspepsia  dextrose Oral Gel 15 Gram(s) Oral once PRN Blood Glucose LESS THAN 70 milliGRAM(s)/deciliter  melatonin 3 milliGRAM(s) Oral at bedtime PRN Insomnia  ondansetron Injectable 4 milliGRAM(s) IV Push every 8 hours PRN Nausea and/or Vomiting      LABS                          9.1    6.82  )-----------( 241      ( 02 Apr 2024 05:57 )             31.3     04-02    144  |  102  |  30<H>  ----------------------------<  109<H>  4.6   |  29  |  1.4    Ca    9.3      02 Apr 2024 05:57  Mg     2.1     04-02    TPro  6.0  /  Alb  3.0<L>  /  TBili  <0.2  /  DBili  x   /  AST  13  /  ALT  13  /  AlkPhos  84  04-02      Urinalysis Basic - ( 02 Apr 2024 05:57 )    Color: x / Appearance: x / SG: x / pH: x  Gluc: 109 mg/dL / Ketone: x  / Bili: x / Urobili: x   Blood: x / Protein: x / Nitrite: x   Leuk Esterase: x / RBC: x / WBC x   Sq Epi: x / Non Sq Epi: x / Bacteria: x        Lactate Trend        CAPILLARY BLOOD GLUCOSE      POCT Blood Glucose.: 149 mg/dL (02 Apr 2024 11:17)      Culture - Urine (collected 03-29-24 @ 07:52)  Source: Clean Catch Clean Catch (Midstream)  Final Report (03-31-24 @ 10:01):    >=3 organisms. Probable collection contamination.        RADIOLOGY & ADDITIONAL TESTS:    Imaging Personally Reviewed:  [ ] YES  [ ] NO    HEALTH ISSUES - PROBLEM Dx:          PHYSICAL EXAM:  GENERAL: NAD, obese  HEAD:  Atraumatic, Normocephalic.  EYES: EOMI, PERRLA, conjunctiva and sclera clear.  NECK: Supple, left thyroid nodule.   CHEST/LUNG: Clear to auscultation bilaterally; No wheeze.  HEART: Irregular rate and rhythm; S1 S2.   ABDOMEN: Soft, Nontender, Nondistended; Bowel sounds present.  EXTREMITIES:  2+ Peripheral Pulses, No clubbing, cyanosis, leg edema 1+  PSYCH: AAOx3.  NEUROLOGY: non-focal.  SKIN: No rashes or lesions.

## 2024-04-02 NOTE — PROGRESS NOTE ADULT - NS ATTEND AMEND GEN_ALL_CORE FT
Evidence of tachy/tamy with pauses < 5 sec.   Advised pt that she would benefit from PPM but she is adamantly opposed. Discussed with family (Dr. Razo) who understands but due to psych history, pt is opposed to PPM.   Will try to medically manage HR, but options will be limited by tamy/pauses.   Can consider Dig loading for HR control. Please load and monitor Dig levels.      Will follow

## 2024-04-02 NOTE — PROGRESS NOTE ADULT - ASSESSMENT
83-year-old female with history of schizophrenia, DM2, HTN, lower extremity edema presents for evaluation of nausea for one day, admitted for new onset afib.     A/P;   New Onset Atrial Fibrillation   Tachy-tamy Syndrome:   Episode of tachycardia, earlier she was chu. Episode of sinus pause 3 second 4/1 and 4/2..   troponin 32>34  Start on Metoprolol 12.5mg BID.   On Lovenox, can switch to Eliquis, not on BB or CCB but her HR is high this morning, can start Metoprolol  EP consult recommended Pacemaker, patient refused but patient lacks capacity, called daughter at [phone] and she said she is the legal guardian but does not want to give approval for PPM until Dr. baum speaks to patient.   TSH is low 0.06, check FT4.   Sleep study outpatient, may need CPAP.     Thyroid nodule:   TSH is low 0.06  Recent CT chest Feb 2024 showed Stable enlarged multinodular right thyroid lobe with substernal extension measuring approximately 6.2 cm x 5.1 cm x 5.7 cm with leftward mass   effect upon the trachea and esophagus, unchanged since 5/28/2020.    Anemia:   Hb is stable,   Start on Pantoprazole.   Iron studies normal, check B12, Folate.     Abdominal Pain- Resolved  CTAP neg for acute pathology, showed cholelithiasis and bibasilar atelectasis/opacities  Low residual, lactose free diet  Continue Maalox prn and Protonix.   Monitor for any signs of GI bleeding.     Bipolar Disorder  Continue Risperdal 0.5mg daily     Morbid Obesity:   low calories diet.   DVT Prophylaxis: Lovenox therapeutic  GI Prophylaxis: PPI po    Code Status: Full   Pending: Dr. baum to discuss with family need for pacemaker.   Family discussion:   Deposition: from

## 2024-04-02 NOTE — PROGRESS NOTE ADULT - SUBJECTIVE AND OBJECTIVE BOX
INTERVAL HPI/OVERNIGHT EVENTS:  still in AF with pauses 3-3.5sec    MEDICATIONS  (STANDING):  atorvastatin 40 milliGRAM(s) Oral at bedtime  dextrose 5%. 1000 milliLiter(s) (100 mL/Hr) IV Continuous <Continuous>  dextrose 5%. 1000 milliLiter(s) (50 mL/Hr) IV Continuous <Continuous>  dextrose 50% Injectable 25 Gram(s) IV Push once  dextrose 50% Injectable 25 Gram(s) IV Push once  dextrose 50% Injectable 12.5 Gram(s) IV Push once  enoxaparin Injectable 120 milliGRAM(s) SubCutaneous every 12 hours  ferrous    sulfate 325 milliGRAM(s) Oral daily  folic acid 1 milliGRAM(s) Oral daily  furosemide    Tablet 40 milliGRAM(s) Oral daily  glucagon  Injectable 1 milliGRAM(s) IntraMuscular once  influenza  Vaccine (HIGH DOSE) 0.7 milliLiter(s) IntraMuscular once  insulin lispro (ADMELOG) corrective regimen sliding scale   SubCutaneous at bedtime  insulin lispro (ADMELOG) corrective regimen sliding scale   SubCutaneous three times a day before meals  losartan 50 milliGRAM(s) Oral daily  metoprolol tartrate 12.5 milliGRAM(s) Oral two times a day  pantoprazole   Suspension 40 milliGRAM(s) Oral two times a day  risperiDONE   Solution 0.5 milliGRAM(s) Oral daily  senna 2 Tablet(s) Oral at bedtime    MEDICATIONS  (PRN):  acetaminophen     Tablet .. 650 milliGRAM(s) Oral every 6 hours PRN Temp greater or equal to 38C (100.4F), Mild Pain (1 - 3)  aluminum hydroxide/magnesium hydroxide/simethicone Suspension 30 milliLiter(s) Oral every 4 hours PRN Dyspepsia  dextrose Oral Gel 15 Gram(s) Oral once PRN Blood Glucose LESS THAN 70 milliGRAM(s)/deciliter  melatonin 3 milliGRAM(s) Oral at bedtime PRN Insomnia  ondansetron Injectable 4 milliGRAM(s) IV Push every 8 hours PRN Nausea and/or Vomiting      Allergies    No Known Allergies    Intolerances        REVIEW OF SYSTEMS    [ x] A ten-point review of systems was otherwise negative except as noted.  [ ] Due to altered mental status/intubation, subjective information were not able to be obtained from the patient. History was obtained, to the extent possible, from review of the chart and collateral sources of information.      Vital Signs Last 24 Hrs  T(C): 37.1 (02 Apr 2024 12:03), Max: 37.2 (01 Apr 2024 19:42)  T(F): 98.8 (02 Apr 2024 12:03), Max: 98.9 (01 Apr 2024 19:42)  HR: 98 (02 Apr 2024 12:03) (81 - 98)  BP: 114/61 (02 Apr 2024 12:03) (114/61 - 129/59)  BP(mean): --  RR: 18 (02 Apr 2024 12:03) (18 - 18)  SpO2: 97% (01 Apr 2024 19:42) (97% - 97%)    Parameters below as of 01 Apr 2024 19:42  Patient On (Oxygen Delivery Method): nasal cannula  O2 Flow (L/min): 2      04-01-24 @ 07:01  -  04-02-24 @ 07:00  --------------------------------------------------------  IN: 806 mL / OUT: 1420 mL / NET: -614 mL    04-02-24 @ 07:01  -  04-02-24 @ 15:39  --------------------------------------------------------  IN: 450 mL / OUT: 750 mL / NET: -300 mL        PHYSICAL EXAM:    GENERAL: In no apparent distress, well nourished, and hydrated.  HEART: IRRegular rate and rhythm; No murmur; NO rubs, or gallops.  PULMONARY: Clear to auscultation and percussion.  Normal expansion/effort. No rales, wheezing, or rhonchi bilaterally.  ABDOMEN: Soft, Nontender, Nondistended; Bowel sounds present  EXTREMITIES:  Extremities warm, pink, well-perfused, 2+ Peripheral Pulses, No clubbing, cyanosis, or edema  NEUROLOGICAL: alert & oriented x 3, no focal deficits, PERRLA, EOMI    LABS:                        9.1    6.82  )-----------( 241      ( 02 Apr 2024 05:57 )             31.3     04-02    144  |  102  |  30<H>  ----------------------------<  109<H>  4.6   |  29  |  1.4    Ca    9.3      02 Apr 2024 05:57  Mg     2.1     04-02    TPro  6.0  /  Alb  3.0<L>  /  TBili  <0.2  /  DBili  x   /  AST  13  /  ALT  13  /  AlkPhos  84  04-02      Thyroid Stimulating Hormone, Serum: 0.06 uIU/mL (03.30.24 @ 12:26)    T4, Serum: 7.2 ug/dL (04.01.24 @ 10:10)              RADIOLOGY & ADDITIONAL TESTS:

## 2024-04-03 LAB
ALBUMIN SERPL ELPH-MCNC: 3.1 G/DL — LOW (ref 3.5–5.2)
ALP SERPL-CCNC: 85 U/L — SIGNIFICANT CHANGE UP (ref 30–115)
ALT FLD-CCNC: 19 U/L — SIGNIFICANT CHANGE UP (ref 0–41)
ANION GAP SERPL CALC-SCNC: 7 MMOL/L — SIGNIFICANT CHANGE UP (ref 7–14)
AST SERPL-CCNC: 22 U/L — SIGNIFICANT CHANGE UP (ref 0–41)
BASOPHILS # BLD AUTO: 0.04 K/UL — SIGNIFICANT CHANGE UP (ref 0–0.2)
BASOPHILS NFR BLD AUTO: 0.7 % — SIGNIFICANT CHANGE UP (ref 0–1)
BILIRUB SERPL-MCNC: 0.2 MG/DL — SIGNIFICANT CHANGE UP (ref 0.2–1.2)
BUN SERPL-MCNC: 28 MG/DL — HIGH (ref 10–20)
CALCIUM SERPL-MCNC: 9.6 MG/DL — SIGNIFICANT CHANGE UP (ref 8.4–10.5)
CHLORIDE SERPL-SCNC: 103 MMOL/L — SIGNIFICANT CHANGE UP (ref 98–110)
CO2 SERPL-SCNC: 33 MMOL/L — HIGH (ref 17–32)
CREAT SERPL-MCNC: 1.3 MG/DL — SIGNIFICANT CHANGE UP (ref 0.7–1.5)
EGFR: 40 ML/MIN/1.73M2 — LOW
EOSINOPHIL # BLD AUTO: 0.27 K/UL — SIGNIFICANT CHANGE UP (ref 0–0.7)
EOSINOPHIL NFR BLD AUTO: 4.7 % — SIGNIFICANT CHANGE UP (ref 0–8)
GLUCOSE BLDC GLUCOMTR-MCNC: 113 MG/DL — HIGH (ref 70–99)
GLUCOSE BLDC GLUCOMTR-MCNC: 127 MG/DL — HIGH (ref 70–99)
GLUCOSE BLDC GLUCOMTR-MCNC: 131 MG/DL — HIGH (ref 70–99)
GLUCOSE BLDC GLUCOMTR-MCNC: 160 MG/DL — HIGH (ref 70–99)
GLUCOSE SERPL-MCNC: 111 MG/DL — HIGH (ref 70–99)
HCT VFR BLD CALC: 32.5 % — LOW (ref 37–47)
HGB BLD-MCNC: 9.5 G/DL — LOW (ref 12–16)
IMM GRANULOCYTES NFR BLD AUTO: 0.2 % — SIGNIFICANT CHANGE UP (ref 0.1–0.3)
LYMPHOCYTES # BLD AUTO: 1.04 K/UL — LOW (ref 1.2–3.4)
LYMPHOCYTES # BLD AUTO: 18.2 % — LOW (ref 20.5–51.1)
MAGNESIUM SERPL-MCNC: 2 MG/DL — SIGNIFICANT CHANGE UP (ref 1.8–2.4)
MCHC RBC-ENTMCNC: 27.9 PG — SIGNIFICANT CHANGE UP (ref 27–31)
MCHC RBC-ENTMCNC: 29.2 G/DL — LOW (ref 32–37)
MCV RBC AUTO: 95.6 FL — SIGNIFICANT CHANGE UP (ref 81–99)
MONOCYTES # BLD AUTO: 0.63 K/UL — HIGH (ref 0.1–0.6)
MONOCYTES NFR BLD AUTO: 11.1 % — HIGH (ref 1.7–9.3)
NEUTROPHILS # BLD AUTO: 3.71 K/UL — SIGNIFICANT CHANGE UP (ref 1.4–6.5)
NEUTROPHILS NFR BLD AUTO: 65.1 % — SIGNIFICANT CHANGE UP (ref 42.2–75.2)
NRBC # BLD: 0 /100 WBCS — SIGNIFICANT CHANGE UP (ref 0–0)
PHOSPHATE SERPL-MCNC: 3.6 MG/DL — SIGNIFICANT CHANGE UP (ref 2.1–4.9)
PLATELET # BLD AUTO: 244 K/UL — SIGNIFICANT CHANGE UP (ref 130–400)
PMV BLD: 10.2 FL — SIGNIFICANT CHANGE UP (ref 7.4–10.4)
POTASSIUM SERPL-MCNC: 4.5 MMOL/L — SIGNIFICANT CHANGE UP (ref 3.5–5)
POTASSIUM SERPL-SCNC: 4.5 MMOL/L — SIGNIFICANT CHANGE UP (ref 3.5–5)
PROT SERPL-MCNC: 5.9 G/DL — LOW (ref 6–8)
RBC # BLD: 3.4 M/UL — LOW (ref 4.2–5.4)
RBC # FLD: 14.1 % — SIGNIFICANT CHANGE UP (ref 11.5–14.5)
SODIUM SERPL-SCNC: 143 MMOL/L — SIGNIFICANT CHANGE UP (ref 135–146)
WBC # BLD: 5.7 K/UL — SIGNIFICANT CHANGE UP (ref 4.8–10.8)
WBC # FLD AUTO: 5.7 K/UL — SIGNIFICANT CHANGE UP (ref 4.8–10.8)

## 2024-04-03 PROCEDURE — 99233 SBSQ HOSP IP/OBS HIGH 50: CPT

## 2024-04-03 RX ORDER — DIGOXIN 250 MCG
250 TABLET ORAL EVERY 8 HOURS
Refills: 0 | Status: COMPLETED | OUTPATIENT
Start: 2024-04-03 | End: 2024-04-04

## 2024-04-03 RX ORDER — DIGOXIN 250 MCG
500 TABLET ORAL ONCE
Refills: 0 | Status: COMPLETED | OUTPATIENT
Start: 2024-04-03 | End: 2024-04-03

## 2024-04-03 RX ORDER — APIXABAN 2.5 MG/1
5 TABLET, FILM COATED ORAL
Refills: 0 | Status: DISCONTINUED | OUTPATIENT
Start: 2024-04-03 | End: 2024-04-06

## 2024-04-03 RX ADMIN — Medication 12.5 MILLIGRAM(S): at 17:25

## 2024-04-03 RX ADMIN — Medication 500 MICROGRAM(S): at 11:29

## 2024-04-03 RX ADMIN — PANTOPRAZOLE SODIUM 40 MILLIGRAM(S): 20 TABLET, DELAYED RELEASE ORAL at 17:25

## 2024-04-03 RX ADMIN — Medication 40 MILLIGRAM(S): at 05:19

## 2024-04-03 RX ADMIN — APIXABAN 5 MILLIGRAM(S): 2.5 TABLET, FILM COATED ORAL at 17:25

## 2024-04-03 RX ADMIN — RISPERIDONE 0.5 MILLIGRAM(S): 4 TABLET ORAL at 11:29

## 2024-04-03 RX ADMIN — Medication 325 MILLIGRAM(S): at 11:29

## 2024-04-03 RX ADMIN — LOSARTAN POTASSIUM 50 MILLIGRAM(S): 100 TABLET, FILM COATED ORAL at 05:19

## 2024-04-03 RX ADMIN — SENNA PLUS 2 TABLET(S): 8.6 TABLET ORAL at 21:59

## 2024-04-03 RX ADMIN — Medication 250 MICROGRAM(S): at 17:25

## 2024-04-03 RX ADMIN — Medication 3 MILLIGRAM(S): at 22:04

## 2024-04-03 RX ADMIN — ENOXAPARIN SODIUM 120 MILLIGRAM(S): 100 INJECTION SUBCUTANEOUS at 05:18

## 2024-04-03 RX ADMIN — Medication 12.5 MILLIGRAM(S): at 05:19

## 2024-04-03 RX ADMIN — ATORVASTATIN CALCIUM 40 MILLIGRAM(S): 80 TABLET, FILM COATED ORAL at 21:58

## 2024-04-03 RX ADMIN — Medication 30 MILLILITER(S): at 22:05

## 2024-04-03 RX ADMIN — Medication 1 MILLIGRAM(S): at 11:28

## 2024-04-03 RX ADMIN — PANTOPRAZOLE SODIUM 40 MILLIGRAM(S): 20 TABLET, DELAYED RELEASE ORAL at 05:19

## 2024-04-03 NOTE — PROGRESS NOTE ADULT - ASSESSMENT
84 yo F PMHx schizophrenia, DM II, HTN, lower extremity edema who presented for evaluation of nausea for one day, admitted for new onset afib.     New Onset Atrial Fibrillation   Tachy-tamy Syndrome:   -yesterday had episode of tachycardia, earlier she was tamy. Episode of sinus pause 3 second  -no pauses noted today on telemetry  -troponin 32>34  -c/w Metoprolol 12.5mg BID.   -as per EP start digoxin loading  -no plan for PPM as pt is adamantly declining (despite not having capacity)  -c/w Eliquis  -TSH is low 0.06, FT4 6.7  -Sleep study outpatient, may need CPAP.     Thyroid nodule:   -TSH is low 0.06  -Recent CT chest Feb 2024 showed Stable enlarged multinodular right thyroid lobe with substernal extension measuring approximately 6.2 cm x 5.1 cm x 5.7 cm with leftward mass effect upon the trachea and esophagus, unchanged since 5/28/2020.    Anemia:   -Hb is stable,   -Start on Pantoprazole.   I-onel studies normal, check B12, Folate.     Abdominal Pain- Resolved  -CTAP neg for acute pathology, showed cholelithiasis and bibasilar atelectasis/opacities  -Low residual, lactose free diet  -Continue Maalox prn and Protonix.   -No evidence of GI bleeding.     Bipolar Disorder  -Continue Risperdal 0.5mg daily     Morbid Obesity:   -low calories diet.     DVT Prophylaxis: Lovenox therapeutic  GI Prophylaxis: PPI po    Code Status: Full   Pending: cardio follow up.   Family discussion: Team spoke with [phone] as well as son-in-law yesterday, aware of plan of digoxin loading. Will update family further in AM after loading  Deposition: from [facility]

## 2024-04-03 NOTE — PROGRESS NOTE ADULT - SUBJECTIVE AND OBJECTIVE BOX
CHIEF COMPLAINT:    Patient is a 83y old  Female who presents with a chief complaint of New onset Afib     INTERVAL HPI/OVERNIGHT EVENTS:    Patient seen and examined at bedside. No acute overnight events occurred.    ROS: Denies SOB, chest pain. Refuses to engage in conversation otherwise.     Medications:  Standing  apixaban 5 milliGRAM(s) Oral two times a day  atorvastatin 40 milliGRAM(s) Oral at bedtime  dextrose 5%. 1000 milliLiter(s) IV Continuous <Continuous>  dextrose 5%. 1000 milliLiter(s) IV Continuous <Continuous>  dextrose 50% Injectable 25 Gram(s) IV Push once  dextrose 50% Injectable 25 Gram(s) IV Push once  dextrose 50% Injectable 12.5 Gram(s) IV Push once  digoxin  Injectable 250 MICROGram(s) IV Push every 8 hours  ferrous    sulfate 325 milliGRAM(s) Oral daily  folic acid 1 milliGRAM(s) Oral daily  furosemide    Tablet 40 milliGRAM(s) Oral daily  glucagon  Injectable 1 milliGRAM(s) IntraMuscular once  influenza  Vaccine (HIGH DOSE) 0.7 milliLiter(s) IntraMuscular once  insulin lispro (ADMELOG) corrective regimen sliding scale   SubCutaneous at bedtime  insulin lispro (ADMELOG) corrective regimen sliding scale   SubCutaneous three times a day before meals  losartan 50 milliGRAM(s) Oral daily  metoprolol tartrate 12.5 milliGRAM(s) Oral two times a day  pantoprazole   Suspension 40 milliGRAM(s) Oral two times a day  risperiDONE   Solution 0.5 milliGRAM(s) Oral daily  senna 2 Tablet(s) Oral at bedtime    PRN Meds  acetaminophen     Tablet .. 650 milliGRAM(s) Oral every 6 hours PRN  aluminum hydroxide/magnesium hydroxide/simethicone Suspension 30 milliLiter(s) Oral every 4 hours PRN  dextrose Oral Gel 15 Gram(s) Oral once PRN  melatonin 3 milliGRAM(s) Oral at bedtime PRN  ondansetron Injectable 4 milliGRAM(s) IV Push every 8 hours PRN        Vital Signs:    T(F): 97.8 (24 @ 14:35), Max: 98.5 (24 @ 19:40)  HR: 75 (24 @ 14:35) (75 - 99)  BP: 136/90 (24 @ 14:35) (108/71 - 136/90)  RR: 17 (24 @ 14:35) (17 - 18)  SpO2: 95% (24 @ 08:00) (95% - 95%)  I&O's Summary    2024 07:01  -  2024 07:00  --------------------------------------------------------  IN: 450 mL / OUT: 1650 mL / NET: -1200 mL    2024 07:01  -  2024 16:56  --------------------------------------------------------  IN: 472 mL / OUT: 1100 mL / NET: -628 mL      Daily     Daily Weight in k.8 (2024 05:11)  CAPILLARY BLOOD GLUCOSE      POCT Blood Glucose.: 131 mg/dL (2024 16:44)  POCT Blood Glucose.: 127 mg/dL (2024 11:33)  POCT Blood Glucose.: 113 mg/dL (2024 07:48)  POCT Blood Glucose.: 155 mg/dL (2024 21:02)      PHYSICAL EXAM:  GENERAL:  NAD  SKIN: No rashes or lesions  HEENT: Atraumatic. Normocephalic. Anicteric  NECK:  No JVD.   PULMONARY: Clear to ausculation bilaterally. No wheezing. No rales  CVS: Normal S1, S2. Regular rate and rhythm. No murmurs.  ABDOMEN/GI: Soft, Nontender, Nondistended; Bowel sounds are present  EXTREMITIES:  No edema B/L LE.  NEUROLOGIC:  No motor deficit.  PSYCH: Alert & oriented x 3, normal affect      LABS:                        9.5    5.70  )-----------( 244      ( 2024 07:18 )             32.5     04-    143  |  103  |  28<H>  ----------------------------<  111<H>  4.5   |  33<H>  |  1.3    Ca    9.6      2024 07:18  Phos  3.6     04-03  Mg     2.0     04-03    TPro  5.9<L>  /  Alb  3.1<L>  /  TBili  0.2  /  DBili  x   /  AST  22  /  ALT  19  /  AlkPhos  85  04-03              RADIOLOGY & ADDITIONAL TESTS:  Imaging or report Personally Reviewed:  [ ] YES  [ ] NO -->no new images    Telemetry reviewed independently - afib  EKG reviewed independently -->no new EKGs    Consultant(s) Notes Reviewed:  [ ] YES  [ ] NO  Care Discussed with Consultants/Other Providers [ ] YES  [ ] NO    Case discussed with resident  Care discussed with pt

## 2024-04-03 NOTE — PROGRESS NOTE ADULT - ASSESSMENT
83-year-old female with history of schizophrenia, DM2, HTN, lower extremity edema presents for evaluation of nausea for one day, admitted for new onset afib.     A/P;   New Onset Atrial Fibrillation   Tachy-tamy Syndrome:   Episode of tachycardia, earlier she was chu. Episode of sinus pause 3 second 4/1 and 4/2..   troponin 32>34  Start on Metoprolol 12.5mg BID.   On Lovenox, can switch to Eliquis, not on BB or CCB but her HR is high this morning, can start Metoprolol  EP consult recommended Pacemaker, patient refused but patient lacks capacity, called daughter at [phone] and she said she is the legal guardian but does not want to give approval for PPM until Dr. baum speaks to patient. Patient still refusing so EP recommended digoxin loading c/w metoprolol 12.5 bid,   TSH and t4 normal  Sleep study outpatient, may need CPAP.     Thyroid nodule:   TSH is low 0.06  Recent CT chest Feb 2024 showed Stable enlarged multinodular right thyroid lobe with substernal extension measuring approximately 6.2 cm x 5.1 cm x 5.7 cm with leftward mass   effect upon the trachea and esophagus, unchanged since 5/28/2020.    Anemia:   Hb is stable,   Start on Pantoprazole.   Iron studies normal, check B12, Folate.     Abdominal Pain- Resolved  CTAP neg for acute pathology, showed cholelithiasis and bibasilar atelectasis/opacities  Low residual, lactose free diet  Continue Maalox prn and Protonix.   Monitor for any signs of GI bleeding.     Bipolar Disorder  Continue Risperdal 0.5mg daily     Morbid Obesity:   low calories diet.   DVT Prophylaxis: Lovenox therapeutic  GI Prophylaxis: PPI po    Code Status: Full   Pending: digoxin loading then dc  Family discussion:   Deposition: from [facility]

## 2024-04-03 NOTE — PROGRESS NOTE ADULT - SUBJECTIVE AND OBJECTIVE BOX
SUBJECTIVE:  HPI:  83-year-old female with history of schizophrenia, DM2, HTN, lower extremity edema presents for evaluation of nausea for one day. Patient at baseline is an unreliable historian. Per the patient she was nauseous for the last day, but never vomited. Patient states that it suddenly just came on and was associated with pain throughout her entire body. Per family members patient has a history of bipolar with psychotic episodes, frequently believing people are trying to kill her. At this time patient is feeling better, and would like to eat food.     In the ED patient was hemodynamically stable /62, HR 74-99, Afebrile. EKG was preformed which showed new onset atrial fibrillation, so patient was admitted to telemetry.     Vital Signs Last 24 Hrs  T(C): 36.2 (29 Mar 2024 07:43), Max: 36.9 (29 Mar 2024 02:35)  T(F): 97.2 (29 Mar 2024 07:43), Max: 98.4 (29 Mar 2024 02:35)  HR: 74 (29 Mar 2024 07:43) (74 - 99)  BP: 110/59 (29 Mar 2024 07:43) (110/59 - 128/62)  BP(mean): --  RR: 18 (29 Mar 2024 07:43) (18 - 18)  SpO2: 95% (29 Mar 2024 07:43) (95% - 96%)    Parameters below as of 29 Mar 2024 07:43  Patient On (Oxygen Delivery Method): room air     (29 Mar 2024 14:02)      Patient is a 83y old Female who presents with a chief complaint of New onset Afib (02 Apr 2024 15:38)    Currently admitted to medicine with the primary diagnosis of New onset a-fib       Today is hospital day 5d.     PAST MEDICAL & SURGICAL HISTORY  Diabetes mellitus    Hypertension    Schizoaffective disorder    Edema  Bilateral LE    Disorder of thyroid, unspecified  Son cannot provide details. States she had "thyroid surgery" decades ago when she was young    No significant past surgical history        ALLERGIES:  No Known Allergies    MEDICATIONS:  ACTIVE MEDICATIONS  acetaminophen     Tablet .. 650 milliGRAM(s) Oral every 6 hours PRN  aluminum hydroxide/magnesium hydroxide/simethicone Suspension 30 milliLiter(s) Oral every 4 hours PRN  apixaban 5 milliGRAM(s) Oral two times a day  atorvastatin 40 milliGRAM(s) Oral at bedtime  dextrose 5%. 1000 milliLiter(s) IV Continuous <Continuous>  dextrose 5%. 1000 milliLiter(s) IV Continuous <Continuous>  dextrose 50% Injectable 12.5 Gram(s) IV Push once  dextrose 50% Injectable 25 Gram(s) IV Push once  dextrose 50% Injectable 25 Gram(s) IV Push once  dextrose Oral Gel 15 Gram(s) Oral once PRN  digoxin  Injectable 250 MICROGram(s) IV Push every 8 hours  ferrous    sulfate 325 milliGRAM(s) Oral daily  folic acid 1 milliGRAM(s) Oral daily  furosemide    Tablet 40 milliGRAM(s) Oral daily  glucagon  Injectable 1 milliGRAM(s) IntraMuscular once  influenza  Vaccine (HIGH DOSE) 0.7 milliLiter(s) IntraMuscular once  insulin lispro (ADMELOG) corrective regimen sliding scale   SubCutaneous three times a day before meals  insulin lispro (ADMELOG) corrective regimen sliding scale   SubCutaneous at bedtime  losartan 50 milliGRAM(s) Oral daily  melatonin 3 milliGRAM(s) Oral at bedtime PRN  metoprolol tartrate 12.5 milliGRAM(s) Oral two times a day  ondansetron Injectable 4 milliGRAM(s) IV Push every 8 hours PRN  pantoprazole   Suspension 40 milliGRAM(s) Oral two times a day  risperiDONE   Solution 0.5 milliGRAM(s) Oral daily  senna 2 Tablet(s) Oral at bedtime      VITALS:   T(F): 96.8  HR: 99  BP: 114/67  RR: 18  SpO2: 95%    LABS:                        9.5    5.70  )-----------( 244      ( 03 Apr 2024 07:18 )             32.5     04-03    143  |  103  |  28<H>  ----------------------------<  111<H>  4.5   |  33<H>  |  1.3    Ca    9.6      03 Apr 2024 07:18  Phos  3.6     04-03  Mg     2.0     04-03    TPro  5.9<L>  /  Alb  3.1<L>  /  TBili  0.2  /  DBili  x   /  AST  22  /  ALT  19  /  AlkPhos  85  04-03      Urinalysis Basic - ( 03 Apr 2024 07:18 )    Color: x / Appearance: x / SG: x / pH: x  Gluc: 111 mg/dL / Ketone: x  / Bili: x / Urobili: x   Blood: x / Protein: x / Nitrite: x   Leuk Esterase: x / RBC: x / WBC x   Sq Epi: x / Non Sq Epi: x / Bacteria: x                    PHYSICAL EXAM:  GEN: No acute distress  LUNGS: Clear to auscultation bilaterally   HEART: S1/S2 present.    ABD: Soft, non-tender, non-distended.   EXT: No pedal edema  NEURO: AAOX3

## 2024-04-04 LAB
ALBUMIN SERPL ELPH-MCNC: 3.2 G/DL — LOW (ref 3.5–5.2)
ALP SERPL-CCNC: 89 U/L — SIGNIFICANT CHANGE UP (ref 30–115)
ALT FLD-CCNC: 27 U/L — SIGNIFICANT CHANGE UP (ref 0–41)
ANION GAP SERPL CALC-SCNC: 8 MMOL/L — SIGNIFICANT CHANGE UP (ref 7–14)
AST SERPL-CCNC: 28 U/L — SIGNIFICANT CHANGE UP (ref 0–41)
BASOPHILS # BLD AUTO: 0.04 K/UL — SIGNIFICANT CHANGE UP (ref 0–0.2)
BASOPHILS NFR BLD AUTO: 0.5 % — SIGNIFICANT CHANGE UP (ref 0–1)
BILIRUB SERPL-MCNC: 0.3 MG/DL — SIGNIFICANT CHANGE UP (ref 0.2–1.2)
BUN SERPL-MCNC: 30 MG/DL — HIGH (ref 10–20)
CALCIUM SERPL-MCNC: 10 MG/DL — SIGNIFICANT CHANGE UP (ref 8.4–10.5)
CHLORIDE SERPL-SCNC: 104 MMOL/L — SIGNIFICANT CHANGE UP (ref 98–110)
CO2 SERPL-SCNC: 33 MMOL/L — HIGH (ref 17–32)
CREAT SERPL-MCNC: 1.3 MG/DL — SIGNIFICANT CHANGE UP (ref 0.7–1.5)
DIGOXIN SERPL-MCNC: 2.5 NG/ML — HIGH (ref 0.8–2)
EGFR: 40 ML/MIN/1.73M2 — LOW
EOSINOPHIL # BLD AUTO: 0.11 K/UL — SIGNIFICANT CHANGE UP (ref 0–0.7)
EOSINOPHIL NFR BLD AUTO: 1.4 % — SIGNIFICANT CHANGE UP (ref 0–8)
GLUCOSE BLDC GLUCOMTR-MCNC: 126 MG/DL — HIGH (ref 70–99)
GLUCOSE BLDC GLUCOMTR-MCNC: 135 MG/DL — HIGH (ref 70–99)
GLUCOSE BLDC GLUCOMTR-MCNC: 139 MG/DL — HIGH (ref 70–99)
GLUCOSE BLDC GLUCOMTR-MCNC: 143 MG/DL — HIGH (ref 70–99)
GLUCOSE SERPL-MCNC: 121 MG/DL — HIGH (ref 70–99)
HCT VFR BLD CALC: 34.1 % — LOW (ref 37–47)
HGB BLD-MCNC: 10.2 G/DL — LOW (ref 12–16)
IMM GRANULOCYTES NFR BLD AUTO: 0.3 % — SIGNIFICANT CHANGE UP (ref 0.1–0.3)
LYMPHOCYTES # BLD AUTO: 0.74 K/UL — LOW (ref 1.2–3.4)
LYMPHOCYTES # BLD AUTO: 9.5 % — LOW (ref 20.5–51.1)
MAGNESIUM SERPL-MCNC: 2 MG/DL — SIGNIFICANT CHANGE UP (ref 1.8–2.4)
MCHC RBC-ENTMCNC: 28.3 PG — SIGNIFICANT CHANGE UP (ref 27–31)
MCHC RBC-ENTMCNC: 29.9 G/DL — LOW (ref 32–37)
MCV RBC AUTO: 94.5 FL — SIGNIFICANT CHANGE UP (ref 81–99)
MONOCYTES # BLD AUTO: 0.51 K/UL — SIGNIFICANT CHANGE UP (ref 0.1–0.6)
MONOCYTES NFR BLD AUTO: 6.5 % — SIGNIFICANT CHANGE UP (ref 1.7–9.3)
NEUTROPHILS # BLD AUTO: 6.38 K/UL — SIGNIFICANT CHANGE UP (ref 1.4–6.5)
NEUTROPHILS NFR BLD AUTO: 81.8 % — HIGH (ref 42.2–75.2)
NRBC # BLD: 0 /100 WBCS — SIGNIFICANT CHANGE UP (ref 0–0)
PHOSPHATE SERPL-MCNC: 3.7 MG/DL — SIGNIFICANT CHANGE UP (ref 2.1–4.9)
PLATELET # BLD AUTO: 246 K/UL — SIGNIFICANT CHANGE UP (ref 130–400)
PMV BLD: 10.3 FL — SIGNIFICANT CHANGE UP (ref 7.4–10.4)
POTASSIUM SERPL-MCNC: 4.8 MMOL/L — SIGNIFICANT CHANGE UP (ref 3.5–5)
POTASSIUM SERPL-SCNC: 4.8 MMOL/L — SIGNIFICANT CHANGE UP (ref 3.5–5)
PROT SERPL-MCNC: 6.2 G/DL — SIGNIFICANT CHANGE UP (ref 6–8)
RBC # BLD: 3.61 M/UL — LOW (ref 4.2–5.4)
RBC # FLD: 14.1 % — SIGNIFICANT CHANGE UP (ref 11.5–14.5)
SODIUM SERPL-SCNC: 145 MMOL/L — SIGNIFICANT CHANGE UP (ref 135–146)
WBC # BLD: 7.8 K/UL — SIGNIFICANT CHANGE UP (ref 4.8–10.8)
WBC # FLD AUTO: 7.8 K/UL — SIGNIFICANT CHANGE UP (ref 4.8–10.8)

## 2024-04-04 PROCEDURE — 99233 SBSQ HOSP IP/OBS HIGH 50: CPT

## 2024-04-04 RX ORDER — MAGNESIUM HYDROXIDE 400 MG/1
30 TABLET, CHEWABLE ORAL DAILY
Refills: 0 | Status: DISCONTINUED | OUTPATIENT
Start: 2024-04-04 | End: 2024-04-06

## 2024-04-04 RX ORDER — CHLORHEXIDINE GLUCONATE 213 G/1000ML
1 SOLUTION TOPICAL
Refills: 0 | Status: DISCONTINUED | OUTPATIENT
Start: 2024-04-04 | End: 2024-04-06

## 2024-04-04 RX ORDER — METOPROLOL TARTRATE 50 MG
25 TABLET ORAL DAILY
Refills: 0 | Status: DISCONTINUED | OUTPATIENT
Start: 2024-04-04 | End: 2024-04-06

## 2024-04-04 RX ORDER — DIGOXIN 250 MCG
125 TABLET ORAL DAILY
Refills: 0 | Status: DISCONTINUED | OUTPATIENT
Start: 2024-04-04 | End: 2024-04-05

## 2024-04-04 RX ORDER — CHLORHEXIDINE GLUCONATE 213 G/1000ML
1 SOLUTION TOPICAL DAILY
Refills: 0 | Status: DISCONTINUED | OUTPATIENT
Start: 2024-04-04 | End: 2024-04-06

## 2024-04-04 RX ORDER — NYSTATIN CREAM 100000 [USP'U]/G
1 CREAM TOPICAL THREE TIMES A DAY
Refills: 0 | Status: DISCONTINUED | OUTPATIENT
Start: 2024-04-04 | End: 2024-04-06

## 2024-04-04 RX ADMIN — Medication 1 MILLIGRAM(S): at 11:48

## 2024-04-04 RX ADMIN — NYSTATIN CREAM 1 APPLICATION(S): 100000 CREAM TOPICAL at 22:10

## 2024-04-04 RX ADMIN — Medication 125 MICROGRAM(S): at 11:48

## 2024-04-04 RX ADMIN — APIXABAN 5 MILLIGRAM(S): 2.5 TABLET, FILM COATED ORAL at 06:44

## 2024-04-04 RX ADMIN — Medication 3 MILLIGRAM(S): at 22:09

## 2024-04-04 RX ADMIN — PANTOPRAZOLE SODIUM 40 MILLIGRAM(S): 20 TABLET, DELAYED RELEASE ORAL at 06:50

## 2024-04-04 RX ADMIN — RISPERIDONE 0.5 MILLIGRAM(S): 4 TABLET ORAL at 11:49

## 2024-04-04 RX ADMIN — Medication 12.5 MILLIGRAM(S): at 06:45

## 2024-04-04 RX ADMIN — CHLORHEXIDINE GLUCONATE 1 APPLICATION(S): 213 SOLUTION TOPICAL at 11:50

## 2024-04-04 RX ADMIN — Medication 250 MICROGRAM(S): at 00:52

## 2024-04-04 RX ADMIN — Medication 40 MILLIGRAM(S): at 06:44

## 2024-04-04 RX ADMIN — SENNA PLUS 2 TABLET(S): 8.6 TABLET ORAL at 22:09

## 2024-04-04 RX ADMIN — Medication 325 MILLIGRAM(S): at 11:48

## 2024-04-04 RX ADMIN — NYSTATIN CREAM 1 APPLICATION(S): 100000 CREAM TOPICAL at 18:06

## 2024-04-04 RX ADMIN — APIXABAN 5 MILLIGRAM(S): 2.5 TABLET, FILM COATED ORAL at 17:27

## 2024-04-04 RX ADMIN — PANTOPRAZOLE SODIUM 40 MILLIGRAM(S): 20 TABLET, DELAYED RELEASE ORAL at 17:26

## 2024-04-04 RX ADMIN — Medication 30 MILLILITER(S): at 11:49

## 2024-04-04 RX ADMIN — LOSARTAN POTASSIUM 50 MILLIGRAM(S): 100 TABLET, FILM COATED ORAL at 06:44

## 2024-04-04 RX ADMIN — ATORVASTATIN CALCIUM 40 MILLIGRAM(S): 80 TABLET, FILM COATED ORAL at 22:09

## 2024-04-04 NOTE — PROGRESS NOTE ADULT - SUBJECTIVE AND OBJECTIVE BOX
CHIEF COMPLAINT:    Patient is a 83y old  Female who presents with a chief complaint of New onset Afib    INTERVAL HPI/OVERNIGHT EVENTS:    Patient seen and examined at bedside. No acute overnight events occurred.    ROS: Reports "queasy stomach". All other systems are negative.    Medications:  Standing  apixaban 5 milliGRAM(s) Oral two times a day  atorvastatin 40 milliGRAM(s) Oral at bedtime  chlorhexidine 2% Cloths 1 Application(s) Topical <User Schedule>  chlorhexidine 2% Cloths 1 Application(s) Topical daily  dextrose 5%. 1000 milliLiter(s) IV Continuous <Continuous>  dextrose 5%. 1000 milliLiter(s) IV Continuous <Continuous>  dextrose 50% Injectable 25 Gram(s) IV Push once  dextrose 50% Injectable 25 Gram(s) IV Push once  dextrose 50% Injectable 12.5 Gram(s) IV Push once  digoxin     Tablet 125 MICROGram(s) Oral daily  ferrous    sulfate 325 milliGRAM(s) Oral daily  folic acid 1 milliGRAM(s) Oral daily  furosemide    Tablet 40 milliGRAM(s) Oral daily  glucagon  Injectable 1 milliGRAM(s) IntraMuscular once  influenza  Vaccine (HIGH DOSE) 0.7 milliLiter(s) IntraMuscular once  insulin lispro (ADMELOG) corrective regimen sliding scale   SubCutaneous at bedtime  insulin lispro (ADMELOG) corrective regimen sliding scale   SubCutaneous three times a day before meals  losartan 50 milliGRAM(s) Oral daily  metoprolol succinate ER 25 milliGRAM(s) Oral daily  nystatin Powder 1 Application(s) Topical three times a day  pantoprazole   Suspension 40 milliGRAM(s) Oral two times a day  risperiDONE   Solution 0.5 milliGRAM(s) Oral daily  senna 2 Tablet(s) Oral at bedtime    PRN Meds  acetaminophen     Tablet .. 650 milliGRAM(s) Oral every 6 hours PRN  aluminum hydroxide/magnesium hydroxide/simethicone Suspension 30 milliLiter(s) Oral every 4 hours PRN  dextrose Oral Gel 15 Gram(s) Oral once PRN  magnesium hydroxide Suspension 30 milliLiter(s) Oral daily PRN  melatonin 3 milliGRAM(s) Oral at bedtime PRN  ondansetron Injectable 4 milliGRAM(s) IV Push every 8 hours PRN        Vital Signs:    T(F): 96.9 (24 @ 13:02), Max: 99.1 (24 @ 20:05)  HR: 91 (24 @ 13:02) (67 - 91)  BP: 127/60 (24 @ 13:02) (111/56 - 132/77)  RR: 18 (24 @ 13:02) (17 - 18)  SpO2: 95% (24 @ 08:45) (87% - 95%)  I&O's Summary    2024 07:01  -  2024 07:00  --------------------------------------------------------  IN: 622 mL / OUT: 1800 mL / NET: -1178 mL    2024 07:01  -  2024 16:08  --------------------------------------------------------  IN: 526 mL / OUT: 1100 mL / NET: -574 mL      Daily     Daily Weight in k.4 (2024 05:18)  CAPILLARY BLOOD GLUCOSE      POCT Blood Glucose.: 139 mg/dL (2024 11:25)  POCT Blood Glucose.: 126 mg/dL (2024 07:35)  POCT Blood Glucose.: 160 mg/dL (2024 21:34)  POCT Blood Glucose.: 131 mg/dL (2024 16:44)      PHYSICAL EXAM:  GENERAL:  NAD  SKIN: No rashes or lesions  HEENT: Atraumatic. Normocephalic. Anicteric  NECK:  No JVD.   PULMONARY: Clear to ausculation bilaterally. No wheezing. No rales  CVS: Normal S1, S2. Regular rate and rhythm. No murmurs.  ABDOMEN/GI: Soft, Nontender, Nondistended; Bowel sounds are present  EXTREMITIES:  No edema B/L LE.  NEUROLOGIC:  No motor deficit.  PSYCH: Alert & oriented x 3, normal affect      LABS:                        10.2   7.80  )-----------( 246      ( 2024 05:23 )             34.1     04-    145  |  104  |  30<H>  ----------------------------<  121<H>  4.8   |  33<H>  |  1.3    Ca    10.0      2024 05:23  Phos  3.7     -  Mg     2.0         TPro  6.2  /  Alb  3.2<L>  /  TBili  0.3  /  DBili  x   /  AST  28  /  ALT  27  /  AlkPhos  89                RADIOLOGY & ADDITIONAL TESTS:  Imaging or report Personally Reviewed:  [ ] YES  [ ] NO -->no new images    Telemetry reviewed independently - rate controlled afib, frequent pauses <3 seconds  EKG reviewed independently -->no new EKGs    Consultant(s) Notes Reviewed:  x[ ] YES  [ ] NO  Care Discussed with Consultants/Other Providers [ ] YES  [ ] NO    Case discussed with resident  Care discussed with pt

## 2024-04-04 NOTE — PROGRESS NOTE ADULT - SUBJECTIVE AND OBJECTIVE BOX
INTERVAL HPI/OVERNIGHT EVENTS:  in AF rate controlled  received Dig load 500+250+250    MEDICATIONS  (STANDING):  apixaban 5 milliGRAM(s) Oral two times a day  atorvastatin 40 milliGRAM(s) Oral at bedtime  chlorhexidine 2% Cloths 1 Application(s) Topical daily  chlorhexidine 2% Cloths 1 Application(s) Topical <User Schedule>  dextrose 5%. 1000 milliLiter(s) (50 mL/Hr) IV Continuous <Continuous>  dextrose 5%. 1000 milliLiter(s) (100 mL/Hr) IV Continuous <Continuous>  dextrose 50% Injectable 12.5 Gram(s) IV Push once  dextrose 50% Injectable 25 Gram(s) IV Push once  dextrose 50% Injectable 25 Gram(s) IV Push once  digoxin     Tablet 125 MICROGram(s) Oral daily  ferrous    sulfate 325 milliGRAM(s) Oral daily  folic acid 1 milliGRAM(s) Oral daily  furosemide    Tablet 40 milliGRAM(s) Oral daily  glucagon  Injectable 1 milliGRAM(s) IntraMuscular once  influenza  Vaccine (HIGH DOSE) 0.7 milliLiter(s) IntraMuscular once  insulin lispro (ADMELOG) corrective regimen sliding scale   SubCutaneous at bedtime  insulin lispro (ADMELOG) corrective regimen sliding scale   SubCutaneous three times a day before meals  losartan 50 milliGRAM(s) Oral daily  metoprolol tartrate 12.5 milliGRAM(s) Oral two times a day  pantoprazole   Suspension 40 milliGRAM(s) Oral two times a day  risperiDONE   Solution 0.5 milliGRAM(s) Oral daily  senna 2 Tablet(s) Oral at bedtime    MEDICATIONS  (PRN):  acetaminophen     Tablet .. 650 milliGRAM(s) Oral every 6 hours PRN Temp greater or equal to 38C (100.4F), Mild Pain (1 - 3)  aluminum hydroxide/magnesium hydroxide/simethicone Suspension 30 milliLiter(s) Oral every 4 hours PRN Dyspepsia  dextrose Oral Gel 15 Gram(s) Oral once PRN Blood Glucose LESS THAN 70 milliGRAM(s)/deciliter  magnesium hydroxide Suspension 30 milliLiter(s) Oral daily PRN Constipation  melatonin 3 milliGRAM(s) Oral at bedtime PRN Insomnia  ondansetron Injectable 4 milliGRAM(s) IV Push every 8 hours PRN Nausea and/or Vomiting      Allergies    No Known Allergies    Intolerances        REVIEW OF SYSTEMS    [x ] A ten-point review of systems was otherwise negative except as noted.  [ ] Due to altered mental status/intubation, subjective information were not able to be obtained from the patient. History was obtained, to the extent possible, from review of the chart and collateral sources of information.      Vital Signs Last 24 Hrs  T(C): 36.1 (04 Apr 2024 13:02), Max: 37.3 (03 Apr 2024 20:05)  T(F): 96.9 (04 Apr 2024 13:02), Max: 99.1 (03 Apr 2024 20:05)  HR: 91 (04 Apr 2024 13:02) (67 - 91)  BP: 127/60 (04 Apr 2024 13:02) (111/56 - 136/90)  BP(mean): --  RR: 18 (04 Apr 2024 13:02) (17 - 18)  SpO2: 95% (04 Apr 2024 08:45) (87% - 95%)    Parameters below as of 04 Apr 2024 08:45  Patient On (Oxygen Delivery Method): nasal cannula  O2 Flow (L/min): 2      04-03-24 @ 07:01  -  04-04-24 @ 07:00  --------------------------------------------------------  IN: 622 mL / OUT: 1800 mL / NET: -1178 mL    04-04-24 @ 07:01  -  04-04-24 @ 13:04  --------------------------------------------------------  IN: 526 mL / OUT: 1100 mL / NET: -574 mL        PHYSICAL EXAM:    GENERAL: In no apparent distress, well nourished, and hydrated.  HEART: IRRegular rate and rhythm; No murmur; NO rubs, or gallops.  PULMONARY: Clear to auscultation and percussion.  Normal expansion/effort. No rales, wheezing, or rhonchi bilaterally.  ABDOMEN: Soft, Nontender, Nondistended; Bowel sounds present  EXTREMITIES:  Extremities warm, pink, well-perfused, 2+ Peripheral Pulses, No clubbing, cyanosis, or edema  NEUROLOGICAL: alert & oriented x 3, no focal deficits, PERRVIJAYA ORTIZMI    LABS:                        10.2   7.80  )-----------( 246      ( 04 Apr 2024 05:23 )             34.1     04-04    145  |  104  |  30<H>  ----------------------------<  121<H>  4.8   |  33<H>  |  1.3    Ca    10.0      04 Apr 2024 05:23  Phos  3.7     04-04  Mg     2.0     04-04    TPro  6.2  /  Alb  3.2<L>  /  TBili  0.3  /  DBili  x   /  AST  28  /  ALT  27  /  AlkPhos  89  04-04      Free Thyroxine, Serum: 1.4 ng/dL (04.02.24 @ 14:25)    Thyroid Stimulating Hormone, Serum: 1.22 uIU/mL (04.02.24 @ 15:39)              RADIOLOGY & ADDITIONAL TESTS:       INTERVAL HPI/OVERNIGHT EVENTS:  in AF rate controlled  received Dig load 500+250+250    MEDICATIONS  (STANDING):  apixaban 5 milliGRAM(s) Oral two times a day  atorvastatin 40 milliGRAM(s) Oral at bedtime  chlorhexidine 2% Cloths 1 Application(s) Topical daily  chlorhexidine 2% Cloths 1 Application(s) Topical <User Schedule>  dextrose 5%. 1000 milliLiter(s) (50 mL/Hr) IV Continuous <Continuous>  dextrose 5%. 1000 milliLiter(s) (100 mL/Hr) IV Continuous <Continuous>  dextrose 50% Injectable 12.5 Gram(s) IV Push once  dextrose 50% Injectable 25 Gram(s) IV Push once  dextrose 50% Injectable 25 Gram(s) IV Push once  digoxin     Tablet 125 MICROGram(s) Oral daily  ferrous    sulfate 325 milliGRAM(s) Oral daily  folic acid 1 milliGRAM(s) Oral daily  furosemide    Tablet 40 milliGRAM(s) Oral daily  glucagon  Injectable 1 milliGRAM(s) IntraMuscular once  influenza  Vaccine (HIGH DOSE) 0.7 milliLiter(s) IntraMuscular once  insulin lispro (ADMELOG) corrective regimen sliding scale   SubCutaneous at bedtime  insulin lispro (ADMELOG) corrective regimen sliding scale   SubCutaneous three times a day before meals  losartan 50 milliGRAM(s) Oral daily  metoprolol tartrate 12.5 milliGRAM(s) Oral two times a day  pantoprazole   Suspension 40 milliGRAM(s) Oral two times a day  risperiDONE   Solution 0.5 milliGRAM(s) Oral daily  senna 2 Tablet(s) Oral at bedtime    MEDICATIONS  (PRN):  acetaminophen     Tablet .. 650 milliGRAM(s) Oral every 6 hours PRN Temp greater or equal to 38C (100.4F), Mild Pain (1 - 3)  aluminum hydroxide/magnesium hydroxide/simethicone Suspension 30 milliLiter(s) Oral every 4 hours PRN Dyspepsia  dextrose Oral Gel 15 Gram(s) Oral once PRN Blood Glucose LESS THAN 70 milliGRAM(s)/deciliter  magnesium hydroxide Suspension 30 milliLiter(s) Oral daily PRN Constipation  melatonin 3 milliGRAM(s) Oral at bedtime PRN Insomnia  ondansetron Injectable 4 milliGRAM(s) IV Push every 8 hours PRN Nausea and/or Vomiting      Allergies    No Known Allergies    Intolerances        REVIEW OF SYSTEMS    [x ] A ten-point review of systems was otherwise negative except as noted.  [ ] Due to altered mental status/intubation, subjective information were not able to be obtained from the patient. History was obtained, to the extent possible, from review of the chart and collateral sources of information.      Vital Signs Last 24 Hrs  T(C): 36.1 (04 Apr 2024 13:02), Max: 37.3 (03 Apr 2024 20:05)  T(F): 96.9 (04 Apr 2024 13:02), Max: 99.1 (03 Apr 2024 20:05)  HR: 91 (04 Apr 2024 13:02) (67 - 91)  BP: 127/60 (04 Apr 2024 13:02) (111/56 - 136/90)  BP(mean): --  RR: 18 (04 Apr 2024 13:02) (17 - 18)  SpO2: 95% (04 Apr 2024 08:45) (87% - 95%)    Parameters below as of 04 Apr 2024 08:45  Patient On (Oxygen Delivery Method): nasal cannula  O2 Flow (L/min): 2      04-03-24 @ 07:01  -  04-04-24 @ 07:00  --------------------------------------------------------  IN: 622 mL / OUT: 1800 mL / NET: -1178 mL    04-04-24 @ 07:01  -  04-04-24 @ 13:04  --------------------------------------------------------  IN: 526 mL / OUT: 1100 mL / NET: -574 mL        PHYSICAL EXAM:    GENERAL: In no apparent distress, well nourished, and hydrated. morbidly obese  HEART: IRRegular rate and rhythm; No murmur; NO rubs, or gallops.  PULMONARY: Clear to auscultation and percussion.  Normal expansion/effort. No rales, wheezing, or rhonchi bilaterally.  ABDOMEN: Soft, Nontender, Nondistended; Bowel sounds present  EXTREMITIES:  Extremities warm, pink, well-perfused, 2+ Peripheral Pulses, No clubbing, cyanosis, or edema  NEUROLOGICAL: alert & oriented x 3, no focal deficits, PERRLA, EOMI    LABS:                        10.2   7.80  )-----------( 246      ( 04 Apr 2024 05:23 )             34.1     04-04    145  |  104  |  30<H>  ----------------------------<  121<H>  4.8   |  33<H>  |  1.3    Ca    10.0      04 Apr 2024 05:23  Phos  3.7     04-04  Mg     2.0     04-04    TPro  6.2  /  Alb  3.2<L>  /  TBili  0.3  /  DBili  x   /  AST  28  /  ALT  27  /  AlkPhos  89  04-04      Free Thyroxine, Serum: 1.4 ng/dL (04.02.24 @ 14:25)    Thyroid Stimulating Hormone, Serum: 1.22 uIU/mL (04.02.24 @ 15:39)              RADIOLOGY & ADDITIONAL TESTS:

## 2024-04-04 NOTE — PROGRESS NOTE ADULT - ASSESSMENT
Cards Dr Razo    82 yo F w HTN, DM, morbid obesity, chronic LE edema, thyroid mass, medically managed, schizophrenia/bipolar, now with new onset AF, SB, and pauses on telemetry  refuses PPM at this time  TSH 0.06    SSS, tachy- tamy  PAF  pauses  [id number] Score is 5  HTN  DM      con't tele  rate controlled  check Digoxin level tomorrow drawn BEFORE 6AM dose, do not hold dose for results, can adjust for next dose as needed  patient is recommended PPM placement for SSS/tachy-tamy  patient refused despite same recommendation from primary cardiologist  con't Metoprolol 12.5mg bid,   con't current management  Maintain electrolytes K>4.0 Mg >2.0

## 2024-04-04 NOTE — PROGRESS NOTE ADULT - NS ATTEND AMEND GEN_ALL_CORE FT
Can change BB to Metoprolol XL daily   Cont with Dig. Pt is better rate controlled  Please check Dig level  Follow up with cardiology

## 2024-04-04 NOTE — PROGRESS NOTE ADULT - ASSESSMENT
82 yo F PMHx schizophrenia, DM II, HTN, lower extremity edema who presented for evaluation of nausea for one day, admitted for new onset afib.     New Onset Atrial Fibrillation   Tachy-tamy Syndrome:   -yesterday had episode of tachycardia, earlier she was tamy. Episode of sinus pause 3 second  -no pauses noted today on telemetry  -troponin 32>34  -as per EP start digoxin on 4/3  - c/w PO digoxin, metoprolol changed to long acting  -no plan for PPM as pt is adamantly declining (despite not having capacity)  -c/w Eliquis  -TSH is low 0.06, FT4 6.7  -Sleep study outpatient, may need CPAP.     Thyroid nodule:   -TSH is low 0.06  -Recent CT chest Feb 2024 showed Stable enlarged multinodular right thyroid lobe with substernal extension measuring approximately 6.2 cm x 5.1 cm x 5.7 cm with leftward mass effect upon the trachea and esophagus, unchanged since 5/28/2020.    Anemia:   -Hb is stable,   -Start on Pantoprazole.   -Iron studies normal, check B12, Folate.     Abdominal Pain- Resolved  -CTAP neg for acute pathology, showed cholelithiasis and bibasilar atelectasis/opacities  -Low residual, lactose free diet  -Continue Maalox prn and Protonix.   -No evidence of GI bleeding.     Bipolar Disorder  -Continue Risperdal 0.5mg daily     Morbid Obesity:   -low calories diet.     DVT Prophylaxis: Lovenox therapeutic  GI Prophylaxis: PPI po    Code Status: Full   Pending: cardio follow up.   Family discussion: I spoke with daughter Shaneka Razo [phone] who is agreeable with plan as above.Deposition: from [facility]

## 2024-04-05 ENCOUNTER — TRANSCRIPTION ENCOUNTER (OUTPATIENT)
Age: 84
End: 2024-04-05

## 2024-04-05 LAB
ALBUMIN SERPL ELPH-MCNC: 3.1 G/DL — LOW (ref 3.5–5.2)
ALP SERPL-CCNC: 83 U/L — SIGNIFICANT CHANGE UP (ref 30–115)
ALT FLD-CCNC: 32 U/L — SIGNIFICANT CHANGE UP (ref 0–41)
ANION GAP SERPL CALC-SCNC: 13 MMOL/L — SIGNIFICANT CHANGE UP (ref 7–14)
AST SERPL-CCNC: 33 U/L — SIGNIFICANT CHANGE UP (ref 0–41)
BASOPHILS # BLD AUTO: 0.03 K/UL — SIGNIFICANT CHANGE UP (ref 0–0.2)
BASOPHILS NFR BLD AUTO: 0.7 % — SIGNIFICANT CHANGE UP (ref 0–1)
BILIRUB SERPL-MCNC: 0.2 MG/DL — SIGNIFICANT CHANGE UP (ref 0.2–1.2)
BUN SERPL-MCNC: 33 MG/DL — HIGH (ref 10–20)
CALCIUM SERPL-MCNC: 10 MG/DL — SIGNIFICANT CHANGE UP (ref 8.4–10.5)
CHLORIDE SERPL-SCNC: 100 MMOL/L — SIGNIFICANT CHANGE UP (ref 98–110)
CO2 SERPL-SCNC: 29 MMOL/L — SIGNIFICANT CHANGE UP (ref 17–32)
CREAT SERPL-MCNC: 1.4 MG/DL — SIGNIFICANT CHANGE UP (ref 0.7–1.5)
DIGOXIN SERPL-MCNC: 2.1 NG/ML — HIGH (ref 0.8–2)
EGFR: 37 ML/MIN/1.73M2 — LOW
EOSINOPHIL # BLD AUTO: 0.07 K/UL — SIGNIFICANT CHANGE UP (ref 0–0.7)
EOSINOPHIL NFR BLD AUTO: 1.7 % — SIGNIFICANT CHANGE UP (ref 0–8)
GLUCOSE BLDC GLUCOMTR-MCNC: 114 MG/DL — HIGH (ref 70–99)
GLUCOSE BLDC GLUCOMTR-MCNC: 128 MG/DL — HIGH (ref 70–99)
GLUCOSE BLDC GLUCOMTR-MCNC: 157 MG/DL — HIGH (ref 70–99)
GLUCOSE BLDC GLUCOMTR-MCNC: 157 MG/DL — HIGH (ref 70–99)
GLUCOSE SERPL-MCNC: 108 MG/DL — HIGH (ref 70–99)
HCT VFR BLD CALC: 36.3 % — LOW (ref 37–47)
HGB BLD-MCNC: 10.5 G/DL — LOW (ref 12–16)
IMM GRANULOCYTES NFR BLD AUTO: 0.2 % — SIGNIFICANT CHANGE UP (ref 0.1–0.3)
LYMPHOCYTES # BLD AUTO: 0.72 K/UL — LOW (ref 1.2–3.4)
LYMPHOCYTES # BLD AUTO: 17.3 % — LOW (ref 20.5–51.1)
MAGNESIUM SERPL-MCNC: 2.1 MG/DL — SIGNIFICANT CHANGE UP (ref 1.8–2.4)
MCHC RBC-ENTMCNC: 28.2 PG — SIGNIFICANT CHANGE UP (ref 27–31)
MCHC RBC-ENTMCNC: 28.9 G/DL — LOW (ref 32–37)
MCV RBC AUTO: 97.6 FL — SIGNIFICANT CHANGE UP (ref 81–99)
MONOCYTES # BLD AUTO: 0.62 K/UL — HIGH (ref 0.1–0.6)
MONOCYTES NFR BLD AUTO: 14.9 % — HIGH (ref 1.7–9.3)
NEUTROPHILS # BLD AUTO: 2.7 K/UL — SIGNIFICANT CHANGE UP (ref 1.4–6.5)
NEUTROPHILS NFR BLD AUTO: 65.2 % — SIGNIFICANT CHANGE UP (ref 42.2–75.2)
NRBC # BLD: 0 /100 WBCS — SIGNIFICANT CHANGE UP (ref 0–0)
PHOSPHATE SERPL-MCNC: 4 MG/DL — SIGNIFICANT CHANGE UP (ref 2.1–4.9)
PLATELET # BLD AUTO: 216 K/UL — SIGNIFICANT CHANGE UP (ref 130–400)
PMV BLD: 10.7 FL — HIGH (ref 7.4–10.4)
POTASSIUM SERPL-MCNC: 4.8 MMOL/L — SIGNIFICANT CHANGE UP (ref 3.5–5)
POTASSIUM SERPL-SCNC: 4.8 MMOL/L — SIGNIFICANT CHANGE UP (ref 3.5–5)
PROT SERPL-MCNC: 5.9 G/DL — LOW (ref 6–8)
RBC # BLD: 3.72 M/UL — LOW (ref 4.2–5.4)
RBC # FLD: 14.4 % — SIGNIFICANT CHANGE UP (ref 11.5–14.5)
SODIUM SERPL-SCNC: 142 MMOL/L — SIGNIFICANT CHANGE UP (ref 135–146)
WBC # BLD: 4.15 K/UL — LOW (ref 4.8–10.8)
WBC # FLD AUTO: 4.15 K/UL — LOW (ref 4.8–10.8)

## 2024-04-05 PROCEDURE — 99233 SBSQ HOSP IP/OBS HIGH 50: CPT

## 2024-04-05 PROCEDURE — 99239 HOSP IP/OBS DSCHRG MGMT >30: CPT

## 2024-04-05 RX ORDER — DIGOXIN 250 MCG
1 TABLET ORAL
Qty: 15 | Refills: 0
Start: 2024-04-05 | End: 2024-05-04

## 2024-04-05 RX ORDER — SENNA PLUS 8.6 MG/1
2 TABLET ORAL
Refills: 0 | DISCHARGE

## 2024-04-05 RX ORDER — ASPIRIN/CALCIUM CARB/MAGNESIUM 324 MG
1 TABLET ORAL
Qty: 0 | Refills: 0 | DISCHARGE

## 2024-04-05 RX ORDER — METOPROLOL TARTRATE 50 MG
1 TABLET ORAL
Qty: 0 | Refills: 0 | DISCHARGE
Start: 2024-04-05

## 2024-04-05 RX ORDER — SENNA PLUS 8.6 MG/1
2 TABLET ORAL
Qty: 0 | Refills: 0 | DISCHARGE
Start: 2024-04-05

## 2024-04-05 RX ORDER — APIXABAN 2.5 MG/1
1 TABLET, FILM COATED ORAL
Qty: 0 | Refills: 0 | DISCHARGE
Start: 2024-04-05

## 2024-04-05 RX ADMIN — NYSTATIN CREAM 1 APPLICATION(S): 100000 CREAM TOPICAL at 22:10

## 2024-04-05 RX ADMIN — PANTOPRAZOLE SODIUM 40 MILLIGRAM(S): 20 TABLET, DELAYED RELEASE ORAL at 06:38

## 2024-04-05 RX ADMIN — APIXABAN 5 MILLIGRAM(S): 2.5 TABLET, FILM COATED ORAL at 18:28

## 2024-04-05 RX ADMIN — CHLORHEXIDINE GLUCONATE 1 APPLICATION(S): 213 SOLUTION TOPICAL at 12:45

## 2024-04-05 RX ADMIN — LOSARTAN POTASSIUM 50 MILLIGRAM(S): 100 TABLET, FILM COATED ORAL at 06:38

## 2024-04-05 RX ADMIN — PANTOPRAZOLE SODIUM 40 MILLIGRAM(S): 20 TABLET, DELAYED RELEASE ORAL at 18:28

## 2024-04-05 RX ADMIN — Medication 1: at 12:43

## 2024-04-05 RX ADMIN — ATORVASTATIN CALCIUM 40 MILLIGRAM(S): 80 TABLET, FILM COATED ORAL at 22:08

## 2024-04-05 RX ADMIN — CHLORHEXIDINE GLUCONATE 1 APPLICATION(S): 213 SOLUTION TOPICAL at 12:41

## 2024-04-05 RX ADMIN — Medication 325 MILLIGRAM(S): at 12:31

## 2024-04-05 RX ADMIN — APIXABAN 5 MILLIGRAM(S): 2.5 TABLET, FILM COATED ORAL at 06:39

## 2024-04-05 RX ADMIN — NYSTATIN CREAM 1 APPLICATION(S): 100000 CREAM TOPICAL at 15:14

## 2024-04-05 RX ADMIN — NYSTATIN CREAM 1 APPLICATION(S): 100000 CREAM TOPICAL at 06:38

## 2024-04-05 RX ADMIN — Medication 40 MILLIGRAM(S): at 06:39

## 2024-04-05 RX ADMIN — Medication 1 MILLIGRAM(S): at 12:31

## 2024-04-05 RX ADMIN — RISPERIDONE 0.5 MILLIGRAM(S): 4 TABLET ORAL at 12:31

## 2024-04-05 RX ADMIN — Medication 125 MICROGRAM(S): at 06:39

## 2024-04-05 RX ADMIN — Medication 25 MILLIGRAM(S): at 06:38

## 2024-04-05 NOTE — PROGRESS NOTE ADULT - TIME BILLING
AF
speaking to the patient, physical exam, review labs, tele, acute care.
AF, tachy tamy
AF, tachy/tamy

## 2024-04-05 NOTE — PROGRESS NOTE ADULT - SUBJECTIVE AND OBJECTIVE BOX
INTERVAL HPI/OVERNIGHT EVENTS:  Patient still having pauses overnight with HR into 20s  HD stable    MEDICATIONS  (STANDING):  apixaban 5 milliGRAM(s) Oral two times a day  atorvastatin 40 milliGRAM(s) Oral at bedtime  chlorhexidine 2% Cloths 1 Application(s) Topical daily  chlorhexidine 2% Cloths 1 Application(s) Topical <User Schedule>  dextrose 5%. 1000 milliLiter(s) (50 mL/Hr) IV Continuous <Continuous>  dextrose 5%. 1000 milliLiter(s) (100 mL/Hr) IV Continuous <Continuous>  dextrose 50% Injectable 25 Gram(s) IV Push once  dextrose 50% Injectable 25 Gram(s) IV Push once  dextrose 50% Injectable 12.5 Gram(s) IV Push once  ferrous    sulfate 325 milliGRAM(s) Oral daily  folic acid 1 milliGRAM(s) Oral daily  furosemide    Tablet 40 milliGRAM(s) Oral daily  glucagon  Injectable 1 milliGRAM(s) IntraMuscular once  influenza  Vaccine (HIGH DOSE) 0.7 milliLiter(s) IntraMuscular once  insulin lispro (ADMELOG) corrective regimen sliding scale   SubCutaneous at bedtime  insulin lispro (ADMELOG) corrective regimen sliding scale   SubCutaneous three times a day before meals  losartan 50 milliGRAM(s) Oral daily  metoprolol succinate ER 25 milliGRAM(s) Oral daily  nystatin Powder 1 Application(s) Topical three times a day  pantoprazole   Suspension 40 milliGRAM(s) Oral two times a day  risperiDONE   Solution 0.5 milliGRAM(s) Oral daily  senna 2 Tablet(s) Oral at bedtime    MEDICATIONS  (PRN):  acetaminophen     Tablet .. 650 milliGRAM(s) Oral every 6 hours PRN Temp greater or equal to 38C (100.4F), Mild Pain (1 - 3)  aluminum hydroxide/magnesium hydroxide/simethicone Suspension 30 milliLiter(s) Oral every 4 hours PRN Dyspepsia  dextrose Oral Gel 15 Gram(s) Oral once PRN Blood Glucose LESS THAN 70 milliGRAM(s)/deciliter  magnesium hydroxide Suspension 30 milliLiter(s) Oral daily PRN Constipation  melatonin 3 milliGRAM(s) Oral at bedtime PRN Insomnia  ondansetron Injectable 4 milliGRAM(s) IV Push every 8 hours PRN Nausea and/or Vomiting      Allergies    No Known Allergies    Intolerances        REVIEW OF SYSTEMS: No CP, palpitations, dizziness or SOB    Vital Signs Last 24 Hrs  T(C): 36.2 (05 Apr 2024 05:17), Max: 36.2 (05 Apr 2024 05:17)  T(F): 97.1 (05 Apr 2024 05:17), Max: 97.1 (05 Apr 2024 05:17)  HR: 86 (05 Apr 2024 05:17) (71 - 86)  BP: 130/77 (05 Apr 2024 05:17) (124/58 - 130/77)  BP(mean): --  RR: 18 (05 Apr 2024 05:17) (18 - 18)  SpO2: --        04-04-24 @ 07:01  -  04-05-24 @ 07:00  --------------------------------------------------------  IN: 746 mL / OUT: 1350 mL / NET: -604 mL    04-05-24 @ 07:01  -  04-05-24 @ 13:14  --------------------------------------------------------  IN: 200 mL / OUT: 0 mL / NET: 200 mL        Physical Exam  GENERAL: In no apparent distress, well nourished, and hydrated.  EYES: EOMI, PERRLA, conjunctiva and sclera clear  NECK: Supple  HEART: Regular rate and rhythm; No murmurs, rubs, or gallops.  PULMONARY: Clear to auscultation and perfusion.  No rales, wheezing, or rhonchi bilaterally.  EXTREMITIES:  2+ Peripheral Pulses, No clubbing, cyanosis, or edema  NEUROLOGICAL: Grossly nonfocal    LABS:                        10.5   4.15  )-----------( 216      ( 05 Apr 2024 06:11 )             36.3     04-05    142  |  100  |  33<H>  ----------------------------<  108<H>  4.8   |  29  |  1.4    Ca    10.0      05 Apr 2024 06:11  Phos  4.0     04-05  Mg     2.1     04-05    TPro  5.9<L>  /  Alb  3.1<L>  /  TBili  0.2  /  DBili  x   /  AST  33  /  ALT  32  /  AlkPhos  83  04-05      Urinalysis Basic - ( 05 Apr 2024 06:11 )    Color: x / Appearance: x / SG: x / pH: x  Gluc: 108 mg/dL / Ketone: x  / Bili: x / Urobili: x   Blood: x / Protein: x / Nitrite: x   Leuk Esterase: x / RBC: x / WBC x   Sq Epi: x / Non Sq Epi: x / Bacteria: x        RADIOLOGY & ADDITIONAL TESTS:

## 2024-04-05 NOTE — DISCHARGE NOTE PROVIDER - NSDCMRMEDTOKEN_GEN_ALL_CORE_FT
apixaban 5 mg oral tablet: 1 tab(s) orally 2 times a day  aspirin 81 mg oral tablet, chewable: 1 tab(s) orally once a day  cholecalciferol 1250 mcg (50,000 intl units) oral capsule: 1 cap(s) orally once a week  Crestor 10 mg oral tablet: 1 tab(s) orally once a day  ferrous sulfate 325 mg (65 mg elemental iron) oral delayed release tablet: 1 tab(s) orally once a day   folic acid 1 mg oral tablet: 1 tab(s) orally once a day  Lasix 40 mg oral tablet: 1 tab(s) orally once a day  Lokelma 5 g oral powder for reconstitution: 5 gram(s) orally every 12 hours MDD:10 grams  losartan 50 mg oral tablet: 1 tab(s) orally once a day  metoprolol succinate 25 mg oral tablet, extended release: 1 tab(s) orally once a day  RisperDAL 1 mg/mL oral solution: 0.5 milliliter(s) orally once a day  senna leaf extract oral tablet: 2 tab(s) orally once a day (at bedtime)   apixaban 5 mg oral tablet: 1 tab(s) orally 2 times a day  cholecalciferol 1250 mcg (50,000 intl units) oral capsule: 1 cap(s) orally once a week  Crestor 10 mg oral tablet: 1 tab(s) orally once a day  digoxin 125 mcg (0.125 mg) oral tablet: 1 tab(s) orally every other day  ferrous sulfate 325 mg (65 mg elemental iron) oral delayed release tablet: 1 tab(s) orally once a day   folic acid 1 mg oral tablet: 1 tab(s) orally once a day  Lasix 40 mg oral tablet: 1 tab(s) orally once a day  losartan 50 mg oral tablet: 1 tab(s) orally once a day  metoprolol succinate 25 mg oral tablet, extended release: 1 tab(s) orally once a day  RisperDAL 1 mg/mL oral solution: 0.5 milliliter(s) orally once a day  senna leaf extract oral tablet: 2 tab(s) orally once a day (at bedtime)   apixaban 5 mg oral tablet: 1 tab(s) orally 2 times a day  cholecalciferol 1250 mcg (50,000 intl units) oral capsule: 1 cap(s) orally once a week  Crestor 10 mg oral tablet: 1 tab(s) orally once a day  digoxin 125 mcg (0.125 mg) oral tablet: 1 tab(s) orally every other day  ferrous sulfate 325 mg (65 mg elemental iron) oral delayed release tablet: 1 tab(s) orally once a day   folic acid 1 mg oral tablet: 1 tab(s) orally once a day  Lasix 40 mg oral tablet: 1 tab(s) orally once a day  losartan 50 mg oral tablet: 1 tab(s) orally once a day hold for SBP &lt;90mmhg  metoprolol succinate 25 mg oral tablet, extended release: 1 tab(s) orally once a day  RisperDAL 1 mg/mL oral solution: 0.5 milliliter(s) orally once a day  senna leaf extract oral tablet: 2 tab(s) orally once a day (at bedtime)   apixaban 5 mg oral tablet: 1 tab(s) orally 2 times a day  cholecalciferol 1250 mcg (50,000 intl units) oral capsule: 1 cap(s) orally once a week  Crestor 10 mg oral tablet: 1 tab(s) orally once a day  digoxin 125 mcg (0.125 mg) oral tablet: 1 tab(s) orally every other day  ferrous sulfate 325 mg (65 mg elemental iron) oral delayed release tablet: 1 tab(s) orally once a day   folic acid 1 mg oral tablet: 1 tab(s) orally once a day  Lasix 40 mg oral tablet: 1 tab(s) orally once a day  losartan 50 mg oral tablet: 1 tab(s) orally once a day hold for SBP less than 90mmhg  metoprolol succinate 25 mg oral tablet, extended release: 1 tab(s) orally once a day  RisperDAL 1 mg/mL oral solution: 0.5 milliliter(s) orally once a day  senna leaf extract oral tablet: 2 tab(s) orally once a day (at bedtime)   apixaban 5 mg oral tablet: 1 tab(s) orally 2 times a day  cholecalciferol 1250 mcg (50,000 intl units) oral capsule: 1 cap(s) orally once a week  Crestor 10 mg oral tablet: 1 tab(s) orally once a day  digoxin 125 mcg (0.125 mg) oral tablet: 1 tab(s) orally every other day  ferrous sulfate 325 mg (65 mg elemental iron) oral delayed release tablet: 1 tab(s) orally once a day   folic acid 1 mg oral tablet: 1 tab(s) orally once a day  Lasix 40 mg oral tablet: 1 tab(s) orally once a day  losartan 50 mg oral tablet: 1 tab(s) orally once a day hold for SBP less than 105mmhg  metoprolol succinate 25 mg oral tablet, extended release: 1 tab(s) orally once a day  RisperDAL 1 mg/mL oral solution: 0.5 milliliter(s) orally once a day  senna leaf extract oral tablet: 2 tab(s) orally once a day (at bedtime)

## 2024-04-05 NOTE — PROGRESS NOTE ADULT - ASSESSMENT
Cards Dr Razo    82 yo F w HTN, DM, morbid obesity, chronic LE edema, thyroid mass, medically managed, schizophrenia/bipolar, now with new onset AF, SB, and pauses on telemetry  refuses PPM at this time  TSH 0.06    Impression:  SSS, tachy- tamy  PAF  pauses  [id number] Score is 5  HTN  DM    Plan:  - Reduce to Digoxin to 125 mg every other day  - Recheck Dig level  - Patient/family still refusing PPM at this time  - No further intervention  - Recall EP as needed [id number] Cards Dr Razo    82 yo F w HTN, DM, morbid obesity, chronic LE edema, thyroid mass, medically managed, schizophrenia/bipolar, now with new onset AF, SB, and pauses on telemetry  refuses PPM at this time  TSH 0.06    Impression:  SSS, tachy- tamy  PAF  pauses  [id number] Score is 5  HTN  DM    Plan:  - Reduce to Digoxin to 125 mg every other day  - Recheck Dig level (can be done as an outpatient). Follow up with cardio  - Cont Eliquis  - Patient/family still refusing PPM at this time  - No further intervention  - Recall EP as needed 4125

## 2024-04-05 NOTE — DISCHARGE NOTE PROVIDER - PROVIDER TOKENS
PROVIDER:[TOKEN:[[id number]],FOLLOWUP:[1-3 days]],PROVIDER:[TOKEN:[[id number]],FOLLOWUP:[1-3 days]]

## 2024-04-05 NOTE — DISCHARGE NOTE PROVIDER - CARE PROVIDERS DIRECT ADDRESSES
[email or web address]

## 2024-04-05 NOTE — PROGRESS NOTE ADULT - REASON FOR ADMISSION
New onset Afib

## 2024-04-05 NOTE — DISCHARGE NOTE PROVIDER - HOSPITAL COURSE
83-year-old female with history of schizophrenia, DM2, HTN, lower extremity edema presents for evaluation of nausea for one day. Patient at baseline is an unreliable historian. Per the patient she was nauseous for the last day, but never vomited. Patient states that it suddenly just came on and was associated with pain throughout her entire body. Per family members patient has a history of bipolar with psychotic episodes, frequently believing people are trying to kill her. At this time patient is feeling better, and would like to eat food.     In the ED patient was hemodynamically stable /62, HR 74-99, Afebrile. EKG was preformed which showed new onset atrial fibrillation, so patient was admitted to telemetry.     Vital Signs Last 24 Hrs  T(C): 36.2 (29 Mar 2024 07:43), Max: 36.9 (29 Mar 2024 02:35)  T(F): 97.2 (29 Mar 2024 07:43), Max: 98.4 (29 Mar 2024 02:35)  HR: 74 (29 Mar 2024 07:43) (74 - 99)  BP: 110/59 (29 Mar 2024 07:43) (110/59 - 128/62)  BP(mean): --  RR: 18 (29 Mar 2024 07:43) (18 - 18)  SpO2: 95% (29 Mar 2024 07:43) (95% - 96%)    Parameters below as of 29 Mar 2024 07:43  Patient On (Oxygen Delivery Method): room air        84 yo F PMHx schizophrenia, DM II, HTN, lower extremity edema who presented for evaluation of nausea for one day, admitted for new onset afib.     New Onset Atrial Fibrillation   Tachy-tamy Syndrome:   - had episode of tachycardia, earlier she was tamy. Episode of sinus pause 3 second  -troponin 32>34  -EP following- started digoxin on 4/3  - metoprolol changed to long acting  -patient is recommended PPM placement for SSS/tachy-tamy  -patient refused despite same recommendation from primary cardiologist. no plan for PPM as pt is adamantly declining  -c/w Eliquis  -Sleep study outpatient, may need CPAP.   -Dc on Digoxin 150mcg q48 per EP     Thyroid nodule:   -TSH is low 0.06. Repeat 1.22  -Recent CT chest Feb 2024 showed Stable enlarged multinodular right thyroid lobe with substernal extension measuring approximately 6.2 cm x 5.1 cm x 5.7 cm with leftward mass effect upon the trachea and esophagus, unchanged since 5/28/2020.    Anemia:   -Hb is stable,   -Start on Pantoprazole.   -Iron studies normal,  B12, Folate normal    Abdominal Pain- Resolved  -CTAP neg for acute pathology, showed cholelithiasis and bibasilar atelectasis/opacities  -Low residual, lactose free diet  -Continue Maalox prn and Protonix.   -No evidence of GI bleeding.     Bipolar Disorder  -Continue Risperdal 0.5mg daily     Morbid Obesity:   -low calories diet.     DVT Prophylaxis: Lovenox therapeutic  GI Prophylaxis: PPI po    Pt is stable for DC 83-year-old female with history of schizophrenia, DM2, HTN, lower extremity edema presents for evaluation of nausea for one day. Patient at baseline is an unreliable historian. Per the patient she was nauseous for the last day, but never vomited. Patient states that it suddenly just came on and was associated with pain throughout her entire body. Per family members patient has a history of bipolar with psychotic episodes, frequently believing people are trying to kill her. At this time patient is feeling better, and would like to eat food.     In the ED patient was hemodynamically stable /62, HR 74-99, Afebrile. EKG was preformed which showed new onset atrial fibrillation, so patient was admitted to telemetry.     Vital Signs Last 24 Hrs  T(C): 36.2 (29 Mar 2024 07:43), Max: 36.9 (29 Mar 2024 02:35)  T(F): 97.2 (29 Mar 2024 07:43), Max: 98.4 (29 Mar 2024 02:35)  HR: 74 (29 Mar 2024 07:43) (74 - 99)  BP: 110/59 (29 Mar 2024 07:43) (110/59 - 128/62)  BP(mean): --  RR: 18 (29 Mar 2024 07:43) (18 - 18)  SpO2: 95% (29 Mar 2024 07:43) (95% - 96%)    Parameters below as of 29 Mar 2024 07:43  Patient On (Oxygen Delivery Method): room air        84 yo F PMHx schizophrenia, DM II, HTN, lower extremity edema who presented for evaluation of nausea for one day, admitted for new onset afib.     New Onset Atrial Fibrillation   Tachy-tamy Syndrome:   - had episode of tachycardia, earlier she was tamy. Episode of sinus pause 3 second  -troponin 32>34  -EP following- started digoxin on 4/3  - metoprolol changed to long acting  -patient is recommended PPM placement for SSS/tachy-tamy  -patient refused despite same recommendation from primary cardiologist. no plan for PPM as pt is adamantly declining  -c/w Eliquis  -Sleep study outpatient, may need CPAP.   -Dc on Digoxin 125mcg q48 per EP     Thyroid nodule:   -TSH is low 0.06. Repeat 1.22  -Recent CT chest Feb 2024 showed Stable enlarged multinodular right thyroid lobe with substernal extension measuring approximately 6.2 cm x 5.1 cm x 5.7 cm with leftward mass effect upon the trachea and esophagus, unchanged since 5/28/2020.    Anemia:   -Hb is stable,   -Start on Pantoprazole.   -Iron studies normal,  B12, Folate normal    Abdominal Pain- Resolved  -CTAP neg for acute pathology, showed cholelithiasis and bibasilar atelectasis/opacities  -Low residual, lactose free diet  -Continue Maalox prn and Protonix.   -No evidence of GI bleeding.     Bipolar Disorder  -Continue Risperdal 0.5mg daily     Morbid Obesity:   -low calories diet.     DVT Prophylaxis: Lovenox therapeutic  GI Prophylaxis: PPI po    Pt is stable for DC

## 2024-04-05 NOTE — DISCHARGE NOTE NURSING/CASE MANAGEMENT/SOCIAL WORK - PATIENT PORTAL LINK FT
You can access the FollowMyHealth Patient Portal offered by [facility] by registering at the following website: [email or web address]. By joining [employer] [facility]’s FollowMyHealth portal, you will also be able to view your health information using other applications (apps) compatible with our system.

## 2024-04-05 NOTE — DISCHARGE NOTE PROVIDER - NSDCCPCAREPLAN_GEN_ALL_CORE_FT
PRINCIPAL DISCHARGE DIAGNOSIS  Diagnosis: New onset a-fib  Assessment and Plan of Treatment: You came to the ED for nausea and were found to have new onset Afib on ekg. You were admitted to the hospital to telemetry which showed episodes of slow and fast heart rate with a pause. Due to your tachy-tamy syndorme, EP recommended pacemaker, however you refused despite same recommendation from primary cardiologist. You were started on a new medication called Digoxin. Please take all medications as prescibed and follow up with your cardioloist, electrophysiologist, and PCP outpatient.        PRINCIPAL DISCHARGE DIAGNOSIS  Diagnosis: New onset a-fib  Assessment and Plan of Treatment: You came to the ED for nausea and were found to have new onset Afib on ekg. You were admitted to the hospital to telemetry which showed episodes of slow and fast heart rate with a pause. Due to your tachy-tamy syndorme, EP recommended pacemaker, however you refused despite same recommendation from primary cardiologist. You were started on a new medication called Digoxin. Please take all medications as prescibed and follow up with your cardioloist, electrophysiologist, and PCP outpatient.   Please check a digoxin level within 1 week to sure it is not supratheurapetuic.  Please use bipap as you likely have MANJEET which triggered afib. You would benefit from an outpatient sleep apnea test.

## 2024-04-05 NOTE — DISCHARGE NOTE PROVIDER - NSDCPNSUBOBJ_GEN_ALL_CORE
Patient seen and examined at bedside. Patient feels well today. HR controlled. I spoke with EP-no further inpt work up planned. I spoke with daughter Shaneka who is in agreement with discharge. Patient is stable for dc to [facility].

## 2024-04-05 NOTE — PROGRESS NOTE ADULT - PROVIDER SPECIALTY LIST ADULT
Electrophysiology
Internal Medicine
Hospitalist
Internal Medicine
Internal Medicine
Electrophysiology
Internal Medicine
Electrophysiology
Hospitalist

## 2024-04-05 NOTE — DISCHARGE NOTE PROVIDER - CARE PROVIDER_API CALL
Caitlin Armstrong  Internal Medicine  [address] [place] [address]  Phone: [phone]  Fax: [phone]  Follow Up Time: 1-3 days    Steffanie Cohen  Cardiovascular Disease  [facility] [place] [address]  Phone: [phone]  Fax: [phone]  Follow Up Time: 1-3 days

## 2024-04-05 NOTE — DISCHARGE NOTE NURSING/CASE MANAGEMENT/SOCIAL WORK - NSDCPEELIQUISFU_GEN_ALL_CORE
Go for blood tests as directed. Your doctor will do lab tests at regular visits to monitor the effects of this medicine. Please follow up with your doctor and keep your health care provider appointments. Clear

## 2024-04-05 NOTE — DISCHARGE NOTE NURSING/CASE MANAGEMENT/SOCIAL WORK - NSDCPEFALRISK_GEN_ALL_CORE
For information on Fall & Injury Prevention, visit: [email or web address] OR  [email or web address] OR  https://www.cdc.gov/steadi/patient.html

## 2024-04-05 NOTE — DISCHARGE NOTE NURSING/CASE MANAGEMENT/SOCIAL WORK - NSDCVIVACCINE_GEN_ALL_CORE_FT
Tdap; 28-May-2020 05:58; Case Guillen); [employer]; [id number] (Exp. Date: 28-Apr-2022); IntraMuscular; Deltoid Left.; 0.5 milliLiter(s); VIS (VIS Published: 09-May-2013, VIS Presented: 28-May-2020);

## 2024-04-06 VITALS
DIASTOLIC BLOOD PRESSURE: 51 MMHG | TEMPERATURE: 97 F | HEART RATE: 51 BPM | SYSTOLIC BLOOD PRESSURE: 95 MMHG | RESPIRATION RATE: 18 BRPM

## 2024-04-06 LAB
ALBUMIN SERPL ELPH-MCNC: 3.2 G/DL — LOW (ref 3.5–5.2)
ALP SERPL-CCNC: 80 U/L — SIGNIFICANT CHANGE UP (ref 30–115)
ALT FLD-CCNC: 35 U/L — SIGNIFICANT CHANGE UP (ref 0–41)
ANION GAP SERPL CALC-SCNC: 8 MMOL/L — SIGNIFICANT CHANGE UP (ref 7–14)
AST SERPL-CCNC: 27 U/L — SIGNIFICANT CHANGE UP (ref 0–41)
BASOPHILS # BLD AUTO: 0.04 K/UL — SIGNIFICANT CHANGE UP (ref 0–0.2)
BASOPHILS NFR BLD AUTO: 0.6 % — SIGNIFICANT CHANGE UP (ref 0–1)
BILIRUB SERPL-MCNC: <0.2 MG/DL — SIGNIFICANT CHANGE UP (ref 0.2–1.2)
BUN SERPL-MCNC: 45 MG/DL — HIGH (ref 10–20)
CALCIUM SERPL-MCNC: 9.5 MG/DL — SIGNIFICANT CHANGE UP (ref 8.4–10.4)
CHLORIDE SERPL-SCNC: 98 MMOL/L — SIGNIFICANT CHANGE UP (ref 98–110)
CO2 SERPL-SCNC: 33 MMOL/L — HIGH (ref 17–32)
CREAT SERPL-MCNC: 1.6 MG/DL — HIGH (ref 0.7–1.5)
EGFR: 31 ML/MIN/1.73M2 — LOW
EOSINOPHIL # BLD AUTO: 0.2 K/UL — SIGNIFICANT CHANGE UP (ref 0–0.7)
EOSINOPHIL NFR BLD AUTO: 3.2 % — SIGNIFICANT CHANGE UP (ref 0–8)
GLUCOSE BLDC GLUCOMTR-MCNC: 126 MG/DL — HIGH (ref 70–99)
GLUCOSE BLDC GLUCOMTR-MCNC: 129 MG/DL — HIGH (ref 70–99)
GLUCOSE BLDC GLUCOMTR-MCNC: 177 MG/DL — HIGH (ref 70–99)
GLUCOSE SERPL-MCNC: 109 MG/DL — HIGH (ref 70–99)
HCT VFR BLD CALC: 33.6 % — LOW (ref 37–47)
HGB BLD-MCNC: 9.8 G/DL — LOW (ref 12–16)
IMM GRANULOCYTES NFR BLD AUTO: 0.3 % — SIGNIFICANT CHANGE UP (ref 0.1–0.3)
LYMPHOCYTES # BLD AUTO: 1.24 K/UL — SIGNIFICANT CHANGE UP (ref 1.2–3.4)
LYMPHOCYTES # BLD AUTO: 19.9 % — LOW (ref 20.5–51.1)
MAGNESIUM SERPL-MCNC: 2.3 MG/DL — SIGNIFICANT CHANGE UP (ref 1.8–2.4)
MCHC RBC-ENTMCNC: 28.3 PG — SIGNIFICANT CHANGE UP (ref 27–31)
MCHC RBC-ENTMCNC: 29.2 G/DL — LOW (ref 32–37)
MCV RBC AUTO: 97.1 FL — SIGNIFICANT CHANGE UP (ref 81–99)
MONOCYTES # BLD AUTO: 0.92 K/UL — HIGH (ref 0.1–0.6)
MONOCYTES NFR BLD AUTO: 14.8 % — HIGH (ref 1.7–9.3)
NEUTROPHILS # BLD AUTO: 3.8 K/UL — SIGNIFICANT CHANGE UP (ref 1.4–6.5)
NEUTROPHILS NFR BLD AUTO: 61.2 % — SIGNIFICANT CHANGE UP (ref 42.2–75.2)
NRBC # BLD: 0 /100 WBCS — SIGNIFICANT CHANGE UP (ref 0–0)
PLATELET # BLD AUTO: 262 K/UL — SIGNIFICANT CHANGE UP (ref 130–400)
PMV BLD: 10.7 FL — HIGH (ref 7.4–10.4)
POTASSIUM SERPL-MCNC: 4.6 MMOL/L — SIGNIFICANT CHANGE UP (ref 3.5–5)
POTASSIUM SERPL-SCNC: 4.6 MMOL/L — SIGNIFICANT CHANGE UP (ref 3.5–5)
PROT SERPL-MCNC: 6.1 G/DL — SIGNIFICANT CHANGE UP (ref 6–8)
RBC # BLD: 3.46 M/UL — LOW (ref 4.2–5.4)
RBC # FLD: 14.5 % — SIGNIFICANT CHANGE UP (ref 11.5–14.5)
SODIUM SERPL-SCNC: 139 MMOL/L — SIGNIFICANT CHANGE UP (ref 135–146)
WBC # BLD: 6.22 K/UL — SIGNIFICANT CHANGE UP (ref 4.8–10.8)
WBC # FLD AUTO: 6.22 K/UL — SIGNIFICANT CHANGE UP (ref 4.8–10.8)

## 2024-04-06 PROCEDURE — 71045 X-RAY EXAM CHEST 1 VIEW: CPT | Mod: 26

## 2024-04-06 RX ORDER — LOSARTAN POTASSIUM 100 MG/1
1 TABLET, FILM COATED ORAL
Qty: 0 | Refills: 0 | DISCHARGE

## 2024-04-06 RX ADMIN — Medication 1 MILLIGRAM(S): at 11:14

## 2024-04-06 RX ADMIN — CHLORHEXIDINE GLUCONATE 1 APPLICATION(S): 213 SOLUTION TOPICAL at 11:58

## 2024-04-06 RX ADMIN — NYSTATIN CREAM 1 APPLICATION(S): 100000 CREAM TOPICAL at 06:12

## 2024-04-06 RX ADMIN — CHLORHEXIDINE GLUCONATE 1 APPLICATION(S): 213 SOLUTION TOPICAL at 06:14

## 2024-04-06 RX ADMIN — NYSTATIN CREAM 1 APPLICATION(S): 100000 CREAM TOPICAL at 14:10

## 2024-04-06 RX ADMIN — RISPERIDONE 0.5 MILLIGRAM(S): 4 TABLET ORAL at 11:14

## 2024-04-06 RX ADMIN — PANTOPRAZOLE SODIUM 40 MILLIGRAM(S): 20 TABLET, DELAYED RELEASE ORAL at 06:16

## 2024-04-06 RX ADMIN — Medication 1: at 11:57

## 2024-04-06 RX ADMIN — Medication 40 MILLIGRAM(S): at 06:12

## 2024-04-06 RX ADMIN — APIXABAN 5 MILLIGRAM(S): 2.5 TABLET, FILM COATED ORAL at 06:13

## 2024-04-06 RX ADMIN — Medication 325 MILLIGRAM(S): at 11:14

## 2024-04-06 RX ADMIN — Medication 25 MILLIGRAM(S): at 06:13

## 2024-04-06 NOTE — CHART NOTE - NSCHARTNOTEFT_GEN_A_CORE
Pt seen and examined at bedside. BP is stable. Family aware. CXR showed left opacity, chronic since 2021, not visualized on CT chest. OUtpt follow up. Patient is stable for dc to SNF.    PHYSICAL EXAM:  GENERAL: NAD, speaks in full sentences, no signs of respiratory distress  HEAD:  Atraumatic, Normocephalic  EYES: EOMI, PERRLA, conjunctiva and sclera clear  NECK: Supple, No JVD  CHEST/LUNG: Clear to auscultation bilaterally; No wheeze; No crackles; No accessory muscles used  HEART: Regular rate and rhythm; No murmurs;   ABDOMEN: Soft, Nontender, Nondistended; Bowel sounds present; No guarding  EXTREMITIES:  2+ Peripheral Pulses, No cyanosis or edema  PSYCH: AAOx3  NEUROLOGY: non-focal  SKIN: No rashes or lesions
SPB 77s when EMS arrived, dc held. Family aware and agreaeble to hold losartan. Will keep on BP, if needed will do fluid challenge. Hopeful dc in AM

## 2024-04-12 DIAGNOSIS — D64.9 ANEMIA, UNSPECIFIED: ICD-10-CM

## 2024-04-12 DIAGNOSIS — G47.00 INSOMNIA, UNSPECIFIED: ICD-10-CM

## 2024-04-12 DIAGNOSIS — J96.92 RESPIRATORY FAILURE, UNSPECIFIED WITH HYPERCAPNIA: ICD-10-CM

## 2024-04-12 DIAGNOSIS — E66.2 MORBID (SEVERE) OBESITY WITH ALVEOLAR HYPOVENTILATION: ICD-10-CM

## 2024-04-12 DIAGNOSIS — I48.0 PAROXYSMAL ATRIAL FIBRILLATION: ICD-10-CM

## 2024-04-12 DIAGNOSIS — R10.9 UNSPECIFIED ABDOMINAL PAIN: ICD-10-CM

## 2024-04-12 DIAGNOSIS — I10 ESSENTIAL (PRIMARY) HYPERTENSION: ICD-10-CM

## 2024-04-12 DIAGNOSIS — E66.01 MORBID (SEVERE) OBESITY DUE TO EXCESS CALORIES: ICD-10-CM

## 2024-04-12 DIAGNOSIS — E11.9 TYPE 2 DIABETES MELLITUS WITHOUT COMPLICATIONS: ICD-10-CM

## 2024-04-12 DIAGNOSIS — I49.5 SICK SINUS SYNDROME: ICD-10-CM

## 2024-04-12 DIAGNOSIS — Z53.20 PROCEDURE AND TREATMENT NOT CARRIED OUT BECAUSE OF PATIENT'S DECISION FOR UNSPECIFIED REASONS: ICD-10-CM

## 2024-04-12 DIAGNOSIS — K80.20 CALCULUS OF GALLBLADDER WITHOUT CHOLECYSTITIS WITHOUT OBSTRUCTION: ICD-10-CM

## 2024-04-12 DIAGNOSIS — F25.9 SCHIZOAFFECTIVE DISORDER, UNSPECIFIED: ICD-10-CM

## 2024-04-12 DIAGNOSIS — Z79.82 LONG TERM (CURRENT) USE OF ASPIRIN: ICD-10-CM

## 2024-04-12 DIAGNOSIS — E04.1 NONTOXIC SINGLE THYROID NODULE: ICD-10-CM

## 2024-05-06 ENCOUNTER — APPOINTMENT (OUTPATIENT)
Dept: ELECTROPHYSIOLOGY | Facility: CLINIC | Age: 84
End: 2024-05-06
Payer: MEDICARE

## 2024-05-06 VITALS
WEIGHT: 180 LBS | BODY MASS INDEX: 29.99 KG/M2 | SYSTOLIC BLOOD PRESSURE: 132 MMHG | HEART RATE: 64 BPM | HEIGHT: 65 IN | DIASTOLIC BLOOD PRESSURE: 70 MMHG

## 2024-05-06 DIAGNOSIS — E11.9 TYPE 2 DIABETES MELLITUS W/OUT COMPLICATIONS: ICD-10-CM

## 2024-05-06 DIAGNOSIS — F31.9 BIPOLAR DISORDER, UNSPECIFIED: ICD-10-CM

## 2024-05-06 DIAGNOSIS — I49.5 SICK SINUS SYNDROME: ICD-10-CM

## 2024-05-06 DIAGNOSIS — I48.0 PAROXYSMAL ATRIAL FIBRILLATION: ICD-10-CM

## 2024-05-06 DIAGNOSIS — F20.9 SCHIZOPHRENIA, UNSPECIFIED: ICD-10-CM

## 2024-05-06 DIAGNOSIS — E66.01 MORBID (SEVERE) OBESITY DUE TO EXCESS CALORIES: ICD-10-CM

## 2024-05-06 DIAGNOSIS — I10 ESSENTIAL (PRIMARY) HYPERTENSION: ICD-10-CM

## 2024-05-06 PROCEDURE — 93005 ELECTROCARDIOGRAM TRACING: CPT

## 2024-05-06 PROCEDURE — 93010 ELECTROCARDIOGRAM REPORT: CPT

## 2024-05-06 PROCEDURE — 93000 ELECTROCARDIOGRAM COMPLETE: CPT

## 2024-05-06 PROCEDURE — 99215 OFFICE O/P EST HI 40 MIN: CPT

## 2024-05-06 RX ORDER — APIXABAN 5 MG/1
5 TABLET, FILM COATED ORAL
Refills: 0 | Status: ACTIVE | COMMUNITY

## 2024-05-06 RX ORDER — CHOLECALCIFEROL (VITAMIN D3) 125 MCG
TABLET ORAL
Refills: 0 | Status: ACTIVE | COMMUNITY

## 2024-05-06 RX ORDER — DIGOXIN 125 UG/1
125 TABLET ORAL EVERY OTHER DAY
Refills: 0 | Status: ACTIVE | COMMUNITY

## 2024-05-06 RX ORDER — DAPAGLIFLOZIN 10 MG/1
10 TABLET, FILM COATED ORAL
Refills: 0 | Status: ACTIVE | COMMUNITY

## 2024-05-06 RX ORDER — DOCUSATE SODIUM 50 MG AND SENNOSIDES 8.6 MG 8.6; 5 MG/1; MG/1
8.6-5 TABLET, FILM COATED ORAL
Refills: 0 | Status: ACTIVE | COMMUNITY

## 2024-05-06 RX ORDER — ROSUVASTATIN CALCIUM 10 MG/1
10 TABLET, FILM COATED ORAL
Refills: 0 | Status: ACTIVE | COMMUNITY

## 2024-05-06 RX ORDER — FUROSEMIDE 20 MG/1
20 TABLET ORAL
Refills: 0 | Status: DISCONTINUED | COMMUNITY
End: 2024-05-06

## 2024-05-06 RX ORDER — METOPROLOL SUCCINATE 25 MG/1
25 TABLET, EXTENDED RELEASE ORAL
Refills: 0 | Status: ACTIVE | COMMUNITY

## 2024-05-06 RX ORDER — WHITE PETROLATUM 1.75 OZ
OINTMENT TOPICAL
Refills: 0 | Status: ACTIVE | COMMUNITY

## 2024-05-06 RX ORDER — FUROSEMIDE 40 MG/1
40 TABLET ORAL
Refills: 0 | Status: ACTIVE | COMMUNITY

## 2024-05-06 RX ORDER — CHLORHEXIDINE GLUCONATE 4 %
325 (65 FE) LIQUID (ML) TOPICAL
Refills: 0 | Status: ACTIVE | COMMUNITY

## 2024-05-06 NOTE — ADDENDUM
[FreeTextEntry1] : Suzan BAJWA assisted in documentation on 05/06/2024 acting as a scribe for Dr. Steffanie Cohen.

## 2024-05-06 NOTE — ASSESSMENT
[FreeTextEntry1] : # AFib - Currently in AF but rate controlled - Cont Metoprolol, Digoxin 125 mcg every other day.  - Cont Eliquis 5 mg PO BID for CHADS VASC 5 (HTN, Age > 75, DM, F). Pt denies signs/symptoms of bleeding.  - Check routine labs including Dig level.  # Tachy/Maximiliano - Refused PPM. Cardiologist aware.   # HTN - BP well controlled - Cont current regimen - 2g Na diet enforced  I have also advised the patient to go to the nearest emergency room if she experiences any chest pain, dyspnea, syncope, or has any other compelling symptoms.   Follow up in 4- 6mo

## 2024-05-06 NOTE — END OF VISIT
[Time Spent: ___ minutes] : I have spent [unfilled] minutes of time on the encounter. [FreeTextEntry3] : I, Steffanie Cohen, personally performed the services described in this documentation. All medical record entries made by the scribe/nurse, JUAN CARLOS were at my direction and in my presence. I have reviewed the chart and agree that the record reflects my personal performance and is accurate and complete.

## 2024-05-06 NOTE — PHYSICAL EXAM
[Well Developed] : well developed [Well Nourished] : well nourished [No Acute Distress] : no acute distress [Obese] : obese [Normal Conjunctiva] : normal conjunctiva [Normal S1, S2] : normal S1, S2 [Clear Lung Fields] : clear lung fields [Good Air Entry] : good air entry [No Respiratory Distress] : no respiratory distress  [Soft] : abdomen soft [Normal Speech] : normal speech [Alert and Oriented] : alert and oriented [de-identified] : morbidly obese, sits in wheelchair [de-identified] : irregular rate and rhythm [de-identified] : obese [de-identified] : wheelchair

## 2024-05-06 NOTE — HISTORY OF PRESENT ILLNESS
[FreeTextEntry1] : Cardio: Dr. Razo  82 yo [facility] resident with history of HTN, DM, morbid obesity, chronic lower extremity edema, thyroid mass, schizophrenia/bipolar disorder, admitted with nausea at the end of March to [facility]. Patient was noted to have new onset atrial fibrillation as well as sinus bradycardia and pauses on telemetry, for which EP was consulted. Conversion pauses were about 3 seconds and pt was recommended for PPM for tachy-tamy syndrome. However, patient adamantly refused pacemaker. Medications were adjusted and titrated to obtain rate control. Case was discussed with patient's family member, Dr. Razo, who was aware of recommendation for pacemaker. However, due to underlying psych history, the benefits of the pacemaker were felt to be outweighed by the risks of a device implant. Therefore, decision was made for patient to not have pacemaker implant. [facility].She denies any cardiovascular complaints.

## 2024-06-04 ENCOUNTER — INPATIENT (INPATIENT)
Facility: HOSPITAL | Age: 84
LOS: 5 days | Discharge: NURSING, REHAB OR LONG-TERM CARE | DRG: 690 | End: 2024-06-10
Attending: INTERNAL MEDICINE | Admitting: INTERNAL MEDICINE
Payer: MEDICARE

## 2024-06-04 VITALS
SYSTOLIC BLOOD PRESSURE: 99 MMHG | TEMPERATURE: 98 F | DIASTOLIC BLOOD PRESSURE: 65 MMHG | WEIGHT: 199.96 LBS | HEART RATE: 61 BPM | OXYGEN SATURATION: 99 % | RESPIRATION RATE: 16 BRPM

## 2024-06-04 DIAGNOSIS — N39.0 URINARY TRACT INFECTION, SITE NOT SPECIFIED: ICD-10-CM

## 2024-06-04 LAB
ALBUMIN SERPL ELPH-MCNC: 3.8 G/DL — SIGNIFICANT CHANGE UP (ref 3.5–5.2)
ALP SERPL-CCNC: 97 U/L — SIGNIFICANT CHANGE UP (ref 30–115)
ALT FLD-CCNC: 9 U/L — SIGNIFICANT CHANGE UP (ref 0–41)
ANION GAP SERPL CALC-SCNC: 10 MMOL/L — SIGNIFICANT CHANGE UP (ref 7–14)
AST SERPL-CCNC: 7 U/L — SIGNIFICANT CHANGE UP (ref 0–41)
BASE EXCESS BLDV CALC-SCNC: 13.5 MMOL/L — HIGH (ref -2–3)
BASOPHILS # BLD AUTO: 0.06 K/UL — SIGNIFICANT CHANGE UP (ref 0–0.2)
BASOPHILS NFR BLD AUTO: 0.7 % — SIGNIFICANT CHANGE UP (ref 0–1)
BILIRUB SERPL-MCNC: 0.2 MG/DL — SIGNIFICANT CHANGE UP (ref 0.2–1.2)
BUN SERPL-MCNC: 21 MG/DL — HIGH (ref 10–20)
CA-I SERPL-SCNC: 1.22 MMOL/L — SIGNIFICANT CHANGE UP (ref 1.15–1.33)
CALCIUM SERPL-MCNC: 8.6 MG/DL — SIGNIFICANT CHANGE UP (ref 8.4–10.5)
CHLORIDE SERPL-SCNC: 96 MMOL/L — LOW (ref 98–110)
CO2 SERPL-SCNC: 35 MMOL/L — HIGH (ref 17–32)
CREAT SERPL-MCNC: 1.4 MG/DL — SIGNIFICANT CHANGE UP (ref 0.7–1.5)
DIGOXIN SERPL-MCNC: <0.3 NG/ML — LOW (ref 0.8–2)
EGFR: 37 ML/MIN/1.73M2 — LOW
EOSINOPHIL # BLD AUTO: 0.08 K/UL — SIGNIFICANT CHANGE UP (ref 0–0.7)
EOSINOPHIL NFR BLD AUTO: 1 % — SIGNIFICANT CHANGE UP (ref 0–8)
GAS PNL BLDV: 138 MMOL/L — SIGNIFICANT CHANGE UP (ref 136–145)
GAS PNL BLDV: SIGNIFICANT CHANGE UP
GAS PNL BLDV: SIGNIFICANT CHANGE UP
GLUCOSE SERPL-MCNC: 110 MG/DL — HIGH (ref 70–99)
HCO3 BLDV-SCNC: 42 MMOL/L — HIGH (ref 22–29)
HCT VFR BLD CALC: 29.8 % — LOW (ref 37–47)
HCT VFR BLDA CALC: 23 % — LOW (ref 34.5–46.5)
HGB BLD CALC-MCNC: 7.6 G/DL — LOW (ref 11.7–16.1)
HGB BLD-MCNC: 8.6 G/DL — LOW (ref 12–16)
IMM GRANULOCYTES NFR BLD AUTO: 0.4 % — HIGH (ref 0.1–0.3)
LACTATE BLDV-MCNC: 0.8 MMOL/L — SIGNIFICANT CHANGE UP (ref 0.5–2)
LYMPHOCYTES # BLD AUTO: 0.95 K/UL — LOW (ref 1.2–3.4)
LYMPHOCYTES # BLD AUTO: 11.8 % — LOW (ref 20.5–51.1)
MCHC RBC-ENTMCNC: 27.2 PG — SIGNIFICANT CHANGE UP (ref 27–31)
MCHC RBC-ENTMCNC: 28.9 G/DL — LOW (ref 32–37)
MCV RBC AUTO: 94.3 FL — SIGNIFICANT CHANGE UP (ref 81–99)
MONOCYTES # BLD AUTO: 0.64 K/UL — HIGH (ref 0.1–0.6)
MONOCYTES NFR BLD AUTO: 8 % — SIGNIFICANT CHANGE UP (ref 1.7–9.3)
NEUTROPHILS # BLD AUTO: 6.28 K/UL — SIGNIFICANT CHANGE UP (ref 1.4–6.5)
NEUTROPHILS NFR BLD AUTO: 78.1 % — HIGH (ref 42.2–75.2)
NRBC # BLD: 0 /100 WBCS — SIGNIFICANT CHANGE UP (ref 0–0)
NT-PROBNP SERPL-SCNC: 2838 PG/ML — HIGH (ref 0–300)
PCO2 BLDV: 85 MMHG — CRITICAL HIGH (ref 39–42)
PH BLDV: 7.3 — LOW (ref 7.32–7.43)
PLATELET # BLD AUTO: 324 K/UL — SIGNIFICANT CHANGE UP (ref 130–400)
PMV BLD: 9.9 FL — SIGNIFICANT CHANGE UP (ref 7.4–10.4)
PO2 BLDV: 32 MMHG — SIGNIFICANT CHANGE UP (ref 25–45)
POTASSIUM BLDV-SCNC: 4.1 MMOL/L — SIGNIFICANT CHANGE UP (ref 3.5–5.1)
POTASSIUM SERPL-MCNC: 4.3 MMOL/L — SIGNIFICANT CHANGE UP (ref 3.5–5)
POTASSIUM SERPL-SCNC: 4.3 MMOL/L — SIGNIFICANT CHANGE UP (ref 3.5–5)
PROT SERPL-MCNC: 5.8 G/DL — LOW (ref 6–8)
RBC # BLD: 3.16 M/UL — LOW (ref 4.2–5.4)
RBC # FLD: 14.8 % — HIGH (ref 11.5–14.5)
SAO2 % BLDV: 49.8 % — LOW (ref 67–88)
SODIUM SERPL-SCNC: 141 MMOL/L — SIGNIFICANT CHANGE UP (ref 135–146)
WBC # BLD: 8.04 K/UL — SIGNIFICANT CHANGE UP (ref 4.8–10.8)
WBC # FLD AUTO: 8.04 K/UL — SIGNIFICANT CHANGE UP (ref 4.8–10.8)

## 2024-06-04 PROCEDURE — 85025 COMPLETE CBC W/AUTO DIFF WBC: CPT

## 2024-06-04 PROCEDURE — 93005 ELECTROCARDIOGRAM TRACING: CPT

## 2024-06-04 PROCEDURE — 94660 CPAP INITIATION&MGMT: CPT

## 2024-06-04 PROCEDURE — 99285 EMERGENCY DEPT VISIT HI MDM: CPT

## 2024-06-04 PROCEDURE — 87086 URINE CULTURE/COLONY COUNT: CPT

## 2024-06-04 PROCEDURE — 83036 HEMOGLOBIN GLYCOSYLATED A1C: CPT

## 2024-06-04 PROCEDURE — 81001 URINALYSIS AUTO W/SCOPE: CPT

## 2024-06-04 PROCEDURE — 83735 ASSAY OF MAGNESIUM: CPT

## 2024-06-04 PROCEDURE — 71045 X-RAY EXAM CHEST 1 VIEW: CPT | Mod: 26

## 2024-06-04 PROCEDURE — 83605 ASSAY OF LACTIC ACID: CPT

## 2024-06-04 PROCEDURE — 83880 ASSAY OF NATRIURETIC PEPTIDE: CPT

## 2024-06-04 PROCEDURE — 85027 COMPLETE CBC AUTOMATED: CPT

## 2024-06-04 PROCEDURE — 85610 PROTHROMBIN TIME: CPT

## 2024-06-04 PROCEDURE — 82962 GLUCOSE BLOOD TEST: CPT

## 2024-06-04 PROCEDURE — 93010 ELECTROCARDIOGRAM REPORT: CPT

## 2024-06-04 PROCEDURE — 80048 BASIC METABOLIC PNL TOTAL CA: CPT

## 2024-06-04 PROCEDURE — 82803 BLOOD GASES ANY COMBINATION: CPT

## 2024-06-04 PROCEDURE — 97162 PT EVAL MOD COMPLEX 30 MIN: CPT | Mod: GP

## 2024-06-04 PROCEDURE — 36415 COLL VENOUS BLD VENIPUNCTURE: CPT

## 2024-06-04 PROCEDURE — 85379 FIBRIN DEGRADATION QUANT: CPT

## 2024-06-04 PROCEDURE — 87186 SC STD MICRODIL/AGAR DIL: CPT

## 2024-06-04 PROCEDURE — 85730 THROMBOPLASTIN TIME PARTIAL: CPT

## 2024-06-04 RX ORDER — SENNA PLUS 8.6 MG/1
2 TABLET ORAL AT BEDTIME
Refills: 0 | Status: DISCONTINUED | OUTPATIENT
Start: 2024-06-04 | End: 2024-06-10

## 2024-06-04 RX ORDER — SALICYLIC ACID 0.5 %
1 CLEANSER (GRAM) TOPICAL
Refills: 0 | DISCHARGE

## 2024-06-04 RX ORDER — NYSTATIN CREAM 100000 [USP'U]/G
1 CREAM TOPICAL
Refills: 0 | DISCHARGE

## 2024-06-04 RX ORDER — FUROSEMIDE 40 MG
1 TABLET ORAL
Refills: 0 | DISCHARGE

## 2024-06-04 RX ORDER — ONDANSETRON 8 MG/1
4 TABLET, FILM COATED ORAL EVERY 8 HOURS
Refills: 0 | Status: DISCONTINUED | OUTPATIENT
Start: 2024-06-04 | End: 2024-06-10

## 2024-06-04 RX ORDER — GLUCAGON INJECTION, SOLUTION 0.5 MG/.1ML
1 INJECTION, SOLUTION SUBCUTANEOUS ONCE
Refills: 0 | Status: DISCONTINUED | OUTPATIENT
Start: 2024-06-04 | End: 2024-06-10

## 2024-06-04 RX ORDER — DEXTROSE 50 % IN WATER 50 %
25 SYRINGE (ML) INTRAVENOUS ONCE
Refills: 0 | Status: DISCONTINUED | OUTPATIENT
Start: 2024-06-04 | End: 2024-06-10

## 2024-06-04 RX ORDER — ACETAMINOPHEN 500 MG
650 TABLET ORAL EVERY 6 HOURS
Refills: 0 | Status: DISCONTINUED | OUTPATIENT
Start: 2024-06-04 | End: 2024-06-10

## 2024-06-04 RX ORDER — FOLIC ACID 0.8 MG
1 TABLET ORAL DAILY
Refills: 0 | Status: DISCONTINUED | OUTPATIENT
Start: 2024-06-04 | End: 2024-06-10

## 2024-06-04 RX ORDER — ERGOCALCIFEROL 1.25 MG/1
1 CAPSULE ORAL
Refills: 0 | DISCHARGE

## 2024-06-04 RX ORDER — FUROSEMIDE 40 MG
60 TABLET ORAL ONCE
Refills: 0 | Status: COMPLETED | OUTPATIENT
Start: 2024-06-04 | End: 2024-06-04

## 2024-06-04 RX ORDER — SODIUM CHLORIDE 9 MG/ML
1000 INJECTION, SOLUTION INTRAVENOUS
Refills: 0 | Status: DISCONTINUED | OUTPATIENT
Start: 2024-06-04 | End: 2024-06-10

## 2024-06-04 RX ORDER — RISPERIDONE 4 MG/1
0.5 TABLET ORAL DAILY
Refills: 0 | Status: DISCONTINUED | OUTPATIENT
Start: 2024-06-04 | End: 2024-06-10

## 2024-06-04 RX ORDER — INSULIN LISPRO 100/ML
VIAL (ML) SUBCUTANEOUS
Refills: 0 | Status: DISCONTINUED | OUTPATIENT
Start: 2024-06-04 | End: 2024-06-10

## 2024-06-04 RX ORDER — METOPROLOL TARTRATE 50 MG
25 TABLET ORAL DAILY
Refills: 0 | Status: DISCONTINUED | OUTPATIENT
Start: 2024-06-04 | End: 2024-06-05

## 2024-06-04 RX ORDER — DAPAGLIFLOZIN 10 MG/1
1 TABLET, FILM COATED ORAL
Refills: 0 | DISCHARGE

## 2024-06-04 RX ORDER — LOSARTAN POTASSIUM 100 MG/1
50 TABLET, FILM COATED ORAL DAILY
Refills: 0 | Status: DISCONTINUED | OUTPATIENT
Start: 2024-06-04 | End: 2024-06-05

## 2024-06-04 RX ORDER — ATORVASTATIN CALCIUM 80 MG/1
40 TABLET, FILM COATED ORAL AT BEDTIME
Refills: 0 | Status: DISCONTINUED | OUTPATIENT
Start: 2024-06-04 | End: 2024-06-10

## 2024-06-04 RX ORDER — DEXTROSE 50 % IN WATER 50 %
15 SYRINGE (ML) INTRAVENOUS ONCE
Refills: 0 | Status: DISCONTINUED | OUTPATIENT
Start: 2024-06-04 | End: 2024-06-10

## 2024-06-04 RX ORDER — APIXABAN 2.5 MG/1
5 TABLET, FILM COATED ORAL EVERY 12 HOURS
Refills: 0 | Status: DISCONTINUED | OUTPATIENT
Start: 2024-06-04 | End: 2024-06-07

## 2024-06-04 RX ORDER — CHOLECALCIFEROL (VITAMIN D3) 125 MCG
1 CAPSULE ORAL
Refills: 0 | DISCHARGE

## 2024-06-04 RX ORDER — LANOLIN ALCOHOL/MO/W.PET/CERES
3 CREAM (GRAM) TOPICAL AT BEDTIME
Refills: 0 | Status: DISCONTINUED | OUTPATIENT
Start: 2024-06-04 | End: 2024-06-10

## 2024-06-04 RX ORDER — DEXTROSE 10 % IN WATER 10 %
125 INTRAVENOUS SOLUTION INTRAVENOUS ONCE
Refills: 0 | Status: DISCONTINUED | OUTPATIENT
Start: 2024-06-04 | End: 2024-06-10

## 2024-06-04 RX ORDER — SALICYLIC ACID 0.5 %
1 CLEANSER (GRAM) TOPICAL DAILY
Refills: 0 | Status: DISCONTINUED | OUTPATIENT
Start: 2024-06-04 | End: 2024-06-10

## 2024-06-04 RX ORDER — DEXTROSE 50 % IN WATER 50 %
12.5 SYRINGE (ML) INTRAVENOUS ONCE
Refills: 0 | Status: DISCONTINUED | OUTPATIENT
Start: 2024-06-04 | End: 2024-06-10

## 2024-06-04 RX ADMIN — APIXABAN 5 MILLIGRAM(S): 2.5 TABLET, FILM COATED ORAL at 23:27

## 2024-06-04 RX ADMIN — ATORVASTATIN CALCIUM 40 MILLIGRAM(S): 80 TABLET, FILM COATED ORAL at 23:25

## 2024-06-04 RX ADMIN — Medication 60 MILLIGRAM(S): at 16:59

## 2024-06-04 NOTE — H&P ADULT - NSHPPHYSICALEXAM_GEN_ALL_CORE
T(C): 36.9 (06-04-24 @ 14:56), Max: 36.9 (06-04-24 @ 14:56)  HR: 63 (06-04-24 @ 20:00) (61 - 71)  BP: 108/60 (06-04-24 @ 20:00) (99/65 - 124/55)  RR: 17 (06-04-24 @ 20:00) (16 - 17)  SpO2: 98% (06-04-24 @ 20:00) (98% - 100%)    CONSTITUTIONAL: Well groomed, no apparent distress    EYES: PERRLA and symmetric, EOMI, No conjunctival or scleral injection, non-icteric    ENMT: Oral mucosa with moist membranes. No external nasal lesions; nasal mucosa not inflamed; no pharyngeal injection or exudates  	  NECK: Supple, symmetric and without tracheal deviation    RESPIRATORY: No respiratory distress, no use of accessory muscles; CTA b/l, no wheezes, rales or rhonchi    CARDIOVASCULAR: RRRR, +S1S2, no murmurs, no rubs, no gallops  	  GASTROINTESTINAL: Soft, non tender, non distended, no rebound, no guarding    MUSCULOSKELETAL:  no digital clubbing or cyanosis; examination of the (head/neck, spine/ribs/pelvis, RUE, LUE, RLE, LLE) without misalignment    SKIN: No rashes or ulcers noted; no subcutaneous nodules or induration palpable    NEUROLOGIC: No focal deficit noted     PSYCHIATRIC: Appropriate insight/judgment; A+O x 3, mood and affect appropriate, recent/remote memory intact T(C): 36.9 (06-04-24 @ 14:56), Max: 36.9 (06-04-24 @ 14:56)  HR: 63 (06-04-24 @ 20:00) (61 - 71)  BP: 108/60 (06-04-24 @ 20:00) (99/65 - 124/55)  RR: 17 (06-04-24 @ 20:00) (16 - 17)  SpO2: 98% (06-04-24 @ 20:00) (98% - 100%)    CONSTITUTIONAL: Well groomed, no apparent distress    EYES: PERRLA and symmetric, EOMI, No conjunctival or scleral injection, non-icteric    ENMT: Oral mucosa with moist membranes. No external nasal lesions; nasal mucosa not inflamed; no pharyngeal injection or exudates  	  NECK: Supple, symmetric and without tracheal deviation    RESPIRATORY: No respiratory distress, no use of accessory muscles; CTA b/l, no wheezes, rales or rhonchi    CARDIOVASCULAR: RRRR, +S1S2, no murmurs, no rubs, no gallops; bilateral LE non-pitting edema;   	  GASTROINTESTINAL: Soft, non tender, non distended, no rebound, no guarding    MUSCULOSKELETAL:  no digital clubbing or cyanosis; examination of the (head/neck, spine/ribs/pelvis, RUE, LUE, RLE, LLE) without misalignment    SKIN: No rashes or ulcers noted; no subcutaneous nodules or induration palpable    NEUROLOGIC: No focal deficit noted     PSYCHIATRIC: A+Ox3

## 2024-06-04 NOTE — ED ADULT NURSE NOTE - CHIEF COMPLAINT QUOTE
Patient from [place] recently dx with UTI and presents with worsening confusion from Baseline and chills . No neuromotor deficits noted

## 2024-06-04 NOTE — ED ADULT TRIAGE NOTE - CHIEF COMPLAINT QUOTE
Patient from [place] recently dx with UTI and presents with worsening confusion from Baseline and chills . No neuromotor deficits noted

## 2024-06-04 NOTE — ED ADULT NURSE NOTE - NSFALLHARMRISKINTERV_ED_ALL_ED

## 2024-06-04 NOTE — ED PROVIDER NOTE - PROGRESS NOTE DETAILS
Dr. Lisa Fuentes: Patient reassessed, noted to be very deeply asleep, always screaming in response to being awakened.  Blood gas obtained to evaluate for CO2 retention.    Blood gas revealed CO2 in the 80s, has BiPAP ordered.  Attempting to place BiPAP on the patient, she woke up, and adamantly refused.  Patient was noted to be oriented x 3, with full capacity to refuse BiPAP.  Patient offered small nasal CPAP, but she still refused.  As she is not altered, no emergent necessity for BiPAP.  Endorsed to the inpatient team to follow-up.

## 2024-06-04 NOTE — ED PROVIDER NOTE - CARE PLAN
1 Principal Discharge DX:	Acute respiratory failure with hypoxia and hypercapnia  Secondary Diagnosis:	Anemia  Secondary Diagnosis:	Urinary tract infection

## 2024-06-04 NOTE — PATIENT PROFILE ADULT - NS PRO AD ANY ON CHART
Crystal Urena presents to the clinic for removal of staples. The patient has had staples in place for 7 days. There has been no patient reported signs or symptoms of infection or drainage. 3  staples are seen and located on the posterior head. Tetanus status is up to date. All staples were easily removed today. Routine wound care discussed by the RN or provider. The patient will follow up as needed.   No

## 2024-06-04 NOTE — ED PROVIDER NOTE - PHYSICAL EXAMINATION
Vital signs reviewed  General: Well nourished, not in acute distress  Skin: Warm, dry  Head & neck: NCAT, supple neck  Eyes: EOMI, PER B/L  ENT: MMM  Card: RRR, S1, S2; no murmurs, no rubs, no gallops  Resp: Normal respiratory effort, CTAB, no rales, no wheezing  Abd: Soft, ND, NT, no rebound, no guarding; no CVAT  Ext: Pulses palpable distally; +B/L pitting edema; no calf tenderness  NeuroMSK: Grossly intact  Psych: AAOx3, cooperative, appropriate

## 2024-06-04 NOTE — ED PROVIDER NOTE - OBJECTIVE STATEMENT
Patient is an 83-year-old woman with a history of diabetes, hypertension, lower extremity edema, bipolar disorder, tachybrady syndrome on Eliquis (declining PPM), resident of [facility], mild atrial stenosis, recently diagnosed UTI on Levaquin, at baseline uses walker or wheelchair.  Patient is sent for evaluation of worsening confusion.  On my exam, patient is alert, oriented x 3, states she had some chills earlier, but otherwise has no acute complaints.  Patient's O2 saturation is noted to be intermittently dropping to mid-80s%, but spontaneously improving to mid-90s%.  Patient states that she does not have COPD, never smoked, but does use oxygen as needed sometimes.   No chest pain, shortness of breath, cough, abdominal pain, vomiting, diarrhea. Patient is an 83-year-old woman with a history of diabetes, hypertension, lower extremity edema, bipolar disorder, tachybrady syndrome on Eliquis (declining PPM), resident of [facility], mild atrial stenosis, recently diagnosed UTI on Levaquin, at baseline uses walker or wheelchair.  Patient is sent for evaluation of worsening confusion.  On my exam, patient is alert, oriented x 3, states she had some chills earlier, but otherwise has no acute complaints.  Patient's O2 saturation is noted to be intermittently dropping to mid-80s%, but spontaneously improving to mid-90s%.  Patient states that she does not have COPD, never smoked, but does use oxygen as needed sometimes.   No chest pain, shortness of breath, cough, abdominal pain, vomiting, diarrhea.    See progress notes below for further history/progression of ED course.

## 2024-06-04 NOTE — H&P ADULT - NSHPLABSRESULTS_GEN_ALL_CORE
8.6    8.04  )-----------( 324      ( 04 Jun 2024 16:14 )             29.8     06-04    141  |  96<L>  |  21<H>  ----------------------------<  110<H>  4.3   |  35<H>  |  1.4    Ca    8.6      04 Jun 2024 16:14    TPro  5.8<L>  /  Alb  3.8  /  TBili  0.2  /  DBili  x   /  AST  7   /  ALT  9   /  AlkPhos  97  06-04      LIVER FUNCTIONS - ( 04 Jun 2024 16:14 )  Alb: 3.8 g/dL / Pro: 5.8 g/dL / ALK PHOS: 97 U/L / ALT: 9 U/L / AST: 7 U/L / GGT: x           Urinalysis Basic - ( 04 Jun 2024 16:14 )    Color: x / Appearance: x / SG: x / pH: x  Gluc: 110 mg/dL / Ketone: x  / Bili: x / Urobili: x   Blood: x / Protein: x / Nitrite: x   Leuk Esterase: x / RBC: x / WBC x   Sq Epi: x / Non Sq Epi: x / Bacteria: x    RADIOLOGY & ADDITIONAL STUDIES:     < from: Xray Chest 1 View AP/PA (06.04.24 @ 15:57) >      Impression:    No radiographic evidence of acute cardiopulmonary disease. Stable   parenchymal pattern.        --- End of Report ---    < end of copied text >

## 2024-06-04 NOTE — H&P ADULT - ASSESSMENT
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  Patient is sent for evaluation of worsening confusion.  On my exam, patient is alert, oriented x 3, states she had some chills earlier, but otherwise has no acute complaints.  Patient's O2 saturation is noted to be intermittently dropping to mid-80s%, but spontaneously improving to mid-90s%.  Patient states that she does not have COPD, never smoked, but does use oxygen as needed sometimes. No chest pain, shortness of breath, cough, abdominal pain, vomiting, diarrhea.    PLAN:     #AHRF likely 2/2 CO2 retention vs pleural effusion ( R> L)   #Respiratory Acidosis   #LE edema       #HTN  #Recently diagnosed AFib with tachy-tamy syndrome ( refused PPM in the past)   #Mild AS  -       #Bipolar disorder   -     #DM   -     #DVT PPx: Eliquis   #GI PPx: None   #Diet: DASH  #Activity: IAT         83-year-old woman with a history of diabetes, hypertension, lower extremity edema, bipolar disorder, tachybrady syndrome on Eliquis (declining PPM), resident of [facility], mild atrial stenosis, recently diagnosed UTI on Levaquin, at baseline uses walker or wheelchair.  Patient is sent for evaluation of worsening confusion.  On my exam, patient is alert, oriented x 3, states she had some chills earlier, but otherwise has no acute complaints.  Patient's O2 saturation is noted to be intermittently dropping to mid-80s%, but spontaneously improving to mid-90s%.  Patient states that she does not have COPD, never smoked, but does use oxygen as needed sometimes. No chest pain, shortness of breath, cough, abdominal pain, vomiting, diarrhea.    PLAN:     #AHRF likely 2/2 CO2 retention vs pleural effusion ( R> L)   #Respiratory Acidosis ( likely from MANJEET)  #Recently diagnosed AFib with tachy-tamy syndrome ( refused PPM in the past)   #LE edema   - Per patient she is on O2 at baseline ( unsure how many liters)   - VBG: pH 7.30, pCO2 85   - Bicarb 35   - per Nursing home records pt has order for BiPAP; However, she is refusing BiPAP here   - CXR: on wet read bilateral pleural effusions ( R> L)   - Echo 3/2024 : EF 60% LA enlargement, mild aortic stenosis     - s/p 1 dose IV lasix 60 in ED with good response ( on Lasix 40 mg PO BID at NH)  - Monitor I's and O's   - BP on the lower side ( MAP > 60) and pt is asymptomatic  - Monitor BP  - Fluid restriction   - Hold Digoxin in a setting of pleural effusion   - C/w Eliquis   - Obtain trop   - Monitor on tele     #HTN  #Mild AS  - Losartan and Metoprolol succinate with holding parameters     #Bipolar disorder   #Schizophrenia  - c/w Risperdal     #DM   - on Farxiga    #Recently Diagnosed UTI?  - Obtain UA   - hold off on antibiotics for now     #DVT PPx: Eliquis   #GI PPx: None   #Diet: DASH  #Activity: IAT         83-year-old woman with a history of diabetes, hypertension, lower extremity edema, bipolar disorder, tachybrady syndrome on Eliquis (declining PPM), resident of [facility], mild atrial stenosis, recently diagnosed UTI on Levaquin, at baseline uses walker or wheelchair.  Patient is sent for evaluation of worsening confusion.  On my exam, patient is alert, oriented x 3, states she had some chills earlier, but otherwise has no acute complaints.  Patient's O2 saturation is noted to be intermittently dropping to mid-80s%, but spontaneously improving to mid-90s%.  Patient states that she does not have COPD, never smoked, but does use oxygen as needed sometimes. No chest pain, shortness of breath, cough, abdominal pain, vomiting, diarrhea.    PLAN:     #AHRF likely 2/2 CO2 retention vs pleural effusion ( R> L)   #Respiratory Acidosis ( likely from MANJEET)  #Recently diagnosed AFib with tachy-tamy syndrome ( refused PPM in the past)   #LE edema   - Per patient she is on O2 at baseline ( unsure how many liters)   - VBG: pH 7.30, pCO2 85   - Bicarb 35   - per Nursing home records pt has order for BiPAP; However, she is refusing BiPAP here   - CXR: on wet read bilateral pleural effusions ( R> L)   - Echo 3/2024 : EF 60% LA enlargement, mild aortic stenosis     - s/p 1 dose IV lasix 60 in ED with good response ( on Lasix 40 mg PO BID at NH)  - Monitor I's and O's   - BP on the lower side ( MAP > 60) and pt is asymptomatic  - Monitor BP  - Fluid restriction   - Hold Digoxin in a setting of pleural effusion   - C/w Eliquis   - Obtain trop   - Monitor on tele     #Normocytic Anemia - 2/2 MANNY   - Hgb 8.6 ( baseline ~8- 9)   - Iron studies:  Iron with Total Binding Capacity (03.30.24 @ 12:26)     Iron - Total Binding Capacity.: 244 ug/dL    % Saturation, Iron: 15 %     Iron Total: 37 ug/dL     Unsaturated Iron Binding Capacity: 207 ug/dL     Ferritin: 37 ng/mL (03.30.24 @ 12:54)   - c/w Iron supplements ( on Ferrous sulfate 325 mg BID outpatient; consider switching it to once a day on discharge)     #HTN  #Mild AS  - Losartan and Metoprolol succinate with holding parameters     #Bipolar disorder   #Schizophrenia  - c/w Risperdal     #DM   - on Farxiga    #Recently Diagnosed UTI?  - Obtain UA   - hold off on antibiotics for now as patient is not symptomatic and no other signs of infection noted ( Afebrile; no leukocytosis/leukopenia)     #DVT PPx: Eliquis   #GI PPx: None   #Diet: DASH  #Activity: IAT

## 2024-06-04 NOTE — ED ADULT NURSE REASSESSMENT NOTE - NS ED NURSE REASSESS COMMENT FT1
noted to be lethargic. HR fluctuates 40-70's. o2 drops to 80s when sleeping. provider aware. see MD orders.

## 2024-06-04 NOTE — ED PROVIDER NOTE - CLINICAL SUMMARY MEDICAL DECISION MAKING FREE TEXT BOX
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  Patient is sent for evaluation of worsening confusion.  On my exam, patient is alert, oriented x 3, states she had some chills earlier, but otherwise has no acute complaints.  Patient's O2 saturation is noted to be intermittently dropping to mid-80s%, but spontaneously improving to mid-90s%.  Patient states that she does not have COPD, never smoked, but does use oxygen as needed sometimes. No chest pain, shortness of breath, cough, abdominal pain, vomiting, diarrhea.    History obtained from nursing supervisor at [facility]. Per the nursing supervisor, pt was diagnosed with UTI earlier today and was started on Levaquin ( however, she did not receive any dose today). When the RN was drawing blood patient was not herself and per RN her blood also clotted quickly despite being on Eliquis.     In ED vitals were  T(F): 98.4  HR: 61  BP: 99/65  RR: 16  SpO2: 99% on 2 L O2     Labs were significant for Hgb 8.6, MCV 94.3, Cl 96, Bicarb 35, BUN 21, Cr 1.4 ( baseline 1.4), pBNP 2838 ( baseline 1440 in 3/2024).     VBG: pH 7.30, pCO2 85    CXR: stable cardiomegaly     EKG: AFib     Pt received IV lasix 60 mg and was offered BiPAP; however, she refused it. (Pt was A&Ox3). She is being admitted to [facility] for further workup.

## 2024-06-04 NOTE — ED ADULT NURSE NOTE - OBJECTIVE STATEMENT
Patient from [place] recently dx with UTI and presents with worsening confusion from Baseline and chills . No neuromotor deficits noted

## 2024-06-04 NOTE — H&P ADULT - HISTORY OF PRESENT ILLNESS
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  Patient is sent for evaluation of worsening confusion.  On my exam, patient is alert, oriented x 3, states she had some chills earlier, but otherwise has no acute complaints.  Patient's O2 saturation is noted to be intermittently dropping to mid-80s%, but spontaneously improving to mid-90s%.  Patient states that she does not have COPD, never smoked, but does use oxygen as needed sometimes. No chest pain, shortness of breath, cough, abdominal pain, vomiting, diarrhea.    In ED vitals were  T(F): 98.4  HR: 61  BP: 99/65  RR: 16  SpO2: 99% on 2 L O2     Labs were significant for Hgb 8.6, MCV 94.3, Cl 96, Bicarb 35, BUN 21, Cr 1.4 ( baseline 1.4), pBNP 2838 ( baseline 1440 in 3/2024).     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  Patient is sent for evaluation of worsening confusion.  On my exam, patient is alert, oriented x 3, states she had some chills earlier, but otherwise has no acute complaints.  Patient's O2 saturation is noted to be intermittently dropping to mid-80s%, but spontaneously improving to mid-90s%.  Patient states that she does not have COPD, never smoked, but does use oxygen as needed sometimes. No chest pain, shortness of breath, cough, abdominal pain, vomiting, diarrhea.    In ED vitals were  T(F): 98.4  HR: 61  BP: 99/65  RR: 16  SpO2: 99% on 2 L O2     Labs were significant for Hgb 8.6, MCV 94.3, Cl 96, Bicarb 35, BUN 21, Cr 1.4 ( baseline 1.4), pBNP 2838 ( baseline 1440 in 3/2024).     VBG: pH 7.30, pCO2 85    CXR: stable cardiomegaly     EKG: AFib     Pt received IV lasix 60 mg and was offered BiPAP; however, she refused it. (Pt was A&Ox3). She is being admitted to [facility] for further workup.  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  Patient is sent for evaluation of worsening confusion.  On my exam, patient is alert, oriented x 3, states she had some chills earlier, but otherwise has no acute complaints.  Patient's O2 saturation is noted to be intermittently dropping to mid-80s%, but spontaneously improving to mid-90s%.  Patient states that she does not have COPD, never smoked, but does use oxygen as needed sometimes. No chest pain, shortness of breath, cough, abdominal pain, vomiting, diarrhea.    History obtained from nursing supervisor at [facility]. Per the nursing supervisor, pt was diagnosed with UTI earlier today and was started on Levaquin ( however, she did not receive any dose today). When the RN was drawing blood patient was not herself and per RN her blood also clotted quickly despite being on Eliquis.     In ED vitals were  T(F): 98.4  HR: 61  BP: 99/65  RR: 16  SpO2: 99% on 2 L O2     Labs were significant for Hgb 8.6, MCV 94.3, Cl 96, Bicarb 35, BUN 21, Cr 1.4 ( baseline 1.4), pBNP 2838 ( baseline 1440 in 3/2024).     VBG: pH 7.30, pCO2 85    CXR: stable cardiomegaly     EKG: AFib     Pt received IV lasix 60 mg and was offered BiPAP; however, she refused it. (Pt was A&Ox3). She is being admitted to [facility] for further workup.

## 2024-06-05 LAB
A1C WITH ESTIMATED AVERAGE GLUCOSE RESULT: 7.1 % — HIGH (ref 4–5.6)
ANION GAP SERPL CALC-SCNC: 6 MMOL/L — LOW (ref 7–14)
APTT BLD: 31.7 SEC — SIGNIFICANT CHANGE UP (ref 27–39.2)
BASOPHILS # BLD AUTO: 0.06 K/UL — SIGNIFICANT CHANGE UP (ref 0–0.2)
BASOPHILS NFR BLD AUTO: 0.7 % — SIGNIFICANT CHANGE UP (ref 0–1)
BUN SERPL-MCNC: 28 MG/DL — HIGH (ref 10–20)
CALCIUM SERPL-MCNC: 9.2 MG/DL — SIGNIFICANT CHANGE UP (ref 8.4–10.4)
CHLORIDE SERPL-SCNC: 98 MMOL/L — SIGNIFICANT CHANGE UP (ref 98–110)
CO2 SERPL-SCNC: 38 MMOL/L — HIGH (ref 17–32)
CREAT SERPL-MCNC: 1.5 MG/DL — SIGNIFICANT CHANGE UP (ref 0.7–1.5)
CULTURE RESULTS: SIGNIFICANT CHANGE UP
D DIMER BLD IA.RAPID-MCNC: 155 NG/ML DDU — SIGNIFICANT CHANGE UP
EGFR: 34 ML/MIN/1.73M2 — LOW
EOSINOPHIL # BLD AUTO: 0.26 K/UL — SIGNIFICANT CHANGE UP (ref 0–0.7)
EOSINOPHIL NFR BLD AUTO: 3.1 % — SIGNIFICANT CHANGE UP (ref 0–8)
ESTIMATED AVERAGE GLUCOSE: 157 MG/DL — HIGH (ref 68–114)
GLUCOSE BLDC GLUCOMTR-MCNC: 128 MG/DL — HIGH (ref 70–99)
GLUCOSE BLDC GLUCOMTR-MCNC: 136 MG/DL — HIGH (ref 70–99)
GLUCOSE BLDC GLUCOMTR-MCNC: 136 MG/DL — HIGH (ref 70–99)
GLUCOSE BLDC GLUCOMTR-MCNC: 164 MG/DL — HIGH (ref 70–99)
GLUCOSE SERPL-MCNC: 110 MG/DL — HIGH (ref 70–99)
HCT VFR BLD CALC: 31.1 % — LOW (ref 37–47)
HGB BLD-MCNC: 8.6 G/DL — LOW (ref 12–16)
IMM GRANULOCYTES NFR BLD AUTO: 0.6 % — HIGH (ref 0.1–0.3)
INR BLD: 1.58 RATIO — HIGH (ref 0.65–1.3)
LYMPHOCYTES # BLD AUTO: 1.31 K/UL — SIGNIFICANT CHANGE UP (ref 1.2–3.4)
LYMPHOCYTES # BLD AUTO: 15.5 % — LOW (ref 20.5–51.1)
MCHC RBC-ENTMCNC: 26.5 PG — LOW (ref 27–31)
MCHC RBC-ENTMCNC: 27.7 G/DL — LOW (ref 32–37)
MCV RBC AUTO: 95.7 FL — SIGNIFICANT CHANGE UP (ref 81–99)
MONOCYTES # BLD AUTO: 0.82 K/UL — HIGH (ref 0.1–0.6)
MONOCYTES NFR BLD AUTO: 9.7 % — HIGH (ref 1.7–9.3)
NEUTROPHILS # BLD AUTO: 5.97 K/UL — SIGNIFICANT CHANGE UP (ref 1.4–6.5)
NEUTROPHILS NFR BLD AUTO: 70.4 % — SIGNIFICANT CHANGE UP (ref 42.2–75.2)
NRBC # BLD: 0 /100 WBCS — SIGNIFICANT CHANGE UP (ref 0–0)
NT-PROBNP SERPL-SCNC: 2440 PG/ML — HIGH (ref 0–300)
PLATELET # BLD AUTO: 315 K/UL — SIGNIFICANT CHANGE UP (ref 130–400)
PMV BLD: 9.9 FL — SIGNIFICANT CHANGE UP (ref 7.4–10.4)
POTASSIUM SERPL-MCNC: 4.6 MMOL/L — SIGNIFICANT CHANGE UP (ref 3.5–5)
POTASSIUM SERPL-SCNC: 4.6 MMOL/L — SIGNIFICANT CHANGE UP (ref 3.5–5)
PROTHROM AB SERPL-ACNC: 18.1 SEC — HIGH (ref 9.95–12.87)
RBC # BLD: 3.25 M/UL — LOW (ref 4.2–5.4)
RBC # FLD: 14.9 % — HIGH (ref 11.5–14.5)
SODIUM SERPL-SCNC: 142 MMOL/L — SIGNIFICANT CHANGE UP (ref 135–146)
SPECIMEN SOURCE: SIGNIFICANT CHANGE UP
WBC # BLD: 8.47 K/UL — SIGNIFICANT CHANGE UP (ref 4.8–10.8)
WBC # FLD AUTO: 8.47 K/UL — SIGNIFICANT CHANGE UP (ref 4.8–10.8)

## 2024-06-05 PROCEDURE — 99222 1ST HOSP IP/OBS MODERATE 55: CPT

## 2024-06-05 RX ORDER — FUROSEMIDE 40 MG
40 TABLET ORAL EVERY 12 HOURS
Refills: 0 | Status: DISCONTINUED | OUTPATIENT
Start: 2024-06-05 | End: 2024-06-10

## 2024-06-05 RX ADMIN — APIXABAN 5 MILLIGRAM(S): 2.5 TABLET, FILM COATED ORAL at 18:01

## 2024-06-05 RX ADMIN — SENNA PLUS 2 TABLET(S): 8.6 TABLET ORAL at 21:36

## 2024-06-05 RX ADMIN — RISPERIDONE 0.5 MILLIGRAM(S): 4 TABLET ORAL at 12:22

## 2024-06-05 RX ADMIN — Medication 1: at 12:21

## 2024-06-05 RX ADMIN — Medication 40 MILLIGRAM(S): at 13:15

## 2024-06-05 RX ADMIN — Medication 1 APPLICATION(S): at 12:22

## 2024-06-05 RX ADMIN — Medication 1 MILLIGRAM(S): at 12:22

## 2024-06-05 RX ADMIN — Medication 40 MILLIGRAM(S): at 18:02

## 2024-06-05 RX ADMIN — APIXABAN 5 MILLIGRAM(S): 2.5 TABLET, FILM COATED ORAL at 05:20

## 2024-06-05 RX ADMIN — ATORVASTATIN CALCIUM 40 MILLIGRAM(S): 80 TABLET, FILM COATED ORAL at 21:36

## 2024-06-05 NOTE — CONSULT NOTE ADULT - SUBJECTIVE AND OBJECTIVE BOX
Patient is a 83y old  Female who presents with a chief complaint of AMS (05 Jun 2024 08:31)      HPI:  83-year-old woman with a history of diabetes, hypertension, lower extremity edema, bipolar disorder, tachybrady syndrome on Eliquis (declining PPM), CKD 3b, resident of [facility], mild atrial stenosis, recently diagnosed UTI on Levaquin(did not get any dose), at baseline uses walker or wheelchair.  Patient is sent for evaluation of worsening confusion.  On my exam, patient is alert, oriented x 3, states she had some chills earlier, but otherwise has no acute complaints.  Patient's O2 saturation is noted to be intermittently dropping to mid-80s%, but spontaneously improving to mid-90s%.  Patient states that she does not have COPD, never smoked, but does use oxygen as needed sometimes. No chest pain, shortness of breath, cough, abdominal pain, vomiting, diarrhea.    History obtained from nursing supervisor at [facility]. Per the nursing supervisor, pt was diagnosed with UTI earlier today and was started on Levaquin ( however, she did not receive any dose today). When the RN was drawing blood patient was not herself and per RN her blood also clotted quickly despite being on Eliquis.     In ED vitals were  T(F): 98.4  HR: 61  BP: 99/65  RR: 16  SpO2: 99% on 2 L O2     Labs were significant for Hgb 8.6, MCV 94.3, Cl 96, Bicarb 35, BUN 21, Cr 1.4 ( baseline 1.4), pBNP 2838 ( baseline 1440 in 3/2024).     VBG: pH 7.30, pCO2 85    CXR: stable cardiomegaly     EKG: AFib     Pt received IV lasix 60 mg and was offered BiPAP; however, she refused it. (Pt was A&Ox3). She is being admitted to [facility] for further workup.  (04 Jun 2024 21:10)      PAST MEDICAL & SURGICAL HISTORY:  Diabetes mellitus      Hypertension      Schizoaffective disorder      Edema  Bilateral LE      Disorder of thyroid, unspecified  Son cannot provide details. States she had "thyroid surgery" decades ago when she was young      No significant past surgical history          SOCIAL HX:   Smoking                         ETOH                            Other    FAMILY HISTORY:  No pertinent family history in first degree relatives    .  No cardiovascular or pulmonary family history     REVIEW OF SYSTEMS:    All ROS are negative exept per HPI       Allergies    No Known Allergies    Intolerances          PHYSICAL EXAM  Vital Signs Last 24 Hrs  T(C): 36.4 (05 Jun 2024 04:15), Max: 36.9 (04 Jun 2024 14:56)  T(F): 97.6 (05 Jun 2024 04:15), Max: 98.4 (04 Jun 2024 14:56)  HR: 65 (05 Jun 2024 04:15) (61 - 75)  BP: 96/55 (05 Jun 2024 04:15) (70/47 - 124/55)  BP(mean): --  RR: 18 (05 Jun 2024 04:15) (16 - 20)  SpO2: 98% (05 Jun 2024 04:15) (98% - 100%)    Parameters below as of 04 Jun 2024 21:30  Patient On (Oxygen Delivery Method): nasal cannula  O2 Flow (L/min): 3      CONSTITUTIONAL:  Well nourished.  NAD    ENT:   Airway patent,   No thrush    EYES:   Clear bilaterally,   pupils equal,   round and reactive to light.    CARDIAC:   Normal rate,   regular rhythm.    no edema      RESPIRATORY:   No wheezing   Normal chest expansion  Not tachypneic,  No use of accessory muscles    GASTROINTESTINAL:  Abdomen soft, non-tender,   No guarding,   Positive BS    MUSCULOSKELETAL:   Range of motion is not limited,  No clubbing, cyanosis    NEUROLOGICAL:   Alert and oriented   No motor deficits.    SKIN:   Skin normal color for race,   No evidence of rash.      HEME LYMPH:   No cervical  lymphadenopathy.  no inguinal lymphadenopathy          LABS:                          8.6    8.47  )-----------( 315      ( 05 Jun 2024 06:44 )             31.1                                               06-05    142  |  98  |  28<H>  ----------------------------<  110<H>  4.6   |  38<H>  |  1.5    Ca    9.2      05 Jun 2024 06:44    TPro  5.8<L>  /  Alb  3.8  /  TBili  0.2  /  DBili  x   /  AST  7   /  ALT  9   /  AlkPhos  97  06-04      PT/INR - ( 05 Jun 2024 06:44 )   PT: 18.10 sec;   INR: 1.58 ratio         PTT - ( 05 Jun 2024 06:44 )  PTT:31.7 sec                                       Urinalysis Basic - ( 05 Jun 2024 06:44 )    Color: x / Appearance: x / SG: x / pH: x  Gluc: 110 mg/dL / Ketone: x  / Bili: x / Urobili: x   Blood: x / Protein: x / Nitrite: x   Leuk Esterase: x / RBC: x / WBC x   Sq Epi: x / Non Sq Epi: x / Bacteria: x                                                  LIVER FUNCTIONS - ( 04 Jun 2024 16:14 )  Alb: 3.8 g/dL / Pro: 5.8 g/dL / ALK PHOS: 97 U/L / ALT: 9 U/L / AST: 7 U/L / GGT: x                                                                                                MEDICATIONS  (STANDING):  apixaban 5 milliGRAM(s) Oral every 12 hours  atorvastatin 40 milliGRAM(s) Oral at bedtime  dextrose 10% Bolus 125 milliLiter(s) IV Bolus once  dextrose 5%. 1000 milliLiter(s) (100 mL/Hr) IV Continuous <Continuous>  dextrose 5%. 1000 milliLiter(s) (50 mL/Hr) IV Continuous <Continuous>  dextrose 50% Injectable 25 Gram(s) IV Push once  dextrose 50% Injectable 12.5 Gram(s) IV Push once  folic acid 1 milliGRAM(s) Oral daily  glucagon  Injectable 1 milliGRAM(s) IntraMuscular once  insulin lispro (ADMELOG) corrective regimen sliding scale   SubCutaneous three times a day before meals  metoprolol succinate ER 25 milliGRAM(s) Oral daily  risperiDONE   Solution 0.5 milliGRAM(s) Oral daily  senna 2 Tablet(s) Oral at bedtime  vitamin A &amp; D Ointment 1 Application(s) Topical daily    MEDICATIONS  (PRN):  acetaminophen     Tablet .. 650 milliGRAM(s) Oral every 6 hours PRN Temp greater or equal to 38C (100.4F), Mild Pain (1 - 3)  aluminum hydroxide/magnesium hydroxide/simethicone Suspension 30 milliLiter(s) Oral every 4 hours PRN Dyspepsia  dextrose Oral Gel 15 Gram(s) Oral once PRN Blood Glucose LESS THAN 70 milliGRAM(s)/deciliter  melatonin 3 milliGRAM(s) Oral at bedtime PRN Insomnia  ondansetron Injectable 4 milliGRAM(s) IV Push every 8 hours PRN Nausea and/or Vomiting      X-Rays reviewed:    CXR interpreted by me: bilateral small pleural effusions, no focal opacity Patient is a 83y old  Female who presents with a chief complaint of AMS (05 Jun 2024 08:31)      HPI:  83-year-old woman with a history of diabetes, hypertension, lower extremity edema, bipolar disorder, tachybrady syndrome on Eliquis (declining PPM), CKD 3b, resident of [facility], mild atrial stenosis, recently diagnosed UTI on Levaquin(did not get any dose), at baseline uses walker or wheelchair.  Patient is sent for evaluation of worsening confusion.  On my exam, patient is alert, oriented x 3, states she had some chills earlier, but otherwise has no acute complaints.  Patient's O2 saturation is noted to be intermittently dropping to mid-80s%, but spontaneously improving to mid-90s%.  Patient states that she does not have COPD, never smoked, but does use oxygen as needed sometimes. No chest pain, shortness of breath, cough, abdominal pain, vomiting, diarrhea.    History obtained from nursing supervisor at [facility]. Per the nursing supervisor, pt was diagnosed with UTI earlier today and was started on Levaquin ( however, she did not receive any dose today). When the RN was drawing blood patient was not herself and per RN her blood also clotted quickly despite being on Eliquis.     In ED vitals were  T(F): 98.4  HR: 61  BP: 99/65  RR: 16  SpO2: 99% on 2 L O2     Labs were significant for Hgb 8.6, MCV 94.3, Cl 96, Bicarb 35, BUN 21, Cr 1.4 ( baseline 1.4), pBNP 2838 ( baseline 1440 in 3/2024).     VBG: pH 7.30, pCO2 85    CXR: stable cardiomegaly     EKG: AFib     Pt received IV lasix 60 mg and was offered BiPAP; however, she refused it. (Pt was A&Ox3). She is being admitted to [facility] for further workup.  (04 Jun 2024 21:10)      PAST MEDICAL & SURGICAL HISTORY:  Diabetes mellitus      Hypertension      Schizoaffective disorder      Edema  Bilateral LE      Disorder of thyroid, unspecified  Son cannot provide details. States she had "thyroid surgery" decades ago when she was young      No significant past surgical history          SOCIAL HX:   Smoking                         ETOH                            Other    FAMILY HISTORY:  No pertinent family history in first degree relatives    .  No cardiovascular or pulmonary family history     REVIEW OF SYSTEMS:    All ROS are negative exept per HPI       Allergies    No Known Allergies    Intolerances          PHYSICAL EXAM  Vital Signs Last 24 Hrs  T(C): 36.4 (05 Jun 2024 04:15), Max: 36.9 (04 Jun 2024 14:56)  T(F): 97.6 (05 Jun 2024 04:15), Max: 98.4 (04 Jun 2024 14:56)  HR: 65 (05 Jun 2024 04:15) (61 - 75)  BP: 96/55 (05 Jun 2024 04:15) (70/47 - 124/55)  BP(mean): --  RR: 18 (05 Jun 2024 04:15) (16 - 20)  SpO2: 98% (05 Jun 2024 04:15) (98% - 100%)    Parameters below as of 04 Jun 2024 21:30  Patient On (Oxygen Delivery Method): nasal cannula  O2 Flow (L/min): 3      CONSTITUTIONAL:  Morbidly obese, lethargic but easily arousable    ENT:   Airway patent,   No thrush    EYES:   Clear bilaterally,   pupils equal,   round and reactive to light.    CARDIAC:   blowing mid systolic murmur  irregularly irregular rhythm  tense no pitting edema up to abdomen      RESPIRATORY:   LLL crackles, RLL absent breath sounds  poor inspiratory effort  no wheeze or rhales    GASTROINTESTINAL:  distended but soft, non-tender  No guarding  Positive BS    MUSCULOSKELETAL:   Range of motion is not limited,  No clubbing, cyanosis    NEUROLOGICAL:   Lethargic but arousable  No motor deficits.    SKIN:   Skin normal color for race,   No evidence of rash.      LABS:                          8.6    8.47  )-----------( 315      ( 05 Jun 2024 06:44 )             31.1                                               06-05    142  |  98  |  28<H>  ----------------------------<  110<H>  4.6   |  38<H>  |  1.5    Ca    9.2      05 Jun 2024 06:44    TPro  5.8<L>  /  Alb  3.8  /  TBili  0.2  /  DBili  x   /  AST  7   /  ALT  9   /  AlkPhos  97  06-04      PT/INR - ( 05 Jun 2024 06:44 )   PT: 18.10 sec;   INR: 1.58 ratio         PTT - ( 05 Jun 2024 06:44 )  PTT:31.7 sec                                       Urinalysis Basic - ( 05 Jun 2024 06:44 )    Color: x / Appearance: x / SG: x / pH: x  Gluc: 110 mg/dL / Ketone: x  / Bili: x / Urobili: x   Blood: x / Protein: x / Nitrite: x   Leuk Esterase: x / RBC: x / WBC x   Sq Epi: x / Non Sq Epi: x / Bacteria: x                                                  LIVER FUNCTIONS - ( 04 Jun 2024 16:14 )  Alb: 3.8 g/dL / Pro: 5.8 g/dL / ALK PHOS: 97 U/L / ALT: 9 U/L / AST: 7 U/L / GGT: x                                                                                                MEDICATIONS  (STANDING):  apixaban 5 milliGRAM(s) Oral every 12 hours  atorvastatin 40 milliGRAM(s) Oral at bedtime  dextrose 10% Bolus 125 milliLiter(s) IV Bolus once  dextrose 5%. 1000 milliLiter(s) (100 mL/Hr) IV Continuous <Continuous>  dextrose 5%. 1000 milliLiter(s) (50 mL/Hr) IV Continuous <Continuous>  dextrose 50% Injectable 25 Gram(s) IV Push once  dextrose 50% Injectable 12.5 Gram(s) IV Push once  folic acid 1 milliGRAM(s) Oral daily  glucagon  Injectable 1 milliGRAM(s) IntraMuscular once  insulin lispro (ADMELOG) corrective regimen sliding scale   SubCutaneous three times a day before meals  metoprolol succinate ER 25 milliGRAM(s) Oral daily  risperiDONE   Solution 0.5 milliGRAM(s) Oral daily  senna 2 Tablet(s) Oral at bedtime  vitamin A &amp; D Ointment 1 Application(s) Topical daily    MEDICATIONS  (PRN):  acetaminophen     Tablet .. 650 milliGRAM(s) Oral every 6 hours PRN Temp greater or equal to 38C (100.4F), Mild Pain (1 - 3)  aluminum hydroxide/magnesium hydroxide/simethicone Suspension 30 milliLiter(s) Oral every 4 hours PRN Dyspepsia  dextrose Oral Gel 15 Gram(s) Oral once PRN Blood Glucose LESS THAN 70 milliGRAM(s)/deciliter  melatonin 3 milliGRAM(s) Oral at bedtime PRN Insomnia  ondansetron Injectable 4 milliGRAM(s) IV Push every 8 hours PRN Nausea and/or Vomiting      X-Rays reviewed:    CXR interpreted by me: bilateral small pleural effusions, no focal opacity Patient is a 83y old  Female who presents with a chief complaint of AMS (05 Jun 2024 08:31)      HPI:  83-year-old woman with a history of diabetes, hypertension, lower extremity edema, bipolar disorder, tachybrady syndrome on Eliquis (declining PPM), CKD 3b, resident of [facility], mild atrial stenosis, recently diagnosed UTI on Levaquin(did not get any dose), at baseline uses walker or wheelchair.  Patient is sent for evaluation of worsening confusion.  On my exam, patient is alert, oriented x 3, states she had some chills earlier, but otherwise has no acute complaints.  Patient's O2 saturation is noted to be intermittently dropping to mid-80s%, but spontaneously improving to mid-90s%.  Patient states that she does not have COPD, never smoked, but does use oxygen as needed sometimes. No chest pain, shortness of breath, cough, abdominal pain, vomiting, diarrhea.    History obtained from nursing supervisor at [facility]. Per the nursing supervisor, pt was diagnosed with UTI earlier today and was started on Levaquin ( however, she did not receive any dose today). When the RN was drawing blood patient was not herself and per RN her blood also clotted quickly despite being on Eliquis.     In ED vitals were  T(F): 98.4  HR: 61  BP: 99/65  RR: 16  SpO2: 99% on 2 L O2     Labs were significant for Hgb 8.6, MCV 94.3, Cl 96, Bicarb 35, BUN 21, Cr 1.4 ( baseline 1.4), pBNP 2838 ( baseline 1440 in 3/2024).     VBG: pH 7.30, pCO2 85    CXR: stable cardiomegaly     EKG: AFib     Pt received IV lasix 60 mg and was offered BiPAP; however, she refused it. (Pt was A&Ox3). She is being admitted to [facility] for further workup.  (04 Jun 2024 21:10)      PAST MEDICAL & SURGICAL HISTORY:  Diabetes mellitus      Hypertension      Schizoaffective disorder      Edema  Bilateral LE      Disorder of thyroid, unspecified  Son cannot provide details. States she had "thyroid surgery" decades ago when she was young      No significant past surgical history            FAMILY HISTORY:  No pertinent family history in first degree relatives    .  No cardiovascular or pulmonary family history     REVIEW OF SYSTEMS:    All ROS are negative exept per HPI       Allergies    No Known Allergies    Intolerances          PHYSICAL EXAM  Vital Signs Last 24 Hrs  T(C): 36.4 (05 Jun 2024 04:15), Max: 36.9 (04 Jun 2024 14:56)  T(F): 97.6 (05 Jun 2024 04:15), Max: 98.4 (04 Jun 2024 14:56)  HR: 65 (05 Jun 2024 04:15) (61 - 75)  BP: 96/55 (05 Jun 2024 04:15) (70/47 - 124/55)  BP(mean): --  RR: 18 (05 Jun 2024 04:15) (16 - 20)  SpO2: 98% (05 Jun 2024 04:15) (98% - 100%)    Parameters below as of 04 Jun 2024 21:30  Patient On (Oxygen Delivery Method): nasal cannula  O2 Flow (L/min): 3      CONSTITUTIONAL:  Morbidly obese, lethargic but easily arousable    ENT:   Airway patent,   No thrush    EYES:   Clear bilaterally,   pupils equal,   round and reactive to light.    CARDIAC:   blowing mid systolic murmur  irregularly irregular rhythm  tense no pitting edema up to abdomen      RESPIRATORY:   LLL crackles, RLL absent breath sounds  poor inspiratory effort  no wheeze or rhales    GASTROINTESTINAL:  distended but soft, non-tender  No guarding  Positive BS    MUSCULOSKELETAL:   Range of motion is not limited,  No clubbing, cyanosis    NEUROLOGICAL:   Lethargic but arousable  No motor deficits.    SKIN:   Skin normal color for race,   No evidence of rash.      LABS:                          8.6    8.47  )-----------( 315      ( 05 Jun 2024 06:44 )             31.1                                               06-05    142  |  98  |  28<H>  ----------------------------<  110<H>  4.6   |  38<H>  |  1.5    Ca    9.2      05 Jun 2024 06:44    TPro  5.8<L>  /  Alb  3.8  /  TBili  0.2  /  DBili  x   /  AST  7   /  ALT  9   /  AlkPhos  97  06-04      PT/INR - ( 05 Jun 2024 06:44 )   PT: 18.10 sec;   INR: 1.58 ratio         PTT - ( 05 Jun 2024 06:44 )  PTT:31.7 sec                                       Urinalysis Basic - ( 05 Jun 2024 06:44 )    Color: x / Appearance: x / SG: x / pH: x  Gluc: 110 mg/dL / Ketone: x  / Bili: x / Urobili: x   Blood: x / Protein: x / Nitrite: x   Leuk Esterase: x / RBC: x / WBC x   Sq Epi: x / Non Sq Epi: x / Bacteria: x                                                  LIVER FUNCTIONS - ( 04 Jun 2024 16:14 )  Alb: 3.8 g/dL / Pro: 5.8 g/dL / ALK PHOS: 97 U/L / ALT: 9 U/L / AST: 7 U/L / GGT: x                                                                                                MEDICATIONS  (STANDING):  apixaban 5 milliGRAM(s) Oral every 12 hours  atorvastatin 40 milliGRAM(s) Oral at bedtime  dextrose 10% Bolus 125 milliLiter(s) IV Bolus once  dextrose 5%. 1000 milliLiter(s) (100 mL/Hr) IV Continuous <Continuous>  dextrose 5%. 1000 milliLiter(s) (50 mL/Hr) IV Continuous <Continuous>  dextrose 50% Injectable 25 Gram(s) IV Push once  dextrose 50% Injectable 12.5 Gram(s) IV Push once  folic acid 1 milliGRAM(s) Oral daily  glucagon  Injectable 1 milliGRAM(s) IntraMuscular once  insulin lispro (ADMELOG) corrective regimen sliding scale   SubCutaneous three times a day before meals  metoprolol succinate ER 25 milliGRAM(s) Oral daily  risperiDONE   Solution 0.5 milliGRAM(s) Oral daily  senna 2 Tablet(s) Oral at bedtime  vitamin A &amp; D Ointment 1 Application(s) Topical daily    MEDICATIONS  (PRN):  acetaminophen     Tablet .. 650 milliGRAM(s) Oral every 6 hours PRN Temp greater or equal to 38C (100.4F), Mild Pain (1 - 3)  aluminum hydroxide/magnesium hydroxide/simethicone Suspension 30 milliLiter(s) Oral every 4 hours PRN Dyspepsia  dextrose Oral Gel 15 Gram(s) Oral once PRN Blood Glucose LESS THAN 70 milliGRAM(s)/deciliter  melatonin 3 milliGRAM(s) Oral at bedtime PRN Insomnia  ondansetron Injectable 4 milliGRAM(s) IV Push every 8 hours PRN Nausea and/or Vomiting      X-Rays reviewed:    CXR interpreted by me: bilateral small pleural effusions, no focal opacity

## 2024-06-05 NOTE — CONSULT NOTE ADULT - ASSESSMENT
IMPRESSION:  Metabolic encephalopathy secondary to CO2 retention  Chronic hypercapnic respiratory failure  Chronic hypoxemia on 2L O2  OHS/MANJEET  ?New onset CHF  Hx new onset atrial fibrillation    PLAN:    CNS: Avoid CNS Depressants     HEENT: Oral care. Aspiration precautions     PULMONARY: HOB @ 45. Keep pulse ox 88-92%, outpt sleep study. ABG. will eval pleural effusion, no recorded thora    CARDIOVASCULAR:   - Daily Weight. Strict I&Os. Keep I < O  - diuress to euvolemia, avoid hypotension    GI: PPI. DASH/TLC, carb consistent diet    RENAL: CKD 3b, Avoid nephrotoxic agents, aggressive lyte repletion    INFECTIOUS DISEASE: Treat UTI    HEMATOLOGICAL: c/w eliquis    ENDOCRINE: Monitor FS    MUSCULOSKELETAL: Ambulate as tolerated    IMPRESSION:  Metabolic encephalopathy secondary to CO2 retention  Chronic hypercapnic respiratory failure  Chronic hypoxemia on 2L O2  OHS/MANJEET  ?New onset CHF  Hx new onset atrial fibrillation    PLAN:    CNS: Avoid CNS Depressants     HEENT: Oral care. Aspiration precautions     PULMONARY: HOB @ 45. Keep pulse ox 88-92%, outpt sleep study. ABG. will eval pleural effusion, no recorded thora    CARDIOVASCULAR:   - Daily Weight. Strict I&Os. Keep I < O  - diuress to euvolemia, avoid hypotension  -tele reviewed, 8.8 sec pause, EP eval    GI: PPI. DASH/TLC, carb consistent diet    RENAL: CKD 3b, Avoid nephrotoxic agents, aggressive lyte repletion    INFECTIOUS DISEASE: Treat UTI    HEMATOLOGICAL: c/w eliquis    ENDOCRINE: Monitor FS    MUSCULOSKELETAL: Ambulate as tolerated    IMPRESSION:    Chronic hypercapnic respiratory failure  Chronic hypoxemia on 2L O2  OHS/MANJEET  ?New onset CHF  Hx new onset atrial fibrillation    PLAN:    VBG noted   Severe hypercapnia - compensated   Bicarb elevation related to severe chronic hypercapnia   No acute infiltrates on CXR   On AC for AFib - VTE unlikely   Recommend BIPAP or AVAP  Patient adamantly refusing   She clearly has severe MANJEET   Explained that her nocturnal pauses are related to MANJEET/OHS   Recall as needed

## 2024-06-05 NOTE — PROGRESS NOTE ADULT - SUBJECTIVE AND OBJECTIVE BOX
CHELA GALLAGHER  83y  Female      Patient is a 83y old  Female who presents with a chief complaint of AMS (04 Jun 2024 21:10)        REVIEW OF SYSTEMS:  CONSTITUTIONAL: No fever, weight loss, or fatigue  EYES: No eye pain, visual disturbances, or discharge  ENMT:  No difficulty hearing, tinnitus, vertigo; No sinus or throat pain  NECK: No pain or stiffness  BREASTS: No pain, masses, or nipple discharge  RESPIRATORY: No cough, wheezing, chills or hemoptysis; No shortness of breath  CARDIOVASCULAR: No chest pain, palpitations, dizziness, both leg swelling+  GASTROINTESTINAL: No abdominal or epigastric pain. No nausea, vomiting, or hematemesis; No diarrhea or constipation. No melena or hematochezia.  GENITOURINARY: No dysuria, frequency, hematuria, or incontinence  MUSCULOSKELETAL: No joint pain or swelling; No muscle, back, or extremity pain  PSYCHIATRIC: No depression, anxiety, mood swings, or difficulty sleeping  HEME/LYMPH: No easy bruising, or bleeding gums  ALLERY AND IMMUNOLOGIC: No hives or eczema  FAMILY HISTORY:  No pertinent family history in first degree relatives      T(C): 36.4 (06-05-24 @ 04:15), Max: 36.9 (06-04-24 @ 14:56)  HR: 65 (06-05-24 @ 04:15) (61 - 75)  BP: 96/55 (06-05-24 @ 04:15) (70/47 - 124/55)  RR: 18 (06-05-24 @ 04:15) (16 - 20)  SpO2: 98% (06-05-24 @ 04:15) (98% - 100%)  Wt(kg): --Vital Signs Last 24 Hrs  T(C): 36.4 (05 Jun 2024 04:15), Max: 36.9 (04 Jun 2024 14:56)  T(F): 97.6 (05 Jun 2024 04:15), Max: 98.4 (04 Jun 2024 14:56)  HR: 65 (05 Jun 2024 04:15) (61 - 75)  BP: 96/55 (05 Jun 2024 04:15) (70/47 - 124/55)  BP(mean): --  RR: 18 (05 Jun 2024 04:15) (16 - 20)  SpO2: 98% (05 Jun 2024 04:15) (98% - 100%)    Parameters below as of 04 Jun 2024 21:30  Patient On (Oxygen Delivery Method): nasal cannula  O2 Flow (L/min): 3    No Known Allergies      PHYSICAL EXAM:  GENERAL: NAD,   HEAD:  Atraumatic, Normocephalic  EYES: EOMI, PERRLA, conjunctiva and sclera clear  ENMT: No tonsillar erythema, exudates, or enlargement;   NECK: Supple, No JVD, Normal thyroid  NERVOUS SYSTEM:  Alert & Oriented X3, Good concentration;   CHEST/LUNG: vbs minimal ronchi+  HEART: Regular rate and rhythm; No murmurs, rubs, or gallops  ABDOMEN: Soft, Nontender, Nondistended; Bowel sounds present  EXTREMITIES:   No clubbing, cyanosisboth legs edema+  LYMPH: No lymphadenopathy noted  SKIN: No rashes or lesions      LABS:  06-05    142  |  98  |  28<H>  ----------------------------<  110<H>  4.6   |  38<H>  |  1.5    Ca    9.2      05 Jun 2024 06:44    TPro  5.8<L>  /  Alb  3.8  /  TBili  0.2  /  DBili  x   /  AST  7   /  ALT  9   /  AlkPhos  97  06-04                          8.6    8.47  )-----------( 315      ( 05 Jun 2024 06:44 )             31.1   < from: Xray Chest 1 View AP/PA (06.04.24 @ 15:57) >  Support devices: None.    Cardiac/mediastinum/hilum: Stable cardiomegaly.    Lung parenchyma/Pleura: Stable parenchymal pattern. No pleural effusion   or pneumothorax.    Skeleton/soft tissues: Unremarkable.    Impression:    No radiographic evidence of acute cardiopulmonary disease. Stable   parenchymal pattern.    < end of copied text >  < from: TTE Echo Complete w/ Contrast w/o Doppler (03.31.24 @ 11:38) >  . Normal global left ventricular systolic function.   2. Left atrial enlargement.   3. LV Ejection Fraction by Pond's Method with a biplane EF of 60 %.   4. Normal left ventricular internal cavity size.   5. Normal right atrial size.   6. There is no evidence of pericardial effusion.   7. Mild mitral annular calcification.   8. Mild thickening and calcification of the anterior and posterior   mitral valve leaflets.   9. No evidence of mitral valve regurgitation.  10. The aortic valve mean gradient is 14.4 mmHg consistent with mild   aortic stenosis.  11. Sclerotic aortic valve with decreased opening.    < end of copied text >        RADIOLOGY & ADDITIONAL TESTS:    MEDICATION:  acetaminophen     Tablet .. 650 milliGRAM(s) Oral every 6 hours PRN  aluminum hydroxide/magnesium hydroxide/simethicone Suspension 30 milliLiter(s) Oral every 4 hours PRN  apixaban 5 milliGRAM(s) Oral every 12 hours  atorvastatin 40 milliGRAM(s) Oral at bedtime  dextrose 10% Bolus 125 milliLiter(s) IV Bolus once  dextrose 5%. 1000 milliLiter(s) IV Continuous <Continuous>  dextrose 5%. 1000 milliLiter(s) IV Continuous <Continuous>  dextrose 50% Injectable 25 Gram(s) IV Push once  dextrose 50% Injectable 12.5 Gram(s) IV Push once  dextrose Oral Gel 15 Gram(s) Oral once PRN  folic acid 1 milliGRAM(s) Oral daily  glucagon  Injectable 1 milliGRAM(s) IntraMuscular once  insulin lispro (ADMELOG) corrective regimen sliding scale   SubCutaneous three times a day before meals  losartan 50 milliGRAM(s) Oral daily  melatonin 3 milliGRAM(s) Oral at bedtime PRN  metoprolol succinate ER 25 milliGRAM(s) Oral daily  ondansetron Injectable 4 milliGRAM(s) IV Push every 8 hours PRN  risperiDONE   Solution 0.5 milliGRAM(s) Oral daily  senna 2 Tablet(s) Oral at bedtime  vitamin A &amp; D Ointment 1 Application(s) Topical daily      HEALTH ISSUES - PROBLEM Dx:  83-year-old woman with a history of diabetes, hypertension, lower extremity edema, bipolar disorder, tachybrady syndrome on Eliquis (declining PPM), resident of [facility], mild atrial stenosis, recently diagnosed UTI on Levaquin, at baseline uses walker or wheelchair.  Patient is sent for evaluation of worsening confusion.  On my exam, patient is alert, oriented x 3, states she had some chills earlier, but otherwise has no acute complaints.  Patient's O2 saturation is noted to be intermittently dropping to mid-80s%, but spontaneously improving to mid-90s%.  Patient states that she does not have COPD, never smoked, but does use oxygen as needed sometimes. No chest pain, shortness of breath, cough, abdominal pain, vomiting, diarrhea.    PLAN:     #AHRF likely 2/2 CO2 retention vs pleural effusion ( R> L)   #Respiratory Acidosis ( likely from MANJEET)  #Recently diagnosed AFib with tachy-tamy syndrome ( refused PPM in the past)   #LE edema   - Per patient she is on O2 at baseline ( unsure how many liters)   - VBG: pH 7.30, pCO2 85   - Bicarb 35   - per Nursing home records pt has order for BiPAP; However, she is refusing BiPAP here   - CXR: on wet read bilateral pleural effusions ( R> L)   - Echo 3/2024 : EF 60% LA enlargement, mild aortic stenosis ,pulmonary consult    - s/p 1 dose IV lasix 60 in ED with good response ( on Lasix 40 mg PO BID at NH)  - Monitor I's and O's   - BP on the lower side ( MAP > 60) and pt is asymptomatic  - Monitor BP  - Fluid restriction   - Hold Digoxin in a setting of pleural effusion   - C/w Eliquis   - Obtain trop   - Monitor on tele ,cardiology consult    #Normocytic Anemia - 2/2 MANNY   - Hgb 8.6 ( baseline ~8- 9)   - Iron studies:  Iron with Total Binding Capacity (03.30.24 @ 12:26)     Iron - Total Binding Capacity.: 244 ug/dL    % Saturation, Iron: 15 %     Iron Total: 37 ug/dL     Unsaturated Iron Binding Capacity: 207 ug/dL     Ferritin: 37 ng/mL (03.30.24 @ 12:54)   - c/w Iron supplements ( on Ferrous sulfate 325 mg BID outpatient; consider switching it to once a day on discharge)     #HTN  #Mild AS  - Losartan and Metoprolol succinate with holding parameters     #Bipolar disorder   #Schizophrenia  - c/w Risperdal     #DM   - on Farxiga    #Recently Diagnosed UTI?Obtain UA   - hold off on antibiotics for now as patient is not symptomatic and no other signs of infection noted ( Afebrile; no leukocytosis/leukopenia)     #DVT PPx: Eliquis   #GI PPx: None   #Diet: DASH  #Activity: IAT

## 2024-06-06 DIAGNOSIS — J96.11 CHRONIC RESPIRATORY FAILURE WITH HYPOXIA: ICD-10-CM

## 2024-06-06 DIAGNOSIS — G47.33 OBSTRUCTIVE SLEEP APNEA (ADULT) (PEDIATRIC): ICD-10-CM

## 2024-06-06 DIAGNOSIS — Z51.5 ENCOUNTER FOR PALLIATIVE CARE: ICD-10-CM

## 2024-06-06 LAB
ANION GAP SERPL CALC-SCNC: 12 MMOL/L — SIGNIFICANT CHANGE UP (ref 7–14)
APPEARANCE UR: ABNORMAL
APPEARANCE UR: ABNORMAL
BACTERIA # UR AUTO: ABNORMAL /HPF
BACTERIA # UR AUTO: ABNORMAL /HPF
BASOPHILS # BLD AUTO: 0.07 K/UL — SIGNIFICANT CHANGE UP (ref 0–0.2)
BASOPHILS NFR BLD AUTO: 0.8 % — SIGNIFICANT CHANGE UP (ref 0–1)
BILIRUB UR-MCNC: NEGATIVE — SIGNIFICANT CHANGE UP
BILIRUB UR-MCNC: NEGATIVE — SIGNIFICANT CHANGE UP
BUN SERPL-MCNC: 37 MG/DL — HIGH (ref 10–20)
CALCIUM SERPL-MCNC: 8.7 MG/DL — SIGNIFICANT CHANGE UP (ref 8.4–10.5)
CAST: 2 /LPF — SIGNIFICANT CHANGE UP (ref 0–4)
CAST: 3 /LPF — SIGNIFICANT CHANGE UP (ref 0–4)
CHLORIDE SERPL-SCNC: 98 MMOL/L — SIGNIFICANT CHANGE UP (ref 98–110)
CO2 SERPL-SCNC: 34 MMOL/L — HIGH (ref 17–32)
COLOR SPEC: YELLOW — SIGNIFICANT CHANGE UP
COLOR SPEC: YELLOW — SIGNIFICANT CHANGE UP
CREAT SERPL-MCNC: 1.5 MG/DL — SIGNIFICANT CHANGE UP (ref 0.7–1.5)
DIFF PNL FLD: NEGATIVE — SIGNIFICANT CHANGE UP
DIFF PNL FLD: NEGATIVE — SIGNIFICANT CHANGE UP
EGFR: 34 ML/MIN/1.73M2 — LOW
EOSINOPHIL # BLD AUTO: 0.2 K/UL — SIGNIFICANT CHANGE UP (ref 0–0.7)
EOSINOPHIL NFR BLD AUTO: 2.2 % — SIGNIFICANT CHANGE UP (ref 0–8)
GLUCOSE BLDC GLUCOMTR-MCNC: 142 MG/DL — HIGH (ref 70–99)
GLUCOSE BLDC GLUCOMTR-MCNC: 176 MG/DL — HIGH (ref 70–99)
GLUCOSE BLDC GLUCOMTR-MCNC: 178 MG/DL — HIGH (ref 70–99)
GLUCOSE SERPL-MCNC: 112 MG/DL — HIGH (ref 70–99)
GLUCOSE UR QL: 250 MG/DL
GLUCOSE UR QL: 500 MG/DL
HCT VFR BLD CALC: 28.6 % — LOW (ref 37–47)
HGB BLD-MCNC: 8.1 G/DL — LOW (ref 12–16)
IMM GRANULOCYTES NFR BLD AUTO: 0.3 % — SIGNIFICANT CHANGE UP (ref 0.1–0.3)
KETONES UR-MCNC: NEGATIVE MG/DL — SIGNIFICANT CHANGE UP
KETONES UR-MCNC: NEGATIVE MG/DL — SIGNIFICANT CHANGE UP
LEUKOCYTE ESTERASE UR-ACNC: ABNORMAL
LEUKOCYTE ESTERASE UR-ACNC: ABNORMAL
LYMPHOCYTES # BLD AUTO: 1.19 K/UL — LOW (ref 1.2–3.4)
LYMPHOCYTES # BLD AUTO: 13.2 % — LOW (ref 20.5–51.1)
MCHC RBC-ENTMCNC: 27.2 PG — SIGNIFICANT CHANGE UP (ref 27–31)
MCHC RBC-ENTMCNC: 28.3 G/DL — LOW (ref 32–37)
MCV RBC AUTO: 96 FL — SIGNIFICANT CHANGE UP (ref 81–99)
MONOCYTES # BLD AUTO: 0.92 K/UL — HIGH (ref 0.1–0.6)
MONOCYTES NFR BLD AUTO: 10.2 % — HIGH (ref 1.7–9.3)
NEUTROPHILS # BLD AUTO: 6.6 K/UL — HIGH (ref 1.4–6.5)
NEUTROPHILS NFR BLD AUTO: 73.3 % — SIGNIFICANT CHANGE UP (ref 42.2–75.2)
NITRITE UR-MCNC: NEGATIVE — SIGNIFICANT CHANGE UP
NITRITE UR-MCNC: POSITIVE
NRBC # BLD: 0 /100 WBCS — SIGNIFICANT CHANGE UP (ref 0–0)
PH UR: 5.5 — SIGNIFICANT CHANGE UP (ref 5–8)
PH UR: 5.5 — SIGNIFICANT CHANGE UP (ref 5–8)
PLATELET # BLD AUTO: 307 K/UL — SIGNIFICANT CHANGE UP (ref 130–400)
PMV BLD: 10.3 FL — SIGNIFICANT CHANGE UP (ref 7.4–10.4)
POTASSIUM SERPL-MCNC: 4.6 MMOL/L — SIGNIFICANT CHANGE UP (ref 3.5–5)
POTASSIUM SERPL-SCNC: 4.6 MMOL/L — SIGNIFICANT CHANGE UP (ref 3.5–5)
PROT UR-MCNC: NEGATIVE MG/DL — SIGNIFICANT CHANGE UP
PROT UR-MCNC: SIGNIFICANT CHANGE UP MG/DL
RBC # BLD: 2.98 M/UL — LOW (ref 4.2–5.4)
RBC # FLD: 14.9 % — HIGH (ref 11.5–14.5)
RBC CASTS # UR COMP ASSIST: 3 /HPF — SIGNIFICANT CHANGE UP (ref 0–4)
RBC CASTS # UR COMP ASSIST: 675 /HPF — HIGH (ref 0–4)
SODIUM SERPL-SCNC: 144 MMOL/L — SIGNIFICANT CHANGE UP (ref 135–146)
SP GR SPEC: 1.01 — SIGNIFICANT CHANGE UP (ref 1–1.03)
SP GR SPEC: 1.01 — SIGNIFICANT CHANGE UP (ref 1–1.03)
SQUAMOUS # UR AUTO: 11 /HPF — HIGH (ref 0–5)
SQUAMOUS # UR AUTO: 20 /HPF — HIGH (ref 0–5)
UROBILINOGEN FLD QL: 0.2 MG/DL — SIGNIFICANT CHANGE UP (ref 0.2–1)
UROBILINOGEN FLD QL: 0.2 MG/DL — SIGNIFICANT CHANGE UP (ref 0.2–1)
WBC # BLD: 9.01 K/UL — SIGNIFICANT CHANGE UP (ref 4.8–10.8)
WBC # FLD AUTO: 9.01 K/UL — SIGNIFICANT CHANGE UP (ref 4.8–10.8)
WBC UR QL: 1 /HPF — SIGNIFICANT CHANGE UP (ref 0–5)
WBC UR QL: 4 /HPF — SIGNIFICANT CHANGE UP (ref 0–5)
YEAST-LIKE CELLS: PRESENT
YEAST-LIKE CELLS: PRESENT

## 2024-06-06 PROCEDURE — 99222 1ST HOSP IP/OBS MODERATE 55: CPT

## 2024-06-06 RX ADMIN — Medication 1 APPLICATION(S): at 11:25

## 2024-06-06 RX ADMIN — Medication 40 MILLIGRAM(S): at 09:17

## 2024-06-06 RX ADMIN — Medication 1 MILLIGRAM(S): at 11:25

## 2024-06-06 RX ADMIN — SENNA PLUS 2 TABLET(S): 8.6 TABLET ORAL at 21:18

## 2024-06-06 RX ADMIN — ATORVASTATIN CALCIUM 40 MILLIGRAM(S): 80 TABLET, FILM COATED ORAL at 21:18

## 2024-06-06 RX ADMIN — Medication 1 APPLICATION(S): at 17:12

## 2024-06-06 RX ADMIN — Medication 40 MILLIGRAM(S): at 17:11

## 2024-06-06 RX ADMIN — RISPERIDONE 0.5 MILLIGRAM(S): 4 TABLET ORAL at 14:24

## 2024-06-06 RX ADMIN — APIXABAN 5 MILLIGRAM(S): 2.5 TABLET, FILM COATED ORAL at 05:22

## 2024-06-06 RX ADMIN — APIXABAN 5 MILLIGRAM(S): 2.5 TABLET, FILM COATED ORAL at 17:11

## 2024-06-06 RX ADMIN — Medication 1: at 17:12

## 2024-06-06 RX ADMIN — Medication 1 APPLICATION(S): at 05:29

## 2024-06-06 NOTE — PROGRESS NOTE ADULT - ASSESSMENT
83-year-old woman with a history of diabetes, hypertension, lower extremity edema, bipolar disorder, tachybrady syndrome on Eliquis (declining PPM), resident of [facility], mild atrial stenosis, recently diagnosed UTI on Levaquin, at baseline uses walker or wheelchair.  Patient is sent for evaluation of worsening confusion.  On my exam, patient is alert, oriented x 3, states she had some chills earlier, but otherwise has no acute complaints.  Patient's O2 saturation is noted to be intermittently dropping to mid-80s%, but spontaneously improving to mid-90s%.  Patient states that she does not have COPD, never smoked, but does use oxygen as needed sometimes. No chest pain, shortness of breath, cough, abdominal pain, vomiting, diarrhea.        Acute Hypoxic, Hypercapnic Respiratory Failure, CO2 retention   Respiratory Acidosis ( likely from MANJEET)  - per Nursing home records pt has order for BiPAP; However, refuses  - pulkennedy white noted    AFib with tachy-tamy syndrome ( refused PPM in the past)   - Echo 3/2024 : EF 60% LA enlargement, mild aortic stenosis   - Lasix 60 in ED with good response ( on Lasix 40 mg PO BID at NH)  - Monitor I's and O's   - Fluid restriction , non compliant with in SNF  - Continue Eliquis   - Monitor on tele   - has refused PPM in past  - obtain Palliative Care eval    Normocytic Anemia, Iron Def Anemia  - Hgb 8.6 ( baseline ~8- 9)   - Iron studies:  Iron with Total Binding Capacity (03.30.24 @ 12:26)     Iron - Total Binding Capacity.: 244 ug/dL    % Saturation, Iron: 15 %     Iron Total: 37 ug/dL     Unsaturated Iron Binding Capacity: 207 ug/dL     Ferritin: 37 ng/mL (03.30.24 @ 12:54)   - continue Iron supplement    HTN  Mild AS  - Losartan and Metoprolol succinate with holding parameters     Bipolar disorder, Schizophrenia  - on Risperdal     DM   - on Farxiga      DVT PPx: Eliquis   GI PPx: None   Diet: DASH  Activity: IAT    Await Palliative care evaluation... D/C planning back to SNF

## 2024-06-06 NOTE — PROGRESS NOTE ADULT - SUBJECTIVE AND OBJECTIVE BOX
CHELA GALLAGHER  83y  Female  HPI:  83-year-old woman with a history of diabetes, hypertension, lower extremity edema, bipolar disorder, tachybrady syndrome on Eliquis (declining PPM), resident of [place] facility, mild atrial stenosis, recently diagnosed UTI on Levaquin(did not get any dose), at baseline uses walker or wheelchair.  Patient is sent for evaluation of worsening confusion.  On my exam, patient is alert, oriented x 3, states she had some chills earlier, but otherwise has no acute complaints.  Patient's O2 saturation is noted to be intermittently dropping to mid-80s%, but spontaneously improving to mid-90s%.  Patient states that she does not have COPD, never smoked, but does use oxygen as needed sometimes. No chest pain, shortness of breath, cough, abdominal pain, vomiting, diarrhea.    History obtained from nursing supervisor at [facility]. Per the nursing supervisor, pt was diagnosed with UTI earlier today and was started on Levaquin ( however, she did not receive any dose today). When the RN was drawing blood patient was not herself and per RN her blood also clotted quickly despite being on Eliquis.     In ED vitals were  T(F): 98.4  HR: 61  BP: 99/65  RR: 16  SpO2: 99% on 2 L O2     Labs were significant for Hgb 8.6, MCV 94.3, Cl 96, Bicarb 35, BUN 21, Cr 1.4 ( baseline 1.4), pBNP 2838 ( baseline 1440 in 3/2024).     VBG: pH 7.30, pCO2 85    CXR: stable cardiomegaly     EKG: AFib     Pt received IV lasix 60 mg and was offered BiPAP; however, she refused it. (Pt was A&Ox3). She is being admitted to [facility] for further workup.  (2024 21:10)    MEDICATIONS  (STANDING):  apixaban 5 milliGRAM(s) Oral every 12 hours  atorvastatin 40 milliGRAM(s) Oral at bedtime  clotrimazole 1% Cream 1 Application(s) Topical two times a day  dextrose 10% Bolus 125 milliLiter(s) IV Bolus once  dextrose 5%. 1000 milliLiter(s) (100 mL/Hr) IV Continuous <Continuous>  dextrose 5%. 1000 milliLiter(s) (50 mL/Hr) IV Continuous <Continuous>  dextrose 50% Injectable 25 Gram(s) IV Push once  dextrose 50% Injectable 12.5 Gram(s) IV Push once  folic acid 1 milliGRAM(s) Oral daily  furosemide    Tablet 40 milliGRAM(s) Oral every 12 hours  glucagon  Injectable 1 milliGRAM(s) IntraMuscular once  insulin lispro (ADMELOG) corrective regimen sliding scale   SubCutaneous three times a day before meals  risperiDONE   Solution 0.5 milliGRAM(s) Oral daily  senna 2 Tablet(s) Oral at bedtime  vitamin A &amp; D Ointment 1 Application(s) Topical daily    MEDICATIONS  (PRN):  acetaminophen     Tablet .. 650 milliGRAM(s) Oral every 6 hours PRN Temp greater or equal to 38C (100.4F), Mild Pain (1 - 3)  aluminum hydroxide/magnesium hydroxide/simethicone Suspension 30 milliLiter(s) Oral every 4 hours PRN Dyspepsia  dextrose Oral Gel 15 Gram(s) Oral once PRN Blood Glucose LESS THAN 70 milliGRAM(s)/deciliter  melatonin 3 milliGRAM(s) Oral at bedtime PRN Insomnia  ondansetron Injectable 4 milliGRAM(s) IV Push every 8 hours PRN Nausea and/or Vomiting    INTERVAL EVENTS: Patient seen today without distress. Patient not using Bipap, repeatedly has refused...  MS not     T(C): 37.2 (24 @ 20:12), Max: 37.2 (24 @ 20:12)  HR: 105 (24 @ 21:22) (87 - 105)  BP: 94/63 (24 @ 20:12) (93/60 - 102/64)  RR: 19 (24 @ 21:22) (18 - 19)  SpO2: 95% (24 @ 21:22) (95% - 95%)  Wt(kg): --Vital Signs Last 24 Hrs  T(C): 37.2 (2024 20:12), Max: 37.2 (2024 20:12)  T(F): 99 (2024 20:12), Max: 99 (2024 20:12)  HR: 105 (2024 21:22) (87 - 105)  BP: 94/63 (2024 20:12) (93/60 - 102/64)  BP(mean): --  RR: 19 (2024 21:22) (18 - 19)  SpO2: 95% (2024 21:22) (95% - 95%)    Parameters below as of 2024 21:22  Patient On (Oxygen Delivery Method): nasal cannula  O2 Flow (L/min): 3      PHYSICAL EXAM:  GENERAL: NAD, Pale  CHEST/LUNG: Clear; No wheezing  HEART: S1, S2, Regular   ABDOMEN: Soft, Nontender, Obese, Bowel sounds present  EXTREMITIES: ++ edema  SKIN: No rashes or lesions    LABS:                        8.1    9.01  )-----------( 307      ( 2024 05:18 )             28.6             06-    144  |  98  |  37<H>  ----------------------------<  112<H>  4.6   |  34<H>  |  1.5    Ca    8.7      2024 05:18                PT/INR - ( 2024 06:44 )   PT: 18.10 sec;   INR: 1.58 ratio         PTT - ( 2024 06:44 )  PTT:31.7 sec        Urinalysis Basic - ( 2024 11:19 )    Color: Yellow / Appearance: Cloudy / S.015 / pH: x  Gluc: x / Ketone: Negative mg/dL  / Bili: Negative / Urobili: 0.2 mg/dL   Blood: x / Protein: Negative mg/dL / Nitrite: Negative   Leuk Esterase: Small / RBC: 675 /HPF / WBC 4 /HPF   Sq Epi: x / Non Sq Epi: 11 /HPF / Bacteria: Many /HPF      Culture - Urine (collected 2024 19:10)  Source: Clean Catch Clean Catch (Midstream)  Final Report (2024 22:12):    <10,000 CFU/mL Normal Urogenital Randa      RADIOLOGY & ADDITIONAL TESTS:

## 2024-06-06 NOTE — CONSULT NOTE ADULT - ASSESSMENT
83-year-old woman with a history of diabetes, hypertension, lower extremity edema, bipolar disorder, tachybrady syndrome on Eliquis (declining PPM), resident of [facility], mild atrial stenosis, recently diagnosed UTI on Levaquin(did not get any dose), at baseline uses walker or wheelchair.  Patient is sent for evaluation of worsening confusion.      Patient asks me to speak to her son, Joaquín, to discuss medical issues. Introduced palliative care to Joaquín. He understands that patient refuses bipap, but states that she is very stubborn. He will continue to talk to her about wearing it. He was agreeable to discuss advance care planning. Inquired what we should do if patient ever requires intubation, and he states that of course we have to do it. I explained that patient's usually do not tolerate intubation well and require sedation, and if he doesn't tolerate her bipap, she would likely like intubation even less. He would still want patient to be intubated to be kept alive. Inquired what we should do if patient herself expresses that she would rather die than to be intubated; Joaquín responds that there is a chance that patient will say that and he would talk to her about the issue. He also states that Dr. Razo is an in-law and would like for me to talk to him.     Plan:  - symptoms per primary team  - I have reached out to Dr. Razo to discuss the case    Palliative care will continue to follow.   Please call [phone] with questions or concerns 24/7.   _____________  Lb Wing MD  Palliative Medicine  [facility]   of Geriatric and Palliative Medicine  [phone]

## 2024-06-06 NOTE — CONSULT NOTE ADULT - SUBJECTIVE AND OBJECTIVE BOX
CHIEF COMPLAINT:Patient is a 83y old  Female who presents with a chief complaint of AMS (2024 10:19)      HISTORY OF PRESENT ILLNESS:   HPI:  83-year-old woman with a history of diabetes, hypertension, lower extremity edema, bipolar disorder, tachybrady syndrome on Eliquis (declining PPM), resident of [facility], mild atrial stenosis, recently diagnosed UTI on Levaquin(did not get any dose), at baseline uses walker or wheelchair.  Patient is sent for evaluation of worsening confusion.  On my exam, patient is alert, oriented x 3, states she had some chills earlier, but otherwise has no acute complaints.  Patient's O2 saturation is noted to be intermittently dropping to mid-80s%, but spontaneously improving to mid-90s%.  Patient states that she does not have COPD, never smoked, but does use oxygen as needed sometimes. No chest pain, shortness of breath, cough, abdominal pain, vomiting, diarrhea.    History obtained from nursing supervisor at [facility]. Per the nursing supervisor, pt was diagnosed with UTI earlier today and was started on Levaquin ( however, she did not receive any dose today). When the RN was drawing blood patient was not herself and per RN her blood also clotted quickly despite being on Eliquis.     In ED vitals were  T(F): 98.4  HR: 61  BP: 99/65  RR: 16  SpO2: 99% on 2 L O2     Labs were significant for Hgb 8.6, MCV 94.3, Cl 96, Bicarb 35, BUN 21, Cr 1.4 ( baseline 1.4), pBNP 2838 ( baseline 1440 in 3/2024).     VBG: pH 7.30, pCO2 85    CXR: stable cardiomegaly     EKG: AFib     Pt received IV lasix 60 mg and was offered BiPAP; however, she refused it. (Pt was A&Ox3). She is being admitted to [facility] for further workup.  (2024 21:10)    PAST MEDICAL & SURGICAL HISTORY:  Diabetes mellitus      Hypertension      Schizoaffective disorder      Edema  Bilateral LE      Disorder of thyroid, unspecified  Son cannot provide details. States she had "thyroid surgery" decades ago when she was young      No significant past surgical history        FAMILY HISTORY:  No pertinent family history in first degree relatives      Allergies    No Known Allergies    Intolerances    	  Home Medications:  apixaban 5 mg oral tablet: 1 tab(s) orally 2 times a day (2024 13:13)  Crestor 10 mg oral tablet: 1 tab(s) orally once a day (29 Mar 2024 14:12)  Farxiga 10 mg oral tablet: 1 tab(s) orally once a day (2024 22:36)  ferrous sulfate 325 mg (65 mg elemental iron) oral delayed release tablet: 1 tab(s) orally 2 times a day (2024 22:30)  folic acid 1 mg oral tablet: 1 tab(s) orally once a day (29 Mar 2024 14:12)  Lasix 40 mg oral tablet: 1 tab(s) orally 2 times a day (2024 22:29)  Levaquin 500 mg oral tablet: 1 tab(s) orally once a day 7 days (start date 24 ) (2024 22:36)  losartan 50 mg oral tablet: 1 tab(s) orally once a day hold for SBP less than 105mmhg (2024 15:08)  metoprolol succinate 25 mg oral tablet, extended release: 1 tab(s) orally once a day (2024 13:13)  nystatin 100,000 units/g topical cream: Apply topically to affected area 2 times a day bilateral groin area x 14 days ( start date 24) (2024 22:36)  RisperDAL 1 mg/mL oral solution: 0.5 milliliter(s) orally once a day (29 Mar 2024 14:12)  senna leaf extract oral tablet: 2 tab(s) orally once a day (at bedtime) (2024 13:13)  Vitamin A and D topical ointment: Apply topically to affected area once a day Apply to bilateral LE&#x27;s/Feet daily (2024 22:36)  Vitamin D2 50,000 intl units (1.25 mg) oral capsule (obsolete): 1 cap(s) orally every 2 weeks (2024 22:36)    MEDICATIONS  (STANDING):  apixaban 5 milliGRAM(s) Oral every 12 hours  atorvastatin 40 milliGRAM(s) Oral at bedtime  clotrimazole 1% Cream 1 Application(s) Topical two times a day  dextrose 10% Bolus 125 milliLiter(s) IV Bolus once  dextrose 5%. 1000 milliLiter(s) (100 mL/Hr) IV Continuous <Continuous>  dextrose 5%. 1000 milliLiter(s) (50 mL/Hr) IV Continuous <Continuous>  dextrose 50% Injectable 25 Gram(s) IV Push once  dextrose 50% Injectable 12.5 Gram(s) IV Push once  folic acid 1 milliGRAM(s) Oral daily  furosemide    Tablet 40 milliGRAM(s) Oral every 12 hours  glucagon  Injectable 1 milliGRAM(s) IntraMuscular once  insulin lispro (ADMELOG) corrective regimen sliding scale   SubCutaneous three times a day before meals  risperiDONE   Solution 0.5 milliGRAM(s) Oral daily  senna 2 Tablet(s) Oral at bedtime  vitamin A &amp; D Ointment 1 Application(s) Topical daily    MEDICATIONS  (PRN):  acetaminophen     Tablet .. 650 milliGRAM(s) Oral every 6 hours PRN Temp greater or equal to 38C (100.4F), Mild Pain (1 - 3)  aluminum hydroxide/magnesium hydroxide/simethicone Suspension 30 milliLiter(s) Oral every 4 hours PRN Dyspepsia  dextrose Oral Gel 15 Gram(s) Oral once PRN Blood Glucose LESS THAN 70 milliGRAM(s)/deciliter  melatonin 3 milliGRAM(s) Oral at bedtime PRN Insomnia  ondansetron Injectable 4 milliGRAM(s) IV Push every 8 hours PRN Nausea and/or Vomiting        SOCIAL HISTORY:    [ ] Non-smoker  [ ] Smoker  [ ] Alcohol      REVIEW OF SYSTEMS:    PHYSICAL EXAM:  T(C): 36.4 (24 @ 13:37), Max: 36.7 (24 @ 20:13)  HR: 98 (24 @ 13:37) (57 - 98)  BP: 102/64 (24 @ 13:37) (91/64 - 102/64)  RR: 18 (24 @ 13:37) (18 - 18)  SpO2: 98% (24 @ 20:13) (98% - 98%)  Wt(kg): --  I&O's Summary    2024 07:01  -  2024 07:00  --------------------------------------------------------  IN: 680 mL / OUT: 600 mL / NET: 80 mL    2024 07:01  -  2024 14:35  --------------------------------------------------------  IN: 220 mL / OUT: 800 mL / NET: -580 mL      Daily     Daily Weight in k.4 (2024 04:00)    General Appearance: Normal	  Cardiovascular: Normal S1 S2, No JVD, No murmurs, No edema  Respiratory: Lungs clear to auscultation	  Psychiatry: A & O x 3, Mood & affect appropriate  Gastrointestinal:  Soft, Non-tender  Skin: No rashes, No ecchymoses, No cyanosis	  Neurologic: Non-focal  Extremities: Normal range of motion, No clubbing, cyanosis or edema  Vascular: Peripheral pulses palpable 2+ bilaterally        LABS:	 	                        8.1    9.01  )-----------( 307      ( 2024 05:18 )             28.6     06-    144  |  98  |  37<H>  ----------------------------<  112<H>  4.6   |  34<H>  |  1.5      142  |  98  |  28<H>  ----------------------------<  110<H>  4.6   |  38<H>  |  1.5    Ca    8.7      2024 05:18  Ca    9.2      2024 06:44    TPro  5.8<L>  /  Alb  3.8  /  TBili  0.2  /  DBili  x   /  AST  7   /  ALT  9   /  AlkPhos  97  06-04      proBNP:   Lipid Profile:   HgA1c:   TSH:       CARDIAC MARKERS:            TELEMETRY EVENTS: 	    ECG:  	  RADIOLOGY:  	    PREVIOUS DIAGNOSTIC TESTING:    [ ] Echocardiogram:  [ ]  Catheterization:  [ ] Stress Test:

## 2024-06-06 NOTE — CONSULT NOTE ADULT - SUBJECTIVE AND OBJECTIVE BOX
HPI:  83-year-old woman with a history of diabetes, hypertension, lower extremity edema, bipolar disorder, tachybrady syndrome on Eliquis (declining PPM), resident of [facility] facility, mild atrial stenosis, recently diagnosed UTI on Levaquin(did not get any dose), at baseline uses walker or wheelchair.  Patient is sent for evaluation of worsening confusion.  On my exam, patient is alert, oriented x 3, states she had some chills earlier, but otherwise has no acute complaints.  Patient's O2 saturation is noted to be intermittently dropping to mid-80s%, but spontaneously improving to mid-90s%.  Patient states that she does not have COPD, never smoked, but does use oxygen as needed sometimes. No chest pain, shortness of breath, cough, abdominal pain, vomiting, diarrhea.    History obtained from nursing supervisor at [facility]. Per the nursing supervisor, pt was diagnosed with UTI earlier today and was started on Levaquin ( however, she did not receive any dose today). When the RN was drawing blood patient was not herself and per RN her blood also clotted quickly despite being on Eliquis.     In ED vitals were  T(F): 98.4  HR: 61  BP: 99/65  RR: 16  SpO2: 99% on 2 L O2     Labs were significant for Hgb 8.6, MCV 94.3, Cl 96, Bicarb 35, BUN 21, Cr 1.4 ( baseline 1.4), pBNP 2838 ( baseline 1440 in 3/2024).     VBG: pH 7.30, pCO2 85    CXR: stable cardiomegaly     EKG: AFib     Pt received IV lasix 60 mg and was offered BiPAP; however, she refused it. (Pt was A&Ox3). She is being admitted to [facility] for further workup.  (04 Jun 2024 21:10)    Patient without acute complaints; states she is comfortable.   Asks me to speak to her son to discuss medical issues.     PERTINENT PM/SXH:   Diabetes mellitus    Hypertension    Schizoaffective disorder    Edema    Disorder of thyroid, unspecified      No significant past surgical history      FAMILY HISTORY:  No pertinent family history in first degree relatives      ITEMS NOT CHECKED ARE NOT PRESENT    SOCIAL HISTORY:   Significant other/partner[ ]  Children[x ]  Protestant/Spirituality:  Substance hx:  [ ]   Tobacco hx:  [ ]   Alcohol hx: [ ]   Living Situation: [ ]Home  [ ]Long term care  [ ]Rehab [ ]Other  Home Services: [ ] [facility] [ ] [facility] RN [ ] Hospice  Occupation:  Home Opioid hx:  [ ] Y [ ] N [ ] I-Stop Reference No:     ADVANCE DIRECTIVES:    [ ] Full Code [ ] DNR  MOLST  [ ]  Living Will  [ ]   DECISION MAKER(s):  [ ] Health Care Proxy(s)  [ ] Surrogate(s)  [ ] Guardian           Name(s): Phone Number(s):    BASELINE (I)ADL(s) (prior to admission):  [place]: [ ]Total  [ ] Moderate [ ]Dependent  Palliative Performance Status Version 2:         %    http://npcrc.org/files/news/palliative_performance_scale_ppsv2.pdf    Allergies    No Known Allergies    Intolerances    MEDICATIONS  (STANDING):  apixaban 5 milliGRAM(s) Oral every 12 hours  atorvastatin 40 milliGRAM(s) Oral at bedtime  clotrimazole 1% Cream 1 Application(s) Topical two times a day  dextrose 10% Bolus 125 milliLiter(s) IV Bolus once  dextrose 5%. 1000 milliLiter(s) (100 mL/Hr) IV Continuous <Continuous>  dextrose 5%. 1000 milliLiter(s) (50 mL/Hr) IV Continuous <Continuous>  dextrose 50% Injectable 25 Gram(s) IV Push once  dextrose 50% Injectable 12.5 Gram(s) IV Push once  folic acid 1 milliGRAM(s) Oral daily  furosemide    Tablet 40 milliGRAM(s) Oral every 12 hours  glucagon  Injectable 1 milliGRAM(s) IntraMuscular once  insulin lispro (ADMELOG) corrective regimen sliding scale   SubCutaneous three times a day before meals  risperiDONE   Solution 0.5 milliGRAM(s) Oral daily  senna 2 Tablet(s) Oral at bedtime  vitamin A &amp; D Ointment 1 Application(s) Topical daily    MEDICATIONS  (PRN):  acetaminophen     Tablet .. 650 milliGRAM(s) Oral every 6 hours PRN Temp greater or equal to 38C (100.4F), Mild Pain (1 - 3)  aluminum hydroxide/magnesium hydroxide/simethicone Suspension 30 milliLiter(s) Oral every 4 hours PRN Dyspepsia  dextrose Oral Gel 15 Gram(s) Oral once PRN Blood Glucose LESS THAN 70 milliGRAM(s)/deciliter  melatonin 3 milliGRAM(s) Oral at bedtime PRN Insomnia  ondansetron Injectable 4 milliGRAM(s) IV Push every 8 hours PRN Nausea and/or Vomiting      PRESENT SYMPTOMS: [ ]Unable to obtain due to poor mentation   Source if other than patient:  [ ]Family   [ ]Team     Pain: [ ]yes [x ]no  QOL impact -   Location -                    Aggravating factors -  Alleviating factors -   Quality -  Radiation -  Timing -   Severity (0-10 scale):  Minimal acceptable level (0-10 scale):     CPOT:    https://[email or web address]/getattachment/[email or web address]/Critical-Care-Pain-Observation-Tool-(CPOT)    PAIN AD Score:   http://geriatrictoolkit.[email or web address]/cog/painad.pdf     Dyspnea:                           [x ]None[ ]Mild [ ]Moderate [ ]Severe     Respiratory Distress Observation Scale (RDOS):   A score of 0 to 2 signifies little or no respiratory distress, 3 signifies mild distress, scores 4 to 6 indicate moderate distress, and scores greater than 7 signify severe distress  [email or web address]/sites/default/[email or web address]-txhaapsnzor-wjglaxuc-skcsmsuqthj-[email or web address]    Anxiety:                             [ ]None[ ]Mild [ ]Moderate [ ]Severe   Fatigue:                             [ ]None[ ]Mild [ ]Moderate [ ]Severe   Nausea:                             [ ]None[ ]Mild [ ]Moderate [ ]Severe   Loss of appetite:              [ ]None[ ]Mild [ ]Moderate [ ]Severe   Constipation:                    [ ]None[ ]Mild [ ]Moderate [ ]Severe    Other Symptoms:  [x ]All other review of systems negative     Palliative Performance Status Version 2:         %    http://npcrc.org/files/news/palliative_performance_scale_ppsv2.pdf  PHYSICAL EXAM:  Vital Signs Last 24 Hrs  T(C): 36.4 (06 Jun 2024 13:37), Max: 36.7 (05 Jun 2024 20:13)  T(F): 97.6 (06 Jun 2024 13:37), Max: 98 (05 Jun 2024 20:13)  HR: 98 (06 Jun 2024 13:37) (57 - 98)  BP: 102/64 (06 Jun 2024 13:37) (91/64 - 102/64)  BP(mean): --  RR: 18 (06 Jun 2024 13:37) (18 - 18)  SpO2: 98% (05 Jun 2024 20:13) (98% - 98%)     I&O's Summary    05 Jun 2024 07:01  -  06 Jun 2024 07:00  --------------------------------------------------------  IN: 680 mL / OUT: 600 mL / NET: 80 mL    06 Jun 2024 07:01  -  06 Jun 2024 16:09  --------------------------------------------------------  IN: 220 mL / OUT: 800 mL / NET: -580 mL        GENERAL:  NAD   PULMONARY:  Non labored breathing  NEUROLOGIC: Grossly intact  BEHAVIORAL/PSYCH:  Calm.     CRITICAL CARE:  [ ] Shock Present  [ ]Septic [ ]Cardiogenic [ ]Neurologic [ ]Hypovolemic  [ ]  Vasopressors [ ]  Inotropes   [ ]Respiratory failure present [ ]Mechanical ventilation [ ]Non-invasive ventilatory support [ ]High flow  [ ]Acute  [ ]Chronic [ ]Hypoxic  [ ]Hypercarbic [ ]Other  [ ]Other organ failure     LABS: I have reviewed daily labs                        8.1    9.01  )-----------( 307      ( 06 Jun 2024 05:18 )             28.6   06-06    144  |  98  |  37<H>  ----------------------------<  112<H>  4.6   |  34<H>  |  1.5    Ca    8.7      06 Jun 2024 05:18    TPro  5.8<L>  /  Alb  3.8  /  TBili  0.2  /  DBili  x   /  AST  7   /  ALT  9   /  AlkPhos  97  06-04  PT/INR - ( 05 Jun 2024 06:44 )   PT: 18.10 sec;   INR: 1.58 ratio         PTT - ( 05 Jun 2024 06:44 )  PTT:31.7 sec    RADIOLOGY & ADDITIONAL STUDIES: I have reviewed new imaging    PROTEIN CALORIE MALNUTRITION PRESENT: [ ]mild [ ]moderate [ ]severe [ ]underweight [ ]morbid obesity  https://www.andeal.org/vault/2440/web/files/ONC/Table_Clinical%20Characteristics%20to%20Document%20Malnutrition-White%20JV%20et%20al%202012.pdf    Height (cm): 153.7 (03-29-24 @ 20:06)  Weight (kg): 130.1 (06-04-24 @ 21:30), 127 (03-29-24 @ 20:06)  BMI (kg/m2): 55.1 (06-04-24 @ 21:30), 53.8 (03-29-24 @ 20:06)    [ ]PPSV2 < or = to 30% [ ]significant weight loss  [ ]poor nutritional intake  [ ]anasarca      [ ]Artificial Nutrition      Palliative Care Spiritual/Emotional Screening Tool Question  Severity (0-4):                    OR                    [ x] Unable to determine. Will assess at later time if appropriate.  Score of 2 or greater indicates recommendation of Chaplaincy and/or SW referral  Chaplaincy Referral: [ ] Yes [ ] Refused [ ] Following     Caregiver [place]:  [ ] Yes [ ] No    OR    [x ] Unable to determine. Will assess at later time if appropriate.  Social Work Referral [ ]  Patient and Family Centered Care Referral [ ]    Anticipatory Grief Present: [ ] Yes [ ] No    OR     [ x] Unable to determine. Will assess at later time if appropriate.  Social Work Referral [ ]  Patient and Family Centered Care Referral [ ]    REFERRALS:   [ ]Chaplaincy  [ ]Hospice  [ ]Child Life  [ ]Social Work  [ ]Case management [ ]Holistic Therapy     Palliative care education provided to patient and/or family

## 2024-06-06 NOTE — CONSULT NOTE ADULT - ASSESSMENT
Pt with hx of MANJEET and CO2 retention with noted hx of afib with controlled VR and noted pauses.     EF in prior echo was noted to be normal with no major valve disease.     1. AFIB controlled VR PAF as well.  We noted she is on eliquis with hx of pauses.     she has been seen by EPS and refuses PPM we are aware agree MANJEET is probable cause.     NL EF on echo with no valve disease nad no hx of CAD.     2. MANJEET with morbid obesity.  Dies not get ou of bed.  although risk for PE she is on Eliquis which is protective.   agree with pulm but pt refuses CPAP    3. She is eliquis for DVT prophylaxis. No need for further Cardiac eval but I will follow.

## 2024-06-06 NOTE — CONSULT NOTE ADULT - CONVERSATION DETAILS
Patient asks me to speak to her son, Joaquín, to discuss medical issues. Introduced palliative care to Joaquín. He understands that patient refuses bipap, but states that she is very stubborn. He will continue to talk to her about wearing it. He was agreeable to discuss advance care planning. Inquired what we should do if patient ever requires intubation, and he states that of course we have to do it. I explained that patient's usually do not tolerate intubation well and require sedation, and if he doesn't tolerate her bipap, she would likely like intubation even less. He would still want patient to be intubated to be kept alive. Inquired what we should do if patient herself expresses that she would rather die than to be intubated; Joaquín responds that there is a chance that patient will say that and he would talk to her about the issue. He also states that Dr. Razo is an in-law and would like for me to talk to him.

## 2024-06-07 LAB
BASE EXCESS BLDA CALC-SCNC: 14.5 MMOL/L — HIGH (ref -2–3)
GAS PNL BLDA: SIGNIFICANT CHANGE UP
GLUCOSE BLDC GLUCOMTR-MCNC: 123 MG/DL — HIGH (ref 70–99)
GLUCOSE BLDC GLUCOMTR-MCNC: 162 MG/DL — HIGH (ref 70–99)
GLUCOSE BLDC GLUCOMTR-MCNC: 183 MG/DL — HIGH (ref 70–99)
GLUCOSE BLDC GLUCOMTR-MCNC: 214 MG/DL — HIGH (ref 70–99)
HCO3 BLDA-SCNC: 42 MMOL/L — HIGH (ref 21–28)
PCO2 BLDA: 67 MMHG — HIGH (ref 32–45)
PH BLDA: 7.41 — SIGNIFICANT CHANGE UP (ref 7.35–7.45)
PO2 BLDA: 101 MMHG — SIGNIFICANT CHANGE UP (ref 83–108)
SAO2 % BLDA: 99.5 % — HIGH (ref 94–98)

## 2024-06-07 PROCEDURE — 99232 SBSQ HOSP IP/OBS MODERATE 35: CPT

## 2024-06-07 RX ORDER — APIXABAN 2.5 MG/1
2.5 TABLET, FILM COATED ORAL EVERY 12 HOURS
Refills: 0 | Status: DISCONTINUED | OUTPATIENT
Start: 2024-06-07 | End: 2024-06-10

## 2024-06-07 RX ADMIN — Medication 1: at 17:02

## 2024-06-07 RX ADMIN — Medication 40 MILLIGRAM(S): at 05:16

## 2024-06-07 RX ADMIN — Medication 1: at 07:59

## 2024-06-07 RX ADMIN — APIXABAN 2.5 MILLIGRAM(S): 2.5 TABLET, FILM COATED ORAL at 17:01

## 2024-06-07 RX ADMIN — Medication 1 MILLIGRAM(S): at 11:10

## 2024-06-07 RX ADMIN — RISPERIDONE 0.5 MILLIGRAM(S): 4 TABLET ORAL at 11:11

## 2024-06-07 RX ADMIN — Medication 1 APPLICATION(S): at 05:17

## 2024-06-07 RX ADMIN — SENNA PLUS 2 TABLET(S): 8.6 TABLET ORAL at 21:11

## 2024-06-07 RX ADMIN — APIXABAN 5 MILLIGRAM(S): 2.5 TABLET, FILM COATED ORAL at 05:16

## 2024-06-07 RX ADMIN — Medication 40 MILLIGRAM(S): at 17:01

## 2024-06-07 RX ADMIN — ATORVASTATIN CALCIUM 40 MILLIGRAM(S): 80 TABLET, FILM COATED ORAL at 21:11

## 2024-06-07 RX ADMIN — Medication 1 APPLICATION(S): at 11:10

## 2024-06-07 NOTE — PROGRESS NOTE ADULT - SUBJECTIVE AND OBJECTIVE BOX
Medication: vitamin d passed protocol.   Last office visit date: 2/27/24  Next appointment scheduled?: Yes   Number of refills given: 1     ----------Daily Progress Note----------    HISTORY OF PRESENT ILLNESS:  Patient is an 84 yo F from [facility] w/PMH of diabetes, HTN, LE edema, bipolar disorder, tachybrady syndrome on Eliquis (declining PPM), mild atrial stenosis, and diagnosed w/UTI on  and started on Levaquin(did not get any dose), who was sent for evaluation of worsening confusion although she was AOx3 on exam in the ED. She reports having some chills but denies any chest pain, dyspnea, abdominal pain, nausea, dysuria, or diarrhea. Of note, her O2 sat intermittently dropped to mid-80s and then improved to mid-90s while in the ED. She was afebrile O2 sat was 99% while on 2L NC. Her labs were notable for WBC 8, hgb 8.6 (9.8 on ), Bicarb 35, BUN 21, Cr 1.4 (around baseline), and pBNP 2838, VBG pH 7.3 and pCO2 85, CXR showing stable cardiomegaly, and EKG showing afib. Patient denies any history of COPD. In the ED, she received lasix 60 mg IV but refused BIPAP. She was admitted to telemetry for further work up and management of acute hypercarbic respiratory failure likely 2/2 MANJEET vs less likely acute HF exacerbation.     Today is hospital day 3d.     INTERVAL HOSPITAL COURSE / OVERNIGHT EVENTS:  O/N events:      24 hr events:      Patient was examined and seen at bedside. This morning she is resting comfortably in bed and reports no new issues or overnight events.     Review of Systems: Otherwise unremarkable     <<<<<PAST MEDICAL & SURGICAL HISTORY>>>>>  Diabetes mellitus    Hypertension    Schizoaffective disorder    Edema  Bilateral LE    Disorder of thyroid, unspecified  Son cannot provide details. States she had "thyroid surgery" decades ago when she was young    No significant past surgical history      ALLERGIES  No Known Allergies      Home Medications:  apixaban 5 mg oral tablet: 1 tab(s) orally 2 times a day (2024 13:13)  Crestor 10 mg oral tablet: 1 tab(s) orally once a day (29 Mar 2024 14:12)  Farxiga 10 mg oral tablet: 1 tab(s) orally once a day (2024 22:36)  ferrous sulfate 325 mg (65 mg elemental iron) oral delayed release tablet: 1 tab(s) orally 2 times a day (2024 22:30)  folic acid 1 mg oral tablet: 1 tab(s) orally once a day (29 Mar 2024 14:12)  Lasix 40 mg oral tablet: 1 tab(s) orally 2 times a day (2024 22:29)  Levaquin 500 mg oral tablet: 1 tab(s) orally once a day 7 days (start date 24 ) (2024 22:36)  losartan 50 mg oral tablet: 1 tab(s) orally once a day hold for SBP less than 105mmhg (2024 15:08)  metoprolol succinate 25 mg oral tablet, extended release: 1 tab(s) orally once a day (2024 13:13)  nystatin 100,000 units/g topical cream: Apply topically to affected area 2 times a day bilateral groin area x 14 days ( start date 24) (2024 22:36)  RisperDAL 1 mg/mL oral solution: 0.5 milliliter(s) orally once a day (29 Mar 2024 14:12)  senna leaf extract oral tablet: 2 tab(s) orally once a day (at bedtime) (2024 13:13)  Vitamin A and D topical ointment: Apply topically to affected area once a day Apply to bilateral LE&#x27;s/Feet daily (2024 22:36)  Vitamin D2 50,000 intl units (1.25 mg) oral capsule (obsolete): 1 cap(s) orally every 2 weeks (2024 22:36)        MEDICATIONS  STANDING MEDICATIONS  apixaban 5 milliGRAM(s) Oral every 12 hours  atorvastatin 40 milliGRAM(s) Oral at bedtime  clotrimazole 1% Cream 1 Application(s) Topical two times a day  dextrose 10% Bolus 125 milliLiter(s) IV Bolus once  dextrose 5%. 1000 milliLiter(s) IV Continuous <Continuous>  dextrose 5%. 1000 milliLiter(s) IV Continuous <Continuous>  dextrose 50% Injectable 25 Gram(s) IV Push once  dextrose 50% Injectable 12.5 Gram(s) IV Push once  folic acid 1 milliGRAM(s) Oral daily  furosemide    Tablet 40 milliGRAM(s) Oral every 12 hours  glucagon  Injectable 1 milliGRAM(s) IntraMuscular once  insulin lispro (ADMELOG) corrective regimen sliding scale   SubCutaneous three times a day before meals  risperiDONE   Solution 0.5 milliGRAM(s) Oral daily  senna 2 Tablet(s) Oral at bedtime  vitamin A &amp; D Ointment 1 Application(s) Topical daily    PRN MEDICATIONS  acetaminophen     Tablet .. 650 milliGRAM(s) Oral every 6 hours PRN  aluminum hydroxide/magnesium hydroxide/simethicone Suspension 30 milliLiter(s) Oral every 4 hours PRN  dextrose Oral Gel 15 Gram(s) Oral once PRN  melatonin 3 milliGRAM(s) Oral at bedtime PRN  ondansetron Injectable 4 milliGRAM(s) IV Push every 8 hours PRN    VITALS:  T(F): 97.6  HR: 85  BP: 107/72  RR: 18  SpO2: 95%    <<<<<PHYSICAL EXAM>>>>>  GENERAL: Well developed, well nourished and in no acute distress. Resting comfortably in bed.  HEENT: Normocephalic, atraumatic, mucous membranes moist, EOMI, PERRLA, bilateral sclera anicteric, no conjunctival injection  Neck: Supple, non-tender, no lymphadenopathy.  PULMONARY: Clear to auscultation bilaterally. No rales, rhonchi, or wheezing.  CARDIOVASCULAR: Regular rate and rhythm, S1-S2, no murmurs  GASTROINTESTINAL: Soft, non-tender, non-distended, no guarding.  RENAL: No CVA tenderness.  SKIN/EXTREMITIES: No clubbing or edema  NEUROLOGIC/MUSCULOSKELETAL: AOx4, grossly moving all extremities, no focal deficits.    <<<<<LABS>>>>>                        8.1    9.01  )-----------( 307      ( 2024 05:18 )             28.6     06-06    144  |  98  |  37<H>  ----------------------------<  112<H>  4.6   |  34<H>  |  1.5    Ca    8.7      2024 05:18      PT/INR - ( 2024 06:44 )   PT: 18.10 sec;   INR: 1.58 ratio         PTT - ( 2024 06:44 )  PTT:31.7 sec  Urinalysis Basic - ( 2024 11:19 )    Color: Yellow / Appearance: Cloudy / S.015 / pH: x  Gluc: x / Ketone: Negative mg/dL  / Bili: Negative / Urobili: 0.2 mg/dL   Blood: x / Protein: Negative mg/dL / Nitrite: Negative   Leuk Esterase: Small / RBC: 675 /HPF / WBC 4 /HPF   Sq Epi: x / Non Sq Epi: 11 /HPF / Bacteria: Many /HPF            Culture - Urine (collected 2024 19:10)  Source: Clean Catch Clean Catch (Midstream)  Final Report (2024 22:12):    <10,000 CFU/mL Normal Urogenital Randa    [id number]        <<<<<RADIOLOGY>>>>>        ----------------------------------------------------------------------------------------------------------------------------------------------------------------------------------------------- ----------Daily Progress Note----------    HISTORY OF PRESENT ILLNESS:  Patient is an 84 yo F from [facility] w/PMH of diabetes, HTN, LE edema, bipolar disorder, tachybrady syndrome on Eliquis (declining PPM), mild atrial stenosis, and diagnosed w/UTI on  and started on Levaquin(did not get any dose), who was sent for evaluation of worsening confusion although she was AOx3 on exam in the ED. She reports having some chills but denies any chest pain, dyspnea, abdominal pain, nausea, dysuria, or diarrhea. Of note, her O2 sat intermittently dropped to mid-80s and then improved to mid-90s while in the ED. She was afebrile O2 sat was 99% while on 2L NC. Her labs were notable for WBC 8, hgb 8.6 (9.8 on ), Bicarb 35, BUN 21, Cr 1.4 (around baseline), and pBNP 2838, VBG pH 7.3 and pCO2 85, CXR showing stable cardiomegaly, and EKG showing afib. Patient denies any history of COPD. In the ED, she received lasix 60 mg IV but refused BIPAP. She was admitted to telemetry for further work up and management of acute hypercarbic respiratory failure likely 2/2 MANJEET vs less likely acute HF exacerbation.     Today is hospital day 3d.     INTERVAL HOSPITAL COURSE / OVERNIGHT EVENTS:  O/N events:  None    24 hr events:  None    Patient was examined and seen at bedside. Lethargic this morning, oriented to person, place, and time, reports feeling well, denies any new symptoms    Review of Systems: Otherwise unremarkable     <<<<<PAST MEDICAL & SURGICAL HISTORY>>>>>  Diabetes mellitus    Hypertension    Schizoaffective disorder    Edema  Bilateral LE    Disorder of thyroid, unspecified  Son cannot provide details. States she had "thyroid surgery" decades ago when she was young    No significant past surgical history      ALLERGIES  No Known Allergies      Home Medications:  apixaban 5 mg oral tablet: 1 tab(s) orally 2 times a day (2024 13:13)  Crestor 10 mg oral tablet: 1 tab(s) orally once a day (29 Mar 2024 14:12)  Farxiga 10 mg oral tablet: 1 tab(s) orally once a day (2024 22:36)  ferrous sulfate 325 mg (65 mg elemental iron) oral delayed release tablet: 1 tab(s) orally 2 times a day (2024 22:30)  folic acid 1 mg oral tablet: 1 tab(s) orally once a day (29 Mar 2024 14:12)  Lasix 40 mg oral tablet: 1 tab(s) orally 2 times a day (2024 22:29)  Levaquin 500 mg oral tablet: 1 tab(s) orally once a day 7 days (start date 24 ) (2024 22:36)  losartan 50 mg oral tablet: 1 tab(s) orally once a day hold for SBP less than 105mmhg (2024 15:08)  metoprolol succinate 25 mg oral tablet, extended release: 1 tab(s) orally once a day (2024 13:13)  nystatin 100,000 units/g topical cream: Apply topically to affected area 2 times a day bilateral groin area x 14 days ( start date 24) (2024 22:36)  RisperDAL 1 mg/mL oral solution: 0.5 milliliter(s) orally once a day (29 Mar 2024 14:12)  senna leaf extract oral tablet: 2 tab(s) orally once a day (at bedtime) (2024 13:13)  Vitamin A and D topical ointment: Apply topically to affected area once a day Apply to bilateral LE&#x27;s/Feet daily (2024 22:36)  Vitamin D2 50,000 intl units (1.25 mg) oral capsule (obsolete): 1 cap(s) orally every 2 weeks (2024 22:36)        MEDICATIONS  STANDING MEDICATIONS  apixaban 5 milliGRAM(s) Oral every 12 hours  atorvastatin 40 milliGRAM(s) Oral at bedtime  clotrimazole 1% Cream 1 Application(s) Topical two times a day  dextrose 10% Bolus 125 milliLiter(s) IV Bolus once  dextrose 5%. 1000 milliLiter(s) IV Continuous <Continuous>  dextrose 5%. 1000 milliLiter(s) IV Continuous <Continuous>  dextrose 50% Injectable 25 Gram(s) IV Push once  dextrose 50% Injectable 12.5 Gram(s) IV Push once  folic acid 1 milliGRAM(s) Oral daily  furosemide    Tablet 40 milliGRAM(s) Oral every 12 hours  glucagon  Injectable 1 milliGRAM(s) IntraMuscular once  insulin lispro (ADMELOG) corrective regimen sliding scale   SubCutaneous three times a day before meals  risperiDONE   Solution 0.5 milliGRAM(s) Oral daily  senna 2 Tablet(s) Oral at bedtime  vitamin A &amp; D Ointment 1 Application(s) Topical daily    PRN MEDICATIONS  acetaminophen     Tablet .. 650 milliGRAM(s) Oral every 6 hours PRN  aluminum hydroxide/magnesium hydroxide/simethicone Suspension 30 milliLiter(s) Oral every 4 hours PRN  dextrose Oral Gel 15 Gram(s) Oral once PRN  melatonin 3 milliGRAM(s) Oral at bedtime PRN  ondansetron Injectable 4 milliGRAM(s) IV Push every 8 hours PRN    VITALS:  T(F): 97.6  HR: 85  BP: 107/72  RR: 18  SpO2: 95%    <<<<<PHYSICAL EXAM>>>>>  GENERAL: NAD  PULMONARY: Clear to auscultation bilaterally. No wheezing or crackles  CARDIOVASCULAR: Regular rate and rhythm, S1-S2, no murmurs, rubs, or gallops  GASTROINTESTINAL: Soft, non-tender, non-distended, no guarding  SKIN/EXTREMITIES:  Warm, 2+ tibialis posterior pulses, no UE or LE edema  NEUROLOGIC/MUSCULOSKELETAL: AOx4, able to lift arms antigravity, difficulty lifting legs, no focal deficits    <<<<<LABS>>>>>                        8.1    9.01  )-----------( 307      ( 2024 05:18 )             28.6     06-    144  |  98  |  37<H>  ----------------------------<  112<H>  4.6   |  34<H>  |  1.5    Ca    8.7      2024 05:18      PT/INR - ( 2024 06:44 )   PT: 18.10 sec;   INR: 1.58 ratio         PTT - ( 2024 06:44 )  PTT:31.7 sec  Urinalysis Basic - ( 2024 11:19 )    Color: Yellow / Appearance: Cloudy / S.015 / pH: x  Gluc: x / Ketone: Negative mg/dL  / Bili: Negative / Urobili: 0.2 mg/dL   Blood: x / Protein: Negative mg/dL / Nitrite: Negative   Leuk Esterase: Small / RBC: 675 /HPF / WBC 4 /HPF   Sq Epi: x / Non Sq Epi: 11 /HPF / Bacteria: Many /HPF            Culture - Urine (collected 2024 19:10)  Source: Clean Catch Clean Catch (Midstream)  Final Report (2024 22:12):    <10,000 CFU/mL Normal Urogenital Randa    [id number]        <<<<<RADIOLOGY>>>>>        -----------------------------------------------------------------------------------------------------------------------------------------------------------------------------------------------

## 2024-06-07 NOTE — PROGRESS NOTE ADULT - ASSESSMENT
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  Patient is sent for evaluation of worsening confusion.  On my exam, patient is alert, oriented x 3, states she had some chills earlier, but otherwise has no acute complaints.  Patient's O2 saturation is noted to be intermittently dropping to mid-80s%, but spontaneously improving to mid-90s%.  Patient states that she does not have COPD, never smoked, but does use oxygen as needed sometimes. No chest pain, shortness of breath, cough, abdominal pain, vomiting, diarrhea.      Acute Hypoxic, Hypercapnic Respiratory Failure, CO2 retention   Respiratory Acidosis ( likely from MANJEET)  - per Nursing home, pt has order for BiPAP; However, refuses  - pulm eval noted    AFib with tachy-tamy syndrome ( refused PPM in the past)   - Echo 3/2024 : EF 60% LA enlargement, mild aortic stenosis   - Lasix 60 in ED with good response ( on Lasix 40 mg PO BID at NH)  - Monitor I's and O's   - Fluid restriction , non compliant with it SNF  - Continue Eliquis   - Monitor on tele   - has refused PPM in past  - case discussed today with Cardio, he will speak with son Joaquín    Normocytic Anemia, Iron Def Anemia  - Hgb noted  - continue Iron supplement    HTN  Mild AS  - Losartan and Metoprolol succinate with holding parameters     Bipolar disorder, Schizophrenia  - on Risperdal, behavior stab,     DM  - on Farxiga  - now with UTI? asymptomatic, afebrile and normal WBC  - monitor      DVT PPx: Eliquis   GI PPx: None   Diet: DASH  Activity: IAT    Await Palliative Care Kaci noted. Patient is a full code despite not wanting any of he above interventions.   Dr Jimenez will speak with the family  D/C planning back to SNF ?

## 2024-06-07 NOTE — PROGRESS NOTE ADULT - SUBJECTIVE AND OBJECTIVE BOX
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  Patient is sent for evaluation of worsening confusion.  On my exam, patient is alert, oriented x 3, states she had some chills earlier, but otherwise has no acute complaints.  Patient's O2 saturation is noted to be intermittently dropping to mid-80s%, but spontaneously improving to mid-90s%.  Patient states that she does not have COPD, never smoked, but does use oxygen as needed sometimes. No chest pain, shortness of breath, cough, abdominal pain, vomiting, diarrhea.    History obtained from nursing supervisor at [facility]. Per the nursing supervisor, pt was diagnosed with UTI earlier today and was started on Levaquin ( however, she did not receive any dose today). When the RN was drawing blood patient was not herself and per RN her blood also clotted quickly despite being on Eliquis.     In ED vitals were  T(F): 98.4  HR: 61  BP: 99/65  RR: 16  SpO2: 99% on 2 L O2     Labs were significant for Hgb 8.6, MCV 94.3, Cl 96, Bicarb 35, BUN 21, Cr 1.4 ( baseline 1.4), pBNP 2838 ( baseline 1440 in 3/2024).     VBG: pH 7.30, pCO2 85    CXR: stable cardiomegaly     EKG: AFib     Pt received IV lasix 60 mg and was offered BiPAP; however, she refused it. (Pt was A&Ox3). She is being admitted to [facility] for further workup.  (2024 21:10)    MEDICATIONS  (STANDING):  apixaban 2.5 milliGRAM(s) Oral every 12 hours  atorvastatin 40 milliGRAM(s) Oral at bedtime  clotrimazole 1% Cream 1 Application(s) Topical two times a day  dextrose 10% Bolus 125 milliLiter(s) IV Bolus once  dextrose 5%. 1000 milliLiter(s) (100 mL/Hr) IV Continuous <Continuous>  dextrose 5%. 1000 milliLiter(s) (50 mL/Hr) IV Continuous <Continuous>  dextrose 50% Injectable 25 Gram(s) IV Push once  dextrose 50% Injectable 12.5 Gram(s) IV Push once  folic acid 1 milliGRAM(s) Oral daily  furosemide    Tablet 40 milliGRAM(s) Oral every 12 hours  glucagon  Injectable 1 milliGRAM(s) IntraMuscular once  insulin lispro (ADMELOG) corrective regimen sliding scale   SubCutaneous three times a day before meals  risperiDONE   Solution 0.5 milliGRAM(s) Oral daily  senna 2 Tablet(s) Oral at bedtime  vitamin A &amp; D Ointment 1 Application(s) Topical daily    MEDICATIONS  (PRN):  acetaminophen     Tablet .. 650 milliGRAM(s) Oral every 6 hours PRN Temp greater or equal to 38C (100.4F), Mild Pain (1 - 3)  aluminum hydroxide/magnesium hydroxide/simethicone Suspension 30 milliLiter(s) Oral every 4 hours PRN Dyspepsia  dextrose Oral Gel 15 Gram(s) Oral once PRN Blood Glucose LESS THAN 70 milliGRAM(s)/deciliter  melatonin 3 milliGRAM(s) Oral at bedtime PRN Insomnia  ondansetron Injectable 4 milliGRAM(s) IV Push every 8 hours PRN Nausea and/or Vomiting    INTERVAL EVENT: Patient seen today, chart reviewed. Patient still not wearing Bipap, states she breathes fine.     T(C): 36.4 (24 @ 04:00), Max: 37.2 (24 @ 20:12)  HR: 85 (24 @ 05:14) (85 - 120)  BP: 107/72 (24 @ 04:00) (94/63 - 107/72)  RR: 18 (24 @ 04:00) (18 - 19)  SpO2: 95% (24 @ 21:22) (95% - 95%)  Wt(kg): --Vital Signs Last 24 Hrs  T(C): 36.4 (2024 04:00), Max: 37.2 (2024 20:12)  T(F): 97.6 (2024 04:00), Max: 99 (2024 20:12)  HR: 85 (2024 05:14) (85 - 120)  BP: 107/72 (2024 04:00) (94/63 - 107/72)  BP(mean): --  RR: 18 (2024 04:00) (18 - 19)  SpO2: 95% (2024 21:22) (95% - 95%)    Parameters below as of 2024 21:22  Patient On (Oxygen Delivery Method): nasal cannula  O2 Flow (L/min): 3      PHYSICAL EXAM:  GENERAL: NAD, Pale, NC O2 in place  LUNGS: Shallow resp, Faint wheeze  HEART: S1, S2,   ABDOMEN: Soft, Nontender, Obese; Bowel sounds present  EXTREMITIES: 2+ edema  SKIN: No rashes or lesions    LABS:                          8.1    9.01  )-----------( 307      ( 2024 05:18 )             28.6             06-    144  |  98  |  37<H>  ----------------------------<  112<H>  4.6   |  34<H>  |  1.5    Ca    8.7      2024 05:18      Urinalysis Basic - ( 2024 11:19 )    Color: Yellow / Appearance: Cloudy / S.015 / pH: x  Gluc: x / Ketone: Negative mg/dL  / Bili: Negative / Urobili: 0.2 mg/dL   Blood: x / Protein: Negative mg/dL / Nitrite: Negative   Leuk Esterase: Small / RBC: 675 /HPF / WBC 4 /HPF   Sq Epi: x / Non Sq Epi: 11 /HPF / Bacteria: Many /HPF        Culture - Urine (collected 2024 12:16)  Source: Clean Catch Clean Catch (Midstream)  Preliminary Report (2024 09:12):    >100,000 CFU/ml Escherichia coli    Culture - Urine (collected 2024 19:10)  Source: Clean Catch Clean Catch (Midstream)  Final Report (2024 22:12):    <10,000 CFU/mL Normal Urogenital Randa      RADIOLOGY & ADDITIONAL TESTS:

## 2024-06-07 NOTE — PROGRESS NOTE ADULT - ASSESSMENT
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  Patient is sent for evaluation of worsening confusion.      6/6: Patient asks me to speak to her son, Joaquín, to discuss medical issues. Introduced palliative care to Joaquín. He understands that patient refuses bipap, but states that she is very stubborn. He will continue to talk to her about wearing it. He was agreeable to discuss advance care planning. Inquired what we should do if patient ever requires intubation, and he states that of course we have to do it. I explained that patient's usually do not tolerate intubation well and require sedation, and if he doesn't tolerate her bipap, she would likely like intubation even less. He would still want patient to be intubated to be kept alive. Inquired what we should do if patient herself expresses that she would rather die than to be intubated; Joaquín responds that there is a chance that patient will say that and he would talk to her about the issue. He also states that Dr. Razo is an in-law and would like for me to talk to him.     6/7: Patient somnolent. Still awaiting to hear from Dr. Razo    Plan:  - symptoms per primary team  - I have reached out to Dr. Razo to discuss the case    Palliative care will continue to follow.   Please call [phone] with questions or concerns 24/7.   _____________  Lb Wing MD  Palliative Medicine  [facility]   of Geriatric and Palliative Medicine  [phone]

## 2024-06-07 NOTE — PROGRESS NOTE ADULT - SUBJECTIVE AND OBJECTIVE BOX
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, mild atrial stenosis, recently diagnosed UTI on Levaquin(did not get any dose), at baseline uses walker or wheelchair.  Patient is sent for evaluation of worsening confusion.  On my exam, patient is alert, oriented x 3, states she had some chills earlier, but otherwise has no acute complaints.  Patient's O2 saturation is noted to be intermittently dropping to mid-80s%, but spontaneously improving to mid-90s%.  Patient states that she does not have COPD, never smoked, but does use oxygen as needed sometimes. No chest pain, shortness of breath, cough, abdominal pain, vomiting, diarrhea.    History obtained from nursing supervisor at [facility]. Per the nursing supervisor, pt was diagnosed with UTI earlier today and was started on Levaquin ( however, she did not receive any dose today). When the RN was drawing blood patient was not herself and per RN her blood also clotted quickly despite being on Eliquis.     In ED vitals were  T(F): 98.4  HR: 61  BP: 99/65  RR: 16  SpO2: 99% on 2 L O2     Labs were significant for Hgb 8.6, MCV 94.3, Cl 96, Bicarb 35, BUN 21, Cr 1.4 ( baseline 1.4), pBNP 2838 ( baseline 1440 in 3/2024).     VBG: pH 7.30, pCO2 85    CXR: stable cardiomegaly     EKG: AFib     Pt received IV lasix 60 mg and was offered BiPAP; however, she refused it. (Pt was A&Ox3). She is being admitted to [facility] for further workup.  (04 Jun 2024 21:10)    Interval history:  Patient somnolent.     ITEMS NOT CHECKED ARE NOT PRESENT    SOCIAL HISTORY:   Significant other/partner[ ]  Children[x ]  Alevism/Spirituality:  Substance hx:  [ ]   Tobacco hx:  [ ]   Alcohol hx: [ ]   Living Situation: [ ]Home  [ ]Long term care  [ ]Rehab [ ]Other  Home Services: [ ] HHA [ ] Visting RN [ ] Hospice  Occupation:  Home Opioid hx:  [ ] Y [ ] N [ ] I-Stop Reference No:     ADVANCE DIRECTIVES:    [ ] Full Code [ ] DNR  MOLST  [ ]  Living Will  [ ]   DECISION MAKER(s):  [ ] Health Care Proxy(s)  [ ] Surrogate(s)  [ ] Guardian           Name(s): Phone Number(s):    BASELINE (I)ADL(s) (prior to admission):  [place]igg: [ ]Total  [ ] Moderate [ ]Dependent  Palliative Performance Status Version 2:         %    [email or web address]    Allergies    No Known Allergies    Intolerances    MEDICATIONS  (STANDING):  apixaban 5 milliGRAM(s) Oral every 12 hours  atorvastatin 40 milliGRAM(s) Oral at bedtime  clotrimazole 1% Cream 1 Application(s) Topical two times a day  dextrose 10% Bolus 125 milliLiter(s) IV Bolus once  dextrose 5%. 1000 milliLiter(s) (100 mL/Hr) IV Continuous <Continuous>  dextrose 5%. 1000 milliLiter(s) (50 mL/Hr) IV Continuous <Continuous>  dextrose 50% Injectable 25 Gram(s) IV Push once  dextrose 50% Injectable 12.5 Gram(s) IV Push once  folic acid 1 milliGRAM(s) Oral daily  furosemide    Tablet 40 milliGRAM(s) Oral every 12 hours  glucagon  Injectable 1 milliGRAM(s) IntraMuscular once  insulin lispro (ADMELOG) corrective regimen sliding scale   SubCutaneous three times a day before meals  risperiDONE   Solution 0.5 milliGRAM(s) Oral daily  senna 2 Tablet(s) Oral at bedtime  vitamin A &amp; D Ointment 1 Application(s) Topical daily    MEDICATIONS  (PRN):  acetaminophen     Tablet .. 650 milliGRAM(s) Oral every 6 hours PRN Temp greater or equal to 38C (100.4F), Mild Pain (1 - 3)  aluminum hydroxide/magnesium hydroxide/simethicone Suspension 30 milliLiter(s) Oral every 4 hours PRN Dyspepsia  dextrose Oral Gel 15 Gram(s) Oral once PRN Blood Glucose LESS THAN 70 milliGRAM(s)/deciliter  melatonin 3 milliGRAM(s) Oral at bedtime PRN Insomnia  ondansetron Injectable 4 milliGRAM(s) IV Push every 8 hours PRN Nausea and/or Vomiting      PRESENT SYMPTOMS: [ ]Unable to obtain due to poor mentation   Source if other than patient:  [ ]Family   [ ]Team     Pain: [ ]yes [x ]no  QOL impact -   Location -                    Aggravating factors -  Alleviating factors -   Quality -  Radiation -  Timing -   Severity (0-10 scale):  Minimal acceptable level (0-10 scale):     CPOT:    https://[email or web address]/getattachment/[email or web address]/Critical-Care-Pain-Observation-Tool-(CPOT)    PAIN AD Score:   http://geriatrictoolkit.[facility]/cog/painad.pdf     Dyspnea:                           [x ]None[ ]Mild [ ]Moderate [ ]Severe     Respiratory Distress Observation Scale (RDOS):   A score of 0 to 2 signifies little or no respiratory distress, 3 signifies mild distress, scores 4 to 6 indicate moderate distress, and scores greater than 7 signify severe distress  [email or web address]    Anxiety:                             [ ]None[ ]Mild [ ]Moderate [ ]Severe   Fatigue:                             [ ]None[ ]Mild [ ]Moderate [ ]Severe   Nausea:                             [ ]None[ ]Mild [ ]Moderate [ ]Severe   Loss of appetite:              [ ]None[ ]Mild [ ]Moderate [ ]Severe   Constipation:                    [ ]None[ ]Mild [ ]Moderate [ ]Severe    Other Symptoms:  [x ]All other review of systems negative     Palliative Performance Status Version 2:         %    [email or web address]  PHYSICAL EXAM:    GENERAL:  NAD   PULMONARY:  Non labored breathing  NEUROLOGIC: Grossly intact  BEHAVIORAL/PSYCH:  Calm.     CRITICAL CARE:  [ ] Shock Present  [ ]Septic [ ]Cardiogenic [ ]Neurologic [ ]Hypovolemic  [ ]  Vasopressors [ ]  Inotropes   [ ]Respiratory failure present [ ]Mechanical ventilation [ ]Non-invasive ventilatory support [ ]High flow  [ ]Acute  [ ]Chronic [ ]Hypoxic  [ ]Hypercarbic [ ]Other  [ ]Other organ failure     LABS: I have reviewed daily labs             RADIOLOGY & ADDITIONAL STUDIES: I have reviewed new imaging    PROTEIN CALORIE MALNUTRITION PRESENT: [ ]mild [ ]moderate [ ]severe [ ]underweight [ ]morbid obesity  https://www.[facility].org/vault/2440/web/files/ONC/Table_Clinical%20Characteristics%20to%20Document%20Malnutrition-White%20JV%20et%20al%202012.pdf    Height (cm): 153.7 (03-29-24 @ 20:06)  Weight (kg): 130.1 (06-04-24 @ 21:30), 127 (03-29-24 @ 20:06)  BMI (kg/m2): 55.1 (06-04-24 @ 21:30), 53.8 (03-29-24 @ 20:06)    [ ]PPSV2 < or = to 30% [ ]significant weight loss  [ ]poor nutritional intake  [ ]anasarca      [ ]Artificial Nutrition      Palliative Care Spiritual/Emotional Screening Tool Question  Severity (0-4):                    OR                    [ x] Unable to determine. Will assess at later time if appropriate.  Score of 2 or greater indicates recommendation of Chaplaincy and/or SW referral  Chaplaincy Referral: [ ] Yes [ ] Refused [ ] Following     Caregiver [place]:  [ ] Yes [ ] No    OR    [x ] Unable to determine. Will assess at later time if appropriate.  Social Work Referral [ ]  Patient and Family Centered Care Referral [ ]    Anticipatory Grief Present: [ ] Yes [ ] No    OR     [ x] Unable to determine. Will assess at later time if appropriate.  Social Work Referral [ ]  Patient and Family Centered Care Referral [ ]    REFERRALS:   [ ]Chaplaincy  [ ]Hospice  [ ]Child Life  [ ]Social Work  [ ]Case management [ ]Holistic Therapy     Palliative care education provided to patient and/or family

## 2024-06-07 NOTE — PROGRESS NOTE ADULT - ASSESSMENT
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    #Acute hypercarbic respiratory failure likely 2/2 MANJEET   #Respiratory Acidosis ( likely from MANJEET)  #Recently diagnosed AFib with tachy-tamy syndrome ( refused PPM in the past)   #LE edema  - Per patient she is on O2 at baseline ( unsure how many liters)   - VBG: pH 7.30, pCO2 85   - Bicarb 35   - per Nursing home records pt has order for BiPAP; However, she is refusing BiPAP here   - CXR: on wet read bilateral pleural effusions ( R> L)   - Echo 3/2024 : EF 60% LA enlargement, mild aortic stenosis ,pulmonary consult  - s/p 1 dose IV lasix 60 in ED with good response ( on Lasix 40 mg PO BID at NH)  - Monitor I's and O's   - BP on the lower side ( MAP > 60) and pt is asymptomatic  - Monitor BP  - Fluid restriction   - Hold Digoxin in a setting of pleural effusion   - C/w Eliquis  - On tele  - Pulm team spoke to patient about her MNAJEET, she continues to refuse BiPAP or CPAP  - Cardiology noted Afib with pauses likely 2/2 MANJEET, patient continues to refuse CPAP  - Palliative care (6/6): Patient's son wants intubation if needed, wants to keep his mother alive    #Normocytic Anemia - 2/2 MANNY   - Hgb 8.6 ( baseline ~8- 9)   - Iron sat 15%, Iron T 37, Ferritin 37 (3/30/24)  - c/w Iron supplements ( on Ferrous sulfate 325 mg BID outpatient; consider switching it to once a day on discharge)     #HTN  #Mild AS  - Losartan and Metoprolol succinate with holding parameters     #Bipolar disorder   #Schizophrenia  - c/w Risperdal     #DM   - on Farxiga at home  - ISS and FS    #Recently Diagnosed UTI?Obtain UA   - hold off on antibiotics for now as patient is not symptomatic and no other signs of infection noted ( Afebrile; no leukocytosis/leukopenia)   - UA shows slightly LE+, UCx negative    #MISC  - DVT PPx: Eliquis   - GI PPx: None   - Diet: DASH  - Activity: IAT         Patient is an 84 yo F from [facility] w/PMH of diabetes, HTN, LE edema, bipolar disorder, 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    #Acute hypercarbic respiratory failure likely 2/2 MANJEET   #Respiratory Acidosis ( likely from MANJEET)  #Recently diagnosed AFib with tachy-tamy syndrome ( refused PPM in the past)   #LE edema  - Per patient she is on O2 at baseline ( unsure how many liters)   - VBG: pH 7.30, pCO2 85   - Bicarb 35   - per Nursing home records pt has order for BiPAP; However, she is refusing BiPAP here   - CXR: on wet read bilateral pleural effusions ( R> L)   - Echo 3/2024 : EF 60% LA enlargement, mild aortic stenosis ,pulmonary consult  - s/p 1 dose IV lasix 60 in ED with good response ( on Lasix 40 mg PO BID at NH)  - Monitor I's and O's   - BP on the lower side ( MAP > 60) and pt is asymptomatic  - Monitor BP  - Fluid restriction   - Hold Digoxin in a setting of pleural effusion   - C/w Eliquis 2.5 mg bid  - On tele  - Pulm team spoke to patient about her MANJEET, she continues to refuse BiPAP or CPAP  - Cardiology noted Afib with pauses likely 2/2 MANJEET, patient continues to refuse CPAP  - Palliative care (6/6): Patient's son wants intubation if needed, wants to keep his mother alive  - ABG (6/7): pH 7.41, pCO2 67, bicarb 42  - As per Dr. Razo, he will speak with family about PPM    #Normocytic Anemia - 2/2 MANNY   - Hgb 8.6 ( baseline ~8- 9)   - Iron sat 15%, Iron T 37, Ferritin 37 (3/30/24)  - c/w Iron supplements ( on Ferrous sulfate 325 mg BID outpatient; consider switching it to once a day on discharge)     #HTN  #Mild AS  - Losartan and Metoprolol succinate with holding parameters     #Bipolar disorder   #Schizophrenia  - c/w Risperdal     #DM   - on Farxiga at home  - ISS and FS    #Unlikely UTI  - UA shows slightly LE+, 1st UCx negative, 2nd UCx showing Ecoli   - hold off on antibiotics for now as patient is not symptomatic and no other signs of infection noted ( Afebrile; no leukocytosis/leukopenia)     #MISC  - DVT PPx: Eliquis   - GI PPx: None   - Diet: DASH  - Activity: IAT    Pending: Possible PPM placement, discharge to SNF?

## 2024-06-08 LAB
ANION GAP SERPL CALC-SCNC: 7 MMOL/L — SIGNIFICANT CHANGE UP (ref 7–14)
BUN SERPL-MCNC: 28 MG/DL — HIGH (ref 10–20)
CALCIUM SERPL-MCNC: 9.2 MG/DL — SIGNIFICANT CHANGE UP (ref 8.4–10.5)
CHLORIDE SERPL-SCNC: 96 MMOL/L — LOW (ref 98–110)
CO2 SERPL-SCNC: 37 MMOL/L — HIGH (ref 17–32)
CREAT SERPL-MCNC: 1.2 MG/DL — SIGNIFICANT CHANGE UP (ref 0.7–1.5)
EGFR: 44 ML/MIN/1.73M2 — LOW
GLUCOSE BLDC GLUCOMTR-MCNC: 170 MG/DL — HIGH (ref 70–99)
GLUCOSE BLDC GLUCOMTR-MCNC: 180 MG/DL — HIGH (ref 70–99)
GLUCOSE BLDC GLUCOMTR-MCNC: 228 MG/DL — HIGH (ref 70–99)
GLUCOSE BLDC GLUCOMTR-MCNC: 235 MG/DL — HIGH (ref 70–99)
GLUCOSE SERPL-MCNC: 225 MG/DL — HIGH (ref 70–99)
HCT VFR BLD CALC: 29 % — LOW (ref 37–47)
HGB BLD-MCNC: 8.1 G/DL — LOW (ref 12–16)
MAGNESIUM SERPL-MCNC: 2 MG/DL — SIGNIFICANT CHANGE UP (ref 1.8–2.4)
MCHC RBC-ENTMCNC: 26.9 PG — LOW (ref 27–31)
MCHC RBC-ENTMCNC: 27.9 G/DL — LOW (ref 32–37)
MCV RBC AUTO: 96.3 FL — SIGNIFICANT CHANGE UP (ref 81–99)
NRBC # BLD: 0 /100 WBCS — SIGNIFICANT CHANGE UP (ref 0–0)
PLATELET # BLD AUTO: 286 K/UL — SIGNIFICANT CHANGE UP (ref 130–400)
PMV BLD: 9.9 FL — SIGNIFICANT CHANGE UP (ref 7.4–10.4)
POTASSIUM SERPL-MCNC: 4.2 MMOL/L — SIGNIFICANT CHANGE UP (ref 3.5–5)
POTASSIUM SERPL-SCNC: 4.2 MMOL/L — SIGNIFICANT CHANGE UP (ref 3.5–5)
RBC # BLD: 3.01 M/UL — LOW (ref 4.2–5.4)
RBC # FLD: 14.8 % — HIGH (ref 11.5–14.5)
SODIUM SERPL-SCNC: 140 MMOL/L — SIGNIFICANT CHANGE UP (ref 135–146)
WBC # BLD: 9.82 K/UL — SIGNIFICANT CHANGE UP (ref 4.8–10.8)
WBC # FLD AUTO: 9.82 K/UL — SIGNIFICANT CHANGE UP (ref 4.8–10.8)

## 2024-06-08 RX ORDER — LACTULOSE 10 G/15ML
10 SOLUTION ORAL ONCE
Refills: 0 | Status: COMPLETED | OUTPATIENT
Start: 2024-06-08 | End: 2024-06-08

## 2024-06-08 RX ORDER — POLYETHYLENE GLYCOL 3350 17 G/17G
17 POWDER, FOR SOLUTION ORAL
Refills: 0 | Status: DISCONTINUED | OUTPATIENT
Start: 2024-06-08 | End: 2024-06-10

## 2024-06-08 RX ORDER — SENNA PLUS 8.6 MG/1
2 TABLET ORAL AT BEDTIME
Refills: 0 | Status: DISCONTINUED | OUTPATIENT
Start: 2024-06-08 | End: 2024-06-10

## 2024-06-08 RX ADMIN — Medication 40 MILLIGRAM(S): at 05:11

## 2024-06-08 RX ADMIN — Medication 1: at 17:10

## 2024-06-08 RX ADMIN — SENNA PLUS 2 TABLET(S): 8.6 TABLET ORAL at 21:28

## 2024-06-08 RX ADMIN — POLYETHYLENE GLYCOL 3350 17 GRAM(S): 17 POWDER, FOR SOLUTION ORAL at 17:48

## 2024-06-08 RX ADMIN — RISPERIDONE 0.5 MILLIGRAM(S): 4 TABLET ORAL at 12:39

## 2024-06-08 RX ADMIN — Medication 1 APPLICATION(S): at 05:11

## 2024-06-08 RX ADMIN — APIXABAN 2.5 MILLIGRAM(S): 2.5 TABLET, FILM COATED ORAL at 05:11

## 2024-06-08 RX ADMIN — Medication 1 APPLICATION(S): at 12:41

## 2024-06-08 RX ADMIN — ATORVASTATIN CALCIUM 40 MILLIGRAM(S): 80 TABLET, FILM COATED ORAL at 21:28

## 2024-06-08 RX ADMIN — Medication 1 MILLIGRAM(S): at 12:39

## 2024-06-08 RX ADMIN — Medication 2: at 12:15

## 2024-06-08 RX ADMIN — LACTULOSE 10 GRAM(S): 10 SOLUTION ORAL at 17:48

## 2024-06-08 RX ADMIN — Medication 40 MILLIGRAM(S): at 17:11

## 2024-06-08 RX ADMIN — Medication 1 APPLICATION(S): at 17:14

## 2024-06-08 RX ADMIN — APIXABAN 2.5 MILLIGRAM(S): 2.5 TABLET, FILM COATED ORAL at 17:11

## 2024-06-08 NOTE — PROGRESS NOTE ADULT - ASSESSMENT
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    #Acute hypercarbic respiratory failure likely 2/2 MANJEET   #Respiratory Acidosis ( likely from MANJEET)  #Recently diagnosed AFib with tachy-tamy syndrome ( refused PPM in the past)   #LE edema  - Per patient she is on O2 at baseline ( unsure how many liters)   - VBG: pH 7.30, pCO2 85   - Bicarb 35   - per Nursing home records pt has order for BiPAP; However, she is refusing BiPAP here   - CXR: on wet read bilateral pleural effusions ( R> L)   - Echo 3/2024 : EF 60% LA enlargement, mild aortic stenosis ,pulmonary consult  - s/p 1 dose IV lasix 60 in ED with good response ( on Lasix 40 mg PO BID at NH)  - Monitor I's and O's   - BP on the lower side ( MAP > 60) and pt is asymptomatic  - Monitor BP  - Fluid restriction   - Hold Digoxin in a setting of pleural effusion   - C/w Eliquis 2.5 mg bid  - On tele  - Pulm team spoke to patient about her MANJEET, she continues to refuse BiPAP or CPAP  - Cardiology noted Afib with pauses likely 2/2 MANJEET, patient continues to refuse CPAP  - Palliative care (6/6): Patient's son wants intubation if needed, wants to keep his mother alive  - ABG (6/7): pH 7.41, pCO2 67, bicarb 42  - As per Dr. Razo, he will speak with family about PPM    #Normocytic Anemia - 2/2 MANNY   - Hgb 8.6 ( baseline ~8- 9)   - Iron sat 15%, Iron T 37, Ferritin 37 (3/30/24)  - c/w Iron supplements ( on Ferrous sulfate 325 mg BID outpatient; consider switching it to once a day on discharge)     #HTN  #Mild AS  - Losartan and Metoprolol succinate with holding parameters     #Bipolar disorder   #Schizophrenia  - c/w Risperdal     #DM   - on Farxiga at home  - ISS and FS    #Unlikely UTI  - UA shows slightly LE+, 1st UCx negative, 2nd UCx showing Ecoli   - hold off on antibiotics for now as patient is not symptomatic and no other signs of infection noted ( Afebrile; no leukocytosis/leukopenia)     #MISC  - DVT PPx: Eliquis   - GI PPx: None   - Diet: DASH  - Activity: IAT    Pending: Possible PPM placement, discharge to SNF?         Patient is an 84 yo F from [facility] w/PMH of diabetes, HTN, LE edema, bipolar disorder, 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    #Acute hypercarbic respiratory failure likely 2/2 MANJEET   #Respiratory Acidosis ( likely from MANJEET)  #Recently diagnosed AFib with tachy-tamy syndrome ( refused PPM in the past)   #LE edema  - Per patient she is on O2 at baseline ( unsure how many liters)   - VBG: pH 7.30, pCO2 85   - Bicarb 35   - per Nursing home records pt has order for BiPAP; However, she is refusing BiPAP here   - CXR: on wet read bilateral pleural effusions ( R> L)   - Echo 3/2024 : EF 60% LA enlargement, mild aortic stenosis ,pulmonary consult  - s/p 1 dose IV lasix 60 in ED with good response ( on Lasix 40 mg PO BID at NH)  - Monitor I's and O's   - BP on the lower side ( MAP > 60) and pt is asymptomatic  - Monitor BP  - Fluid restriction   - Hold Digoxin in a setting of pleural effusion   - C/w Eliquis 2.5 mg bid  - On tele  - Pulm team spoke to patient about her MANJEET, she continues to refuse BiPAP or CPAP  - Cardiology noted Afib with pauses likely 2/2 MANJEET, patient continues to refuse CPAP  - Palliative care (6/6): Patient's son wants intubation if needed, wants to keep his mother alive  - ABG (6/7): pH 7.41, pCO2 67, bicarb 42  - As per Dr. Razo, he will speak with family about PPM    #Normocytic Anemia - 2/2 MANNY   - Hgb 8.6 ( baseline ~8- 9)   - Iron sat 15%, Iron T 37, Ferritin 37 (3/30/24)  - c/w Iron supplements ( on Ferrous sulfate 325 mg BID outpatient; consider switching it to once a day on discharge)     #HTN  #Mild AS  - Losartan and Metoprolol succinate with holding parameters     #Bipolar disorder   #Schizophrenia  - c/w Risperdal     #DM   - on Farxiga at home  - ISS and FS    #Unlikely UTI  - UA shows slightly LE+, 1st UCx negative, 2nd UCx showing Ecoli   - hold off on antibiotics for now as patient is not symptomatic and no other signs of infection noted ( Afebrile; no leukocytosis/leukopenia)     #MISC  - DVT PPx: Eliquis   - GI PPx: None   - Diet: DASH  - Activity: IAT    Pending: Possible PPM placement, discharge to SNF?

## 2024-06-08 NOTE — PROGRESS NOTE ADULT - ASSESSMENT
83-year-old woman with a history of diabetes, hypertension, lower extremity edema, bipolar disorder, tachybrady syndrome on Eliquis (declining PPM), resident of [facility], mild atrial stenosis, recently diagnosed UTI on Levaquin, at baseline uses walker or wheelchair.  Patient is sent for evaluation of worsening confusion.  On my exam, patient is alert, oriented x 3, states she had some chills earlier, but otherwise has no acute complaints.  Patient's O2 saturation is noted to be intermittently dropping to mid-80s%, but spontaneously improving to mid-90s%.  Patient states that she does not have COPD, never smoked, but does use oxygen as needed sometimes. No chest pain, shortness of breath, cough, abdominal pain, vomiting, diarrhea.      Acute Hypoxic, Hypercapnic Respiratory Failure, CO2 retention   Respiratory Acidosis ( likely from MANJEET)  - per Nursing home, pt has order for BiPAP; However, refuses  - pulm eval noted    AFib with tachy-tmay syndrome ( refused PPM in the past)   - Echo 3/2024 : EF 60% LA enlargement, mild aortic stenosis   - Lasix 60 in ED with good response   - currently on Lasix 40 mg PO BID   - Monitor I's and O's   - Fluid restriction , non compliant with it SNF  - Continue Eliquis   - Monitor on tele   - has refused PPM in past  - case discussed today with Cardio, he will speak with son Joaquín    Normocytic Anemia, Iron Def Anemia  - Hgb noted  - continue Iron supplement    HTN  Mild AS  - Losartan and Metoprolol succinate with holding parameters     Bipolar disorder, Schizophrenia  - on Risperdal, behavior stab,     DM  - on Farxiga  - now with UTI? asymptomatic, afebrile and normal WBC  - monitor      DVT PPx: Eliquis   GI PPx: None   Diet: DASH  Activity: IAT    Await Palliative Care Kaci noted. Patient is a full code despite not wanting any of he above interventions.   Dr Jimenez will speak with the family  D/C planning back to SNF

## 2024-06-08 NOTE — PROGRESS NOTE ADULT - SUBJECTIVE AND OBJECTIVE BOX
CHELA GALLAGHER  83y  Female  HPI:  83-year-old woman with a history of diabetes, hypertension, lower extremity edema, bipolar disorder, tachybrady syndrome on Eliquis (declining PPM), resident of [facility] facility, mild atrial stenosis, recently diagnosed UTI on Levaquin(did not get any dose), at baseline uses walker or wheelchair.  Patient is sent for evaluation of worsening confusion.  On my exam, patient is alert, oriented x 3, states she had some chills earlier, but otherwise has no acute complaints.  Patient's O2 saturation is noted to be intermittently dropping to mid-80s%, but spontaneously improving to mid-90s%.  Patient states that she does not have COPD, never smoked, but does use oxygen as needed sometimes. No chest pain, shortness of breath, cough, abdominal pain, vomiting, diarrhea.    History obtained from nursing supervisor at [facility]. Per the nursing supervisor, pt was diagnosed with UTI earlier today and was started on Levaquin ( however, she did not receive any dose today). When the RN was drawing blood patient was not herself and per RN her blood also clotted quickly despite being on Eliquis.     In ED vitals were  T(F): 98.4  HR: 61  BP: 99/65  RR: 16  SpO2: 99% on 2 L O2     Labs were significant for Hgb 8.6, MCV 94.3, Cl 96, Bicarb 35, BUN 21, Cr 1.4 ( baseline 1.4), pBNP 2838 ( baseline 1440 in 3/2024).     VBG: pH 7.30, pCO2 85    CXR: stable cardiomegaly     EKG: AFib     Pt received IV lasix 60 mg and was offered BiPAP; however, she refused it. (Pt was A&Ox3). She is being admitted to [facility] for further workup.  (2024 21:10)    MEDICATIONS  (STANDING):  apixaban 2.5 milliGRAM(s) Oral every 12 hours  atorvastatin 40 milliGRAM(s) Oral at bedtime  clotrimazole 1% Cream 1 Application(s) Topical two times a day  dextrose 10% Bolus 125 milliLiter(s) IV Bolus once  dextrose 5%. 1000 milliLiter(s) (100 mL/Hr) IV Continuous <Continuous>  dextrose 5%. 1000 milliLiter(s) (50 mL/Hr) IV Continuous <Continuous>  dextrose 50% Injectable 25 Gram(s) IV Push once  dextrose 50% Injectable 12.5 Gram(s) IV Push once  folic acid 1 milliGRAM(s) Oral daily  furosemide    Tablet 40 milliGRAM(s) Oral every 12 hours  glucagon  Injectable 1 milliGRAM(s) IntraMuscular once  insulin lispro (ADMELOG) corrective regimen sliding scale   SubCutaneous three times a day before meals  risperiDONE   Solution 0.5 milliGRAM(s) Oral daily  senna 2 Tablet(s) Oral at bedtime  vitamin A &amp; D Ointment 1 Application(s) Topical daily    MEDICATIONS  (PRN):  acetaminophen     Tablet .. 650 milliGRAM(s) Oral every 6 hours PRN Temp greater or equal to 38C (100.4F), Mild Pain (1 - 3)  aluminum hydroxide/magnesium hydroxide/simethicone Suspension 30 milliLiter(s) Oral every 4 hours PRN Dyspepsia  dextrose Oral Gel 15 Gram(s) Oral once PRN Blood Glucose LESS THAN 70 milliGRAM(s)/deciliter  melatonin 3 milliGRAM(s) Oral at bedtime PRN Insomnia  ondansetron Injectable 4 milliGRAM(s) IV Push every 8 hours PRN Nausea and/or Vomiting    INTERVAL EVENTS:    T(C): 36.6 (24 @ 04:42), Max: 37.3 (24 @ 20:52)  HR: 94 (24 @ 09:43) (76 - 98)  BP: 98/61 (24 @ 04:42) (98/61 - 99/51)  RR: 18 (24 @ 09:43) (18 - 19)  SpO2: 96% (24 @ 09:43) (96% - 99%)  Wt(kg): --Vital Signs Last 24 Hrs  T(C): 36.6 (2024 04:42), Max: 37.3 (2024 20:52)  T(F): 97.9 (2024 04:42), Max: 99.2 (2024 20:52)  HR: 94 (2024 09:43) (76 - 98)  BP: 98/61 (2024 04:42) (98/61 - 99/51)  BP(mean): 76 (2024 20:52) (76 - 76)  RR: 18 (2024 09:43) (18 - 19)  SpO2: 96% (2024 09:43) (96% - 99%)    Parameters below as of 2024 09:43  Patient On (Oxygen Delivery Method): nasal cannula  O2 Flow (L/min): 3      PHYSICAL EXAM:  GENERAL:   NECK: Supple, No JVD  CHEST/LUNG:   HEART: S1, S2,   ABDOMEN: Soft, Nontender, Obese, Bowel sounds present  EXTREMITIES: ++ edema  SKIN: No rashes or lesions    LABS:                               ABG - ( 2024 13:19 )  pH, Arterial: 7.41  pH, Blood: x     /  pCO2: 67    /  pO2: 101   / HCO3: 42    / Base Excess: 14.5  /  SaO2: 99.5              Urinalysis Basic - ( 2024 11:19 )    Color: Yellow / Appearance: Cloudy / S.015 / pH: x  Gluc: x / Ketone: Negative mg/dL  / Bili: Negative / Urobili: 0.2 mg/dL   Blood: x / Protein: Negative mg/dL / Nitrite: Negative   Leuk Esterase: Small / RBC: 675 /HPF / WBC 4 /HPF   Sq Epi: x / Non Sq Epi: 11 /HPF / Bacteria: Many /HPF          Culture - Urine (collected 2024 12:16)  Source: Clean Catch Clean Catch (Midstream)  Preliminary Report (2024 09:12):    >100,000 CFU/ml Escherichia coli      RADIOLOGY & ADDITIONAL TESTS:

## 2024-06-08 NOTE — PROGRESS NOTE ADULT - SUBJECTIVE AND OBJECTIVE BOX
CHELA GALLAGHER  83y  Female  HPI:  83-year-old woman with a history of diabetes, hypertension, lower extremity edema, bipolar disorder, tachybrady syndrome on Eliquis (declining PPM), resident of [place] facility, mild atrial stenosis, recently diagnosed UTI on Levaquin(did not get any dose), at baseline uses walker or wheelchair.  Patient is sent for evaluation of worsening confusion.  On my exam, patient is alert, oriented x 3, states she had some chills earlier, but otherwise has no acute complaints.  Patient's O2 saturation is noted to be intermittently dropping to mid-80s%, but spontaneously improving to mid-90s%.  Patient states that she does not have COPD, never smoked, but does use oxygen as needed sometimes. No chest pain, shortness of breath, cough, abdominal pain, vomiting, diarrhea.    History obtained from nursing supervisor at [facility]. Per the nursing supervisor, pt was diagnosed with UTI earlier today and was started on Levaquin ( however, she did not receive any dose today). When the RN was drawing blood patient was not herself and per RN her blood also clotted quickly despite being on Eliquis.     In ED vitals were  T(F): 98.4  HR: 61  BP: 99/65  RR: 16  SpO2: 99% on 2 L O2     Labs were significant for Hgb 8.6, MCV 94.3, Cl 96, Bicarb 35, BUN 21, Cr 1.4 ( baseline 1.4), pBNP 2838 ( baseline 1440 in 3/2024).     VBG: pH 7.30, pCO2 85    CXR: stable cardiomegaly     EKG: AFib     Pt received IV lasix 60 mg and was offered BiPAP; however, she refused it. (Pt was A&Ox3). She is being admitted to [facility] for further workup.  (04 Jun 2024 21:10)    MEDICATIONS  (STANDING):  apixaban 2.5 milliGRAM(s) Oral every 12 hours  atorvastatin 40 milliGRAM(s) Oral at bedtime  clotrimazole 1% Cream 1 Application(s) Topical two times a day  dextrose 10% Bolus 125 milliLiter(s) IV Bolus once  dextrose 5%. 1000 milliLiter(s) (100 mL/Hr) IV Continuous <Continuous>  dextrose 5%. 1000 milliLiter(s) (50 mL/Hr) IV Continuous <Continuous>  dextrose 50% Injectable 25 Gram(s) IV Push once  dextrose 50% Injectable 12.5 Gram(s) IV Push once  folic acid 1 milliGRAM(s) Oral daily  furosemide    Tablet 40 milliGRAM(s) Oral every 12 hours  glucagon  Injectable 1 milliGRAM(s) IntraMuscular once  insulin lispro (ADMELOG) corrective regimen sliding scale   SubCutaneous three times a day before meals  risperiDONE   Solution 0.5 milliGRAM(s) Oral daily  senna 2 Tablet(s) Oral at bedtime  vitamin A &amp; D Ointment 1 Application(s) Topical daily    MEDICATIONS  (PRN):  acetaminophen     Tablet .. 650 milliGRAM(s) Oral every 6 hours PRN Temp greater or equal to 38C (100.4F), Mild Pain (1 - 3)  aluminum hydroxide/magnesium hydroxide/simethicone Suspension 30 milliLiter(s) Oral every 4 hours PRN Dyspepsia  dextrose Oral Gel 15 Gram(s) Oral once PRN Blood Glucose LESS THAN 70 milliGRAM(s)/deciliter  melatonin 3 milliGRAM(s) Oral at bedtime PRN Insomnia  ondansetron Injectable 4 milliGRAM(s) IV Push every 8 hours PRN Nausea and/or Vomiting    INTERVAL EVENTS: Patient seen today without distress, however orthopneic when flat    T(C): 36.9 (06-08-24 @ 12:03), Max: 37.3 (06-07-24 @ 20:52)  HR: 93 (06-08-24 @ 12:03) (76 - 98)  BP: 108/71 (06-08-24 @ 12:03) (98/61 - 108/71)  RR: 18 (06-08-24 @ 12:03) (18 - 19)  SpO2: 96% (06-08-24 @ 09:43) (96% - 99%)  Wt(kg): --Vital Signs Last 24 Hrs  T(C): 36.9 (08 Jun 2024 12:03), Max: 37.3 (07 Jun 2024 20:52)  T(F): 98.4 (08 Jun 2024 12:03), Max: 99.2 (07 Jun 2024 20:52)  HR: 93 (08 Jun 2024 12:03) (76 - 98)  BP: 108/71 (08 Jun 2024 12:03) (98/61 - 108/71)  BP(mean): 76 (07 Jun 2024 20:52) (76 - 76)  RR: 18 (08 Jun 2024 12:03) (18 - 19)  SpO2: 96% (08 Jun 2024 09:43) (96% - 99%)    Parameters below as of 08 Jun 2024 09:43  Patient On (Oxygen Delivery Method): nasal cannula  O2 Flow (L/min): 3      PHYSICAL EXAM:  GENERAL:  Pale, baseline confusion  NECK: Scar,   CHEST/LUNG: Faint wheezing  HEART: S1, S2  ABDOMEN: Soft, Nontender, Obese; Bowel sounds present  EXTREMITIES: ++ edema  SKIN: No rashes or lesions    LABS:    Culture - Urine (collected 05 Jun 2024 12:16)  Source: Clean Catch Clean Catch (Midstream)  Preliminary Report (07 Jun 2024 09:12):    >100,000 CFU/ml Escherichia coli      RADIOLOGY & ADDITIONAL TESTS:

## 2024-06-08 NOTE — PROGRESS NOTE ADULT - SUBJECTIVE AND OBJECTIVE BOX
----------Daily Progress Note----------    HISTORY OF PRESENT ILLNESS:  Patient is a 83y old Female who presents with a chief complaint of AMS (2024 14:12)    Currently admitted to medicine with the primary diagnosis of Acute respiratory failure with hypoxia and hypercapnia       Today is hospital day 4d.     INTERVAL HOSPITAL COURSE / OVERNIGHT EVENTS:    Patient was examined and seen at bedside. This morning she is resting comfortably in bed and reports no new issues or overnight events.     Review of Systems: Otherwise unremarkable     <<<<<PAST MEDICAL & SURGICAL HISTORY>>>>>  Diabetes mellitus    Hypertension    Schizoaffective disorder    Edema  Bilateral LE    Disorder of thyroid, unspecified  Son cannot provide details. States she had "thyroid surgery" decades ago when she was young    No significant past surgical history      ALLERGIES  No Known Allergies      Home Medications:  apixaban 5 mg oral tablet: 1 tab(s) orally 2 times a day (2024 13:13)  Crestor 10 mg oral tablet: 1 tab(s) orally once a day (29 Mar 2024 14:12)  Farxiga 10 mg oral tablet: 1 tab(s) orally once a day (2024 22:36)  ferrous sulfate 325 mg (65 mg elemental iron) oral delayed release tablet: 1 tab(s) orally 2 times a day (2024 22:30)  folic acid 1 mg oral tablet: 1 tab(s) orally once a day (29 Mar 2024 14:12)  Lasix 40 mg oral tablet: 1 tab(s) orally 2 times a day (2024 22:29)  Levaquin 500 mg oral tablet: 1 tab(s) orally once a day 7 days (start date 24 ) (2024 22:36)  losartan 50 mg oral tablet: 1 tab(s) orally once a day hold for SBP less than 105mmhg (2024 15:08)  metoprolol succinate 25 mg oral tablet, extended release: 1 tab(s) orally once a day (2024 13:13)  nystatin 100,000 units/g topical cream: Apply topically to affected area 2 times a day bilateral groin area x 14 days ( start date 24) (2024 22:36)  RisperDAL 1 mg/mL oral solution: 0.5 milliliter(s) orally once a day (29 Mar 2024 14:12)  senna leaf extract oral tablet: 2 tab(s) orally once a day (at bedtime) (2024 13:13)  Vitamin A and D topical ointment: Apply topically to affected area once a day Apply to bilateral LE&#x27;s/Feet daily (2024 22:36)  Vitamin D2 50,000 intl units (1.25 mg) oral capsule (obsolete): 1 cap(s) orally every 2 weeks (2024 22:36)        MEDICATIONS  STANDING MEDICATIONS  apixaban 2.5 milliGRAM(s) Oral every 12 hours  atorvastatin 40 milliGRAM(s) Oral at bedtime  clotrimazole 1% Cream 1 Application(s) Topical two times a day  dextrose 10% Bolus 125 milliLiter(s) IV Bolus once  dextrose 5%. 1000 milliLiter(s) IV Continuous <Continuous>  dextrose 5%. 1000 milliLiter(s) IV Continuous <Continuous>  dextrose 50% Injectable 25 Gram(s) IV Push once  dextrose 50% Injectable 12.5 Gram(s) IV Push once  folic acid 1 milliGRAM(s) Oral daily  furosemide    Tablet 40 milliGRAM(s) Oral every 12 hours  glucagon  Injectable 1 milliGRAM(s) IntraMuscular once  insulin lispro (ADMELOG) corrective regimen sliding scale   SubCutaneous three times a day before meals  risperiDONE   Solution 0.5 milliGRAM(s) Oral daily  senna 2 Tablet(s) Oral at bedtime  vitamin A &amp; D Ointment 1 Application(s) Topical daily    PRN MEDICATIONS  acetaminophen     Tablet .. 650 milliGRAM(s) Oral every 6 hours PRN  aluminum hydroxide/magnesium hydroxide/simethicone Suspension 30 milliLiter(s) Oral every 4 hours PRN  dextrose Oral Gel 15 Gram(s) Oral once PRN  melatonin 3 milliGRAM(s) Oral at bedtime PRN  ondansetron Injectable 4 milliGRAM(s) IV Push every 8 hours PRN    VITALS:  T(F): 97.9  HR: 94  BP: 98/61  RR: 18  SpO2: 96%    <<<<<PHYSICAL EXAM>>>>>  GENERAL: NAD  PULMONARY: Clear to auscultation bilaterally. No wheezing or crackles  CARDIOVASCULAR: Regular rate and rhythm, S1-S2, no murmurs, rubs, or gallops  GASTROINTESTINAL: Soft, non-tender, non-distended, no guarding  SKIN/EXTREMITIES:  Warm, 2+ tibialis posterior pulses, no UE or LE edema  NEUROLOGIC/MUSCULOSKELETAL: AOx4, able to lift arms antigravity, difficulty lifting legs, no focal deficits    <<<<<LABS>>>>>            Urinalysis Basic - ( 2024 11:19 )    Color: Yellow / Appearance: Cloudy / S.015 / pH: x  Gluc: x / Ketone: Negative mg/dL  / Bili: Negative / Urobili: 0.2 mg/dL   Blood: x / Protein: Negative mg/dL / Nitrite: Negative   Leuk Esterase: Small / RBC: 675 /HPF / WBC 4 /HPF   Sq Epi: x / Non Sq Epi: 11 /HPF / Bacteria: Many /HPF      ABG - ( 2024 13:19 )  pH, Arterial: 7.41  pH, Blood: x     /  pCO2: 67    /  pO2: 101   / HCO3: 42    / Base Excess: 14.5  /  SaO2: 99.5                  Culture - Urine (collected 2024 12:16)  Source: Clean Catch Clean Catch (Midstream)  Preliminary Report (2024 09:12):    >100,000 CFU/ml Escherichia coli    [id number]        <<<<<RADIOLOGY>>>>>    ----------------------------------------------------------------------------------------------------------------------------------------------------------------------------------------------- ----------Daily Progress Note----------    HISTORY OF PRESENT ILLNESS:  Patient is an 82 yo F from [facility] w/PMH of diabetes, HTN, LE edema, bipolar disorder, tachybrady syndrome on Eliquis (declining PPM), mild atrial stenosis, and diagnosed w/UTI on  and started on Levaquin(did not get any dose), who was sent for evaluation of worsening confusion although she was AOx3 on exam in the ED. She reports having some chills but denies any chest pain, dyspnea, abdominal pain, nausea, dysuria, or diarrhea. Of note, her O2 sat intermittently dropped to mid-80s and then improved to mid-90s while in the ED. She was afebrile O2 sat was 99% while on 2L NC. Her labs were notable for WBC 8, hgb 8.6 (9.8 on ), Bicarb 35, BUN 21, Cr 1.4 (around baseline), and pBNP 2838, VBG pH 7.3 and pCO2 85, CXR showing stable cardiomegaly, and EKG showing afib. Patient denies any history of COPD. In the ED, she received lasix 60 mg IV but refused BIPAP. She was admitted to telemetry for further work up and management of acute hypercarbic respiratory failure likely 2/2 MANJEET vs less likely acute HF exacerbation.     Today is hospital day 3d.     INTERVAL HOSPITAL COURSE / OVERNIGHT EVENTS:  O/N events:  None    24 hr events:  None    Patient was examined and seen at bedside. Less lethargic this morning, oriented to person, place, and time, reports feeling well, denies any new symptoms    Review of Systems: Otherwise unremarkable     <<<<<PAST MEDICAL & SURGICAL HISTORY>>>>>  Diabetes mellitus    Hypertension    Schizoaffective disorder    Edema  Bilateral LE    Disorder of thyroid, unspecified  Son cannot provide details. States she had "thyroid surgery" decades ago when she was young    No significant past surgical history      ALLERGIES  No Known Allergies      Home Medications:  apixaban 5 mg oral tablet: 1 tab(s) orally 2 times a day (2024 13:13)  Crestor 10 mg oral tablet: 1 tab(s) orally once a day (29 Mar 2024 14:12)  Farxiga 10 mg oral tablet: 1 tab(s) orally once a day (2024 22:36)  ferrous sulfate 325 mg (65 mg elemental iron) oral delayed release tablet: 1 tab(s) orally 2 times a day (2024 22:30)  folic acid 1 mg oral tablet: 1 tab(s) orally once a day (29 Mar 2024 14:12)  Lasix 40 mg oral tablet: 1 tab(s) orally 2 times a day (2024 22:29)  Levaquin 500 mg oral tablet: 1 tab(s) orally once a day 7 days (start date 24 ) (2024 22:36)  losartan 50 mg oral tablet: 1 tab(s) orally once a day hold for SBP less than 105mmhg (2024 15:08)  metoprolol succinate 25 mg oral tablet, extended release: 1 tab(s) orally once a day (2024 13:13)  nystatin 100,000 units/g topical cream: Apply topically to affected area 2 times a day bilateral groin area x 14 days ( start date 24) (2024 22:36)  RisperDAL 1 mg/mL oral solution: 0.5 milliliter(s) orally once a day (29 Mar 2024 14:12)  senna leaf extract oral tablet: 2 tab(s) orally once a day (at bedtime) (2024 13:13)  Vitamin A and D topical ointment: Apply topically to affected area once a day Apply to bilateral LE&#x27;s/Feet daily (2024 22:36)  Vitamin D2 50,000 intl units (1.25 mg) oral capsule (obsolete): 1 cap(s) orally every 2 weeks (2024 22:36)        MEDICATIONS  STANDING MEDICATIONS  apixaban 2.5 milliGRAM(s) Oral every 12 hours  atorvastatin 40 milliGRAM(s) Oral at bedtime  clotrimazole 1% Cream 1 Application(s) Topical two times a day  dextrose 10% Bolus 125 milliLiter(s) IV Bolus once  dextrose 5%. 1000 milliLiter(s) IV Continuous <Continuous>  dextrose 5%. 1000 milliLiter(s) IV Continuous <Continuous>  dextrose 50% Injectable 25 Gram(s) IV Push once  dextrose 50% Injectable 12.5 Gram(s) IV Push once  folic acid 1 milliGRAM(s) Oral daily  furosemide    Tablet 40 milliGRAM(s) Oral every 12 hours  glucagon  Injectable 1 milliGRAM(s) IntraMuscular once  insulin lispro (ADMELOG) corrective regimen sliding scale   SubCutaneous three times a day before meals  risperiDONE   Solution 0.5 milliGRAM(s) Oral daily  senna 2 Tablet(s) Oral at bedtime  vitamin A &amp; D Ointment 1 Application(s) Topical daily    PRN MEDICATIONS  acetaminophen     Tablet .. 650 milliGRAM(s) Oral every 6 hours PRN  aluminum hydroxide/magnesium hydroxide/simethicone Suspension 30 milliLiter(s) Oral every 4 hours PRN  dextrose Oral Gel 15 Gram(s) Oral once PRN  melatonin 3 milliGRAM(s) Oral at bedtime PRN  ondansetron Injectable 4 milliGRAM(s) IV Push every 8 hours PRN    VITALS:  T(F): 97.9  HR: 94  BP: 98/61  RR: 18  SpO2: 96%    <<<<<PHYSICAL EXAM>>>>>  GENERAL: NAD  PULMONARY: Clear to auscultation bilaterally. No wheezing or crackles  CARDIOVASCULAR: Regular rate and rhythm, S1-S2, no murmurs, rubs, or gallops  GASTROINTESTINAL: Soft, non-tender, non-distended, no guarding  SKIN/EXTREMITIES:  Warm, 2+ tibialis posterior pulses, no UE or LE edema  NEUROLOGIC/MUSCULOSKELETAL: AOx4, able to lift arms antigravity, difficulty lifting legs, no focal deficits    <<<<<LABS>>>>>            Urinalysis Basic - ( 2024 11:19 )    Color: Yellow / Appearance: Cloudy / S.015 / pH: x  Gluc: x / Ketone: Negative mg/dL  / Bili: Negative / Urobili: 0.2 mg/dL   Blood: x / Protein: Negative mg/dL / Nitrite: Negative   Leuk Esterase: Small / RBC: 675 /HPF / WBC 4 /HPF   Sq Epi: x / Non Sq Epi: 11 /HPF / Bacteria: Many /HPF      ABG - ( 2024 13:19 )  pH, Arterial: 7.41  pH, Blood: x     /  pCO2: 67    /  pO2: 101   / HCO3: 42    / Base Excess: 14.5  /  SaO2: 99.5                  Culture - Urine (collected 2024 12:16)  Source: Clean Catch Clean Catch (Midstream)  Preliminary Report (2024 09:12):    >100,000 CFU/ml Escherichia coli    [id number]        <<<<<RADIOLOGY>>>>>    -----------------------------------------------------------------------------------------------------------------------------------------------------------------------------------------------

## 2024-06-09 LAB
-  AMPICILLIN/SULBACTAM: SIGNIFICANT CHANGE UP
-  AMPICILLIN: SIGNIFICANT CHANGE UP
-  AZTREONAM: SIGNIFICANT CHANGE UP
-  CEFAZOLIN: SIGNIFICANT CHANGE UP
-  CEFEPIME: SIGNIFICANT CHANGE UP
-  CEFTRIAXONE: SIGNIFICANT CHANGE UP
-  CEFUROXIME: SIGNIFICANT CHANGE UP
-  CIPROFLOXACIN: SIGNIFICANT CHANGE UP
-  ERTAPENEM: SIGNIFICANT CHANGE UP
-  GENTAMICIN: SIGNIFICANT CHANGE UP
-  IMIPENEM: SIGNIFICANT CHANGE UP
-  LEVOFLOXACIN: SIGNIFICANT CHANGE UP
-  MEROPENEM: SIGNIFICANT CHANGE UP
-  NITROFURANTOIN: SIGNIFICANT CHANGE UP
-  PIPERACILLIN/TAZOBACTAM: SIGNIFICANT CHANGE UP
-  TOBRAMYCIN: SIGNIFICANT CHANGE UP
-  TRIMETHOPRIM/SULFAMETHOXAZOLE: SIGNIFICANT CHANGE UP
CULTURE RESULTS: ABNORMAL
GLUCOSE BLDC GLUCOMTR-MCNC: 161 MG/DL — HIGH (ref 70–99)
GLUCOSE BLDC GLUCOMTR-MCNC: 163 MG/DL — HIGH (ref 70–99)
GLUCOSE BLDC GLUCOMTR-MCNC: 185 MG/DL — HIGH (ref 70–99)
GLUCOSE BLDC GLUCOMTR-MCNC: 285 MG/DL — HIGH (ref 70–99)
METHOD TYPE: SIGNIFICANT CHANGE UP
ORGANISM # SPEC MICROSCOPIC CNT: ABNORMAL
ORGANISM # SPEC MICROSCOPIC CNT: SIGNIFICANT CHANGE UP
SPECIMEN SOURCE: SIGNIFICANT CHANGE UP

## 2024-06-09 RX ADMIN — Medication 1 APPLICATION(S): at 11:54

## 2024-06-09 RX ADMIN — Medication 1: at 11:53

## 2024-06-09 RX ADMIN — POLYETHYLENE GLYCOL 3350 17 GRAM(S): 17 POWDER, FOR SOLUTION ORAL at 17:04

## 2024-06-09 RX ADMIN — Medication 40 MILLIGRAM(S): at 17:04

## 2024-06-09 RX ADMIN — ATORVASTATIN CALCIUM 40 MILLIGRAM(S): 80 TABLET, FILM COATED ORAL at 21:29

## 2024-06-09 RX ADMIN — APIXABAN 2.5 MILLIGRAM(S): 2.5 TABLET, FILM COATED ORAL at 05:43

## 2024-06-09 RX ADMIN — Medication 1 APPLICATION(S): at 05:43

## 2024-06-09 RX ADMIN — Medication 1 MILLIGRAM(S): at 11:54

## 2024-06-09 RX ADMIN — POLYETHYLENE GLYCOL 3350 17 GRAM(S): 17 POWDER, FOR SOLUTION ORAL at 05:45

## 2024-06-09 RX ADMIN — Medication 40 MILLIGRAM(S): at 05:43

## 2024-06-09 RX ADMIN — Medication 1: at 17:05

## 2024-06-09 RX ADMIN — Medication 3: at 07:58

## 2024-06-09 RX ADMIN — RISPERIDONE 0.5 MILLIGRAM(S): 4 TABLET ORAL at 11:54

## 2024-06-09 RX ADMIN — Medication 1 APPLICATION(S): at 17:05

## 2024-06-09 RX ADMIN — SENNA PLUS 2 TABLET(S): 8.6 TABLET ORAL at 21:29

## 2024-06-09 RX ADMIN — APIXABAN 2.5 MILLIGRAM(S): 2.5 TABLET, FILM COATED ORAL at 17:04

## 2024-06-09 NOTE — PROGRESS NOTE ADULT - ASSESSMENT
83-year-old woman with a history of diabetes, hypertension, lower extremity edema, bipolar disorder, tachybrady syndrome on Eliquis (declining PPM), resident of [facility], mild atrial stenosis, recently diagnosed UTI on Levaquin, at baseline uses walker or wheelchair.  Patient is sent for evaluation of worsening confusion.  On my exam, patient is alert, oriented x 3, states she had some chills earlier, but otherwise has no acute complaints.  Patient's O2 saturation is noted to be intermittently dropping to mid-80s%, but spontaneously improving to mid-90s%.  Patient states that she does not have COPD, never smoked, but does use oxygen as needed sometimes. No chest pain, shortness of breath, cough, abdominal pain, vomiting, diarrhea.      Acute Hypoxic, Hypercapnic Respiratory Failure, CO2 retention   Respiratory Acidosis ( likely from MANJEET)  - pt has order for BiPAP; However, refuses, encourage use  - pulm eval noted    AFib with tachy-tamy syndrome ( refused PPM in the past)   - Echo 3/2024 : EF 60% LA enlargement, mild aortic stenosis   - Lasix 60 in ED with good response   - currently on Lasix 40 mg PO BID   - Monitor I's and O's   - Fluid restriction , non compliant with it SNF  - Continue Eliquis   - Monitor on tele   - has refused PPM in past  - case was discussed with Cardio    Normocytic Anemia, Iron Def Anemia  - Hgb noted  - continue Iron supplement    HTN  Mild AS  - Losartan and Metoprolol succinate with holding parameters     Bipolar disorder, Schizophrenia  - on Risperdal, behavior stab,     DM  - on Farxiga  - now with UTI? asymptomatic, afebrile and normal WBC  - monitor      DVT PPx: Eliquis   GI PPx: None   Diet: DASH  Activity: IAT    Await Palliative Care Kaci noted. Patient is a full code despite not wanting any of he above interventions.   Dr Jimenez will speak with the family  D/C planning back to SNF

## 2024-06-09 NOTE — CHART NOTE - NSCHARTNOTEFT_GEN_A_CORE
Urine Culture with ESBL bacteria. Examined patient. asymptomatic. no suprapubic tenderness. No dysuria. vitals stable no fevers. alert and oriented X3. Will continue to monitor off abx.   If any symptoms develop, then can consider starting meropenem and consulting ID.

## 2024-06-09 NOTE — PROGRESS NOTE ADULT - SUBJECTIVE AND OBJECTIVE BOX
CHELA GALLAGHER  83y  Female  HPI:  83-year-old woman with a history of diabetes, hypertension, lower extremity edema, bipolar disorder, tachybrady syndrome on Eliquis (declining PPM), resident of [facility], mild atrial stenosis, recently diagnosed UTI on Levaquin(did not get any dose), at baseline uses walker or wheelchair.  Patient is sent for evaluation of worsening confusion.  On my exam, patient is alert, oriented x 3, states she had some chills earlier, but otherwise has no acute complaints.  Patient's O2 saturation is noted to be intermittently dropping to mid-80s%, but spontaneously improving to mid-90s%.  Patient states that she does not have COPD, never smoked, but does use oxygen as needed sometimes. No chest pain, shortness of breath, cough, abdominal pain, vomiting, diarrhea.    History obtained from nursing supervisor at [facility]. Per the nursing supervisor, pt was diagnosed with UTI earlier today and was started on Levaquin ( however, she did not receive any dose today). When the RN was drawing blood patient was not herself and per RN her blood also clotted quickly despite being on Eliquis.     In ED vitals were  T(F): 98.4  HR: 61  BP: 99/65  RR: 16  SpO2: 99% on 2 L O2     Labs were significant for Hgb 8.6, MCV 94.3, Cl 96, Bicarb 35, BUN 21, Cr 1.4 ( baseline 1.4), pBNP 2838 ( baseline 1440 in 3/2024).     VBG: pH 7.30, pCO2 85    CXR: stable cardiomegaly     EKG: AFib     Pt received IV lasix 60 mg and was offered BiPAP; however, she refused it. (Pt was A&Ox3). She is being admitted to [facility] for further workup.  (04 Jun 2024 21:10)    MEDICATIONS  (STANDING):  apixaban 2.5 milliGRAM(s) Oral every 12 hours  atorvastatin 40 milliGRAM(s) Oral at bedtime  clotrimazole 1% Cream 1 Application(s) Topical two times a day  dextrose 10% Bolus 125 milliLiter(s) IV Bolus once  dextrose 5%. 1000 milliLiter(s) (100 mL/Hr) IV Continuous <Continuous>  dextrose 5%. 1000 milliLiter(s) (50 mL/Hr) IV Continuous <Continuous>  dextrose 50% Injectable 25 Gram(s) IV Push once  dextrose 50% Injectable 12.5 Gram(s) IV Push once  folic acid 1 milliGRAM(s) Oral daily  furosemide    Tablet 40 milliGRAM(s) Oral every 12 hours  glucagon  Injectable 1 milliGRAM(s) IntraMuscular once  insulin lispro (ADMELOG) corrective regimen sliding scale   SubCutaneous three times a day before meals  polyethylene glycol 3350 17 Gram(s) Oral two times a day  risperiDONE   Solution 0.5 milliGRAM(s) Oral daily  senna 2 Tablet(s) Oral at bedtime  senna 2 Tablet(s) Oral at bedtime  vitamin A &amp; D Ointment 1 Application(s) Topical daily    MEDICATIONS  (PRN):  acetaminophen     Tablet .. 650 milliGRAM(s) Oral every 6 hours PRN Temp greater or equal to 38C (100.4F), Mild Pain (1 - 3)  aluminum hydroxide/magnesium hydroxide/simethicone Suspension 30 milliLiter(s) Oral every 4 hours PRN Dyspepsia  dextrose Oral Gel 15 Gram(s) Oral once PRN Blood Glucose LESS THAN 70 milliGRAM(s)/deciliter  melatonin 3 milliGRAM(s) Oral at bedtime PRN Insomnia  ondansetron Injectable 4 milliGRAM(s) IV Push every 8 hours PRN Nausea and/or Vomiting    INTERVAL EVENTS: Patient seen today without distress, less alert today? Bipap in the room?    T(C): 36.7 (06-09-24 @ 04:52), Max: 36.9 (06-08-24 @ 20:04)  HR: 94 (06-09-24 @ 04:52) (63 - 94)  BP: 105/57 (06-09-24 @ 04:52) (105/57 - 107/65)  RR: 18 (06-09-24 @ 11:30) (18 - 18)  SpO2: 100% (06-09-24 @ 11:30) (98% - 100%)  Wt(kg): --Vital Signs Last 24 Hrs  T(C): 36.7 (09 Jun 2024 04:52), Max: 36.9 (08 Jun 2024 20:04)  T(F): 98 (09 Jun 2024 04:52), Max: 98.4 (08 Jun 2024 20:04)  HR: 94 (09 Jun 2024 04:52) (63 - 94)  BP: 105/57 (09 Jun 2024 04:52) (105/57 - 107/65)  BP(mean): --  RR: 18 (09 Jun 2024 11:30) (18 - 18)  SpO2: 100% (09 Jun 2024 11:30) (98% - 100%)    Parameters below as of 09 Jun 2024 11:30  Patient On (Oxygen Delivery Method): nasal cannula  O2 Flow (L/min): 3      PHYSICAL EXAM:  GENERAL: Lethargic  CHEST/LUNG: Shallow resp, Occ faint wheeze  HEART: S1, S2  ABDOMEN: Soft, Nontender, Obese, Bowel sounds present  EXTREMITIES: ++ edema  SKIN: No rashes or lesions    LABS:                        8.1    9.82  )-----------( 286      ( 08 Jun 2024 10:20 )             29.0             06-08    140  |  96<L>  |  28<H>  ----------------------------<  225<H>  4.2   |  37<H>  |  1.2    Ca    9.2      08 Jun 2024 10:20  Mg     2.0     06-08        Urinalysis Basic - ( 08 Jun 2024 10:20 )    Color: x / Appearance: x / SG: x / pH: x  Gluc: 225 mg/dL / Ketone: x  / Bili: x / Urobili: x   Blood: x / Protein: x / Nitrite: x   Leuk Esterase: x / RBC: x / WBC x   Sq Epi: x / Non Sq Epi: x / Bacteria: x              RADIOLOGY & ADDITIONAL TESTS:  < from: Xray Chest 1 View AP/PA (06.04.24 @ 15:57) >  PROCEDURE DATE:  06/04/2024          INTERPRETATION:  Clinical History / Reason for exam: Weakness    Comparison : Chest radiograph 4/6/2024.    Technique/Positioning: Frontalradiograph of the chest.    Findings:    Support devices: None.    Cardiac/mediastinum/hilum: Stable cardiomegaly.    Lung parenchyma/Pleura: Stable parenchymal pattern. No pleural effusion   or pneumothorax.    Skeleton/soft tissues: Unremarkable.    Impression:    No radiographic evidence of acute cardiopulmonary disease. Stable   parenchymal pattern.        --- End of Report ---    < end of copied text >

## 2024-06-10 ENCOUNTER — TRANSCRIPTION ENCOUNTER (OUTPATIENT)
Age: 84
End: 2024-06-10

## 2024-06-10 VITALS
RESPIRATION RATE: 18 BRPM | DIASTOLIC BLOOD PRESSURE: 70 MMHG | TEMPERATURE: 98 F | OXYGEN SATURATION: 96 % | HEART RATE: 111 BPM | SYSTOLIC BLOOD PRESSURE: 110 MMHG

## 2024-06-10 LAB
ANION GAP SERPL CALC-SCNC: 8 MMOL/L — SIGNIFICANT CHANGE UP (ref 7–14)
BUN SERPL-MCNC: 23 MG/DL — HIGH (ref 10–20)
CALCIUM SERPL-MCNC: 9.3 MG/DL — SIGNIFICANT CHANGE UP (ref 8.4–10.4)
CHLORIDE SERPL-SCNC: 95 MMOL/L — LOW (ref 98–110)
CO2 SERPL-SCNC: 39 MMOL/L — HIGH (ref 17–32)
CREAT SERPL-MCNC: 1.2 MG/DL — SIGNIFICANT CHANGE UP (ref 0.7–1.5)
EGFR: 44 ML/MIN/1.73M2 — LOW
GLUCOSE BLDC GLUCOMTR-MCNC: 159 MG/DL — HIGH (ref 70–99)
GLUCOSE BLDC GLUCOMTR-MCNC: 161 MG/DL — HIGH (ref 70–99)
GLUCOSE BLDC GLUCOMTR-MCNC: 234 MG/DL — HIGH (ref 70–99)
GLUCOSE SERPL-MCNC: 145 MG/DL — HIGH (ref 70–99)
HCT VFR BLD CALC: 30.5 % — LOW (ref 37–47)
HGB BLD-MCNC: 8.5 G/DL — LOW (ref 12–16)
MAGNESIUM SERPL-MCNC: 2.1 MG/DL — SIGNIFICANT CHANGE UP (ref 1.8–2.4)
MCHC RBC-ENTMCNC: 27 PG — SIGNIFICANT CHANGE UP (ref 27–31)
MCHC RBC-ENTMCNC: 27.9 G/DL — LOW (ref 32–37)
MCV RBC AUTO: 96.8 FL — SIGNIFICANT CHANGE UP (ref 81–99)
NRBC # BLD: 0 /100 WBCS — SIGNIFICANT CHANGE UP (ref 0–0)
PLATELET # BLD AUTO: 294 K/UL — SIGNIFICANT CHANGE UP (ref 130–400)
PMV BLD: 10.3 FL — SIGNIFICANT CHANGE UP (ref 7.4–10.4)
POTASSIUM SERPL-MCNC: 4.6 MMOL/L — SIGNIFICANT CHANGE UP (ref 3.5–5)
POTASSIUM SERPL-SCNC: 4.6 MMOL/L — SIGNIFICANT CHANGE UP (ref 3.5–5)
RBC # BLD: 3.15 M/UL — LOW (ref 4.2–5.4)
RBC # FLD: 14.9 % — HIGH (ref 11.5–14.5)
SODIUM SERPL-SCNC: 142 MMOL/L — SIGNIFICANT CHANGE UP (ref 135–146)
WBC # BLD: 9.51 K/UL — SIGNIFICANT CHANGE UP (ref 4.8–10.8)
WBC # FLD AUTO: 9.51 K/UL — SIGNIFICANT CHANGE UP (ref 4.8–10.8)

## 2024-06-10 RX ORDER — APIXABAN 2.5 MG/1
1 TABLET, FILM COATED ORAL
Qty: 0 | Refills: 0 | DISCHARGE
Start: 2024-06-10

## 2024-06-10 RX ORDER — APIXABAN 2.5 MG/1
5 TABLET, FILM COATED ORAL EVERY 12 HOURS
Refills: 0 | Status: DISCONTINUED | OUTPATIENT
Start: 2024-06-10 | End: 2024-06-10

## 2024-06-10 RX ORDER — LEVOFLOXACIN 5 MG/ML
1 INJECTION, SOLUTION INTRAVENOUS
Refills: 0 | DISCHARGE

## 2024-06-10 RX ADMIN — RISPERIDONE 0.5 MILLIGRAM(S): 4 TABLET ORAL at 11:53

## 2024-06-10 RX ADMIN — Medication 40 MILLIGRAM(S): at 05:52

## 2024-06-10 RX ADMIN — POLYETHYLENE GLYCOL 3350 17 GRAM(S): 17 POWDER, FOR SOLUTION ORAL at 17:02

## 2024-06-10 RX ADMIN — Medication 1: at 08:18

## 2024-06-10 RX ADMIN — Medication 1: at 17:02

## 2024-06-10 RX ADMIN — Medication 1 MILLIGRAM(S): at 11:55

## 2024-06-10 RX ADMIN — Medication 1 APPLICATION(S): at 16:33

## 2024-06-10 RX ADMIN — Medication 1 APPLICATION(S): at 11:53

## 2024-06-10 RX ADMIN — Medication 1 APPLICATION(S): at 05:53

## 2024-06-10 RX ADMIN — Medication 40 MILLIGRAM(S): at 17:01

## 2024-06-10 RX ADMIN — APIXABAN 5 MILLIGRAM(S): 2.5 TABLET, FILM COATED ORAL at 17:00

## 2024-06-10 RX ADMIN — POLYETHYLENE GLYCOL 3350 17 GRAM(S): 17 POWDER, FOR SOLUTION ORAL at 05:52

## 2024-06-10 RX ADMIN — Medication 2: at 11:52

## 2024-06-10 RX ADMIN — APIXABAN 2.5 MILLIGRAM(S): 2.5 TABLET, FILM COATED ORAL at 05:52

## 2024-06-10 NOTE — PROGRESS NOTE ADULT - SUBJECTIVE AND OBJECTIVE BOX
----------Daily Progress Note----------    HISTORY OF PRESENT ILLNESS:  Patient is an 82 yo F from [facility] w/PMH of diabetes, HTN, LE edema, bipolar disorder, tachybrady syndrome on Eliquis (declining PPM), mild atrial stenosis, and diagnosed w/UTI on 6/6 and started on Levaquin(did not get any dose), who was sent for evaluation of worsening confusion although she was AOx3 on exam in the ED. She reports having some chills but denies any chest pain, dyspnea, abdominal pain, nausea, dysuria, or diarrhea. Of note, her O2 sat intermittently dropped to mid-80s and then improved to mid-90s while in the ED. She was afebrile O2 sat was 99% while on 2L NC. Her labs were notable for WBC 8, hgb 8.6 (9.8 on 4/6), Bicarb 35, BUN 21, Cr 1.4 (around baseline), and pBNP 2838, VBG pH 7.3 and pCO2 85, CXR showing stable cardiomegaly, and EKG showing afib. Patient denies any history of COPD. In the ED, she received lasix 60 mg IV but refused BIPAP. She was admitted to telemetry for further work up and management of acute hypercarbic respiratory failure likely 2/2 MANJEET vs less likely acute HF exacerbation.     Today is hospital day 6d.     INTERVAL HOSPITAL COURSE / OVERNIGHT EVENTS:  O/N events:  None    24 hr events:  None    Patient was examined and seen at bedside.    Review of Systems: Otherwise unremarkable     <<<<<PAST MEDICAL & SURGICAL HISTORY>>>>>  Diabetes mellitus    Hypertension    Schizoaffective disorder    Edema  Bilateral LE    Disorder of thyroid, unspecified  Son cannot provide details. States she had "thyroid surgery" decades ago when she was young    No significant past surgical history      ALLERGIES  No Known Allergies      Home Medications:  apixaban 5 mg oral tablet: 1 tab(s) orally 2 times a day (05 Apr 2024 13:13)  Crestor 10 mg oral tablet: 1 tab(s) orally once a day (29 Mar 2024 14:12)  Farxiga 10 mg oral tablet: 1 tab(s) orally once a day (04 Jun 2024 22:36)  ferrous sulfate 325 mg (65 mg elemental iron) oral delayed release tablet: 1 tab(s) orally 2 times a day (04 Jun 2024 22:30)  folic acid 1 mg oral tablet: 1 tab(s) orally once a day (29 Mar 2024 14:12)  Lasix 40 mg oral tablet: 1 tab(s) orally 2 times a day (04 Jun 2024 22:29)  Levaquin 500 mg oral tablet: 1 tab(s) orally once a day 7 days (start date 6/4/24 ) (04 Jun 2024 22:36)  losartan 50 mg oral tablet: 1 tab(s) orally once a day hold for SBP less than 105mmhg (06 Apr 2024 15:08)  metoprolol succinate 25 mg oral tablet, extended release: 1 tab(s) orally once a day (05 Apr 2024 13:13)  nystatin 100,000 units/g topical cream: Apply topically to affected area 2 times a day bilateral groin area x 14 days ( start date 6/2/24) (04 Jun 2024 22:36)  RisperDAL 1 mg/mL oral solution: 0.5 milliliter(s) orally once a day (29 Mar 2024 14:12)  senna leaf extract oral tablet: 2 tab(s) orally once a day (at bedtime) (05 Apr 2024 13:13)  Vitamin A and D topical ointment: Apply topically to affected area once a day Apply to bilateral LE&#x27;s/Feet daily (04 Jun 2024 22:36)  Vitamin D2 50,000 intl units (1.25 mg) oral capsule (obsolete): 1 cap(s) orally every 2 weeks (04 Jun 2024 22:36)        MEDICATIONS  STANDING MEDICATIONS  apixaban 2.5 milliGRAM(s) Oral every 12 hours  atorvastatin 40 milliGRAM(s) Oral at bedtime  clotrimazole 1% Cream 1 Application(s) Topical two times a day  dextrose 10% Bolus 125 milliLiter(s) IV Bolus once  dextrose 5%. 1000 milliLiter(s) IV Continuous <Continuous>  dextrose 5%. 1000 milliLiter(s) IV Continuous <Continuous>  dextrose 50% Injectable 25 Gram(s) IV Push once  dextrose 50% Injectable 12.5 Gram(s) IV Push once  folic acid 1 milliGRAM(s) Oral daily  furosemide    Tablet 40 milliGRAM(s) Oral every 12 hours  glucagon  Injectable 1 milliGRAM(s) IntraMuscular once  insulin lispro (ADMELOG) corrective regimen sliding scale   SubCutaneous three times a day before meals  polyethylene glycol 3350 17 Gram(s) Oral two times a day  risperiDONE   Solution 0.5 milliGRAM(s) Oral daily  senna 2 Tablet(s) Oral at bedtime  senna 2 Tablet(s) Oral at bedtime  vitamin A &amp; D Ointment 1 Application(s) Topical daily    PRN MEDICATIONS  acetaminophen     Tablet .. 650 milliGRAM(s) Oral every 6 hours PRN  aluminum hydroxide/magnesium hydroxide/simethicone Suspension 30 milliLiter(s) Oral every 4 hours PRN  dextrose Oral Gel 15 Gram(s) Oral once PRN  melatonin 3 milliGRAM(s) Oral at bedtime PRN  ondansetron Injectable 4 milliGRAM(s) IV Push every 8 hours PRN    VITALS:  T(F): 98.8  HR: 79  BP: 123/68  RR: 18  SpO2: 100%    <<<<<PHYSICAL EXAM>>>>>  GENERAL: NAD  PULMONARY: Clear to auscultation bilaterally. No wheezing or crackles  CARDIOVASCULAR: Regular rate and rhythm, S1-S2, no murmurs, rubs, or gallops  GASTROINTESTINAL: Soft, non-tender, non-distended, no guarding  SKIN/EXTREMITIES:  Warm, 2+ tibialis posterior pulses, no UE or LE edema  NEUROLOGIC/MUSCULOSKELETAL: AOx4, able to lift arms antigravity, difficulty lifting legs, no focal deficits    <<<<<LABS>>>>>                        8.1    9.82  )-----------( 286      ( 08 Jun 2024 10:20 )             29.0     06-08    140  |  96<L>  |  28<H>  ----------------------------<  225<H>  4.2   |  37<H>  |  1.2    Ca    9.2      08 Jun 2024 10:20  Mg     2.0     06-08        Urinalysis Basic - ( 08 Jun 2024 10:20 )    Color: x / Appearance: x / SG: x / pH: x  Gluc: 225 mg/dL / Ketone: x  / Bili: x / Urobili: x   Blood: x / Protein: x / Nitrite: x   Leuk Esterase: x / RBC: x / WBC x   Sq Epi: x / Non Sq Epi: x / Bacteria: x          [id number]        <<<<<RADIOLOGY>>>>>        ----------------------------------------------------------------------------------------------------------------------------------------------------------------------------------------------- ----------Daily Progress Note----------    HISTORY OF PRESENT ILLNESS:  Patient is an 84 yo F from [facility] w/PMH of diabetes, HTN, LE edema, bipolar disorder, tachybrady syndrome on Eliquis (declining PPM), mild atrial stenosis, and diagnosed w/UTI on 6/6 and started on Levaquin(did not get any dose), who was sent for evaluation of worsening confusion although she was AOx3 on exam in the ED. She reports having some chills but denies any chest pain, dyspnea, abdominal pain, nausea, dysuria, or diarrhea. Of note, her O2 sat intermittently dropped to mid-80s and then improved to mid-90s while in the ED. She was afebrile O2 sat was 99% while on 2L NC. Her labs were notable for WBC 8, hgb 8.6 (9.8 on 4/6), Bicarb 35, BUN 21, Cr 1.4 (around baseline), and pBNP 2838, VBG pH 7.3 and pCO2 85, CXR showing stable cardiomegaly, and EKG showing afib. Patient denies any history of COPD. In the ED, she received lasix 60 mg IV but refused BIPAP. She was admitted to telemetry for further work up and management of acute hypercarbic respiratory failure likely 2/2 MANJEET vs less likely acute HF exacerbation.     Today is hospital day 6d.     INTERVAL HOSPITAL COURSE / OVERNIGHT EVENTS:  O/N events:  None    24 hr events:  None    Patient was examined and seen at bedside. Reports feeling well, denies any difficulty breathing, is alert and oriented to person, place, and time, endorses good appetite    Review of Systems: Otherwise unremarkable     <<<<<PAST MEDICAL & SURGICAL HISTORY>>>>>  Diabetes mellitus    Hypertension    Schizoaffective disorder    Edema  Bilateral LE    Disorder of thyroid, unspecified  Son cannot provide details. States she had "thyroid surgery" decades ago when she was young    No significant past surgical history      ALLERGIES  No Known Allergies      Home Medications:  apixaban 5 mg oral tablet: 1 tab(s) orally 2 times a day (05 Apr 2024 13:13)  Crestor 10 mg oral tablet: 1 tab(s) orally once a day (29 Mar 2024 14:12)  Farxiga 10 mg oral tablet: 1 tab(s) orally once a day (04 Jun 2024 22:36)  ferrous sulfate 325 mg (65 mg elemental iron) oral delayed release tablet: 1 tab(s) orally 2 times a day (04 Jun 2024 22:30)  folic acid 1 mg oral tablet: 1 tab(s) orally once a day (29 Mar 2024 14:12)  Lasix 40 mg oral tablet: 1 tab(s) orally 2 times a day (04 Jun 2024 22:29)  Levaquin 500 mg oral tablet: 1 tab(s) orally once a day 7 days (start date 6/4/24 ) (04 Jun 2024 22:36)  losartan 50 mg oral tablet: 1 tab(s) orally once a day hold for SBP less than 105mmhg (06 Apr 2024 15:08)  metoprolol succinate 25 mg oral tablet, extended release: 1 tab(s) orally once a day (05 Apr 2024 13:13)  nystatin 100,000 units/g topical cream: Apply topically to affected area 2 times a day bilateral groin area x 14 days ( start date 6/2/24) (04 Jun 2024 22:36)  RisperDAL 1 mg/mL oral solution: 0.5 milliliter(s) orally once a day (29 Mar 2024 14:12)  senna leaf extract oral tablet: 2 tab(s) orally once a day (at bedtime) (05 Apr 2024 13:13)  Vitamin A and D topical ointment: Apply topically to affected area once a day Apply to bilateral LE&#x27;s/Feet daily (04 Jun 2024 22:36)  Vitamin D2 50,000 intl units (1.25 mg) oral capsule (obsolete): 1 cap(s) orally every 2 weeks (04 Jun 2024 22:36)        MEDICATIONS  STANDING MEDICATIONS  apixaban 2.5 milliGRAM(s) Oral every 12 hours  atorvastatin 40 milliGRAM(s) Oral at bedtime  clotrimazole 1% Cream 1 Application(s) Topical two times a day  dextrose 10% Bolus 125 milliLiter(s) IV Bolus once  dextrose 5%. 1000 milliLiter(s) IV Continuous <Continuous>  dextrose 5%. 1000 milliLiter(s) IV Continuous <Continuous>  dextrose 50% Injectable 25 Gram(s) IV Push once  dextrose 50% Injectable 12.5 Gram(s) IV Push once  folic acid 1 milliGRAM(s) Oral daily  furosemide    Tablet 40 milliGRAM(s) Oral every 12 hours  glucagon  Injectable 1 milliGRAM(s) IntraMuscular once  insulin lispro (ADMELOG) corrective regimen sliding scale   SubCutaneous three times a day before meals  polyethylene glycol 3350 17 Gram(s) Oral two times a day  risperiDONE   Solution 0.5 milliGRAM(s) Oral daily  senna 2 Tablet(s) Oral at bedtime  senna 2 Tablet(s) Oral at bedtime  vitamin A &amp; D Ointment 1 Application(s) Topical daily    PRN MEDICATIONS  acetaminophen     Tablet .. 650 milliGRAM(s) Oral every 6 hours PRN  aluminum hydroxide/magnesium hydroxide/simethicone Suspension 30 milliLiter(s) Oral every 4 hours PRN  dextrose Oral Gel 15 Gram(s) Oral once PRN  melatonin 3 milliGRAM(s) Oral at bedtime PRN  ondansetron Injectable 4 milliGRAM(s) IV Push every 8 hours PRN    VITALS:  T(F): 98.8  HR: 79  BP: 123/68  RR: 18  SpO2: 100%    <<<<<PHYSICAL EXAM>>>>>  GENERAL: NAD  PULMONARY: Clear to auscultation bilaterally. No wheezing or crackles  CARDIOVASCULAR: Regular rate and rhythm, S1-S2, no murmurs, rubs, or gallops  GASTROINTESTINAL: Soft, non-tender, non-distended, no guarding  SKIN/EXTREMITIES:  Warm, 2+ tibialis posterior pulses, no UE or LE edema  NEUROLOGIC/MUSCULOSKELETAL: AOx4, able to lift arms antigravity, difficulty lifting legs, no focal deficits    <<<<<LABS>>>>>                        8.1    9.82  )-----------( 286      ( 08 Jun 2024 10:20 )             29.0     06-08    140  |  96<L>  |  28<H>  ----------------------------<  225<H>  4.2   |  37<H>  |  1.2    Ca    9.2      08 Jun 2024 10:20  Mg     2.0     06-08        Urinalysis Basic - ( 08 Jun 2024 10:20 )    Color: x / Appearance: x / SG: x / pH: x  Gluc: 225 mg/dL / Ketone: x  / Bili: x / Urobili: x   Blood: x / Protein: x / Nitrite: x   Leuk Esterase: x / RBC: x / WBC x   Sq Epi: x / Non Sq Epi: x / Bacteria: x          [id number]        <<<<<RADIOLOGY>>>>>        -----------------------------------------------------------------------------------------------------------------------------------------------------------------------------------------------

## 2024-06-10 NOTE — PROGRESS NOTE ADULT - PROVIDER SPECIALTY LIST ADULT
Internal Medicine
Palliative Care

## 2024-06-10 NOTE — PHARMACOTHERAPY INTERVENTION NOTE - COMMENTS
Patient home dose is apixaban 5 mg q12h and patient can get the same dose based on Criteria however Dr wants to decrease to apixaban 2.5 mg q12h fr now
Patient with a fib receiving apixaban 5 mg po BID. SCr today and yesterday = 1.5, would recommend lowering apixaban to 2.5 mg po BID for now (age > 80 and SCr > 1.5) until kidney function improves.
Patient receiving apixaban 2.5 mg po BID, dose was renally adjusted during this admission due to SCr > 1.5. SCr today = 1.2 would recommend changing apixaban back to 5 mg po BID.

## 2024-06-10 NOTE — DISCHARGE NOTE PROVIDER - HOSPITAL COURSE
Patient is an 82 yo F from [facility] w/PMH of diabetes, HTN, LE edema, bipolar disorder, 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 She was found to have sinus pauses on heart monitor but refused both CPAP and permanent pacemaker placement. Considering there is no further management for her and she has otherwise been stable, she is now ready for discharge with follow up with cardiologist outpatient.    #Acute hypercarbic respiratory failure likely 2/2 MANJEET   #Respiratory Acidosis ( likely from MANJEET)  #Recently diagnosed AFib with tachy-tamy syndrome ( refused PPM in the past)   #LE edema  - Patient noncompliant with CPAP, refused PPM, will be discharged with outpatient follow up  - c/w Eliquis 2.5 mg bid  - c/w lasix 40 mg PO bid  - f/u with cardiology outpatient  - Still full code as per GOC conversation with son during this visit    #Normocytic Anemia - 2/2 MANNY   - Hgb 8.6 ( baseline ~8- 9)   - Iron sat 15%, Iron T 37, Ferritin 37 (3/30/24)  - c/w Iron supplements ( on Ferrous sulfate 325 mg BID outpatient; consider switching it to once a day on discharge)     #HTN  #Mild AS  - Losartan and Metoprolol succinate with holding parameters     #Bipolar disorder   #Schizophrenia  - c/w Risperdal     #DM   - Restart on home meds    #Asymptomatic bacturia  - UA shows slightly LE+, 1st UCx negative, 2nd UCx showing Ecoli  - Asymptomatic, no other signs of infection, no abx given

## 2024-06-10 NOTE — DISCHARGE NOTE NURSING/CASE MANAGEMENT/SOCIAL WORK - PATIENT PORTAL LINK FT
You can access the FollowMyHealth Patient Portal offered by [facility] by registering at the following website: [email or web address]. By joining [employer]’s FollowMyHealth portal, you will also be able to view your health information using other applications (apps) compatible with our system.

## 2024-06-10 NOTE — PHYSICAL THERAPY INITIAL EVALUATION ADULT - GENERAL OBSERVATIONS, REHAB EVAL
Patient encountered lying in bed on O2 via NC 3LPM, O2 sat 99%, HR 89 bpm at rest. Agreed to participate in therapy

## 2024-06-10 NOTE — PROGRESS NOTE ADULT - SUBJECTIVE AND OBJECTIVE BOX
CHELA GALLAGHER  83y  Female  HPI:  83-year-old woman with a history of diabetes, hypertension, lower extremity edema, bipolar disorder, tachybrady syndrome on Eliquis (declining PPM), resident of [facility] facility, mild atrial stenosis, recently diagnosed UTI on Levaquin(did not get any dose), at baseline uses walker or wheelchair.  Patient is sent for evaluation of worsening confusion.  On my exam, patient is alert, oriented x 3, states she had some chills earlier, but otherwise has no acute complaints.  Patient's O2 saturation is noted to be intermittently dropping to mid-80s%, but spontaneously improving to mid-90s%.  Patient states that she does not have COPD, never smoked, but does use oxygen as needed sometimes. No chest pain, shortness of breath, cough, abdominal pain, vomiting, diarrhea.    History obtained from nursing supervisor at [facility]. Per the nursing supervisor, pt was diagnosed with UTI earlier today and was started on Levaquin ( however, she did not receive any dose today). When the RN was drawing blood patient was not herself and per RN her blood also clotted quickly despite being on Eliquis.     In ED vitals were  T(F): 98.4  HR: 61  BP: 99/65  RR: 16  SpO2: 99% on 2 L O2     Labs were significant for Hgb 8.6, MCV 94.3, Cl 96, Bicarb 35, BUN 21, Cr 1.4 ( baseline 1.4), pBNP 2838 ( baseline 1440 in 3/2024).     VBG: pH 7.30, pCO2 85    CXR: stable cardiomegaly     EKG: AFib     Pt received IV lasix 60 mg and was offered BiPAP; however, she refused it. (Pt was A&Ox3). She is being admitted to [facility] for further workup.  (04 Jun 2024 21:10)    MEDICATIONS  (STANDING):  apixaban 2.5 milliGRAM(s) Oral every 12 hours  atorvastatin 40 milliGRAM(s) Oral at bedtime  clotrimazole 1% Cream 1 Application(s) Topical two times a day  dextrose 10% Bolus 125 milliLiter(s) IV Bolus once  dextrose 5%. 1000 milliLiter(s) (100 mL/Hr) IV Continuous <Continuous>  dextrose 5%. 1000 milliLiter(s) (50 mL/Hr) IV Continuous <Continuous>  dextrose 50% Injectable 25 Gram(s) IV Push once  dextrose 50% Injectable 12.5 Gram(s) IV Push once  folic acid 1 milliGRAM(s) Oral daily  furosemide    Tablet 40 milliGRAM(s) Oral every 12 hours  glucagon  Injectable 1 milliGRAM(s) IntraMuscular once  insulin lispro (ADMELOG) corrective regimen sliding scale   SubCutaneous three times a day before meals  polyethylene glycol 3350 17 Gram(s) Oral two times a day  risperiDONE   Solution 0.5 milliGRAM(s) Oral daily  senna 2 Tablet(s) Oral at bedtime  senna 2 Tablet(s) Oral at bedtime  vitamin A &amp; D Ointment 1 Application(s) Topical daily    MEDICATIONS  (PRN):  acetaminophen     Tablet .. 650 milliGRAM(s) Oral every 6 hours PRN Temp greater or equal to 38C (100.4F), Mild Pain (1 - 3)  aluminum hydroxide/magnesium hydroxide/simethicone Suspension 30 milliLiter(s) Oral every 4 hours PRN Dyspepsia  dextrose Oral Gel 15 Gram(s) Oral once PRN Blood Glucose LESS THAN 70 milliGRAM(s)/deciliter  melatonin 3 milliGRAM(s) Oral at bedtime PRN Insomnia  ondansetron Injectable 4 milliGRAM(s) IV Push every 8 hours PRN Nausea and/or Vomiting    INTERVAL EVENTS:    T(C): 37.1 (06-10-24 @ 04:56), Max: 37.1 (06-10-24 @ 04:56)  HR: 79 (06-10-24 @ 04:56) (79 - 102)  BP: 123/68 (06-10-24 @ 04:56) (114/48 - 123/68)  RR: 18 (06-10-24 @ 04:56) (18 - 18)  SpO2: 100% (06-09-24 @ 13:30) (100% - 100%)  Wt(kg): --Vital Signs Last 24 Hrs  T(C): 37.1 (10 Rubio 2024 04:56), Max: 37.1 (10 Rubio 2024 04:56)  T(F): 98.8 (10 Rubio 2024 04:56), Max: 98.8 (10 Rubio 2024 04:56)  HR: 79 (10 Rubio 2024 04:56) (79 - 102)  BP: 123/68 (10 Rubio 2024 04:56) (114/48 - 123/68)  BP(mean): --  RR: 18 (10 Rubio 2024 04:56) (18 - 18)  SpO2: 100% (09 Jun 2024 13:30) (100% - 100%)    Parameters below as of 09 Jun 2024 11:30  Patient On (Oxygen Delivery Method): nasal cannula  O2 Flow (L/min): 3      PHYSICAL EXAM:  GENERAL:   CHEST/LUNG:  HEART: S1, S2  ABDOMEN: Soft, Obese, Bowel sounds present  EXTREMITIES:  edema  SKIN: No rashes or lesions    LABS:                        8.1    9.82  )-----------( 286      ( 08 Jun 2024 10:20 )             29.0             06-08    140  |  96<L>  |  28<H>  ----------------------------<  225<H>  4.2   |  37<H>  |  1.2    Ca    9.2      08 Jun 2024 10:20  Mg     2.0     06-08                      Urinalysis Basic - ( 08 Jun 2024 10:20 )    Color: x / Appearance: x / SG: x / pH: x  Gluc: 225 mg/dL / Ketone: x  / Bili: x / Urobili: x   Blood: x / Protein: x / Nitrite: x   Leuk Esterase: x / RBC: x / WBC x   Sq Epi: x / Non Sq Epi: x / Bacteria: x        RADIOLOGY & ADDITIONAL TESTS:  < from: TTE Echo Complete w/ Contrast w/o Doppler (03.31.24 @ 11:38) >    Summary:   1. Normal global left ventricular systolic function.   2. Left atrial enlargement.   3. LV Ejection Fraction by Pond's Method with a biplane EF of 60 %.   4. Normal left ventricular internal cavity size.   5. Normal right atrial size.   6. There is no evidence of pericardial effusion.   7. Mild mitral annular calcification.   8. Mild thickening and calcification of the anterior and posterior   mitral valve leaflets.   9. No evidence of mitral valve regurgitation.  10. The aortic valve mean gradient is 14.4 mmHg consistent with mild   aortic stenosis.  11. Sclerotic aortic valve with decreased opening.    < end of copied text >     CHELA GALLAGHER  83y  Female  HPI:  83-year-old woman with a history of diabetes, hypertension, lower extremity edema, bipolar disorder, tachybrady syndrome on Eliquis (declining PPM), resident of [place] facility, mild atrial stenosis, recently diagnosed UTI on Levaquin(did not get any dose), at baseline uses walker or wheelchair.  Patient is sent for evaluation of worsening confusion.  On my exam, patient is alert, oriented x 3, states she had some chills earlier, but otherwise has no acute complaints.  Patient's O2 saturation is noted to be intermittently dropping to mid-80s%, but spontaneously improving to mid-90s%.  Patient states that she does not have COPD, never smoked, but does use oxygen as needed sometimes. No chest pain, shortness of breath, cough, abdominal pain, vomiting, diarrhea.    History obtained from nursing supervisor at [facility]. Per the nursing supervisor, pt was diagnosed with UTI earlier today and was started on Levaquin ( however, she did not receive any dose today). When the RN was drawing blood patient was not herself and per RN her blood also clotted quickly despite being on Eliquis.     In ED vitals were  T(F): 98.4  HR: 61  BP: 99/65  RR: 16  SpO2: 99% on 2 L O2     Labs were significant for Hgb 8.6, MCV 94.3, Cl 96, Bicarb 35, BUN 21, Cr 1.4 ( baseline 1.4), pBNP 2838 ( baseline 1440 in 3/2024).     VBG: pH 7.30, pCO2 85    CXR: stable cardiomegaly     EKG: AFib     Pt received IV lasix 60 mg and was offered BiPAP; however, she refused it. (Pt was A&Ox3). She is being admitted to [facility] for further workup.  (04 Jun 2024 21:10)    MEDICATIONS  (STANDING):  apixaban 2.5 milliGRAM(s) Oral every 12 hours  atorvastatin 40 milliGRAM(s) Oral at bedtime  clotrimazole 1% Cream 1 Application(s) Topical two times a day  dextrose 10% Bolus 125 milliLiter(s) IV Bolus once  dextrose 5%. 1000 milliLiter(s) (100 mL/Hr) IV Continuous <Continuous>  dextrose 5%. 1000 milliLiter(s) (50 mL/Hr) IV Continuous <Continuous>  dextrose 50% Injectable 25 Gram(s) IV Push once  dextrose 50% Injectable 12.5 Gram(s) IV Push once  folic acid 1 milliGRAM(s) Oral daily  furosemide    Tablet 40 milliGRAM(s) Oral every 12 hours  glucagon  Injectable 1 milliGRAM(s) IntraMuscular once  insulin lispro (ADMELOG) corrective regimen sliding scale   SubCutaneous three times a day before meals  polyethylene glycol 3350 17 Gram(s) Oral two times a day  risperiDONE   Solution 0.5 milliGRAM(s) Oral daily  senna 2 Tablet(s) Oral at bedtime  senna 2 Tablet(s) Oral at bedtime  vitamin A &amp; D Ointment 1 Application(s) Topical daily    MEDICATIONS  (PRN):  acetaminophen     Tablet .. 650 milliGRAM(s) Oral every 6 hours PRN Temp greater or equal to 38C (100.4F), Mild Pain (1 - 3)  aluminum hydroxide/magnesium hydroxide/simethicone Suspension 30 milliLiter(s) Oral every 4 hours PRN Dyspepsia  dextrose Oral Gel 15 Gram(s) Oral once PRN Blood Glucose LESS THAN 70 milliGRAM(s)/deciliter  melatonin 3 milliGRAM(s) Oral at bedtime PRN Insomnia  ondansetron Injectable 4 milliGRAM(s) IV Push every 8 hours PRN Nausea and/or Vomiting    INTERVAL EVENTS: [facility]. Patient without new complaints, is hungry. Patient with multiple Snapple drinks at bedside...    T(C): 37.1 (06-10-24 @ 04:56), Max: 37.1 (06-10-24 @ 04:56)  HR: 79 (06-10-24 @ 04:56) (79 - 102)  BP: 123/68 (06-10-24 @ 04:56) (114/48 - 123/68)  RR: 18 (06-10-24 @ 04:56) (18 - 18)  SpO2: 100% (06-09-24 @ 13:30) (100% - 100%)  Wt(kg): --Vital Signs Last 24 Hrs  T(C): 37.1 (10 Rubio 2024 04:56), Max: 37.1 (10 Rubio 2024 04:56)  T(F): 98.8 (10 Rubio 2024 04:56), Max: 98.8 (10 Rubio 2024 04:56)  HR: 79 (10 Rubio 2024 04:56) (79 - 102)  BP: 123/68 (10 Rubio 2024 04:56) (114/48 - 123/68)  BP(mean): --  RR: 18 (10 Rubio 2024 04:56) (18 - 18)  SpO2: 100% (09 Jun 2024 13:30) (100% - 100%)    Parameters below as of 09 Jun 2024 11:30  Patient On (Oxygen Delivery Method): nasal cannula  O2 Flow (L/min): 3      PHYSICAL EXAM:  GENERAL: NAD  CHEST/LUNG: Decreased effort  HEART: S1, S2  ABDOMEN: Soft, Obese, Bowel sounds present  EXTREMITIES:  + edema  SKIN: No rashes or lesions    LABS:                        8.1    9.82  )-----------( 286      ( 08 Jun 2024 10:20 )             29.0             06-08    140  |  96<L>  |  28<H>  ----------------------------<  225<H>  4.2   |  37<H>  |  1.2    Ca    9.2      08 Jun 2024 10:20  Mg     2.0     06-08                      Urinalysis Basic - ( 08 Jun 2024 10:20 )    Color: x / Appearance: x / SG: x / pH: x  Gluc: 225 mg/dL / Ketone: x  / Bili: x / Urobili: x   Blood: x / Protein: x / Nitrite: x   Leuk Esterase: x / RBC: x / WBC x   Sq Epi: x / Non Sq Epi: x / Bacteria: x        RADIOLOGY & ADDITIONAL TESTS:  < from: TTE Echo Complete w/ Contrast w/o Doppler (03.31.24 @ 11:38) >    Summary:   1. Normal global left ventricular systolic function.   2. Left atrial enlargement.   3. LV Ejection Fraction by Pond's Method with a biplane EF of 60 %.   4. Normal left ventricular internal cavity size.   5. Normal right atrial size.   6. There is no evidence of pericardial effusion.   7. Mild mitral annular calcification.   8. Mild thickening and calcification of the anterior and posterior   mitral valve leaflets.   9. No evidence of mitral valve regurgitation.  10. The aortic valve mean gradient is 14.4 mmHg consistent with mild   aortic stenosis.  11. Sclerotic aortic valve with decreased opening.    < end of copied text >

## 2024-06-10 NOTE — DISCHARGE NOTE PROVIDER - CARE PROVIDER_API CALL
Dipesh Razo  Interventional Cardiology  [address] [place] [address], [address]  [place] [address]  Phone: [phone]  Fax: [phone]  Established Patient  Follow Up Time: 2 weeks

## 2024-06-10 NOTE — DISCHARGE NOTE PROVIDER - NSDCFUSCHEDAPPT_GEN_ALL_CORE_FT
[facility]  [address]  Scheduled Appointment: 06/20/2024    Celso Mascorro Physician [facility]  GASTRO Doc Off [id number]  Scheduled Appointment: 09/06/2024

## 2024-06-10 NOTE — PHYSICAL THERAPY INITIAL EVALUATION ADULT - IMPAIRED TRANSFERS: SIT/STAND, REHAB EVAL
decreased endurance, moderate SOB with minimal activity/impaired balance/decreased flexibility/decreased strength

## 2024-06-10 NOTE — PROGRESS NOTE ADULT - ASSESSMENT
Patient is an 82 yo F from [facility] w/PMH of diabetes, HTN, LE edema, bipolar disorder, 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    #Acute hypercarbic respiratory failure likely 2/2 MANJEET   #Respiratory Acidosis ( likely from MANJEET)  #Recently diagnosed AFib with tachy-tamy syndrome ( refused PPM in the past)   #LE edema  - Per patient she is on O2 at baseline ( unsure how many liters)   - VBG: pH 7.30, pCO2 85   - Bicarb 35   - per Nursing home records pt has order for BiPAP; However, she is refusing BiPAP here   - CXR: on wet read bilateral pleural effusions ( R> L)   - Echo 3/2024 : EF 60% LA enlargement, mild aortic stenosis ,pulmonary consult  - s/p 1 dose IV lasix 60 in ED with good response ( on Lasix 40 mg PO BID at NH)  - Monitor I's and O's   - BP on the lower side ( MAP > 60) and pt is asymptomatic  - Monitor BP  - Fluid restriction   - Hold Digoxin in a setting of pleural effusion   - C/w Eliquis 2.5 mg bid  - On tele  - Pulm team spoke to patient about her MANJEET, she continues to refuse BiPAP or CPAP  - Cardiology noted Afib with pauses likely 2/2 MANJEET, patient continues to refuse CPAP  - Palliative care (6/6): Patient's son wants intubation if needed, wants to keep his mother alive  - ABG (6/7): pH 7.41, pCO2 67, bicarb 42  - As per Dr. Razo, he will speak with family about PPM    #Normocytic Anemia - 2/2 MANNY   - Hgb 8.6 ( baseline ~8- 9)   - Iron sat 15%, Iron T 37, Ferritin 37 (3/30/24)  - c/w Iron supplements ( on Ferrous sulfate 325 mg BID outpatient; consider switching it to once a day on discharge)     #HTN  #Mild AS  - Losartan and Metoprolol succinate with holding parameters     #Bipolar disorder   #Schizophrenia  - c/w Risperdal     #DM   - on Farxiga at home  - ISS and FS    #Unlikely UTI  - UA shows slightly LE+, 1st UCx negative, 2nd UCx showing Ecoli   - hold off on antibiotics for now as patient is not symptomatic and no other signs of infection noted ( Afebrile; no leukocytosis/leukopenia)     #MISC  - DVT PPx: Eliquis   - GI PPx: None   - Diet: DASH  - Activity: IAT    Pending: Dr. Razo will speak to family about PPM, discharge planning to SNF     Patient is an 84 yo F from [facility] w/PMH of diabetes, HTN, LE edema, bipolar disorder, 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    #Acute hypercarbic respiratory failure likely 2/2 MANJEET   #Respiratory Acidosis ( likely from MANJEET)  #Recently diagnosed AFib with tachy-tamy syndrome ( refused PPM in the past)   #LE edema  - Per patient she is on O2 at baseline ( unsure how many liters)   - VBG: pH 7.30, pCO2 85   - Bicarb 35   - per Nursing home records pt has order for BiPAP; However, she is refusing BiPAP here   - CXR: on wet read bilateral pleural effusions ( R> L)   - Echo 3/2024 : EF 60% LA enlargement, mild aortic stenosis ,pulmonary consult  - s/p 1 dose IV lasix 60 in ED with good response ( on Lasix 40 mg PO BID at NH)  - Monitor I's and O's   - BP on the lower side ( MAP > 60) and pt is asymptomatic  - Monitor BP  - Fluid restriction   - Hold Digoxin in a setting of pleural effusion   - C/w Eliquis 2.5 mg bid  - On tele  - Pulm team spoke to patient about her MANJEET, she continues to refuse BiPAP or CPAP  - Cardiology noted Afib with pauses likely 2/2 MANJEET, patient continues to refuse CPAP  - Palliative care (6/6): Patient's son wants intubation if needed, wants to keep his mother alive  - ABG (6/7): pH 7.41, pCO2 67, bicarb 42  - PPM likely not happening  - PT eval    #Normocytic Anemia - 2/2 MANNY   - Hgb 8.6 ( baseline ~8- 9)   - Iron sat 15%, Iron T 37, Ferritin 37 (3/30/24)  - c/w Iron supplements ( on Ferrous sulfate 325 mg BID outpatient; consider switching it to once a day on discharge)     #HTN  #Mild AS  - Losartan and Metoprolol succinate with holding parameters     #Bipolar disorder   #Schizophrenia  - c/w Risperdal     #DM   - on Farxiga at home  - ISS and FS    #Unlikely UTI  - UA shows slightly LE+, 1st UCx negative, 2nd UCx showing Ecoli   - hold off on antibiotics for now as patient is not symptomatic and no other signs of infection noted ( Afebrile; no leukocytosis/leukopenia)     #MISC  - DVT PPx: Eliquis   - GI PPx: None   - Diet: DASH  - Activity: IAT    Pending: Discharge planning to SNF, pending PT simonal

## 2024-06-10 NOTE — DISCHARGE NOTE PROVIDER - NSDCCPCAREPLAN_GEN_ALL_CORE_FT
PRINCIPAL DISCHARGE DIAGNOSIS  Diagnosis: Acute respiratory failure with hypoxia and hypercapnia  Assessment and Plan of Treatment: You came to the hospital     PRINCIPAL DISCHARGE DIAGNOSIS  Diagnosis: Acute respiratory failure with hypoxia and hypercapnia  Assessment and Plan of Treatment: You came to the hospital with confusion and were found to have high carbon dioxide levels and low oxygen levels in your blood. This was most likely due to your obstructive sleep apnea, which you should use a CPAP for every night. However, you have been refusing to use your CPAP, which is why your carbon dioxide levels increased and oxygen levels decreased. Please make sure to use your CPAP at night. You also had pauses in your heart rhythm likely due to the obstructive sleep apnea. You would benefit from a permanent pacemaker but you also refused this. Please follow up with a cardiologist within 2 weeks for further discussion. If you become confused again or notice worsening shortness of breath, please return to the hospital.

## 2024-06-10 NOTE — PHYSICAL THERAPY INITIAL EVALUATION ADULT - ADDITIONAL COMMENTS
As per patient, she was receiving PT treatment in [facility] rehab. She was assisted in ambulation short distances only with wheelchair follow using RW. Assisted in ADLs by NH staff.

## 2024-06-10 NOTE — PHYSICAL THERAPY INITIAL EVALUATION ADULT - PERTINENT HX OF CURRENT PROBLEM, REHAB EVAL
83-year-old woman with a history of diabetes, hypertension, lower extremity edema, bipolar disorder, tachybrady syndrome on Eliquis (declining PPM), resident of [place] facility, mild atrial stenosis, recently diagnosed UTI on Levaquin(did not get any dose), at baseline uses walker or wheelchair.  Patient is sent for evaluation of worsening confusion.  On my exam, patient is alert, oriented x 3, states she had some chills earlier, but otherwise has no acute complaints.  Patient's O2 saturation is noted to be intermittently dropping to mid-80s%, but spontaneously improving to mid-90s%.  Patient states that she does not have COPD, never smoked, but does use oxygen as needed sometimes. No chest pain, shortness of breath, cough, abdominal pain, vomiting, diarrhea.  History obtained from nursing supervisor at [facility]. Per the nursing supervisor, pt was diagnosed with UTI earlier today and was started on Levaquin ( however, she did not receive any dose today). When the RN was drawing blood patient was not herself and per RN her blood also clotted quickly despite being on Eliquis.

## 2024-06-10 NOTE — DISCHARGE NOTE PROVIDER - NSDCMRMEDTOKEN_GEN_ALL_CORE_FT
apixaban 5 mg oral tablet: 1 tab(s) orally 2 times a day  Crestor 10 mg oral tablet: 1 tab(s) orally once a day  digoxin 125 mcg (0.125 mg) oral tablet: 1 tab(s) orally every other day  Farxiga 10 mg oral tablet: 1 tab(s) orally once a day  ferrous sulfate 325 mg (65 mg elemental iron) oral delayed release tablet: 1 tab(s) orally 2 times a day  folic acid 1 mg oral tablet: 1 tab(s) orally once a day  Lasix 40 mg oral tablet: 1 tab(s) orally 2 times a day  Levaquin 500 mg oral tablet: 1 tab(s) orally once a day 7 days (start date 6/4/24 )  losartan 50 mg oral tablet: 1 tab(s) orally once a day hold for SBP less than 105mmhg  metoprolol succinate 25 mg oral tablet, extended release: 1 tab(s) orally once a day  nystatin 100,000 units/g topical cream: Apply topically to affected area 2 times a day bilateral groin area x 14 days ( start date 6/2/24)  RisperDAL 1 mg/mL oral solution: 0.5 milliliter(s) orally once a day  senna leaf extract oral tablet: 2 tab(s) orally once a day (at bedtime)  Vitamin A and D topical ointment: Apply topically to affected area once a day Apply to bilateral LE&#x27;s/Feet daily  Vitamin D2 50,000 intl units (1.25 mg) oral capsule (obsolete): 1 cap(s) orally every 2 weeks   apixaban 2.5 mg oral tablet: 1 tab(s) orally every 12 hours  Crestor 10 mg oral tablet: 1 tab(s) orally once a day  digoxin 125 mcg (0.125 mg) oral tablet: 1 tab(s) orally every other day  Farxiga 10 mg oral tablet: 1 tab(s) orally once a day  ferrous sulfate 325 mg (65 mg elemental iron) oral delayed release tablet: 1 tab(s) orally 2 times a day  folic acid 1 mg oral tablet: 1 tab(s) orally once a day  Lasix 40 mg oral tablet: 1 tab(s) orally 2 times a day  losartan 50 mg oral tablet: 1 tab(s) orally once a day hold for SBP less than 105mmhg  metoprolol succinate 25 mg oral tablet, extended release: 1 tab(s) orally once a day  nystatin 100,000 units/g topical cream: Apply topically to affected area 2 times a day bilateral groin area x 14 days ( start date 6/2/24)  RisperDAL 1 mg/mL oral solution: 0.5 milliliter(s) orally once a day  senna leaf extract oral tablet: 2 tab(s) orally once a day (at bedtime)  Vitamin A and D topical ointment: Apply topically to affected area once a day Apply to bilateral LE&#x27;s/Feet daily  Vitamin D2 50,000 intl units (1.25 mg) oral capsule (obsolete): 1 cap(s) orally every 2 weeks   apixaban 5 mg oral tablet: 1 tab(s) orally every 12 hours  Crestor 10 mg oral tablet: 1 tab(s) orally once a day  Farxiga 10 mg oral tablet: 1 tab(s) orally once a day  ferrous sulfate 325 mg (65 mg elemental iron) oral delayed release tablet: 1 tab(s) orally 2 times a day  folic acid 1 mg oral tablet: 1 tab(s) orally once a day  Lasix 40 mg oral tablet: 1 tab(s) orally 2 times a day  losartan 50 mg oral tablet: 1 tab(s) orally once a day hold for SBP less than 105mmhg  metoprolol succinate 25 mg oral tablet, extended release: 1 tab(s) orally once a day HOLD for SBP less than 100 and HR less than 60  nystatin 100,000 units/g topical cream: Apply topically to affected area 2 times a day bilateral groin area x 14 days ( start date 6/2/24)  RisperDAL 1 mg/mL oral solution: 0.5 milliliter(s) orally once a day  senna leaf extract oral tablet: 2 tab(s) orally once a day (at bedtime)  Vitamin A and D topical ointment: Apply topically to affected area once a day Apply to bilateral LE&#x27;s/Feet daily  Vitamin D2 50,000 intl units (1.25 mg) oral capsule (obsolete): 1 cap(s) orally every 2 weeks

## 2024-06-10 NOTE — DISCHARGE NOTE PROVIDER - NSDCFUADDAPPT_GEN_ALL_CORE_FT
APPTS ARE READY TO BE MADE: [X] YES    Best Family or Patient Contact (if needed):    Additional Information about above appointments (if needed):    1: Needs follow up with a cardiologist for her obstructive sleep apnea and sinus pauses in 2 weeks (Dr. Razo).  2:   3:     Other comments or requests:

## 2024-06-10 NOTE — PROGRESS NOTE ADULT - ASSESSMENT
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  Patient is sent for evaluation of worsening confusion.  On my exam, patient is alert, oriented x 3, states she had some chills earlier, but otherwise has no acute complaints.  Patient's O2 saturation is noted to be intermittently dropping to mid-80s%, but spontaneously improving to mid-90s%.  Patient states that she does not have COPD, never smoked, but does use oxygen as needed sometimes. No chest pain, shortness of breath, cough, abdominal pain, vomiting, diarrhea.      Acute Hypoxic, Hypercapnic Respiratory Failure, CO2 retention   Respiratory Acidosis ( likely from MANJEET)  - pt has order for BiPAP; However, refuses, encourage use  - pulkennedy white noted    AFib with tachy-tamy syndrome ( refused PPM in the past)   - Echo 3/2024 : EF 60% LA enlargement, mild aortic stenosis   - Lasix 60 in ED with good response   - currently on Lasix 40 mg PO BID   - Monitor I's and O's   - Fluid restriction , non compliant   - Continue Eliquis   - Monitor on tele   - has refused and continues to refuse PPM   - case was discussed with Cardio    Normocytic Anemia, Iron Def Anemia  - Hgb noted  - continue Iron supplement    HTN  Mild AS  - Losartan and Metoprolol succinate with holding parameters     Bipolar disorder, Schizophrenia  - on Risperdal, behavior stab,     DM  - on Farxiga  - now with UTI? asymptomatic, afebrile and normal WBC  - monitor      DVT PPx: Eliquis   GI PPx: None   Diet: DASH  Activity: IAT    Await Palliative Care Eval noted. Patient is a full code despite not wanting any of he above interventions.     PT evaluation in anticipation of returning to SNF.  D/C planning if no further intervention planned

## 2024-06-10 NOTE — DISCHARGE NOTE NURSING/CASE MANAGEMENT/SOCIAL WORK - NSDCVIVACCINE_GEN_ALL_CORE_FT
Tdap; 28-May-2020 05:58; Case Guillen); [employer]; [id number] (Exp. Date: 28-Apr-2022); IntraMuscular; Deltoid Left.; 0.5 milliLiter(s); VIS (VIS Published: 09-May-2013, VIS Presented: 28-May-2020);

## 2024-06-17 DIAGNOSIS — Z79.01 LONG TERM (CURRENT) USE OF ANTICOAGULANTS: ICD-10-CM

## 2024-06-17 DIAGNOSIS — Z79.899 OTHER LONG TERM (CURRENT) DRUG THERAPY: ICD-10-CM

## 2024-06-17 DIAGNOSIS — Z53.20 PROCEDURE AND TREATMENT NOT CARRIED OUT BECAUSE OF PATIENT'S DECISION FOR UNSPECIFIED REASONS: ICD-10-CM

## 2024-06-17 DIAGNOSIS — D50.9 IRON DEFICIENCY ANEMIA, UNSPECIFIED: ICD-10-CM

## 2024-06-17 DIAGNOSIS — N18.32 CHRONIC KIDNEY DISEASE, STAGE 3B: ICD-10-CM

## 2024-06-17 DIAGNOSIS — F25.0 SCHIZOAFFECTIVE DISORDER, BIPOLAR TYPE: ICD-10-CM

## 2024-06-17 DIAGNOSIS — E66.2 MORBID (SEVERE) OBESITY WITH ALVEOLAR HYPOVENTILATION: ICD-10-CM

## 2024-06-17 DIAGNOSIS — I48.91 UNSPECIFIED ATRIAL FIBRILLATION: ICD-10-CM

## 2024-06-17 DIAGNOSIS — I49.5 SICK SINUS SYNDROME: ICD-10-CM

## 2024-06-17 DIAGNOSIS — I35.0 NONRHEUMATIC AORTIC (VALVE) STENOSIS: ICD-10-CM

## 2024-06-17 DIAGNOSIS — I12.9 HYPERTENSIVE CHRONIC KIDNEY DISEASE WITH STAGE 1 THROUGH STAGE 4 CHRONIC KIDNEY DISEASE, OR UNSPECIFIED CHRONIC KIDNEY DISEASE: ICD-10-CM

## 2024-06-17 DIAGNOSIS — J96.02 ACUTE RESPIRATORY FAILURE WITH HYPERCAPNIA: ICD-10-CM

## 2024-06-17 DIAGNOSIS — Z16.12 EXTENDED SPECTRUM BETA LACTAMASE (ESBL) RESISTANCE: ICD-10-CM

## 2024-06-17 DIAGNOSIS — E11.22 TYPE 2 DIABETES MELLITUS WITH DIABETIC CHRONIC KIDNEY DISEASE: ICD-10-CM

## 2024-06-17 DIAGNOSIS — Z91.199 PATIENT'S NONCOMPLIANCE WITH OTHER MEDICAL TREATMENT AND REGIMEN DUE TO UNSPECIFIED REASON: ICD-10-CM

## 2024-06-17 DIAGNOSIS — J96.01 ACUTE RESPIRATORY FAILURE WITH HYPOXIA: ICD-10-CM

## 2024-06-17 DIAGNOSIS — B96.20 UNSPECIFIED ESCHERICHIA COLI [E. COLI] AS THE CAUSE OF DISEASES CLASSIFIED ELSEWHERE: ICD-10-CM

## 2024-06-20 ENCOUNTER — APPOINTMENT (OUTPATIENT)
Dept: OTOLARYNGOLOGY | Facility: CLINIC | Age: 84
End: 2024-06-20

## 2024-07-07 ENCOUNTER — EMERGENCY (EMERGENCY)
Facility: HOSPITAL | Age: 84
LOS: 0 days | Discharge: HOME | End: 2024-07-07
Attending: EMERGENCY MEDICINE
Payer: MEDICARE

## 2024-07-07 VITALS
HEART RATE: 94 BPM | SYSTOLIC BLOOD PRESSURE: 139 MMHG | OXYGEN SATURATION: 98 % | DIASTOLIC BLOOD PRESSURE: 65 MMHG | RESPIRATION RATE: 18 BRPM

## 2024-07-07 VITALS
SYSTOLIC BLOOD PRESSURE: 122 MMHG | WEIGHT: 229.94 LBS | HEIGHT: 63 IN | DIASTOLIC BLOOD PRESSURE: 61 MMHG | OXYGEN SATURATION: 97 % | RESPIRATION RATE: 18 BRPM | HEART RATE: 92 BPM | TEMPERATURE: 98 F

## 2024-07-07 DIAGNOSIS — R06.02 SHORTNESS OF BREATH: ICD-10-CM

## 2024-07-07 DIAGNOSIS — R05.1 ACUTE COUGH: ICD-10-CM

## 2024-07-07 LAB
ALBUMIN SERPL ELPH-MCNC: 3.1 G/DL — LOW (ref 3.5–5.2)
ALP SERPL-CCNC: 91 U/L — SIGNIFICANT CHANGE UP (ref 30–115)
ALT FLD-CCNC: 6 U/L — SIGNIFICANT CHANGE UP (ref 0–41)
ANION GAP SERPL CALC-SCNC: 8 MMOL/L — SIGNIFICANT CHANGE UP (ref 7–14)
AST SERPL-CCNC: 15 U/L — SIGNIFICANT CHANGE UP (ref 0–41)
BASE EXCESS BLDV CALC-SCNC: 13 MMOL/L — HIGH (ref -2–3)
BASOPHILS # BLD AUTO: 0.05 K/UL — SIGNIFICANT CHANGE UP (ref 0–0.2)
BASOPHILS NFR BLD AUTO: 0.6 % — SIGNIFICANT CHANGE UP (ref 0–1)
BILIRUB SERPL-MCNC: 0.3 MG/DL — SIGNIFICANT CHANGE UP (ref 0.2–1.2)
BUN SERPL-MCNC: 30 MG/DL — HIGH (ref 10–20)
CA-I SERPL-SCNC: 1.17 MMOL/L — SIGNIFICANT CHANGE UP (ref 1.15–1.33)
CALCIUM SERPL-MCNC: 9.3 MG/DL — SIGNIFICANT CHANGE UP (ref 8.4–10.5)
CHLORIDE SERPL-SCNC: 99 MMOL/L — SIGNIFICANT CHANGE UP (ref 98–110)
CO2 SERPL-SCNC: 34 MMOL/L — HIGH (ref 17–32)
CREAT SERPL-MCNC: 1.8 MG/DL — HIGH (ref 0.7–1.5)
EGFR: 27 ML/MIN/1.73M2 — LOW
EOSINOPHIL # BLD AUTO: 0.32 K/UL — SIGNIFICANT CHANGE UP (ref 0–0.7)
EOSINOPHIL NFR BLD AUTO: 4.1 % — SIGNIFICANT CHANGE UP (ref 0–8)
GAS PNL BLDV: 136 MMOL/L — SIGNIFICANT CHANGE UP (ref 136–145)
GAS PNL BLDV: SIGNIFICANT CHANGE UP
GLUCOSE SERPL-MCNC: 116 MG/DL — HIGH (ref 70–99)
HCO3 BLDV-SCNC: 42 MMOL/L — HIGH (ref 22–29)
HCT VFR BLD CALC: 34.7 % — LOW (ref 37–47)
HCT VFR BLDA CALC: 53 % — HIGH (ref 34.5–46.5)
HGB BLD CALC-MCNC: 17.8 G/DL — HIGH (ref 11.7–16.1)
HGB BLD-MCNC: 10.2 G/DL — LOW (ref 12–16)
IMM GRANULOCYTES NFR BLD AUTO: 0.1 % — SIGNIFICANT CHANGE UP (ref 0.1–0.3)
LACTATE BLDV-MCNC: 1.4 MMOL/L — SIGNIFICANT CHANGE UP (ref 0.5–2)
LYMPHOCYTES # BLD AUTO: 1.29 K/UL — SIGNIFICANT CHANGE UP (ref 1.2–3.4)
LYMPHOCYTES # BLD AUTO: 16.6 % — LOW (ref 20.5–51.1)
MCHC RBC-ENTMCNC: 27.6 PG — SIGNIFICANT CHANGE UP (ref 27–31)
MCHC RBC-ENTMCNC: 29.4 G/DL — LOW (ref 32–37)
MCV RBC AUTO: 94 FL — SIGNIFICANT CHANGE UP (ref 81–99)
MONOCYTES # BLD AUTO: 0.54 K/UL — SIGNIFICANT CHANGE UP (ref 0.1–0.6)
MONOCYTES NFR BLD AUTO: 6.9 % — SIGNIFICANT CHANGE UP (ref 1.7–9.3)
NEUTROPHILS # BLD AUTO: 5.58 K/UL — SIGNIFICANT CHANGE UP (ref 1.4–6.5)
NEUTROPHILS NFR BLD AUTO: 71.7 % — SIGNIFICANT CHANGE UP (ref 42.2–75.2)
NRBC # BLD: 0 /100 WBCS — SIGNIFICANT CHANGE UP (ref 0–0)
NT-PROBNP SERPL-SCNC: 984 PG/ML — HIGH (ref 0–300)
PCO2 BLDV: 66 MMHG — HIGH (ref 39–42)
PH BLDV: 7.41 — SIGNIFICANT CHANGE UP (ref 7.32–7.43)
PLATELET # BLD AUTO: 311 K/UL — SIGNIFICANT CHANGE UP (ref 130–400)
PMV BLD: 10.1 FL — SIGNIFICANT CHANGE UP (ref 7.4–10.4)
PO2 BLDV: 36 MMHG — SIGNIFICANT CHANGE UP (ref 25–45)
POTASSIUM BLDV-SCNC: 4.5 MMOL/L — SIGNIFICANT CHANGE UP (ref 3.5–5.1)
POTASSIUM SERPL-MCNC: 4.8 MMOL/L — SIGNIFICANT CHANGE UP (ref 3.5–5)
POTASSIUM SERPL-SCNC: 4.8 MMOL/L — SIGNIFICANT CHANGE UP (ref 3.5–5)
PROT SERPL-MCNC: 6.7 G/DL — SIGNIFICANT CHANGE UP (ref 6–8)
RBC # BLD: 3.69 M/UL — LOW (ref 4.2–5.4)
RBC # FLD: 15.9 % — HIGH (ref 11.5–14.5)
SAO2 % BLDV: 54.1 % — LOW (ref 67–88)
SODIUM SERPL-SCNC: 141 MMOL/L — SIGNIFICANT CHANGE UP (ref 135–146)
TROPONIN T, HIGH SENSITIVITY RESULT: 43 NG/L — HIGH (ref 6–13)
TROPONIN T, HIGH SENSITIVITY RESULT: 43 NG/L — HIGH (ref 6–13)
WBC # BLD: 7.79 K/UL — SIGNIFICANT CHANGE UP (ref 4.8–10.8)
WBC # FLD AUTO: 7.79 K/UL — SIGNIFICANT CHANGE UP (ref 4.8–10.8)

## 2024-07-07 PROCEDURE — 85014 HEMATOCRIT: CPT

## 2024-07-07 PROCEDURE — 85018 HEMOGLOBIN: CPT

## 2024-07-07 PROCEDURE — 84132 ASSAY OF SERUM POTASSIUM: CPT

## 2024-07-07 PROCEDURE — 93005 ELECTROCARDIOGRAM TRACING: CPT

## 2024-07-07 PROCEDURE — 71045 X-RAY EXAM CHEST 1 VIEW: CPT | Mod: 26

## 2024-07-07 PROCEDURE — 82330 ASSAY OF CALCIUM: CPT

## 2024-07-07 PROCEDURE — 84484 ASSAY OF TROPONIN QUANT: CPT | Mod: 59

## 2024-07-07 PROCEDURE — 93010 ELECTROCARDIOGRAM REPORT: CPT | Mod: 76

## 2024-07-07 PROCEDURE — 36000 PLACE NEEDLE IN VEIN: CPT

## 2024-07-07 PROCEDURE — 99285 EMERGENCY DEPT VISIT HI MDM: CPT | Mod: GC

## 2024-07-07 PROCEDURE — 71045 X-RAY EXAM CHEST 1 VIEW: CPT

## 2024-07-07 PROCEDURE — 83605 ASSAY OF LACTIC ACID: CPT

## 2024-07-07 PROCEDURE — 99285 EMERGENCY DEPT VISIT HI MDM: CPT | Mod: 25

## 2024-07-07 PROCEDURE — 85025 COMPLETE CBC W/AUTO DIFF WBC: CPT

## 2024-07-07 PROCEDURE — 84295 ASSAY OF SERUM SODIUM: CPT

## 2024-07-07 PROCEDURE — 83880 ASSAY OF NATRIURETIC PEPTIDE: CPT

## 2024-07-07 PROCEDURE — 36415 COLL VENOUS BLD VENIPUNCTURE: CPT

## 2024-07-07 PROCEDURE — 80053 COMPREHEN METABOLIC PANEL: CPT

## 2024-07-07 PROCEDURE — 82803 BLOOD GASES ANY COMBINATION: CPT

## 2024-07-07 RX ORDER — AMOXICILLIN/POTASSIUM CLAV 250-125 MG
875 TABLET ORAL
Qty: 10 | Refills: 0
Start: 2024-07-07 | End: 2024-07-11

## 2024-08-19 ENCOUNTER — APPOINTMENT (OUTPATIENT)
Dept: OTOLARYNGOLOGY | Facility: CLINIC | Age: 84
End: 2024-08-19

## 2024-08-30 NOTE — ED ADULT TRIAGE NOTE - BEFAST SPEECH PHRASE
Morning assessment complete. Medications given per MAR. VS stable. A&O. Pt denies pain at this time. Patient clean and dry. Ambulates independently in room. Tele monitor on. Breakfast tray set up. Bed side table, call light and belongings within reach. Bed locked and in lowest position. All questions and concerns addressed, no further needs at this time.      Yes

## 2024-09-06 ENCOUNTER — APPOINTMENT (OUTPATIENT)
Dept: GASTROENTEROLOGY | Facility: CLINIC | Age: 84
End: 2024-09-06
Payer: MEDICARE

## 2024-09-06 DIAGNOSIS — Z86.39 PERSONAL HISTORY OF OTHER ENDOCRINE, NUTRITIONAL AND METABOLIC DISEASE: ICD-10-CM

## 2024-09-06 DIAGNOSIS — E53.8 DEFICIENCY OF OTHER SPECIFIED B GROUP VITAMINS: ICD-10-CM

## 2024-09-06 DIAGNOSIS — D64.9 ANEMIA, UNSPECIFIED: ICD-10-CM

## 2024-09-06 DIAGNOSIS — Z86.79 PERSONAL HISTORY OF OTHER DISEASES OF THE CIRCULATORY SYSTEM: ICD-10-CM

## 2024-09-06 PROCEDURE — 99213 OFFICE O/P EST LOW 20 MIN: CPT

## 2024-09-06 NOTE — PHYSICAL EXAM

## 2024-09-06 NOTE — PHYSICAL EXAM
[Alert] : alert [Normal Voice/Communication] : normal voice/communication [Healthy Appearing] : healthy appearing [No Acute Distress] : no acute distress [Sclera] : the sclera and conjunctiva were normal [Hearing Threshold Finger Rub Not Sebastian] : hearing was normal [Normal Lips/Gums] : the lips and gums were normal [Oropharynx] : the oropharynx was normal [Normal Appearance] : the appearance of the neck was normal [No Neck Mass] : no neck mass was observed [No Respiratory Distress] : no respiratory distress [No Acc Muscle Use] : no accessory muscle use [Respiration, Rhythm And Depth] : normal respiratory rhythm and effort [Auscultation Breath Sounds / Voice Sounds] : lungs were clear to auscultation bilaterally [Heart Rate And Rhythm] : heart rate was normal and rhythm regular [Normal S1, S2] : normal S1 and S2 [Murmurs] : no murmurs [Bowel Sounds] : normal bowel sounds [Abdomen Tenderness] : non-tender [No Masses] : no abdominal mass palpated [Abdomen Soft] : soft [] : no hepatosplenomegaly [Oriented To Time, Place, And Person] : oriented to person, place, and time

## 2024-09-07 NOTE — HISTORY OF PRESENT ILLNESS
[FreeTextEntry1] : 84 yo F [facility] resident w/ DM, HTN, LE edema, bipolar disorder, tach tamy syndrome on Eliquis (declining PPM), mild atrial stenosis, presents today for eval of anemia.   Patient was found to be anemic during recent ED visit for SOB.  Otherwise, patient denies any other GI complaints and has no abd pain, no nausea or vomiting, no dysphagia or odynophagia, no heartburn, no unintentional weight loss or appetite changes, no diarrhea, no constipation or changes in BMs including hematochezia or melena, no coffee ground emesis or hematemesis. ROS otherwise negative.   As per patient and aide, patient has never had an EGD/ colonoscopy. Patient makes decisions for herself, with the help of her son and daughter. Spoke to son Joaquín   7/7 Labs Reviewed Hgb: 10.2 Hct: 34.7 GFR: 27 LFTS WNL  ----------------------------------------------- PMHx: peripheral venous insufficiency, HTN, Edema, DM Type II, Schizoaffective disorder, basal cell carcinoma of face, Vitamin B 12 folate deficiency, depression, obesity, hypothyroidism,   PSHx:   Fam hx: none  Social hx: resident at [facility]  Meds: see list  Allergies: none ----------------------------------------------- Prior endoscopic evaluation: None

## 2024-09-07 NOTE — ASSESSMENT
[FreeTextEntry1] : 82 yo F [facility] resident w/ DM, HTN, LE edema, bipolar disorder, tach tamy syndrome on Eliquis (declining PPM), mild atrial stenosis, presents today for eval of anemia.    #MANNY -Discussed EGD and colonoscopy -risks vs benefits discussed  Patient adamantly refusing of endoscopic evaluation.  Explained to her risks and benefits of missed pathology including malignancy and would still like to avoid endoscopic evaluation with understanding the risks. After patient's permission to discuss with family, called patient's son Joaquín who advised to call the patient's son-in-law to update the family about the patient's decisions. Discussed with patient's son-in-law who was agreeable to respecting the patient's wishes and holding off endoscopic evaluation.  Will reconsider if the patient's status changes or if she has any objective evidence of bleeding.  -check serial CBC at NH -transfuse PRN to keep Hb > 7 -iron supp -monitor for objective evidence of bleeding f/u PRN.
[FreeTextEntry1] : 84 yo F [facility] resident w/ DM, HTN, LE edema, bipolar disorder, tach tamy syndrome on Eliquis (declining PPM), mild atrial stenosis, presents today for eval of anemia.    #MANNY -Discussed EGD and colonoscopy -risks vs benefits discussed  Patient adamantly refusing of endoscopic evaluation.  Explained to her risks and benefits of missed pathology including malignancy and would still like to avoid endoscopic evaluation with understanding the risks. After patient's permission to discuss with family, called patient's son Joaquín who advised to call the patient's son-in-law to update the family about the patient's decisions. Discussed with patient's son-in-law who was agreeable to respecting the patient's wishes and holding off endoscopic evaluation.  Will reconsider if the patient's status changes or if she has any objective evidence of bleeding.  -check serial CBC at NH -transfuse PRN to keep Hb > 7 -iron supp -monitor for objective evidence of bleeding f/u PRN.
12-Jun-2022 23:50

## 2024-09-07 NOTE — END OF VISIT
[Time Spent: ___ minutes] : I have spent [unfilled] minutes of time on the encounter which excludes teaching and separately reported services.
[Time Spent: ___ minutes] : I have spent [unfilled] minutes of time on the encounter which excludes teaching and separately reported services.
stated

## 2024-09-07 NOTE — HISTORY OF PRESENT ILLNESS
[FreeTextEntry1] : 84 yo F [facility] resident w/ DM, HTN, LE edema, bipolar disorder, tach tamy syndrome on Eliquis (declining PPM), mild atrial stenosis, presents today for eval of anemia.   Patient was found to be anemic during recent ED visit for SOB.  Otherwise, patient denies any other GI complaints and has no abd pain, no nausea or vomiting, no dysphagia or odynophagia, no heartburn, no unintentional weight loss or appetite changes, no diarrhea, no constipation or changes in BMs including hematochezia or melena, no coffee ground emesis or hematemesis. ROS otherwise negative.   As per patient and aide, patient has never had an EGD/ colonoscopy. Patient makes decisions for herself, with the help of her son and daughter. Spoke to son Joaquín   7/7 Labs Reviewed Hgb: 10.2 Hct: 34.7 GFR: 27 LFTS WNL  ----------------------------------------------- PMHx: peripheral venous insufficiency, HTN, Edema, DM Type II, Schizoaffective disorder, basal cell carcinoma of face, Vitamin B 12 folate deficiency, depression, obesity, hypothyroidism,   PSHx:   Fam hx: none  Social hx: resident at [facility]  Meds: see list  Allergies: none ----------------------------------------------- Prior endoscopic evaluation: None

## 2024-10-01 NOTE — ED PROVIDER NOTE - OBJECTIVE STATEMENT
The patient is a 80 year old female with a history of HTN, DM, schizoaffective disorder presenting for foot pain. Per son, this morning patient was screaming, "Call 911, my foot hurts." Son states he would like patient to be evaluated by podiatry and psychiatry and states patient has not been taking her psych meds. In ED, patient states her lower extremities have been swollen for a long time and she is not having difficulty ambulating at home and is able to take care of herself. Pt had negative DVT study in 9/20 and was evaluated by podiatry in 7/20 but did not follow up with outpatient podiatry. Patient denies SI/HI/Auditory or visual hallucinations. Also states she bumped her left ear which began bleeding over the area where she has basal cell carcinoma. Statement Selected

## 2024-10-28 ENCOUNTER — APPOINTMENT (OUTPATIENT)
Dept: ELECTROPHYSIOLOGY | Facility: CLINIC | Age: 84
End: 2024-10-28

## 2024-12-24 ENCOUNTER — INPATIENT (INPATIENT)
Facility: HOSPITAL | Age: 84
LOS: 12 days | Discharge: HOME | DRG: 605 | End: 2025-01-06
Attending: INTERNAL MEDICINE | Admitting: INTERNAL MEDICINE
Payer: MEDICARE

## 2024-12-24 VITALS
DIASTOLIC BLOOD PRESSURE: 65 MMHG | TEMPERATURE: 98 F | OXYGEN SATURATION: 97 % | HEART RATE: 85 BPM | WEIGHT: 250 LBS | SYSTOLIC BLOOD PRESSURE: 96 MMHG | RESPIRATION RATE: 18 BRPM

## 2024-12-24 DIAGNOSIS — S80.12XA CONTUSION OF LEFT LOWER LEG, INITIAL ENCOUNTER: ICD-10-CM

## 2024-12-24 LAB
ALBUMIN SERPL ELPH-MCNC: 3.1 G/DL — LOW (ref 3.5–5.2)
ALP SERPL-CCNC: 88 U/L — SIGNIFICANT CHANGE UP (ref 30–115)
ALT FLD-CCNC: 13 U/L — SIGNIFICANT CHANGE UP (ref 0–41)
ANION GAP SERPL CALC-SCNC: 9 MMOL/L — SIGNIFICANT CHANGE UP (ref 7–14)
APTT BLD: 33.1 SEC — SIGNIFICANT CHANGE UP (ref 27–39.2)
AST SERPL-CCNC: 14 U/L — SIGNIFICANT CHANGE UP (ref 0–41)
BASOPHILS # BLD AUTO: 0.04 K/UL — SIGNIFICANT CHANGE UP (ref 0–0.2)
BASOPHILS # BLD AUTO: 0.04 K/UL — SIGNIFICANT CHANGE UP (ref 0–0.2)
BASOPHILS NFR BLD AUTO: 0.3 % — SIGNIFICANT CHANGE UP (ref 0–1)
BASOPHILS NFR BLD AUTO: 0.3 % — SIGNIFICANT CHANGE UP (ref 0–1)
BILIRUB SERPL-MCNC: 0.3 MG/DL — SIGNIFICANT CHANGE UP (ref 0.2–1.2)
BLD GP AB SCN SERPL QL: SIGNIFICANT CHANGE UP
BUN SERPL-MCNC: 52 MG/DL — HIGH (ref 10–20)
CALCIUM SERPL-MCNC: 10 MG/DL — SIGNIFICANT CHANGE UP (ref 8.4–10.5)
CHLORIDE SERPL-SCNC: 99 MMOL/L — SIGNIFICANT CHANGE UP (ref 98–110)
CO2 SERPL-SCNC: 37 MMOL/L — HIGH (ref 17–32)
CREAT SERPL-MCNC: 1.5 MG/DL — SIGNIFICANT CHANGE UP (ref 0.7–1.5)
EGFR: 34 ML/MIN/1.73M2 — LOW
EOSINOPHIL # BLD AUTO: 0 K/UL — SIGNIFICANT CHANGE UP (ref 0–0.7)
EOSINOPHIL # BLD AUTO: 0.01 K/UL — SIGNIFICANT CHANGE UP (ref 0–0.7)
EOSINOPHIL NFR BLD AUTO: 0 % — SIGNIFICANT CHANGE UP (ref 0–8)
EOSINOPHIL NFR BLD AUTO: 0.1 % — SIGNIFICANT CHANGE UP (ref 0–8)
GLUCOSE SERPL-MCNC: 140 MG/DL — HIGH (ref 70–99)
HCT VFR BLD CALC: 23.4 % — LOW (ref 37–47)
HCT VFR BLD CALC: 26.2 % — LOW (ref 37–47)
HGB BLD-MCNC: 6.7 G/DL — CRITICAL LOW (ref 12–16)
HGB BLD-MCNC: 7.9 G/DL — LOW (ref 12–16)
HYPOCHROMIA BLD QL: SLIGHT — SIGNIFICANT CHANGE UP
IMM GRANULOCYTES NFR BLD AUTO: 0.4 % — HIGH (ref 0.1–0.3)
IMM GRANULOCYTES NFR BLD AUTO: 0.5 % — HIGH (ref 0.1–0.3)
INR BLD: 1.55 RATIO — HIGH (ref 0.65–1.3)
LACTATE SERPL-SCNC: 1.3 MMOL/L — SIGNIFICANT CHANGE UP (ref 0.7–2)
LACTATE SERPL-SCNC: 1.4 MMOL/L — SIGNIFICANT CHANGE UP (ref 0.7–2)
LIDOCAIN IGE QN: 33 U/L — SIGNIFICANT CHANGE UP (ref 7–60)
LYMPHOCYTES # BLD AUTO: 0.54 K/UL — LOW (ref 1.2–3.4)
LYMPHOCYTES # BLD AUTO: 0.81 K/UL — LOW (ref 1.2–3.4)
LYMPHOCYTES # BLD AUTO: 3.6 % — LOW (ref 20.5–51.1)
LYMPHOCYTES # BLD AUTO: 5.7 % — LOW (ref 20.5–51.1)
MACROCYTES BLD QL: SLIGHT — SIGNIFICANT CHANGE UP
MANUAL SMEAR VERIFICATION: SIGNIFICANT CHANGE UP
MCHC RBC-ENTMCNC: 27.2 PG — SIGNIFICANT CHANGE UP (ref 27–31)
MCHC RBC-ENTMCNC: 28.1 PG — SIGNIFICANT CHANGE UP (ref 27–31)
MCHC RBC-ENTMCNC: 28.6 G/DL — LOW (ref 32–37)
MCHC RBC-ENTMCNC: 30.2 G/DL — LOW (ref 32–37)
MCV RBC AUTO: 93.2 FL — SIGNIFICANT CHANGE UP (ref 81–99)
MCV RBC AUTO: 95.1 FL — SIGNIFICANT CHANGE UP (ref 81–99)
MONOCYTES # BLD AUTO: 1.06 K/UL — HIGH (ref 0.1–0.6)
MONOCYTES # BLD AUTO: 1.48 K/UL — HIGH (ref 0.1–0.6)
MONOCYTES NFR BLD AUTO: 10.5 % — HIGH (ref 1.7–9.3)
MONOCYTES NFR BLD AUTO: 7.1 % — SIGNIFICANT CHANGE UP (ref 1.7–9.3)
NEUTROPHILS # BLD AUTO: 11.71 K/UL — HIGH (ref 1.4–6.5)
NEUTROPHILS # BLD AUTO: 13.21 K/UL — HIGH (ref 1.4–6.5)
NEUTROPHILS NFR BLD AUTO: 82.9 % — HIGH (ref 42.2–75.2)
NEUTROPHILS NFR BLD AUTO: 88.6 % — HIGH (ref 42.2–75.2)
NRBC # BLD: 0 /100 WBCS — SIGNIFICANT CHANGE UP (ref 0–0)
NRBC # BLD: 0 /100 WBCS — SIGNIFICANT CHANGE UP (ref 0–0)
PLAT MORPH BLD: NORMAL — SIGNIFICANT CHANGE UP
PLATELET # BLD AUTO: 348 K/UL — SIGNIFICANT CHANGE UP (ref 130–400)
PLATELET # BLD AUTO: 410 K/UL — HIGH (ref 130–400)
PLATELET COUNT - ESTIMATE: ABNORMAL
PMV BLD: 9.2 FL — SIGNIFICANT CHANGE UP (ref 7.4–10.4)
PMV BLD: 9.3 FL — SIGNIFICANT CHANGE UP (ref 7.4–10.4)
POTASSIUM SERPL-MCNC: 4.6 MMOL/L — SIGNIFICANT CHANGE UP (ref 3.5–5)
POTASSIUM SERPL-SCNC: 4.6 MMOL/L — SIGNIFICANT CHANGE UP (ref 3.5–5)
PROT SERPL-MCNC: 6.6 G/DL — SIGNIFICANT CHANGE UP (ref 6–8)
PROTHROM AB SERPL-ACNC: 18.5 SEC — HIGH (ref 9.95–12.87)
RBC # BLD: 2.46 M/UL — LOW (ref 4.2–5.4)
RBC # BLD: 2.81 M/UL — LOW (ref 4.2–5.4)
RBC # FLD: 14.9 % — HIGH (ref 11.5–14.5)
RBC # FLD: 14.9 % — HIGH (ref 11.5–14.5)
RBC BLD AUTO: ABNORMAL
SODIUM SERPL-SCNC: 145 MMOL/L — SIGNIFICANT CHANGE UP (ref 135–146)
WBC # BLD: 14.12 K/UL — HIGH (ref 4.8–10.8)
WBC # BLD: 14.91 K/UL — HIGH (ref 4.8–10.8)
WBC # FLD AUTO: 14.12 K/UL — HIGH (ref 4.8–10.8)
WBC # FLD AUTO: 14.91 K/UL — HIGH (ref 4.8–10.8)

## 2024-12-24 PROCEDURE — 92526 ORAL FUNCTION THERAPY: CPT | Mod: GN

## 2024-12-24 PROCEDURE — 82728 ASSAY OF FERRITIN: CPT

## 2024-12-24 PROCEDURE — 85025 COMPLETE CBC W/AUTO DIFF WBC: CPT

## 2024-12-24 PROCEDURE — 87186 SC STD MICRODIL/AGAR DIL: CPT

## 2024-12-24 PROCEDURE — 82962 GLUCOSE BLOOD TEST: CPT

## 2024-12-24 PROCEDURE — 87077 CULTURE AEROBIC IDENTIFY: CPT

## 2024-12-24 PROCEDURE — 84132 ASSAY OF SERUM POTASSIUM: CPT

## 2024-12-24 PROCEDURE — 99285 EMERGENCY DEPT VISIT HI MDM: CPT | Mod: FS

## 2024-12-24 PROCEDURE — 93306 TTE W/DOPPLER COMPLETE: CPT

## 2024-12-24 PROCEDURE — 71260 CT THORAX DX C+: CPT | Mod: 26,MC

## 2024-12-24 PROCEDURE — 83540 ASSAY OF IRON: CPT

## 2024-12-24 PROCEDURE — 87070 CULTURE OTHR SPECIMN AEROBIC: CPT

## 2024-12-24 PROCEDURE — 86850 RBC ANTIBODY SCREEN: CPT

## 2024-12-24 PROCEDURE — 97162 PT EVAL MOD COMPLEX 30 MIN: CPT | Mod: GP

## 2024-12-24 PROCEDURE — 83550 IRON BINDING TEST: CPT

## 2024-12-24 PROCEDURE — 85027 COMPLETE CBC AUTOMATED: CPT

## 2024-12-24 PROCEDURE — 83735 ASSAY OF MAGNESIUM: CPT

## 2024-12-24 PROCEDURE — 93005 ELECTROCARDIOGRAM TRACING: CPT

## 2024-12-24 PROCEDURE — 73706 CT ANGIO LWR EXTR W/O&W/DYE: CPT | Mod: 26,LT,MC

## 2024-12-24 PROCEDURE — 84295 ASSAY OF SERUM SODIUM: CPT

## 2024-12-24 PROCEDURE — 84100 ASSAY OF PHOSPHORUS: CPT

## 2024-12-24 PROCEDURE — 82746 ASSAY OF FOLIC ACID SERUM: CPT

## 2024-12-24 PROCEDURE — 71045 X-RAY EXAM CHEST 1 VIEW: CPT | Mod: 26

## 2024-12-24 PROCEDURE — 70450 CT HEAD/BRAIN W/O DYE: CPT | Mod: MC

## 2024-12-24 PROCEDURE — 70450 CT HEAD/BRAIN W/O DYE: CPT | Mod: 26,MC

## 2024-12-24 PROCEDURE — 82803 BLOOD GASES ANY COMBINATION: CPT

## 2024-12-24 PROCEDURE — 86901 BLOOD TYPING SEROLOGIC RH(D): CPT

## 2024-12-24 PROCEDURE — 76882 US LMTD JT/FCL EVL NVASC XTR: CPT | Mod: RT

## 2024-12-24 PROCEDURE — 81003 URINALYSIS AUTO W/O SCOPE: CPT

## 2024-12-24 PROCEDURE — 71045 X-RAY EXAM CHEST 1 VIEW: CPT

## 2024-12-24 PROCEDURE — 73590 X-RAY EXAM OF LOWER LEG: CPT | Mod: 26,LT

## 2024-12-24 PROCEDURE — 83036 HEMOGLOBIN GLYCOSYLATED A1C: CPT

## 2024-12-24 PROCEDURE — 99223 1ST HOSP IP/OBS HIGH 75: CPT

## 2024-12-24 PROCEDURE — 94660 CPAP INITIATION&MGMT: CPT

## 2024-12-24 PROCEDURE — 86900 BLOOD TYPING SEROLOGIC ABO: CPT

## 2024-12-24 PROCEDURE — 85014 HEMATOCRIT: CPT

## 2024-12-24 PROCEDURE — 80048 BASIC METABOLIC PNL TOTAL CA: CPT

## 2024-12-24 PROCEDURE — 83880 ASSAY OF NATRIURETIC PEPTIDE: CPT

## 2024-12-24 PROCEDURE — 72170 X-RAY EXAM OF PELVIS: CPT | Mod: 26

## 2024-12-24 PROCEDURE — 85018 HEMOGLOBIN: CPT

## 2024-12-24 PROCEDURE — 83605 ASSAY OF LACTIC ACID: CPT

## 2024-12-24 PROCEDURE — 74177 CT ABD & PELVIS W/CONTRAST: CPT | Mod: 26,MC

## 2024-12-24 PROCEDURE — 80053 COMPREHEN METABOLIC PANEL: CPT

## 2024-12-24 PROCEDURE — 82330 ASSAY OF CALCIUM: CPT

## 2024-12-24 PROCEDURE — 85379 FIBRIN DEGRADATION QUANT: CPT

## 2024-12-24 PROCEDURE — 72125 CT NECK SPINE W/O DYE: CPT | Mod: 26,MC

## 2024-12-24 PROCEDURE — 36415 COLL VENOUS BLD VENIPUNCTURE: CPT

## 2024-12-24 PROCEDURE — 82607 VITAMIN B-12: CPT

## 2024-12-24 RX ORDER — FERROUS SULFATE 325(65) MG
325 TABLET ORAL DAILY
Refills: 0 | Status: DISCONTINUED | OUTPATIENT
Start: 2024-12-24 | End: 2025-01-06

## 2024-12-24 RX ORDER — METOPROLOL TARTRATE 50 MG
25 TABLET ORAL DAILY
Refills: 0 | Status: DISCONTINUED | OUTPATIENT
Start: 2024-12-24 | End: 2024-12-27

## 2024-12-24 RX ORDER — VANCOMYCIN HYDROCHLORIDE 5 G/100ML
1500 INJECTION, POWDER, LYOPHILIZED, FOR SOLUTION INTRAVENOUS ONCE
Refills: 0 | Status: COMPLETED | OUTPATIENT
Start: 2024-12-24 | End: 2024-12-24

## 2024-12-24 RX ORDER — VITAMIN A 10000 UNIT
1 TABLET ORAL DAILY
Refills: 0 | Status: DISCONTINUED | OUTPATIENT
Start: 2024-12-24 | End: 2025-01-06

## 2024-12-24 RX ORDER — SODIUM CHLORIDE 9 MG/ML
1000 INJECTION, SOLUTION INTRAVENOUS
Refills: 0 | Status: DISCONTINUED | OUTPATIENT
Start: 2024-12-24 | End: 2025-01-06

## 2024-12-24 RX ORDER — FUROSEMIDE 20 MG
40 TABLET ORAL DAILY
Refills: 0 | Status: DISCONTINUED | OUTPATIENT
Start: 2024-12-24 | End: 2024-12-28

## 2024-12-24 RX ORDER — SENNOSIDES 8.6 MG/1
2 TABLET, FILM COATED ORAL AT BEDTIME
Refills: 0 | Status: DISCONTINUED | OUTPATIENT
Start: 2024-12-24 | End: 2025-01-06

## 2024-12-24 RX ORDER — AZTREONAM 1 G/1
500 INJECTION, POWDER, LYOPHILIZED, FOR SOLUTION INTRAMUSCULAR; INTRAVENOUS ONCE
Refills: 0 | Status: COMPLETED | OUTPATIENT
Start: 2024-12-24 | End: 2024-12-24

## 2024-12-24 RX ORDER — METOPROLOL TARTRATE 50 MG
25 TABLET ORAL DAILY
Refills: 0 | Status: DISCONTINUED | OUTPATIENT
Start: 2024-12-24 | End: 2024-12-24

## 2024-12-24 RX ORDER — GLUCAGON INJECTION, SOLUTION 0.5 MG/.1ML
1 INJECTION, SOLUTION SUBCUTANEOUS ONCE
Refills: 0 | Status: DISCONTINUED | OUTPATIENT
Start: 2024-12-24 | End: 2025-01-06

## 2024-12-24 RX ORDER — PANTOPRAZOLE 40 MG/1
40 TABLET, DELAYED RELEASE ORAL
Refills: 0 | Status: DISCONTINUED | OUTPATIENT
Start: 2024-12-24 | End: 2025-01-06

## 2024-12-24 RX ORDER — DEXTROSE MONOHYDRATE 25 G/50ML
25 INJECTION, SOLUTION INTRAVENOUS ONCE
Refills: 0 | Status: DISCONTINUED | OUTPATIENT
Start: 2024-12-24 | End: 2025-01-06

## 2024-12-24 RX ORDER — RISPERIDONE 0.5 MG/1
1 TABLET ORAL DAILY
Refills: 0 | Status: DISCONTINUED | OUTPATIENT
Start: 2024-12-24 | End: 2025-01-06

## 2024-12-24 RX ORDER — LANOLIN AND PETROLATUM 136.4; 469.9 MG/G; MG/G
1 OINTMENT TOPICAL DAILY
Refills: 0 | Status: DISCONTINUED | OUTPATIENT
Start: 2024-12-24 | End: 2025-01-06

## 2024-12-24 RX ORDER — ACETAMINOPHEN 80 MG/.8ML
650 SOLUTION/ DROPS ORAL EVERY 6 HOURS
Refills: 0 | Status: DISCONTINUED | OUTPATIENT
Start: 2024-12-24 | End: 2025-01-06

## 2024-12-24 RX ORDER — INSULIN LISPRO 100/ML
VIAL (ML) SUBCUTANEOUS
Refills: 0 | Status: DISCONTINUED | OUTPATIENT
Start: 2024-12-24 | End: 2025-01-06

## 2024-12-24 RX ORDER — LOSARTAN POTASSIUM 100 MG/1
50 TABLET, FILM COATED ORAL DAILY
Refills: 0 | Status: DISCONTINUED | OUTPATIENT
Start: 2024-12-24 | End: 2024-12-27

## 2024-12-24 RX ORDER — CHLORHEXIDINE GLUCONATE 1.2 MG/ML
1 RINSE ORAL
Refills: 0 | Status: DISCONTINUED | OUTPATIENT
Start: 2024-12-24 | End: 2025-01-06

## 2024-12-24 RX ORDER — LOSARTAN POTASSIUM 100 MG/1
50 TABLET, FILM COATED ORAL DAILY
Refills: 0 | Status: DISCONTINUED | OUTPATIENT
Start: 2024-12-24 | End: 2024-12-24

## 2024-12-24 RX ORDER — DEXTROSE MONOHYDRATE 25 G/50ML
15 INJECTION, SOLUTION INTRAVENOUS ONCE
Refills: 0 | Status: DISCONTINUED | OUTPATIENT
Start: 2024-12-24 | End: 2025-01-06

## 2024-12-24 RX ORDER — ROSUVASTATIN 40 MG/1
10 TABLET, FILM COATED ORAL AT BEDTIME
Refills: 0 | Status: DISCONTINUED | OUTPATIENT
Start: 2024-12-24 | End: 2025-01-06

## 2024-12-24 RX ORDER — CEFAZOLIN SODIUM 1 G
1000 VIAL (EA) INJECTION EVERY 12 HOURS
Refills: 0 | Status: DISCONTINUED | OUTPATIENT
Start: 2024-12-24 | End: 2024-12-27

## 2024-12-24 RX ORDER — DEXTROSE MONOHYDRATE 25 G/50ML
12.5 INJECTION, SOLUTION INTRAVENOUS ONCE
Refills: 0 | Status: DISCONTINUED | OUTPATIENT
Start: 2024-12-24 | End: 2025-01-06

## 2024-12-24 RX ORDER — SILVER SULFADIAZINE 10 MG/G
1 CREAM TOPICAL DAILY
Refills: 0 | Status: DISCONTINUED | OUTPATIENT
Start: 2024-12-24 | End: 2024-12-31

## 2024-12-24 RX ORDER — METRONIDAZOLE 250 MG/1
500 TABLET ORAL ONCE
Refills: 0 | Status: COMPLETED | OUTPATIENT
Start: 2024-12-24 | End: 2024-12-24

## 2024-12-24 RX ORDER — INFLUENZA A VIRUS A/WISCONSIN/588/2019 (H1N1) RECOMBINANT HEMAGGLUTININ ANTIGEN, INFLUENZA A VIRUS A/DARWIN/6/2021 (H3N2) RECOMBINANT HEMAGGLUTININ ANTIGEN, INFLUENZA B VIRUS B/AUSTRIA/1359417/2021 RECOMBINANT HEMAGGLUTININ ANTIGEN, AND INFLUENZA B VIRUS B/PHUKET/3073/2013 RECOMBINANT HEMAGGLUTININ ANTIGEN 45; 45; 45; 45 UG/.5ML; UG/.5ML; UG/.5ML; UG/.5ML
0.5 INJECTION INTRAMUSCULAR ONCE
Refills: 0 | Status: DISCONTINUED | OUTPATIENT
Start: 2024-12-24 | End: 2025-01-06

## 2024-12-24 RX ORDER — SILVER SULFADIAZINE 10 MG/G
1 CREAM TOPICAL
Refills: 0 | DISCHARGE

## 2024-12-24 RX ADMIN — ROSUVASTATIN 10 MILLIGRAM(S): 40 TABLET, FILM COATED ORAL at 22:16

## 2024-12-24 RX ADMIN — Medication 1 MILLIGRAM(S): at 22:17

## 2024-12-24 RX ADMIN — Medication 100 MILLIGRAM(S): at 22:33

## 2024-12-24 RX ADMIN — VANCOMYCIN HYDROCHLORIDE 333.33 MILLIGRAM(S): 5 INJECTION, POWDER, LYOPHILIZED, FOR SOLUTION INTRAVENOUS at 14:44

## 2024-12-24 RX ADMIN — RISPERIDONE 1 MILLIGRAM(S): 0.5 TABLET ORAL at 22:18

## 2024-12-24 RX ADMIN — METRONIDAZOLE 100 MILLIGRAM(S): 250 TABLET ORAL at 12:56

## 2024-12-24 RX ADMIN — Medication 325 MILLIGRAM(S): at 22:17

## 2024-12-24 RX ADMIN — AZTREONAM 50 MILLIGRAM(S): 1 INJECTION, POWDER, LYOPHILIZED, FOR SOLUTION INTRAMUSCULAR; INTRAVENOUS at 13:53

## 2024-12-24 RX ADMIN — Medication 40 MILLIGRAM(S): at 22:16

## 2024-12-24 RX ADMIN — SENNOSIDES 2 TABLET(S): 8.6 TABLET, FILM COATED ORAL at 22:17

## 2024-12-24 NOTE — ED PROVIDER NOTE - CLINICAL SUMMARY MEDICAL DECISION MAKING FREE TEXT BOX
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  No fever, chills, n/v, cp,  pleuritic cp, sob, palpitations, diaphoresis, cough, chest wall/rib pain, clavicular pain, ankle pain, knee pain, shoulder pain, wrist pain, no back pain, no hip pain, no ha/lh/dizziness, numbness/tingling, neck pain/ stiffness, abd pain,  melena/brbpr, urinary symptoms,  sick contacts, recent travel . GCS 15.    On Exam:  Vital Signs: I have reviewed the initial vital signs.  Constitutional: Non toxic appearing pt laying on stretcher in nad.   HEAD: No signs of basilar skull fracture.  Integumentary: (+) LLE erythema  with mild streaking, (+) warmth to palpation, no crepitus, induration, fluctuance, no discharge, no abscess, (+) soft compartments. LLE hematoma ~ 9 x 5  cm with active bleeding. Controlled with pressure and surgacel.   EYES: No periorbital swelling/ecchymoses. EOM intact. No nystagmus. No subconjunctival hemorrhage.   ENT: MMM. No rhinorrhea/otorrhea. No epistaxis,  No septal hematoma. No mastoid ecchymoses. No intraoral bleeding, No loose or cracked teeth, no active bleeding. No malocclusion. No TMJ pain.  NECK: Supple, non-tender, No palpable shelves or step-offs.  BACK: No spinous tenderness. No palpable shelves or step-offs.  Cardiovascular: Afib, radial pulses 2/4 b/l. dp and pt pulses 2/4/ b/l. No pain to palpation to chest wall.  Respiratory: BS present b/l, ctabl, no wheezing or crackles, no stridor. No pain to palpation to ribs b/l. No crepitus. No subq emphysema.   Gastrointestinal: BS present throughout all 4 quadrants, soft, nd, nt. no r/g.  Musculoskeletal: Pt with edema to b/l LE- 2 pitting edema. No bony tenderness. No pain to palpation to clavicles, no obvious bony deformities. No hip pain. No short leg. No internal or external rotation of LE  Neurologic: GCS 15. CN II-XII intact, no facial droop or slurring of speech. Motor 5/5 and sensation intact throughout upper and lower extremities.     Plan: Monitor, EKG, CXR, labs, imaging including of LLE, abx, reassess.    Labs and EKG were ordered and reviewed.  Imaging was ordered and reviewed by me.  Appropriate medications for patient's presenting complaints were ordered and effects were reassessed.  Patient's records (prior hospital, ED visit, and nursing home notes if available) were reviewed.  Additional history was obtained from EMS, family.

## 2024-12-24 NOTE — H&P ADULT - NSHPPHYSICALEXAM_GEN_ALL_CORE
T(C): 36.4 (12-24-24 @ 18:15), Max: 36.7 (12-24-24 @ 11:16)  HR: 80 (12-24-24 @ 17:55) (68 - 85)  BP: 101/60 (12-24-24 @ 18:15) (90/55 - 121/88)  RR: 18 (12-24-24 @ 18:15) (16 - 18)  SpO2: 98% (12-24-24 @ 18:15) (97% - 99%)    PHYSICAL EXAM:  CONSTITUTIONAL: laying on stretcher; in no acute distress.  HEAD: Normocephalic; atraumatic.  EYES: PERRL, EOM intact; conjunctiva and sclera clear.  NECK: Supple; non tender.  CARD: S1, S2 normal; no murmurs, gallops, or rubs. Regular rate and rhythm.  RESP: No wheezes, rales or rhonchi. Speaking in full sentences.   ABD: Normal bowel sounds; soft; non-distended; non-tender; No rebound or guarding. No CVA tenderness.  EXT: Unable to assess open LLE wound as patient refusing removing  dressing at this time. (+) LLE erythema  with mild streaking, (+) warm to palpation, sensation intact, mildly tender (+) soft compartment.   NEURO: Alert, oriented. Grossly unremarkable. No focal deficits.  BACK: No spinous tenderness. T(C): 36.4 (12-24-24 @ 18:15), Max: 36.7 (12-24-24 @ 11:16)  HR: 80 (12-24-24 @ 17:55) (68 - 85)  BP: 101/60 (12-24-24 @ 18:15) (90/55 - 121/88)  RR: 18 (12-24-24 @ 18:15) (16 - 18)  SpO2: 98% (12-24-24 @ 18:15) (97% - 99%)    PHYSICAL EXAM:  CONSTITUTIONAL: laying on stretcher; in no acute distress.  HEAD: Normocephalic; atraumatic.  EYES: PERRL, EOM intact; conjunctiva and sclera clear.  NECK: Supple; non tender.  CARD: S1, S2 normal; no murmurs, gallops, or rubs. Regular rate and rhythm.  RESP: No wheezes, rales or rhonchi. Speaking in full sentences.   ABD: Normal bowel sounds; soft; non-distended; non-tender; No rebound or guarding. No CVA tenderness.  EXT: Unable to assess LLE wound as patient adamantly refusing removing dressing at this time. (+) LLE erythema with mild streaking, (+) Warm to palpation, sensation intact, mildly tender (+) Soft compartment.   NEURO: Alert, oriented. Grossly unremarkable. No focal deficits.  BACK: No spinal tenderness

## 2024-12-24 NOTE — ED ADULT TRIAGE NOTE - CHIEF COMPLAINT QUOTE
pt from [facility], pt fell the other day as per ems, pt has wound on left leg which is now bleeding again

## 2024-12-24 NOTE — ED PROVIDER NOTE - ATTENDING APP SHARED VISIT CONTRIBUTION OF CARE
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  No fever, chills, n/v, cp,  pleuritic cp, sob, palpitations, diaphoresis, cough, chest wall/rib pain, clavicular pain, ankle pain, knee pain, shoulder pain, wrist pain, no back pain, no hip pain, no ha/lh/dizziness, numbness/tingling, neck pain/ stiffness, abd pain,  melena/brbpr, urinary symptoms,  sick contacts, recent travel . GCS 15.    On Exam:  Vital Signs: I have reviewed the initial vital signs.  Constitutional: Non toxic appearing pt laying on stretcher in nad.   HEAD: No signs of basilar skull fracture.  Integumentary: (+) LLE erythema  with mild streaking, (+) warmth to palpation, no crepitus, induration, fluctuance, no discharge, no abscess, (+) soft compartments. LLE hematoma ~ 9 x 5  cm with active bleeding. Controlled with pressure and surgacel.   EYES: No periorbital swelling/ecchymoses. EOM intact. No nystagmus. No subconjunctival hemorrhage.   ENT: MMM. No rhinorrhea/otorrhea. No epistaxis,  No septal hematoma. No mastoid ecchymoses. No intraoral bleeding, No loose or cracked teeth, no active bleeding. No malocclusion. No TMJ pain.  NECK: Supple, non-tender, No palpable shelves or step-offs.  BACK: No spinous tenderness. No palpable shelves or step-offs.  Cardiovascular: Afib, radial pulses 2/4 b/l. dp and pt pulses 2/4/ b/l. No pain to palpation to chest wall.  Respiratory: BS present b/l, ctabl, no wheezing or crackles, no stridor. No pain to palpation to ribs b/l. No crepitus. No subq emphysema.   Gastrointestinal: BS present throughout all 4 quadrants, soft, nd, nt. no r/g.  Musculoskeletal: Pt with edema to b/l LE- 2 pitting edema. No bony tenderness. No pain to palpation to clavicles, no obvious bony deformities. No hip pain. No short leg. No internal or external rotation of LE  Neurologic: GCS 15. CN II-XII intact, no facial droop or slurring of speech. Motor 5/5 and sensation intact throughout upper and lower extremities.     Plan: Monitor, EKG, CXR, labs, imaging including of LLE, abx, reassess. 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  No fever, chills, n/v, cp,  pleuritic cp, sob, palpitations, diaphoresis, cough, chest wall/rib pain, clavicular pain, ankle pain, knee pain, shoulder pain, wrist pain, no back pain, no hip pain, no ha/lh/dizziness, numbness/tingling, neck pain/ stiffness, abd pain,  melena/brbpr, urinary symptoms,  sick contacts, recent travel . GCS 15.  .  On Exam:  Vital Signs: I have reviewed the initial vital signs.  Constitutional: Non toxic appearing pt laying on stretcher in nad.   HEAD: No signs of basilar skull fracture.  Integumentary: (+) LLE erythema  with mild streaking, (+) warmth to palpation, no crepitus, induration, fluctuance, no discharge, no abscess, (+) soft compartments. LLE hematoma ~ 9 x 5  cm with active bleeding. Controlled with pressure and surgacel.   EYES: No periorbital swelling/ecchymoses. EOM intact. No nystagmus. No subconjunctival hemorrhage.   ENT: MMM. No rhinorrhea/otorrhea. No epistaxis,  No septal hematoma. No mastoid ecchymoses. No intraoral bleeding, No loose or cracked teeth, no active bleeding. No malocclusion. No TMJ pain.  NECK: Supple, non-tender, No palpable shelves or step-offs.  BACK: No spinous tenderness. No palpable shelves or step-offs.  Cardiovascular: Afib, radial pulses 2/4 b/l. dp and pt pulses 2/4/ b/l. No pain to palpation to chest wall.  Respiratory: BS present b/l, ctabl, no wheezing or crackles, no stridor. No pain to palpation to ribs b/l. No crepitus. No subq emphysema.   Gastrointestinal: BS present throughout all 4 quadrants, soft, nd, nt. no r/g.  Musculoskeletal: Pt with edema to b/l LE- 2 pitting edema. No bony tenderness. No pain to palpation to clavicles, no obvious bony deformities. No hip pain. No short leg. No internal or external rotation of LE  Neurologic: GCS 15. CN II-XII intact, no facial droop or slurring of speech. Motor 5/5 and sensation intact throughout upper and lower extremities.     Plan: Monitor, EKG, CXR, labs, imaging including of LLE, abx, reassess.

## 2024-12-24 NOTE — ED ADULT NURSE NOTE - CHIEF COMPLAINT QUOTE
pt from [facility], pt fell the other day as per ems, pt has wound on left leg which is now bleeding again

## 2024-12-24 NOTE — H&P ADULT - NSHPLABSRESULTS_GEN_ALL_CORE
6.7    14.91 )-----------( 410      ( 24 Dec 2024 13:00 )             23.4       12-24    145  |  99  |  52[H]  ----------------------------<  140[H]  4.6   |  37[H]  |  1.5    Ca    10.0      24 Dec 2024 13:00    TPro  6.6  /  Alb  3.1[L]  /  TBili  0.3  /  DBili  x   /  AST  14  /  ALT  13  /  AlkPhos  88  12-24          Urinalysis Basic - ( 24 Dec 2024 13:00 )  Color: x / Appearance: x / SG: x / pH: x  Gluc: 140 mg/dL / Ketone: x  / Bili: x / Urobili: x   Blood: x / Protein: x / Nitrite: x   Leuk Esterase: x / RBC: x / WBC x   Sq Epi: x / Non Sq Epi: x / Bacteria: x    PT/INR - ( 24 Dec 2024 13:00 )   PT: 18.50 sec;   INR: 1.55 ratio    PTT - ( 24 Dec 2024 13:00 )  PTT:33.1 sec    Lactate Trend  12-24 @ 13:00 Lactate:1.4     CT HEAD:  No acute intracranial pathology or hemorrhage. No acute calvarial   fracture.    CT CERVICAL SPINE:  No acute fracture or subluxation.    CT Chest/ AP:   No evidence of thoracic, abdominal or pelvic visceral injury, laceration,   or hematoma.    No evidence of acute fracture.    An intraluminal filling defect is noted within the gallbladder compatible   with a large stone. Dense material seen in the gallbladder fundus which   may represent inspissated bile/sludge.    CTA LE with IV contrast:   A subcutaneous hematoma is noted along the lateral and posterior aspect   of the left calf. The hematoma measures approximately 11.5 x 2 x 18 cm.    There is no evidence of active extravasation.    There is no evidence of vascular injury, occlusion, dissection, or   pseudoaneurysm.    --- End of Report ---

## 2024-12-24 NOTE — ED PROVIDER NOTE - PHYSICAL EXAMINATION
VITAL SIGNS: I have reviewed nursing notes and confirm.  CONSTITUTIONAL: 85 yo F laying on stretcher; in no acute distress.  SKIN: Skin exam is warm and dry, no acute rash.  HEAD: Normocephalic; atraumatic.  EYES: PERRL, EOM intact; conjunctiva and sclera clear.  ENT: No nasal discharge; airway clear.   NECK: Supple; non tender.  CARD: S1, S2 normal; no murmurs, gallops, or rubs. Regular rate and rhythm.  RESP: No wheezes, rales or rhonchi. Speaking in full sentences.   ABD: Normal bowel sounds; soft; non-distended; non-tender; No rebound or guarding. No CVA tenderness.  EXT: (+) ~ 9 x 5 cm open wound with mild active bleeding, surrounding erythema/warmth, no TTP/crepitus. DP 2+.   NEURO: Alert, oriented. Grossly unremarkable. No focal deficits.

## 2024-12-24 NOTE — H&P ADULT - ASSESSMENT
This is a 84-year-old female brought in by EMS from [facility], with past medical history of  diabetes, HTN, chronic LE edema, bipolar disorder, tachybrady syndrome (declining PPM), A fib ((on Eliquis), mild atrial stenosis, presented for mechanical fall 3 days ago (-head trauma, -LOC, +AC). Patient sustained wound to left posterior lateral lower extremity with hematoma, that started rebleeding today. Denies fever, chills, n/v, cp, sob, palpitations, cough, back pain, knee, hip pain, numbness/tingling, abd pain, changes in BM, and urinary symptoms,    #LLE subcutaneous hematoma   -Admit to medicine   -CTA LE: A subcutaneous hematoma is noted along the lateral and posterior aspect of the left calf. The hematoma measures approximately 11.5 x 2 x 18 cm.  -S/p Aztreonam, Flagyl, and Vancomycin in ED  -WBC 14.91, lactate WNL   -F/U surgery recs   -ID consult   -Wound care consult  -Pain control prn   -PT consult     #Acute blood loss anemia   #Iron deficiency anemia   -Hgb 6.7 (10.2 in 7/2024)  -Hold Eliquis in setting of acute blood loss anemia   -F/U post transfusion CBC   -C/w ferrous sulfate 325 mg BID     #DM  -Holding Farxiga 10 mg   -ISS while inpatient     #HTN  -C/w Losartan 50 mg     #HLD  -C/w Crestor 10 mg     #Bipolar  -C/w Risperdal 1 mg     #Chronic LE edema  -C/w home Lasix    #A fib   -C/w Metoprolol Succinate ER 25 mg tab     Diet: DASH, fluid restriction 1200 ml/day   Activity: OOB with assistance  VTE ppx: Holding home Eliquis in setting of acute blood loss anemia   GI ppx: Protonix     Above plan to be discussed with       This is a 84-year-old female brought in by EMS from [facility], with past medical history of  diabetes, HTN, chronic LE edema, bipolar disorder, tachybrady syndrome (declining PPM), A fib ((on Eliquis), mild atrial stenosis, presented for mechanical fall 3 days ago (-head trauma, -LOC, +AC). Patient sustained wound to left posterior lateral lower extremity with hematoma, that started rebleeding today. Denies fever, chills, n/v, cp, sob, palpitations, cough, back pain, knee, hip pain, numbness/tingling, abd pain, changes in BM, and urinary symptoms,    #LLE subcutaneous hematoma   -Admit to medicine   -CTA LE: A subcutaneous hematoma is noted along the lateral and posterior aspect of the left calf. The hematoma measures approximately 11.5 x 2 x 18 cm.  -WBC 14.91, lactate WNL   -S/p Aztreonam, Flagyl, and Vancomycin in ED  -C/w IV Ancef 1 g q12 as per    -Surgery following: No acute surgical intervention, continue daily wound care (telfa/gauze/abd/kerlix/ACE wrap)   -ID consult   -Wound care consult  -Pain control prn   -PT consult     #Acute blood loss anemia   #Iron deficiency anemia   -Hgb 6.7 (10.2 in 7/2024)  -Hold Eliquis in setting of acute blood loss anemia   -F/U post transfusion CBC   -C/w ferrous sulfate 325 mg     #DM  -Holding Farxiga 10 mg   -ISS while inpatient     #HTN  -C/w Losartan 50 mg     #HLD  -C/w Crestor 10 mg     #Bipolar  -C/w Risperdal 1 mg     #Chronic LE edema  -C/w home Lasix    #A fib   -C/w Metoprolol Succinate ER 25 mg tab     Diet: DASH, fluid restriction 1200 ml/day   Activity: OOB with assistance  VTE ppx: Holding home Eliquis in setting of acute blood loss anemia   GI ppx: Protonix     Above plan discussed with       This is a 84-year-old female brought in by EMS from [facility], with past medical history of  diabetes, HTN, chronic LE edema, bipolar disorder, tachybrady syndrome (declining PPM), A fib ((on Eliquis), mild atrial stenosis, presented for mechanical fall 3 days ago (-head trauma, -LOC, +AC). Patient sustained wound to left posterior lateral lower extremity with hematoma, that started rebleeding today. Denies fever, chills, n/v, cp, sob, palpitations, cough, back pain, knee, hip pain, numbness/tingling, abd pain, changes in BM, and urinary symptoms,    #LLE subcutaneous hematoma   -Admit to medicine   -CTA LE: A subcutaneous hematoma is noted along the lateral and posterior aspect of the left calf. The hematoma measures approximately 11.5 x 2 x 18 cm.  -WBC 14.91, lactate WNL   -S/p Aztreonam, Flagyl, and Vancomycin in ED  -C/w IV Ancef 1 g q12 as per    -Surgery following: No acute surgical intervention, continue daily wound care (telfa/gauze/abd/kerlix/ACE wrap)   -ID consult   -Wound care consult   -Pain control prn   -PT consult     #Acute blood loss anemia   #Iron deficiency anemia   -Hgb 6.7 (10.2 in 7/2024)  -Hold Eliquis in setting of acute blood loss anemia   -F/U post transfusion CBC   -C/w ferrous sulfate 325 mg     #DM  -Holding Farxiga 10 mg   -ISS while inpatient     #HTN  -C/w Losartan 50 mg     #HLD  -C/w Crestor 10 mg     #Bipolar  -C/w Risperdal 1 mg     #Chronic LE edema  -C/w home Lasix    #A fib   -C/w Metoprolol Succinate ER 25 mg tab     Diet: DASH, fluid restriction 1200 ml/day   Activity: OOB with assistance  VTE ppx: Holding home Eliquis in setting of acute blood loss anemia   GI ppx: Protonix     Above plan discussed with       This is a 84-year-old female brought in by EMS from [facility] [place], with past medical history of  diabetes, HTN, chronic LE edema, bipolar disorder, tachybrady syndrome (declining PPM), A fib (on Eliquis), mild aortic stenosis, presenting s/p mechanical fall 3 days ago (-head trauma, +AC). Patient sustained wound to left posterior lateral lower extremity with hematoma, that started rebleeding today. Denies fever, chills, n/v, cp, sob, palpitations, cough, back pain, numbness/tingling, abd pain, changes in BM, and urinary symptoms,    #LLE subcutaneous hematoma and cellulitis   #Fall  -Admit to medicine   -CTA LE: A subcutaneous hematoma is noted along the lateral and posterior aspect of the left calf. The hematoma measures approximately 11.5 x 2 x 18 cm.  -WBC 14.91, lactate WNL   -S/p Aztreonam, Flagyl, and Vancomycin in ED  -C/w IV Ancef 1 g q12 as per    -Surgery following: No acute surgical intervention, continue daily wound care (telfa/gauze/abd/kerlix/ACE wrap)   -ID consult   -Wound care consult   -Pain control prn   -PT consult   -Fall precautions     #Acute blood loss anemia   #H/o iron deficiency anemia   -Hgb 6.7 (10.2 in 7/2024). Baseline Hgb ~8- 9  -Hold Eliquis in setting of acute blood loss anemia   -F/U post transfusion CBC   -C/w ferrous sulfate 325 mg     #Acute hypercarbic respiratory failure likely 2/2 MANJEET   -BiPAP HS 14/8  -Supplemental O2 prn     #DM  -Holding Farxiga 10 mg   -ISS while inpatient     #HTN  -C/w Losartan 50 mg     #HLD  -C/w Crestor 10 mg     #Bipolar  -C/w Risperdal 1 mg     #Chronic LE edema  -C/w home Lasix    #H/o A fib  -C/w Metoprolol Succinate ER 25 mg tab   -Holding home Eliquis in setting of acute blood loss anemia    Diet: DASH/CC, fluid restriction 1200 ml/day   Activity: OOB with assistance  VTE ppx: Holding home Eliquis in setting of acute blood loss anemia   GI ppx: Protonix     Above plan discussed with       This is a 84-year-old female brought in by EMS from [facility], with past medical history of  diabetes, HTN, chronic LE edema, bipolar disorder, tachybrady syndrome (declining PPM), A fib (on Eliquis), mild aortic stenosis, presenting s/p mechanical fall 3 days ago (-head trauma, +AC). Patient sustained wound to left posterior lateral lower extremity with hematoma, that started rebleeding today. Denies fever, chills, n/v, cp, sob, palpitations, cough, back pain, numbness/tingling, abd pain, changes in BM, and urinary symptoms,    #LLE subcutaneous hematoma and cellulitis   #Fall  -Admit to medicine   -CTA LE: A subcutaneous hematoma is noted along the lateral and posterior aspect of the left calf. The hematoma measures approximately 11.5 x 2 x 18 cm.  -WBC 14.91, lactate WNL   -S/p Aztreonam, Flagyl, and Vancomycin in ED  -C/w IV Ancef 1 g q12 as per    -Surgery following: No acute surgical intervention, continue daily wound care (telfa/gauze/abd/kerlix/ACE wrap)   -ID consult   -Wound care consult   -Pain control prn   -PT consult   -Fall precautions     #Acute blood loss anemia   #H/o iron deficiency anemia   -Hgb 6.7 (10.2 in 7/2024). Baseline Hgb ~8- 9  -Hold Eliquis in setting of acute blood loss anemia   -F/U post transfusion CBC   -C/w ferrous sulfate 325 mg     #Acute hypercarbic respiratory failure likely 2/2 MANJEET   -BiPAP HS 14/8  -Supplemental O2 prn     #DM  -Holding Farxiga 10 mg   -ISS while inpatient     #HTN  -C/w Losartan 50 mg     #HLD  -C/w Crestor 10 mg     #Bipolar  -C/w Risperdal 1 mg     #Chronic LE edema  -C/w home Lasix    #H/o A fib  -C/w Metoprolol Succinate ER 25 mg tab   -Holding home Eliquis in setting of acute blood loss anemia    Diet: DASH/CC, fluid restriction 1200 ml/day   Activity: Ambulate with assistance  VTE ppx: Holding home Eliquis in setting of acute blood loss anemia   GI ppx: Protonix     Above plan discussed with

## 2024-12-24 NOTE — ED PROVIDER NOTE - CARE PLAN
1 Principal Discharge DX:	Hematoma of left lower leg  Secondary Diagnosis:	Cellulitis  Secondary Diagnosis:	Anemia due to acute blood loss  Secondary Diagnosis:	Fall

## 2024-12-24 NOTE — CONSULT NOTE ADULT - ASSESSMENT
84 yr old with PMHX below, on Eliquis for tachybrady syndrome - now sent in from [facility]. Pt states she fell 2 days ago, hitting the back of her leftt calf on the bedrail - having profuse bleeding from the area at at that time and continuing, prompting NH to send her in.      In ED: pt noted to be anemic - Hgb = 6.7 (baseline 10.2 in July and CTA LLE - significant for A subcutaneous hematoma is noted along the lateral and posterior aspect of the left calf. The hematoma measures approximately 11.5 x 2 x 18 cm.  There is no evidence of active extravasation.There is no evidence of vascular injury, occlusion, dissection, or pseudoaneurysm.    Case D/W Dr. Vasquez:    1. LLE hematoma - s/p fall and on ELiquis  2. Acute anemia 2/2 above     - no acute surgical intervention  --continue daily wound care (telfa/gauze/abd/kerlix/ACE wrap applied in ED)  -recommend wound care consult  -hold eliquis if not contraindicated; transfuse PRN  -surgical team to re-eval tomorrow for further recommendations 84 yr old with PMHX below, on Eliquis for tachybrady syndrome - now sent in from [facility]. Pt states she fell 2 days ago, hitting the back of her leftt calf on the bedrail - having profuse bleeding from the area at at that time and continuing, prompting NH to send her in.      In ED: pt noted to be anemic - Hgb = 6.7 (baseline 10.2 in July and CTA LLE - significant for A subcutaneous hematoma is noted along the lateral and posterior aspect of the left calf. The hematoma measures approximately 11.5 x 2 x 18 cm.  There is no evidence of active extravasation.There is no evidence of vascular injury, occlusion, dissection, or pseudoaneurysm.    Case D/W Dr. Vasquez:    1. LLE hematoma - s/p fall and on ELiquis  2. Acute anemia 2/2 above     - no acute surgical intervention - admit to medicine  --continue daily wound care (telfa/gauze/abd/kerlix/ACE wrap applied in ED)  -recommend wound care consult  -hold eliquis if not contraindicated; transfuse PRN  -surgical team to re-eval tomorrow for further recommendations

## 2024-12-24 NOTE — H&P ADULT - NSICDXNOPASTSURGICALHX_GEN_ALL_CORE
<-- Click to add NO significant Past Surgical History I, Clay Parekh MD,  performed the initial face to face bedside interview with this patient regarding history of present illness, review of symptoms and relevant past medical, social and family history.  I completed an independent physical examination.  I was the initial provider who evaluated this patient.   I personally saw the patient and performed a substantive portion of the visit including all aspects of the medical decision making.  I have signed out the follow up of any pending tests (i.e. labs, radiological studies) to the NICOLE.  I have communicated the patient’s plan of care and disposition with the NICOLE.  The history, relevant review of systems, past medical and surgical history, medical decision making, and physical examination was documented by the scribe in my presence and I attest to the accuracy of the documentation.

## 2024-12-24 NOTE — PATIENT PROFILE ADULT - NSPROPTRIGHTSUPPORTPERSON_GEN_A_NUR
Detail Level: Zone
Plan: Counseled patient to apply SPF30 or greater daily to sun exposed areas
declines

## 2024-12-24 NOTE — H&P ADULT - HISTORY OF PRESENT ILLNESS
This is a 84-year-old female brought in by EMS from [facility], with past medical history of  diabetes, HTN, chronic LE edema, bipolar disorder, tachybrady syndrome (declining PPM), A fib ((on Eliquis), mild atrial stenosis, presented for mechanical fall 3 days ago (-head trauma, -LOC, +AC). Patient sustained wound to left posterior lateral lower extremity with hematoma, that started rebleeding today. Denies fever, chills, n/v, cp, sob, palpitations, cough, back pain, knee, hip pain, numbness/tingling, abd pain, changes in BM, and urinary symptoms, This is a 84-year-old female brought in by EMS from [facility], with past medical history of  diabetes, HTN, chronic LE edema, bipolar disorder, tachybrady syndrome (declining PPM), A fib (on Eliquis), mild aortic stenosis, presenting s/p mechanical fall 3 days ago (-head trauma, +AC). Patient sustained wound to left posterior lateral lower extremity with hematoma, that started rebleeding today. Denies fever, chills, n/v, cp, sob, palpitations, cough, back pain, numbness/tingling, abd pain, changes in BM, and urinary symptoms,

## 2024-12-24 NOTE — ED PROVIDER NOTE - OBJECTIVE STATEMENT
84-year-old female with past medical history of HTN, DM, chronic LE edema, bipolar disorder, A-fib on Eliquis, taking reduced syndrome, and MANJEET presents to the ED from [place] for evaluation of persistent bleeding from left lower extremity wound.  Patient reports falling while walking to the bathroom yesterday, states she usually needs assistance to walk but tried to walk on her own.  Patient fell onto her buttocks and states she cut her left leg on something.  Patient was sent in today because staff was unable to control bleeding.  Patient denies other complaints. Pt denies fever, chills, nausea, vomiting, abdominal pain, diarrhea, headache, dizziness, weakness, chest pain, SOB, back pain, LOC, urinary symptoms, cough, recent travel, recent surgery. 84-year-old female with past medical history of HTN, DM, chronic LE edema, bipolar disorder, A-fib on Eliquis, taking reduced syndrome, and MANJEET presents to the ED from [place] for evaluation of persistent bleeding from left lower extremity wound.  Patient reports falling while walking to the bathroom 2 days ago, states she usually needs assistance to walk but tried to walk on her own.  Patient fell onto her buttocks and states she cut her left leg on something.  Patient was sent in today because staff was unable to control bleeding.  Patient denies other complaints. Pt denies fever, chills, nausea, vomiting, abdominal pain, diarrhea, headache, dizziness, weakness, chest pain, SOB, back pain, LOC, urinary symptoms, cough, recent travel, recent surgery.

## 2024-12-24 NOTE — PATIENT PROFILE ADULT - FALL HARM RISK - HARM RISK INTERVENTIONS
Assistance with ambulation/Assistance OOB with selected safe patient handling equipment/Communicate Risk of Fall with Harm to all staff/Discuss with provider need for PT consult/Monitor gait and stability/Provide patient with walking aids - walker, cane, crutches/Reinforce activity limits and safety measures with patient and family/Sit up slowly, dangle for a short time, stand at bedside before walking/Tailored Fall Risk Interventions/Use of alarms - bed, chair and/or voice tab/Visual Cue: Yellow wristband and red socks/Bed in lowest position, wheels locked, appropriate side rails in place/Call bell, personal items and telephone in reach/Instruct patient to call for assistance before getting out of bed or chair/Non-slip footwear when patient is out of bed/[place] to call system/Physically safe environment - no spills, clutter or unnecessary equipment/Purposeful Proactive Rounding/Room/bathroom lighting operational, light cord in reach

## 2024-12-24 NOTE — ED PROVIDER NOTE - DIFFERENTIAL DIAGNOSIS
Differential Diagnosis The differential diagnosis for patients clinical presentation includes but is not limited to:  hematoma  fracture  dislocation   traumatic injury  anemia, cellulitis  metabolic vs infectious etiology   arrythmia  ich  chi

## 2024-12-24 NOTE — ED PROVIDER NOTE - WR INTERPRETATION 3
R hand dominant, patient cleared for OT by RN Frida, received semi-supine, NAD, +vEEG, +heplock. no dislocation

## 2024-12-24 NOTE — ED PROVIDER NOTE - EKG/XRAY ADDITIONAL INFORMATION
Atrial fibrillation at 70 bpm, , QTc 4 1, no ST elevations or depressions. Atrial fibrillation at 70 bpm, , QTc 4 1, no ST elevations or depressions.  no dislocation

## 2024-12-24 NOTE — ED ADULT NURSE NOTE - NSFALLHARMRISKINTERV_ED_ALL_ED
Assistance OOB with selected safe patient handling equipment if applicable/Assistance with ambulation/Communicate risk of Fall with Harm to all staff, patient, and family/Encourage patient to sit up slowly, dangle for a short time, stand at bedside before walking/Monitor gait and stability/Monitor for mental status changes and reorient to person, place, and time, as needed/Move patient closer to nursing station/within visual sight of ED staff/Provide patient with walking aids/Provide visual cue: red socks, yellow wristband, yellow gown, etc/Reinforce activity limits and safety measures with patient and family/Use of alarms - bed, stretcher, chair and/or video monitoring/Bed in lowest position, wheels locked, appropriate side rails in place/Call bell, personal items and telephone in reach/Instruct patient to call for assistance before getting out of bed/chair/stretcher/Non-slip footwear applied when patient is off stretcher/[place] to call system/Physically safe environment - no spills, clutter or unnecessary equipment/Purposeful Proactive Rounding/Room/bathroom lighting operational, light cord in reach

## 2024-12-24 NOTE — CONSULT NOTE ADULT - SUBJECTIVE AND OBJECTIVE BOX
CHELA GALLAGHER [id number]  84y Female      HPI:  84 yr old with PMHX below, on Eliquis for tachybrady syndrome - now sent in from [facility]. Pt states she fell 2 days ago, hitting the back of her leftt calf on the bedrail - having profuse bleeding from the area at at that time and continuing, prompting NH to send her in.      In ED: pt noted to be anemic - Hgb = 6.7 (baseline 10.2 in July and CTA LLE - significant for A subcutaneous hematoma is noted along the lateral and posterior aspect of the left calf. The hematoma measures approximately 11.5 x 2 x 18 cm.  There is no evidence of active extravasation.There is no evidence of vascular injury, occlusion, dissection, or pseudoaneurysm.      PAST MEDICAL & SURGICAL HISTORY:  Diabetes mellitus  Hypertension  Schizoaffective disorder  Edema  Bilateral LE  Disorder of thyroid, unspecified  Son cannot provide details. States she had "thyroid surgery" decades ago when she was young  No significant past surgical history        MEDICATIONS  (STANDING):  dextrose 5%. 1000 milliLiter(s) (100 mL/Hr) IV Continuous <Continuous>  dextrose 5%. 1000 milliLiter(s) (50 mL/Hr) IV Continuous <Continuous>  dextrose 50% Injectable 25 Gram(s) IV Push once  dextrose 50% Injectable 12.5 Gram(s) IV Push once  dextrose 50% Injectable 25 Gram(s) IV Push once  ferrous    sulfate 325 milliGRAM(s) Oral daily  folic acid 1 milliGRAM(s) Oral daily  furosemide    Tablet 40 milliGRAM(s) Oral daily  glucagon  Injectable 1 milliGRAM(s) IntraMuscular once  insulin lispro (ADMELOG) corrective regimen sliding scale   SubCutaneous three times a day before meals  losartan 50 milliGRAM(s) Oral daily  metoprolol succinate ER 25 milliGRAM(s) Oral daily  pantoprazole    Tablet 40 milliGRAM(s) Oral before breakfast  risperiDONE   Tablet 1 milliGRAM(s) Oral daily  rosuvastatin 10 milliGRAM(s) Oral at bedtime  senna 2 Tablet(s) Oral at bedtime  silver sulfADIAZINE 1% Cream 1 Application(s) Topical daily  vitamin A & D Ointment 1 Application(s) Topical daily    MEDICATIONS  (PRN):  acetaminophen     Tablet .. 650 milliGRAM(s) Oral every 6 hours PRN Temp greater or equal to 38C (100.4F), Mild Pain (1 - 3)  dextrose Oral Gel 15 Gram(s) Oral once PRN Blood Glucose LESS THAN 70 milliGRAM(s)/deciliter      Allergies    No Known Allergies    Intolerances        REVIEW OF SYSTEMS    [ ] A ten-point review of systems was otherwise negative except as noted.  [ ] Due to altered mental status/intubation, subjective information were not able to be obtained from the patient. History was obtained, to the extent possible, from review of the chart and collateral sources of information.      Vital Signs Last 24 Hrs  T(C): 36.4 (24 Dec 2024 18:15), Max: 36.7 (24 Dec 2024 11:16)  T(F): 97.5 (24 Dec 2024 18:15), Max: 98 (24 Dec 2024 11:16)  HR: 80 (24 Dec 2024 17:55) (68 - 85)  BP: 101/60 (24 Dec 2024 18:15) (90/55 - 121/88)  BP(mean): --  RR: 18 (24 Dec 2024 18:15) (16 - 18)  SpO2: 98% (24 Dec 2024 18:15) (97% - 99%)    Parameters below as of 24 Dec 2024 18:15  Patient On (Oxygen Delivery Method): nasal cannula  O2 Flow (L/min): 3      PHYSICAL EXAM:  GENERAL: NAD  EXTREMITIES:  Posterior left calf with 6x3 cm laceration, no active bleeding but oozing noted; pink base with white fibrinous exudate and overlying clot; mild surrounding erythema with dark, foul smelling oozing.      LABS:  Labs:  CAPILLARY BLOOD GLUCOSE                              6.7    14.91 )-----------( 410      ( 24 Dec 2024 13:00 )             23.4       Auto Neutrophil %: 88.6 % (12-24-24 @ 13:00)  Auto Immature Granulocyte %: 0.4 % (12-24-24 @ 13:00)    12-24    145  |  99  |  52[H]  ----------------------------<  140[H]  4.6   |  37[H]  |  1.5      Calcium: 10.0 mg/dL (12-24-24 @ 13:00)      LFTs:             6.6  | 0.3  | 14       ------------------[88      ( 24 Dec 2024 13:00 )  3.1  | x    | 13          Lipase:33     Amylase:x         Lactate, Blood: 1.4 mmol/L (12-24-24 @ 13:00)  Lactate, Blood: 1.3 mmol/L (12-24-24 @ 13:00)      Coags:     18.50  ----< 1.55    ( 24 Dec 2024 13:00 )     33.1      Urinalysis Basic - ( 24 Dec 2024 13:00 )    Color: x / Appearance: x / SG: x / pH: x  Gluc: 140 mg/dL / Ketone: x  / Bili: x / Urobili: x   Blood: x / Protein: x / Nitrite: x   Leuk Esterase: x / RBC: x / WBC x   Sq Epi: x / Non Sq Epi: x / Bacteria: x            RADIOLOGY & ADDITIONAL STUDIES:  < from: CT Angio Lower Extremity w/ IV Cont, Left (12.24.24 @ 16:39) >  IMPRESSION:    A subcutaneous hematoma is noted along the lateral and posterior aspect   of the left calf. The hematoma measures approximately 11.5 x 2 x 18 cm.    There is no evidence of active extravasation.    There is no evidence of vascular injury, occlusion, dissection, or   pseudoaneurysm.    < end of copied text >

## 2024-12-25 LAB
ALBUMIN SERPL ELPH-MCNC: 2.9 G/DL — LOW (ref 3.5–5.2)
ALP SERPL-CCNC: 84 U/L — SIGNIFICANT CHANGE UP (ref 30–115)
ALT FLD-CCNC: 13 U/L — SIGNIFICANT CHANGE UP (ref 0–41)
ANION GAP SERPL CALC-SCNC: 16 MMOL/L — HIGH (ref 7–14)
APPEARANCE UR: CLEAR — SIGNIFICANT CHANGE UP
AST SERPL-CCNC: 10 U/L — SIGNIFICANT CHANGE UP (ref 0–41)
BILIRUB SERPL-MCNC: 0.3 MG/DL — SIGNIFICANT CHANGE UP (ref 0.2–1.2)
BILIRUB UR-MCNC: NEGATIVE — SIGNIFICANT CHANGE UP
BUN SERPL-MCNC: 54 MG/DL — HIGH (ref 10–20)
CALCIUM SERPL-MCNC: 9.4 MG/DL — SIGNIFICANT CHANGE UP (ref 8.4–10.5)
CHLORIDE SERPL-SCNC: 97 MMOL/L — LOW (ref 98–110)
CO2 SERPL-SCNC: 29 MMOL/L — SIGNIFICANT CHANGE UP (ref 17–32)
COLOR SPEC: YELLOW — SIGNIFICANT CHANGE UP
CREAT SERPL-MCNC: 1.9 MG/DL — HIGH (ref 0.7–1.5)
DIFF PNL FLD: NEGATIVE — SIGNIFICANT CHANGE UP
EGFR: 25 ML/MIN/1.73M2 — LOW
GLUCOSE BLDC GLUCOMTR-MCNC: 144 MG/DL — HIGH (ref 70–99)
GLUCOSE BLDC GLUCOMTR-MCNC: 170 MG/DL — HIGH (ref 70–99)
GLUCOSE BLDC GLUCOMTR-MCNC: 189 MG/DL — HIGH (ref 70–99)
GLUCOSE BLDC GLUCOMTR-MCNC: 202 MG/DL — HIGH (ref 70–99)
GLUCOSE BLDC GLUCOMTR-MCNC: 209 MG/DL — HIGH (ref 70–99)
GLUCOSE SERPL-MCNC: 117 MG/DL — HIGH (ref 70–99)
GLUCOSE UR QL: 500 MG/DL
HCT VFR BLD CALC: 28.9 % — LOW (ref 37–47)
HGB BLD-MCNC: 8.4 G/DL — LOW (ref 12–16)
KETONES UR-MCNC: NEGATIVE MG/DL — SIGNIFICANT CHANGE UP
LEUKOCYTE ESTERASE UR-ACNC: NEGATIVE — SIGNIFICANT CHANGE UP
MAGNESIUM SERPL-MCNC: 2.5 MG/DL — HIGH (ref 1.8–2.4)
MCHC RBC-ENTMCNC: 27.9 PG — SIGNIFICANT CHANGE UP (ref 27–31)
MCHC RBC-ENTMCNC: 29.1 G/DL — LOW (ref 32–37)
MCV RBC AUTO: 96 FL — SIGNIFICANT CHANGE UP (ref 81–99)
NITRITE UR-MCNC: NEGATIVE — SIGNIFICANT CHANGE UP
NRBC # BLD: 0 /100 WBCS — SIGNIFICANT CHANGE UP (ref 0–0)
PH UR: 5.5 — SIGNIFICANT CHANGE UP (ref 5–8)
PHOSPHATE SERPL-MCNC: 4.9 MG/DL — SIGNIFICANT CHANGE UP (ref 2.1–4.9)
PLATELET # BLD AUTO: 382 K/UL — SIGNIFICANT CHANGE UP (ref 130–400)
PMV BLD: 9.8 FL — SIGNIFICANT CHANGE UP (ref 7.4–10.4)
POTASSIUM SERPL-MCNC: 5.2 MMOL/L — HIGH (ref 3.5–5)
POTASSIUM SERPL-SCNC: 5.2 MMOL/L — HIGH (ref 3.5–5)
PROT SERPL-MCNC: 6.2 G/DL — SIGNIFICANT CHANGE UP (ref 6–8)
PROT UR-MCNC: NEGATIVE MG/DL — SIGNIFICANT CHANGE UP
RBC # BLD: 3.01 M/UL — LOW (ref 4.2–5.4)
RBC # FLD: 15.7 % — HIGH (ref 11.5–14.5)
SODIUM SERPL-SCNC: 142 MMOL/L — SIGNIFICANT CHANGE UP (ref 135–146)
SP GR SPEC: 1.02 — SIGNIFICANT CHANGE UP (ref 1–1.03)
UROBILINOGEN FLD QL: 0.2 MG/DL — SIGNIFICANT CHANGE UP (ref 0.2–1)
WBC # BLD: 12.27 K/UL — HIGH (ref 4.8–10.8)
WBC # FLD AUTO: 12.27 K/UL — HIGH (ref 4.8–10.8)

## 2024-12-25 PROCEDURE — 27603 DRAIN LOWER LEG LESION: CPT | Mod: LT

## 2024-12-25 PROCEDURE — 99232 SBSQ HOSP IP/OBS MODERATE 35: CPT | Mod: 57

## 2024-12-25 RX ORDER — LIDOCAINE HYDROCHLORIDE 10 MG/ML
30 INJECTION INFILTRATION; PERINEURAL ONCE
Refills: 0 | Status: DISCONTINUED | OUTPATIENT
Start: 2024-12-25 | End: 2025-01-06

## 2024-12-25 RX ADMIN — Medication 100 MILLIGRAM(S): at 05:41

## 2024-12-25 RX ADMIN — Medication 100 MILLIGRAM(S): at 18:25

## 2024-12-25 RX ADMIN — Medication 2: at 11:34

## 2024-12-25 RX ADMIN — SENNOSIDES 2 TABLET(S): 8.6 TABLET, FILM COATED ORAL at 22:06

## 2024-12-25 RX ADMIN — ROSUVASTATIN 10 MILLIGRAM(S): 40 TABLET, FILM COATED ORAL at 22:06

## 2024-12-25 RX ADMIN — RISPERIDONE 1 MILLIGRAM(S): 0.5 TABLET ORAL at 11:28

## 2024-12-25 RX ADMIN — Medication 1 MILLIGRAM(S): at 11:28

## 2024-12-25 RX ADMIN — Medication 325 MILLIGRAM(S): at 11:28

## 2024-12-25 RX ADMIN — PANTOPRAZOLE 40 MILLIGRAM(S): 40 TABLET, DELAYED RELEASE ORAL at 07:10

## 2024-12-25 RX ADMIN — CHLORHEXIDINE GLUCONATE 1 APPLICATION(S): 1.2 RINSE ORAL at 05:41

## 2024-12-25 NOTE — CONSULT NOTE ADULT - SUBJECTIVE AND OBJECTIVE BOX
AILEEN CHELA  84y, Female  Allergy: No Known Allergies      CHIEF COMPLAINT:   acute blood loss anemia, cellulitis (24 Dec 2024 19:10)      LOS  1d    HPI  HPI:  This is a 84-year-old female brought in by EMS from [facility], with past medical history of  diabetes, HTN, chronic LE edema, bipolar disorder, tachybrady syndrome (declining PPM), A fib (on Eliquis), mild aortic stenosis, presenting s/p mechanical fall 3 days ago (-head trauma, +AC). Patient sustained wound to left posterior lateral lower extremity with hematoma, that started rebleeding today. Denies fever, chills, n/v, cp, sob, palpitations, cough, back pain, numbness/tingling, abd pain, changes in BM, and urinary symptoms, (24 Dec 2024 18:45)      INFECTIOUS DISEASE HISTORY:  ID consulted for cellulitis      Currently ordered for:  ceFAZolin   IVPB 1000 milliGRAM(s) IV Intermittent every 12 hours      PMH  PAST MEDICAL & SURGICAL HISTORY:  Diabetes mellitus      Hypertension      Schizoaffective disorder      Edema  Bilateral LE      Disorder of thyroid, unspecified  Son cannot provide details. States she had "thyroid surgery" decades ago when she was young      No significant past surgical history          FAMILY HISTORY  No pertinent family history in first degree relatives    No pertinent family history in first degree relatives        SOCIAL HISTORY  Social History:        ROS  ***    VITALS:  T(F): 97.5, Max: 98.4 (12-24-24 @ 20:28)  HR: 90  BP: 109/75  RR: 18Vital Signs Last 24 Hrs  T(C): 36.4 (25 Dec 2024 04:35), Max: 36.9 (24 Dec 2024 20:28)  T(F): 97.5 (25 Dec 2024 04:35), Max: 98.4 (24 Dec 2024 20:28)  HR: 90 (25 Dec 2024 04:35) (68 - 90)  BP: 109/75 (25 Dec 2024 04:35) (90/55 - 121/88)  BP(mean): --  RR: 18 (25 Dec 2024 04:35) (16 - 18)  SpO2: 99% (25 Dec 2024 04:49) (97% - 100%)    Parameters below as of 25 Dec 2024 04:35  Patient On (Oxygen Delivery Method): nasal cannula  O2 Flow (L/min): 3      PHYSICAL EXAM:  ***    TESTS & MEASUREMENTS:                        7.9    14.12 )-----------( 348      ( 24 Dec 2024 21:55 )             26.2     12-24    145  |  99  |  52[H]  ----------------------------<  140[H]  4.6   |  37[H]  |  1.5    Ca    10.0      24 Dec 2024 13:00    TPro  6.6  /  Alb  3.1[L]  /  TBili  0.3  /  DBili  x   /  AST  14  /  ALT  13  /  AlkPhos  88  12-24      LIVER FUNCTIONS - ( 24 Dec 2024 13:00 )  Alb: 3.1 g/dL / Pro: 6.6 g/dL / ALK PHOS: 88 U/L / ALT: 13 U/L / AST: 14 U/L / GGT: x           Urinalysis Basic - ( 24 Dec 2024 13:00 )    Color: x / Appearance: x / SG: x / pH: x  Gluc: 140 mg/dL / Ketone: x  / Bili: x / Urobili: x   Blood: x / Protein: x / Nitrite: x   Leuk Esterase: x / RBC: x / WBC x   Sq Epi: x / Non Sq Epi: x / Bacteria: x        Culture - Urine (collected 06-05-24 @ 12:16)  Source: Clean Catch Clean Catch (Midstream)  Final Report (06-09-24 @ 10:43):    >100,000 CFU/ml Escherichia coli ESBL  Organism: Escherichia coli ESBL (06-09-24 @ 10:43)  Organism: Escherichia coli ESBL (06-09-24 @ 10:43)      Method Type: JOO      -  Ampicillin: R >16 These ampicillin results predict results for amoxicillin      -  Ampicillin/Sulbactam: R >16/8      -  Aztreonam: R >16      -  Cefazolin: R >16 For uncomplicated UTI with K. pneumoniae, E. coli, or P. mirablis: JOO <=16 is sensitive and JOO >=32 is resistant. This also predicts results for oral agents cefaclor, cefdinir, cefpodoxime, cefprozil, cefuroxime axetil, cephalexin and locarbef for uncomplicated UTI. Note that some isolates may be susceptible to these agents while testing resistant to cefazolin.      -  Cefepime: R >16      -  Ceftriaxone: R >32      -  Cefuroxime: R >16      -  Ciprofloxacin: R >2      -  Ertapenem: S <=0.5      -  Gentamicin: S <=2      -  Imipenem: S <=1      -  Levofloxacin: R >4      -  Meropenem: S <=1      -  Nitrofurantoin: S <=32 Should not be used to treat pyelonephritis      -  Piperacillin/Tazobactam: SDD 16      -  Tobramycin: R >8      -  Trimethoprim/Sulfamethoxazole: R >2/38    Culture - Urine (collected 06-04-24 @ 19:10)  Source: Clean Catch Clean Catch (Midstream)  Final Report (06-05-24 @ 22:12):    <10,000 CFU/mL Normal Urogenital Randa    Culture - Urine (collected 03-29-24 @ 07:52)  Source: Clean Catch Clean Catch (Midstream)  Final Report (03-31-24 @ 10:01):    >=3 organisms. Probable collection contamination.        Lactate, Blood: 1.4 mmol/L (12-24-24 @ 13:00)  Lactate, Blood: 1.3 mmol/L (12-24-24 @ 13:00)      INFECTIOUS DISEASES TESTING      INFLAMMATORY MARKERS      RADIOLOGY & ADDITIONAL TESTS:  I have personally reviewed the last Chest xray  CXR  Xray Chest 1 View AP/PA:   ACC: [id number] EXAM:  XR CHEST 1 VIEW   ORDERED BY: SKYLER HOUSE     PROCEDURE DATE:  12/24/2024          INTERPRETATION:  CLINICAL HISTORY: Trauma    COMPARISON: 7/7/2024.    TECHNIQUE: Frontal    FINDINGS:    Cardiac/mediastinum/hilum: Stable with stable large right paratracheal   density    Lung parenchyma/Pleura: Bilateral lung opacities    Skeleton/soft tissues: Degenerative change..      IMPRESSION:    Bilateral lung opacities    Stable large right paratracheal density    --- End of Report ---            KARLO JAUREGUI MD; Attending Radiologist  This document has been electronically signed. Dec 24 2024 12:50PM (12-24-24 @ 12:43)      CT  CT Chest w/ IV Cont:   ACC: [id number] EXAM:  CT CHEST IC   ORDERED BY: SKYLER HOUSE     ACC: [id number] EXAM:  CT ABDOMEN AND PELVIS IC   ORDERED BY: SKYLER HOUSE     PROCEDURE DATE:  12/24/2024          INTERPRETATION:  CLINICAL INFORMATION: Trauma. Fall.  Wound/hematoma left   lower extremity.     WBC 14.91    PMHx of diabetes, HTN, LE edema,   bipolar disorder, tachybrady syndrome on Eliquis (declining PPM), mild   atrial stenosis    COMPARISON: CT scan of the chest dated 2/16/2024 and CT of the abdomen   and pelvis dated 3/29/2024    CONTRAST/COMPLICATIONS:  IV Contrast:   The patient received 95 ml of Omnipaque 350 with 5 ml   discarded.  Oral Contrast:  None  Complications:  None reported at time of study completion    PROCEDURE:  CT of the Chest, Abdomen and Pelvis was performed.  Sagittal and coronal reformats were performed.    FINDINGS:  CHEST:    TUBES AND LINES: None.  LUNGS AND LARGE AIRWAYS: The central tracheobronchial tree is patent.   There are patchy areas of groundglass opacity in the right upper lobe   which may represent areas of atelectasis or inflammation. There are mild   atelectatic changes at the lung bases. There is no pneumothorax.  PLEURA: There is a small right pleural effusion.  VESSELS: There is no evidence of central pulmonary embolism. Normal   caliber aorta and pulmonary artery. Mild aortic calcifications are noted.   There is no evidence of aortic aneurysm or dissection.  HEART: Cardiomegaly. No pericardial effusion.  MEDIASTINUM AND ASHLEY: There are no suspicious mediastinal, hilar or   axillary lymph nodes. Post left thyroidectomy. Substernal goiter of the   right lobe of the thyroid gland into the right-sided mediastinum with   displacement of the trachea and esophagus to the left.  CHEST WALL AND LOWER NECK: Unremarkable    ABDOMEN AND PELVIS:    LIVER: The liver is normal in size with no evidence of solid mass. The   portal vein is patent.  BILE DUCTS: Normal caliber. A 3 x 4.5 mm calcification is seen in the   region of the ampulla that appears to be a distal common duct stone.  GALLBLADDER: An intraluminal 3 x 4 cm filling defect is noted within the   gallbladder compatible with a large stone. Dense material seen in the   gallbladder fundus which may represent inspissated bile/sludge. Several   small calcified stones are noted.  SPLEEN: Within normal limits.  PANCREAS: The pancreas is normal in size and configuration. No evidence   of mass or pancreatitis.  ADRENALS: Within normal limits.  KIDNEYS/URETERS: There is symmetric renal enhancement. No evidence of   hydronephrosis, calcified stones, or solid mass. Right renal cysts.    BLADDER: Unremarkable.  REPRODUCTIVE ORGANS: Unremarkable    BOWEL: Colonic diverticula. No evidence of bowel obstruction, colitis, or   inflammatory process. Normal caliber appendix.  PERITONEUM/RETROPERITONEUM: No ascites. No free intraperitoneal air.  VESSELS: Aortic calcifications are noted with no evidence of abdominal   aortic aneurysm. Circumaortic left renal vein.  LYMPH NODES: No abdominal or pelvic adenopathy.  ABDOMINAL WALL: Unremarkable.  BONES: Degenerative changes of the spine. Misregistration artifact mid   sternum.    IMPRESSION:    No evidence of thoracic, abdominal or pelvic visceral injury, laceration,   or hematoma.    No evidence of acute fracture.    An intraluminal filling defect is noted within the gallbladder compatible   with a large stone. Dense material seen in the gallbladder fundus which   may represent inspissated bile/sludge.    --- End of Report ---            YVETTE HELLER; Attending Interventional Radiologist  This document has been electronically signed. Dec 24 2024  5:20PM (12-24-24 @ 16:39)      CARDIOLOGY TESTING  12 Lead ECG:   Ventricular Rate 70 BPM    QRS Duration 102 ms    Q-T Interval 372 m <TRUNCATED> (12-24-24 @ 12:37)       MEDICATIONS  ceFAZolin   IVPB 1000 IV Intermittent every 12 hours  chlorhexidine 2% Cloths 1 Topical <User Schedule>  dextrose 5%. 1000 IV Continuous <Continuous>  dextrose 5%. 1000 IV Continuous <Continuous>  dextrose 50% Injectable 25 IV Push once  dextrose 50% Injectable 12.5 IV Push once  dextrose 50% Injectable 25 IV Push once  ferrous    sulfate 325 Oral daily  folic acid 1 Oral daily  furosemide    Tablet 40 Oral daily  glucagon  Injectable 1 IntraMuscular once  influenza  Vaccine (HIGH DOSE) 0.5 IntraMuscular once  insulin lispro (ADMELOG) corrective regimen sliding scale  SubCutaneous three times a day before meals  losartan 50 Oral daily  metoprolol succinate ER 25 Oral daily  pantoprazole    Tablet 40 Oral before breakfast  risperiDONE   Tablet 1 Oral daily  rosuvastatin 10 Oral at bedtime  senna 2 Oral at bedtime  silver sulfADIAZINE 1% Cream 1 Topical daily  vitamin A & D Ointment 1 Topical daily      ANTIBIOTICS:  ceFAZolin   IVPB 1000 milliGRAM(s) IV Intermittent every 12 hours      ALLERGIES:  No Known Allergies           AILEENCHELA  84y, Female  Allergy: No Known Allergies      CHIEF COMPLAINT:   acute blood loss anemia, cellulitis (24 Dec 2024 19:10)      LOS  1d    HPI  HPI:  This is a 84-year-old female brought in by EMS from [facility], with past medical history of  diabetes, HTN, chronic LE edema, bipolar disorder, tachybrady syndrome (declining PPM), A fib (on Eliquis), mild aortic stenosis, presenting s/p mechanical fall 3 days ago (-head trauma, +AC). Patient sustained wound to left posterior lateral lower extremity with hematoma, that started rebleeding today. Denies fever, chills, n/v, cp, sob, palpitations, cough, back pain, numbness/tingling, abd pain, changes in BM, and urinary symptoms, (24 Dec 2024 18:45)      INFECTIOUS DISEASE HISTORY:  ID consulted for cellulitis    Only answering with yes/no answers  Denies fever  Reports LE pain    Currently ordered for:  ceFAZolin   IVPB 1000 milliGRAM(s) IV Intermittent every 12 hours      PMH  PAST MEDICAL & SURGICAL HISTORY:  Diabetes mellitus      Hypertension      Schizoaffective disorder      Edema  Bilateral LE      Disorder of thyroid, unspecified  Son cannot provide details. States she had "thyroid surgery" decades ago when she was young      No significant past surgical history          FAMILY HISTORY  No pertinent family history in first degree relatives    No pertinent family history in first degree relatives        SOCIAL HISTORY  Social History:        ROS  General: Denies rigors, nightsweats  HEENT: Denies headache, rhinorrhea, sore throat, eye pain  CV: Denies CP, palpitations  PULM: Denies wheezing, hemoptysis  GI: Denies hematemesis, hematochezia, melena  : Denies discharge, hematuria  MSK: Denies arthralgias, myalgias  SKIN: Denies rash, lesions  NEURO: Denies paresthesias, weakness  PSYCH: Denies depression, anxiety     VITALS:  T(F): 97.5, Max: 98.4 (12-24-24 @ 20:28)  HR: 90  BP: 109/75  RR: 18Vital Signs Last 24 Hrs  T(C): 36.4 (25 Dec 2024 04:35), Max: 36.9 (24 Dec 2024 20:28)  T(F): 97.5 (25 Dec 2024 04:35), Max: 98.4 (24 Dec 2024 20:28)  HR: 90 (25 Dec 2024 04:35) (68 - 90)  BP: 109/75 (25 Dec 2024 04:35) (90/55 - 121/88)  BP(mean): --  RR: 18 (25 Dec 2024 04:35) (16 - 18)  SpO2: 99% (25 Dec 2024 04:49) (97% - 100%)    Parameters below as of 25 Dec 2024 04:35  Patient On (Oxygen Delivery Method): nasal cannula  O2 Flow (L/min): 3      PHYSICAL EXAM:  Gen: NAD, resting in bed, obese  HEENT: Normocephalic, atraumatic  Neck: supple, no lymphadenopathy  CV: Regular rate & regular rhythm  Lungs: decreased BS at bases, no fremitus  Abdomen: Soft, BS present  Ext: Warm, well perfused  Neuro: non focal, awake,   Psych flat affect  Skin: b/l LE edema, LLE lateral hematoma with drainage, tender, erythema of the LLE  Lines: no phlebitis     TESTS & MEASUREMENTS:                        7.9    14.12 )-----------( 348      ( 24 Dec 2024 21:55 )             26.2     12-24    145  |  99  |  52[H]  ----------------------------<  140[H]  4.6   |  37[H]  |  1.5    Ca    10.0      24 Dec 2024 13:00    TPro  6.6  /  Alb  3.1[L]  /  TBili  0.3  /  DBili  x   /  AST  14  /  ALT  13  /  AlkPhos  88  12-24      LIVER FUNCTIONS - ( 24 Dec 2024 13:00 )  Alb: 3.1 g/dL / Pro: 6.6 g/dL / ALK PHOS: 88 U/L / ALT: 13 U/L / AST: 14 U/L / GGT: x           Urinalysis Basic - ( 24 Dec 2024 13:00 )    Color: x / Appearance: x / SG: x / pH: x  Gluc: 140 mg/dL / Ketone: x  / Bili: x / Urobili: x   Blood: x / Protein: x / Nitrite: x   Leuk Esterase: x / RBC: x / WBC x   Sq Epi: x / Non Sq Epi: x / Bacteria: x        Culture - Urine (collected 06-05-24 @ 12:16)  Source: Clean Catch Clean Catch (Midstream)  Final Report (06-09-24 @ 10:43):    >100,000 CFU/ml Escherichia coli ESBL  Organism: Escherichia coli ESBL (06-09-24 @ 10:43)  Organism: Escherichia coli ESBL (06-09-24 @ 10:43)      Method Type: JOO      -  Ampicillin: R >16 These ampicillin results predict results for amoxicillin      -  Ampicillin/Sulbactam: R >16/8      -  Aztreonam: R >16      -  Cefazolin: R >16 For uncomplicated UTI with K. pneumoniae, E. coli, or P. mirablis: JOO <=16 is sensitive and JOO >=32 is resistant. This also predicts results for oral agents cefaclor, cefdinir, cefpodoxime, cefprozil, cefuroxime axetil, cephalexin and locarbef for uncomplicated UTI. Note that some isolates may be susceptible to these agents while testing resistant to cefazolin.      -  Cefepime: R >16      -  Ceftriaxone: R >32      -  Cefuroxime: R >16      -  Ciprofloxacin: R >2      -  Ertapenem: S <=0.5      -  Gentamicin: S <=2      -  Imipenem: S <=1      -  Levofloxacin: R >4      -  Meropenem: S <=1      -  Nitrofurantoin: S <=32 Should not be used to treat pyelonephritis      -  Piperacillin/Tazobactam: SDD 16      -  Tobramycin: R >8      -  Trimethoprim/Sulfamethoxazole: R >2/38    Culture - Urine (collected 06-04-24 @ 19:10)  Source: Clean Catch Clean Catch (Midstream)  Final Report (06-05-24 @ 22:12):    <10,000 CFU/mL Normal Urogenital Randa    Culture - Urine (collected 03-29-24 @ 07:52)  Source: Clean Catch Clean Catch (Midstream)  Final Report (03-31-24 @ 10:01):    >=3 organisms. Probable collection contamination.        Lactate, Blood: 1.4 mmol/L (12-24-24 @ 13:00)  Lactate, Blood: 1.3 mmol/L (12-24-24 @ 13:00)      INFECTIOUS DISEASES TESTING      INFLAMMATORY MARKERS      RADIOLOGY & ADDITIONAL TESTS:  I have personally reviewed the last Chest xray  CXR  Xray Chest 1 View AP/PA:   ACC: [id number] EXAM:  XR CHEST 1 VIEW   ORDERED BY: SKYLER HOUSE     PROCEDURE DATE:  12/24/2024          INTERPRETATION:  CLINICAL HISTORY: Trauma    COMPARISON: 7/7/2024.    TECHNIQUE: Frontal    FINDINGS:    Cardiac/mediastinum/hilum: Stable with stable large right paratracheal   density    Lung parenchyma/Pleura: Bilateral lung opacities    Skeleton/soft tissues: Degenerative change..      IMPRESSION:    Bilateral lung opacities    Stable large right paratracheal density    --- End of Report ---            KARLO JAUREGUI MD; Attending Radiologist  This document has been electronically signed. Dec 24 2024 12:50PM (12-24-24 @ 12:43)      CT  CT Chest w/ IV Cont:   ACC: [id number] EXAM:  CT CHEST IC   ORDERED BY: SKYLER HOUSE     ACC: [id number] EXAM:  CT ABDOMEN AND PELVIS IC   ORDERED BY: SKYLER HOUSE     PROCEDURE DATE:  12/24/2024          INTERPRETATION:  CLINICAL INFORMATION: Trauma. Fall.  Wound/hematoma left   lower extremity.     WBC 14.91    PMHx of diabetes, HTN, LE edema,   bipolar disorder, tachybrady syndrome on Eliquis (declining PPM), mild   atrial stenosis    COMPARISON: CT scan of the chest dated 2/16/2024 and CT of the abdomen   and pelvis dated 3/29/2024    CONTRAST/COMPLICATIONS:  IV Contrast:   The patient received 95 ml of Omnipaque 350 with 5 ml   discarded.  Oral Contrast:  None  Complications:  None reported at time of study completion    PROCEDURE:  CT of the Chest, Abdomen and Pelvis was performed.  Sagittal and coronal reformats were performed.    FINDINGS:  CHEST:    TUBES AND LINES: None.  LUNGS AND LARGE AIRWAYS: The central tracheobronchial tree is patent.   There are patchy areas of groundglass opacity in the right upper lobe   which may represent areas of atelectasis or inflammation. There are mild   atelectatic changes at the lung bases. There is no pneumothorax.  PLEURA: There is a small right pleural effusion.  VESSELS: There is no evidence of central pulmonary embolism. Normal   caliber aorta and pulmonary artery. Mild aortic calcifications are noted.   There is no evidence of aortic aneurysm or dissection.  HEART: Cardiomegaly. No pericardial effusion.  MEDIASTINUM AND ASHLEY: There are no suspicious mediastinal, hilar or   axillary lymph nodes. Post left thyroidectomy. Substernal goiter of the   right lobe of the thyroid gland into the right-sided mediastinum with   displacement of the trachea and esophagus to the left.  CHEST WALL AND LOWER NECK: Unremarkable    ABDOMEN AND PELVIS:    LIVER: The liver is normal in size with no evidence of solid mass. The   portal vein is patent.  BILE DUCTS: Normal caliber. A 3 x 4.5 mm calcification is seen in the   region of the ampulla that appears to be a distal common duct stone.  GALLBLADDER: An intraluminal 3 x 4 cm filling defect is noted within the   gallbladder compatible with a large stone. Dense material seen in the   gallbladder fundus which may represent inspissated bile/sludge. Several   small calcified stones are noted.  SPLEEN: Within normal limits.  PANCREAS: The pancreas is normal in size and configuration. No evidence   of mass or pancreatitis.  ADRENALS: Within normal limits.  KIDNEYS/URETERS: There is symmetric renal enhancement. No evidence of   hydronephrosis, calcified stones, or solid mass. Right renal cysts.    BLADDER: Unremarkable.  REPRODUCTIVE ORGANS: Unremarkable    BOWEL: Colonic diverticula. No evidence of bowel obstruction, colitis, or   inflammatory process. Normal caliber appendix.  PERITONEUM/RETROPERITONEUM: No ascites. No free intraperitoneal air.  VESSELS: Aortic calcifications are noted with no evidence of abdominal   aortic aneurysm. Circumaortic left renal vein.  LYMPH NODES: No abdominal or pelvic adenopathy.  ABDOMINAL WALL: Unremarkable.  BONES: Degenerative changes of the spine. Misregistration artifact mid   sternum.    IMPRESSION:    No evidence of thoracic, abdominal or pelvic visceral injury, laceration,   or hematoma.    No evidence of acute fracture.    An intraluminal filling defect is noted within the gallbladder compatible   with a large stone. Dense material seen in the gallbladder fundus which   may represent inspissated bile/sludge.    --- End of Report ---            YVETTE HELLER; Attending Interventional Radiologist  This document has been electronically signed. Dec 24 2024  5:20PM (12-24-24 @ 16:39)      CARDIOLOGY TESTING  12 Lead ECG:   Ventricular Rate 70 BPM    QRS Duration 102 ms    Q-T Interval 372 m <TRUNCATED> (12-24-24 @ 12:37)       MEDICATIONS  ceFAZolin   IVPB 1000 IV Intermittent every 12 hours  chlorhexidine 2% Cloths 1 Topical <User Schedule>  dextrose 5%. 1000 IV Continuous <Continuous>  dextrose 5%. 1000 IV Continuous <Continuous>  dextrose 50% Injectable 25 IV Push once  dextrose 50% Injectable 12.5 IV Push once  dextrose 50% Injectable 25 IV Push once  ferrous    sulfate 325 Oral daily  folic acid 1 Oral daily  furosemide    Tablet 40 Oral daily  glucagon  Injectable 1 IntraMuscular once  influenza  Vaccine (HIGH DOSE) 0.5 IntraMuscular once  insulin lispro (ADMELOG) corrective regimen sliding scale  SubCutaneous three times a day before meals  losartan 50 Oral daily  metoprolol succinate ER 25 Oral daily  pantoprazole    Tablet 40 Oral before breakfast  risperiDONE   Tablet 1 Oral daily  rosuvastatin 10 Oral at bedtime  senna 2 Oral at bedtime  silver sulfADIAZINE 1% Cream 1 Topical daily  vitamin A & D Ointment 1 Topical daily      ANTIBIOTICS:  ceFAZolin   IVPB 1000 milliGRAM(s) IV Intermittent every 12 hours      ALLERGIES:  No Known Allergies

## 2024-12-25 NOTE — PROGRESS NOTE ADULT - SUBJECTIVE AND OBJECTIVE BOX
{\rtf1\kglrlm11178\ansi\sysboit4538\ftnbj\uc1\deff0  {\fonttbl{\f0 \fnil Segoe UI;}{\f1 \fnil \fcharset0 Segoe UI;}{\f2 \fnil Times New Joshua;}}  {\colortbl ;\qvb774\plord855\ugfi282 ;\red0\green0\blue0 ;\red0\green0\mwjq925 ;\red0\green0\blue0 ;}  {\stylesheet{\f0\fs20 Normal;}{\cs1 Default Paragraph Font;}{\cs2\f0\fs16 Line Number;}{\cs3\f2\fs24\ul\cf3 Hyperlink;}}  {\*\revtbl{Unknown;}}  \mydjho23655\xqttix24106\mrdtz3697\goubm4657\vyhra4888\vynyj1842\islatqe209\vztulnc215\nogrowautofit\coqneh702\formshade\nofeaturethrottle1\dntblnsbdb\fet4\aendnotes\aftnnrlc\pgbrdrhead\pgbrdrfoot  \sectd\kfpwup75111\yfsmgn32137\guttersxn0\baplbsjl2075\cxrcdfdf6253\ikjwtfir0638\hhrjtygb6464\tnqetyo209\igjgbxo069\sbkpage\pgncont\pgndec  \plain\plain\f0\fs24\pard\plain\f0\fs24\plain\f0\fs20\jcgj1978\hich\f0\dbch\f0\loch\f0\fs20\par  DEMAYO, CHELA\par  84y\par  Female\par  \par  \par  Patient is a 84y old  Female who presents with a chief complaint of acute blood loss anemia, cellulitis (25 Dec 2024 06:21)\par  \par  \par  \par  REVIEW OF SYSTEMS:\par  CONSTITUTIONAL: No fever, weight loss, or fatigue\par  RESPIRATORY: No cough, wheezing, chills or hemoptysis; No shortness of breath\par  CARDIOVASCULAR: No chest pain, palpitations, dizziness, or leg swelling\par  GASTROINTESTINAL: No abdominal or epigastric pain. No nausea, vomiting, or hematemesis; No diarrhea or constipation. No melena or hematochezia.\par  GENITOURINARY: No dysuria, frequency, hematuria, \par  MUSCULOSKELETAL: No joint pain or swelling; No muscle, back, or extremity pain\par  PSYCHIATRIC: No depression, anxiety, mood swings, or difficulty sleeping\par  HEME/LYMPH: No easy bruising, or bleeding gums\par  ALLERY AND IMMUNOLOGIC: No hives or eczema\par  FAMILY HISTORY:\par  \par  T(C): 36.4 (12-25-24 @ 04:35), Max: 36.9 (12-24-24 @ 20:28)\par  HR: 90 (12-25-24 @ 04:35) (68 - 90)\par  BP: 109/75 (12-25-24 @ 04:35) (90/55 - 121/88)\par  RR: 18 (12-25-24 @ 04:35) (16 - 18)\par  SpO2: 99% (12-25-24 @ 04:49) (97% - 100%)\par  Wt(kg): --Vital Signs Last 24 Hrs\par  T(C): 36.4 (25 Dec 2024 04:35), Max: 36.9 (24 Dec 2024 20:28)\par  T(F): 97.5 (25 Dec 2024 04:35), Max: 98.4 (24 Dec 2024 20:28)\par  HR: 90 (25 Dec 2024 04:35) (68 - 90)\par  BP: 109/75 (25 Dec 2024 04:35) (90/55 - 121/88)\par  BP(mean): --\par  RR: 18 (25 Dec 2024 04:35) (16 - 18)\par  SpO2: 99% (25 Dec 2024 04:49) (97% - 100%)\par  \par  Parameters below as of 25 Dec 2024 04:35\par  Patient On (Oxygen Delivery Method): nasal cannula\par  O2 Flow (L/min): 3\par  \par  No Known Allergies\par  \par  \par  PHYSICAL EXAM:\par  GENERAL: NAD, \par  HEAD:  Atraumatic, Normocephalic\par  EYES: EOMI, PERRLA, conjunctiva and sclera clear\par  ENMT: No tonsillar erythema, exudates, or enlargement;\par  NECK: Supple, No JVD, Normal thyroid\par  NERVOUS SYSTEM:  Alert & Oriented X3, \par  CHEST/LUNG: Clear to percussion bilaterally; No rales, rhonchi, wheezing, or rubs\par  HEART: Regular rate and rhythm; No murmurs, rubs, or gallops\par  ABDOMEN: Soft, Nontender, Nondistended; Bowel sounds present\par  EXTREMITIES:   No clubbing, cyanosis, or edema,lt leg edema,hematoma+\par  LYMPH: No lymphadenopathy noted\par  SKIN: No rashes or lesions\par  \par  \par  LABS:\par  12-24\par  \par  145  |  99  |  52[H]\par  ----------------------------<  140[H]\par  4.6   |  37[H]  |  1.5\par  \par  Ca    10.0      24 Dec 2024 13:00\par  \par  TPro  6.6  /  Alb  3.1[L]  /  TBili  0.3  /  DBili  x   /  AST  14  /  ALT  13  /  AlkPhos  88  12-24\par  \par           \par             7.9  \par  14.12 )-----------( 348      ( 24 Dec 2024 21:55 )\par             26.2 \par  \par  \par  < from: CT Angio Lower Extremity w/ IV Cont, Left (12.24.24 @ 16:39) >\par  \ql\plain\f0\fs24\plain\f1\fs16\efqm5076\hich\f1\dbch\f1\loch\f1\cf2\fs16 A subcutaneous hematoma is noted along the lateral and posterior aspect \par  of the left calf. The hematoma measures approximately 11.5 x 2 x 18 cm.\par  \par  There is no evidence of active extravasation.\par  \par  There is no evidence of vascular injury, occlusion, dissection, or \par  \pard\plain\f0\fs24\plain\f1\fs16\gmge4758\hich\f1\dbch\f1\loch\f1\cf2\fs16 pseudoaneurysm.\par  \par  < end of copied text >\par  \ql\plain\f0\fs24\plain\f0\fs20\opok1151\hich\f0\dbch\f0\loch\f0\fs20\par  \pard\plain\f0\fs24\plain\f0\fs20\ojjg7865\hich\f0\dbch\f0\loch\f0\fs20 RADIOLOGY & ADDITIONAL TESTS:\par  \par  MEDICATION:\par  acetaminophen     Tablet .. 650 milliGRAM(s) Oral every 6 hours PRN\par  ceFAZolin   IVPB 1000 milliGRAM(s) IV Intermittent every 12 hours\par  chlorhexidine 2% Cloths 1 Application(s) Topical <User Schedule>\par  dextrose 5%. 1000 milliLiter(s) IV Continuous <Continuous>\par  dextrose 5%. 1000 milliLiter(s) IV Continuous <Continuous>\par  dextrose 50% Injectable 25 Gram(s) IV Push once\par  dextrose 50% Injectable 12.5 Gram(s) IV Push once\par  dextrose 50% Injectable 25 Gram(s) IV Push once\par  dextrose Oral Gel 15 Gram(s) Oral once PRN\par  ferrous    sulfate 325 milliGRAM(s) Oral daily\par  folic acid 1 milliGRAM(s) Oral daily\par  furosemide    Tablet 40 milliGRAM(s) Oral daily\par  glucagon  Injectable 1 milliGRAM(s) IntraMuscular once\par  influenza  Vaccine (HIGH DOSE) 0.5 milliLiter(s) IntraMuscular once\par  insulin lispro (ADMELOG) corrective regimen sliding scale   SubCutaneous three times a day before meals\par  losartan 50 milliGRAM(s) Oral daily\par  metoprolol succinate ER 25 milliGRAM(s) Oral daily\par  pantoprazole    Tablet 40 milliGRAM(s) Oral before breakfast\par  risperiDONE   Tablet 1 milliGRAM(s) Oral daily\par  rosuvastatin 10 milliGRAM(s) Oral at bedtime\par  senna 2 Tablet(s) Oral at bedtime\par  silver sulfADIAZINE 1% Cream 1 Application(s) Topical daily\par  vitamin A & D Ointment 1 Application(s) Topical daily\par  \par  \par  HEALTH ISSUES - PROBLEM Dx:\par  \par  \ql\plain\f0\fs24\plain\f1\fs16\pfwc6227\hich\f1\dbch\f1\loch\f1\cf2\fs16 This is a 84-year-old female brought in by EMS from [facility], with past medical history of  diabetes, HTN, chronic LE edema, bipolar disorder, tachybrady syndrome (declining   PPM), A fib (on Eliquis), mild aortic stenosis, presenting s/p mechanical fall 3 days ago (-head trauma, +AC). Patient sustained wound to left posterior lateral lower extremity with hematoma, that started rebleeding today. Denies fever, chills, n/v, cp,   sob, palpitations, cough, back pain, numbness/tingling, abd pain, changes in BM, and urinary symptoms,\par  \par  #LLE subcutaneous hematoma and cellulitis \par  #Fall\par  -Admit to medicine \par  -CTA LE: A subcutaneous hematoma is noted along the lateral and posterior aspect of the left calf. The hematoma measures approximately 11.5 x 2 x 18 cm.\par  -WBC 14.91, lactate WNL \par  -S/p Aztreonam, Flagyl, and Vancomycin in ED\par  -C/w IV Ancef 1 g q12 as per  \par  -Surgery following: No acute surgical intervention, continue daily wound care (telfa/gauze/abd/kerlix/ACE wrap) \par  -ID consult \par  -Wound care consult \par  -Pain control prn \par  -PT consult \par  -Fall precautions \par  \par  #Acute blood loss anemia \par  #H/o iron deficiency anemia \plain\f0\fs20\egfc2631\hich\f0\dbch\f0\loch\f0\fs20\par  \plain\f1\fs16\qlmc8185\hich\f1\dbch\f1\loch\f1\cf2\fs16 -Hgb 6.7 (10.2 in 7/2024). Baseline Hgb ~8- 9\par  -Hold Eliquis in setting of acute blood loss anemia \par  -F/U post transfusion CBC \par  -C/w ferrous sulfate 325 mg \par  \par  #Acute hypercarbic respiratory failure likely 2/2 MANJEET \par  -BiPAP HS 14/8\par  -Supplemental O2 prn \par  \par  #DM\par  -Holding Farxiga 10 mg \par  -ISS while inpatient \par  \par  #HTN\par  -C/w Losartan 50 mg \par  \par  #HLD\par  -C/w Crestor 10 mg \par  \par  #Bipolar\par  -C/w Risperdal 1 mg \par  \par  #Chronic LE edema\par  -C/w home Lasix\par  \par  #H/o A fib\par  -C/w Metoprolol Succinate ER 25 mg tab \par  -Holding home Eliquis in setting of acute blood loss anemia\par  \par  Diet: DASH/CC, fluid restriction 1200 ml/day \par  Activity: Ambulate with assistance\par  VTE ppx: Holding home Eliquis in setting of acute blood loss anemia \par  GI ppx: Protonix  \par  \par  \par  \par  \plain\f1\fs16\ssdh8047\hich\f1\dbch\f1\loch\f1\cf2\fs16\strike\plain\f1\fs16\jvls6246\hich\f1\dbch\f1\loch\f1\cf2\fs16\plain\f1\fs16\nnas6669\hich\f1\dbch\f1\loch\f1\cf2\fs16\par  \pard\plain\f0\fs24\plain\f0\fs20\flmc6471\hich\f0\dbch\f0\loch\f0\fs20\par  \ql\plain\f0\fs24\plain\f0\fs20\anid4631\hich\f0\dbch\f0\loch\f0\fs20\par  }

## 2024-12-25 NOTE — PHYSICAL THERAPY INITIAL EVALUATION ADULT - MANUAL MUSCLE TESTING RESULTS, REHAB EVAL
1st attempt:    Community Health Worker reached out to patient this afternoon in regards to referral from [facility] Donnell Ocasio to assist patient with [facility]  [facility] ninoska  Had to leave a vm for patient to call me back  Will let OPCM know 
CHW closing patient's referral at this time due to patient declining services for [facility]  Received update from [facility] MSW of this  No other concerns at the moment 
R LE grossly 3+5, L LE grossly 2/5

## 2024-12-25 NOTE — PROGRESS NOTE ADULT - SUBJECTIVE AND OBJECTIVE BOX
GENERAL SURGERY PROGRESS NOTE     CHELA GALLAGHER  84y  Female  Hospital day :1d  24-HOUR EVENTS:  Pt admitted to medicine, completed 2U pRBC transfusion. NAEON. This AM, pt resting in bed, NAD.    T(F): 97.5 (12-25-24 @ 04:35), Max: 98.4 (12-24-24 @ 20:28)  HR: 90 (12-25-24 @ 04:35) (68 - 90)  BP: 109/75 (12-25-24 @ 04:35) (90/55 - 121/88)  ABP: --  ABP(mean): --  RR: 18 (12-25-24 @ 04:35) (16 - 18)  SpO2: 99% (12-25-24 @ 04:49) (97% - 100%)    DIET/FLUIDS: dextrose 5%. 1000 milliLiter(s) IV Continuous <Continuous>  dextrose 5%. 1000 milliLiter(s) IV Continuous <Continuous>  ferrous    sulfate 325 milliGRAM(s) Oral daily  folic acid 1 milliGRAM(s) Oral daily    URINE:  Indwelling Urethral Catheter:     Connect To:  Straight Drainage/[place]    Indication:  Urinary Retention / Obstruction (12-25-24 @ 06:28)    GI proph:  pantoprazole    Tablet 40 milliGRAM(s) Oral before breakfast    AC/ proph:   ABx: ceFAZolin   IVPB 1000 milliGRAM(s) IV Intermittent every 12 hours    PHYSICAL EXAM:  GENERAL: A&Ox3, NAD  HEENT: Normocephalic, atraumatic  PULM: Non-labored respirations, bilateral chest rise, saturating well on 3L NC  CV: Regular rate and rhythm  MSK: Moving extremities spontaneously, BUE and BLE sensorimotor and strength intact, LLE wound with decreased drainage, decreased surrounding erythema, soft black eschar overlying wound   SKIN: Warm, dry, normal skin color, texture, turgor    LABS  CAPILLARY BLOOD GLUCOSE  POCT Blood Glucose.: 144 mg/dL (25 Dec 2024 07:45)  POCT Blood Glucose.: 170 mg/dL (25 Dec 2024 04:46)                        7.9    14.12 )-----------( 348      ( 24 Dec 2024 21:55 )             26.2       Auto Neutrophil %: 82.9 % (12-24-24 @ 21:55)  Auto Immature Granulocyte %: 0.5 % (12-24-24 @ 21:55)  Auto Immature Granulocyte %: 0.4 % (12-24-24 @ 13:00)  Auto Neutrophil %: 88.6 % (12-24-24 @ 13:00)    12-24    145  |  99  |  52[H]  ----------------------------<  140[H]  4.6   |  37[H]  |  1.5    Magnesium: 2.5 mg/dL (12-25-24 @ 05:00)    LFTs:             6.6  | 0.3  | 14       ------------------[88      ( 24 Dec 2024 13:00 )  3.1  | x    | 13          Lipase:33     Amylase:x        Lactate, Blood: 1.4 mmol/L (12-24-24 @ 13:00)  Lactate, Blood: 1.3 mmol/L (12-24-24 @ 13:00)    Coags:     18.50  ----< 1.55    ( 24 Dec 2024 13:00 )     33.1      Urinalysis Basic - ( 24 Dec 2024 13:00 )    Color: x / Appearance: x / SG: x / pH: x  Gluc: 140 mg/dL / Ketone: x  / Bili: x / Urobili: x   Blood: x / Protein: x / Nitrite: x   Leuk Esterase: x / RBC: x / WBC x   Sq Epi: x / Non Sq Epi: x / Bacteria: x    RADIOLOGY & ADDITIONAL TESTS:  < from: CT Angio Lower Extremity w/ IV Cont, Left (12.24.24 @ 16:39) >  ACC: [id number] EXAM:  CT ANGIO LWR EXT (W)AW IC LT   ORDERED BY: [facility]     PROCEDURE DATE:  12/24/2024          INTERPRETATION:  CLINICAL INFORMATION:  Trauma. Fall. Wound/hematoma left   lower extremity. WBC 14.91   Hgb 6.7   PMHx of diabetes, HTN, LE edema,   bipolar disorder, tachybrady syndrome on Eliquis (declining PPM), mild   atrial stenosis.    COMPARISON: None    CONTRAST/COMPLICATIONS:  IV Contrast:   The patient received 95 ml of Omnipaque 350 with 5 ml   discarded.  Oral Contrast:  None  Complications:  None reported at time of study completion    TECHNIQUE: Multiple CT images were obtained from the mid left knee   through the left ankle following intravenous contrast administration.   Reformatted images in the coronal andsagittal planes were acquired, as   well as 3D, Volume rendering, and MIP reconstructed images.    FINDINGS:    LEFT LOWER EXTREMITY ANGIO:    Popliteal: The left popliteal artery is patent.  3 Vessel Runoff: The left anterior and posterior tibial arteries and the   peroneal artery are patent to the foot and ankle. The dorsalis pedis   artery is patent.    There is no evidence of extravasation.  There is no evidence of vascular injury, occlusion, dissection, or   pseudoaneurysm.    -----------------------------    NONVASCULAR FINDINGS:    BONES/SOFT TISSUES:    A subcutaneous hematoma is noted along the lateral and posterior aspect   of the left calf. The hematoma measures approximately 11.5 x 2 x 18 cm.    A dressing is seen overlying the region of the wound.    There is no evidence of fracture.      IMPRESSION:    A subcutaneous hematoma is noted along the lateral and posterior aspect   of the left calf. The hematoma measures approximately 11.5 x 2 x 18 cm.    There is no evidence of active extravasation.    There is no evidence of vascular injury, occlusion, dissection, or   pseudoaneurysm.    < end of copied text >    ASSESSMENT:  84F PMHx DM, HTN, chronic BLE edema, bipolar disorder, tachybrady syndrome (on Eliquis) who presented to the ED 12/24 post-fall 2-3 days earlier at [facility]. Surgery consulted to evaluate LLE wound.     PLAN:  - No acute surgical intervention indicated, continue wound care  - Eliquis held by primary in setting of acute blood loss anemia     - Post-transfusion hgb 7.9  - Continue to monitor WBC (14.1 from 14.9)  - Ancef 1g Q12  - Management per primary    Final plan pending discussion with attending    Surgery (1192)

## 2024-12-25 NOTE — PHYSICAL THERAPY INITIAL EVALUATION ADULT - PERTINENT HX OF CURRENT PROBLEM, REHAB EVAL
This is a 84-year-old female brought in by EMS from [facility], with past medical history of  diabetes, HTN, chronic LE edema, bipolar disorder, tachybrady syndrome (declining PPM), A fib (on Eliquis), mild aortic stenosis, presenting s/p mechanical fall 3 days ago (-head trauma, +AC). Patient sustained wound to left posterior lateral lower extremity with hematoma, that started rebleeding today. Denies fever, chills, n/v, cp, sob, palpitations, cough, back pain, numbness/tingling, abd pain, changes in BM, and urinary symptoms

## 2024-12-25 NOTE — PHYSICAL THERAPY INITIAL EVALUATION ADULT - ADDITIONAL COMMENTS
pt is a resident of [facility], was able to amb with RW prior to admission, no stairs in the building

## 2024-12-25 NOTE — PROCEDURE NOTE - ADDITIONAL PROCEDURE DETAILS
Incision and drainage of infected hematoma performed. 20cc 1% lidocaine injected. 10 blade used to incise eschar overlying hematoma, blood clots evacuated. defected measured approximately 6x6 cm. Wound irrigated with saline, packed with 2 sheets Aquacel, dressed with gauze, abdominal pads, Kerlix, and ACE bandage. Plan to remove packing tomorrow AM at bedside, will continue with daily dressings. Dr. Aguirre was present for the entirety of the procedure. Incision and drainage of infected hematoma performed. 20cc 1% lidocaine injected. 10 blade used to incise eschar overlying hematoma, blood clots evacuated. defected measured approximately 6x6 cm. Swabbed and culture to pathology. Wound irrigated with saline, packed with 2 sheets Aquacel, dressed with gauze, abdominal pads, Kerlix, and ACE bandage. Plan to remove packing tomorrow AM at bedside, will continue with daily dressings. Dr. Aguirre was present for the entirety of the procedure. Incision and drainage of infected hematoma performed. 20cc 1% lidocaine injected. 10 blade used to incise eschar overlying hematoma, blood clots evacuated. defected measured approximately 6x6 cm. Swabbed and culture to pathology. Wound irrigated with saline, packed with 2 sheets Aquacel, dressed with gauze, abdominal pads, Kerlix, and ACE bandage. Plan to remove packing tomorrow AM at bedside, will continue with daily dressings. Dr. Aguirre was present for the entirety of the procedure.    Preprocedure dimension 6x 10cm  Postprocedure dimension [id number]

## 2024-12-25 NOTE — CHART NOTE - NSCHARTNOTEFT_GEN_A_CORE
pt received two units PRBC.  Repeat H/H post transfusion is 7.9/26.2   D/W Dr Glaser.  CBC for morning rounds.

## 2024-12-25 NOTE — CONSULT NOTE ADULT - ASSESSMENT
ASSESSMENT  84-year-old female brought in by EMS from [facility], with past medical history of  diabetes, HTN, chronic LE edema, bipolar disorder, tachybrady syndrome (declining PPM), A fib (on Eliquis), mild aortic stenosis, presenting s/p mechanical fall 3 days ago (-head trauma, +AC). Patient sustained wound to left posterior lateral lower extremity with hematoma, that started rebleeding today.     IMPRESSION  #    Admission WBC 14; Afebrile   < from: CT Angio Lower Extremity w/ IV Cont, Left (12.24.24 @ 16:39) >  A subcutaneous hematoma is noted along the lateral and posterior aspect of the left calf. The hematoma measures approximately 11.5 x 2 x 18 cm.  There is no evidence of active extravasation.  There is no evidence of vascular injury, occlusion, dissection, or pseudoaneurysm.    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 All final recommendations to follow after interview and examination of the patient. Please follow recommendations noted below.    If any questions, please send a message or call on [employer] Teams  Please continue to update ID with any pertinent new laboratory, radiographic findings, or change in clinical status ASSESSMENT  84-year-old female brought in by EMS from [facility], with past medical history of  diabetes, HTN, chronic LE edema, bipolar disorder, tachybrady syndrome (declining PPM), A fib (on Eliquis), mild aortic stenosis, presenting s/p mechanical fall 3 days ago (-head trauma, +AC). Patient sustained wound to left posterior lateral lower extremity with hematoma, that started rebleeding today.     IMPRESSION  #LLE hematoma + cellulitis , no abscess     Admission WBC 14; Afebrile   < from: CT Angio Lower Extremity w/ IV Cont, Left (12.24.24 @ 16:39) >  A subcutaneous hematoma is noted along the lateral and posterior aspect of the left calf. The hematoma measures approximately 11.5 x 2 x 18 cm.  There is no evidence of active extravasation.  There is no evidence of vascular injury, occlusion, dissection, or pseudoaneurysm.  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  which could results in poor clinical outcomes  #Abx allergy: No Known Allergies  #BRYAN  Creatinine: 1.5 (12-24-24 @ 13:00) 48 mL/min  Creatinine clearance, original Cockcroft-Gault    Height (cm): 160 (07-07-24 @ 08:17)  Weight (kg): 113.4 (12-24-24 @ 11:16)    RECOMMENDATIONS  - Please enter ht into sunrise  - MRSA nares  - D/C cefazolin--> Unasyn 3g q6h IV  - Anticipate on D/C PO augmentin 875mg bid x 7 days, (if MRSA nares +, add doxy 100mg BID)  - Local wound care  - Trend WBC, Hgb     If any questions, please send a message or call on [employer] Teams  Please continue to update ID with any pertinent new laboratory, radiographic findings, or change in clinical status

## 2024-12-25 NOTE — PHYSICAL THERAPY INITIAL EVALUATION ADULT - GENERAL OBSERVATIONS, REHAB EVAL
15;00-15;20 pt was seen for PT IE at bed side, pt is agreeable, chart thoroughly reviewed, RN is aware.  Pt received and left semi jane in bed, in no apparent distress, +hep lock, +O2 via NC, +primafit, +call bell within reach, bed side table at reach

## 2024-12-26 LAB
A1C WITH ESTIMATED AVERAGE GLUCOSE RESULT: 6.2 % — HIGH (ref 4–5.6)
ALBUMIN SERPL ELPH-MCNC: 2.8 G/DL — LOW (ref 3.5–5.2)
ALP SERPL-CCNC: 78 U/L — SIGNIFICANT CHANGE UP (ref 30–115)
ALT FLD-CCNC: 6 U/L — SIGNIFICANT CHANGE UP (ref 0–41)
ANION GAP SERPL CALC-SCNC: 9 MMOL/L — SIGNIFICANT CHANGE UP (ref 7–14)
AST SERPL-CCNC: 8 U/L — SIGNIFICANT CHANGE UP (ref 0–41)
BASE EXCESS BLDA CALC-SCNC: 8 MMOL/L — HIGH (ref -2–3)
BASOPHILS # BLD AUTO: 0.03 K/UL — SIGNIFICANT CHANGE UP (ref 0–0.2)
BASOPHILS # BLD AUTO: 0.05 K/UL — SIGNIFICANT CHANGE UP (ref 0–0.2)
BASOPHILS NFR BLD AUTO: 0.3 % — SIGNIFICANT CHANGE UP (ref 0–1)
BASOPHILS NFR BLD AUTO: 0.4 % — SIGNIFICANT CHANGE UP (ref 0–1)
BILIRUB SERPL-MCNC: <0.2 MG/DL — SIGNIFICANT CHANGE UP (ref 0.2–1.2)
BUN SERPL-MCNC: 54 MG/DL — HIGH (ref 10–20)
BUN SERPL-MCNC: 55 MG/DL — HIGH (ref 10–20)
CALCIUM SERPL-MCNC: 8.8 MG/DL — SIGNIFICANT CHANGE UP (ref 8.4–10.5)
CALCIUM SERPL-MCNC: 9 MG/DL — SIGNIFICANT CHANGE UP (ref 8.4–10.5)
CHLORIDE SERPL-SCNC: 98 MMOL/L — SIGNIFICANT CHANGE UP (ref 98–110)
CHLORIDE SERPL-SCNC: 98 MMOL/L — SIGNIFICANT CHANGE UP (ref 98–110)
CO2 SERPL-SCNC: 30 MMOL/L — SIGNIFICANT CHANGE UP (ref 17–32)
CO2 SERPL-SCNC: 33 MMOL/L — HIGH (ref 17–32)
CREAT SERPL-MCNC: 1.8 MG/DL — HIGH (ref 0.7–1.5)
CREAT SERPL-MCNC: 1.9 MG/DL — HIGH (ref 0.7–1.5)
D DIMER BLD IA.RAPID-MCNC: 226 NG/ML DDU — SIGNIFICANT CHANGE UP
EGFR: 25 ML/MIN/1.73M2 — LOW
EGFR: 27 ML/MIN/1.73M2 — LOW
EOSINOPHIL # BLD AUTO: 0.09 K/UL — SIGNIFICANT CHANGE UP (ref 0–0.7)
EOSINOPHIL # BLD AUTO: 0.12 K/UL — SIGNIFICANT CHANGE UP (ref 0–0.7)
EOSINOPHIL NFR BLD AUTO: 0.8 % — SIGNIFICANT CHANGE UP (ref 0–8)
EOSINOPHIL NFR BLD AUTO: 1 % — SIGNIFICANT CHANGE UP (ref 0–8)
ESTIMATED AVERAGE GLUCOSE: 131 MG/DL — HIGH (ref 68–114)
GAS PNL BLDA: SIGNIFICANT CHANGE UP
GLUCOSE BLDC GLUCOMTR-MCNC: 153 MG/DL — HIGH (ref 70–99)
GLUCOSE BLDC GLUCOMTR-MCNC: 173 MG/DL — HIGH (ref 70–99)
GLUCOSE BLDC GLUCOMTR-MCNC: 187 MG/DL — HIGH (ref 70–99)
GLUCOSE BLDC GLUCOMTR-MCNC: 200 MG/DL — HIGH (ref 70–99)
GLUCOSE BLDC GLUCOMTR-MCNC: 214 MG/DL — HIGH (ref 70–99)
GLUCOSE SERPL-MCNC: 138 MG/DL — HIGH (ref 70–99)
GLUCOSE SERPL-MCNC: 155 MG/DL — HIGH (ref 70–99)
HCO3 BLDA-SCNC: 34 MMOL/L — HIGH (ref 21–28)
HCT VFR BLD CALC: 26.7 % — LOW (ref 37–47)
HCT VFR BLD CALC: 28.2 % — LOW (ref 37–47)
HGB BLD-MCNC: 7.8 G/DL — LOW (ref 12–16)
HGB BLD-MCNC: 8.1 G/DL — LOW (ref 12–16)
IMM GRANULOCYTES NFR BLD AUTO: 0.4 % — HIGH (ref 0.1–0.3)
IMM GRANULOCYTES NFR BLD AUTO: 0.5 % — HIGH (ref 0.1–0.3)
LYMPHOCYTES # BLD AUTO: 0.81 K/UL — LOW (ref 1.2–3.4)
LYMPHOCYTES # BLD AUTO: 0.85 K/UL — LOW (ref 1.2–3.4)
LYMPHOCYTES # BLD AUTO: 7 % — LOW (ref 20.5–51.1)
LYMPHOCYTES # BLD AUTO: 7.4 % — LOW (ref 20.5–51.1)
MAGNESIUM SERPL-MCNC: 2.4 MG/DL — SIGNIFICANT CHANGE UP (ref 1.8–2.4)
MAGNESIUM SERPL-MCNC: 2.4 MG/DL — SIGNIFICANT CHANGE UP (ref 1.8–2.4)
MCHC RBC-ENTMCNC: 27.8 PG — SIGNIFICANT CHANGE UP (ref 27–31)
MCHC RBC-ENTMCNC: 27.9 PG — SIGNIFICANT CHANGE UP (ref 27–31)
MCHC RBC-ENTMCNC: 28.7 G/DL — LOW (ref 32–37)
MCHC RBC-ENTMCNC: 29.2 G/DL — LOW (ref 32–37)
MCV RBC AUTO: 95.4 FL — SIGNIFICANT CHANGE UP (ref 81–99)
MCV RBC AUTO: 96.9 FL — SIGNIFICANT CHANGE UP (ref 81–99)
MONOCYTES # BLD AUTO: 0.93 K/UL — HIGH (ref 0.1–0.6)
MONOCYTES # BLD AUTO: 1.09 K/UL — HIGH (ref 0.1–0.6)
MONOCYTES NFR BLD AUTO: 8.1 % — SIGNIFICANT CHANGE UP (ref 1.7–9.3)
MONOCYTES NFR BLD AUTO: 9.4 % — HIGH (ref 1.7–9.3)
NEUTROPHILS # BLD AUTO: 9.38 K/UL — HIGH (ref 1.4–6.5)
NEUTROPHILS # BLD AUTO: 9.58 K/UL — HIGH (ref 1.4–6.5)
NEUTROPHILS NFR BLD AUTO: 81.3 % — HIGH (ref 42.2–75.2)
NEUTROPHILS NFR BLD AUTO: 83.4 % — HIGH (ref 42.2–75.2)
NRBC # BLD: 0 /100 WBCS — SIGNIFICANT CHANGE UP (ref 0–0)
NRBC # BLD: 0 /100 WBCS — SIGNIFICANT CHANGE UP (ref 0–0)
PCO2 BLDA: 51 MMHG — HIGH (ref 32–45)
PH BLDA: 7.43 — SIGNIFICANT CHANGE UP (ref 7.35–7.45)
PLATELET # BLD AUTO: 354 K/UL — SIGNIFICANT CHANGE UP (ref 130–400)
PLATELET # BLD AUTO: 372 K/UL — SIGNIFICANT CHANGE UP (ref 130–400)
PMV BLD: 9.1 FL — SIGNIFICANT CHANGE UP (ref 7.4–10.4)
PMV BLD: 9.5 FL — SIGNIFICANT CHANGE UP (ref 7.4–10.4)
PO2 BLDA: 85 MMHG — SIGNIFICANT CHANGE UP (ref 83–108)
POTASSIUM SERPL-MCNC: 4.7 MMOL/L — SIGNIFICANT CHANGE UP (ref 3.5–5)
POTASSIUM SERPL-MCNC: 5.2 MMOL/L — HIGH (ref 3.5–5)
POTASSIUM SERPL-SCNC: 4.7 MMOL/L — SIGNIFICANT CHANGE UP (ref 3.5–5)
POTASSIUM SERPL-SCNC: 5.2 MMOL/L — HIGH (ref 3.5–5)
PROT SERPL-MCNC: 5.8 G/DL — LOW (ref 6–8)
RBC # BLD: 2.8 M/UL — LOW (ref 4.2–5.4)
RBC # BLD: 2.91 M/UL — LOW (ref 4.2–5.4)
RBC # FLD: 15.1 % — HIGH (ref 11.5–14.5)
RBC # FLD: 15.2 % — HIGH (ref 11.5–14.5)
SAO2 % BLDA: 98.6 % — HIGH (ref 94–98)
SODIUM SERPL-SCNC: 137 MMOL/L — SIGNIFICANT CHANGE UP (ref 135–146)
SODIUM SERPL-SCNC: 140 MMOL/L — SIGNIFICANT CHANGE UP (ref 135–146)
WBC # BLD: 11.49 K/UL — HIGH (ref 4.8–10.8)
WBC # BLD: 11.55 K/UL — HIGH (ref 4.8–10.8)
WBC # FLD AUTO: 11.49 K/UL — HIGH (ref 4.8–10.8)
WBC # FLD AUTO: 11.55 K/UL — HIGH (ref 4.8–10.8)

## 2024-12-26 PROCEDURE — 70450 CT HEAD/BRAIN W/O DYE: CPT | Mod: 26

## 2024-12-26 PROCEDURE — 99232 SBSQ HOSP IP/OBS MODERATE 35: CPT

## 2024-12-26 PROCEDURE — 71045 X-RAY EXAM CHEST 1 VIEW: CPT | Mod: 26

## 2024-12-26 PROCEDURE — 93010 ELECTROCARDIOGRAM REPORT: CPT

## 2024-12-26 RX ORDER — MIDODRINE HYDROCHLORIDE 5 MG/1
10 TABLET ORAL ONCE
Refills: 0 | Status: DISCONTINUED | OUTPATIENT
Start: 2024-12-26 | End: 2024-12-26

## 2024-12-26 RX ORDER — SODIUM CHLORIDE 9 MG/ML
1000 INJECTION, SOLUTION INTRAVENOUS
Refills: 0 | Status: DISCONTINUED | OUTPATIENT
Start: 2024-12-26 | End: 2024-12-27

## 2024-12-26 RX ORDER — SODIUM CHLORIDE 9 MG/ML
500 INJECTION, SOLUTION INTRAMUSCULAR; INTRAVENOUS; SUBCUTANEOUS ONCE
Refills: 0 | Status: DISCONTINUED | OUTPATIENT
Start: 2024-12-26 | End: 2024-12-26

## 2024-12-26 RX ADMIN — Medication 25 MILLIGRAM(S): at 06:16

## 2024-12-26 RX ADMIN — SODIUM CHLORIDE 75 MILLILITER(S): 9 INJECTION, SOLUTION INTRAVENOUS at 11:46

## 2024-12-26 RX ADMIN — Medication 1 MILLIGRAM(S): at 11:46

## 2024-12-26 RX ADMIN — PANTOPRAZOLE 40 MILLIGRAM(S): 40 TABLET, DELAYED RELEASE ORAL at 06:16

## 2024-12-26 RX ADMIN — Medication 325 MILLIGRAM(S): at 11:46

## 2024-12-26 RX ADMIN — RISPERIDONE 1 MILLIGRAM(S): 0.5 TABLET ORAL at 11:46

## 2024-12-26 RX ADMIN — Medication 100 MILLIGRAM(S): at 19:03

## 2024-12-26 RX ADMIN — Medication 100 MILLIGRAM(S): at 06:16

## 2024-12-26 RX ADMIN — CHLORHEXIDINE GLUCONATE 1 APPLICATION(S): 1.2 RINSE ORAL at 06:16

## 2024-12-26 RX ADMIN — LOSARTAN POTASSIUM 50 MILLIGRAM(S): 100 TABLET, FILM COATED ORAL at 06:16

## 2024-12-26 NOTE — PROGRESS NOTE ADULT - SUBJECTIVE AND OBJECTIVE BOX
Subjective: 83 yo female s/p I&D LLE infected hematoma. Pt without complaints. Denies f/c or increased pain.            Vital Signs Last 24 Hrs  T(C): 36.7 (26 Dec 2024 04:30), Max: 36.8 (25 Dec 2024 13:50)  T(F): 98.1 (26 Dec 2024 04:30), Max: 98.2 (25 Dec 2024 13:50)  HR: 100 (26 Dec 2024 04:30) (85 - 102)  BP: 121/67 (26 Dec 2024 04:30) (89/51 - 121/67)  BP(mean): --  RR: 18 (26 Dec 2024 04:30) (18 - 19)  SpO2: 94% (26 Dec 2024 04:30) (93% - 94%)    Parameters below as of 26 Dec 2024 04:30  Patient On (Oxygen Delivery Method): nasal cannula  O2 Flow (L/min): 3      General Appearance: Appears well, NAD  Neck: Supple  Chest: Equal expansion bilaterally, equal breath sounds  CV: Pulse regular presently  Abdomen: Soft, NT/ND,+bs,  no rebound/gaurding  Extremities: Grossly symmetric, no calf tend b/l, LLE with posterior calf I&D +hematoma, no active bleeding, dressing chnaged        I&O's Summary    25 Dec 2024 07:01  -  26 Dec 2024 07:00  --------------------------------------------------------  IN: 0 mL / OUT: 600 mL / NET: -600 mL      I&O's Detail    25 Dec 2024 07:01  -  26 Dec 2024 07:00  --------------------------------------------------------  IN:  Total IN: 0 mL    OUT:    Voided (mL): 600 mL  Total OUT: 600 mL    Total NET: -600 mL          MEDICATIONS  (STANDING):  ceFAZolin   IVPB 1000 milliGRAM(s) IV Intermittent every 12 hours  chlorhexidine 2% Cloths 1 Application(s) Topical <User Schedule>  dextrose 5%. 1000 milliLiter(s) (100 mL/Hr) IV Continuous <Continuous>  dextrose 5%. 1000 milliLiter(s) (50 mL/Hr) IV Continuous <Continuous>  dextrose 50% Injectable 25 Gram(s) IV Push once  dextrose 50% Injectable 12.5 Gram(s) IV Push once  dextrose 50% Injectable 25 Gram(s) IV Push once  ferrous    sulfate 325 milliGRAM(s) Oral daily  folic acid 1 milliGRAM(s) Oral daily  furosemide    Tablet 40 milliGRAM(s) Oral daily  glucagon  Injectable 1 milliGRAM(s) IntraMuscular once  influenza  Vaccine (HIGH DOSE) 0.5 milliLiter(s) IntraMuscular once  insulin lispro (ADMELOG) corrective regimen sliding scale   SubCutaneous three times a day before meals  lidocaine 1% Injectable 30 milliLiter(s) Local Injection once  losartan 50 milliGRAM(s) Oral daily  metoprolol succinate ER 25 milliGRAM(s) Oral daily  pantoprazole    Tablet 40 milliGRAM(s) Oral before breakfast  risperiDONE   Tablet 1 milliGRAM(s) Oral daily  rosuvastatin 10 milliGRAM(s) Oral at bedtime  senna 2 Tablet(s) Oral at bedtime  silver sulfADIAZINE 1% Cream 1 Application(s) Topical daily  vitamin A & D Ointment 1 Application(s) Topical daily    MEDICATIONS  (PRN):  acetaminophen     Tablet .. 650 milliGRAM(s) Oral every 6 hours PRN Temp greater or equal to 38C (100.4F), Mild Pain (1 - 3)  dextrose Oral Gel 15 Gram(s) Oral once PRN Blood Glucose LESS THAN 70 milliGRAM(s)/deciliter      LABS:                        7.8    11.55 )-----------( 372      ( 26 Dec 2024 05:50 )             26.7     12-26    140  |  98  |  54[H]  ----------------------------<  138[H]  4.7   |  33[H]  |  1.8[H]    Ca    9.0      26 Dec 2024 05:50  Phos  4.9     12-25  Mg     2.4     12-26    TPro  6.2  /  Alb  2.9[L]  /  TBili  0.3  /  DBili  x   /  AST  10  /  ALT  13  /  AlkPhos  84  12-25    PT/INR - ( 24 Dec 2024 13:00 )   PT: 18.50 sec;   INR: 1.55 ratio         PTT - ( 24 Dec 2024 13:00 )  PTT:33.1 sec  Urinalysis Basic - ( 26 Dec 2024 05:50 )    Color: x / Appearance: x / SG: x / pH: x  Gluc: 138 mg/dL / Ketone: x  / Bili: x / Urobili: x   Blood: x / Protein: x / Nitrite: x   Leuk Esterase: x / RBC: x / WBC x   Sq Epi: x / Non Sq Epi: x / Bacteria: x      Culture - Blood (collected 12-24-24 @ 13:00)  Source: .Blood BLOOD  Preliminary Report (12-25-24 @ 23:08):    No growth at 24 hours    Culture - Blood (collected 12-24-24 @ 13:00)  Source: .Blood BLOOD  Preliminary Report (12-25-24 @ 23:08):    No growth at 24 hours        RADIOLOGY & ADDITIONAL STUDIES: none new

## 2024-12-26 NOTE — CHART NOTE - NSCHARTNOTEFT_GEN_A_CORE
called for hypotension:  Bp runs low every day.  Plan:  midodrine 10   500ml bolus over 1 H  cbc, bmp  cxr  UA called for hypotension:  Bp runs low every day.  Plan:  midodrine 10   500ml bolus over 1 H  cbc, bmp, mag  UA  CXR called for hypotension with a SBP in the 70's:  Pt 's  Bp runs low every day.  Plan:  midodrine 10   500ml bolus over 1 H  cbc, bmp, mag  UA  CXR    note addendum:  Called by nurse telling me the bp went back up SBP in the 120's w/in normal range without midodrine and bolus. Yes

## 2024-12-26 NOTE — PROGRESS NOTE ADULT - TIME BILLING
spent 38  minutes on this patient's case:  10  minutes w patient,  14  minutes reviewing the chart,    and  14   minutes speaking to the PA, RN and family, also coordinating care and documenting all.  This is my D1 on this case; patient and case are new to me.

## 2024-12-26 NOTE — PROGRESS NOTE ADULT - SUBJECTIVE AND OBJECTIVE BOX
INFECTIOUS DISEASE FOLLOW UP NOTE:    Interval History/ROS: Patient is a 84y old  Female who presents with a chief complaint of acute blood loss anemia, cellulitis (26 Dec 2024 10:25)    Overnight events: No overnight event    REVIEW OF SYSTEMS:  CONSTITUTIONAL: No fever or chills  HEAD: No lesion on scalp  EYES: No visual disturbance  ENT: No sore throat  RESPIRATORY: No cough, no shortness of breath  CARDIOVASCULAR: No chest pain or palpitations  GASTROINTESTINAL: No abdominal or epigastric pain  GENITOURINARY: No dysuria  NEUROLOGICAL: No headache/dizziness  MUSCULOSKELETAL: No joint pain, erythema, or swelling; no back pain  SKIN: left leg hematoma  All other ROS negative except noted above      Prior hospital charts reviewed [Yes]  Primary team notes reviewed [Yes]  Other consultant notes reviewed [Yes]    Allergies:  No Known Allergies      ANTIMICROBIALS:   ceFAZolin   IVPB 1000 every 12 hours      OTHER MEDS: MEDICATIONS  (STANDING):  acetaminophen     Tablet .. 650 every 6 hours PRN  dextrose 50% Injectable 25 once  dextrose 50% Injectable 12.5 once  dextrose 50% Injectable 25 once  dextrose Oral Gel 15 once PRN  furosemide    Tablet 40 daily  glucagon  Injectable 1 once  influenza  Vaccine (HIGH DOSE) 0.5 once  insulin lispro (ADMELOG) corrective regimen sliding scale  three times a day before meals  losartan 50 daily  metoprolol succinate ER 25 daily  pantoprazole    Tablet 40 before breakfast  risperiDONE   Tablet 1 daily  rosuvastatin 10 at bedtime  senna 2 at bedtime      Vital Signs Last 24 Hrs  T(F): 96.1 (12-26-24 @ 14:45), Max: 98.4 (12-24-24 @ 20:28)    Vital Signs Last 24 Hrs  HR: 95 (12-26-24 @ 14:45) (91 - 102)  BP: 94/60 (12-26-24 @ 14:45) (92/60 - 121/67)  RR: 18 (12-26-24 @ 14:45)  SpO2: 94% (12-26-24 @ 04:30) (93% - 94%)  Wt(kg): --    EXAM:  GENERAL: NAD, lying in bed  HEAD: No head lesions  EYES: Conjunctiva pink and cornea white  EAR, NOSE, MOUTH, THROAT: Normal external ears and nose, no discharges; moist mucous membranes  NECK: Supple, nontender to palpation; no JVD  RESPIRATORY: Bibasilar crackles  CARDIOVASCULAR: S1 S2  GASTROINTESTINAL: Soft, nontender, nondistended; normoactive bowel sounds  GENITOURINARY: No escobar catheter, no CVA tenderness  EXTREMITIES: No clubbing, cyanosis, or petal edema  NERVOUS SYSTEM: Alert and oriented to person, time, place and situation, speech clear. No focal deficits   MUSCULOSKELETAL: No joint erythema, swelling or pain  SKIN: Left leg mild erythema with dressing; no superficial thrombophlebitis  PSYCH: Normal affect    Labs:                        7.8    11.55 )-----------( 372      ( 26 Dec 2024 05:50 )             26.7     12-26    140  |  98  |  54[H]  ----------------------------<  138[H]  4.7   |  33[H]  |  1.8[H]    Ca    9.0      26 Dec 2024 05:50  Phos  4.9     12-25  Mg     2.4     12-26    TPro  6.2  /  Alb  2.9[L]  /  TBili  0.3  /  DBili  x   /  AST  10  /  ALT  13  /  AlkPhos  84  12-25      WBC Trend:  WBC Count: 11.55 (12-26-24 @ 05:50)  WBC Count: 12.27 (12-25-24 @ 04:30)  WBC Count: 14.12 (12-24-24 @ 21:55)  WBC Count: 14.91 (12-24-24 @ 13:00)      Creatine Trend:  Creatinine: 1.8 (12-26)  Creatinine: 1.9 (12-25)  Creatinine: 1.5 (12-24)      Liver Biochemical Testing Trend:  Alanine Aminotransferase (ALT/SGPT): 13 (12-25)  Alanine Aminotransferase (ALT/SGPT): 13 (12-24)  Alanine Aminotransferase (ALT/SGPT): 6 (07-07)  Alanine Aminotransferase (ALT/SGPT): 9 (06-04)  Alanine Aminotransferase (ALT/SGPT): 35 (04-06)  Aspartate Aminotransferase (AST/SGOT): 10 (12-25-24 @ 04:30)  Aspartate Aminotransferase (AST/SGOT): 14 (12-24-24 @ 13:00)  Aspartate Aminotransferase (AST/SGOT): 15 (07-07-24 @ 08:35)  Aspartate Aminotransferase (AST/SGOT): 7 (06-04-24 @ 16:14)  Aspartate Aminotransferase (AST/SGOT): 27 (04-06-24 @ 05:51)  Bilirubin Total: 0.3 (12-25)  Bilirubin Total: 0.3 (12-24)  Bilirubin Total: 0.3 (07-07)  Bilirubin Total: 0.2 (06-04)  Bilirubin Total: <0.2 (04-06)      Trend LDH      Urinalysis Basic - ( 26 Dec 2024 05:50 )    Color: x / Appearance: x / SG: x / pH: x  Gluc: 138 mg/dL / Ketone: x  / Bili: x / Urobili: x   Blood: x / Protein: x / Nitrite: x   Leuk Esterase: x / RBC: x / WBC x   Sq Epi: x / Non Sq Epi: x / Bacteria: x        MICROBIOLOGY:        Culture - Blood (collected 24 Dec 2024 13:00)  Source: .Blood BLOOD  Preliminary Report:    No growth at 24 hours    Culture - Blood (collected 24 Dec 2024 13:00)  Source: .Blood BLOOD  Preliminary Report:    No growth at 24 hours    Culture - Urine (collected 05 Jun 2024 12:16)  Source: Clean Catch Clean Catch (Midstream)  Final Report:    >100,000 CFU/ml Escherichia coli ESBL  Organism: Escherichia coli ESBL  Organism: Escherichia coli ESBL    Sensitivities:      Method Type: JOO      -  Ampicillin: R >16 These ampicillin results predict results for amoxicillin      -  Ampicillin/Sulbactam: R >16/8      -  Aztreonam: R >16      -  Cefazolin: R >16 For uncomplicated UTI with K. pneumoniae, E. coli, or P. mirablis: JOO <=16 is sensitive and JOO >=32 is resistant. This also predicts results for oral agents cefaclor, cefdinir, cefpodoxime, cefprozil, cefuroxime axetil, cephalexin and locarbef for uncomplicated UTI. Note that some isolates may be susceptible to these agents while testing resistant to cefazolin.      -  Cefepime: R >16      -  Ceftriaxone: R >32      -  Cefuroxime: R >16      -  Ciprofloxacin: R >2      -  Ertapenem: S <=0.5      -  Gentamicin: S <=2      -  Imipenem: S <=1      -  Levofloxacin: R >4      -  Meropenem: S <=1      -  Nitrofurantoin: S <=32 Should not be used to treat pyelonephritis      -  Piperacillin/Tazobactam: SDD 16      -  Tobramycin: R >8      -  Trimethoprim/Sulfamethoxazole: R >2/38    Culture - Urine (collected 04 Jun 2024 19:10)  Source: Clean Catch Clean Catch (Midstream)  Final Report:    <10,000 CFU/mL Normal Urogenital Randa    Culture - Urine (collected 29 Mar 2024 07:52)  Source: Clean Catch Clean Catch (Midstream)  Final Report:    >=3 organisms. Probable collection contamination.      Lactate, Blood: 1.4 (12-24 @ 13:00)  Lactate, Blood: 1.3 (12-24 @ 13:00)      RADIOLOGY:  imaging below personally reviewed    < from: CT Angio Lower Extremity w/ IV Cont, Left (12.24.24 @ 16:39) >  IMPRESSION:    A subcutaneous hematoma is noted along the lateral and posterior aspect   of the left calf. The hematoma measures approximately 11.5 x 2 x 18 cm.    There is no evidence of active extravasation.    There is no evidence of vascular injury, occlusion, dissection, or   pseudoaneurysm.    < end of copied text >    < from: CT Chest w/ IV Cont (12.24.24 @ 16:39) >  IMPRESSION:    No evidence of thoracic, abdominal or pelvic visceral injury, laceration,   or hematoma.    No evidence of acute fracture.    An intraluminal filling defect is noted within the gallbladder compatible   with a large stone. Dense material seen in the gallbladder fundus which   may represent inspissated bile/sludge.    < end of copied text >

## 2024-12-26 NOTE — CONSULT NOTE ADULT - ASSESSMENT
Impression:    AMS   Acute on chronic hypercapnic failure   On BIPAP  MANJEET/OHS  Hematoma secondary to fall     Plan:     CXR stable; left basilar atelectasis  Initial ABG with acute on chronic hypercapnia   Improved with BIPAP 16/8   Remains however altered   CTH done - pending read   Consider neurology eval   UA negative; no obvious sepsis   Avoid CNS depressants   Hemodynamically stable; Comfortable on BIPAP   Avoid fluid overload; gentle hydration while NPO   Medical floor for now; recall if status changes   Clarify goals of care with family   Will follow as needed

## 2024-12-26 NOTE — PROGRESS NOTE ADULT - ASSESSMENT
84-year-old female brought in by EMS from [facility], with past medical history of  diabetes, HTN, chronic LE edema, bipolar disorder, tachybrady syndrome (declining PPM), A fib (on Eliquis), mild aortic stenosis, presenting s/p mechanical fall 3 days ago (-head trauma, +AC). Patient sustained wound to left posterior lateral lower extremity with hematoma, that started rebleeding today.     IMPRESSION:  #LLE hematoma + cellulitis , no abscess     Admission WBC 14; Afebrile   < from: CT Angio Lower Extremity w/ IV Cont, Left (12.24.24 @ 16:39) >  A subcutaneous hematoma is noted along the lateral and posterior aspect of the left calf. The hematoma measures approximately 11.5 x 2 x 18 cm.  There is no evidence of active extravasation.  There is no evidence of vascular injury, occlusion, dissection, or pseudoaneurysm.  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  Attending Physician  Division of Infectious Diseases  [facility]  Please contact me via [employer] Teams

## 2024-12-26 NOTE — CHART NOTE - NSCHARTNOTEFT_GEN_A_CORE
HPI:  Rapid response called for altered mental status.      PAST MEDICAL & SURGICAL HISTORY:  Diabetes mellitus    Hypertension    Schizoaffective disorder    Edema  Bilateral LE    Disorder of thyroid, unspecified  Son cannot provide details. States she had "thyroid surgery" decades ago when she was young    No significant past surgical history        Allergies    No Known Allergies    MEDICATIONS  (STANDING):  ceFAZolin   IVPB 1000 milliGRAM(s) IV Intermittent every 12 hours  chlorhexidine 2% Cloths 1 Application(s) Topical <User Schedule>  dextrose 5%. 1000 milliLiter(s) (100 mL/Hr) IV Continuous <Continuous>  dextrose 5%. 1000 milliLiter(s) (50 mL/Hr) IV Continuous <Continuous>  dextrose 50% Injectable 25 Gram(s) IV Push once  dextrose 50% Injectable 12.5 Gram(s) IV Push once  dextrose 50% Injectable 25 Gram(s) IV Push once  ferrous    sulfate 325 milliGRAM(s) Oral daily  folic acid 1 milliGRAM(s) Oral daily  furosemide    Tablet 40 milliGRAM(s) Oral daily  glucagon  Injectable 1 milliGRAM(s) IntraMuscular once  influenza  Vaccine (HIGH DOSE) 0.5 milliLiter(s) IntraMuscular once  insulin lispro (ADMELOG) corrective regimen sliding scale   SubCutaneous three times a day before meals  lactated ringers. 1000 milliLiter(s) (75 mL/Hr) IV Continuous <Continuous>  lidocaine 1% Injectable 30 milliLiter(s) Local Injection once  losartan 50 milliGRAM(s) Oral daily  metoprolol succinate ER 25 milliGRAM(s) Oral daily  pantoprazole    Tablet 40 milliGRAM(s) Oral before breakfast  risperiDONE   Tablet 1 milliGRAM(s) Oral daily  rosuvastatin 10 milliGRAM(s) Oral at bedtime  senna 2 Tablet(s) Oral at bedtime  silver sulfADIAZINE 1% Cream 1 Application(s) Topical daily  vitamin A & D Ointment 1 Application(s) Topical daily    MEDICATIONS  (PRN):  acetaminophen     Tablet .. 650 milliGRAM(s) Oral every 6 hours PRN Temp greater or equal to 38C (100.4F), Mild Pain (1 - 3)  dextrose Oral Gel 15 Gram(s) Oral once PRN Blood Glucose LESS THAN 70 milliGRAM(s)/deciliter    ROS:  cannot obtain from patient        Vital Signs Last 24 Hrs  T(F): 96.1 (26 Dec 2024 14:45), Max: 98.1 (25 Dec 2024 21:17)  HR: 95 (26 Dec 2024 14:45) (91 - 102)  BP: 94/60 (26 Dec 2024 14:45) (92/60 - 121/67)  RR: 18 (26 Dec 2024 14:45) (18 - 19)  SpO2: 94% (26 Dec 2024 04:30) (93% - 94%)    PHYSICAL EXAM:      Constitutional: A&Ox4  Respiratory: cta b/l  Cardiovascular: s1 s2 rrr  Gastrointestinal: soft nt  nd + bs no rebound or guarding  Genitourinary: no cva tenderness  Extremities: normal rom, no edema, calf tenderness  Neurological:no focal deficits  Skin: no rash                            8.1    11.49 )-----------( 354      ( 26 Dec 2024 17:22 )             28.2       12-26    137  |  98  |  55[H]  ----------------------------<  155[H]  5.2[H]   |  30  |  1.9[H]    Ca    8.8      26 Dec 2024 17:22  Phos  4.9     12-25  Mg     2.4     12-26    TPro  5.8[L]  /  Alb  2.8[L]  /  TBili  <0.2  /  DBili  x   /  AST  8   /  ALT  6   /  AlkPhos  78  12-26 HPI:  Rapid response called for altered mental status.      PAST MEDICAL & SURGICAL HISTORY:  Diabetes mellitus    Hypertension    Schizoaffective disorder    Edema  Bilateral LE    Disorder of thyroid, unspecified  Son cannot provide details. States she had "thyroid surgery" decades ago when she was young    No significant past surgical history        Allergies    No Known Allergies    MEDICATIONS  (STANDING):  ceFAZolin   IVPB 1000 milliGRAM(s) IV Intermittent every 12 hours  chlorhexidine 2% Cloths 1 Application(s) Topical <User Schedule>  dextrose 5%. 1000 milliLiter(s) (100 mL/Hr) IV Continuous <Continuous>  dextrose 5%. 1000 milliLiter(s) (50 mL/Hr) IV Continuous <Continuous>  dextrose 50% Injectable 25 Gram(s) IV Push once  dextrose 50% Injectable 12.5 Gram(s) IV Push once  dextrose 50% Injectable 25 Gram(s) IV Push once  ferrous    sulfate 325 milliGRAM(s) Oral daily  folic acid 1 milliGRAM(s) Oral daily  furosemide    Tablet 40 milliGRAM(s) Oral daily  glucagon  Injectable 1 milliGRAM(s) IntraMuscular once  influenza  Vaccine (HIGH DOSE) 0.5 milliLiter(s) IntraMuscular once  insulin lispro (ADMELOG) corrective regimen sliding scale   SubCutaneous three times a day before meals  lactated ringers. 1000 milliLiter(s) (75 mL/Hr) IV Continuous <Continuous>  lidocaine 1% Injectable 30 milliLiter(s) Local Injection once  losartan 50 milliGRAM(s) Oral daily  metoprolol succinate ER 25 milliGRAM(s) Oral daily  pantoprazole    Tablet 40 milliGRAM(s) Oral before breakfast  risperiDONE   Tablet 1 milliGRAM(s) Oral daily  rosuvastatin 10 milliGRAM(s) Oral at bedtime  senna 2 Tablet(s) Oral at bedtime  silver sulfADIAZINE 1% Cream 1 Application(s) Topical daily  vitamin A & D Ointment 1 Application(s) Topical daily    MEDICATIONS  (PRN):  acetaminophen     Tablet .. 650 milliGRAM(s) Oral every 6 hours PRN Temp greater or equal to 38C (100.4F), Mild Pain (1 - 3)  dextrose Oral Gel 15 Gram(s) Oral once PRN Blood Glucose LESS THAN 70 milliGRAM(s)/deciliter    ROS:  cannot obtain from patient        Vital Signs Last 24 Hrs  T(F): 96.1 (26 Dec 2024 14:45), Max: 98.1 (25 Dec 2024 21:17)  HR: 95 (26 Dec 2024 14:45) (91 - 102)  BP: 94/60 (26 Dec 2024 14:45) (92/60 - 121/67)  RR: 18 (26 Dec 2024 14:45) (18 - 19)  SpO2: 94% (26 Dec 2024 04:30) (93% - 94%)    PHYSICAL EXAM:      Constitutional: A&Ox4  Respiratory: cta b/l  Cardiovascular: s1 s2 rrr  Gastrointestinal: soft nt  nd + bs no rebound or guarding  Genitourinary: no cva tenderness  Extremities: normal rom, no edema, calf tenderness  Neurological:no focal deficits  Skin: no rash                            8.1    11.49 )-----------( 354      ( 26 Dec 2024 17:22 )             28.2       12-26    137  |  98  |  55[H]  ----------------------------<  155[H]  5.2[H]   |  30  |  1.9[H]    Ca    8.8      26 Dec 2024 17:22  Phos  4.9     12-25  Mg     2.4     12-26    TPro  5.8[L]  /  Alb  2.8[L]  /  TBili  <0.2  /  DBili  x   /  AST  8   /  ALT  6   /  AlkPhos  78  12-26      Blood Gas Arterial - Lytes,iCa,Lact: Performed in Lab (12.26.24 @ 17:23)   pH, Arterial: 7.24 (12.26.24 @ 17:23)   pCO2, Arterial: 86 mmHg (12.26.24 @ 17:23)   pO2, Arterial: 105 mmHg (12.26.24 @ 17:23)   HCO3, Arterial: 37 mmol/L (12.26.24 @ 17:23)   Base Excess, Arterial: 6.3 mmol/L (12.26.24 @ 17:23)   Oxygen Saturation, Arterial: 99.3 % (12.26.24 @ 17:23)   FIO2, Arterial: 35.0 (12.26.24 @ 17:23)     1.acute hypercapnic respiratory failure  2.metabolic encephalopathy secondary to above  -d/w critical care doctor for consult, pt placed on bipap 16/8  -repeat abg after 1 hour on bipap  -f/u cxr report  -f/u ct head  -LR 75cc/hr  -pulmonary consult  -pt seen and examined with Dr Duncan mitchell during rapid response  -case d/w dr Reich  -spoke with son at bedside, would like pt to remain full code  -d/w night staff for sign out, f/u on pt and results HPI:  Rapid response called for altered mental status.      PAST MEDICAL & SURGICAL HISTORY:  Diabetes mellitus    Hypertension    Schizoaffective disorder    Edema  Bilateral LE    Disorder of thyroid, unspecified  Son cannot provide details. States she had "thyroid surgery" decades ago when she was young    No significant past surgical history        Allergies    No Known Allergies    MEDICATIONS  (STANDING):  ceFAZolin   IVPB 1000 milliGRAM(s) IV Intermittent every 12 hours  chlorhexidine 2% Cloths 1 Application(s) Topical <User Schedule>  dextrose 5%. 1000 milliLiter(s) (100 mL/Hr) IV Continuous <Continuous>  dextrose 5%. 1000 milliLiter(s) (50 mL/Hr) IV Continuous <Continuous>  dextrose 50% Injectable 25 Gram(s) IV Push once  dextrose 50% Injectable 12.5 Gram(s) IV Push once  dextrose 50% Injectable 25 Gram(s) IV Push once  ferrous    sulfate 325 milliGRAM(s) Oral daily  folic acid 1 milliGRAM(s) Oral daily  furosemide    Tablet 40 milliGRAM(s) Oral daily  glucagon  Injectable 1 milliGRAM(s) IntraMuscular once  influenza  Vaccine (HIGH DOSE) 0.5 milliLiter(s) IntraMuscular once  insulin lispro (ADMELOG) corrective regimen sliding scale   SubCutaneous three times a day before meals  lactated ringers. 1000 milliLiter(s) (75 mL/Hr) IV Continuous <Continuous>  lidocaine 1% Injectable 30 milliLiter(s) Local Injection once  losartan 50 milliGRAM(s) Oral daily  metoprolol succinate ER 25 milliGRAM(s) Oral daily  pantoprazole    Tablet 40 milliGRAM(s) Oral before breakfast  risperiDONE   Tablet 1 milliGRAM(s) Oral daily  rosuvastatin 10 milliGRAM(s) Oral at bedtime  senna 2 Tablet(s) Oral at bedtime  silver sulfADIAZINE 1% Cream 1 Application(s) Topical daily  vitamin A & D Ointment 1 Application(s) Topical daily    MEDICATIONS  (PRN):  acetaminophen     Tablet .. 650 milliGRAM(s) Oral every 6 hours PRN Temp greater or equal to 38C (100.4F), Mild Pain (1 - 3)  dextrose Oral Gel 15 Gram(s) Oral once PRN Blood Glucose LESS THAN 70 milliGRAM(s)/deciliter    ROS:  cannot obtain from patient        Vital Signs Last 24 Hrs  T(F): 96.1 (26 Dec 2024 14:45), Max: 98.1 (25 Dec 2024 21:17)  HR: 95 (26 Dec 2024 14:45) (91 - 102)  BP: 94/60 (26 Dec 2024 14:45) (92/60 - 121/67)  RR: 18 (26 Dec 2024 14:45) (18 - 19)  SpO2: 94% (26 Dec 2024 04:30) (93% - 94%)    PHYSICAL EXAM:      Constitutional: lethargic but arousal, obese female  Respiratory: + b/l bs, mild wheeze  Cardiovascular: s1 s2 rrr  Gastrointestinal: soft nt  nd + bs no rebound or guarding  Genitourinary: no cva tenderness  Extremities: + le edema, calf tenderness  Neurological:no focal deficits  Skin: no rash                            8.1    11.49 )-----------( 354      ( 26 Dec 2024 17:22 )             28.2       12-26    137  |  98  |  55[H]  ----------------------------<  155[H]  5.2[H]   |  30  |  1.9[H]    Ca    8.8      26 Dec 2024 17:22  Phos  4.9     12-25  Mg     2.4     12-26    TPro  5.8[L]  /  Alb  2.8[L]  /  TBili  <0.2  /  DBili  x   /  AST  8   /  ALT  6   /  AlkPhos  78  12-26      Blood Gas Arterial - Lytes,iCa,Lact: Performed in Lab (12.26.24 @ 17:23)   pH, Arterial: 7.24 (12.26.24 @ 17:23)   pCO2, Arterial: 86 mmHg (12.26.24 @ 17:23)   pO2, Arterial: 105 mmHg (12.26.24 @ 17:23)   HCO3, Arterial: 37 mmol/L (12.26.24 @ 17:23)   Base Excess, Arterial: 6.3 mmol/L (12.26.24 @ 17:23)   Oxygen Saturation, Arterial: 99.3 % (12.26.24 @ 17:23)   FIO2, Arterial: 35.0 (12.26.24 @ 17:23)     1.acute hypercapnic respiratory failure  2.metabolic encephalopathy secondary to above  -d/w critical care doctor for consult, pt placed on bipap 16/8  -repeat abg after 1 hour on bipap  -f/u cxr report  -f/u ct head  -LR 75cc/hr  -pulmonary consult  -pt seen and examined with Dr Duncan mitchell during rapid response  -case d/w dr Reich  -spoke with son at bedside, would like pt to remain full code  -d/w night staff for sign out, f/u on pt and results

## 2024-12-26 NOTE — CONSULT NOTE ADULT - SUBJECTIVE AND OBJECTIVE BOX
Patient is a 84y old  Female who presents with a chief complaint of acute blood loss anemia, cellulitis (26 Dec 2024 15:40)      HPI:  This is a 84-year-old female brought in by EMS from [facility], [place], with past medical history of  diabetes, HTN, chronic LE edema, bipolar disorder, tachybrady syndrome (declining PPM), A fib (on Eliquis), mild aortic stenosis, presenting s/p mechanical fall 3 days ago (-head trauma, +AC). Patient sustained wound to left posterior lateral lower extremity with hematoma, that started rebleeding today. Denies fever, chills, n/v, cp, sob, palpitations, cough, back pain, numbness/tingling, abd pain, changes in BM, and urinary symptoms, (24 Dec 2024 18:45)      PAST MEDICAL & SURGICAL HISTORY:  Diabetes mellitus      Hypertension      Schizoaffective disorder      Edema  Bilateral LE      Disorder of thyroid, unspecified  Son cannot provide details. States she had "thyroid surgery" decades ago when she was young      No significant past surgical history          SOCIAL HX:   Smoking                         ETOH                            Other    FAMILY HISTORY:  :  No known cardiovacular family hisotry     ROS:  See HPI     Allergies    No Known Allergies    Intolerances          PHYSICAL EXAM    ICU Vital Signs Last 24 Hrs  T(C): 35.6 (26 Dec 2024 14:45), Max: 36.7 (25 Dec 2024 21:17)  T(F): 96.1 (26 Dec 2024 14:45), Max: 98.1 (25 Dec 2024 21:17)  HR: 77 (26 Dec 2024 19:34) (77 - 102)  BP: 107/69 (26 Dec 2024 19:34) (92/60 - 121/67)  BP(mean): --  ABP: --  ABP(mean): --  RR: 18 (26 Dec 2024 19:34) (18 - 19)  SpO2: 97% (26 Dec 2024 19:34) (93% - 98%)    O2 Parameters below as of 26 Dec 2024 19:34  Patient On (Oxygen Delivery Method): BiPAP/CPAP    O2 Concentration (%): 40        General: In NAD; altered  HEENT:  AMELIA              Lymphatic system: No cervical LN   Lungs: Bilateral BS, spontaneous breathing; good MV; no wheezing   Cardiovascular: Regular  Gastrointestinal: Soft, Positive BS  Musculoskeletal: No clubbing.  No cyanosis.   Skin: Warm.  Intact  Neurological: AMS     12-25-24 @ 07:01  -  12-26-24 @ 07:00  --------------------------------------------------------  IN:  Total IN: 0 mL    OUT:    Voided (mL): 600 mL  Total OUT: 600 mL    Total NET: -600 mL          LABS:                          8.1    11.49 )-----------( 354      ( 26 Dec 2024 17:22 )             28.2                                               12-26    137  |  98  |  55[H]  ----------------------------<  155[H]  5.2[H]   |  30  |  1.9[H]    Ca    8.8      26 Dec 2024 17:22  Phos  4.9     12-25  Mg     2.4     12-26    TPro  5.8[L]  /  Alb  2.8[L]  /  TBili  <0.2  /  DBili  x   /  AST  8   /  ALT  6   /  AlkPhos  78  12-26                                             Urinalysis Basic - ( 26 Dec 2024 17:22 )    Color: x / Appearance: x / SG: x / pH: x  Gluc: 155 mg/dL / Ketone: x  / Bili: x / Urobili: x   Blood: x / Protein: x / Nitrite: x   Leuk Esterase: x / RBC: x / WBC x   Sq Epi: x / Non Sq Epi: x / Bacteria: x                                                  LIVER FUNCTIONS - ( 26 Dec 2024 17:22 )  Alb: 2.8 g/dL / Pro: 5.8 g/dL / ALK PHOS: 78 U/L / ALT: 6 U/L / AST: 8 U/L / GGT: x                                                  Culture - Blood (collected 24 Dec 2024 13:00)  Source: .Blood BLOOD  Preliminary Report (25 Dec 2024 23:08):    No growth at 24 hours    Culture - Blood (collected 24 Dec 2024 13:00)  Source: .Blood BLOOD  Preliminary Report (25 Dec 2024 23:08):    No growth at 24 hours                                                                                       ABG - ( 26 Dec 2024 19:50 )  pH, Arterial: 7.43  pH, Blood: x     /  pCO2: 51    /  pO2: 85    / HCO3: 34    / Base Excess: 8.0   /  SaO2: 98.6                X-Rays congested                                                                                     ECHO    MEDICATIONS  (STANDING):  ceFAZolin   IVPB 1000 milliGRAM(s) IV Intermittent every 12 hours  chlorhexidine 2% Cloths 1 Application(s) Topical <User Schedule>  dextrose 5%. 1000 milliLiter(s) (100 mL/Hr) IV Continuous <Continuous>  dextrose 5%. 1000 milliLiter(s) (50 mL/Hr) IV Continuous <Continuous>  dextrose 50% Injectable 25 Gram(s) IV Push once  dextrose 50% Injectable 12.5 Gram(s) IV Push once  dextrose 50% Injectable 25 Gram(s) IV Push once  ferrous    sulfate 325 milliGRAM(s) Oral daily  folic acid 1 milliGRAM(s) Oral daily  furosemide    Tablet 40 milliGRAM(s) Oral daily  glucagon  Injectable 1 milliGRAM(s) IntraMuscular once  influenza  Vaccine (HIGH DOSE) 0.5 milliLiter(s) IntraMuscular once  insulin lispro (ADMELOG) corrective regimen sliding scale   SubCutaneous three times a day before meals  lactated ringers. 1000 milliLiter(s) (75 mL/Hr) IV Continuous <Continuous>  lidocaine 1% Injectable 30 milliLiter(s) Local Injection once  losartan 50 milliGRAM(s) Oral daily  metoprolol succinate ER 25 milliGRAM(s) Oral daily  pantoprazole    Tablet 40 milliGRAM(s) Oral before breakfast  risperiDONE   Tablet 1 milliGRAM(s) Oral daily  rosuvastatin 10 milliGRAM(s) Oral at bedtime  senna 2 Tablet(s) Oral at bedtime  silver sulfADIAZINE 1% Cream 1 Application(s) Topical daily  vitamin A & D Ointment 1 Application(s) Topical daily    MEDICATIONS  (PRN):  acetaminophen     Tablet .. 650 milliGRAM(s) Oral every 6 hours PRN Temp greater or equal to 38C (100.4F), Mild Pain (1 - 3)  dextrose Oral Gel 15 Gram(s) Oral once PRN Blood Glucose LESS THAN 70 milliGRAM(s)/deciliter

## 2024-12-26 NOTE — PROGRESS NOTE ADULT - ASSESSMENT
83 yo female s/p I&D infected LLE hematoma          Plan:  1. LLE hematoma s/p I&D   - cont IV abntibx  - daily dressing changes  - will remove Aquacell tomorrow  - fup wound cx  - trend WBC and fever curve    ALl above D/W Dr Vasquez and surgical team

## 2024-12-26 NOTE — PROGRESS NOTE ADULT - SUBJECTIVE AND OBJECTIVE BOX
Chart reviewed, patient examined. Pertinent results reviewed.  reviewed with the PA; specialist f/u reviewed  HD#3; POD(I&D)#1-12/25  CHELA GALLAGHER        Patient is a 84y old  Female who presents with a chief complaint of acute blood loss anemia, cellulitis (25 Dec 2024 06:21)        REVIEW OF SYSTEMS:  CONSTITUTIONAL: No fever, weight loss, or fatigue  RESPIRATORY: No cough, wheezing, chills or hemoptysis; No shortness of breath  CARDIOVASCULAR: No chest pain, palpitations, dizziness, or leg swelling  GASTROINTESTINAL: No abdominal or epigastric pain. No nausea, vomiting, or hematemesis; No diarrhea or constipation. No melena or hematochezia.  GENITOURINARY: No dysuria, frequency, hematuria,   MUSCULOSKELETAL: No joint pain or swelling; No muscle, back, or extremity pain  PSYCHIATRIC: No depression, anxiety, mood swings, or difficulty sleeping  HEME/LYMPH: No easy bruising, or bleeding gums  ALLERY AND IMMUNOLOGIC: No hives or eczema  FAMILY HISTORY:    No Known Allergies    MEDICATIONS  (STANDING):  ceFAZolin   IVPB 1000 milliGRAM(s) IV Intermittent every 12 hours  chlorhexidine 2% Cloths 1 Application(s) Topical <User Schedule>  dextrose 5%. 1000 milliLiter(s) (100 mL/Hr) IV Continuous <Continuous>  dextrose 5%. 1000 milliLiter(s) (50 mL/Hr) IV Continuous <Continuous>  dextrose 50% Injectable 25 Gram(s) IV Push once  dextrose 50% Injectable 12.5 Gram(s) IV Push once  dextrose 50% Injectable 25 Gram(s) IV Push once  ferrous    sulfate 325 milliGRAM(s) Oral daily  folic acid 1 milliGRAM(s) Oral daily  furosemide    Tablet 40 milliGRAM(s) Oral daily  glucagon  Injectable 1 milliGRAM(s) IntraMuscular once  influenza  Vaccine (HIGH DOSE) 0.5 milliLiter(s) IntraMuscular once  insulin lispro (ADMELOG) corrective regimen sliding scale   SubCutaneous three times a day before meals  lidocaine 1% Injectable 30 milliLiter(s) Local Injection once  losartan 50 milliGRAM(s) Oral daily  metoprolol succinate ER 25 milliGRAM(s) Oral daily  pantoprazole    Tablet 40 milliGRAM(s) Oral before breakfast  risperiDONE   Tablet 1 milliGRAM(s) Oral daily  rosuvastatin 10 milliGRAM(s) Oral at bedtime  senna 2 Tablet(s) Oral at bedtime  silver sulfADIAZINE 1% Cream 1 Application(s) Topical daily  vitamin A & D Ointment 1 Application(s) Topical daily    MEDICATIONS  (PRN):  acetaminophen     Tablet .. 650 milliGRAM(s) Oral every 6 hours PRN Temp greater or equal to 38C (100.4F), Mild Pain (1 - 3)  dextrose Oral Gel 15 Gram(s) Oral once PRN Blood Glucose LESS THAN 70 milliGRAM(s)/deciliter    Vital Signs Last 24 Hrs  T(C): 36.7 (26 Dec 2024 04:30), Max: 36.8 (25 Dec 2024 13:50)  T(F): 98.1 (26 Dec 2024 04:30), Max: 98.2 (25 Dec 2024 13:50)  HR: 100 (26 Dec 2024 04:30) (85 - 102)  BP: 121/67 (26 Dec 2024 04:30) (89/51 - 121/67)  BP(mean): --  RR: 18 (26 Dec 2024 04:30) (18 - 19)  SpO2: 94% (26 Dec 2024 04:30) (93% - 94%)    Parameters below as of 26 Dec 2024 04:30  Patient On (Oxygen Delivery Method): nasal cannula  O2 Flow (L/min): 3    PHYSICAL EXAM:  GENERAL: NAD,   HEAD:  Atraumatic, Normocephalic  EYES: EOMI, PERRLA, conjunctiva and sclera clear  ENMT: No tonsillar erythema, exudates, or enlargement;  NECK: Supple, No JVD, Normal thyroid  NERVOUS SYSTEM:  Alert & Oriented X3,   CHEST/LUNG: Clear to percussion bilaterally; No rales, rhonchi, wheezing, or rubs  HEART: Regular rate and rhythm; No murmurs, rubs, or gallops  ABDOMEN: Soft, Nontender, Nondistended; Bowel sounds present  EXTREMITIES:   No clubbing, cyanosis, or edema,lt leg edema,hematoma+  LYMPH: No lymphadenopathy noted  SKIN: No rashes or lesions      LABS:  12-24    145  |  99  |  52[H]  ----------------------------<  140[H]  4.6   |  37[H]  |  1.5    Ca    10.0      24 Dec 2024 13:00    TPro  6.6  /  Alb  3.1[L]  /  TBili  0.3  /  DBili  x   /  AST  14  /  ALT  13  /  AlkPhos  88  12-24                          7.9    14.12 )-----------( 348      ( 24 Dec 2024 21:55 )             26.2       < from: CT Angio Lower Extremity w/ IV Cont, Left (12.24.24 @ 16:39) >  A subcutaneous hematoma is noted along the lateral and posterior aspect   of the left calf. The hematoma measures approximately 11.5 x 2 x 18 cm.    There is no evidence of active extravasation.    There is no evidence of vascular injury, occlusion, dissection, or   pseudoaneurysm.    < end of copied text >    RADIOLOGY & ADDITIONAL TESTS:    HEALTH ISSUES - PROBLEM Dx:    This is a 84-year-old female brought in by EMS from [facility], with past medical history of  diabetes, HTN, chronic LE edema, bipolar disorder, tachybrady syndrome (declining PPM), A fib (on Eliquis), mild aortic stenosis, presenting s/p mechanical fall 3 days ago (-head trauma, +AC). Patient sustained wound to left posterior lateral lower extremity with hematoma, that started rebleeding today. Denies fever, chills, n/v, cp, sob, palpitations, cough, back pain, numbness/tingling, abd pain, changes in BM, and urinary symptoms,    #LLE subcutaneous hematoma and cellulitis   #Fall  -Admit to medicine   -CTA LE: A subcutaneous hematoma is noted along the lateral and posterior aspect of the left calf. The hematoma measures approximately 11.5 x 2 x 18 cm.  -WBC 14.91, lactate WNL   -S/p Aztreonam, Flagyl, and Vancomycin in ED  -C/w IV Ancef 1 g q12 as per    -Surgery following: No acute surgical intervention, continue daily wound care (telfa/gauze/abd/kerlix/ACE wrap)   -ID consult   -Wound care consult - noted: Had I&D yesterday, 12/25  -Pain control prn   -PT consult   -Fall precautions     #Acute blood loss anemia   #H/o iron deficiency anemia   -Hgb 6.7 (10.2 in 7/2024). Baseline Hgb ~8- 9  -Hold Eliquis in setting of acute blood loss anemia   -F/U post transfusion CBC   -C/w ferrous sulfate 325 mg     #Acute hypercarbic respiratory failure likely 2/2 MANJEET   -BiPAP HS 14/8  -Supplemental O2 prn     #DM  -Holding Farxiga 10 mg   -ISS while inpatient     #HTN  -C/w Losartan 50 mg     #HLD  -C/w Crestor 10 mg     #Bipolar  -C/w Risperdal 1 mg     #Chronic LE edema  -C/w home Lasix    #H/o A fib  -C/w Metoprolol Succinate ER 25 mg tab   -Holding home Eliquis in setting of acute blood loss anemia    Diet: DASH/CC, fluid restriction 1200 ml/day   Activity: Ambulate with assistance  VTE ppx: Holding home Eliquis in setting of acute blood loss anemia   GI ppx: Protonix               Chart reviewed, patient examined. Pertinent results reviewed.  reviewed with the PA; specialist f/u reviewed  HD#3; POD(I&D)#1-12/25  CHELA GALLAGHER        Patient is a 84y old  Female who presents with a chief complaint of acute blood loss anemia, cellulitis (25 Dec 2024 06:21);  s/p I&D by surgery yesterday    HPI:  This is a 84-year-old female brought in by EMS from [facility], with past medical history of  diabetes, HTN, chronic LE edema, bipolar disorder, tachybrady syndrome (declining PPM), A fib (on Eliquis), mild aortic stenosis, MANJEET, presenting s/p mechanical fall 3 days PTA (-head trauma, +AC). Patient sustained wound to left posterior lateral lower extremity with hematoma, that started rebleeding the DOA(12/24). Denies fever, chills, n/v, cp, sob, palpitations, cough, back pain, numbness/tingling, abd pain, changes in BM, and urinary symptoms,          REVIEW OF SYSTEMS:  CONSTITUTIONAL: No fever, weight loss, or fatigue; + obese- HS BIPAP/MANJEET  RESPIRATORY: No cough, wheezing, chills or hemoptysis; No shortness of breath  CARDIOVASCULAR: No chest pain, palpitations, dizziness, or leg swelling  GASTROINTESTINAL: No abdominal or epigastric pain. No nausea, vomiting, or hematemesis; No diarrhea or constipation. No melena or hematochezia.  GENITOURINARY: No dysuria, frequency, hematuria,   MUSCULOSKELETAL: No joint pain or swelling; No muscle, back,bleeding from LLE PTA  PSYCHIATRIC: No depression, anxiety, mood swings, or difficulty sleeping  HEME/LYMPH: No easy bruising, or bleeding gums  ALLERY AND IMMUNOLOGIC: No hives or eczema  FAMILY HISTORY:    No Known Allergies    MEDICATIONS  (STANDING):  ceFAZolin   IVPB 1000 milliGRAM(s) IV Intermittent every 12 hours  chlorhexidine 2% Cloths 1 Application(s) Topical <User Schedule>  dextrose 5%. 1000 milliLiter(s) (100 mL/Hr) IV Continuous <Continuous>  dextrose 5%. 1000 milliLiter(s) (50 mL/Hr) IV Continuous <Continuous>  dextrose 50% Injectable 25 Gram(s) IV Push once  dextrose 50% Injectable 12.5 Gram(s) IV Push once  dextrose 50% Injectable 25 Gram(s) IV Push once  ferrous    sulfate 325 milliGRAM(s) Oral daily  folic acid 1 milliGRAM(s) Oral daily  furosemide    Tablet 40 milliGRAM(s) Oral daily  glucagon  Injectable 1 milliGRAM(s) IntraMuscular once  influenza  Vaccine (HIGH DOSE) 0.5 milliLiter(s) IntraMuscular once  insulin lispro (ADMELOG) corrective regimen sliding scale   SubCutaneous three times a day before meals  lidocaine 1% Injectable 30 milliLiter(s) Local Injection once  losartan 50 milliGRAM(s) Oral daily  metoprolol succinate ER 25 milliGRAM(s) Oral daily  pantoprazole    Tablet 40 milliGRAM(s) Oral before breakfast  risperiDONE   Tablet 1 milliGRAM(s) Oral daily  rosuvastatin 10 milliGRAM(s) Oral at bedtime  senna 2 Tablet(s) Oral at bedtime  silver sulfADIAZINE 1% Cream 1 Application(s) Topical daily  vitamin A & D Ointment 1 Application(s) Topical daily    MEDICATIONS  (PRN):  acetaminophen     Tablet .. 650 milliGRAM(s) Oral every 6 hours PRN Temp greater or equal to 38C (100.4F), Mild Pain (1 - 3)  dextrose Oral Gel 15 Gram(s) Oral once PRN Blood Glucose LESS THAN 70 milliGRAM(s)/deciliter    Vital Signs Last 24 Hrs  T(C): 36.7 (26 Dec 2024 04:30), Max: 36.8 (25 Dec 2024 13:50)  T(F): 98.1 (26 Dec 2024 04:30), Max: 98.2 (25 Dec 2024 13:50)  HR: 100 (26 Dec 2024 04:30) (85 - 102)  BP: 121/67 (26 Dec 2024 04:30) (89/51 - 121/67)  BP(mean): --  RR: 18 (26 Dec 2024 04:30) (18 - 19)  SpO2: 94% (26 Dec 2024 04:30) (93% - 94%)    Parameters below as of 26 Dec 2024 04:30  Patient On (Oxygen Delivery Method): nasal cannula  O2 Flow (L/min): 3    PHYSICAL EXAM:  GENERAL: NAD, obese; Ox3  HEAD:  Atraumatic, Normocephalic  EYES: EOMI, PERRLA, conjunctiva and sclera clear  ENMT: No tonsillar erythema, exudates, or enlargement;  NECK: Supple, No JVD, Normal thyroid  NERVOUS SYSTEM:  Alert & Oriented X3,   CHEST/LUNG: Clear  bilaterally; No rales, rhonchi, wheezing, or rubs  HEART: IRR IRR; No murmurs, rubs, or gallops  ABDOMEN: obese, Soft, Nontender, Nondistended; Bowel sounds present  EXTREMITIES:   No clubbing, cyanosis, or edema,lt leg edema,hematoma+w ACE wrap (just had dressing changed)  LYMPH: No lymphadenopathy noted  SKIN: No rashes or lesions      LABS:  12-26    140  |  98  |  54[H]  ----------------------------<  138[H]  4.7   |  33[H]  |  1.8[H]    Ca    9.0      26 Dec 2024 05:50  Phos  4.9     12-25  Mg     2.4     12-26    TPro  6.2  /  Alb  2.9[L]  /  TBili  0.3  /  DBili  x   /  AST  10  /  ALT  13  /  AlkPhos  84  12-25 12-24    145  |  99  |  52[H]  ----------------------------<  140[H]  4.6   |  37[H]  |  1.5    Ca    10.0      24 Dec 2024 13:00    TPro  6.6  /  Alb  3.1[L]  /  TBili  0.3  /  DBili  x   /  AST  14  /  ALT  13  /  AlkPhos  88  12-24                          7.9    14.12 )-----------( 348      ( 24 Dec 2024 21:55 )             26.2       < from: CT Angio Lower Extremity w/ IV Cont, Left (12.24.24 @ 16:39) >  A subcutaneous hematoma is noted along the lateral and posterior aspect   of the left calf. The hematoma measures approximately 11.5 x 2 x 18 cm.    There is no evidence of active extravasation.    There is no evidence of vascular injury, occlusion, dissection, or   pseudoaneurysm.    < end of copied text >    RADIOLOGY & ADDITIONAL TESTS:    HEALTH ISSUES - PROBLEM Dx:    This is a 84-year-old female brought in by EMS from [facility], with past medical history of  diabetes, HTN, chronic LE edema, bipolar disorder, tachybrady syndrome (declining PPM), A fib (on Eliquis), mild aortic stenosis, presenting s/p mechanical fall 3 days ago (-head trauma, +AC). Patient sustained wound to left posterior lateral lower extremity with hematoma, that started rebleeding today. Denies fever, chills, n/v, cp, sob, palpitations, cough, back pain, numbness/tingling, abd pain, changes in BM, and urinary symptoms,    #LLE subcutaneous hematoma and cellulitis   #Fall  -Admit to medicine   -CTA LE: A subcutaneous hematoma is noted along the lateral and posterior aspect of the left calf. The hematoma measures approximately 11.5 x 2 x 18 cm.  -WBC 14.91, lactate WNL   -S/p Aztreonam, Flagyl, and Vancomycin in ED  -C/w IV Ancef 1 g q12 as per -Yamil   -Surgery following: Had I&D- see 12/25; continue daily wound care (telfa/gauze/abd/kerlix/ACE wrap)   -ID consult noted  -Wound care consult - noted: Had I&D yesterday, 12/25  -Pain control prn   -PT consult   -Fall precautions     #Acute blood loss anemia   #H/o iron deficiency anemia   -Hgb 6.7 (10.2 in 7/2024). Baseline Hgb ~8- 9  -Hold Eliquis in setting of acute blood loss anemia   -F/U post transfusion CBC improved.  -C/w ferrous sulfate 325 mg   - check anemia labs    #Acute hypercarbic respiratory failure likely 2/2 MANJEET   -BiPAP HS 14/8  -Supplemental O2 prn     #DM  -Holding Farxiga 10 mg   -ISS while inpatient     #HTN  -C/w Losartan 50 mg     #HLD  -C/w Crestor 10 mg     #Bipolar  -C/w Risperdal 1 mg     #Chronic LE edema  -C/w home Lasix    #H/o A fib  -C/w Metoprolol Succinate ER 25 mg tab   -Holding home Eliquis in setting of acute blood loss anemia    Diet: DASH/CC, fluid restriction 1200 ml/day   Activity: Ambulate with assistance-PT  VTE ppx: Holding home Eliquis in setting of acute blood loss anemia   GI ppx: Protonix

## 2024-12-27 LAB
-  AMOXICILLIN/CLAVULANIC ACID: SIGNIFICANT CHANGE UP
-  AMOXICILLIN/CLAVULANIC ACID: SIGNIFICANT CHANGE UP
-  AMPICILLIN/SULBACTAM: SIGNIFICANT CHANGE UP
-  AMPICILLIN/SULBACTAM: SIGNIFICANT CHANGE UP
-  AMPICILLIN: SIGNIFICANT CHANGE UP
-  AZTREONAM: SIGNIFICANT CHANGE UP
-  AZTREONAM: SIGNIFICANT CHANGE UP
-  CEFAZOLIN: SIGNIFICANT CHANGE UP
-  CEFAZOLIN: SIGNIFICANT CHANGE UP
-  CEFEPIME: SIGNIFICANT CHANGE UP
-  CEFEPIME: SIGNIFICANT CHANGE UP
-  CEFOXITIN: SIGNIFICANT CHANGE UP
-  CEFOXITIN: SIGNIFICANT CHANGE UP
-  CEFTRIAXONE: SIGNIFICANT CHANGE UP
-  CEFTRIAXONE: SIGNIFICANT CHANGE UP
-  CIPROFLOXACIN: SIGNIFICANT CHANGE UP
-  CIPROFLOXACIN: SIGNIFICANT CHANGE UP
-  DAPTOMYCIN: SIGNIFICANT CHANGE UP
-  ERTAPENEM: SIGNIFICANT CHANGE UP
-  ERTAPENEM: SIGNIFICANT CHANGE UP
-  GENTAMICIN: SIGNIFICANT CHANGE UP
-  GENTAMICIN: SIGNIFICANT CHANGE UP
-  LEVOFLOXACIN: SIGNIFICANT CHANGE UP
-  LEVOFLOXACIN: SIGNIFICANT CHANGE UP
-  LINEZOLID: SIGNIFICANT CHANGE UP
-  MEROPENEM: SIGNIFICANT CHANGE UP
-  MEROPENEM: SIGNIFICANT CHANGE UP
-  PIPERACILLIN/TAZOBACTAM: SIGNIFICANT CHANGE UP
-  PIPERACILLIN/TAZOBACTAM: SIGNIFICANT CHANGE UP
-  TOBRAMYCIN: SIGNIFICANT CHANGE UP
-  TOBRAMYCIN: SIGNIFICANT CHANGE UP
-  TRIMETHOPRIM/SULFAMETHOXAZOLE: SIGNIFICANT CHANGE UP
-  TRIMETHOPRIM/SULFAMETHOXAZOLE: SIGNIFICANT CHANGE UP
-  VANCOMYCIN: SIGNIFICANT CHANGE UP
ANION GAP SERPL CALC-SCNC: 7 MMOL/L — SIGNIFICANT CHANGE UP (ref 7–14)
BASOPHILS # BLD AUTO: 0.03 K/UL — SIGNIFICANT CHANGE UP (ref 0–0.2)
BASOPHILS NFR BLD AUTO: 0.3 % — SIGNIFICANT CHANGE UP (ref 0–1)
BUN SERPL-MCNC: 56 MG/DL — HIGH (ref 10–20)
CALCIUM SERPL-MCNC: 9 MG/DL — SIGNIFICANT CHANGE UP (ref 8.4–10.5)
CHLORIDE SERPL-SCNC: 100 MMOL/L — SIGNIFICANT CHANGE UP (ref 98–110)
CO2 SERPL-SCNC: 34 MMOL/L — HIGH (ref 17–32)
CREAT SERPL-MCNC: 1.8 MG/DL — HIGH (ref 0.7–1.5)
EGFR: 27 ML/MIN/1.73M2 — LOW
EOSINOPHIL # BLD AUTO: 0.11 K/UL — SIGNIFICANT CHANGE UP (ref 0–0.7)
EOSINOPHIL NFR BLD AUTO: 1.1 % — SIGNIFICANT CHANGE UP (ref 0–8)
GAS PNL BLDA: SIGNIFICANT CHANGE UP
GAS PNL BLDA: SIGNIFICANT CHANGE UP
GLUCOSE BLDC GLUCOMTR-MCNC: 105 MG/DL — HIGH (ref 70–99)
GLUCOSE BLDC GLUCOMTR-MCNC: 107 MG/DL — HIGH (ref 70–99)
GLUCOSE BLDC GLUCOMTR-MCNC: 124 MG/DL — HIGH (ref 70–99)
GLUCOSE BLDC GLUCOMTR-MCNC: 172 MG/DL — HIGH (ref 70–99)
GLUCOSE BLDC GLUCOMTR-MCNC: 81 MG/DL — SIGNIFICANT CHANGE UP (ref 70–99)
GLUCOSE SERPL-MCNC: 122 MG/DL — HIGH (ref 70–99)
HCT VFR BLD CALC: 27.8 % — LOW (ref 37–47)
HGB BLD-MCNC: 8.1 G/DL — LOW (ref 12–16)
IMM GRANULOCYTES NFR BLD AUTO: 0.4 % — HIGH (ref 0.1–0.3)
IRON SATN MFR SERPL: 17 UG/DL — LOW (ref 35–150)
IRON SATN MFR SERPL: 7 % — LOW (ref 15–50)
LYMPHOCYTES # BLD AUTO: 0.56 K/UL — LOW (ref 1.2–3.4)
LYMPHOCYTES # BLD AUTO: 5.4 % — LOW (ref 20.5–51.1)
MAGNESIUM SERPL-MCNC: 2.4 MG/DL — SIGNIFICANT CHANGE UP (ref 1.8–2.4)
MCHC RBC-ENTMCNC: 27.6 PG — SIGNIFICANT CHANGE UP (ref 27–31)
MCHC RBC-ENTMCNC: 29.1 G/DL — LOW (ref 32–37)
MCV RBC AUTO: 94.9 FL — SIGNIFICANT CHANGE UP (ref 81–99)
METHOD TYPE: SIGNIFICANT CHANGE UP
MONOCYTES # BLD AUTO: 0.78 K/UL — HIGH (ref 0.1–0.6)
MONOCYTES NFR BLD AUTO: 7.5 % — SIGNIFICANT CHANGE UP (ref 1.7–9.3)
NEUTROPHILS # BLD AUTO: 8.86 K/UL — HIGH (ref 1.4–6.5)
NEUTROPHILS NFR BLD AUTO: 85.3 % — HIGH (ref 42.2–75.2)
NRBC # BLD: 0 /100 WBCS — SIGNIFICANT CHANGE UP (ref 0–0)
PHOSPHATE SERPL-MCNC: 3.8 MG/DL — SIGNIFICANT CHANGE UP (ref 2.1–4.9)
PLATELET # BLD AUTO: 334 K/UL — SIGNIFICANT CHANGE UP (ref 130–400)
PMV BLD: 9.1 FL — SIGNIFICANT CHANGE UP (ref 7.4–10.4)
POTASSIUM SERPL-MCNC: 4.9 MMOL/L — SIGNIFICANT CHANGE UP (ref 3.5–5)
POTASSIUM SERPL-SCNC: 4.9 MMOL/L — SIGNIFICANT CHANGE UP (ref 3.5–5)
RBC # BLD: 2.93 M/UL — LOW (ref 4.2–5.4)
RBC # FLD: 15.1 % — HIGH (ref 11.5–14.5)
SODIUM SERPL-SCNC: 141 MMOL/L — SIGNIFICANT CHANGE UP (ref 135–146)
TIBC SERPL-MCNC: 255 UG/DL — SIGNIFICANT CHANGE UP (ref 220–430)
UIBC SERPL-MCNC: 238 UG/DL — SIGNIFICANT CHANGE UP (ref 110–370)
WBC # BLD: 10.38 K/UL — SIGNIFICANT CHANGE UP (ref 4.8–10.8)
WBC # FLD AUTO: 10.38 K/UL — SIGNIFICANT CHANGE UP (ref 4.8–10.8)

## 2024-12-27 PROCEDURE — 99024 POSTOP FOLLOW-UP VISIT: CPT

## 2024-12-27 PROCEDURE — 99222 1ST HOSP IP/OBS MODERATE 55: CPT | Mod: FS

## 2024-12-27 PROCEDURE — 71045 X-RAY EXAM CHEST 1 VIEW: CPT | Mod: 26

## 2024-12-27 PROCEDURE — 99233 SBSQ HOSP IP/OBS HIGH 50: CPT

## 2024-12-27 RX ORDER — PIPERACILLIN AND TAZOBACTAM 3; .375 G/15ML; G/15ML
3.38 INJECTION, POWDER, LYOPHILIZED, FOR SOLUTION INTRAVENOUS ONCE
Refills: 0 | Status: COMPLETED | OUTPATIENT
Start: 2024-12-27 | End: 2024-12-27

## 2024-12-27 RX ORDER — NOREPINEPHRINE BITARTRATE 1 MG/ML
0.02 INJECTION INTRAVENOUS
Qty: 8 | Refills: 0 | Status: DISCONTINUED | OUTPATIENT
Start: 2024-12-27 | End: 2025-01-02

## 2024-12-27 RX ORDER — PIPERACILLIN AND TAZOBACTAM 3; .375 G/15ML; G/15ML
3.38 INJECTION, POWDER, LYOPHILIZED, FOR SOLUTION INTRAVENOUS EVERY 8 HOURS
Refills: 0 | Status: COMPLETED | OUTPATIENT
Start: 2024-12-28 | End: 2025-01-04

## 2024-12-27 RX ORDER — FUROSEMIDE 20 MG
20 TABLET ORAL ONCE
Refills: 0 | Status: COMPLETED | OUTPATIENT
Start: 2024-12-27 | End: 2024-12-27

## 2024-12-27 RX ORDER — NOREPINEPHRINE BITARTRATE 1 MG/ML
0.05 INJECTION INTRAVENOUS
Qty: 8 | Refills: 0 | Status: DISCONTINUED | OUTPATIENT
Start: 2024-12-27 | End: 2024-12-27

## 2024-12-27 RX ORDER — PIPERACILLIN AND TAZOBACTAM 3; .375 G/15ML; G/15ML
3.38 INJECTION, POWDER, LYOPHILIZED, FOR SOLUTION INTRAVENOUS ONCE
Refills: 0 | Status: COMPLETED | OUTPATIENT
Start: 2024-12-28 | End: 2024-12-28

## 2024-12-27 RX ADMIN — NOREPINEPHRINE BITARTRATE 10.6 MICROGRAM(S)/KG/MIN: 1 INJECTION INTRAVENOUS at 14:12

## 2024-12-27 RX ADMIN — PIPERACILLIN AND TAZOBACTAM 25 GRAM(S): 3; .375 INJECTION, POWDER, LYOPHILIZED, FOR SOLUTION INTRAVENOUS at 21:43

## 2024-12-27 RX ADMIN — Medication 1: at 08:20

## 2024-12-27 RX ADMIN — SENNOSIDES 2 TABLET(S): 8.6 TABLET, FILM COATED ORAL at 21:40

## 2024-12-27 RX ADMIN — ROSUVASTATIN 10 MILLIGRAM(S): 40 TABLET, FILM COATED ORAL at 21:43

## 2024-12-27 RX ADMIN — CHLORHEXIDINE GLUCONATE 1 APPLICATION(S): 1.2 RINSE ORAL at 05:27

## 2024-12-27 RX ADMIN — Medication 100 MILLIGRAM(S): at 06:33

## 2024-12-27 RX ADMIN — Medication 20 MILLIGRAM(S): at 06:53

## 2024-12-27 NOTE — CHART NOTE - NSCHARTNOTEFT_GEN_A_CORE
8.1    10.38 )-----------( 334      ( 27 Dec 2024 08:23 )             27.8       12-27    141  |  100  |  56[H]  ----------------------------<  122[H]  4.9   |  34[H]  |  1.8[H]    Ca    9.0      27 Dec 2024 08:23  Phos  3.8     12-27  Mg     2.4     12-27    TPro  5.8[L]  /  Alb  2.8[L]  /  TBili  <0.2  /  DBili  x   /  AST  8   /  ALT  6   /  AlkPhos  78  12-26          ABG - ( 26 Dec 2024 19:50 )  pH, Arterial: 7.43  pH, Blood: x     /  pCO2: 51    /  pO2: 85    / HCO3: 34    / Base Excess: 8.0   /  SaO2: 98.6      Urinalysis Basic - ( 27 Dec 2024 08:23 )    Color: x / Appearance: x / SG: x / pH: x  Gluc: 122 mg/dL / Ketone: x  / Bili: x / Urobili: x   Blood: x / Protein: x / Nitrite: x   Leuk Esterase: x / RBC: x / WBC x   Sq Epi: x / Non Sq Epi: x / Bacteria: x    POCT Blood Glucose.: 172 mg/dL (27 Dec 2024 07:45)    Case discussed with Dr Domenico Lobo this AM  labs reviewed and acceptable  will order labs for AM  Swallow evaluation unable to be performed due to patient on BIPAP 8.1    10.38 )-----------( 334      ( 27 Dec 2024 08:23 )             27.8       12-27    141  |  100  |  56[H]  ----------------------------<  122[H]  4.9   |  34[H]  |  1.8[H]    Ca    9.0      27 Dec 2024 08:23  Phos  3.8     12-27  Mg     2.4     12-27    TPro  5.8[L]  /  Alb  2.8[L]  /  TBili  <0.2  /  DBili  x   /  AST  8   /  ALT  6   /  AlkPhos  78  12-26          ABG - ( 26 Dec 2024 19:50 )  pH, Arterial: 7.43  pH, Blood: x     /  pCO2: 51    /  pO2: 85    / HCO3: 34    / Base Excess: 8.0   /  SaO2: 98.6      Urinalysis Basic - ( 27 Dec 2024 08:23 )    Color: x / Appearance: x / SG: x / pH: x  Gluc: 122 mg/dL / Ketone: x  / Bili: x / Urobili: x   Blood: x / Protein: x / Nitrite: x   Leuk Esterase: x / RBC: x / WBC x   Sq Epi: x / Non Sq Epi: x / Bacteria: x    POCT Blood Glucose.: 172 mg/dL (27 Dec 2024 07:45)    Case discussed with Dr Domenico Lobo this AM  labs reviewed and acceptable  will order labs for AM  Swallow evaluation unable to be performed due to patient on BIPAP  I spoke to Respiratory Therapist regarding trial off BIPAP

## 2024-12-27 NOTE — PROGRESS NOTE ADULT - SUBJECTIVE AND OBJECTIVE BOX
INFECTIOUS DISEASE FOLLOW UP NOTE:    Interval History/ROS: Patient is a 84y old  Female who presents with a chief complaint of acute blood loss anemia, cellulitis (27 Dec 2024 08:39)      Overnight events:    REVIEW OF SYSTEMS:        Prior hospital charts reviewed [Yes]  Primary team notes reviewed [Yes]  Other consultant notes reviewed [Yes]    Allergies:  No Known Allergies      ANTIMICROBIALS:   ceFAZolin   IVPB 1000 every 12 hours      OTHER MEDS: MEDICATIONS  (STANDING):  acetaminophen     Tablet .. 650 every 6 hours PRN  dextrose 50% Injectable 25 once  dextrose 50% Injectable 12.5 once  dextrose 50% Injectable 25 once  dextrose Oral Gel 15 once PRN  furosemide    Tablet 40 daily  glucagon  Injectable 1 once  influenza  Vaccine (HIGH DOSE) 0.5 once  insulin lispro (ADMELOG) corrective regimen sliding scale  three times a day before meals  losartan 50 daily  metoprolol succinate ER 25 daily  pantoprazole    Tablet 40 before breakfast  risperiDONE   Tablet 1 daily  rosuvastatin 10 at bedtime  senna 2 at bedtime      Vital Signs Last 24 Hrs  T(F): 97.7 (12-27-24 @ 05:01), Max: 98.5 (12-26-24 @ 21:45)    Vital Signs Last 24 Hrs  HR: 96 (12-27-24 @ 05:01) (77 - 103)  BP: 106/77 (12-27-24 @ 05:01) (79/54 - 117/91)  RR: 18 (12-27-24 @ 05:01)  SpO2: 99% (12-27-24 @ 05:01) (97% - 100%)  Wt(kg): --    EXAM:      Labs:                        8.1    10.38 )-----------( 334      ( 27 Dec 2024 08:23 )             27.8     12-26    137  |  98  |  55[H]  ----------------------------<  155[H]  5.2[H]   |  30  |  1.9[H]    Ca    8.8      26 Dec 2024 17:22  Mg     2.4     12-26    TPro  5.8[L]  /  Alb  2.8[L]  /  TBili  <0.2  /  DBili  x   /  AST  8   /  ALT  6   /  AlkPhos  78  12-26      WBC Trend:  WBC Count: 10.38 (12-27-24 @ 08:23)  WBC Count: 11.49 (12-26-24 @ 17:22)  WBC Count: 11.55 (12-26-24 @ 05:50)  WBC Count: 12.27 (12-25-24 @ 04:30)      Creatine Trend:  Creatinine: 1.9 (12-26)  Creatinine: 1.8 (12-26)  Creatinine: 1.9 (12-25)  Creatinine: 1.5 (12-24)      Liver Biochemical Testing Trend:  Alanine Aminotransferase (ALT/SGPT): 6 (12-26)  Alanine Aminotransferase (ALT/SGPT): 13 (12-25)  Alanine Aminotransferase (ALT/SGPT): 13 (12-24)  Alanine Aminotransferase (ALT/SGPT): 6 (07-07)  Alanine Aminotransferase (ALT/SGPT): 9 (06-04)  Aspartate Aminotransferase (AST/SGOT): 8 (12-26-24 @ 17:22)  Aspartate Aminotransferase (AST/SGOT): 10 (12-25-24 @ 04:30)  Aspartate Aminotransferase (AST/SGOT): 14 (12-24-24 @ 13:00)  Aspartate Aminotransferase (AST/SGOT): 15 (07-07-24 @ 08:35)  Aspartate Aminotransferase (AST/SGOT): 7 (06-04-24 @ 16:14)  Bilirubin Total: <0.2 (12-26)  Bilirubin Total: 0.3 (12-25)  Bilirubin Total: 0.3 (12-24)  Bilirubin Total: 0.3 (07-07)  Bilirubin Total: 0.2 (06-04)      Trend LDH      Urinalysis Basic - ( 26 Dec 2024 17:22 )    Color: x / Appearance: x / SG: x / pH: x  Gluc: 155 mg/dL / Ketone: x  / Bili: x / Urobili: x   Blood: x / Protein: x / Nitrite: x   Leuk Esterase: x / RBC: x / WBC x   Sq Epi: x / Non Sq Epi: x / Bacteria: x        MICROBIOLOGY:        Culture - Blood (collected 24 Dec 2024 13:00)  Source: .Blood BLOOD  Preliminary Report:    No growth at 48 Hours    Culture - Blood (collected 24 Dec 2024 13:00)  Source: .Blood BLOOD  Preliminary Report:    No growth at 48 Hours    Culture - Urine (collected 05 Jun 2024 12:16)  Source: Clean Catch Clean Catch (Midstream)  Final Report:    >100,000 CFU/ml Escherichia coli ESBL  Organism: Escherichia coli ESBL  Organism: Escherichia coli ESBL    Sensitivities:      Method Type: JOO      -  Ampicillin: R >16 These ampicillin results predict results for amoxicillin      -  Ampicillin/Sulbactam: R >16/8      -  Aztreonam: R >16      -  Cefazolin: R >16 For uncomplicated UTI with K. pneumoniae, E. coli, or P. mirablis: JOO <=16 is sensitive and JOO >=32 is resistant. This also predicts results for oral agents cefaclor, cefdinir, cefpodoxime, cefprozil, cefuroxime axetil, cephalexin and locarbef for uncomplicated UTI. Note that some isolates may be susceptible to these agents while testing resistant to cefazolin.      -  Cefepime: R >16      -  Ceftriaxone: R >32      -  Cefuroxime: R >16      -  Ciprofloxacin: R >2      -  Ertapenem: S <=0.5      -  Gentamicin: S <=2      -  Imipenem: S <=1      -  Levofloxacin: R >4      -  Meropenem: S <=1      -  Nitrofurantoin: S <=32 Should not be used to treat pyelonephritis      -  Piperacillin/Tazobactam: SDD 16      -  Tobramycin: R >8      -  Trimethoprim/Sulfamethoxazole: R >2/38    Culture - Urine (collected 04 Jun 2024 19:10)  Source: Clean Catch Clean Catch (Midstream)  Final Report:    <10,000 CFU/mL Normal Urogenital Randa    Culture - Urine (collected 29 Mar 2024 07:52)  Source: Clean Catch Clean Catch (Midstream)  Final Report:    >=3 organisms. Probable collection contamination.      D-Dimer Assay, Quantitative: 226 (12-26)          Blood Gas Arterial, Lactate: 1.1 (12-26 @ 19:50)  Blood Gas Arterial, Lactate: 0.5 (12-26 @ 17:23)  Lactate, Blood: 1.4 (12-24 @ 13:00)  Lactate, Blood: 1.3 (12-24 @ 13:00)      RADIOLOGY:  imaging below personally reviewed   INFECTIOUS DISEASE FOLLOW UP NOTE:    Interval History/ROS: Patient is a 84y old  Female who presents with a chief complaint of acute blood loss anemia, cellulitis (27 Dec 2024 08:39)    Overnight events: Rapid response last night due to respiratory distress. ON BiPAP    REVIEW OF SYSTEMS:  Unable to provide due to respiratory distress      Prior hospital charts reviewed [Yes]  Primary team notes reviewed [Yes]  Other consultant notes reviewed [Yes]    Allergies:  No Known Allergies      ANTIMICROBIALS:   ceFAZolin   IVPB 1000 every 12 hours      OTHER MEDS: MEDICATIONS  (STANDING):  acetaminophen     Tablet .. 650 every 6 hours PRN  dextrose 50% Injectable 25 once  dextrose 50% Injectable 12.5 once  dextrose 50% Injectable 25 once  dextrose Oral Gel 15 once PRN  furosemide    Tablet 40 daily  glucagon  Injectable 1 once  influenza  Vaccine (HIGH DOSE) 0.5 once  insulin lispro (ADMELOG) corrective regimen sliding scale  three times a day before meals  losartan 50 daily  metoprolol succinate ER 25 daily  pantoprazole    Tablet 40 before breakfast  risperiDONE   Tablet 1 daily  rosuvastatin 10 at bedtime  senna 2 at bedtime      Vital Signs Last 24 Hrs  T(F): 97.7 (12-27-24 @ 05:01), Max: 98.5 (12-26-24 @ 21:45)    Vital Signs Last 24 Hrs  HR: 96 (12-27-24 @ 05:01) (77 - 103)  BP: 106/77 (12-27-24 @ 05:01) (79/54 - 117/91)  RR: 18 (12-27-24 @ 05:01)  SpO2: 99% (12-27-24 @ 05:01) (97% - 100%)  Wt(kg): --    EXAM:  GENERAL: In moderate respiratory distress, lying in bed  HEAD: No head lesions  EYES: Conjunctiva pink and cornea white  EAR, NOSE, MOUTH, THROAT: Normal external ears and nose, no discharges; moist mucous membranes; On BIPAP  NECK: Supple, nontender to palpation; no JVD  RESPIRATORY: Bibasilar crackles  CARDIOVASCULAR: S1 S2  GASTROINTESTINAL: Soft, nontender, nondistended; normoactive bowel sounds  GENITOURINARY: No escobar catheter, no CVA tenderness  EXTREMITIES: No clubbing, cyanosis, or petal edema  NERVOUS SYSTEM: Lethargic, arousable, withdrawal to pain  MUSCULOSKELETAL: No joint erythema, swelling or pain  SKIN: Left leg mild erythema with dressing; no superficial thrombophlebitis  PSYCH: Lethargic    Labs:                        8.1    10.38 )-----------( 334      ( 27 Dec 2024 08:23 )             27.8     12-26    137  |  98  |  55[H]  ----------------------------<  155[H]  5.2[H]   |  30  |  1.9[H]    Ca    8.8      26 Dec 2024 17:22  Mg     2.4     12-26    TPro  5.8[L]  /  Alb  2.8[L]  /  TBili  <0.2  /  DBili  x   /  AST  8   /  ALT  6   /  AlkPhos  78  12-26      WBC Trend:  WBC Count: 10.38 (12-27-24 @ 08:23)  WBC Count: 11.49 (12-26-24 @ 17:22)  WBC Count: 11.55 (12-26-24 @ 05:50)  WBC Count: 12.27 (12-25-24 @ 04:30)      Creatine Trend:  Creatinine: 1.9 (12-26)  Creatinine: 1.8 (12-26)  Creatinine: 1.9 (12-25)  Creatinine: 1.5 (12-24)      Liver Biochemical Testing Trend:  Alanine Aminotransferase (ALT/SGPT): 6 (12-26)  Alanine Aminotransferase (ALT/SGPT): 13 (12-25)  Alanine Aminotransferase (ALT/SGPT): 13 (12-24)  Alanine Aminotransferase (ALT/SGPT): 6 (07-07)  Alanine Aminotransferase (ALT/SGPT): 9 (06-04)  Aspartate Aminotransferase (AST/SGOT): 8 (12-26-24 @ 17:22)  Aspartate Aminotransferase (AST/SGOT): 10 (12-25-24 @ 04:30)  Aspartate Aminotransferase (AST/SGOT): 14 (12-24-24 @ 13:00)  Aspartate Aminotransferase (AST/SGOT): 15 (07-07-24 @ 08:35)  Aspartate Aminotransferase (AST/SGOT): 7 (06-04-24 @ 16:14)  Bilirubin Total: <0.2 (12-26)  Bilirubin Total: 0.3 (12-25)  Bilirubin Total: 0.3 (12-24)  Bilirubin Total: 0.3 (07-07)  Bilirubin Total: 0.2 (06-04)      Trend LDH      Urinalysis Basic - ( 26 Dec 2024 17:22 )    Color: x / Appearance: x / SG: x / pH: x  Gluc: 155 mg/dL / Ketone: x  / Bili: x / Urobili: x   Blood: x / Protein: x / Nitrite: x   Leuk Esterase: x / RBC: x / WBC x   Sq Epi: x / Non Sq Epi: x / Bacteria: x        MICROBIOLOGY:        Culture - Blood (collected 24 Dec 2024 13:00)  Source: .Blood BLOOD  Preliminary Report:    No growth at 48 Hours    Culture - Blood (collected 24 Dec 2024 13:00)  Source: .Blood BLOOD  Preliminary Report:    No growth at 48 Hours    Culture - Urine (collected 05 Jun 2024 12:16)  Source: Clean Catch Clean Catch (Midstream)  Final Report:    >100,000 CFU/ml Escherichia coli ESBL  Organism: Escherichia coli ESBL  Organism: Escherichia coli ESBL    Sensitivities:      Method Type: JOO      -  Ampicillin: R >16 These ampicillin results predict results for amoxicillin      -  Ampicillin/Sulbactam: R >16/8      -  Aztreonam: R >16      -  Cefazolin: R >16 For uncomplicated UTI with K. pneumoniae, E. coli, or P. mirablis: JOO <=16 is sensitive and JOO >=32 is resistant. This also predicts results for oral agents cefaclor, cefdinir, cefpodoxime, cefprozil, cefuroxime axetil, cephalexin and locarbef for uncomplicated UTI. Note that some isolates may be susceptible to these agents while testing resistant to cefazolin.      -  Cefepime: R >16      -  Ceftriaxone: R >32      -  Cefuroxime: R >16      -  Ciprofloxacin: R >2      -  Ertapenem: S <=0.5      -  Gentamicin: S <=2      -  Imipenem: S <=1      -  Levofloxacin: R >4      -  Meropenem: S <=1      -  Nitrofurantoin: S <=32 Should not be used to treat pyelonephritis      -  Piperacillin/Tazobactam: SDD 16      -  Tobramycin: R >8      -  Trimethoprim/Sulfamethoxazole: R >2/38    Culture - Urine (collected 04 Jun 2024 19:10)  Source: Clean Catch Clean Catch (Midstream)  Final Report:    <10,000 CFU/mL Normal Urogenital Randa    Culture - Urine (collected 29 Mar 2024 07:52)  Source: Clean Catch Clean Catch (Midstream)  Final Report:    >=3 organisms. Probable collection contamination.      D-Dimer Assay, Quantitative: 226 (12-26)          Blood Gas Arterial, Lactate: 1.1 (12-26 @ 19:50)  Blood Gas Arterial, Lactate: 0.5 (12-26 @ 17:23)  Lactate, Blood: 1.4 (12-24 @ 13:00)  Lactate, Blood: 1.3 (12-24 @ 13:00)      RADIOLOGY:  imaging below personally reviewed    < from: Xray Chest 1 View- PORTABLE-Routine (Xray Chest 1 View- PORTABLE-Routine in AM.) (12.27.24 @ 07:30) >  Findings:    Support devices: None.    Cardiac/mediastinum/hilum: Unremarkable.    Lung parenchyma/Pleura: Stable left pleural effusion and right basilar   opacity. No pneumothorax. Right paratracheal thyroid mass..    Skeleton/soft tissues: Stable      Impression:      No change since the prior exam.      < end of copied text >

## 2024-12-27 NOTE — CHART NOTE - NSCHARTNOTEFT_GEN_A_CORE
Orders received, chart reviewed. Pt received on BIPAP, swallow evaluation deferred at this time. Spoke with SAMANTHA Pang. SLP to f/u

## 2024-12-27 NOTE — PROGRESS NOTE ADULT - SUBJECTIVE AND OBJECTIVE BOX
Patient is a 84y old  Female who presents with a chief complaint of acute blood loss anemia, cellulitis (27 Dec 2024 08:29)        Over Night Events:    On BIPAP more awake   No fevers         ROS:  See HPI    PHYSICAL EXAM    ICU Vital Signs Last 24 Hrs  T(C): 36.5 (27 Dec 2024 05:01), Max: 36.9 (26 Dec 2024 21:45)  T(F): 97.7 (27 Dec 2024 05:01), Max: 98.5 (26 Dec 2024 21:45)  HR: 96 (27 Dec 2024 05:01) (77 - 103)  BP: 106/77 (27 Dec 2024 05:01) (79/54 - 117/91)  BP(mean): --  ABP: --  ABP(mean): --  RR: 18 (27 Dec 2024 05:01) (18 - 18)  SpO2: 99% (27 Dec 2024 05:01) (97% - 100%)    O2 Parameters below as of 26 Dec 2024 19:34  Patient On (Oxygen Delivery Method): BiPAP/CPAP    O2 Concentration (%): 40        General: NAD   HEENT: AMELIA             Lymphatic system: No cervical LN   Lungs: Bilateral BS  Cardiovascular: Regular   Gastrointestinal: Soft, Positive BS  Extremities: No clubbing.  Moves extremities.  Full Range of motion   Skin: Warm, intact  Neurological: Alert and awake         LABS:                            8.1    11.49 )-----------( 354      ( 26 Dec 2024 17:22 )             28.2                                               12-26    137  |  98  |  55[H]  ----------------------------<  155[H]  5.2[H]   |  30  |  1.9[H]    Ca    8.8      26 Dec 2024 17:22  Mg     2.4     12-26    TPro  5.8[L]  /  Alb  2.8[L]  /  TBili  <0.2  /  DBili  x   /  AST  8   /  ALT  6   /  AlkPhos  78  12-26                                             Urinalysis Basic - ( 26 Dec 2024 17:22 )    Color: x / Appearance: x / SG: x / pH: x  Gluc: 155 mg/dL / Ketone: x  / Bili: x / Urobili: x   Blood: x / Protein: x / Nitrite: x   Leuk Esterase: x / RBC: x / WBC x   Sq Epi: x / Non Sq Epi: x / Bacteria: x                                                  LIVER FUNCTIONS - ( 26 Dec 2024 17:22 )  Alb: 2.8 g/dL / Pro: 5.8 g/dL / ALK PHOS: 78 U/L / ALT: 6 U/L / AST: 8 U/L / GGT: x                                                  Culture - Wound Aerobic/Anaerobic (collected 25 Dec 2024 16:00)  Source: Swab  Preliminary Report (26 Dec 2024 22:23):    Numerous Morganella morganii    Moderate Proteus mirabilis    Few Enterococcus faecalis    Culture - Blood (collected 24 Dec 2024 13:00)  Source: .Blood BLOOD  Preliminary Report (26 Dec 2024 23:01):    No growth at 48 Hours    Culture - Blood (collected 24 Dec 2024 13:00)  Source: .Blood BLOOD  Preliminary Report (26 Dec 2024 23:01):    No growth at 48 Hours                                                                                       ABG - ( 26 Dec 2024 19:50 )  pH, Arterial: 7.43  pH, Blood: x     /  pCO2: 51    /  pO2: 85    / HCO3: 34    / Base Excess: 8.0   /  SaO2: 98.6                MEDICATIONS  (STANDING):  ceFAZolin   IVPB 1000 milliGRAM(s) IV Intermittent every 12 hours  chlorhexidine 2% Cloths 1 Application(s) Topical <User Schedule>  dextrose 5%. 1000 milliLiter(s) (100 mL/Hr) IV Continuous <Continuous>  dextrose 5%. 1000 milliLiter(s) (50 mL/Hr) IV Continuous <Continuous>  dextrose 50% Injectable 25 Gram(s) IV Push once  dextrose 50% Injectable 12.5 Gram(s) IV Push once  dextrose 50% Injectable 25 Gram(s) IV Push once  ferrous    sulfate 325 milliGRAM(s) Oral daily  folic acid 1 milliGRAM(s) Oral daily  furosemide    Tablet 40 milliGRAM(s) Oral daily  glucagon  Injectable 1 milliGRAM(s) IntraMuscular once  influenza  Vaccine (HIGH DOSE) 0.5 milliLiter(s) IntraMuscular once  insulin lispro (ADMELOG) corrective regimen sliding scale   SubCutaneous three times a day before meals  lidocaine 1% Injectable 30 milliLiter(s) Local Injection once  losartan 50 milliGRAM(s) Oral daily  metoprolol succinate ER 25 milliGRAM(s) Oral daily  pantoprazole    Tablet 40 milliGRAM(s) Oral before breakfast  risperiDONE   Tablet 1 milliGRAM(s) Oral daily  rosuvastatin 10 milliGRAM(s) Oral at bedtime  senna 2 Tablet(s) Oral at bedtime  silver sulfADIAZINE 1% Cream 1 Application(s) Topical daily  vitamin A & D Ointment 1 Application(s) Topical daily    MEDICATIONS  (PRN):  acetaminophen     Tablet .. 650 milliGRAM(s) Oral every 6 hours PRN Temp greater or equal to 38C (100.4F), Mild Pain (1 - 3)  dextrose Oral Gel 15 Gram(s) Oral once PRN Blood Glucose LESS THAN 70 milliGRAM(s)/deciliter      Xrays:                                                                                     ECHO

## 2024-12-27 NOTE — RAPID RESPONSE TEAM SUMMARY - NSSITUATIONBACKGROUNDRRT_GEN_ALL_CORE
PMH Hypercapnic Resp failure/ was on BIPAP earlier today, now N/C 4LPM, Atrial Fib: Eliquis held due to Hematoma. S/P Rapid response yesterday due to Hypotension/AMS. Evaluated by ICU BP stabilized with Volume  resuscitation and placement on BIPAP.  Today placed on N/C 4LPM, received lasix 20mg IV this AM: 550ml urine:  Hypotension again  - VS 69/41 manually, Sat 93%, HR 85, awake answers appropriately: Rapid response called due to Hypotension , placed on Levophed drip, ICU Intensivist called by Hospitalist for evaluation PMH Hypercapnic Resp failure/ was on BIPAP earlier today, now N/C 4LPM, Atrial Fib: Eliquis held due to Hematoma. S/P Rapid response yesterday due to Hypotension/AMS. Evaluated by ICU BP stabilized with Volume  resuscitation and placement on BIPAP.  Today placed on N/C 4LPM, received lasix 20mg IV this AM: 550ml urine:  Hypotensive again  - VS 69/41 manually, Sat 93%, HR 85, awake answers appropriately: Rapid response called due to Hypotension , placed on Levophed drip, ICU Intensivist called by Hospitalist for evaluation, accepted to SDU

## 2024-12-27 NOTE — PROGRESS NOTE ADULT - SUBJECTIVE AND OBJECTIVE BOX
CHELA GALLAGHER  84y  Female      Patient is a 84y old  Female who presents with a chief complaint of acute blood loss anemia, cellulitis (26 Dec 2024 20:01)        REVIEW OF SYSTEMS:  CONSTITUTIONAL: No fever, weight loss, or fatigue  RESPIRATORY: No cough, wheezing, chills or hemoptysis;  shortness of breath+  CARDIOVASCULAR: No chest pain, palpitations, dizziness, or leg swelling  GASTROINTESTINAL: No abdominal or epigastric pain. No nausea, vomiting, or hematemesis; No diarrhea or constipation. No melena or hematochezia.  GENITOURINARY: No dysuria, frequency, hematuria,  incontinence+  MUSCULOSKELETAL: No joint pain or swelling; No muscle, back, or extremity pain  PSYCHIATRIC: No depression, anxiety, mood swings, or difficulty sleeping  HEME/LYMPH: No easy bruising, or bleeding gums  ALLERY AND IMMUNOLOGIC: No hives or eczema  FAMILY HISTORY:    T(C): 36.5 (12-27-24 @ 05:01), Max: 36.9 (12-26-24 @ 21:45)  HR: 96 (12-27-24 @ 05:01) (77 - 103)  BP: 106/77 (12-27-24 @ 05:01) (79/54 - 117/91)  RR: 18 (12-27-24 @ 05:01) (18 - 18)  SpO2: 99% (12-27-24 @ 05:01) (97% - 100%)  Wt(kg): --Vital Signs Last 24 Hrs  T(C): 36.5 (27 Dec 2024 05:01), Max: 36.9 (26 Dec 2024 21:45)  T(F): 97.7 (27 Dec 2024 05:01), Max: 98.5 (26 Dec 2024 21:45)  HR: 96 (27 Dec 2024 05:01) (77 - 103)  BP: 106/77 (27 Dec 2024 05:01) (79/54 - 117/91)  BP(mean): --  RR: 18 (27 Dec 2024 05:01) (18 - 18)  SpO2: 99% (27 Dec 2024 05:01) (97% - 100%)    Parameters below as of 26 Dec 2024 19:34  Patient On (Oxygen Delivery Method): BiPAP/CPAP    O2 Concentration (%): 40  No Known Allergies      PHYSICAL EXAM:  GENERAL: NAD,   HEAD:  Atraumatic, Normocephalic  EYES: EOMI, PERRLA, conjunctiva and sclera clear  ENMT: No tonsillar erythema, exudates, or enlargement;   NECK: Supple, No JVD, Normal thyroid  NERVOUS SYSTEM:  Alert & Oriented   CHEST/LUNG: vbs  HEART: Regular rate and rhythm; No murmurs, rubs, or gallops  ABDOMEN: Soft, Nontender, Nondistended; Bowel sounds present  EXTREMITIES:  , No clubbing, cyanosis, or edema  LYMPH: No lymphadenopathy noted  SKIN: No rashes or lesions      LABS:  12-26    137  |  98  |  55[H]  ----------------------------<  155[H]  5.2[H]   |  30  |  1.9[H]    Ca    8.8      26 Dec 2024 17:22  Mg     2.4     12-26    TPro  5.8[L]  /  Alb  2.8[L]  /  TBili  <0.2  /  DBili  x   /  AST  8   /  ALT  6   /  AlkPhos  78  12-26                          8.1    11.49 )-----------( 354      ( 26 Dec 2024 17:22 )             28.2     < from: CT Head No Cont (12.26.24 @ 18:54) >  Stable exam since previous CT 12/24/2024.    < end of copied text >  < from: Xray Chest 1 View-PORTABLE IMMEDIATE (Xray Chest 1 View-PORTABLE IMMEDIATE .) (12.26.24 @ 18:45) >  Stable bilateral opacities/pleural effusions.    < end of copied text >    < from: CT Chest w/ IV Cont (12.24.24 @ 16:39) >  No evidence of thoracic, abdominal or pelvic visceral injury, laceration,   or hematoma.    No evidence of acute fracture.    An intraluminal filling defect is noted within the gallbladder compatible   with a large stone. Dense material seen in the gallbladder fundus which   may represent inspissated bile/sludge.      < end of copied text >    RADIOLOGY & ADDITIONAL TESTS:    MEDICATION:  acetaminophen     Tablet .. 650 milliGRAM(s) Oral every 6 hours PRN  ceFAZolin   IVPB 1000 milliGRAM(s) IV Intermittent every 12 hours  chlorhexidine 2% Cloths 1 Application(s) Topical <User Schedule>  dextrose 5%. 1000 milliLiter(s) IV Continuous <Continuous>  dextrose 5%. 1000 milliLiter(s) IV Continuous <Continuous>  dextrose 50% Injectable 25 Gram(s) IV Push once  dextrose 50% Injectable 12.5 Gram(s) IV Push once  dextrose 50% Injectable 25 Gram(s) IV Push once  dextrose Oral Gel 15 Gram(s) Oral once PRN  ferrous    sulfate 325 milliGRAM(s) Oral daily  folic acid 1 milliGRAM(s) Oral daily  furosemide    Tablet 40 milliGRAM(s) Oral daily  glucagon  Injectable 1 milliGRAM(s) IntraMuscular once  influenza  Vaccine (HIGH DOSE) 0.5 milliLiter(s) IntraMuscular once  insulin lispro (ADMELOG) corrective regimen sliding scale   SubCutaneous three times a day before meals  lidocaine 1% Injectable 30 milliLiter(s) Local Injection once  losartan 50 milliGRAM(s) Oral daily  metoprolol succinate ER 25 milliGRAM(s) Oral daily  pantoprazole    Tablet 40 milliGRAM(s) Oral before breakfast  risperiDONE   Tablet 1 milliGRAM(s) Oral daily  rosuvastatin 10 milliGRAM(s) Oral at bedtime  senna 2 Tablet(s) Oral at bedtime  silver sulfADIAZINE 1% Cream 1 Application(s) Topical daily  vitamin A & D Ointment 1 Application(s) Topical daily      HEALTH ISSUES - PROBLEM Dx:        This is a 84-year-old female brought in by EMS from [facility], with past medical history of  diabetes, HTN, chronic LE edema, bipolar disorder, tachybrady syndrome (declining PPM), A fib (on Eliquis), mild aortic stenosis, presenting s/p mechanical fall 3 days ago (-head trauma, +AC). Patient sustained wound to left posterior lateral lower extremity with hematoma, that started rebleeding today. Denies fever, chills, n/v, cp, sob, palpitations, cough, back pain, numbness/tingling, abd pain, changes in BM, and urinary symptoms,     # s/p rapid response Acute Respiratory distress ,failure probably from CHF   hx a fib ,lasix 40mg x1 d/c IVF   cardiologist ,pulmonary   #LLE subcutaneous hematoma and cellulitis   #Fall  -Admit to medicine   -CTA LE: A subcutaneous hematoma is noted along the lateral and posterior aspect of the left calf. The hematoma measures approximately 11.5 x 2 x 18 cm.  -WBC 14.91, lactate WNL   -S/p Aztreonam, Flagyl, and Vancomycin in ED  -C/w IV Ancef 1 g q12 as per -[facility]   -Surgery following: Had I&D- see 12/25; continue daily wound care (telfa/gauze/abd/kerlix/ACE wrap)   -ID consult noted  -Wound care consult - noted: Had I&D yesterday, 12/25  -Pain control prn   -PT consult   -Fall precautions     #Acute blood loss anemia   #H/o iron deficiency anemia   -Hgb 6.7 (10.2 in 7/2024). Baseline Hgb ~8- 9  -Hold Eliquis in setting of acute blood loss anemia   -F/U post transfusion CBC improved.  -C/w ferrous sulfate 325 mg   - check anemia labs    #Acute hypercarbic respiratory failure likely 2/2 MANJEET   -BiPAP HS 14/8  -Supplemental O2 prn     #DM  -Holding Farxiga 10 mg   -ISS while inpatient     #HTN  -C/w Losartan 50 mg     #HLD  -C/w Crestor 10 mg     #Bipolar  -C/w Risperdal 1 mg     #Chronic LE edema  -C/w home Lasix    #H/o A fib-C/w Metoprolol Succinate ER 25 mg tab   -Holding home Eliquis in setting of acute blood loss anemia    Diet: DASH/CC, fluid restriction 1200 ml/day   Activity: Ambulate with assistance-PT  VTE ppx: Holding home Eliquis in setting of acute blood loss anemia   GI ppx: Protonix

## 2024-12-27 NOTE — CHART NOTE - NSCHARTNOTEFT_GEN_A_CORE
Patient is hypoxic off BIPAP.  obtain BNP, CXR, and Pulmonary consult.  NPO pending speech therapy consult   IV lasix x 1 dose, hold off on IVF

## 2024-12-27 NOTE — PROGRESS NOTE ADULT - ASSESSMENT
Impression:    AMS - resolved   Acute on chronic hypercapnic failure - better   On BIPAP  MANJEET/OHS  Hematoma secondary to fall     Plan:     CXR stable; left basilar atelectasis; increased markings   Initial ABG with acute on chronic hypercapnia  Improved with BIPAP 16/8   MS better   CTH done - negative   UA negative; no obvious sepsis   Avoid CNS depressants   Hemodynamically stable; Comfortable on BIPAP   Use BIPAP PRN and during sleep  Wean off to NC; goal spo2 more than 88%  Avoid fluid overload; Check BNP; trial of diuretic if elevated   Speech and swallow when off BIPAP  Abx per ID  Clarify goals of care with family   Will follow as needed

## 2024-12-27 NOTE — CONSULT NOTE ADULT - ASSESSMENT
84-year-old female brought in by EMS from [facility], with past medical history of  diabetes, HTN, chronic LE edema, bipolar disorder, tachybrady syndrome (declining PPM), A fib (on Eliquis), mild aortic stenosis, presenting s/p mechanical fall. Cardiology consulted for eval for CHF      Impression:  #Acute hypoxic Resp failure: Possibly Multifactorial   #Permanent Afib on Eliquis  #Sinus node dysfunction (declined PPM in the past)  #HTN      Currently on Bipap  Clinically does not appear grossly volume overloaded  On tele appears in Afib, and rate controlled with HR <110  TTE 3/31/24: EF 60%, Normal global LVSF, Mild AS      Plan:  Obtain TTE  Check lipid profile, HgA1C, TSH  Cont w/ Lasix 40mg IVP daily. Transition to PO when off BiPAP  Check pBNP closer to discharge  Currently off Eliquis 2/2 to blood loss anemia. Resume when stable and if no contraindications  Cont w/ Metoprolol for rate control with holding parameters  Cont w/ rest of cardiac home meds  Monitor abby

## 2024-12-27 NOTE — CONSULT NOTE ADULT - SUBJECTIVE AND OBJECTIVE BOX
HPI:  This is a 84-year-old female brought in by EMS from [facility], with past medical history of  diabetes, HTN, chronic LE edema, bipolar disorder, tachybrady syndrome (declining PPM), A fib (on Eliquis), mild aortic stenosis, presenting s/p mechanical fall 3 days ago (-head trauma, +AC). Patient sustained wound to left posterior lateral lower extremity with hematoma, that started rebleeding today. Denies fever, chills, n/v, cp, sob, palpitations, cough, back pain, numbness/tingling, abd pain, changes in BM, and urinary symptoms, (24 Dec 2024 18:45)        HPI-Cardiology   Pt with the above Hx and HPI, evaluated at bedside. Pt confused and nonverbal at time of assessment. Hx and HPI obtained from primary team, chart. Pt currently on Bipap, and denies any CP or any other cardiac related symptoms. Radiology tests and hospital records, were reviewed, as well as previous notes on this patient.      PAST MEDICAL & SURGICAL HISTORY  Diabetes mellitus    Hypertension    Schizoaffective disorder    Edema  Bilateral LE    Disorder of thyroid, unspecified  Son cannot provide details. States she had "thyroid surgery" decades ago when she was young    No significant past surgical history          ALLERGIES:  No Known Allergies      MEDICATIONS:  MEDICATIONS  (STANDING):  ceFAZolin   IVPB 1000 milliGRAM(s) IV Intermittent every 12 hours  chlorhexidine 2% Cloths 1 Application(s) Topical <User Schedule>  dextrose 5%. 1000 milliLiter(s) (100 mL/Hr) IV Continuous <Continuous>  dextrose 5%. 1000 milliLiter(s) (50 mL/Hr) IV Continuous <Continuous>  dextrose 50% Injectable 25 Gram(s) IV Push once  dextrose 50% Injectable 12.5 Gram(s) IV Push once  dextrose 50% Injectable 25 Gram(s) IV Push once  ferrous    sulfate 325 milliGRAM(s) Oral daily  folic acid 1 milliGRAM(s) Oral daily  furosemide    Tablet 40 milliGRAM(s) Oral daily  glucagon  Injectable 1 milliGRAM(s) IntraMuscular once  influenza  Vaccine (HIGH DOSE) 0.5 milliLiter(s) IntraMuscular once  insulin lispro (ADMELOG) corrective regimen sliding scale   SubCutaneous three times a day before meals  lidocaine 1% Injectable 30 milliLiter(s) Local Injection once  losartan 50 milliGRAM(s) Oral daily  metoprolol succinate ER 25 milliGRAM(s) Oral daily  pantoprazole    Tablet 40 milliGRAM(s) Oral before breakfast  risperiDONE   Tablet 1 milliGRAM(s) Oral daily  rosuvastatin 10 milliGRAM(s) Oral at bedtime  senna 2 Tablet(s) Oral at bedtime  silver sulfADIAZINE 1% Cream 1 Application(s) Topical daily  vitamin A & D Ointment 1 Application(s) Topical daily    MEDICATIONS  (PRN):  acetaminophen     Tablet .. 650 milliGRAM(s) Oral every 6 hours PRN Temp greater or equal to 38C (100.4F), Mild Pain (1 - 3)  dextrose Oral Gel 15 Gram(s) Oral once PRN Blood Glucose LESS THAN 70 milliGRAM(s)/deciliter      HOME MEDICATIONS:  Home Medications:  apixaban 5 mg oral tablet: 1 tab(s) orally every 12 hours (24 Dec 2024 18:43)  Crestor 10 mg oral tablet: 1 tab(s) orally once a day (24 Dec 2024 18:43)  Farxiga 10 mg oral tablet: 1 tab(s) orally once a day (24 Dec 2024 18:43)  ferrous sulfate 325 mg (65 mg elemental iron) oral delayed release tablet: 1 tab(s) orally 2 times a day (24 Dec 2024 18:43)  folic acid 1 mg oral tablet: 1 tab(s) orally once a day (24 Dec 2024 18:43)  Lasix 40 mg oral tablet: 1 tab(s) orally once a day (24 Dec 2024 18:40)  losartan 50 mg oral tablet: 1 tab(s) orally once a day hold for SBP less than 105mmhg (24 Dec 2024 18:43)  metoprolol succinate 25 mg oral tablet, extended release: 1 tab(s) orally once a day HOLD for SBP less than 100 and HR less than 60 (24 Dec 2024 18:43)  nystatin 100,000 units/g topical cream: Apply topically to affected area 2 times a day (24 Dec 2024 18:38)  RisperDAL 1 mg oral tablet: 1 tab(s) orally once a day (at bedtime) (24 Dec 2024 19:15)  senna leaf extract oral tablet: 2 tab(s) orally once a day (at bedtime) (24 Dec 2024 18:43)  silver sulfADIAZINE 1% topical cream: Apply topically to affected area once a day (24 Dec 2024 19:15)  Vitamin A and D topical ointment: Apply topically to affected area once a day Apply to bilateral LE&#x27;s/Feet daily (24 Dec 2024 18:43)  Vitamin D2 50,000 intl units (1.25 mg) oral capsule (obsolete): 1 cap(s) orally every 2 weeks (24 Dec 2024 18:43)      VITALS:   T(F): 97.7 (12-27 @ 05:01), Max: 98.5 (12-26 @ 21:45)  HR: 96 (12-27 @ 05:01) (68 - 103)  BP: 106/77 (12-27 @ 05:01) (79/54 - 121/88)  BP(mean): --  RR: 18 (12-27 @ 05:01) (16 - 19)  SpO2: 99% (12-27 @ 05:01) (93% - 100%)    I&O's Summary      REVIEW OF SYSTEMS:  See HPI      PHYSICAL EXAM:  NEURO: patient is awake , alert and oriented  GEN: Not in acute distress  NECK: no thyroid enlargement, no JVD  LUNGS: Rhonchi to auscultation bilaterally   CARDIOVASCULAR: S1/S2 present, RRR , no murmurs or rubs, no carotid bruits,  + PP bilaterally  ABD: Soft, non-tender, non-distended, +BS  EXT: No AMISHA  SKIN: Intact        LABS:                        8.1    10.38 )-----------( 334      ( 27 Dec 2024 08:23 )             27.8     12-27    141  |  100  |  56[H]  ----------------------------<  122[H]  4.9   |  34[H]  |  1.8[H]    Ca    9.0      27 Dec 2024 08:23  Phos  3.8     12-27  Mg     2.4     12-27    TPro  5.8[L]  /  Alb  2.8[L]  /  TBili  <0.2  /  DBili  x   /  AST  8   /  ALT  6   /  AlkPhos  78  12-26              RADIOLOGY:  -CXR:  < from: Xray Chest 1 View- PORTABLE-Routine (Xray Chest 1 View- PORTABLE-Routine in AM.) (12.27.24 @ 07:30) >  Impression:      No change since the prior exam.    --- End of Report ---      < end of copied text >            < from: TTE Echo Complete w/ Contrast w/o Doppler (03.31.24 @ 11:38) >    Summary:   1. Normal global left ventricular systolic function.   2. Left atrial enlargement.   3. LV Ejection Fraction by Pond's Method with a biplane EF of 60 %.   4. Normal left ventricular internal cavity size.   5. Normal right atrial size.   6. There is no evidence of pericardial effusion.   7. Mild mitral annular calcification.   8. Mild thickening and calcification of the anterior and posterior   mitral valve leaflets.   9. No evidence of mitral valve regurgitation.  10. The aortic valve mean gradient is 14.4 mmHg consistent with mild   aortic stenosis.  11. Sclerotic aortic valve with decreased opening.    < end of copied text >      ECG:  < from: 12 Lead ECG (12.26.24 @ 17:26) >  Atrial fibrillation with premature ventricular or aberrantly conducted  complexes  Left anterior fascicular block  Abnormal ECG  Confirmed by KYE ROBLES, JS ([id number]) on 12/27/2024 9:21:59 AM    < end of copied text >

## 2024-12-27 NOTE — PROGRESS NOTE ADULT - ASSESSMENT
84-year-old female brought in by EMS from [facility], with past medical history of  diabetes, HTN, chronic LE edema, bipolar disorder, tachybrady syndrome (declining PPM), A fib (on Eliquis), mild aortic stenosis, presenting s/p mechanical fall 3 days ago (-head trauma, +AC). Patient sustained wound to left posterior lateral lower extremity with hematoma, that started rebleeding today.     IMPRESSION:  #LLE hematoma + cellulitis , no abscess     Admission WBC 14; Afebrile   < from: CT Angio Lower Extremity w/ IV Cont, Left (12.24.24 @ 16:39) >  A subcutaneous hematoma is noted along the lateral and posterior aspect of the left calf. The hematoma measures approximately 11.5 x 2 x 18 cm.  There is no evidence of active extravasation.  There is no evidence of vascular injury, occlusion, dissection, or pseudoaneurysm.  Wound Cx growing M. morganii, P. mirabilis, E. 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  Attending Physician  Division of Infectious Diseases  [facility]  Please contact me via [employer] Teams

## 2024-12-27 NOTE — PROGRESS NOTE ADULT - SUBJECTIVE AND OBJECTIVE BOX
GENERAL SURGERY PROGRESS NOTE     CHELA GALLAGHER  84y  Female  Hospital day :3d  Procedure: 1/25 bedside I&D infected hematoma  24-HOUR EVENTS:  Rapid called yesterday for patient unresponsiveness, upgraded to telemetry unit and started on BiPAP. This AM, pt resting in bed with BiPAP, NAD.    T(F): 97.7 (12-27-24 @ 05:01), Max: 98.5 (12-26-24 @ 21:45)  HR: 96 (12-27-24 @ 05:01) (77 - 103)  BP: 106/77 (12-27-24 @ 05:01) (79/54 - 117/91)  ABP: --  ABP(mean): --  RR: 18 (12-27-24 @ 05:01) (18 - 18)  SpO2: 99% (12-27-24 @ 05:01) (97% - 100%)    DIET/FLUIDS: dextrose 5%. 1000 milliLiter(s) IV Continuous <Continuous>  dextrose 5%. 1000 milliLiter(s) IV Continuous <Continuous>  ferrous    sulfate 325 milliGRAM(s) Oral daily  folic acid 1 milliGRAM(s) Oral daily    GI proph:  pantoprazole    Tablet 40 milliGRAM(s) Oral before breakfast    AC/ proph:   ABx: ceFAZolin   IVPB 1000 milliGRAM(s) IV Intermittent every 12 hours    PHYSICAL EXAM:  GENERAL: NAD  HEENT: Normocephalic, atraumatic  PULM: On BiPAP  CV: Regular rate and rhythm  MSK: LLE dressing changed, no active bleeding, foul-smelling discharge in dressing but no active drainage, Aquacel packing replaced, dressed with gauze, abdominal pad, Kerlix, and ACE bandage  SKIN: Warm, dry, normal skin color, texture, turgor    LABS  CAPILLARY BLOOD GLUCOSE  POCT Blood Glucose.: 172 mg/dL (27 Dec 2024 07:45)  POCT Blood Glucose.: 153 mg/dL (26 Dec 2024 21:00)  POCT Blood Glucose.: 200 mg/dL (26 Dec 2024 17:17)  POCT Blood Glucose.: 214 mg/dL (26 Dec 2024 16:35)  POCT Blood Glucose.: 173 mg/dL (26 Dec 2024 11:33)                 8.1    10.38 )-----------( 334      ( 27 Dec 2024 08:23 )             27.8       Auto Neutrophil %: 85.3 % (12-27-24 @ 08:23)  Auto Immature Granulocyte %: 0.4 % (12-27-24 @ 08:23)  Auto Neutrophil %: 83.4 % (12-26-24 @ 17:22)  Auto Immature Granulocyte %: 0.4 % (12-26-24 @ 17:22)    12-27    141  |  100  |  56[H]  ----------------------------<  122[H]  4.9   |  34[H]  |  1.8[H]    Calcium: 9.0 mg/dL (12-27-24 @ 08:23)    LFTs:             5.8  | <0.2 | 8        ------------------[78      ( 26 Dec 2024 17:22 )  2.8  | x    | 6           Lipase:x      Amylase:x        Blood Gas Arterial, Lactate: 1.1 mmol/L (12-26-24 @ 19:50)  Blood Gas Arterial, Lactate: 0.5 mmol/L (12-26-24 @ 17:23)  Lactate, Blood: 1.4 mmol/L (12-24-24 @ 13:00)  Lactate, Blood: 1.3 mmol/L (12-24-24 @ 13:00)    ABG - ( 26 Dec 2024 19:50 )  pH: 7.43  /  pCO2: 51    /  pO2: 85    / HCO3: 34    / Base Excess: 8.0   /  SaO2: 98.6      ABG - ( 26 Dec 2024 17:23 )  pH: 7.24  /  pCO2: 86    /  pO2: 105   / HCO3: 37    / Base Excess: 6.3   /  SaO2: 99.3      Urinalysis Basic - ( 27 Dec 2024 08:23 )    Color: x / Appearance: x / SG: x / pH: x  Gluc: 122 mg/dL / Ketone: x  / Bili: x / Urobili: x   Blood: x / Protein: x / Nitrite: x   Leuk Esterase: x / RBC: x / WBC x   Sq Epi: x / Non Sq Epi: x / Bacteria: x    Culture - Wound Aerobic/Anaerobic (collected 25 Dec 2024 16:00)  Source: Swab  Preliminary Report (26 Dec 2024 22:23):    Numerous Morganella morganii    Moderate Proteus mirabilis    Few Enterococcus faecalis    Culture - Blood (collected 24 Dec 2024 13:00)  Source: .Blood BLOOD  Preliminary Report (26 Dec 2024 23:01):    No growth at 48 Hours    Culture - Blood (collected 24 Dec 2024 13:00)  Source: .Blood BLOOD  Preliminary Report (26 Dec 2024 23:01):    No growth at 48 Hours    ASSESSMENT:  84F PMHx DM, HTN, chronic BLE edema, bipolar disorder, tachybrady syndrome (on Eliquis) who presented to the ED 12/24 post-fall 2-3 days earlier at [facility]. Surgery consulted to evaluate LLE wound. S/p 12/25 bedside I&D of infected hematoma    PLAN:  - Monitor CBC (continuing to downtrend)  - Wound clx: numerous morganella morganii, moderate proteus mirabilis, few enterococcus faecalis      - F/u ID recs  - Daily LLE dressing changes: Aquacel replacement, gauze, abdominal pad, Kerlix, ACE bandage  - Management per primary    Final plan pending discussion with attending    Surgery ([id number]) GENERAL SURGERY PROGRESS NOTE     CHELA GALLAGHER  84y  Female  Hospital day :3d  Procedure: 12/25 bedside I&D infected hematoma  24-HOUR EVENTS:  Rapid called yesterday for patient unresponsiveness, upgraded to telemetry unit and started on BiPAP. This AM, pt resting in bed with BiPAP, NAD.    T(F): 97.7 (12-27-24 @ 05:01), Max: 98.5 (12-26-24 @ 21:45)  HR: 96 (12-27-24 @ 05:01) (77 - 103)  BP: 106/77 (12-27-24 @ 05:01) (79/54 - 117/91)  ABP: --  ABP(mean): --  RR: 18 (12-27-24 @ 05:01) (18 - 18)  SpO2: 99% (12-27-24 @ 05:01) (97% - 100%)    DIET/FLUIDS: dextrose 5%. 1000 milliLiter(s) IV Continuous <Continuous>  dextrose 5%. 1000 milliLiter(s) IV Continuous <Continuous>  ferrous    sulfate 325 milliGRAM(s) Oral daily  folic acid 1 milliGRAM(s) Oral daily    GI proph:  pantoprazole    Tablet 40 milliGRAM(s) Oral before breakfast    AC/ proph:   ABx: ceFAZolin   IVPB 1000 milliGRAM(s) IV Intermittent every 12 hours    PHYSICAL EXAM:  GENERAL: NAD  HEENT: Normocephalic, atraumatic  PULM: On BiPAP  CV: Regular rate and rhythm  MSK: LLE dressing changed, no active bleeding, foul-smelling discharge in dressing but no active drainage, Aquacel packing replaced, dressed with gauze, abdominal pad, Kerlix, and ACE bandage  SKIN: Warm, dry, normal skin color, texture, turgor    LABS  CAPILLARY BLOOD GLUCOSE  POCT Blood Glucose.: 172 mg/dL (27 Dec 2024 07:45)  POCT Blood Glucose.: 153 mg/dL (26 Dec 2024 21:00)  POCT Blood Glucose.: 200 mg/dL (26 Dec 2024 17:17)  POCT Blood Glucose.: 214 mg/dL (26 Dec 2024 16:35)  POCT Blood Glucose.: 173 mg/dL (26 Dec 2024 11:33)                 8.1    10.38 )-----------( 334      ( 27 Dec 2024 08:23 )             27.8       Auto Neutrophil %: 85.3 % (12-27-24 @ 08:23)  Auto Immature Granulocyte %: 0.4 % (12-27-24 @ 08:23)  Auto Neutrophil %: 83.4 % (12-26-24 @ 17:22)  Auto Immature Granulocyte %: 0.4 % (12-26-24 @ 17:22)    12-27    141  |  100  |  56[H]  ----------------------------<  122[H]  4.9   |  34[H]  |  1.8[H]    Calcium: 9.0 mg/dL (12-27-24 @ 08:23)    LFTs:             5.8  | <0.2 | 8        ------------------[78      ( 26 Dec 2024 17:22 )  2.8  | x    | 6           Lipase:x      Amylase:x        Blood Gas Arterial, Lactate: 1.1 mmol/L (12-26-24 @ 19:50)  Blood Gas Arterial, Lactate: 0.5 mmol/L (12-26-24 @ 17:23)  Lactate, Blood: 1.4 mmol/L (12-24-24 @ 13:00)  Lactate, Blood: 1.3 mmol/L (12-24-24 @ 13:00)    ABG - ( 26 Dec 2024 19:50 )  pH: 7.43  /  pCO2: 51    /  pO2: 85    / HCO3: 34    / Base Excess: 8.0   /  SaO2: 98.6      ABG - ( 26 Dec 2024 17:23 )  pH: 7.24  /  pCO2: 86    /  pO2: 105   / HCO3: 37    / Base Excess: 6.3   /  SaO2: 99.3      Urinalysis Basic - ( 27 Dec 2024 08:23 )    Color: x / Appearance: x / SG: x / pH: x  Gluc: 122 mg/dL / Ketone: x  / Bili: x / Urobili: x   Blood: x / Protein: x / Nitrite: x   Leuk Esterase: x / RBC: x / WBC x   Sq Epi: x / Non Sq Epi: x / Bacteria: x    Culture - Wound Aerobic/Anaerobic (collected 25 Dec 2024 16:00)  Source: Swab  Preliminary Report (26 Dec 2024 22:23):    Numerous Morganella morganii    Moderate Proteus mirabilis    Few Enterococcus faecalis    Culture - Blood (collected 24 Dec 2024 13:00)  Source: .Blood BLOOD  Preliminary Report (26 Dec 2024 23:01):    No growth at 48 Hours    Culture - Blood (collected 24 Dec 2024 13:00)  Source: .Blood BLOOD  Preliminary Report (26 Dec 2024 23:01):    No growth at 48 Hours    ASSESSMENT:  84F PMHx DM, HTN, chronic BLE edema, bipolar disorder, tachybrady syndrome (on Eliquis) who presented to the ED 12/24 post-fall 2-3 days earlier at [facility]. Surgery consulted to evaluate LLE wound. S/p 12/25 bedside I&D of infected hematoma    PLAN:  - Monitor CBC (continuing to downtrend)  - Wound clx: numerous morganella morganii, moderate proteus mirabilis, few enterococcus faecalis      - F/u ID recs  - Daily LLE dressing changes: Aquacel replacement, gauze, abdominal pad, Kerlix, ACE bandage  - Management per primary    Final plan pending discussion with attending    Surgery ([id number])

## 2024-12-28 LAB
ALBUMIN SERPL ELPH-MCNC: 2.7 G/DL — LOW (ref 3.5–5.2)
ALP SERPL-CCNC: 77 U/L — SIGNIFICANT CHANGE UP (ref 30–115)
ALT FLD-CCNC: <5 U/L — SIGNIFICANT CHANGE UP (ref 0–41)
ANION GAP SERPL CALC-SCNC: 9 MMOL/L — SIGNIFICANT CHANGE UP (ref 7–14)
AST SERPL-CCNC: 8 U/L — SIGNIFICANT CHANGE UP (ref 0–41)
BILIRUB SERPL-MCNC: 0.3 MG/DL — SIGNIFICANT CHANGE UP (ref 0.2–1.2)
BUN SERPL-MCNC: 50 MG/DL — HIGH (ref 10–20)
CALCIUM SERPL-MCNC: 9.1 MG/DL — SIGNIFICANT CHANGE UP (ref 8.4–10.5)
CHLORIDE SERPL-SCNC: 102 MMOL/L — SIGNIFICANT CHANGE UP (ref 98–110)
CO2 SERPL-SCNC: 33 MMOL/L — HIGH (ref 17–32)
CREAT SERPL-MCNC: 1.4 MG/DL — SIGNIFICANT CHANGE UP (ref 0.7–1.5)
EGFR: 37 ML/MIN/1.73M2 — LOW
FERRITIN SERPL-MCNC: 80 NG/ML — SIGNIFICANT CHANGE UP (ref 13–330)
FOLATE SERPL-MCNC: >20 NG/ML — SIGNIFICANT CHANGE UP
GLUCOSE BLDC GLUCOMTR-MCNC: 184 MG/DL — HIGH (ref 70–99)
GLUCOSE BLDC GLUCOMTR-MCNC: 227 MG/DL — HIGH (ref 70–99)
GLUCOSE SERPL-MCNC: 149 MG/DL — HIGH (ref 70–99)
HCT VFR BLD CALC: 28.4 % — LOW (ref 37–47)
HGB BLD-MCNC: 8.1 G/DL — LOW (ref 12–16)
MCHC RBC-ENTMCNC: 27.6 PG — SIGNIFICANT CHANGE UP (ref 27–31)
MCHC RBC-ENTMCNC: 28.5 G/DL — LOW (ref 32–37)
MCV RBC AUTO: 96.6 FL — SIGNIFICANT CHANGE UP (ref 81–99)
NRBC # BLD: 0 /100 WBCS — SIGNIFICANT CHANGE UP (ref 0–0)
NT-PROBNP SERPL-SCNC: 2589 PG/ML — HIGH (ref 0–300)
PLATELET # BLD AUTO: 366 K/UL — SIGNIFICANT CHANGE UP (ref 130–400)
PMV BLD: 9.6 FL — SIGNIFICANT CHANGE UP (ref 7.4–10.4)
POTASSIUM SERPL-MCNC: 5.1 MMOL/L — HIGH (ref 3.5–5)
POTASSIUM SERPL-SCNC: 5.1 MMOL/L — HIGH (ref 3.5–5)
PROT SERPL-MCNC: 5.8 G/DL — LOW (ref 6–8)
RBC # BLD: 2.94 M/UL — LOW (ref 4.2–5.4)
RBC # FLD: 14.4 % — SIGNIFICANT CHANGE UP (ref 11.5–14.5)
SODIUM SERPL-SCNC: 144 MMOL/L — SIGNIFICANT CHANGE UP (ref 135–146)
VIT B12 SERPL-MCNC: 427 PG/ML — SIGNIFICANT CHANGE UP (ref 232–1245)
WBC # BLD: 9.75 K/UL — SIGNIFICANT CHANGE UP (ref 4.8–10.8)
WBC # FLD AUTO: 9.75 K/UL — SIGNIFICANT CHANGE UP (ref 4.8–10.8)

## 2024-12-28 PROCEDURE — 71045 X-RAY EXAM CHEST 1 VIEW: CPT | Mod: 26

## 2024-12-28 PROCEDURE — 99233 SBSQ HOSP IP/OBS HIGH 50: CPT | Mod: FS

## 2024-12-28 PROCEDURE — 99024 POSTOP FOLLOW-UP VISIT: CPT

## 2024-12-28 PROCEDURE — 99291 CRITICAL CARE FIRST HOUR: CPT

## 2024-12-28 RX ORDER — HEPARIN SODIUM 1000 [USP'U]/ML
5000 INJECTION, SOLUTION INTRAVENOUS; SUBCUTANEOUS EVERY 12 HOURS
Refills: 0 | Status: DISCONTINUED | OUTPATIENT
Start: 2024-12-28 | End: 2024-12-30

## 2024-12-28 RX ORDER — NYSTATIN TOPICAL POWDER 100000 U/G
1 POWDER TOPICAL
Refills: 0 | Status: DISCONTINUED | OUTPATIENT
Start: 2024-12-28 | End: 2025-01-06

## 2024-12-28 RX ORDER — FUROSEMIDE 20 MG
40 TABLET ORAL EVERY 12 HOURS
Refills: 0 | Status: DISCONTINUED | OUTPATIENT
Start: 2024-12-28 | End: 2024-12-31

## 2024-12-28 RX ADMIN — SENNOSIDES 2 TABLET(S): 8.6 TABLET, FILM COATED ORAL at 23:10

## 2024-12-28 RX ADMIN — NOREPINEPHRINE BITARTRATE 4.25 MICROGRAM(S)/KG/MIN: 1 INJECTION INTRAVENOUS at 15:43

## 2024-12-28 RX ADMIN — PIPERACILLIN AND TAZOBACTAM 25 GRAM(S): 3; .375 INJECTION, POWDER, LYOPHILIZED, FOR SOLUTION INTRAVENOUS at 15:43

## 2024-12-28 RX ADMIN — Medication 40 MILLIGRAM(S): at 05:26

## 2024-12-28 RX ADMIN — ROSUVASTATIN 10 MILLIGRAM(S): 40 TABLET, FILM COATED ORAL at 23:10

## 2024-12-28 RX ADMIN — Medication 2: at 17:56

## 2024-12-28 RX ADMIN — HEPARIN SODIUM 5000 UNIT(S): 1000 INJECTION, SOLUTION INTRAVENOUS; SUBCUTANEOUS at 17:56

## 2024-12-28 RX ADMIN — PIPERACILLIN AND TAZOBACTAM 25 GRAM(S): 3; .375 INJECTION, POWDER, LYOPHILIZED, FOR SOLUTION INTRAVENOUS at 02:45

## 2024-12-28 RX ADMIN — CHLORHEXIDINE GLUCONATE 1 APPLICATION(S): 1.2 RINSE ORAL at 05:26

## 2024-12-28 RX ADMIN — Medication 325 MILLIGRAM(S): at 11:23

## 2024-12-28 RX ADMIN — Medication 1 MILLIGRAM(S): at 11:24

## 2024-12-28 RX ADMIN — RISPERIDONE 1 MILLIGRAM(S): 0.5 TABLET ORAL at 11:23

## 2024-12-28 RX ADMIN — LANOLIN AND PETROLATUM 1 APPLICATION(S): 136.4; 469.9 OINTMENT TOPICAL at 11:39

## 2024-12-28 RX ADMIN — Medication 40 MILLIGRAM(S): at 17:56

## 2024-12-28 RX ADMIN — PIPERACILLIN AND TAZOBACTAM 25 GRAM(S): 3; .375 INJECTION, POWDER, LYOPHILIZED, FOR SOLUTION INTRAVENOUS at 23:10

## 2024-12-28 RX ADMIN — PIPERACILLIN AND TAZOBACTAM 25 GRAM(S): 3; .375 INJECTION, POWDER, LYOPHILIZED, FOR SOLUTION INTRAVENOUS at 09:19

## 2024-12-28 RX ADMIN — Medication 1: at 11:24

## 2024-12-28 RX ADMIN — PANTOPRAZOLE 40 MILLIGRAM(S): 40 TABLET, DELAYED RELEASE ORAL at 05:26

## 2024-12-28 NOTE — PROGRESS NOTE ADULT - ASSESSMENT
IMPRESSION:    AMS - resolved ; 2/2 hypercapnea  Acute on chronic hypercapnic failure  On BIPAP- trying to wean- not tolerating even short periods off  MANJEET/OHS  Hematoma secondary to fall - see surgery f/u; WC POS+    PLAN:    CNS:  Stable when not in respiratory distress.  GOC.    HEENT: Oral care    PULMONARY:  HOB @ 45 degrees.  Aspiration precautions.  Continue BiPAP PRN, should require less as diuresis improves.    CARDIOVASCULAR:  Check BNP, TTE, increase diuresis to 40mg BID.  Continue metoprolol/rate control.  Transiently on levophed when hypotensive.  Resume AC for AF when able.  Target MAP >65     follow BMP closely.    GI: GI prophylaxis not indicated.  Advance diet per speech. as able, when able;  Bowel regimen PRN    RENAL:  Follow up lytes.  Correct as needed.  Diuresis as above.    INFECTIOUS DISEASE: Follow up cultures.  Continue Abx per ID.    HEMATOLOGICAL:  DVT prophylaxis.    ENDOCRINE:  Follow up FS.  Insulin protocol if needed.    MUSCULOSKELETAL:  bedrest/offloading.

## 2024-12-28 NOTE — PROGRESS NOTE ADULT - SUBJECTIVE AND OBJECTIVE BOX
Patient is a 84y old  Female who presents with a chief complaint of acute blood loss anemia, cellulitis (28 Dec 2024 06:15)      T(F): 97.2 (12-28-24 @ 07:01), Max: 98.2 (12-27-24 @ 14:05)  HR: 93 (12-28-24 @ 08:30)  BP: 92/52 (12-28-24 @ 08:00)  RR: 21 (12-28-24 @ 08:00)  SpO2: 100% (12-28-24 @ 08:30) (91% - 100%)    PHYSICAL EXAM:  GENERAL: NAD, well-groomed, well-developed  HEAD:  Atraumatic, Normocephalic  EYES: EOMI, PERRLA, conjunctiva and sclera clear  ENMT: No tonsillar erythema, exudates, or enlargement; Moist mucous membranes, Good dentition, No lesions  NECK: Supple, No JVD, Normal thyroid  NERVOUS SYSTEM:  Alert & Oriented X3,  Motor Strength 5/5 B/L upper and lower extremities  CHEST/LUNG: Clear to percussion bilaterally; No rales, rhonchi, wheezing, or rubs  HEART: Regular rate and rhythm; No murmurs, rubs, or gallops  ABDOMEN: Soft, Nontender, Nondistended; Bowel sounds present  EXTREMITIES:   No clubbing, cyanosis, or edema  LYMPH: No lymphadenopathy noted  SKIN: No rashes or lesions    labs  12-28    144  |  102  |  50[H]  ----------------------------<  149[H]  5.1[H]   |  33[H]  |  1.4    Ca    9.1      28 Dec 2024 07:02  Phos  3.8     12-27  Mg     2.4     12-27    TPro  5.8[L]  /  Alb  2.7[L]  /  TBili  0.3  /  DBili  x   /  AST  8   /  ALT  <5  /  AlkPhos  77  12-28                          8.1    9.75  )-----------( 366      ( 28 Dec 2024 07:02 )             28.4       Culture - Wound Aerobic/Anaerobic (collected 25 Dec 2024 16:00)  Source: Swab  Preliminary Report (27 Dec 2024 16:01):    Numerous Morganella morganii    Moderate Proteus mirabilis    Few Enterococcus faecalis (vancomycin resistant)  Organism: Morganella morganii  Proteus mirabilis  Enterococcus faecalis (vancomycin resistant) (27 Dec 2024 16:01)  Organism: Enterococcus faecalis (vancomycin resistant) (27 Dec 2024 16:01)  Organism: Proteus mirabilis (27 Dec 2024 16:01)  Organism: Morganella morganii (27 Dec 2024 16:01)              acetaminophen     Tablet .. 650 milliGRAM(s) Oral every 6 hours PRN  chlorhexidine 2% Cloths 1 Application(s) Topical <User Schedule>  dextrose 5%. 1000 milliLiter(s) IV Continuous <Continuous>  dextrose 5%. 1000 milliLiter(s) IV Continuous <Continuous>  dextrose 50% Injectable 25 Gram(s) IV Push once  dextrose 50% Injectable 12.5 Gram(s) IV Push once  dextrose 50% Injectable 25 Gram(s) IV Push once  dextrose Oral Gel 15 Gram(s) Oral once PRN  ferrous    sulfate 325 milliGRAM(s) Oral daily  folic acid 1 milliGRAM(s) Oral daily  furosemide    Tablet 40 milliGRAM(s) Oral daily  glucagon  Injectable 1 milliGRAM(s) IntraMuscular once  influenza  Vaccine (HIGH DOSE) 0.5 milliLiter(s) IntraMuscular once  insulin lispro (ADMELOG) corrective regimen sliding scale   SubCutaneous three times a day before meals  lidocaine 1% Injectable 30 milliLiter(s) Local Injection once  norepinephrine Infusion 0.02 MICROgram(s)/kG/Min IV Continuous <Continuous>  nystatin Powder 1 Application(s) Topical two times a day  pantoprazole    Tablet 40 milliGRAM(s) Oral before breakfast  piperacillin/tazobactam IVPB.- 3.375 Gram(s) IV Intermittent once  piperacillin/tazobactam IVPB.. 3.375 Gram(s) IV Intermittent every 8 hours  risperiDONE   Tablet 1 milliGRAM(s) Oral daily  rosuvastatin 10 milliGRAM(s) Oral at bedtime  senna 2 Tablet(s) Oral at bedtime  silver sulfADIAZINE 1% Cream 1 Application(s) Topical daily  vitamin A & D Ointment 1 Application(s) Topical daily

## 2024-12-28 NOTE — PROGRESS NOTE ADULT - TIME BILLING
spending 36  minutes on this patient's case:  11  minutes w patient,  13  minutes reviewing the chart,    and  12 minutes speaking to the HO, RN, & Cardio;  also coordinating care and documenting all.

## 2024-12-28 NOTE — PROGRESS NOTE ADULT - ASSESSMENT
IMPRESSION:    AMS - resolved   Acute on chronic hypercapnic failure  On BIPAP  MANJEET/OHS  Hematoma secondary to fall     PLAN:    CNS:  Stable when not in respiratory distress.  GOC.    HEENT: Oral care    PULMONARY:  HOB @ 45 degrees.  Aspiration precautions.  Continue BiPAP PRN, should require less as diuresis improves.    CARDIOVASCULAR:  Check BNP, TTE, increase diuresis to 40mg BID.  Continue metoprolol/rate control.  Transiently on levophed when hypotensive.  Resume AC for AF when able.  Target MAP >65    GI: GI prophylaxis not indicated.  Advance diet per speech.  Bowel regimen PRN    RENAL:  Follow up lytes.  Correct as needed.  Diuresis as above.    INFECTIOUS DISEASE: Follow up cultures.  Continue Abx per ID.    HEMATOLOGICAL:  DVT prophylaxis.    ENDOCRINE:  Follow up FS.  Insulin protocol if needed.    MUSCULOSKELETAL:  bedrest/offloading.        MICU Pt seen and examined. Patient did well overnight. Appropriate amount of swelling. Drain 30.     A/P  Continue current care.   Dressing/ drain out tomorrow.     SCW

## 2024-12-28 NOTE — PROGRESS NOTE ADULT - SUBJECTIVE AND OBJECTIVE BOX
Chart reviewed, patient examined. Pertinent results reviewed.  reviewed with the PA; specialist f/u reviewed  HD#5; POD(I&D)#3-12/25    Patient  is a 84y old  Female who presented with a chief complaint of acute blood loss anemia, cellulitis (27 Dec 2024 10:13)  HPI reviewed      Over Night Events:    upgraded for hypotension/AMS, was altered transiently  Now off levophed  comfortable but unable to tolerate remaining off bipap for very long without worsening mental status;   ABG showing CO2 retention.    ROS: + lethargy; + tolerates BIPAP    All ROS are negative except HPI         PHYSICAL EXAM  ICU Vital Signs Last 24 Hrs  T(C): 36.6 (28 Dec 2024 11:00), Max: 36.8 (27 Dec 2024 14:05)  T(F): 97.8 (28 Dec 2024 11:00), Max: 98.2 (27 Dec 2024 14:05)  HR: 92 (28 Dec 2024 13:00) (77 - 121)  BP: 96/63 (28 Dec 2024 13:00) (67/38 - 113/66)  BP(mean): 71 (28 Dec 2024 13:00) (48 - 82)  ABP: 101/93 (28 Dec 2024 13:00) (81/39 - 148/69)  ABP(mean): 98 (28 Dec 2024 13:00) (52 - 98)  RR: 25 (28 Dec 2024 13:00) (13 - 37)  SpO2: 98% (28 Dec 2024 13:36) (91% - 100%)    O2 Parameters below as of 28 Dec 2024 13:36  Patient On (Oxygen Delivery Method): nasal cannula  O2 Flow (L/min): 2          Constitutional: no acute distress, well nourished  obese; w BIPAP on  Neuro: moving all 4 limbs spontaneously, no facial droop or dysarthria  HEENT: NCAT, anicteric  Neck: no visible lymphadenopathy or goiter; + scar  Pulm: stable respirations w BIPAP. clear to auscultation bilaterally  Cardiac: extremities appear pink and well-perfused.  IRR IRR, no murmur detected  Abdomen: non-distended; obese; NT  Extremities:MILD peripheral edema; L calf/leg dressing; Min + TTP      12-27-24 @ 07:01  -  12-28-24 @ 06:16  --------------------------------------------------------  IN:    IV PiggyBack: 50 mL    Norepinephrine: 44.7 mL  Total IN: 94.7 mL    OUT:    Voided (mL): 1400 mL  Total OUT: 1400 mL    Total NET: -1305.3 mL          LABS:                          8.1    9.75  )-----------( 366      ( 28 Dec 2024 07:02 )             28.4                           8.1    10.38 )-----------( 334      ( 27 Dec 2024 08:23 )             27.8     12-28    144  |  102  |  50[H]  ----------------------------<  149[H]  5.1[H]   |  33[H]  |  1.4    Ca    9.1      28 Dec 2024 07:02  Phos  3.8     12-27  Mg     2.4     12-27    TPro  5.8[L]  /  Alb  2.7[L]  /  TBili  0.3  /  DBili  x   /  AST  8   /  ALT  <5  /  AlkPhos  77  12-28 12-27    141  |  100  |  56[H]  ----------------------------<  122[H]  4.9   |  34[H]  |  1.8[H]    Ca    9.0      27 Dec 2024 08:23  Phos  3.8     12-27  Mg     2.4     12-27    TPro  5.8[L]  /  Alb  2.8[L]  /  TBili  <0.2  /  DBili  x   /  AST  8   /  ALT  6   /  AlkPhos  78  12-26                                             Urinalysis Basic - ( 27 Dec 2024 08:23 )    Color: x / Appearance: x / SG: x / pH: x  Gluc: 122 mg/dL / Ketone: x  / Bili: x / Urobili: x   Blood: x / Protein: x / Nitrite: x   Leuk Esterase: x / RBC: x / WBC x   Sq Epi: x / Non Sq Epi: x / Bacteria: x                                                  LIVER FUNCTIONS - ( 26 Dec 2024 17:22 )  Alb: 2.8 g/dL / Pro: 5.8 g/dL / ALK PHOS: 78 U/L / ALT: 6 U/L / AST: 8 U/L / GGT: x                                              Pro-Brain Natriuretic Peptide (12.28.24 @ 11:48)   Pro-Brain Natriuretic Peptide: 2589 pg/mL  Historical Values  Pro-Brain Natriuretic Peptide (12.28.24 @ 11:48)   Pro-Brain Natriuretic Peptide: 2589 pg/mL  Pro-Brain Natriuretic Peptide (07.07.24 @ 08:35)   Pro-Brain Natriuretic Peptide: 984 pg/mL  Pro-Brain Natriuretic Peptide (06.05.24 @ 11:38)   Pro-Brain Natriuretic Peptide: 2440 pg/mL    Culture - Wound Aerobic/Anaerobic (collected 25 Dec 2024 16:00)  Source: Swab  Preliminary Report (27 Dec 2024 16:01):    Numerous Morganella morganii    Moderate Proteus mirabilis    Few Enterococcus faecalis (vancomycin resistant)  Organism: Morganella morganii  Proteus mirabilis  Enterococcus faecalis (vancomycin resistant) (27 Dec 2024 16:01)  Organism: Enterococcus faecalis (vancomycin resistant) (27 Dec 2024 16:01)  Organism: Proteus mirabilis (27 Dec 2024 16:01)  Organism: Morganella morganii (27 Dec 2024 16:01)                                                                                       ABG - ( 27 Dec 2024 17:47 )  pH, Arterial: 7.34  pH, Blood: x     /  pCO2: 69    /  pO2: 93    / HCO3: 37    / Base Excess: 9.8   /  SaO2: 98.5                MEDICATIONS  (STANDING):  chlorhexidine 2% Cloths 1 Application(s) Topical <User Schedule>  dextrose 5%. 1000 milliLiter(s) (100 mL/Hr) IV Continuous <Continuous>  dextrose 5%. 1000 milliLiter(s) (50 mL/Hr) IV Continuous <Continuous>  dextrose 50% Injectable 25 Gram(s) IV Push once  dextrose 50% Injectable 12.5 Gram(s) IV Push once  dextrose 50% Injectable 25 Gram(s) IV Push once  ferrous    sulfate 325 milliGRAM(s) Oral daily  folic acid 1 milliGRAM(s) Oral daily  furosemide    Tablet 40 milliGRAM(s) Oral daily  glucagon  Injectable 1 milliGRAM(s) IntraMuscular once  influenza  Vaccine (HIGH DOSE) 0.5 milliLiter(s) IntraMuscular once  insulin lispro (ADMELOG) corrective regimen sliding scale   SubCutaneous three times a day before meals  lidocaine 1% Injectable 30 milliLiter(s) Local Injection once  norepinephrine Infusion 0.02 MICROgram(s)/kG/Min (4.25 mL/Hr) IV Continuous <Continuous>  pantoprazole    Tablet 40 milliGRAM(s) Oral before breakfast  piperacillin/tazobactam IVPB.- 3.375 Gram(s) IV Intermittent once  piperacillin/tazobactam IVPB.. 3.375 Gram(s) IV Intermittent every 8 hours  risperiDONE   Tablet 1 milliGRAM(s) Oral daily  rosuvastatin 10 milliGRAM(s) Oral at bedtime  senna 2 Tablet(s) Oral at bedtime  silver sulfADIAZINE 1% Cream 1 Application(s) Topical daily  vitamin A & D Ointment 1 Application(s) Topical daily    MEDICATIONS  (PRN):  acetaminophen     Tablet .. 650 milliGRAM(s) Oral every 6 hours PRN Temp greater or equal to 38C (100.4F), Mild Pain (1 - 3)  dextrose Oral Gel 15 Gram(s) Oral once PRN Blood Glucose LESS THAN 70 milliGRAM(s)/deciliter      New X-rays reviewed:       ECHO reviewed    CXR interpreted by me:    12/27  images and radiologist read reviewed, by my read demonstrating stable L opacity vs effusion, RLL opacity.

## 2024-12-28 NOTE — PROGRESS NOTE ADULT - SUBJECTIVE AND OBJECTIVE BOX
GENERAL SURGERY PROGRESS NOTE     CHELA GALLAGHER  84y  Female  Hospital day :4d  Procedure: 12/25 bedside I&D infected LLE hematoma  24-HOUR EVENTS:  Rapid called for hypotension, pt upgraded to CCU. This AM, pt resting in bed on BiPAP, NAD.     T(F): 97.2 (12-28-24 @ 07:01), Max: 98.2 (12-27-24 @ 14:05)  HR: 93 (12-28-24 @ 08:30) (77 - 121)  BP: 92/52 (12-28-24 @ 08:00) (67/38 - 113/66)  ABP: 89/79 (12-28-24 @ 08:00) (81/39 - 148/69)  ABP(mean): 85 (12-28-24 @ 08:00) (52 - 92)  RR: 21 (12-28-24 @ 08:00) (13 - 35)  SpO2: 100% (12-28-24 @ 08:30) (91% - 100%)    DIET/FLUIDS: dextrose 5%. 1000 milliLiter(s) IV Continuous <Continuous>  dextrose 5%. 1000 milliLiter(s) IV Continuous <Continuous>  ferrous    sulfate 325 milliGRAM(s) Oral daily  folic acid 1 milliGRAM(s) Oral daily    GI proph:  pantoprazole    Tablet 40 milliGRAM(s) Oral before breakfast    AC/ proph:   ABx: piperacillin/tazobactam IVPB.. 3.375 Gram(s) IV Intermittent every 8 hours    PHYSICAL EXAM:  GENERAL: NAD  HEENT: Normocephalic, atraumatic  PULM: On BiPAP  CV: Regular rate and rhythm  MSK: LLE dressing changed, no active bleeding, foul-smelling discharge in dressing, Aquacel packing replaced, dressed with gauze, abdominal pad, Kerlix, and ACE bandage  SKIN: Warm, dry, normal skin color, texture, turgor    LABS  CAPILLARY BLOOD GLUCOSE  POCT Blood Glucose.: 81 mg/dL (27 Dec 2024 23:36)  POCT Blood Glucose.: 107 mg/dL (27 Dec 2024 18:26)  POCT Blood Glucose.: 105 mg/dL (27 Dec 2024 13:55)  POCT Blood Glucose.: 124 mg/dL (27 Dec 2024 11:17)                        8.1    9.75  )-----------( 366      ( 28 Dec 2024 07:02 )             28.4       12-28    144  |  102  |  50[H]  ----------------------------<  149[H]  5.1[H]   |  33[H]  |  1.4    Calcium: 9.1 mg/dL (12-28-24 @ 07:02)    LFTs:             5.8  | 0.3  | 8        ------------------[77      ( 28 Dec 2024 07:02 )  2.7  | x    | <5          Lipase:x      Amylase:x        Blood Gas Arterial, Lactate: 0.5 mmol/L (12-27-24 @ 17:47)  Blood Gas Arterial, Lactate: 0.5 mmol/L (12-27-24 @ 15:18)  Blood Gas Arterial, Lactate: 1.1 mmol/L (12-26-24 @ 19:50)  Blood Gas Arterial, Lactate: 0.5 mmol/L (12-26-24 @ 17:23)    ABG - ( 27 Dec 2024 17:47 )  pH: 7.34  /  pCO2: 69    /  pO2: 93    / HCO3: 37    / Base Excess: 9.8   /  SaO2: 98.5      ABG - ( 27 Dec 2024 15:18 )  pH: 7.33  /  pCO2: 71    /  pO2: 77    / HCO3: 37    / Base Excess: 9.4   /  SaO2: 96.9      ABG - ( 26 Dec 2024 19:50 )  pH: 7.43  /  pCO2: 51    /  pO2: 85    / HCO3: 34    / Base Excess: 8.0   /  SaO2: 98.6      Urinalysis Basic - ( 28 Dec 2024 07:02 )    Color: x / Appearance: x / SG: x / pH: x  Gluc: 149 mg/dL / Ketone: x  / Bili: x / Urobili: x   Blood: x / Protein: x / Nitrite: x   Leuk Esterase: x / RBC: x / WBC x   Sq Epi: x / Non Sq Epi: x / Bacteria: x    Culture - Wound Aerobic/Anaerobic (collected 25 Dec 2024 16:00)  Source: Swab  Preliminary Report (27 Dec 2024 16:01):    Numerous Morganella morganii    Moderate Proteus mirabilis    Few Enterococcus faecalis (vancomycin resistant)  Organism: Morganella morganii  Proteus mirabilis  Enterococcus faecalis (vancomycin resistant) (27 Dec 2024 16:01)  Organism: Enterococcus faecalis (vancomycin resistant) (27 Dec 2024 16:01)  Organism: Proteus mirabilis (27 Dec 2024 16:01)  Organism: Morganella morganii (27 Dec 2024 16:01)    ASSESSMENT:  84F PMHx DM, HTN, chronic BLE edema, bipolar disorder, tachybrady syndrome (on Eliquis) who presented to the ED 12/24 post-fall 2-3 days earlier at [facility]. Surgery consulted to evaluate LLE wound. S/p 12/25 bedside I&D of infected hematoma.    PLAN:  - Monitor CBC (continuing to downtrend)  - Wound clx: numerous morganella morganii, moderate proteus mirabilis, few enterococcus faecalis      - F/u ID recs: d/c cefazolin, start Zosyn Q8  - Daily LLE dressing changes to be done by nursing: Aquacel sheet replacement, gauze, abdominal pad, Kerlix, ACE bandage  - Management per primary    Final plan pending discussion with attending.    Surgery ([id number])

## 2024-12-28 NOTE — PROGRESS NOTE ADULT - ASSESSMENT
Appreciate events. Event volume overload. In part low alb. Out greater in. Follow lytes cxr. Po lasix soon.

## 2024-12-28 NOTE — PROGRESS NOTE ADULT - SUBJECTIVE AND OBJECTIVE BOX
Patient is a 84y old  Female who presents with a chief complaint of acute blood loss anemia, cellulitis (27 Dec 2024 10:13)        Over Night Events:    upgraded for hypotension/AMS, was altered transiently  Now off levophed  comfortable but unable to tolerate remaining off bipap for very long without worsening mental status    ROS:     All ROS are negative except HPI         PHYSICAL EXAM    ICU Vital Signs Last 24 Hrs  T(C): 36.6 (28 Dec 2024 03:00), Max: 36.8 (27 Dec 2024 14:05)  T(F): 97.9 (28 Dec 2024 03:00), Max: 98.2 (27 Dec 2024 14:05)  HR: 98 (28 Dec 2024 00:00) (77 - 98)  BP: 113/66 (27 Dec 2024 17:00) (67/38 - 113/66)  BP(mean): 79 (27 Dec 2024 17:00) (48 - 79)  ABP: 100/44 (28 Dec 2024 03:00) (100/44 - 148/69)  ABP(mean): 62 (28 Dec 2024 03:00) (62 - 92)  RR: 24 (28 Dec 2024 03:00) (13 - 27)  SpO2: 100% (28 Dec 2024 03:00) (91% - 100%)    O2 Parameters below as of 27 Dec 2024 15:12  Patient On (Oxygen Delivery Method): nasal cannula            Constitutional: no acute distress, well nourished well developed  Neuro: moving all 4 limbs spontaneously, no facial droop or dysarthria  HEENT: NCAT, anicteric  Neck: no visible lymphadenopathy or goiter  Pulm: no respiratory distress. clear to auscultation bilaterally  Cardiac: extremities appear pink and well-perfused.  regular rhythm and rate, no murmur detected  Abdomen: non-distended  Extremities: no peripheral edema      12-27-24 @ 07:01  -  12-28-24 @ 06:16  --------------------------------------------------------  IN:    IV PiggyBack: 50 mL    Norepinephrine: 44.7 mL  Total IN: 94.7 mL    OUT:    Voided (mL): 1400 mL  Total OUT: 1400 mL    Total NET: -1305.3 mL          LABS:                            8.1    10.38 )-----------( 334      ( 27 Dec 2024 08:23 )             27.8                                               12-27    141  |  100  |  56[H]  ----------------------------<  122[H]  4.9   |  34[H]  |  1.8[H]    Ca    9.0      27 Dec 2024 08:23  Phos  3.8     12-27  Mg     2.4     12-27    TPro  5.8[L]  /  Alb  2.8[L]  /  TBili  <0.2  /  DBili  x   /  AST  8   /  ALT  6   /  AlkPhos  78  12-26                                             Urinalysis Basic - ( 27 Dec 2024 08:23 )    Color: x / Appearance: x / SG: x / pH: x  Gluc: 122 mg/dL / Ketone: x  / Bili: x / Urobili: x   Blood: x / Protein: x / Nitrite: x   Leuk Esterase: x / RBC: x / WBC x   Sq Epi: x / Non Sq Epi: x / Bacteria: x                                                  LIVER FUNCTIONS - ( 26 Dec 2024 17:22 )  Alb: 2.8 g/dL / Pro: 5.8 g/dL / ALK PHOS: 78 U/L / ALT: 6 U/L / AST: 8 U/L / GGT: x                                                  Culture - Wound Aerobic/Anaerobic (collected 25 Dec 2024 16:00)  Source: Swab  Preliminary Report (27 Dec 2024 16:01):    Numerous Morganella morganii    Moderate Proteus mirabilis    Few Enterococcus faecalis (vancomycin resistant)  Organism: Morganella morganii  Proteus mirabilis  Enterococcus faecalis (vancomycin resistant) (27 Dec 2024 16:01)  Organism: Enterococcus faecalis (vancomycin resistant) (27 Dec 2024 16:01)  Organism: Proteus mirabilis (27 Dec 2024 16:01)  Organism: Morganella morganii (27 Dec 2024 16:01)                                                                                       ABG - ( 27 Dec 2024 17:47 )  pH, Arterial: 7.34  pH, Blood: x     /  pCO2: 69    /  pO2: 93    / HCO3: 37    / Base Excess: 9.8   /  SaO2: 98.5                MEDICATIONS  (STANDING):  chlorhexidine 2% Cloths 1 Application(s) Topical <User Schedule>  dextrose 5%. 1000 milliLiter(s) (100 mL/Hr) IV Continuous <Continuous>  dextrose 5%. 1000 milliLiter(s) (50 mL/Hr) IV Continuous <Continuous>  dextrose 50% Injectable 25 Gram(s) IV Push once  dextrose 50% Injectable 12.5 Gram(s) IV Push once  dextrose 50% Injectable 25 Gram(s) IV Push once  ferrous    sulfate 325 milliGRAM(s) Oral daily  folic acid 1 milliGRAM(s) Oral daily  furosemide    Tablet 40 milliGRAM(s) Oral daily  glucagon  Injectable 1 milliGRAM(s) IntraMuscular once  influenza  Vaccine (HIGH DOSE) 0.5 milliLiter(s) IntraMuscular once  insulin lispro (ADMELOG) corrective regimen sliding scale   SubCutaneous three times a day before meals  lidocaine 1% Injectable 30 milliLiter(s) Local Injection once  norepinephrine Infusion 0.02 MICROgram(s)/kG/Min (4.25 mL/Hr) IV Continuous <Continuous>  pantoprazole    Tablet 40 milliGRAM(s) Oral before breakfast  piperacillin/tazobactam IVPB.- 3.375 Gram(s) IV Intermittent once  piperacillin/tazobactam IVPB.. 3.375 Gram(s) IV Intermittent every 8 hours  risperiDONE   Tablet 1 milliGRAM(s) Oral daily  rosuvastatin 10 milliGRAM(s) Oral at bedtime  senna 2 Tablet(s) Oral at bedtime  silver sulfADIAZINE 1% Cream 1 Application(s) Topical daily  vitamin A & D Ointment 1 Application(s) Topical daily    MEDICATIONS  (PRN):  acetaminophen     Tablet .. 650 milliGRAM(s) Oral every 6 hours PRN Temp greater or equal to 38C (100.4F), Mild Pain (1 - 3)  dextrose Oral Gel 15 Gram(s) Oral once PRN Blood Glucose LESS THAN 70 milliGRAM(s)/deciliter      New X-rays reviewed:       ECHO reviewed    CXR interpreted by me:    12/27  images and radiologist read reviewed, by my read demonstrating stable L opacity vs effusion, RLL opacity.

## 2024-12-29 LAB
ALBUMIN SERPL ELPH-MCNC: 2.8 G/DL — LOW (ref 3.5–5.2)
ALP SERPL-CCNC: 72 U/L — SIGNIFICANT CHANGE UP (ref 30–115)
ALT FLD-CCNC: <5 U/L — SIGNIFICANT CHANGE UP (ref 0–41)
ANION GAP SERPL CALC-SCNC: 7 MMOL/L — SIGNIFICANT CHANGE UP (ref 7–14)
AST SERPL-CCNC: 7 U/L — SIGNIFICANT CHANGE UP (ref 0–41)
BASOPHILS # BLD AUTO: 0.04 K/UL — SIGNIFICANT CHANGE UP (ref 0–0.2)
BASOPHILS NFR BLD AUTO: 0.4 % — SIGNIFICANT CHANGE UP (ref 0–1)
BILIRUB SERPL-MCNC: 0.4 MG/DL — SIGNIFICANT CHANGE UP (ref 0.2–1.2)
BUN SERPL-MCNC: 43 MG/DL — HIGH (ref 10–20)
CALCIUM SERPL-MCNC: 9 MG/DL — SIGNIFICANT CHANGE UP (ref 8.4–10.5)
CHLORIDE SERPL-SCNC: 95 MMOL/L — LOW (ref 98–110)
CO2 SERPL-SCNC: 35 MMOL/L — HIGH (ref 17–32)
CREAT SERPL-MCNC: 1.6 MG/DL — HIGH (ref 0.7–1.5)
CULTURE RESULTS: SIGNIFICANT CHANGE UP
CULTURE RESULTS: SIGNIFICANT CHANGE UP
EGFR: 31 ML/MIN/1.73M2 — LOW
EOSINOPHIL # BLD AUTO: 0.16 K/UL — SIGNIFICANT CHANGE UP (ref 0–0.7)
EOSINOPHIL NFR BLD AUTO: 1.6 % — SIGNIFICANT CHANGE UP (ref 0–8)
GLUCOSE BLDC GLUCOMTR-MCNC: 154 MG/DL — HIGH (ref 70–99)
GLUCOSE BLDC GLUCOMTR-MCNC: 183 MG/DL — HIGH (ref 70–99)
GLUCOSE BLDC GLUCOMTR-MCNC: 195 MG/DL — HIGH (ref 70–99)
GLUCOSE BLDC GLUCOMTR-MCNC: 201 MG/DL — HIGH (ref 70–99)
GLUCOSE SERPL-MCNC: 154 MG/DL — HIGH (ref 70–99)
HCT VFR BLD CALC: 27.7 % — LOW (ref 37–47)
HGB BLD-MCNC: 8 G/DL — LOW (ref 12–16)
IMM GRANULOCYTES NFR BLD AUTO: 0.3 % — SIGNIFICANT CHANGE UP (ref 0.1–0.3)
LYMPHOCYTES # BLD AUTO: 0.73 K/UL — LOW (ref 1.2–3.4)
LYMPHOCYTES # BLD AUTO: 7.4 % — LOW (ref 20.5–51.1)
MAGNESIUM SERPL-MCNC: 2.1 MG/DL — SIGNIFICANT CHANGE UP (ref 1.8–2.4)
MCHC RBC-ENTMCNC: 27.6 PG — SIGNIFICANT CHANGE UP (ref 27–31)
MCHC RBC-ENTMCNC: 28.9 G/DL — LOW (ref 32–37)
MCV RBC AUTO: 95.5 FL — SIGNIFICANT CHANGE UP (ref 81–99)
MONOCYTES # BLD AUTO: 0.74 K/UL — HIGH (ref 0.1–0.6)
MONOCYTES NFR BLD AUTO: 7.5 % — SIGNIFICANT CHANGE UP (ref 1.7–9.3)
NEUTROPHILS # BLD AUTO: 8.23 K/UL — HIGH (ref 1.4–6.5)
NEUTROPHILS NFR BLD AUTO: 82.8 % — HIGH (ref 42.2–75.2)
NRBC # BLD: 0 /100 WBCS — SIGNIFICANT CHANGE UP (ref 0–0)
PLATELET # BLD AUTO: 352 K/UL — SIGNIFICANT CHANGE UP (ref 130–400)
PMV BLD: 9.3 FL — SIGNIFICANT CHANGE UP (ref 7.4–10.4)
POTASSIUM SERPL-MCNC: 4.7 MMOL/L — SIGNIFICANT CHANGE UP (ref 3.5–5)
POTASSIUM SERPL-SCNC: 4.7 MMOL/L — SIGNIFICANT CHANGE UP (ref 3.5–5)
PROT SERPL-MCNC: 5.9 G/DL — LOW (ref 6–8)
RBC # BLD: 2.9 M/UL — LOW (ref 4.2–5.4)
RBC # FLD: 14.5 % — SIGNIFICANT CHANGE UP (ref 11.5–14.5)
SODIUM SERPL-SCNC: 137 MMOL/L — SIGNIFICANT CHANGE UP (ref 135–146)
SPECIMEN SOURCE: SIGNIFICANT CHANGE UP
SPECIMEN SOURCE: SIGNIFICANT CHANGE UP
WBC # BLD: 9.93 K/UL — SIGNIFICANT CHANGE UP (ref 4.8–10.8)
WBC # FLD AUTO: 9.93 K/UL — SIGNIFICANT CHANGE UP (ref 4.8–10.8)

## 2024-12-29 PROCEDURE — 99291 CRITICAL CARE FIRST HOUR: CPT

## 2024-12-29 PROCEDURE — 71045 X-RAY EXAM CHEST 1 VIEW: CPT | Mod: 26

## 2024-12-29 PROCEDURE — 99024 POSTOP FOLLOW-UP VISIT: CPT

## 2024-12-29 RX ADMIN — PIPERACILLIN AND TAZOBACTAM 25 GRAM(S): 3; .375 INJECTION, POWDER, LYOPHILIZED, FOR SOLUTION INTRAVENOUS at 14:30

## 2024-12-29 RX ADMIN — SILVER SULFADIAZINE 1 APPLICATION(S): 10 CREAM TOPICAL at 12:26

## 2024-12-29 RX ADMIN — Medication 1 MILLIGRAM(S): at 12:25

## 2024-12-29 RX ADMIN — SENNOSIDES 2 TABLET(S): 8.6 TABLET, FILM COATED ORAL at 21:46

## 2024-12-29 RX ADMIN — PIPERACILLIN AND TAZOBACTAM 25 GRAM(S): 3; .375 INJECTION, POWDER, LYOPHILIZED, FOR SOLUTION INTRAVENOUS at 05:34

## 2024-12-29 RX ADMIN — NOREPINEPHRINE BITARTRATE 4.25 MICROGRAM(S)/KG/MIN: 1 INJECTION INTRAVENOUS at 05:38

## 2024-12-29 RX ADMIN — ROSUVASTATIN 10 MILLIGRAM(S): 40 TABLET, FILM COATED ORAL at 21:46

## 2024-12-29 RX ADMIN — PANTOPRAZOLE 40 MILLIGRAM(S): 40 TABLET, DELAYED RELEASE ORAL at 05:34

## 2024-12-29 RX ADMIN — CHLORHEXIDINE GLUCONATE 1 APPLICATION(S): 1.2 RINSE ORAL at 05:40

## 2024-12-29 RX ADMIN — HEPARIN SODIUM 5000 UNIT(S): 1000 INJECTION, SOLUTION INTRAVENOUS; SUBCUTANEOUS at 05:38

## 2024-12-29 RX ADMIN — Medication 40 MILLIGRAM(S): at 05:34

## 2024-12-29 RX ADMIN — LANOLIN AND PETROLATUM 1 APPLICATION(S): 136.4; 469.9 OINTMENT TOPICAL at 12:26

## 2024-12-29 RX ADMIN — NOREPINEPHRINE BITARTRATE 4.25 MICROGRAM(S)/KG/MIN: 1 INJECTION INTRAVENOUS at 19:53

## 2024-12-29 RX ADMIN — Medication 1: at 12:20

## 2024-12-29 RX ADMIN — Medication 1: at 17:00

## 2024-12-29 RX ADMIN — PIPERACILLIN AND TAZOBACTAM 25 GRAM(S): 3; .375 INJECTION, POWDER, LYOPHILIZED, FOR SOLUTION INTRAVENOUS at 21:46

## 2024-12-29 RX ADMIN — NYSTATIN TOPICAL POWDER 1 APPLICATION(S): 100000 POWDER TOPICAL at 05:40

## 2024-12-29 RX ADMIN — RISPERIDONE 1 MILLIGRAM(S): 0.5 TABLET ORAL at 12:21

## 2024-12-29 RX ADMIN — NYSTATIN TOPICAL POWDER 1 APPLICATION(S): 100000 POWDER TOPICAL at 17:01

## 2024-12-29 RX ADMIN — Medication 2: at 08:39

## 2024-12-29 RX ADMIN — Medication 325 MILLIGRAM(S): at 12:25

## 2024-12-29 RX ADMIN — HEPARIN SODIUM 5000 UNIT(S): 1000 INJECTION, SOLUTION INTRAVENOUS; SUBCUTANEOUS at 17:00

## 2024-12-29 RX ADMIN — Medication 40 MILLIGRAM(S): at 17:01

## 2024-12-29 NOTE — PROGRESS NOTE ADULT - SUBJECTIVE AND OBJECTIVE BOX
Patient is a 84y old  Female who presents with a chief complaint of acute blood loss anemia, cellulitis (28 Dec 2024 13:52)        Over Night Events:    a-line removed for poor quality data  No acute events  net negative 2L   Refused bipap overnight    ROS:     All ROS are negative except HPI         PHYSICAL EXAM    ICU Vital Signs Last 24 Hrs  T(C): 36.6 (29 Dec 2024 07:01), Max: 37.1 (28 Dec 2024 23:26)  T(F): 97.9 (29 Dec 2024 07:01), Max: 98.8 (28 Dec 2024 23:26)  HR: 95 (29 Dec 2024 05:00) (92 - 109)  BP: 108/54 (29 Dec 2024 05:00) (71/48 - 117/60)  BP(mean): 69 (29 Dec 2024 05:00) (48 - 88)  ABP: 73/57 (28 Dec 2024 23:26) (73/57 - 105/101)  ABP(mean): 64 (28 Dec 2024 23:26) (57 - 104)  RR: 24 (29 Dec 2024 07:01) (15 - 37)  SpO2: 94% (29 Dec 2024 05:00) (94% - 100%)    O2 Parameters below as of 29 Dec 2024 05:00  Patient On (Oxygen Delivery Method): nasal cannula  O2 Flow (L/min): 4          Constitutional: no acute distress, well nourished well developed  Neuro: moving all 4 limbs spontaneously, no facial droop or dysarthria  HEENT: NCAT, anicteric  Neck: no visible lymphadenopathy or goiter  Pulm: no respiratory distress. clear to auscultation bilaterally  Cardiac: extremities appear pink and well-perfused.  regular rhythm and rate, no murmur detected  Abdomen: non-distended  Extremities: no peripheral edema, wrapped [facility]      12-28-24 @ 07:01  -  12-29-24 @ 07:00  --------------------------------------------------------  IN:    IV PiggyBack: 300 mL    Norepinephrine: 114.8 mL    Oral Fluid: 360 mL  Total IN: 774.8 mL    OUT:    Voided (mL): 2850 mL  Total OUT: 2850 mL    Total NET: -2075.2 mL          LABS:                            8.1    9.75  )-----------( 366      ( 28 Dec 2024 07:02 )             28.4                                               12-28    144  |  102  |  50[H]  ----------------------------<  149[H]  5.1[H]   |  33[H]  |  1.4    Ca    9.1      28 Dec 2024 07:02  Phos  3.8     12-27  Mg     2.4     12-27    TPro  5.8[L]  /  Alb  2.7[L]  /  TBili  0.3  /  DBili  x   /  AST  8   /  ALT  <5  /  AlkPhos  77  12-28                                             Urinalysis Basic - ( 28 Dec 2024 07:02 )    Color: x / Appearance: x / SG: x / pH: x  Gluc: 149 mg/dL / Ketone: x  / Bili: x / Urobili: x   Blood: x / Protein: x / Nitrite: x   Leuk Esterase: x / RBC: x / WBC x   Sq Epi: x / Non Sq Epi: x / Bacteria: x                                                  LIVER FUNCTIONS - ( 28 Dec 2024 07:02 )  Alb: 2.7 g/dL / Pro: 5.8 g/dL / ALK PHOS: 77 U/L / ALT: <5 U/L / AST: 8 U/L / GGT: x                                                                                                                                   ABG - ( 27 Dec 2024 17:47 )  pH, Arterial: 7.34  pH, Blood: x     /  pCO2: 69    /  pO2: 93    / HCO3: 37    / Base Excess: 9.8   /  SaO2: 98.5                MEDICATIONS  (STANDING):  chlorhexidine 2% Cloths 1 Application(s) Topical <User Schedule>  dextrose 5%. 1000 milliLiter(s) (100 mL/Hr) IV Continuous <Continuous>  dextrose 5%. 1000 milliLiter(s) (50 mL/Hr) IV Continuous <Continuous>  dextrose 50% Injectable 25 Gram(s) IV Push once  dextrose 50% Injectable 12.5 Gram(s) IV Push once  dextrose 50% Injectable 25 Gram(s) IV Push once  ferrous    sulfate 325 milliGRAM(s) Oral daily  folic acid 1 milliGRAM(s) Oral daily  furosemide    Tablet 40 milliGRAM(s) Oral every 12 hours  glucagon  Injectable 1 milliGRAM(s) IntraMuscular once  heparin   Injectable 5000 Unit(s) SubCutaneous every 12 hours  influenza  Vaccine (HIGH DOSE) 0.5 milliLiter(s) IntraMuscular once  insulin lispro (ADMELOG) corrective regimen sliding scale   SubCutaneous three times a day before meals  lidocaine 1% Injectable 30 milliLiter(s) Local Injection once  norepinephrine Infusion 0.02 MICROgram(s)/kG/Min (4.25 mL/Hr) IV Continuous <Continuous>  nystatin Powder 1 Application(s) Topical two times a day  pantoprazole    Tablet 40 milliGRAM(s) Oral before breakfast  piperacillin/tazobactam IVPB.. 3.375 Gram(s) IV Intermittent every 8 hours  risperiDONE   Tablet 1 milliGRAM(s) Oral daily  rosuvastatin 10 milliGRAM(s) Oral at bedtime  senna 2 Tablet(s) Oral at bedtime  silver sulfADIAZINE 1% Cream 1 Application(s) Topical daily  vitamin A & D Ointment 1 Application(s) Topical daily    MEDICATIONS  (PRN):  acetaminophen     Tablet .. 650 milliGRAM(s) Oral every 6 hours PRN Temp greater or equal to 38C (100.4F), Mild Pain (1 - 3)  dextrose Oral Gel 15 Gram(s) Oral once PRN Blood Glucose LESS THAN 70 milliGRAM(s)/deciliter      New X-rays reviewed:       ECHO reviewed    CXR interpreted by me:    12/29  images and radiologist read reviewed, by my read demonstrating stable to slightly improved bibasilar opacities and L effusion.

## 2024-12-29 NOTE — PROGRESS NOTE ADULT - ASSESSMENT
IMPRESSION:    AMS - resolved ; 2/2 hypercapnea  Acute on chronic hypercapnic failure  On BIPAP- trying to wean- not tolerating even short periods off  MANJEET/OHS  Hematoma secondary to fall - see surgery f/u; WC POS+    PLAN:    CNS:  Stable when not in respiratory distress.  GOC.    HEENT: Oral care    PULMONARY:  HOB @ 45 degrees.  Aspiration precautions.  Continue BiPAP PRN, should require less as diuresis improves.    CARDIOVASCULAR:  Check BNP, TTE, increase diuresis to 40mg BID.  Continue metoprolol/rate control.  Transiently on levophed when hypotensive.  Resume AC for AF when able.  Target MAP >65     follow BMP closely.    GI: GI prophylaxis not indicated.  Advance diet per speech. as able, when able;  Bowel regimen PRN    RENAL:  Follow up lytes.  Correct as needed.  Diuresis as above.    INFECTIOUS DISEASE: Follow up cultures.  Continue Abx per ID.    HEMATOLOGICAL:  DVT prophylaxis.    ENDOCRINE:  Follow up FS.  Insulin protocol if needed.    MUSCULOSKELETAL:  bedrest/offloading.       IMPRESSION:    AMS - resolved ; 2/2 hypercapnea  Acute on chronic hypercapnic failure  On BIPAP- trying to wean- not tolerating even short periods off  MANJEET/OHS  Hematoma secondary to fall - see surgery f/u; WC POS+    PLAN:    CNS:  Stable when not in respiratory distress.  GOC.    HEENT: Oral care    PULMONARY:  HOB @ 45 degrees.  Aspiration precautions.  Continue BiPAP PRN, should require less as diuresis improves.    CARDIOVASCULAR:  Check BNP, TTE, increase diuresis to 40mg BID.  Continue metoprolol/rate control.  Transiently on levophed when hypotensive.  Resume AC for AF when able.  Target MAP >65     follow BMP closely.    GI: GI prophylaxis not indicated.  Advance diet per speech. as able, when able;  Bowel regimen PRN    RENAL:  Follow up lytes.  Correct as needed.  Diuresis as above.    INFECTIOUS DISEASE: Follow up cultures.  Continue Abx per ID. See + VRE- On Zosyn    HEMATOLOGICAL:  DVT prophylaxis.    ENDOCRINE:  Follow up FS.  Insulin protocol if needed.    MUSCULOSKELETAL:  bedrest/offloading.

## 2024-12-29 NOTE — PROGRESS NOTE ADULT - ASSESSMENT
IMPRESSION:    AMS - resolved   Acute on chronic hypercapnic failure  On BIPAP  MANJEET/OHS  Hematoma secondary to fall     PLAN:    CNS:  Stable when not in respiratory distress.  GOC.    HEENT: Oral care    PULMONARY:  HOB @ 45 degrees.  Aspiration precautions.  Continue BiPAP PRN, should require less as diuresis improves.    CARDIOVASCULAR:  Check BNP, TTE, continue diuresis to 40mg BID.  Continue metoprolol/rate control.  Remains on very low-dose levophed when hypotensive.  Resume AC for AF when able.  Target MAP >65 given BRYAN.  D/c a-line.    GI: GI prophylaxis not indicated.  Advance diet per speech.  Bowel regimen PRN    RENAL:  Follow up lytes.  Correct as needed.  Diuresis as above.    INFECTIOUS DISEASE: Follow up cultures.  Continue Abx per ID.    HEMATOLOGICAL:  DVT prophylaxis.    ENDOCRINE:  Follow up FS.  Insulin protocol if needed.    MUSCULOSKELETAL:  OOBTC        MICU

## 2024-12-29 NOTE — DIETITIAN INITIAL EVALUATION ADULT - PERTINENT MEDS FT
MEDICATIONS  (STANDING):  ferrous    sulfate 325 milliGRAM(s) Oral daily  folic acid 1 milliGRAM(s) Oral daily  furosemide    Tablet 40 milliGRAM(s) Oral every 12 hours  insulin lispro (ADMELOG) corrective regimen sliding scale   SubCutaneous three times a day before meals  lidocaine 1% Injectable 30 milliLiter(s) Local Injection once  norepinephrine Infusion 0.02 MICROgram(s)/kG/Min (4.25 mL/Hr) IV Continuous <Continuous>  nystatin Powder 1 Application(s) Topical two times a day  pantoprazole    Tablet 40 milliGRAM(s) Oral before breakfast  piperacillin/tazobactam IVPB.. 3.375 Gram(s) IV Intermittent every 8 hours  risperiDONE   Tablet 1 milliGRAM(s) Oral daily  rosuvastatin 10 milliGRAM(s) Oral at bedtime  senna 2 Tablet(s) Oral at bedtime  silver sulfADIAZINE 1% Cream 1 Application(s) Topical daily  vitamin A & D Ointment 1 Application(s) Topical daily    MEDICATIONS  (PRN):  acetaminophen     Tablet .. 650 milliGRAM(s) Oral every 6 hours PRN Temp greater or equal to 38C (100.4F), Mild Pain (1 - 3)  dextrose Oral Gel 15 Gram(s) Oral once PRN Blood Glucose LESS THAN 70 milliGRAM(s)/deciliter

## 2024-12-29 NOTE — DIETITIAN INITIAL EVALUATION ADULT - OTHER INFO
pt is 84 year old female with hx of DM, HTN, chronic B/L LE edema, bipolar disorder, tach/tamy syndrome, afib p/w mechanical fall and sustained wound to LLE + hematoma with cellulitis. s/p I & D on 12/25. s/p RR on both 12/26 and 12/27 for hypotension with upgrade to ICU on 12/27.

## 2024-12-29 NOTE — PROGRESS NOTE ADULT - SUBJECTIVE AND OBJECTIVE BOX
Chart reviewed, patient examined. Pertinent results reviewed.  reviewed with the PA; specialist f/u reviewed  HD#67; POD(I&D)#4-12/25    Patient  is a 84y old  Female who presented with a chief complaint of acute blood loss anemia, cellulitis (27 Dec 2024 10:13)  HPI reviewed      Over Night Events:    upgraded for hypotension/AMS, was altered transiently  Now off levophed  comfortable but unable to tolerate remaining off bipap for very long without worsening mental status;   ABG showing CO2 retention.    ROS: + lethargy; + tolerates BIPAP    All ROS are negative except HPI         PHYSICAL EXAM  Vital Signs Last 24 Hrs  T(C): 36.6 (29 Dec 2024 07:01), Max: 37.1 (28 Dec 2024 23:26)  T(F): 97.9 (29 Dec 2024 07:01), Max: 98.8 (28 Dec 2024 23:26)  HR: 95 (29 Dec 2024 05:00) (92 - 109)  BP: 108/54 (29 Dec 2024 05:00) (71/48 - 117/60)  BP(mean): 69 (29 Dec 2024 05:00) (48 - 88)  RR: 24 (29 Dec 2024 07:01) (15 - 37)  SpO2: 94% (29 Dec 2024 05:00) (94% - 100%)    Parameters below as of 29 Dec 2024 05:00  Patient On (Oxygen Delivery Method): nasal cannula  O2 Flow (L/min): 4    Constitutional: no acute distress, well nourished  obese; w BIPAP on  Neuro: moving all 4 limbs spontaneously, no facial droop or dysarthria  HEENT: NCAT, anicteric  Neck: no visible lymphadenopathy or goiter; + scar  Pulm: stable respirations w BIPAP. clear to auscultation bilaterally  Cardiac: extremities appear pink and well-perfused.  IRR IRR, no murmur detected  Abdomen: non-distended; obese; NT  Extremities:MILD peripheral edema; L calf/leg dressing; Min + TTP      12-27-24 @ 07:01  -  12-28-24 @ 06:16  --------------------------------------------------------  IN:    IV PiggyBack: 50 mL    Norepinephrine: 44.7 mL  Total IN: 94.7 mL    OUT:    Voided (mL): 1400 mL  Total OUT: 1400 mL    Total NET: -1305.3 mL          LABS:                          8.1    9.75  )-----------( 366      ( 28 Dec 2024 07:02 )             28.4                           8.1    10.38 )-----------( 334      ( 27 Dec 2024 08:23 )             27.8     12-28    144  |  102  |  50[H]  ----------------------------<  149[H]  5.1[H]   |  33[H]  |  1.4    Ca    9.1      28 Dec 2024 07:02  Phos  3.8     12-27  Mg     2.4     12-27    TPro  5.8[L]  /  Alb  2.7[L]  /  TBili  0.3  /  DBili  x   /  AST  8   /  ALT  <5  /  AlkPhos  77  12-28 12-27    141  |  100  |  56[H]  ----------------------------<  122[H]  4.9   |  34[H]  |  1.8[H]    Ca    9.0      27 Dec 2024 08:23  Phos  3.8     12-27  Mg     2.4     12-27    TPro  5.8[L]  /  Alb  2.8[L]  /  TBili  <0.2  /  DBili  x   /  AST  8   /  ALT  6   /  AlkPhos  78  12-26                                             Urinalysis Basic - ( 27 Dec 2024 08:23 )    Color: x / Appearance: x / SG: x / pH: x  Gluc: 122 mg/dL / Ketone: x  / Bili: x / Urobili: x   Blood: x / Protein: x / Nitrite: x   Leuk Esterase: x / RBC: x / WBC x   Sq Epi: x / Non Sq Epi: x / Bacteria: x                                                  LIVER FUNCTIONS - ( 26 Dec 2024 17:22 )  Alb: 2.8 g/dL / Pro: 5.8 g/dL / ALK PHOS: 78 U/L / ALT: 6 U/L / AST: 8 U/L / GGT: x                                              Pro-Brain Natriuretic Peptide (12.28.24 @ 11:48)   Pro-Brain Natriuretic Peptide: 2589 pg/mL  Historical Values  Pro-Brain Natriuretic Peptide (12.28.24 @ 11:48)   Pro-Brain Natriuretic Peptide: 2589 pg/mL  Pro-Brain Natriuretic Peptide (07.07.24 @ 08:35)   Pro-Brain Natriuretic Peptide: 984 pg/mL  Pro-Brain Natriuretic Peptide (06.05.24 @ 11:38)   Pro-Brain Natriuretic Peptide: 2440 pg/mL    Culture - Wound Aerobic/Anaerobic (collected 25 Dec 2024 16:00)  Source: Swab  Preliminary Report (27 Dec 2024 16:01):    Numerous Morganella morganii    Moderate Proteus mirabilis    Few Enterococcus faecalis (vancomycin resistant)  Organism: Morganella morganii  Proteus mirabilis  Enterococcus faecalis (vancomycin resistant) (27 Dec 2024 16:01)  Organism: Enterococcus faecalis (vancomycin resistant) (27 Dec 2024 16:01)  Organism: Proteus mirabilis (27 Dec 2024 16:01)  Organism: Morganella morganii (27 Dec 2024 16:01)                                                                                       ABG - ( 27 Dec 2024 17:47 )  pH, Arterial: 7.34  pH, Blood: x     /  pCO2: 69    /  pO2: 93    / HCO3: 37    / Base Excess: 9.8   /  SaO2: 98.5                MEDICATIONS  (STANDING):  chlorhexidine 2% Cloths 1 Application(s) Topical <User Schedule>  dextrose 5%. 1000 milliLiter(s) (100 mL/Hr) IV Continuous <Continuous>  dextrose 5%. 1000 milliLiter(s) (50 mL/Hr) IV Continuous <Continuous>  dextrose 50% Injectable 25 Gram(s) IV Push once  dextrose 50% Injectable 12.5 Gram(s) IV Push once  dextrose 50% Injectable 25 Gram(s) IV Push once  ferrous    sulfate 325 milliGRAM(s) Oral daily  folic acid 1 milliGRAM(s) Oral daily  furosemide    Tablet 40 milliGRAM(s) Oral daily  glucagon  Injectable 1 milliGRAM(s) IntraMuscular once  influenza  Vaccine (HIGH DOSE) 0.5 milliLiter(s) IntraMuscular once  insulin lispro (ADMELOG) corrective regimen sliding scale   SubCutaneous three times a day before meals  lidocaine 1% Injectable 30 milliLiter(s) Local Injection once  norepinephrine Infusion 0.02 MICROgram(s)/kG/Min (4.25 mL/Hr) IV Continuous <Continuous>  pantoprazole    Tablet 40 milliGRAM(s) Oral before breakfast  piperacillin/tazobactam IVPB.- 3.375 Gram(s) IV Intermittent once  piperacillin/tazobactam IVPB.. 3.375 Gram(s) IV Intermittent every 8 hours  risperiDONE   Tablet 1 milliGRAM(s) Oral daily  rosuvastatin 10 milliGRAM(s) Oral at bedtime  senna 2 Tablet(s) Oral at bedtime  silver sulfADIAZINE 1% Cream 1 Application(s) Topical daily  vitamin A & D Ointment 1 Application(s) Topical daily    MEDICATIONS  (PRN):  acetaminophen     Tablet .. 650 milliGRAM(s) Oral every 6 hours PRN Temp greater or equal to 38C (100.4F), Mild Pain (1 - 3)  dextrose Oral Gel 15 Gram(s) Oral once PRN Blood Glucose LESS THAN 70 milliGRAM(s)/deciliter      New X-rays reviewed:       ECHO reviewed    CXR interpreted by me:    12/27  images and radiologist read reviewed, by my read demonstrating stable L opacity vs effusion, RLL opacity.   Chart reviewed, patient examined. Pertinent results reviewed.  reviewed with the PA; specialist f/u reviewed  HD#6; POD(I&D)#4-12/25    Patient  is a 84y old  Female who presented with a chief complaint of acute blood loss anemia, cellulitis (27 Dec 2024 10:13)  HPI reviewed      Over Night Events:    upgraded for hypotension/AMS, was altered transiently  Now off levophed  comfortable but unable to tolerate remaining off bipap for very long without worsening mental status;   ABG showing CO2 retention.    ROS: + lethargy; + now to 4L NC    All ROS are negative except HPI   See WC- + VRE    MEDICATIONS  (STANDING):  chlorhexidine 2% Cloths 1 Application(s) Topical <User Schedule>  dextrose 5%. 1000 milliLiter(s) (100 mL/Hr) IV Continuous <Continuous>  dextrose 5%. 1000 milliLiter(s) (50 mL/Hr) IV Continuous <Continuous>  dextrose 50% Injectable 25 Gram(s) IV Push once  dextrose 50% Injectable 12.5 Gram(s) IV Push once  dextrose 50% Injectable 25 Gram(s) IV Push once  ferrous    sulfate 325 milliGRAM(s) Oral daily  folic acid 1 milliGRAM(s) Oral daily  furosemide    Tablet 40 milliGRAM(s) Oral every 12 hours  glucagon  Injectable 1 milliGRAM(s) IntraMuscular once  heparin   Injectable 5000 Unit(s) SubCutaneous every 12 hours  influenza  Vaccine (HIGH DOSE) 0.5 milliLiter(s) IntraMuscular once  insulin lispro (ADMELOG) corrective regimen sliding scale   SubCutaneous three times a day before meals  lidocaine 1% Injectable 30 milliLiter(s) Local Injection once  norepinephrine Infusion 0.02 MICROgram(s)/kG/Min (4.25 mL/Hr) IV Continuous <Continuous>  nystatin Powder 1 Application(s) Topical two times a day  pantoprazole    Tablet 40 milliGRAM(s) Oral before breakfast  piperacillin/tazobactam IVPB.. 3.375 Gram(s) IV Intermittent every 8 hours  risperiDONE   Tablet 1 milliGRAM(s) Oral daily  rosuvastatin 10 milliGRAM(s) Oral at bedtime  senna 2 Tablet(s) Oral at bedtime  silver sulfADIAZINE 1% Cream 1 Application(s) Topical daily  vitamin A & D Ointment 1 Application(s) Topical daily    MEDICATIONS  (PRN):  acetaminophen     Tablet .. 650 milliGRAM(s) Oral every 6 hours PRN Temp greater or equal to 38C (100.4F), Mild Pain (1 - 3)  dextrose Oral Gel 15 Gram(s) Oral once PRN Blood Glucose LESS THAN 70 milliGRAM(s)/deciliter        PHYSICAL EXAM  Vital Signs Last 24 Hrs  T(C): 36.6 (29 Dec 2024 07:01), Max: 37.1 (28 Dec 2024 23:26)  T(F): 97.9 (29 Dec 2024 07:01), Max: 98.8 (28 Dec 2024 23:26)  HR: 95 (29 Dec 2024 05:00) (92 - 109)  BP: 108/54 (29 Dec 2024 05:00) (71/48 - 117/60)  BP(mean): 69 (29 Dec 2024 05:00) (48 - 88)  RR: 24 (29 Dec 2024 07:01) (15 - 37)  SpO2: 94% (29 Dec 2024 05:00) (94% - 100%)    Parameters below as of 29 Dec 2024 05:00  Patient On (Oxygen Delivery Method): nasal cannula  O2 Flow (L/min): 4    Constitutional: no acute distress, well nourished  obese; 4L NC  Neuro: moving all 4 limbs spontaneously, no facial droop or dysarthria  HEENT: NCAT, anicteric  Neck: no visible lymphadenopathy or goiter; + scar  Pulm: stable respirations w BIPAP. clear to auscultation bilaterally  Cardiac: extremities appear pink and well-perfused.  IRR IRR, no murmur detected  Abdomen: non-distended; obese; NT  Extremities:MILD peripheral edema; L calf/leg dressing; Min + TTP  Skin: + anasarca; ecchymoses on UE      12-27-24 @ 07:01  -  12-28-24 @ 06:16  --------------------------------------------------------  IN:    IV PiggyBack: 50 mL    Norepinephrine: 44.7 mL  Total IN: 94.7 mL    OUT:    Voided (mL): 1400 mL  Total OUT: 1400 mL    Total NET: -1305.3 mL          LABS:                          8.1    9.75  )-----------( 366      ( 28 Dec 2024 07:02 )             28.4                           8.1    10.38 )-----------( 334      ( 27 Dec 2024 08:23 )             27.8     12-28    144  |  102  |  50[H]  ----------------------------<  149[H]  5.1[H]   |  33[H]  |  1.4    Ca    9.1      28 Dec 2024 07:02  Phos  3.8     12-27  Mg     2.4     12-27    TPro  5.8[L]  /  Alb  2.7[L]  /  TBili  0.3  /  DBili  x   /  AST  8   /  ALT  <5  /  AlkPhos  77  12-28 12-27    141  |  100  |  56[H]  ----------------------------<  122[H]  4.9   |  34[H]  |  1.8[H]    Ca    9.0      27 Dec 2024 08:23  Phos  3.8     12-27  Mg     2.4     12-27    TPro  5.8[L]  /  Alb  2.8[L]  /  TBili  <0.2  /  DBili  x   /  AST  8   /  ALT  6   /  AlkPhos  78  12-26                                             Urinalysis Basic - ( 27 Dec 2024 08:23 )    Color: x / Appearance: x / SG: x / pH: x  Gluc: 122 mg/dL / Ketone: x  / Bili: x / Urobili: x   Blood: x / Protein: x / Nitrite: x   Leuk Esterase: x / RBC: x / WBC x   Sq Epi: x / Non Sq Epi: x / Bacteria: x                                                  LIVER FUNCTIONS - ( 26 Dec 2024 17:22 )  Alb: 2.8 g/dL / Pro: 5.8 g/dL / ALK PHOS: 78 U/L / ALT: 6 U/L / AST: 8 U/L / GGT: x                                              Pro-Brain Natriuretic Peptide (12.28.24 @ 11:48)   Pro-Brain Natriuretic Peptide: 2589 pg/mL  Historical Values  Pro-Brain Natriuretic Peptide (12.28.24 @ 11:48)   Pro-Brain Natriuretic Peptide: 2589 pg/mL  Pro-Brain Natriuretic Peptide (07.07.24 @ 08:35)   Pro-Brain Natriuretic Peptide: 984 pg/mL  Pro-Brain Natriuretic Peptide (06.05.24 @ 11:38)   Pro-Brain Natriuretic Peptide: 2440 pg/mL    Culture - Wound Aerobic/Anaerobic (collected 25 Dec 2024 16:00)  Source: Swab  Preliminary Report (27 Dec 2024 16:01):    Numerous Morganella morganii    Moderate Proteus mirabilis    Few Enterococcus faecalis (vancomycin resistant)  Organism: Morganella morganii  Proteus mirabilis  Enterococcus faecalis (vancomycin resistant) (27 Dec 2024 16:01)  Organism: Enterococcus faecalis (vancomycin resistant) (27 Dec 2024 16:01)  Organism: Proteus mirabilis (27 Dec 2024 16:01)  Organism: Morganella morganii (27 Dec 2024 16:01)                                                                                       ABG - ( 27 Dec 2024 17:47 )  pH, Arterial: 7.34  pH, Blood: x     /  pCO2: 69    /  pO2: 93    / HCO3: 37    / Base Excess: 9.8   /  SaO2: 98.5                New X-rays reviewed:     < from: Xray Chest 1 View- PORTABLE-Routine (Xray Chest 1 View- PORTABLE-Routine in AM.) (12.29.24 @ 07:31) >  ACC: [id number] EXAM:  XR CHEST PORTABLE  ROUTINE 1V   ORDERED BY: PERCY EDOUARD     PROCEDURE DATE:  12/29/2024          INTERPRETATION:  CLINICAL HISTORY: Shortness of breath    COMPARISON: Prior day.    TECHNIQUE: Portable frontal chest radiograph.    FINDINGS:    Support devices: Telemetry leads    Cardiac/mediastinum/hilum: Stable cardiomegaly.    Lung parenchyma/Pleura: Bilateral opacities.    Skeleton/soft tissues: Stable      IMPRESSION:    Stable bilateral opacifications.    --- End of Report ---      CHYNA BLACK MD; Attending Interventional Radiologist  This document has been electronically signed. Dec 29 2024  8:    < end of copied text >      ECHO reviewed    CXR interpreted by me:    12/27  images and radiologist read reviewed, by my read demonstrating stable L opacity vs effusion, RLL opacity.

## 2024-12-29 NOTE — PROGRESS NOTE ADULT - TIME BILLING
spending 36  minutes on this patient's case:  11  minutes w patient,  13  minutes reviewing the chart,    and  12 minutes speaking to the HO, RN, & Cardio;  also coordinating care and documenting all. spending 36  minutes on this patient's case:  10  minutes w patient,  13  minutes reviewing the chart,    and  13 minutes speaking to the HO and Surg PA; RN, & Cardio;  also coordinating care and documenting all.

## 2024-12-29 NOTE — DIETITIAN INITIAL EVALUATION ADULT - ORAL INTAKE PTA/DIET HISTORY
unable to assess. pt sleeping, multiple attempts made. no family at bedside. RD to obtain upon f/u visit  as per EMR review weight stable since june       presently on a soft and bite CHO consistent diet as per SLP eval, tolerating well consumes >75% of meals

## 2024-12-29 NOTE — PROGRESS NOTE ADULT - ASSESSMENT
84F PMHx DM, HTN, chronic BLE edema, bipolar disorder, tachybrady syndrome (on Eliquis) who presented to the ED 12/24 post-fall 2-3 days earlier at [facility]. Surgery consulted to evaluate LLE wound. S/p 12/25 bedside I&D of infected hematoma.      RECS  - trend WBC and monitor fever curve  - Wound clx: + VRE  - c/w IV abx per ID   -c/w daily LLE dressing changes to be done by nursing: Aquacel sheet replacement, gauze, abdominal pad, Kerlix, ACE bandage      Management per primary      pt seen and d/w Dr. Skaggs

## 2024-12-29 NOTE — DIETITIAN INITIAL EVALUATION ADULT - PERTINENT LABORATORY DATA
12-29    137  |  95[L]  |  43[H]  ----------------------------<  154[H]  4.7   |  35[H]  |  1.6[H]    Ca    9.0      29 Dec 2024 08:42  Mg     2.1     12-29    TPro  5.9[L]  /  Alb  2.8[L]  /  TBili  0.4  /  DBili  x   /  AST  7   /  ALT  <5  /  AlkPhos  72  12-29  POCT Blood Glucose.: 154 mg/dL (12-29-24 @ 16:53)  A1C with Estimated Average Glucose Result: 6.2 % (12-25-24 @ 04:30)  A1C with Estimated Average Glucose Result: 7.1 % (06-05-24 @ 06:44)  A1C with Estimated Average Glucose Result: 7.1 % (03-30-24 @ 05:38)                            8.0    9.93  )-----------( 352      ( 29 Dec 2024 08:42 )             27.7

## 2024-12-29 NOTE — PROGRESS NOTE ADULT - SUBJECTIVE AND OBJECTIVE BOX
OVERNIGHT/INTERIM: Pt seen and examined at bedside during AM rounds. NAEO. No fever, emesis, bloody urine, wound discharge, wound bleeding         Vital Signs Last 24 Hrs  T(C): 36.6 (29 Dec 2024 07:01), Max: 37.1 (28 Dec 2024 23:26)  T(F): 97.9 (29 Dec 2024 07:01), Max: 98.8 (28 Dec 2024 23:26)  HR: 95 (29 Dec 2024 07:01) (92 - 109)  BP: 90/45 (29 Dec 2024 07:01) (71/48 - 117/60)  BP(mean): 65 (29 Dec 2024 07:01) (48 - 88)  RR: 24 (29 Dec 2024 07:01) (15 - 37)  SpO2: 100% (29 Dec 2024 08:42) (94% - 100%)    Parameters below as of 29 Dec 2024 08:42  Patient On (Oxygen Delivery Method): nasal cannula  O2 Flow (L/min): 3    PHYSICAL EXAM:  GENERAL: laying in bed, appearing comfortable and in NAD  HEENT: Moist mucous membranes  NECK: Supple  CHEST/LUNG: even respirations b/l; no accessory muscle use  ABDOMEN: Soft  EXTREMITIES: overlying LLE appearing c/d/i   SKIN: warm      12-28-24 @ 07:01  -  12-29-24 @ 07:00  --------------------------------------------------------  IN: 774.8 mL / OUT: 2850 mL / NET: -2075.2 mL      LABS:    .  LABS:                         8.0    9.93  )-----------( 352      ( 29 Dec 2024 08:42 )             27.7     12-28    144  |  102  |  50[H]  ----------------------------<  149[H]  5.1[H]   |  33[H]  |  1.4    Ca    9.1      28 Dec 2024 07:02    TPro  5.8[L]  /  Alb  2.7[L]  /  TBili  0.3  /  DBili  x   /  AST  8   /  ALT  <5  /  AlkPhos  77  12-28      Urinalysis Basic - ( 28 Dec 2024 07:02 )    Color: x / Appearance: x / SG: x / pH: x  Gluc: 149 mg/dL / Ketone: x  / Bili: x / Urobili: x   Blood: x / Protein: x / Nitrite: x   Leuk Esterase: x / RBC: x / WBC x   Sq Epi: x / Non Sq Epi: x / Bacteria: x                  MEDICATIONS  (STANDING):  chlorhexidine 2% Cloths 1 Application(s) Topical <User Schedule>  dextrose 5%. 1000 milliLiter(s) (100 mL/Hr) IV Continuous <Continuous>  dextrose 5%. 1000 milliLiter(s) (50 mL/Hr) IV Continuous <Continuous>  dextrose 50% Injectable 25 Gram(s) IV Push once  dextrose 50% Injectable 12.5 Gram(s) IV Push once  dextrose 50% Injectable 25 Gram(s) IV Push once  ferrous    sulfate 325 milliGRAM(s) Oral daily  folic acid 1 milliGRAM(s) Oral daily  furosemide    Tablet 40 milliGRAM(s) Oral every 12 hours  glucagon  Injectable 1 milliGRAM(s) IntraMuscular once  heparin   Injectable 5000 Unit(s) SubCutaneous every 12 hours  influenza  Vaccine (HIGH DOSE) 0.5 milliLiter(s) IntraMuscular once  insulin lispro (ADMELOG) corrective regimen sliding scale   SubCutaneous three times a day before meals  lidocaine 1% Injectable 30 milliLiter(s) Local Injection once  norepinephrine Infusion 0.02 MICROgram(s)/kG/Min (4.25 mL/Hr) IV Continuous <Continuous>  nystatin Powder 1 Application(s) Topical two times a day  pantoprazole    Tablet 40 milliGRAM(s) Oral before breakfast  piperacillin/tazobactam IVPB.. 3.375 Gram(s) IV Intermittent every 8 hours  risperiDONE   Tablet 1 milliGRAM(s) Oral daily  rosuvastatin 10 milliGRAM(s) Oral at bedtime  senna 2 Tablet(s) Oral at bedtime  silver sulfADIAZINE 1% Cream 1 Application(s) Topical daily  vitamin A & D Ointment 1 Application(s) Topical daily    MEDICATIONS  (PRN):  acetaminophen     Tablet .. 650 milliGRAM(s) Oral every 6 hours PRN Temp greater or equal to 38C (100.4F), Mild Pain (1 - 3)  dextrose Oral Gel 15 Gram(s) Oral once PRN Blood Glucose LESS THAN 70 milliGRAM(s)/deciliter

## 2024-12-30 ENCOUNTER — RESULT REVIEW (OUTPATIENT)
Age: 84
End: 2024-12-30

## 2024-12-30 LAB
ALBUMIN SERPL ELPH-MCNC: 2.8 G/DL — LOW (ref 3.5–5.2)
ALP SERPL-CCNC: 77 U/L — SIGNIFICANT CHANGE UP (ref 30–115)
ALT FLD-CCNC: <5 U/L — SIGNIFICANT CHANGE UP (ref 0–41)
ANION GAP SERPL CALC-SCNC: 2 MMOL/L — LOW (ref 7–14)
AST SERPL-CCNC: 7 U/L — SIGNIFICANT CHANGE UP (ref 0–41)
BILIRUB SERPL-MCNC: 0.3 MG/DL — SIGNIFICANT CHANGE UP (ref 0.2–1.2)
BUN SERPL-MCNC: 37 MG/DL — HIGH (ref 10–20)
CALCIUM SERPL-MCNC: 9.1 MG/DL — SIGNIFICANT CHANGE UP (ref 8.4–10.5)
CHLORIDE SERPL-SCNC: 96 MMOL/L — LOW (ref 98–110)
CO2 SERPL-SCNC: 40 MMOL/L — HIGH (ref 17–32)
CREAT SERPL-MCNC: 1.7 MG/DL — HIGH (ref 0.7–1.5)
CULTURE RESULTS: ABNORMAL
EGFR: 29 ML/MIN/1.73M2 — LOW
GLUCOSE BLDC GLUCOMTR-MCNC: 174 MG/DL — HIGH (ref 70–99)
GLUCOSE BLDC GLUCOMTR-MCNC: 176 MG/DL — HIGH (ref 70–99)
GLUCOSE BLDC GLUCOMTR-MCNC: 189 MG/DL — HIGH (ref 70–99)
GLUCOSE BLDC GLUCOMTR-MCNC: 230 MG/DL — HIGH (ref 70–99)
GLUCOSE SERPL-MCNC: 166 MG/DL — HIGH (ref 70–99)
HCT VFR BLD CALC: 29 % — LOW (ref 37–47)
HGB BLD-MCNC: 8.3 G/DL — LOW (ref 12–16)
MAGNESIUM SERPL-MCNC: 2 MG/DL — SIGNIFICANT CHANGE UP (ref 1.8–2.4)
MCHC RBC-ENTMCNC: 27.3 PG — SIGNIFICANT CHANGE UP (ref 27–31)
MCHC RBC-ENTMCNC: 28.6 G/DL — LOW (ref 32–37)
MCV RBC AUTO: 95.4 FL — SIGNIFICANT CHANGE UP (ref 81–99)
NRBC # BLD: 0 /100 WBCS — SIGNIFICANT CHANGE UP (ref 0–0)
ORGANISM # SPEC MICROSCOPIC CNT: ABNORMAL
ORGANISM # SPEC MICROSCOPIC CNT: SIGNIFICANT CHANGE UP
PLATELET # BLD AUTO: 352 K/UL — SIGNIFICANT CHANGE UP (ref 130–400)
PMV BLD: 9.4 FL — SIGNIFICANT CHANGE UP (ref 7.4–10.4)
POTASSIUM SERPL-MCNC: 4.2 MMOL/L — SIGNIFICANT CHANGE UP (ref 3.5–5)
POTASSIUM SERPL-SCNC: 4.2 MMOL/L — SIGNIFICANT CHANGE UP (ref 3.5–5)
PROT SERPL-MCNC: 6.2 G/DL — SIGNIFICANT CHANGE UP (ref 6–8)
RBC # BLD: 3.04 M/UL — LOW (ref 4.2–5.4)
RBC # FLD: 14.4 % — SIGNIFICANT CHANGE UP (ref 11.5–14.5)
SODIUM SERPL-SCNC: 139 MMOL/L — SIGNIFICANT CHANGE UP (ref 135–146)
SPECIMEN SOURCE: SIGNIFICANT CHANGE UP
WBC # BLD: 9.25 K/UL — SIGNIFICANT CHANGE UP (ref 4.8–10.8)
WBC # FLD AUTO: 9.25 K/UL — SIGNIFICANT CHANGE UP (ref 4.8–10.8)

## 2024-12-30 PROCEDURE — 71045 X-RAY EXAM CHEST 1 VIEW: CPT | Mod: 26

## 2024-12-30 PROCEDURE — 93306 TTE W/DOPPLER COMPLETE: CPT | Mod: 26

## 2024-12-30 PROCEDURE — 99291 CRITICAL CARE FIRST HOUR: CPT

## 2024-12-30 RX ORDER — APIXABAN 5 MG/1
2.5 TABLET, FILM COATED ORAL
Refills: 0 | Status: DISCONTINUED | OUTPATIENT
Start: 2024-12-30 | End: 2025-01-06

## 2024-12-30 RX ORDER — MIDODRINE HYDROCHLORIDE 5 MG/1
10 TABLET ORAL EVERY 8 HOURS
Refills: 0 | Status: DISCONTINUED | OUTPATIENT
Start: 2024-12-30 | End: 2024-12-31

## 2024-12-30 RX ORDER — METOPROLOL TARTRATE 50 MG
25 TABLET ORAL DAILY
Refills: 0 | Status: DISCONTINUED | OUTPATIENT
Start: 2024-12-30 | End: 2025-01-06

## 2024-12-30 RX ORDER — MIDODRINE HYDROCHLORIDE 5 MG/1
10 TABLET ORAL ONCE
Refills: 0 | Status: COMPLETED | OUTPATIENT
Start: 2024-12-30 | End: 2024-12-30

## 2024-12-30 RX ADMIN — PIPERACILLIN AND TAZOBACTAM 25 GRAM(S): 3; .375 INJECTION, POWDER, LYOPHILIZED, FOR SOLUTION INTRAVENOUS at 14:36

## 2024-12-30 RX ADMIN — PANTOPRAZOLE 40 MILLIGRAM(S): 40 TABLET, DELAYED RELEASE ORAL at 06:11

## 2024-12-30 RX ADMIN — Medication 40 MILLIGRAM(S): at 06:13

## 2024-12-30 RX ADMIN — LANOLIN AND PETROLATUM 1 APPLICATION(S): 136.4; 469.9 OINTMENT TOPICAL at 11:33

## 2024-12-30 RX ADMIN — MIDODRINE HYDROCHLORIDE 10 MILLIGRAM(S): 5 TABLET ORAL at 21:18

## 2024-12-30 RX ADMIN — PIPERACILLIN AND TAZOBACTAM 25 GRAM(S): 3; .375 INJECTION, POWDER, LYOPHILIZED, FOR SOLUTION INTRAVENOUS at 21:18

## 2024-12-30 RX ADMIN — RISPERIDONE 1 MILLIGRAM(S): 0.5 TABLET ORAL at 11:31

## 2024-12-30 RX ADMIN — PIPERACILLIN AND TAZOBACTAM 25 GRAM(S): 3; .375 INJECTION, POWDER, LYOPHILIZED, FOR SOLUTION INTRAVENOUS at 06:11

## 2024-12-30 RX ADMIN — CHLORHEXIDINE GLUCONATE 1 APPLICATION(S): 1.2 RINSE ORAL at 06:12

## 2024-12-30 RX ADMIN — NYSTATIN TOPICAL POWDER 1 APPLICATION(S): 100000 POWDER TOPICAL at 18:21

## 2024-12-30 RX ADMIN — ROSUVASTATIN 10 MILLIGRAM(S): 40 TABLET, FILM COATED ORAL at 21:19

## 2024-12-30 RX ADMIN — MIDODRINE HYDROCHLORIDE 10 MILLIGRAM(S): 5 TABLET ORAL at 16:08

## 2024-12-30 RX ADMIN — Medication 40 MILLIGRAM(S): at 18:21

## 2024-12-30 RX ADMIN — HEPARIN SODIUM 5000 UNIT(S): 1000 INJECTION, SOLUTION INTRAVENOUS; SUBCUTANEOUS at 06:11

## 2024-12-30 RX ADMIN — NYSTATIN TOPICAL POWDER 1 APPLICATION(S): 100000 POWDER TOPICAL at 06:12

## 2024-12-30 RX ADMIN — NOREPINEPHRINE BITARTRATE 4.25 MICROGRAM(S)/KG/MIN: 1 INJECTION INTRAVENOUS at 21:18

## 2024-12-30 RX ADMIN — Medication 1: at 11:45

## 2024-12-30 RX ADMIN — SENNOSIDES 2 TABLET(S): 8.6 TABLET, FILM COATED ORAL at 21:19

## 2024-12-30 RX ADMIN — Medication 1: at 16:21

## 2024-12-30 RX ADMIN — Medication 1 MILLIGRAM(S): at 11:31

## 2024-12-30 RX ADMIN — APIXABAN 2.5 MILLIGRAM(S): 5 TABLET, FILM COATED ORAL at 18:21

## 2024-12-30 RX ADMIN — Medication 1: at 07:39

## 2024-12-30 RX ADMIN — Medication 325 MILLIGRAM(S): at 11:31

## 2024-12-30 NOTE — PROGRESS NOTE ADULT - SUBJECTIVE AND OBJECTIVE BOX
Patient is a 84y old  Female who presents with a chief complaint of acute blood loss anemia, cellulitis (30 Dec 2024 08:47)        Over Night Events:    No acute events  tolerated bipap overnight  a-line replaced  Net negative 2.6L yesterday    ROS:     All ROS are negative except HPI         PHYSICAL EXAM    ICU Vital Signs Last 24 Hrs  T(C): 36.2 (30 Dec 2024 07:01), Max: 36.9 (30 Dec 2024 04:30)  T(F): 97.2 (30 Dec 2024 07:01), Max: 98.4 (30 Dec 2024 04:30)  HR: 99 (30 Dec 2024 10:00) (90 - 174)  BP: 88/39 (29 Dec 2024 15:00) (79/43 - 88/39)  BP(mean): 56 (29 Dec 2024 15:00) (56 - 59)  ABP: 125/58 (30 Dec 2024 10:00) (84/68 - 143/57)  ABP(mean): 80 (30 Dec 2024 10:00) (60 - 92)  RR: 20 (30 Dec 2024 10:00) (13 - 45)  SpO2: 99% (30 Dec 2024 10:00) (90% - 100%)    O2 Parameters below as of 30 Dec 2024 07:00  Patient On (Oxygen Delivery Method): nasal cannula  O2 Flow (L/min): 3          Constitutional: no acute distress, well nourished well developed  Neuro: moving all 4 limbs spontaneously, altered  HEENT: NCAT, anicteric  Neck: no visible lymphadenopathy or goiter  Pulm: no respiratory distress. clear to auscultation bilaterally  Cardiac: extremities appear pink and well-perfused.  regular rhythm and rate, no murmur detected  Abdomen: non-distended  Extremities: +3 RLE peripheral edema, very tender      12-29-24 @ 07:01  -  12-30-24 @ 07:00  --------------------------------------------------------  IN:    IV PiggyBack: 300 mL    Norepinephrine: 244.9 mL    Oral Fluid: 660 mL  Total IN: 1204.9 mL    OUT:    Voided (mL): 3800 mL  Total OUT: 3800 mL    Total NET: -2595.1 mL      12-30-24 @ 07:01  -  12-30-24 @ 11:02  --------------------------------------------------------  IN:    Oral Fluid: 240 mL  Total IN: 240 mL    OUT:  Total OUT: 0 mL    Total NET: 240 mL          LABS:                            8.0    9.93  )-----------( 352      ( 29 Dec 2024 08:42 )             27.7                                               12-29    137  |  95[L]  |  43[H]  ----------------------------<  154[H]  4.7   |  35[H]  |  1.6[H]    Ca    9.0      29 Dec 2024 08:42  Mg     2.1     12-29    TPro  5.9[L]  /  Alb  2.8[L]  /  TBili  0.4  /  DBili  x   /  AST  7   /  ALT  <5  /  AlkPhos  72  12-29                                             Urinalysis Basic - ( 29 Dec 2024 08:42 )    Color: x / Appearance: x / SG: x / pH: x  Gluc: 154 mg/dL / Ketone: x  / Bili: x / Urobili: x   Blood: x / Protein: x / Nitrite: x   Leuk Esterase: x / RBC: x / WBC x   Sq Epi: x / Non Sq Epi: x / Bacteria: x                                                  LIVER FUNCTIONS - ( 29 Dec 2024 08:42 )  Alb: 2.8 g/dL / Pro: 5.9 g/dL / ALK PHOS: 72 U/L / ALT: <5 U/L / AST: 7 U/L / GGT: x                                                                                                                                       MEDICATIONS  (STANDING):  chlorhexidine 2% Cloths 1 Application(s) Topical <User Schedule>  dextrose 5%. 1000 milliLiter(s) (100 mL/Hr) IV Continuous <Continuous>  dextrose 5%. 1000 milliLiter(s) (50 mL/Hr) IV Continuous <Continuous>  dextrose 50% Injectable 25 Gram(s) IV Push once  dextrose 50% Injectable 12.5 Gram(s) IV Push once  dextrose 50% Injectable 25 Gram(s) IV Push once  ferrous    sulfate 325 milliGRAM(s) Oral daily  folic acid 1 milliGRAM(s) Oral daily  furosemide    Tablet 40 milliGRAM(s) Oral every 12 hours  glucagon  Injectable 1 milliGRAM(s) IntraMuscular once  heparin   Injectable 5000 Unit(s) SubCutaneous every 12 hours  influenza  Vaccine (HIGH DOSE) 0.5 milliLiter(s) IntraMuscular once  insulin lispro (ADMELOG) corrective regimen sliding scale   SubCutaneous three times a day before meals  lidocaine 1% Injectable 30 milliLiter(s) Local Injection once  norepinephrine Infusion 0.02 MICROgram(s)/kG/Min (4.25 mL/Hr) IV Continuous <Continuous>  nystatin Powder 1 Application(s) Topical two times a day  pantoprazole    Tablet 40 milliGRAM(s) Oral before breakfast  piperacillin/tazobactam IVPB.. 3.375 Gram(s) IV Intermittent every 8 hours  risperiDONE   Tablet 1 milliGRAM(s) Oral daily  rosuvastatin 10 milliGRAM(s) Oral at bedtime  senna 2 Tablet(s) Oral at bedtime  silver sulfADIAZINE 1% Cream 1 Application(s) Topical daily  vitamin A & D Ointment 1 Application(s) Topical daily    MEDICATIONS  (PRN):  acetaminophen     Tablet .. 650 milliGRAM(s) Oral every 6 hours PRN Temp greater or equal to 38C (100.4F), Mild Pain (1 - 3)  dextrose Oral Gel 15 Gram(s) Oral once PRN Blood Glucose LESS THAN 70 milliGRAM(s)/deciliter      New X-rays reviewed:       ECHO reviewed    CXR interpreted by me:    12/30  images and radiologist read reviewed, by my read demonstrating improving bilateral opacities and effusions.

## 2024-12-30 NOTE — PROGRESS NOTE ADULT - ASSESSMENT
# s/p rapid response Acute Respiratory distress ,failure probably from CHF Acute on chronic hypercapnic failure  hx a fib ,lasix 40mg x1 d/c IVF ,s/p BIPAP  cardiologist ,pulmonary   #LLE subcutaneous hematoma and cellulitis   #Fall   -CTA LE: A subcutaneous hematoma is noted along the lateral and posterior aspect of the left calf. The hematoma measures approximately 11.5 x 2 x 18 cm.  -zosyn   -Surgery following: Had I&D- see 12/25; continue daily wound care (telfa/gauze/abd/kerlix/ACE wrap)   -ID consult noted  -Wound care consult - noted: Had I&D , 12/25  -Pain control prn   -PT consult   -Fall precautions     #Acute blood loss anemia   #H/o iron deficiency anemia   -H&H stable now  - resume eliquis        #Acute hypercarbic respiratory failure likely 2/2 MANJEET   -BiPAP HS 14/8  -Supplemental O2 prn     #DM  -Holding Farxiga 10 mg   -ISS while inpatient     #HTN  -C/w Losartan 50 mg     #HLD  -C/w Crestor 10 mg     #Bipolar  -C/w Risperdal 1 mg     #Chronic LE edema,acute on chronic chf  -C/w home Lasix, track I's/O's    #H/o A fib-C/w Metoprolol Succinate ER 25 mg tab   -restart eliquis    Diet: DASH/CC, fluid restriction 1200 ml/day   Activity: Ambulate with assistance-PT

## 2024-12-30 NOTE — PROGRESS NOTE ADULT - SUBJECTIVE AND OBJECTIVE BOX
----------Daily Progress Note----------    HISTORY OF PRESENT ILLNESS:  Patient is a 84y old Female who presents with a chief complaint of acute blood loss anemia, cellulitis (30 Dec 2024 11:06)    Currently admitted to medicine with the primary diagnosis of Hematoma of left lower leg       Today is hospital day 6d.     INTERVAL HOSPITAL COURSE / OVERNIGHT EVENTS:    No overnight events    Review of Systems: Otherwise unremarkable     <<<<<PAST MEDICAL & SURGICAL HISTORY>>>>>  Diabetes mellitus    Hypertension    Schizoaffective disorder    Edema  Bilateral LE    Disorder of thyroid, unspecified  Son cannot provide details. States she had "thyroid surgery" decades ago when she was young    No significant past surgical history      ALLERGIES  No Known Allergies      Home Medications:  apixaban 5 mg oral tablet: 1 tab(s) orally every 12 hours (24 Dec 2024 18:43)  Crestor 10 mg oral tablet: 1 tab(s) orally once a day (24 Dec 2024 18:43)  Farxiga 10 mg oral tablet: 1 tab(s) orally once a day (24 Dec 2024 18:43)  ferrous sulfate 325 mg (65 mg elemental iron) oral delayed release tablet: 1 tab(s) orally 2 times a day (24 Dec 2024 18:43)  folic acid 1 mg oral tablet: 1 tab(s) orally once a day (24 Dec 2024 18:43)  Lasix 40 mg oral tablet: 1 tab(s) orally once a day (24 Dec 2024 18:40)  losartan 50 mg oral tablet: 1 tab(s) orally once a day hold for SBP less than 105mmhg (24 Dec 2024 18:43)  metoprolol succinate 25 mg oral tablet, extended release: 1 tab(s) orally once a day HOLD for SBP less than 100 and HR less than 60 (24 Dec 2024 18:43)  nystatin 100,000 units/g topical cream: Apply topically to affected area 2 times a day (24 Dec 2024 18:38)  RisperDAL 1 mg oral tablet: 1 tab(s) orally once a day (at bedtime) (24 Dec 2024 19:15)  senna leaf extract oral tablet: 2 tab(s) orally once a day (at bedtime) (24 Dec 2024 18:43)  silver sulfADIAZINE 1% topical cream: Apply topically to affected area once a day (24 Dec 2024 19:15)  Vitamin A and D topical ointment: Apply topically to affected area once a day Apply to bilateral LE&#x27;s/Feet daily (24 Dec 2024 18:43)  Vitamin D2 50,000 intl units (1.25 mg) oral capsule (obsolete): 1 cap(s) orally every 2 weeks (24 Dec 2024 18:43)        MEDICATIONS  STANDING MEDICATIONS  apixaban 2.5 milliGRAM(s) Oral two times a day  chlorhexidine 2% Cloths 1 Application(s) Topical <User Schedule>  dextrose 5%. 1000 milliLiter(s) IV Continuous <Continuous>  dextrose 5%. 1000 milliLiter(s) IV Continuous <Continuous>  dextrose 50% Injectable 25 Gram(s) IV Push once  dextrose 50% Injectable 12.5 Gram(s) IV Push once  dextrose 50% Injectable 25 Gram(s) IV Push once  ferrous    sulfate 325 milliGRAM(s) Oral daily  folic acid 1 milliGRAM(s) Oral daily  furosemide    Tablet 40 milliGRAM(s) Oral every 12 hours  glucagon  Injectable 1 milliGRAM(s) IntraMuscular once  influenza  Vaccine (HIGH DOSE) 0.5 milliLiter(s) IntraMuscular once  insulin lispro (ADMELOG) corrective regimen sliding scale   SubCutaneous three times a day before meals  lidocaine 1% Injectable 30 milliLiter(s) Local Injection once  metoprolol succinate ER 25 milliGRAM(s) Oral daily  norepinephrine Infusion 0.02 MICROgram(s)/kG/Min IV Continuous <Continuous>  nystatin Powder 1 Application(s) Topical two times a day  pantoprazole    Tablet 40 milliGRAM(s) Oral before breakfast  piperacillin/tazobactam IVPB.. 3.375 Gram(s) IV Intermittent every 8 hours  risperiDONE   Tablet 1 milliGRAM(s) Oral daily  rosuvastatin 10 milliGRAM(s) Oral at bedtime  senna 2 Tablet(s) Oral at bedtime  silver sulfADIAZINE 1% Cream 1 Application(s) Topical daily  vitamin A & D Ointment 1 Application(s) Topical daily    PRN MEDICATIONS  acetaminophen     Tablet .. 650 milliGRAM(s) Oral every 6 hours PRN  dextrose Oral Gel 15 Gram(s) Oral once PRN    VITALS:  T(F): 97.9  HR: 100  BP: 88/39  RR: 22  SpO2: 98%    <<<<<LABS>>>>>                        8.3    9.25  )-----------( 352      ( 30 Dec 2024 11:08 )             29.0     12-30    139  |  96[L]  |  37[H]  ----------------------------<  166[H]  4.2   |  40[H]  |  1.7[H]    Ca    9.1      30 Dec 2024 11:08  Mg     2.0     12-30    TPro  6.2  /  Alb  2.8[L]  /  TBili  0.3  /  DBili  x   /  AST  7   /  ALT  <5  /  AlkPhos  77  12-30      Urinalysis Basic - ( 30 Dec 2024 11:08 )    Color: x / Appearance: x / SG: x / pH: x  Gluc: 166 mg/dL / Ketone: x  / Bili: x / Urobili: x   Blood: x / Protein: x / Nitrite: x   Leuk Esterase: x / RBC: x / WBC x   Sq Epi: x / Non Sq Epi: x / Bacteria: x          [id number]        <<<<<RADIOLOGY>>>>>  < from: Xray Chest 1 View- PORTABLE-Routine (Xray Chest 1 View- PORTABLE-Routine in AM.) (12.30.24 @ 07:23) >  IMPRESSION:    Stable left and mild improvement of right lung opacities.    < end of copied text >      <<<<<PHYSICAL EXAM>>>>>  GENERAL: NAD, resting comfortably in bed  HEENT: Normocephalic, atraumatic  PULMONARY: Clear to auscultation bilaterally. No rales, rhonchi, or wheezing.  CARDIOVASCULAR: Regular rate and rhythm, S1-S2, no murmurs  GASTROINTESTINAL: Soft, non-tender, non-distended, no guarding.  RENAL: No CVA tenderness.  SKIN/EXTREMITIES: 2+ b/l LE edema b/l  NEUROLOGIC: AAOX1?, minimally verbal to questioning but awake and alert      -----------------------------------------------------------------------------------------------------------------------------------------------------------------------------------------------

## 2024-12-30 NOTE — PROGRESS NOTE ADULT - SUBJECTIVE AND OBJECTIVE BOX
CHELA GALLAGHER  84y  Female      Patient is a 84y old  Female who presents with a chief complaint of Contusion of left lower leg, initial encounter     (29 Dec 2024 22:30)        REVIEW OF SYSTEMS:  CONSTITUTIONAL: No fever, weight loss,   RESPIRATORY: No cough, wheezing, chills or hemoptysis;  shortness of breath+  CARDIOVASCULAR: No chest pain, palpitations, dizziness, or leg swelling  GASTROINTESTINAL: No abdominal or epigastric pain. No nausea, vomiting, or hematemesis; No diarrhea or constipation. No melena or hematochezia.  GENITOURINARY: No dysuria, frequency, hematuria,  incontinence+  MUSCULOSKELETAL: No joint pain or swelling; No muscle, back, or extremity pain  PSYCHIATRIC: No depression, anxiety, mood swings, or difficulty sleeping  HEME/LYMPH: No easy bruising, or bleeding gums  ALLERY AND IMMUNOLOGIC: No hives or eczema  FAMILY HISTORY:    T(C): 36.2 (12-30-24 @ 07:01), Max: 36.9 (12-30-24 @ 04:30)  HR: 97 (12-30-24 @ 08:00) (87 - 174)  BP: 88/39 (12-29-24 @ 15:00) (79/43 - 99/46)  RR: 22 (12-30-24 @ 08:00) (14 - 45)  SpO2: 99% (12-30-24 @ 08:00) (90% - 100%)  Wt(kg): --Vital Signs Last 24 Hrs  T(C): 36.2 (30 Dec 2024 07:01), Max: 36.9 (30 Dec 2024 04:30)  T(F): 97.2 (30 Dec 2024 07:01), Max: 98.4 (30 Dec 2024 04:30)  HR: 97 (30 Dec 2024 08:00) (87 - 174)  BP: 88/39 (29 Dec 2024 15:00) (79/43 - 99/46)  BP(mean): 56 (29 Dec 2024 15:00) (56 - 69)  RR: 22 (30 Dec 2024 08:00) (14 - 45)  SpO2: 99% (30 Dec 2024 08:00) (90% - 100%)    Parameters below as of 30 Dec 2024 07:00  Patient On (Oxygen Delivery Method): nasal cannula  O2 Flow (L/min): 3    No Known Allergies      PHYSICAL EXAM:  GENERAL: NAD,   HEAD:  Atraumatic, Normocephalic  EYES: EOMI, PERRLA, conjunctiva and sclera clear  ENMT: No tonsillar erythema, exudates, or enlargement;   NECK: Supple, No JVD, Normal thyroid  NERVOUS SYSTEM:  Alert & Oriented   CHEST/LUNG: vbs bilateral rales+  HEART: Regular rate and rhythm; No murmurs, rubs, or gallops  ABDOMEN: Soft, Nontender, Nondistended; Bowel sounds present  EXTREMITIES:   No clubbing, cyanosis, rt leg edema+  LYMPH: No lymphadenopathy noted  SKIN: No rashes or lesions      LABS:  12-29    137  |  95[L]  |  43[H]  ----------------------------<  154[H]  4.7   |  35[H]  |  1.6[H]    Ca    9.0      29 Dec 2024 08:42  Mg     2.1     12-29    TPro  5.9[L]  /  Alb  2.8[L]  /  TBili  0.4  /  DBili  x   /  AST  7   /  ALT  <5  /  AlkPhos  72  12-29                          8.0    9.93  )-----------( 352      ( 29 Dec 2024 08:42 )             27.7       < from: CT Angio Lower Extremity w/ IV Cont, Left (12.24.24 @ 16:39) >  A subcutaneous hematoma is noted along the lateral and posterior aspect   of the left calf. The hematoma measures approximately 11.5 x 2 x 18 cm.    There is no evidence of active extravasation.    There is no evidence of vascular injury, occlusion, dissection, or   pseudoaneurysm.    < end of copied text >    RADIOLOGY & ADDITIONAL TESTS:    MEDICATION:  acetaminophen     Tablet .. 650 milliGRAM(s) Oral every 6 hours PRN  chlorhexidine 2% Cloths 1 Application(s) Topical <User Schedule>  dextrose 5%. 1000 milliLiter(s) IV Continuous <Continuous>  dextrose 5%. 1000 milliLiter(s) IV Continuous <Continuous>  dextrose 50% Injectable 25 Gram(s) IV Push once  dextrose 50% Injectable 12.5 Gram(s) IV Push once  dextrose 50% Injectable 25 Gram(s) IV Push once  dextrose Oral Gel 15 Gram(s) Oral once PRN  ferrous    sulfate 325 milliGRAM(s) Oral daily  folic acid 1 milliGRAM(s) Oral daily  furosemide    Tablet 40 milliGRAM(s) Oral every 12 hours  glucagon  Injectable 1 milliGRAM(s) IntraMuscular once  heparin   Injectable 5000 Unit(s) SubCutaneous every 12 hours  influenza  Vaccine (HIGH DOSE) 0.5 milliLiter(s) IntraMuscular once  insulin lispro (ADMELOG) corrective regimen sliding scale   SubCutaneous three times a day before meals  lidocaine 1% Injectable 30 milliLiter(s) Local Injection once  norepinephrine Infusion 0.02 MICROgram(s)/kG/Min IV Continuous <Continuous>  nystatin Powder 1 Application(s) Topical two times a day  pantoprazole    Tablet 40 milliGRAM(s) Oral before breakfast  piperacillin/tazobactam IVPB.. 3.375 Gram(s) IV Intermittent every 8 hours  risperiDONE   Tablet 1 milliGRAM(s) Oral daily  rosuvastatin 10 milliGRAM(s) Oral at bedtime  senna 2 Tablet(s) Oral at bedtime  silver sulfADIAZINE 1% Cream 1 Application(s) Topical daily  vitamin A & D Ointment 1 Application(s) Topical daily      HEALTH ISSUES - PROBLEM Dx:  This is a 84-year-old female brought in by EMS from [facility], with past medical history of  diabetes, HTN, chronic LE edema, bipolar disorder, tachybrady syndrome (declining PPM), A fib (on Eliquis), mild aortic stenosis, presenting s/p mechanical fall 3 days ago (-head trauma, +AC). Patient sustained wound to left posterior lateral lower extremity with hematoma, that started rebleeding today. Denies fever, chills, n/v, cp, sob, palpitations, cough, back pain, numbness/tingling, abd pain, changes in BM, and urinary symptoms,     # s/p rapid response Acute Respiratory distress ,failure probably from CHF Acute on chronic hypercapnic failure  hx a fib ,lasix 40mg x1 d/c IVF ,s/p BIPAP  cardiologist ,pulmonary   #LLE subcutaneous hematoma and cellulitis   #Fall   -CTA LE: A subcutaneous hematoma is noted along the lateral and posterior aspect of the left calf. The hematoma measures approximately 11.5 x 2 x 18 cm.  -zosyn   -Surgery following: Had I&D- see 12/25; continue daily wound care (telfa/gauze/abd/kerlix/ACE wrap)   -ID consult noted  -Wound care consult - noted: Had I&D yesterday, 12/25  -Pain control prn   -PT consult   -Fall precautions     #Acute blood loss anemia   #H/o iron deficiency anemia   -Hgb 6.7 (10.2 in 7/2024). Baseline Hgb ~8- 9  -Hold Eliquis in setting of acute blood loss anemia   -F/U post transfusion CBC improved.  -C/w ferrous sulfate 325 mg   - check anemia labs        #Acute hypercarbic respiratory failure likely 2/2 MANJEET   -BiPAP HS 14/8  -Supplemental O2 prn     #DM  -Holding Farxiga 10 mg   -ISS while inpatient     #HTN  -C/w Losartan 50 mg     #HLD  -C/w Crestor 10 mg     #Bipolar  -C/w Risperdal 1 mg     #Chronic LE edema,acute on chronic chf  -C/w home Lasix    #H/o A fib-C/w Metoprolol Succinate ER 25 mg tab   -Holding home Eliquis in setting of acute blood loss anemia    Diet: DASH/CC, fluid restriction 1200 ml/day   Activity: Ambulate with assistance-PT  case d/w son eric about pt condition ,but Dr batista will decide about plan,total 35 minutes

## 2024-12-30 NOTE — PROGRESS NOTE ADULT - ASSESSMENT
IMPRESSION:    AMS - resolved   Acute on chronic hypercapnic failure  On BIPAP  MANJEET/OHS  Hematoma secondary to fall     PLAN:    CNS:  Stable when not in respiratory distress.  GOC.  Delirium precautions.    HEENT: Oral care    PULMONARY:  HOB @ 45 degrees.  Aspiration precautions.  Continue BiPAP QHS & PRN, should require less as diuresis improves.    CARDIOVASCULAR:  Check BNP, TTE, continue diuresis to 40mg BID.  Continue metoprolol/rate control.  Remains on very low-dose levophed when hypotensive.  Resume AC for AF when able.  Target MAP >65 given BRYAN.  D/c a-line.    GI: GI prophylaxis not indicated.  Advance diet per speech.  Bowel regimen PRN    RENAL:  Follow up lytes.  Correct as needed.  Diuresis as above.    INFECTIOUS DISEASE: Follow up cultures.  Continue Abx per ID.    HEMATOLOGICAL:  DVT prophylaxis: heparin SQ.    ENDOCRINE:  Follow up FS.  Insulin protocol if needed.    MUSCULOSKELETAL:  OOBTC, surgery follow-up for LLE        SDU when off levo

## 2024-12-30 NOTE — PROGRESS NOTE ADULT - ASSESSMENT
84F PMHx DM, HTN, chronic BLE edema, bipolar disorder, tachybrady syndrome (on Eliquis) who presented to the ED 12/24 post-fall 2-3 days earlier at [facility]. Surgery consulted to evaluate LLE wound. S/p 12/25 bedside I&D of infected hematoma.    received call from primary team on 12/29 regarding if Eliquis can be restarted. per d/w Dr. Mcneil subsequently- ok to restart Eliquis from surgical standpoint, which was d/w Unit provider x [phone] later that evening    RECS  - trend WBC and monitor fever curve  - Wound clx: + VRE  - c/w IV abx per ID   -c/w daily LLE dressing changes to be done by nursing: Aquacel sheet replacement, gauze, abdominal pad, Kerlix, ACE bandage      Management per primary    will d/w surg attending

## 2024-12-30 NOTE — PROGRESS NOTE ADULT - SUBJECTIVE AND OBJECTIVE BOX
OVERNIGHT/INTERIM: Pt seen and examined at bedside during AM rounds.        Vital Signs Last 24 Hrs  T(C): 36.2 (30 Dec 2024 07:01), Max: 36.9 (30 Dec 2024 04:30)  T(F): 97.2 (30 Dec 2024 07:01), Max: 98.4 (30 Dec 2024 04:30)  HR: 99 (30 Dec 2024 10:00) (90 - 174)  BP: 88/39 (29 Dec 2024 15:00) (79/43 - 88/39)  BP(mean): 56 (29 Dec 2024 15:00) (56 - 59)  RR: 20 (30 Dec 2024 10:00) (13 - 45)  SpO2: 99% (30 Dec 2024 10:00) (90% - 100%)    Parameters below as of 30 Dec 2024 07:00  Patient On (Oxygen Delivery Method): nasal cannula  O2 Flow (L/min): 3    PHYSICAL EXAM:  GENERAL: laying in bed, appearing comfortable and in NAD   NECK: Supple  CHEST/LUNG: even respirations  LLE: blood, appearing dried, noted on overlying gauze dressing of LLE. no blood seen on overlying ACE bandage   SKIN: warm      12-29-24 @ 07:01  -  12-30-24 @ 07:00  --------------------------------------------------------  IN: 1204.9 mL / OUT: 3800 mL / NET: -2595.1 mL    12-30-24 @ 07:01  -  12-30-24 @ 11:07  --------------------------------------------------------  IN: 240 mL / OUT: 0 mL / NET: 240 mL      LABS:    .  LABS:                         8.0    9.93  )-----------( 352      ( 29 Dec 2024 08:42 )             27.7     12-29    137  |  95[L]  |  43[H]  ----------------------------<  154[H]  4.7   |  35[H]  |  1.6[H]    Ca    9.0      29 Dec 2024 08:42  Mg     2.1     12-29    TPro  5.9[L]  /  Alb  2.8[L]  /  TBili  0.4  /  DBili  x   /  AST  7   /  ALT  <5  /  AlkPhos  72  12-29      Urinalysis Basic - ( 29 Dec 2024 08:42 )    Color: x / Appearance: x / SG: x / pH: x  Gluc: 154 mg/dL / Ketone: x  / Bili: x / Urobili: x   Blood: x / Protein: x / Nitrite: x   Leuk Esterase: x / RBC: x / WBC x   Sq Epi: x / Non Sq Epi: x / Bacteria: x                  MEDICATIONS  (STANDING):  chlorhexidine 2% Cloths 1 Application(s) Topical <User Schedule>  dextrose 5%. 1000 milliLiter(s) (100 mL/Hr) IV Continuous <Continuous>  dextrose 5%. 1000 milliLiter(s) (50 mL/Hr) IV Continuous <Continuous>  dextrose 50% Injectable 25 Gram(s) IV Push once  dextrose 50% Injectable 12.5 Gram(s) IV Push once  dextrose 50% Injectable 25 Gram(s) IV Push once  ferrous    sulfate 325 milliGRAM(s) Oral daily  folic acid 1 milliGRAM(s) Oral daily  furosemide    Tablet 40 milliGRAM(s) Oral every 12 hours  glucagon  Injectable 1 milliGRAM(s) IntraMuscular once  heparin   Injectable 5000 Unit(s) SubCutaneous every 12 hours  influenza  Vaccine (HIGH DOSE) 0.5 milliLiter(s) IntraMuscular once  insulin lispro (ADMELOG) corrective regimen sliding scale   SubCutaneous three times a day before meals  lidocaine 1% Injectable 30 milliLiter(s) Local Injection once  norepinephrine Infusion 0.02 MICROgram(s)/kG/Min (4.25 mL/Hr) IV Continuous <Continuous>  nystatin Powder 1 Application(s) Topical two times a day  pantoprazole    Tablet 40 milliGRAM(s) Oral before breakfast  piperacillin/tazobactam IVPB.. 3.375 Gram(s) IV Intermittent every 8 hours  risperiDONE   Tablet 1 milliGRAM(s) Oral daily  rosuvastatin 10 milliGRAM(s) Oral at bedtime  senna 2 Tablet(s) Oral at bedtime  silver sulfADIAZINE 1% Cream 1 Application(s) Topical daily  vitamin A & D Ointment 1 Application(s) Topical daily    MEDICATIONS  (PRN):  acetaminophen     Tablet .. 650 milliGRAM(s) Oral every 6 hours PRN Temp greater or equal to 38C (100.4F), Mild Pain (1 - 3)  dextrose Oral Gel 15 Gram(s) Oral once PRN Blood Glucose LESS THAN 70 milliGRAM(s)/deciliter             GENERAL SURGERY PROGRESS NOTE     CHELA GALLAGHER  84y  Female  Hospital day :6d  POD:  Procedure:   SUBJECTIVE:    T(F): 97.9 (12-30-24 @ 11:00), Max: 98.4 (12-30-24 @ 04:30)  HR: 100 (12-30-24 @ 14:00) (90 - 121)  BP: 88/39 (12-29-24 @ 15:00) (88/39 - 88/39)  ABP: 100/45 (12-30-24 @ 14:00) (84/68 - 143/57)  ABP(mean): 63 (12-30-24 @ 14:00) (60 - 92)  RR: 22 (12-30-24 @ 14:00) (13 - 45)  SpO2: 98% (12-30-24 @ 14:00) (90% - 100%)    DIET/FLUIDS: dextrose 5%. 1000 milliLiter(s) IV Continuous <Continuous>  dextrose 5%. 1000 milliLiter(s) IV Continuous <Continuous>  ferrous    sulfate 325 milliGRAM(s) Oral daily  folic acid 1 milliGRAM(s) Oral daily                                                               GI proph:  pantoprazole    Tablet 40 milliGRAM(s) Oral before breakfast    AC/ proph:   ABx: piperacillin/tazobactam IVPB.. 3.375 Gram(s) IV Intermittent every 8 hours    PHYSICAL EXAM:  GENERAL: NAD  HEENT: Normocephalic, atraumatic  PULM: Non-labored respirations, bilateral chest rise, saturating well on NC  CV: Regular rate and rhythm  : No inguinal lymphadenopathy, no inguinal hernias on palpation, no Hagen  MSK: LLE dressing intact, mild strikethrough  SKIN: Warm, dry, normal skin color, texture, turgor    LABS  CAPILLARY BLOOD GLUCOSE  POCT Blood Glucose.: 176 mg/dL (30 Dec 2024 11:30)  POCT Blood Glucose.: 174 mg/dL (30 Dec 2024 07:27)  POCT Blood Glucose.: 195 mg/dL (29 Dec 2024 23:33)  POCT Blood Glucose.: 154 mg/dL (29 Dec 2024 16:53)                        8.3    9.25  )-----------( 352      ( 30 Dec 2024 11:08 )             29.0       12-30    139  |  96[L]  |  37[H]  ----------------------------<  166[H]  4.2   |  40[H]  |  1.7[H]    Calcium: 9.1 mg/dL (12-30-24 @ 11:08)    LFTs:             6.2  | 0.3  | 7        ------------------[77      ( 30 Dec 2024 11:08 )  2.8  | x    | <5          Lipase:x      Amylase:x        Blood Gas Arterial, Lactate: 0.5 mmol/L (12-27-24 @ 17:47)  Blood Gas Arterial, Lactate: 0.5 mmol/L (12-27-24 @ 15:18)    ABG - ( 27 Dec 2024 17:47 )  pH: 7.34  /  pCO2: 69    /  pO2: 93    / HCO3: 37    / Base Excess: 9.8   /  SaO2: 98.5      ABG - ( 27 Dec 2024 15:18 )  pH: 7.33  /  pCO2: 71    /  pO2: 77    / HCO3: 37    / Base Excess: 9.4   /  SaO2: 96.9      ABG - ( 26 Dec 2024 19:50 )  pH: 7.43  /  pCO2: 51    /  pO2: 85    / HCO3: 34    / Base Excess: 8.0   /  SaO2: 98.6      Urinalysis Basic - ( 30 Dec 2024 11:08 )    Color: x / Appearance: x / SG: x / pH: x  Gluc: 166 mg/dL / Ketone: x  / Bili: x / Urobili: x   Blood: x / Protein: x / Nitrite: x   Leuk Esterase: x / RBC: x / WBC x   Sq Epi: x / Non Sq Epi: x / Bacteria: x    ASSESSMENT:  84F PMHx DM, HTN, chronic BLE edema, bipolar disorder, tachybrady syndrome (on Eliquis) who presented to the ED 12/24 post-fall 2-3 days earlier at [facility]. Surgery consulted to evaluate LLE wound. S/p 12/25 bedside I&D of infected hematoma.    PLAN:  - Monitor CBC (continuing to downtrend)  - Wound clx: numerous morganella morganii, moderate proteus mirabilis, few enterococcus faecalis      - ID: Zosyn [id number]  - Daily LLE dressing changes to be done by nursing: Aquacel sheet replacement, gauze, abdominal pad, Kerlix, ACE bandage  - Management per primary  - Okay to restart Eliquis from surgical perspective    Surgery ([id number])

## 2024-12-31 LAB
ALBUMIN SERPL ELPH-MCNC: 2.7 G/DL — LOW (ref 3.5–5.2)
ALP SERPL-CCNC: 75 U/L — SIGNIFICANT CHANGE UP (ref 30–115)
ALT FLD-CCNC: <5 U/L — SIGNIFICANT CHANGE UP (ref 0–41)
ANION GAP SERPL CALC-SCNC: 3 MMOL/L — LOW (ref 7–14)
AST SERPL-CCNC: 7 U/L — SIGNIFICANT CHANGE UP (ref 0–41)
BILIRUB SERPL-MCNC: 0.3 MG/DL — SIGNIFICANT CHANGE UP (ref 0.2–1.2)
BUN SERPL-MCNC: 32 MG/DL — HIGH (ref 10–20)
CALCIUM SERPL-MCNC: 9.4 MG/DL — SIGNIFICANT CHANGE UP (ref 8.4–10.5)
CHLORIDE SERPL-SCNC: 94 MMOL/L — LOW (ref 98–110)
CO2 SERPL-SCNC: 39 MMOL/L — HIGH (ref 17–32)
CREAT SERPL-MCNC: 1.5 MG/DL — SIGNIFICANT CHANGE UP (ref 0.7–1.5)
EGFR: 34 ML/MIN/1.73M2 — LOW
GLUCOSE BLDC GLUCOMTR-MCNC: 140 MG/DL — HIGH (ref 70–99)
GLUCOSE BLDC GLUCOMTR-MCNC: 160 MG/DL — HIGH (ref 70–99)
GLUCOSE BLDC GLUCOMTR-MCNC: 198 MG/DL — HIGH (ref 70–99)
GLUCOSE BLDC GLUCOMTR-MCNC: 202 MG/DL — HIGH (ref 70–99)
GLUCOSE SERPL-MCNC: 165 MG/DL — HIGH (ref 70–99)
HCT VFR BLD CALC: 28.6 % — LOW (ref 37–47)
HGB BLD-MCNC: 8 G/DL — LOW (ref 12–16)
MAGNESIUM SERPL-MCNC: 1.9 MG/DL — SIGNIFICANT CHANGE UP (ref 1.8–2.4)
MCHC RBC-ENTMCNC: 26.7 PG — LOW (ref 27–31)
MCHC RBC-ENTMCNC: 28 G/DL — LOW (ref 32–37)
MCV RBC AUTO: 95.3 FL — SIGNIFICANT CHANGE UP (ref 81–99)
NRBC # BLD: 0 /100 WBCS — SIGNIFICANT CHANGE UP (ref 0–0)
PLATELET # BLD AUTO: 364 K/UL — SIGNIFICANT CHANGE UP (ref 130–400)
PMV BLD: 9.6 FL — SIGNIFICANT CHANGE UP (ref 7.4–10.4)
POTASSIUM SERPL-MCNC: 4.1 MMOL/L — SIGNIFICANT CHANGE UP (ref 3.5–5)
POTASSIUM SERPL-SCNC: 4.1 MMOL/L — SIGNIFICANT CHANGE UP (ref 3.5–5)
PROT SERPL-MCNC: 5.8 G/DL — LOW (ref 6–8)
RBC # BLD: 3 M/UL — LOW (ref 4.2–5.4)
RBC # FLD: 14.4 % — SIGNIFICANT CHANGE UP (ref 11.5–14.5)
SODIUM SERPL-SCNC: 138 MMOL/L — SIGNIFICANT CHANGE UP (ref 135–146)
WBC # BLD: 8.08 K/UL — SIGNIFICANT CHANGE UP (ref 4.8–10.8)
WBC # FLD AUTO: 8.08 K/UL — SIGNIFICANT CHANGE UP (ref 4.8–10.8)

## 2024-12-31 PROCEDURE — 99291 CRITICAL CARE FIRST HOUR: CPT

## 2024-12-31 PROCEDURE — 71045 X-RAY EXAM CHEST 1 VIEW: CPT | Mod: 26

## 2024-12-31 RX ORDER — FUROSEMIDE 20 MG
40 TABLET ORAL DAILY
Refills: 0 | Status: DISCONTINUED | OUTPATIENT
Start: 2025-01-01 | End: 2025-01-03

## 2024-12-31 RX ORDER — MIDODRINE HYDROCHLORIDE 5 MG/1
15 TABLET ORAL EVERY 8 HOURS
Refills: 0 | Status: DISCONTINUED | OUTPATIENT
Start: 2024-12-31 | End: 2025-01-06

## 2024-12-31 RX ORDER — MAGNESIUM SULFATE 500 MG/ML
2 INJECTION, SOLUTION INTRAMUSCULAR; INTRAVENOUS ONCE
Refills: 0 | Status: COMPLETED | OUTPATIENT
Start: 2024-12-31 | End: 2024-12-31

## 2024-12-31 RX ADMIN — PIPERACILLIN AND TAZOBACTAM 25 GRAM(S): 3; .375 INJECTION, POWDER, LYOPHILIZED, FOR SOLUTION INTRAVENOUS at 05:11

## 2024-12-31 RX ADMIN — SENNOSIDES 2 TABLET(S): 8.6 TABLET, FILM COATED ORAL at 21:22

## 2024-12-31 RX ADMIN — APIXABAN 2.5 MILLIGRAM(S): 5 TABLET, FILM COATED ORAL at 05:12

## 2024-12-31 RX ADMIN — PIPERACILLIN AND TAZOBACTAM 25 GRAM(S): 3; .375 INJECTION, POWDER, LYOPHILIZED, FOR SOLUTION INTRAVENOUS at 21:22

## 2024-12-31 RX ADMIN — APIXABAN 2.5 MILLIGRAM(S): 5 TABLET, FILM COATED ORAL at 17:12

## 2024-12-31 RX ADMIN — Medication 25 MILLIGRAM(S): at 05:11

## 2024-12-31 RX ADMIN — PIPERACILLIN AND TAZOBACTAM 25 GRAM(S): 3; .375 INJECTION, POWDER, LYOPHILIZED, FOR SOLUTION INTRAVENOUS at 13:33

## 2024-12-31 RX ADMIN — Medication 2: at 16:30

## 2024-12-31 RX ADMIN — LANOLIN AND PETROLATUM 1 APPLICATION(S): 136.4; 469.9 OINTMENT TOPICAL at 11:38

## 2024-12-31 RX ADMIN — Medication 1 MILLIGRAM(S): at 11:38

## 2024-12-31 RX ADMIN — NOREPINEPHRINE BITARTRATE 4.25 MICROGRAM(S)/KG/MIN: 1 INJECTION INTRAVENOUS at 19:45

## 2024-12-31 RX ADMIN — Medication 325 MILLIGRAM(S): at 11:38

## 2024-12-31 RX ADMIN — MIDODRINE HYDROCHLORIDE 10 MILLIGRAM(S): 5 TABLET ORAL at 05:12

## 2024-12-31 RX ADMIN — NYSTATIN TOPICAL POWDER 1 APPLICATION(S): 100000 POWDER TOPICAL at 17:13

## 2024-12-31 RX ADMIN — PANTOPRAZOLE 40 MILLIGRAM(S): 40 TABLET, DELAYED RELEASE ORAL at 05:11

## 2024-12-31 RX ADMIN — ROSUVASTATIN 10 MILLIGRAM(S): 40 TABLET, FILM COATED ORAL at 21:22

## 2024-12-31 RX ADMIN — Medication 1: at 11:49

## 2024-12-31 RX ADMIN — CHLORHEXIDINE GLUCONATE 1 APPLICATION(S): 1.2 RINSE ORAL at 05:11

## 2024-12-31 RX ADMIN — NYSTATIN TOPICAL POWDER 1 APPLICATION(S): 100000 POWDER TOPICAL at 05:12

## 2024-12-31 RX ADMIN — MAGNESIUM SULFATE 25 GRAM(S): 500 INJECTION, SOLUTION INTRAMUSCULAR; INTRAVENOUS at 11:37

## 2024-12-31 RX ADMIN — MIDODRINE HYDROCHLORIDE 15 MILLIGRAM(S): 5 TABLET ORAL at 13:33

## 2024-12-31 RX ADMIN — Medication 1: at 07:33

## 2024-12-31 RX ADMIN — RISPERIDONE 1 MILLIGRAM(S): 0.5 TABLET ORAL at 11:38

## 2024-12-31 RX ADMIN — MIDODRINE HYDROCHLORIDE 15 MILLIGRAM(S): 5 TABLET ORAL at 21:22

## 2024-12-31 RX ADMIN — Medication 40 MILLIGRAM(S): at 05:11

## 2024-12-31 NOTE — PROGRESS NOTE ADULT - ASSESSMENT
84-year-old female brought in by EMS from [facility], with past medical history of  diabetes, HTN, chronic LE edema, bipolar disorder, tachybrady syndrome (declining PPM), A fib (on Eliquis), mild aortic stenosis, presenting s/p mechanical fall 3 days ago (-head trauma, +AC). Patient sustained wound to left posterior lateral lower extremity with hematoma, that started rebleeding today.     IMPRESSION:  #LLE hematoma + cellulitis , no abscess     Admission WBC 14; Afebrile   < from: CT Angio Lower Extremity w/ IV Cont, Left (12.24.24 @ 16:39) >  A subcutaneous hematoma is noted along the lateral and posterior aspect of the left calf. The hematoma measures approximately 11.5 x 2 x 18 cm.  There is no evidence of active extravasation.  There is no evidence of vascular injury, occlusion, dissection, or pseudoaneurysm.  Wound Cx growing M. morganii, P. mirabilis, E. 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  Attending Physician  Division of Infectious Diseases  [facility]  Please contact me via [employer] Teams

## 2024-12-31 NOTE — PROGRESS NOTE ADULT - ASSESSMENT
IMPRESSION:    AMS - resolved   Acute on chronic hypercapnic failure  On BIPAP  MANJEET/OHS  Hematoma secondary to fall     PLAN:    CNS:  Stable when not in respiratory distress.  GOC.  Delirium precautions.    HEENT: Oral care    PULMONARY:  HOB @ 45 degrees.  Aspiration precautions.  Continue BiPAP QHS & PRN, should require less as diuresis improves.    CARDIOVASCULAR:  Check BNP, TTE, continue diuresis to 40mg BID.  Continue metoprolol/rate control.  Remains on very low-dose levophed when hypotensive.  Clear to resume AC per Sx.  Target MAP >65 given BRYAN.  D/c a-line.    GI: GI prophylaxis not indicated.  Advance diet per speech.  Bowel regimen PRN    RENAL:  Follow up lytes.  Correct as needed.  Diuresis as above.    INFECTIOUS DISEASE: Follow up cultures.  Continue Abx per ID.    HEMATOLOGICAL:  DVT prophylaxis: heparin SQ.    ENDOCRINE:  Follow up FS.  Insulin protocol if needed.    MUSCULOSKELETAL:  OOBTC, surgery follow-up for LLE      SDU when off levo IMPRESSION:    AMS - resolved   Acute on chronic hypercapnic failure  On BIPAP  MANJEET/OHS  Hematoma secondary to fall   BRYAN - improving    PLAN:    CNS:  Stable when not in respiratory distress.  GOC.  Delirium precautions.    HEENT: Oral care    PULMONARY:  HOB @ 45 degrees.  Aspiration precautions.  Continue BiPAP QHS & PRN, should require less as diuresis improves.    CARDIOVASCULAR: TTE with EF 55%. continue diuresis to 40mg daily.  Continue metoprolol/rate control.  Remains on very low-dose levophed when hypotensive. Midodrine 15 Q8H.  Clear to resume AC per Sx.  Target MAP >65 given BRYAN.  D/c a-line.    GI: GI prophylaxis not indicated.  Advance diet per speech.  Bowel regimen PRN    RENAL:  Follow up lytes.  Correct as needed.  Diuresis as above.    INFECTIOUS DISEASE: Follow up cultures.  Continue Abx per ID.    HEMATOLOGICAL:  DVT prophylaxis: heparin SQ.    ENDOCRINE:  Follow up FS.  Insulin protocol if needed.    MUSCULOSKELETAL:  OOBTC, surgery follow-up for LLE      SDU when off levo IMPRESSION:    Toxic Metabolic Encephalopathy resolved   Acute on Chronic Hypercapnic Respiratory Failure  MANJEET/OHS  Infected Hematoma SP I&D   BRYAN, improving    PLAN:    CNS: avoid sedatives. Delirium precautions.    HEENT: Oral care    PULMONARY:  HOB @ 45 degrees.  Aspiration precautions.  Continue BiPAP QHS & PRN.    CARDIOVASCULAR: TTE with EF 55%. continue diuresis to 40mg daily.  Continue metoprolol/rate control.  wean off Levophed. Clear to resume AC per Sx.  Target MAP >65 given BRYAN.  DC a-line.    GI: GI prophylaxis not indicated.  Advance diet per speech.  Bowel regimen PRN    RENAL:  Follow up lytes.  Correct as needed.  Diuresis as above.    INFECTIOUS DISEASE: Follow up cultures.  Continue Abx per ID.    HEMATOLOGICAL:  DVT prophylaxis: heparin SQ.    ENDOCRINE:  Follow up FS.  Insulin protocol if needed.    MUSCULOSKELETAL:  OOBTC, surgery follow-up for LLE    SDU monitoring

## 2024-12-31 NOTE — PROGRESS NOTE ADULT - SUBJECTIVE AND OBJECTIVE BOX
INFECTIOUS DISEASE FOLLOW UP NOTE:    Interval History/ROS: Patient is a 84y old  Female who presents with a chief complaint of acute blood loss anemia, cellulitis (31 Dec 2024 08:40)    Overnight events: No overnight event. Resting comfortably    REVIEW OF SYSTEMS:  Unable to provide due to cognitive impairment      Prior hospital charts reviewed [Yes]  Primary team notes reviewed [Yes]  Other consultant notes reviewed [Yes]    Allergies:  No Known Allergies      ANTIMICROBIALS:   piperacillin/tazobactam IVPB.. 3.375 every 8 hours      OTHER MEDS: MEDICATIONS  (STANDING):  acetaminophen     Tablet .. 650 every 6 hours PRN  apixaban 2.5 two times a day  dextrose 50% Injectable 25 once  dextrose 50% Injectable 12.5 once  dextrose 50% Injectable 25 once  dextrose Oral Gel 15 once PRN  glucagon  Injectable 1 once  influenza  Vaccine (HIGH DOSE) 0.5 once  insulin lispro (ADMELOG) corrective regimen sliding scale  three times a day before meals  metoprolol succinate ER 25 daily  midodrine 15 every 8 hours  norepinephrine Infusion 0.02 <Continuous>  pantoprazole    Tablet 40 before breakfast  risperiDONE   Tablet 1 daily  rosuvastatin 10 at bedtime  senna 2 at bedtime      Vital Signs Last 24 Hrs  T(F): 97.6 (12-31-24 @ 15:00), Max: 98.8 (12-28-24 @ 23:26)    Vital Signs Last 24 Hrs  HR: 93 (12-31-24 @ 14:30) (64 - 104)  BP: 105/54 (12-31-24 @ 15:00) (81/48 - 130/65)  RR: 17 (12-31-24 @ 14:30)  SpO2: 98% (12-31-24 @ 14:30) (94% - 100%)  Wt(kg): --    EXAM:  GENERAL: NAD, lying in bed  HEAD: No head lesions  EYES: Conjunctiva pink and cornea white  EAR, NOSE, MOUTH, THROAT: Normal external ears and nose, no discharges; moist mucous membranes; On NC  NECK: Supple, nontender to palpation; no JVD  RESPIRATORY: Bibasilar crackles  CARDIOVASCULAR: S1 S2  GASTROINTESTINAL: Soft, nontender, nondistended; normoactive bowel sounds  GENITOURINARY: No escobar catheter, no CVA tenderness  EXTREMITIES: No clubbing, cyanosis, or petal edema  NERVOUS SYSTEM: Awake and alert, not fully oriented  MUSCULOSKELETAL: No joint erythema, swelling or pain  SKIN: Left leg mild erythema with dressing; no superficial thrombophlebitis  PSYCH: Calm    Labs:                        8.0    8.08  )-----------( 364      ( 31 Dec 2024 08:00 )             28.6     12-31    138  |  94[L]  |  32[H]  ----------------------------<  165[H]  4.1   |  39[H]  |  1.5    Ca    9.4      31 Dec 2024 08:00  Mg     1.9     12-31    TPro  5.8[L]  /  Alb  2.7[L]  /  TBili  0.3  /  DBili  x   /  AST  7   /  ALT  <5  /  AlkPhos  75  12-31      WBC Trend:  WBC Count: 8.08 (12-31-24 @ 08:00)  WBC Count: 9.25 (12-30-24 @ 11:08)  WBC Count: 9.93 (12-29-24 @ 08:42)  WBC Count: 9.75 (12-28-24 @ 07:02)      Creatine Trend:  Creatinine: 1.5 (12-31)  Creatinine: 1.7 (12-30)  Creatinine: 1.6 (12-29)  Creatinine: 1.4 (12-28)      Liver Biochemical Testing Trend:  Alanine Aminotransferase (ALT/SGPT): <5 (12-31)  Alanine Aminotransferase (ALT/SGPT): <5 (12-30)  Alanine Aminotransferase (ALT/SGPT): <5 (12-29)  Alanine Aminotransferase (ALT/SGPT): <5 (12-28)  Alanine Aminotransferase (ALT/SGPT): 6 (12-26)  Aspartate Aminotransferase (AST/SGOT): 7 (12-31-24 @ 08:00)  Aspartate Aminotransferase (AST/SGOT): 7 (12-30-24 @ 11:08)  Aspartate Aminotransferase (AST/SGOT): 7 (12-29-24 @ 08:42)  Aspartate Aminotransferase (AST/SGOT): 8 (12-28-24 @ 07:02)  Aspartate Aminotransferase (AST/SGOT): 8 (12-26-24 @ 17:22)  Bilirubin Total: 0.3 (12-31)  Bilirubin Total: 0.3 (12-30)  Bilirubin Total: 0.4 (12-29)  Bilirubin Total: 0.3 (12-28)  Bilirubin Total: <0.2 (12-26)      Trend LDH      Urinalysis Basic - ( 31 Dec 2024 08:00 )    Color: x / Appearance: x / SG: x / pH: x  Gluc: 165 mg/dL / Ketone: x  / Bili: x / Urobili: x   Blood: x / Protein: x / Nitrite: x   Leuk Esterase: x / RBC: x / WBC x   Sq Epi: x / Non Sq Epi: x / Bacteria: x        MICROBIOLOGY:        Culture - Blood (collected 24 Dec 2024 13:00)  Source: .Blood BLOOD  Final Report:    No growth at 5 days    Culture - Blood (collected 24 Dec 2024 13:00)  Source: .Blood BLOOD  Final Report:    No growth at 5 days    Culture - Urine (collected 05 Jun 2024 12:16)  Source: Clean Catch Clean Catch (Midstream)  Final Report:    >100,000 CFU/ml Escherichia coli ESBL  Organism: Escherichia coli ESBL  Organism: Escherichia coli ESBL    Sensitivities:      Method Type: JOO      -  Ampicillin: R >16 These ampicillin results predict results for amoxicillin      -  Ampicillin/Sulbactam: R >16/8      -  Aztreonam: R >16      -  Cefazolin: R >16 For uncomplicated UTI with K. pneumoniae, E. coli, or P. mirablis: JOO <=16 is sensitive and JOO >=32 is resistant. This also predicts results for oral agents cefaclor, cefdinir, cefpodoxime, cefprozil, cefuroxime axetil, cephalexin and locarbef for uncomplicated UTI. Note that some isolates may be susceptible to these agents while testing resistant to cefazolin.      -  Cefepime: R >16      -  Ceftriaxone: R >32      -  Cefuroxime: R >16      -  Ciprofloxacin: R >2      -  Ertapenem: S <=0.5      -  Gentamicin: S <=2      -  Imipenem: S <=1      -  Levofloxacin: R >4      -  Meropenem: S <=1      -  Nitrofurantoin: S <=32 Should not be used to treat pyelonephritis      -  Piperacillin/Tazobactam: SDD 16      -  Tobramycin: R >8      -  Trimethoprim/Sulfamethoxazole: R >2/38    Culture - Urine (collected 04 Jun 2024 19:10)  Source: Clean Catch Clean Catch (Midstream)  Final Report:    <10,000 CFU/mL Normal Urogenital Randa    Culture - Urine (collected 29 Mar 2024 07:52)  Source: Clean Catch Clean Catch (Midstream)  Final Report:    >=3 organisms. Probable collection contamination.    Ferritin: 80 (12-27)    D-Dimer Assay, Quantitative: 226 (12-26)      RADIOLOGY:  imaging below personally reviewed    < from: Xray Chest 1 View- PORTABLE-Routine (Xray Chest 1 View- PORTABLE-Routine in AM.) (12.31.24 @ 07:56) >    IMPRESSION:    Stable bilateral lung opacities    < end of copied text >      < from: TTE Echo Complete w/o Contrast w/ Doppler (12.30.24 @ 12:02) >  Summary:   1. Left ventricular ejection fraction, by visual estimation, is 55 to   60%.   2. Mildly enlarged left atrium.   3. Mild mitral annular calcification.   4. Sclerotic aortic valve with normal opening.   5. There is mild aortic root calcification.    < end of copied text >

## 2024-12-31 NOTE — PROGRESS NOTE ADULT - SUBJECTIVE AND OBJECTIVE BOX
Patient is a 84y old  Female who presents with a chief complaint of acute blood loss anemia, cellulitis (30 Dec 2024 14:08)        Over Night Events:  On 3L nasal cannula. Levophed 0.03. Afebrile.       ROS:     All ROS are negative except HPI         PHYSICAL EXAM    ICU Vital Signs Last 24 Hrs  T(C): 36.1 (31 Dec 2024 07:00), Max: 36.6 (30 Dec 2024 11:00)  T(F): 97 (31 Dec 2024 07:00), Max: 97.9 (30 Dec 2024 11:00)  HR: 89 (31 Dec 2024 08:00) (70 - 121)  BP: --  BP(mean): --  ABP: 135/56 (31 Dec 2024 08:00) (90/38 - 147/62)  ABP(mean): 84 (31 Dec 2024 08:00) (56 - 92)  RR: 22 (31 Dec 2024 08:00) (12 - 26)  SpO2: 99% (31 Dec 2024 08:00) (97% - 100%)    O2 Parameters below as of 31 Dec 2024 07:01  Patient On (Oxygen Delivery Method): nasal cannula  O2 Flow (L/min): 3          CONSTITUTIONAL:  in NAD    ENT:   Airway patent,   Mouth with normal mucosa.   No thrush    EYES:   Pupils equal,   Round and reactive to light.    CARDIAC:   Normal rate,   Regular rhythm.    No edema    Vascular:  Normal systolic impulse  No Carotid bruits    RESPIRATORY:   No wheezing  Bilateral BS  Normal chest expansion  Not tachypneic,  No use of accessory muscles    GASTROINTESTINAL:  Abdomen soft,   Non-tender,   No guarding,   + BS    MUSCULOSKELETAL:   Range of motion is not limited,  No clubbing, cyanosis    NEUROLOGICAL:   Alert and oriented   No motor  deficits.    SKIN:   Skin normal color for race,   Warm and dry      12-30-24 @ 07:01  -  12-31-24 @ 07:00  --------------------------------------------------------  IN:    IV PiggyBack: 300 mL    Norepinephrine: 199.2 mL    Oral Fluid: 980 mL  Total IN: 1479.2 mL    OUT:    Voided (mL): 4100 mL  Total OUT: 4100 mL    Total NET: -2620.8 mL      12-31-24 @ 07:01  -  12-31-24 @ 08:40  --------------------------------------------------------  IN:    Norepinephrine: 8.5 mL    Oral Fluid: 360 mL  Total IN: 368.5 mL    OUT:  Total OUT: 0 mL    Total NET: 368.5 mL          LABS:                            8.0    8.08  )-----------( 364      ( 31 Dec 2024 08:00 )             28.6                                               12-30    139  |  96[L]  |  37[H]  ----------------------------<  166[H]  4.2   |  40[H]  |  1.7[H]    Ca    9.1      30 Dec 2024 11:08  Mg     2.0     12-30    TPro  6.2  /  Alb  2.8[L]  /  TBili  0.3  /  DBili  x   /  AST  7   /  ALT  <5  /  AlkPhos  77  12-30                                             Urinalysis Basic - ( 30 Dec 2024 11:08 )    Color: x / Appearance: x / SG: x / pH: x  Gluc: 166 mg/dL / Ketone: x  / Bili: x / Urobili: x   Blood: x / Protein: x / Nitrite: x   Leuk Esterase: x / RBC: x / WBC x   Sq Epi: x / Non Sq Epi: x / Bacteria: x                                                  LIVER FUNCTIONS - ( 30 Dec 2024 11:08 )  Alb: 2.8 g/dL / Pro: 6.2 g/dL / ALK PHOS: 77 U/L / ALT: <5 U/L / AST: 7 U/L / GGT: x                                                                                                                                       MEDICATIONS  (STANDING):  apixaban 2.5 milliGRAM(s) Oral two times a day  chlorhexidine 2% Cloths 1 Application(s) Topical <User Schedule>  dextrose 5%. 1000 milliLiter(s) (100 mL/Hr) IV Continuous <Continuous>  dextrose 5%. 1000 milliLiter(s) (50 mL/Hr) IV Continuous <Continuous>  dextrose 50% Injectable 25 Gram(s) IV Push once  dextrose 50% Injectable 12.5 Gram(s) IV Push once  dextrose 50% Injectable 25 Gram(s) IV Push once  ferrous    sulfate 325 milliGRAM(s) Oral daily  folic acid 1 milliGRAM(s) Oral daily  furosemide    Tablet 40 milliGRAM(s) Oral every 12 hours  glucagon  Injectable 1 milliGRAM(s) IntraMuscular once  influenza  Vaccine (HIGH DOSE) 0.5 milliLiter(s) IntraMuscular once  insulin lispro (ADMELOG) corrective regimen sliding scale   SubCutaneous three times a day before meals  lidocaine 1% Injectable 30 milliLiter(s) Local Injection once  metoprolol succinate ER 25 milliGRAM(s) Oral daily  midodrine 10 milliGRAM(s) Oral every 8 hours  norepinephrine Infusion 0.02 MICROgram(s)/kG/Min (4.25 mL/Hr) IV Continuous <Continuous>  nystatin Powder 1 Application(s) Topical two times a day  pantoprazole    Tablet 40 milliGRAM(s) Oral before breakfast  piperacillin/tazobactam IVPB.. 3.375 Gram(s) IV Intermittent every 8 hours  risperiDONE   Tablet 1 milliGRAM(s) Oral daily  rosuvastatin 10 milliGRAM(s) Oral at bedtime  senna 2 Tablet(s) Oral at bedtime  silver sulfADIAZINE 1% Cream 1 Application(s) Topical daily  vitamin A & D Ointment 1 Application(s) Topical daily    MEDICATIONS  (PRN):  acetaminophen     Tablet .. 650 milliGRAM(s) Oral every 6 hours PRN Temp greater or equal to 38C (100.4F), Mild Pain (1 - 3)  dextrose Oral Gel 15 Gram(s) Oral once PRN Blood Glucose LESS THAN 70 milliGRAM(s)/deciliter      New X-rays reviewed:                                                                                  ECHO   Patient is a 84y old  Female who presents with a chief complaint of acute blood loss anemia, cellulitis (30 Dec 2024 14:08)        Over Night Events:  On 3L nasal cannula. Levophed 0.03. Afebrile.       ROS:     All ROS are negative except HPI         PHYSICAL EXAM    ICU Vital Signs Last 24 Hrs  T(C): 36.1 (31 Dec 2024 07:00), Max: 36.6 (30 Dec 2024 11:00)  T(F): 97 (31 Dec 2024 07:00), Max: 97.9 (30 Dec 2024 11:00)  HR: 89 (31 Dec 2024 08:00) (70 - 121)  BP: --  BP(mean): --  ABP: 135/56 (31 Dec 2024 08:00) (90/38 - 147/62)  ABP(mean): 84 (31 Dec 2024 08:00) (56 - 92)  RR: 22 (31 Dec 2024 08:00) (12 - 26)  SpO2: 99% (31 Dec 2024 08:00) (97% - 100%)    O2 Parameters below as of 31 Dec 2024 07:01  Patient On (Oxygen Delivery Method): nasal cannula  O2 Flow (L/min): 3          CONSTITUTIONAL:  in NAD    ENT:   Airway patent,   Mouth with normal mucosa.   No thrush    EYES:   Pupils equal,   Round and reactive to light.    CARDIAC:   Normal rate,   Regular rhythm.    No edema    Vascular:  Normal systolic impulse  No Carotid bruits    RESPIRATORY:   No wheezing  Bilateral BS  Normal chest expansion  Not tachypneic,  No use of accessory muscles    GASTROINTESTINAL:  Abdomen soft,   Non-tender,   No guarding,   + BS    MUSCULOSKELETAL:   Range of motion is not limited,  No clubbing, cyanosis    NEUROLOGICAL:   Alert   Follows commands    SKIN:   Skin normal color for race,   Warm and dry  LLE hematoma wound    12-30-24 @ 07:01  -  12-31-24 @ 07:00  --------------------------------------------------------  IN:    IV PiggyBack: 300 mL    Norepinephrine: 199.2 mL    Oral Fluid: 980 mL  Total IN: 1479.2 mL    OUT:    Voided (mL): 4100 mL  Total OUT: 4100 mL    Total NET: -2620.8 mL      12-31-24 @ 07:01  -  12-31-24 @ 08:40  --------------------------------------------------------  IN:    Norepinephrine: 8.5 mL    Oral Fluid: 360 mL  Total IN: 368.5 mL    OUT:  Total OUT: 0 mL    Total NET: 368.5 mL          LABS:                            8.0    8.08  )-----------( 364      ( 31 Dec 2024 08:00 )             28.6                                               12-30    139  |  96[L]  |  37[H]  ----------------------------<  166[H]  4.2   |  40[H]  |  1.7[H]    Ca    9.1      30 Dec 2024 11:08  Mg     2.0     12-30    TPro  6.2  /  Alb  2.8[L]  /  TBili  0.3  /  DBili  x   /  AST  7   /  ALT  <5  /  AlkPhos  77  12-30                                             Urinalysis Basic - ( 30 Dec 2024 11:08 )    Color: x / Appearance: x / SG: x / pH: x  Gluc: 166 mg/dL / Ketone: x  / Bili: x / Urobili: x   Blood: x / Protein: x / Nitrite: x   Leuk Esterase: x / RBC: x / WBC x   Sq Epi: x / Non Sq Epi: x / Bacteria: x                                                  LIVER FUNCTIONS - ( 30 Dec 2024 11:08 )  Alb: 2.8 g/dL / Pro: 6.2 g/dL / ALK PHOS: 77 U/L / ALT: <5 U/L / AST: 7 U/L / GGT: x                                                                                                                                       MEDICATIONS  (STANDING):  apixaban 2.5 milliGRAM(s) Oral two times a day  chlorhexidine 2% Cloths 1 Application(s) Topical <User Schedule>  dextrose 5%. 1000 milliLiter(s) (100 mL/Hr) IV Continuous <Continuous>  dextrose 5%. 1000 milliLiter(s) (50 mL/Hr) IV Continuous <Continuous>  dextrose 50% Injectable 25 Gram(s) IV Push once  dextrose 50% Injectable 12.5 Gram(s) IV Push once  dextrose 50% Injectable 25 Gram(s) IV Push once  ferrous    sulfate 325 milliGRAM(s) Oral daily  folic acid 1 milliGRAM(s) Oral daily  furosemide    Tablet 40 milliGRAM(s) Oral every 12 hours  glucagon  Injectable 1 milliGRAM(s) IntraMuscular once  influenza  Vaccine (HIGH DOSE) 0.5 milliLiter(s) IntraMuscular once  insulin lispro (ADMELOG) corrective regimen sliding scale   SubCutaneous three times a day before meals  lidocaine 1% Injectable 30 milliLiter(s) Local Injection once  metoprolol succinate ER 25 milliGRAM(s) Oral daily  midodrine 10 milliGRAM(s) Oral every 8 hours  norepinephrine Infusion 0.02 MICROgram(s)/kG/Min (4.25 mL/Hr) IV Continuous <Continuous>  nystatin Powder 1 Application(s) Topical two times a day  pantoprazole    Tablet 40 milliGRAM(s) Oral before breakfast  piperacillin/tazobactam IVPB.. 3.375 Gram(s) IV Intermittent every 8 hours  risperiDONE   Tablet 1 milliGRAM(s) Oral daily  rosuvastatin 10 milliGRAM(s) Oral at bedtime  senna 2 Tablet(s) Oral at bedtime  silver sulfADIAZINE 1% Cream 1 Application(s) Topical daily  vitamin A & D Ointment 1 Application(s) Topical daily    MEDICATIONS  (PRN):  acetaminophen     Tablet .. 650 milliGRAM(s) Oral every 6 hours PRN Temp greater or equal to 38C (100.4F), Mild Pain (1 - 3)  dextrose Oral Gel 15 Gram(s) Oral once PRN Blood Glucose LESS THAN 70 milliGRAM(s)/deciliter      New X-rays reviewed:                                                                                  ECHO   Patient is a 84y old  Female who presents with a chief complaint of acute blood loss anemia, cellulitis (30 Dec 2024 14:08)        Over Night Events: no acute events overnight, on 3L nasal cannula. Levophed 0.03. Afebrile.     Vital Signs Last 24 Hrs  T(C): 36.1 (31 Dec 2024 07:00), Max: 36.6 (30 Dec 2024 11:00)  T(F): 97 (31 Dec 2024 07:00), Max: 97.9 (30 Dec 2024 11:00)  HR: 89 (31 Dec 2024 08:00) (70 - 121)  ABP: 135/56 (31 Dec 2024 08:00) (90/38 - 147/62)  ABP(mean): 84 (31 Dec 2024 08:00) (56 - 92)  RR: 22 (31 Dec 2024 08:00) (12 - 26)  SpO2: 99% (31 Dec 2024 08:00) (97% - 100%)    O2 Parameters below as of 31 Dec 2024 07:01  Patient On (Oxygen Delivery Method): nasal cannula  O2 Flow (L/min): 3          CONSTITUTIONAL:  NAD    ENT:   Airway patent,   Mouth with normal mucosa.   No thrush    EYES:   Pupils equal,   Round and reactive to light.    CARDIAC:   Normal rate,   Regular rhythm.      RESPIRATORY:   No wheezing  Bilateral BS  Normal chest expansion  Not tachypneic,  No use of accessory muscles    GASTROINTESTINAL:  Abdomen soft,   Non-tender,   No guarding,   + BS    MUSCULOSKELETAL:   Range of motion is not limited    NEUROLOGICAL:   Alert   Follows commands    SKIN:   Skin normal color for race  LLE hematoma wound    12-30-24 @ 07:01  -  12-31-24 @ 07:00  --------------------------------------------------------  IN:    IV PiggyBack: 300 mL    Norepinephrine: 199.2 mL    Oral Fluid: 980 mL  Total IN: 1479.2 mL    OUT:    Voided (mL): 4100 mL  Total OUT: 4100 mL    Total NET: -2620.8 mL      12-31-24 @ 07:01  -  12-31-24 @ 08:40  --------------------------------------------------------  IN:    Norepinephrine: 8.5 mL    Oral Fluid: 360 mL  Total IN: 368.5 mL    OUT:  Total OUT: 0 mL    Total NET: 368.5 mL          LABS:                            8.0    8.08  )-----------( 364      ( 31 Dec 2024 08:00 )             28.6                                               12-30    139  |  96[L]  |  37[H]  ----------------------------<  166[H]  4.2   |  40[H]  |  1.7[H]    Ca    9.1      30 Dec 2024 11:08  Mg     2.0     12-30    TPro  6.2  /  Alb  2.8[L]  /  TBili  0.3  /  DBili  x   /  AST  7   /  ALT  <5  /  AlkPhos  77  12-30                                             Urinalysis Basic - ( 30 Dec 2024 11:08 )    Color: x / Appearance: x / SG: x / pH: x  Gluc: 166 mg/dL / Ketone: x  / Bili: x / Urobili: x   Blood: x / Protein: x / Nitrite: x   Leuk Esterase: x / RBC: x / WBC x   Sq Epi: x / Non Sq Epi: x / Bacteria: x                                                  LIVER FUNCTIONS - ( 30 Dec 2024 11:08 )  Alb: 2.8 g/dL / Pro: 6.2 g/dL / ALK PHOS: 77 U/L / ALT: <5 U/L / AST: 7 U/L / GGT: x                                                                                                                                       MEDICATIONS  (STANDING):  apixaban 2.5 milliGRAM(s) Oral two times a day  chlorhexidine 2% Cloths 1 Application(s) Topical <User Schedule>  dextrose 5%. 1000 milliLiter(s) (100 mL/Hr) IV Continuous <Continuous>  dextrose 5%. 1000 milliLiter(s) (50 mL/Hr) IV Continuous <Continuous>  dextrose 50% Injectable 25 Gram(s) IV Push once  dextrose 50% Injectable 12.5 Gram(s) IV Push once  dextrose 50% Injectable 25 Gram(s) IV Push once  ferrous    sulfate 325 milliGRAM(s) Oral daily  folic acid 1 milliGRAM(s) Oral daily  furosemide    Tablet 40 milliGRAM(s) Oral every 12 hours  glucagon  Injectable 1 milliGRAM(s) IntraMuscular once  influenza  Vaccine (HIGH DOSE) 0.5 milliLiter(s) IntraMuscular once  insulin lispro (ADMELOG) corrective regimen sliding scale   SubCutaneous three times a day before meals  lidocaine 1% Injectable 30 milliLiter(s) Local Injection once  metoprolol succinate ER 25 milliGRAM(s) Oral daily  midodrine 10 milliGRAM(s) Oral every 8 hours  norepinephrine Infusion 0.02 MICROgram(s)/kG/Min (4.25 mL/Hr) IV Continuous <Continuous>  nystatin Powder 1 Application(s) Topical two times a day  pantoprazole    Tablet 40 milliGRAM(s) Oral before breakfast  piperacillin/tazobactam IVPB.. 3.375 Gram(s) IV Intermittent every 8 hours  risperiDONE   Tablet 1 milliGRAM(s) Oral daily  rosuvastatin 10 milliGRAM(s) Oral at bedtime  senna 2 Tablet(s) Oral at bedtime  silver sulfADIAZINE 1% Cream 1 Application(s) Topical daily  vitamin A & D Ointment 1 Application(s) Topical daily    MEDICATIONS  (PRN):  acetaminophen     Tablet .. 650 milliGRAM(s) Oral every 6 hours PRN Temp greater or equal to 38C (100.4F), Mild Pain (1 - 3)  dextrose Oral Gel 15 Gram(s) Oral once PRN Blood Glucose LESS THAN 70 milliGRAM(s)/deciliter

## 2025-01-01 LAB
ALBUMIN SERPL ELPH-MCNC: 2.7 G/DL — LOW (ref 3.5–5.2)
ALP SERPL-CCNC: 72 U/L — SIGNIFICANT CHANGE UP (ref 30–115)
ALT FLD-CCNC: <5 U/L — SIGNIFICANT CHANGE UP (ref 0–41)
ANION GAP SERPL CALC-SCNC: 5 MMOL/L — LOW (ref 7–14)
AST SERPL-CCNC: 7 U/L — SIGNIFICANT CHANGE UP (ref 0–41)
BASOPHILS # BLD AUTO: 0.06 K/UL — SIGNIFICANT CHANGE UP (ref 0–0.2)
BASOPHILS NFR BLD AUTO: 0.7 % — SIGNIFICANT CHANGE UP (ref 0–1)
BILIRUB SERPL-MCNC: 0.2 MG/DL — SIGNIFICANT CHANGE UP (ref 0.2–1.2)
BUN SERPL-MCNC: 28 MG/DL — HIGH (ref 10–20)
CALCIUM SERPL-MCNC: 9.5 MG/DL — SIGNIFICANT CHANGE UP (ref 8.4–10.5)
CHLORIDE SERPL-SCNC: 95 MMOL/L — LOW (ref 98–110)
CO2 SERPL-SCNC: 39 MMOL/L — HIGH (ref 17–32)
CREAT SERPL-MCNC: 1.5 MG/DL — SIGNIFICANT CHANGE UP (ref 0.7–1.5)
EGFR: 34 ML/MIN/1.73M2 — LOW
EOSINOPHIL # BLD AUTO: 0.32 K/UL — SIGNIFICANT CHANGE UP (ref 0–0.7)
EOSINOPHIL NFR BLD AUTO: 3.7 % — SIGNIFICANT CHANGE UP (ref 0–8)
GLUCOSE BLDC GLUCOMTR-MCNC: 144 MG/DL — HIGH (ref 70–99)
GLUCOSE BLDC GLUCOMTR-MCNC: 198 MG/DL — HIGH (ref 70–99)
GLUCOSE BLDC GLUCOMTR-MCNC: 248 MG/DL — HIGH (ref 70–99)
GLUCOSE BLDC GLUCOMTR-MCNC: 266 MG/DL — HIGH (ref 70–99)
GLUCOSE SERPL-MCNC: 140 MG/DL — HIGH (ref 70–99)
HCT VFR BLD CALC: 28.8 % — LOW (ref 37–47)
HGB BLD-MCNC: 8.4 G/DL — LOW (ref 12–16)
IMM GRANULOCYTES NFR BLD AUTO: 0.2 % — SIGNIFICANT CHANGE UP (ref 0.1–0.3)
LYMPHOCYTES # BLD AUTO: 0.83 K/UL — LOW (ref 1.2–3.4)
LYMPHOCYTES # BLD AUTO: 9.7 % — LOW (ref 20.5–51.1)
MAGNESIUM SERPL-MCNC: 2.2 MG/DL — SIGNIFICANT CHANGE UP (ref 1.8–2.4)
MCHC RBC-ENTMCNC: 27.9 PG — SIGNIFICANT CHANGE UP (ref 27–31)
MCHC RBC-ENTMCNC: 29.2 G/DL — LOW (ref 32–37)
MCV RBC AUTO: 95.7 FL — SIGNIFICANT CHANGE UP (ref 81–99)
MONOCYTES # BLD AUTO: 0.57 K/UL — SIGNIFICANT CHANGE UP (ref 0.1–0.6)
MONOCYTES NFR BLD AUTO: 6.7 % — SIGNIFICANT CHANGE UP (ref 1.7–9.3)
NEUTROPHILS # BLD AUTO: 6.74 K/UL — HIGH (ref 1.4–6.5)
NEUTROPHILS NFR BLD AUTO: 79 % — HIGH (ref 42.2–75.2)
NRBC # BLD: 0 /100 WBCS — SIGNIFICANT CHANGE UP (ref 0–0)
PLATELET # BLD AUTO: 370 K/UL — SIGNIFICANT CHANGE UP (ref 130–400)
PMV BLD: 9.7 FL — SIGNIFICANT CHANGE UP (ref 7.4–10.4)
POTASSIUM SERPL-MCNC: 4.5 MMOL/L — SIGNIFICANT CHANGE UP (ref 3.5–5)
POTASSIUM SERPL-SCNC: 4.5 MMOL/L — SIGNIFICANT CHANGE UP (ref 3.5–5)
PROT SERPL-MCNC: 5.7 G/DL — LOW (ref 6–8)
RBC # BLD: 3.01 M/UL — LOW (ref 4.2–5.4)
RBC # FLD: 14.4 % — SIGNIFICANT CHANGE UP (ref 11.5–14.5)
SODIUM SERPL-SCNC: 139 MMOL/L — SIGNIFICANT CHANGE UP (ref 135–146)
WBC # BLD: 8.54 K/UL — SIGNIFICANT CHANGE UP (ref 4.8–10.8)
WBC # FLD AUTO: 8.54 K/UL — SIGNIFICANT CHANGE UP (ref 4.8–10.8)

## 2025-01-01 PROCEDURE — 99291 CRITICAL CARE FIRST HOUR: CPT

## 2025-01-01 PROCEDURE — 71045 X-RAY EXAM CHEST 1 VIEW: CPT | Mod: 26

## 2025-01-01 RX ORDER — INSULIN LISPRO 100/ML
3 VIAL (ML) SUBCUTANEOUS ONCE
Refills: 0 | Status: COMPLETED | OUTPATIENT
Start: 2025-01-01 | End: 2025-01-01

## 2025-01-01 RX ORDER — GINKGO BILOBA 40 MG
5 CAPSULE ORAL AT BEDTIME
Refills: 0 | Status: DISCONTINUED | OUTPATIENT
Start: 2025-01-01 | End: 2025-01-06

## 2025-01-01 RX ADMIN — Medication 3 UNIT(S): at 21:47

## 2025-01-01 RX ADMIN — Medication 25 MILLIGRAM(S): at 06:18

## 2025-01-01 RX ADMIN — LANOLIN AND PETROLATUM 1 APPLICATION(S): 136.4; 469.9 OINTMENT TOPICAL at 12:19

## 2025-01-01 RX ADMIN — APIXABAN 2.5 MILLIGRAM(S): 5 TABLET, FILM COATED ORAL at 17:31

## 2025-01-01 RX ADMIN — Medication 1 MILLIGRAM(S): at 12:18

## 2025-01-01 RX ADMIN — Medication 5 MILLIGRAM(S): at 21:16

## 2025-01-01 RX ADMIN — PIPERACILLIN AND TAZOBACTAM 25 GRAM(S): 3; .375 INJECTION, POWDER, LYOPHILIZED, FOR SOLUTION INTRAVENOUS at 06:08

## 2025-01-01 RX ADMIN — Medication 40 MILLIGRAM(S): at 06:10

## 2025-01-01 RX ADMIN — NYSTATIN TOPICAL POWDER 1 APPLICATION(S): 100000 POWDER TOPICAL at 06:10

## 2025-01-01 RX ADMIN — SENNOSIDES 2 TABLET(S): 8.6 TABLET, FILM COATED ORAL at 21:17

## 2025-01-01 RX ADMIN — NOREPINEPHRINE BITARTRATE 4.25 MICROGRAM(S)/KG/MIN: 1 INJECTION INTRAVENOUS at 21:17

## 2025-01-01 RX ADMIN — MIDODRINE HYDROCHLORIDE 15 MILLIGRAM(S): 5 TABLET ORAL at 06:09

## 2025-01-01 RX ADMIN — CHLORHEXIDINE GLUCONATE 1 APPLICATION(S): 1.2 RINSE ORAL at 06:10

## 2025-01-01 RX ADMIN — NYSTATIN TOPICAL POWDER 1 APPLICATION(S): 100000 POWDER TOPICAL at 17:31

## 2025-01-01 RX ADMIN — Medication 325 MILLIGRAM(S): at 12:18

## 2025-01-01 RX ADMIN — APIXABAN 2.5 MILLIGRAM(S): 5 TABLET, FILM COATED ORAL at 06:09

## 2025-01-01 RX ADMIN — PIPERACILLIN AND TAZOBACTAM 25 GRAM(S): 3; .375 INJECTION, POWDER, LYOPHILIZED, FOR SOLUTION INTRAVENOUS at 15:52

## 2025-01-01 RX ADMIN — Medication 2: at 12:13

## 2025-01-01 RX ADMIN — MIDODRINE HYDROCHLORIDE 15 MILLIGRAM(S): 5 TABLET ORAL at 15:53

## 2025-01-01 RX ADMIN — MIDODRINE HYDROCHLORIDE 15 MILLIGRAM(S): 5 TABLET ORAL at 21:17

## 2025-01-01 RX ADMIN — Medication 1: at 08:12

## 2025-01-01 RX ADMIN — PANTOPRAZOLE 40 MILLIGRAM(S): 40 TABLET, DELAYED RELEASE ORAL at 06:10

## 2025-01-01 RX ADMIN — PIPERACILLIN AND TAZOBACTAM 25 GRAM(S): 3; .375 INJECTION, POWDER, LYOPHILIZED, FOR SOLUTION INTRAVENOUS at 21:14

## 2025-01-01 RX ADMIN — ROSUVASTATIN 10 MILLIGRAM(S): 40 TABLET, FILM COATED ORAL at 21:17

## 2025-01-01 RX ADMIN — RISPERIDONE 1 MILLIGRAM(S): 0.5 TABLET ORAL at 12:15

## 2025-01-01 NOTE — SWALLOW BEDSIDE ASSESSMENT ADULT - SLP GENERAL OBSERVATIONS
Unable to assess oropharyngeal swallow function 2/2 pt currently on BIPAP. RN deferred eval today 12/28/24 2/2 respiratory status
Pt awake and alert in bed, 3L O2 via NC, AOx4.
Pt awake and alert in bed, 3L O2 via NC, AOx4. O2 dropped slightly during PO intake
Pt awake and alert in bed, 3L O2 via NC, AOx4.

## 2025-01-01 NOTE — PROGRESS NOTE ADULT - SUBJECTIVE AND OBJECTIVE BOX
CHELA GALLAGHER  84y  Female      Patient is a 84y old  Female who presents with a chief complaint of acute blood loss anemia, cellulitis (01 Jan 2025 07:25)        REVIEW OF SYSTEMS:  CONSTITUTIONAL: No fever, weight loss, or fatigue  RESPIRATORY: No cough, wheezing, chills or hemoptysis; No shortness of breath  CARDIOVASCULAR: No chest pain, palpitations, dizziness, or leg swelling  GASTROINTESTINAL: No abdominal or epigastric pain. No nausea, vomiting, or hematemesis; No diarrhea or constipation. No melena or hematochezia.  GENITOURINARY: No dysuria, frequency, hematuria, or incontinence  MUSCULOSKELETAL: No joint pain or swelling; No muscle, back, or extremity pain  PSYCHIATRIC: No depression, anxiety, mood swings, or difficulty sleeping  HEME/LYMPH: No easy bruising, or bleeding gums  ALLERY AND IMMUNOLOGIC: No hives or eczema  FAMILY HISTORY:    T(C): 36.4 (01-01-25 @ 07:00), Max: 36.4 (12-31-24 @ 15:00)  HR: 87 (01-01-25 @ 06:00) (64 - 96)  BP: 126/65 (01-01-25 @ 06:00) (81/48 - 183/79)  RR: 21 (01-01-25 @ 07:00) (12 - 33)  SpO2: 96% (01-01-25 @ 07:00) (91% - 100%)  Wt(kg): --Vital Signs Last 24 Hrs  T(C): 36.4 (01 Jan 2025 07:00), Max: 36.4 (31 Dec 2024 15:00)  T(F): 97.6 (01 Jan 2025 07:00), Max: 97.6 (31 Dec 2024 15:00)  HR: 87 (01 Jan 2025 06:00) (64 - 96)  BP: 126/65 (01 Jan 2025 06:00) (81/48 - 183/79)  BP(mean): 89 (01 Jan 2025 06:00) (60 - 114)  RR: 21 (01 Jan 2025 07:00) (12 - 33)  SpO2: 96% (01 Jan 2025 07:00) (91% - 100%)    Parameters below as of 01 Jan 2025 06:00  Patient On (Oxygen Delivery Method): nasal cannula      No Known Allergies      PHYSICAL EXAM:  GENERAL: NAD,   HEAD:  Atraumatic, Normocephalic  EYES: EOMI, PERRLA, conjunctiva and sclera clear  ENMT: No tonsillar erythema, exudates, or enlargement;   NECK: Supple, No JVD, Normal thyroid  NERVOUS SYSTEM:  Alert & Oriented   CHEST/LUNG: Clear to percussion bilaterally; No rales, rhonchi  HEART: Regular rate and rhythm; No murmurs, rubs, or gallops  ABDOMEN: Soft, Nontender, Nondistended; Bowel sounds present  EXTREMITIES:  , No clubbing, cyanosis, or edema  LYMPH: No lymphadenopathy noted  SKIN: No rashes or lesions      LABS:  01-01    139  |  95[L]  |  28[H]  ----------------------------<  140[H]  4.5   |  39[H]  |  1.5    Ca    9.5      01 Jan 2025 05:57  Mg     2.2     01-01    TPro  5.7[L]  /  Alb  2.7[L]  /  TBili  0.2  /  DBili  x   /  AST  7   /  ALT  <5  /  AlkPhos  72  01-01                          8.4    8.54  )-----------( 370      ( 01 Jan 2025 05:57 )             28.8   < from: TTE Echo Complete w/o Contrast w/ Doppler (12.30.24 @ 12:02) >   1. Left ventricular ejection fraction, by visual estimation, is 55 to   60%.   2. Mildly enlarged left atrium.   3. Mild mitral annular calcification.   4. Sclerotic aortic valve with normal opening.   5. There is mild aortic root calcification.    < end of copied text >      < from: CT Head No Cont (12.26.24 @ 18:54) >  Stable exam since previous CT 12/24/2024.    < end of copied text >    RADIOLOGY & ADDITIONAL TESTS:    MEDICATION:  acetaminophen     Tablet .. 650 milliGRAM(s) Oral every 6 hours PRN  apixaban 2.5 milliGRAM(s) Oral two times a day  chlorhexidine 2% Cloths 1 Application(s) Topical <User Schedule>  dextrose 5%. 1000 milliLiter(s) IV Continuous <Continuous>  dextrose 5%. 1000 milliLiter(s) IV Continuous <Continuous>  dextrose 50% Injectable 25 Gram(s) IV Push once  dextrose 50% Injectable 12.5 Gram(s) IV Push once  dextrose 50% Injectable 25 Gram(s) IV Push once  dextrose Oral Gel 15 Gram(s) Oral once PRN  ferrous    sulfate 325 milliGRAM(s) Oral daily  folic acid 1 milliGRAM(s) Oral daily  furosemide    Tablet 40 milliGRAM(s) Oral daily  glucagon  Injectable 1 milliGRAM(s) IntraMuscular once  influenza  Vaccine (HIGH DOSE) 0.5 milliLiter(s) IntraMuscular once  insulin lispro (ADMELOG) corrective regimen sliding scale   SubCutaneous three times a day before meals  lidocaine 1% Injectable 30 milliLiter(s) Local Injection once  metoprolol succinate ER 25 milliGRAM(s) Oral daily  midodrine 15 milliGRAM(s) Oral every 8 hours  norepinephrine Infusion 0.02 MICROgram(s)/kG/Min IV Continuous <Continuous>  nystatin Powder 1 Application(s) Topical two times a day  pantoprazole    Tablet 40 milliGRAM(s) Oral before breakfast  piperacillin/tazobactam IVPB.. 3.375 Gram(s) IV Intermittent every 8 hours  risperiDONE   Tablet 1 milliGRAM(s) Oral daily  rosuvastatin 10 milliGRAM(s) Oral at bedtime  senna 2 Tablet(s) Oral at bedtime  vitamin A & D Ointment 1 Application(s) Topical daily      HEALTH ISSUES - PROBLEM Dx:    This is a 84-year-old female brought in by EMS from [facility], with past medical history of  diabetes, HTN, chronic LE edema, bipolar disorder, tachybrady syndrome (declining PPM), A fib (on Eliquis), mild aortic stenosis, presenting s/p mechanical fall 3 days ago (-head trauma, +AC). Patient sustained wound to left posterior lateral lower extremity with hematoma, that started rebleeding today. Denies fever, chills, n/v, cp, sob, palpitations, cough, back pain, numbness/tingling, abd pain, changes in BM, and urinary symptoms,     # s/p rapid response Acute Respiratory distress ,failure probably from CHF Acute on chronic hypercapnic failure  hx a fib ,lasix 40mg x1 d/c IVF ,s/p BIPAP  cardiologist ,pulmonary   #LLE subcutaneous hematoma and cellulitis   #Fall   -CTA LE: A subcutaneous hematoma is noted along the lateral and posterior aspect of the left calf. The hematoma measures approximately 11.5 x 2 x 18 cm.  -zosyn   -Surgery following: Had I&D- see 12/25; continue daily wound care (telfa/gauze/abd/kerlix/ACE wrap)   -ID consult noted  -Wound care consult - noted: Had I&D yesterday, 12/25  -Pain control prn   -PT consult   -Fall precautions     #Acute blood loss anemia   #H/o iron deficiency anemia   -Hgb 6.7 (10.2 in 7/2024). Baseline Hgb ~8- 9  -Hold Eliquis in setting of acute blood loss anemia   -F/U post transfusion CBC improved.  -C/w ferrous sulfate 325 mg   - check anemia labs        #Acute hypercarbic respiratory failure likely 2/2 MANJEET   -BiPAP HS 14/8  -Supplemental O2 prn     #DM  -Holding Farxiga 10 mg   -ISS while inpatient     will wean off from levophed

## 2025-01-01 NOTE — SWALLOW BEDSIDE ASSESSMENT ADULT - SLP PERTINENT HISTORY OF CURRENT PROBLEM
This is a 84-year-old female brought in by EMS from [facility], with past medical history of  diabetes, HTN, chronic LE edema, bipolar disorder, tachybrady syndrome (declining PPM), A fib (on Eliquis), mild aortic stenosis, presenting s/p mechanical fall 3 days ago (-head trauma, +AC). Patient sustained wound to left posterior lateral lower extremity with hematoma. Admitted for management of acute blood loss anemia, cellulitis. Surgery following for infected LLE hematoma. Rapid called 12/26 for AMS, pt placed on  BIPAP. Rapid called 12/27 for hypotension, pt upgraded to CCU, placed on Levophed drip.
This is a 84-year-old female brought in by EMS from [facility], [place], with past medical history of  diabetes, HTN, chronic LE edema, bipolar disorder, tachybrady syndrome (declining PPM), A fib (on Eliquis), mild aortic stenosis, presenting s/p mechanical fall 3 days ago (-head trauma, +AC). Patient sustained wound to left posterior lateral lower extremity with hematoma. Admitted for management of acute blood loss anemia, cellulitis. Surgery following for infected LLE hematoma. Rapid called 12/26 for AMS, pt placed on  BIPAP. Rapid called 12/27 for hypotension, pt upgraded to CCU, placed on Levophed drip.
This is a 84-year-old female brought in by EMS from [facility], with past medical history of  diabetes, HTN, chronic LE edema, bipolar disorder, tachybrady syndrome (declining PPM), A fib (on Eliquis), mild aortic stenosis, presenting s/p mechanical fall 3 days ago (-head trauma, +AC). Patient sustained wound to left posterior lateral lower extremity with hematoma, that started rebleeding today. Denies fever, chills, n/v, cp, sob, palpitations, cough, back pain, numbness/tingling, abd pain, changes in BM, and urinary symptoms,
This is a 84-year-old female brought in by EMS from [facility], with past medical history of  diabetes, HTN, chronic LE edema, bipolar disorder, tachybrady syndrome (declining PPM), A fib (on Eliquis), mild aortic stenosis, presenting s/p mechanical fall 3 days ago (-head trauma, +AC). Patient sustained wound to left posterior lateral lower extremity with hematoma. Admitted for management of acute blood loss anemia, cellulitis. Surgery following for infected LLE hematoma. Rapid called 12/26 for AMS, pt placed on  BIPAP. Rapid called 12/27 for hypotension, pt upgraded to CCU, placed on Levophed drip.

## 2025-01-01 NOTE — PROGRESS NOTE ADULT - CRITICAL CARE ATTENDING COMMENT
This patient is critically ill due to the following:  * Hemodynamic instability requiring titration of vasopressors or other vasoactive agents  * Multiple organ failure requiring complex decision-making, and there is a high probability of imminent or life-threatening deterioration in the patient’s condition  * The patient required frequent reassessments and monitoring to ensure response to interventions and therapies.    Critical care time includes time spent evaluating and treating the patient's acute illness as well as time spent reviewing labs, radiology,  and discussing the case with a multidisciplinary team in an effort to prevent further life threatening deterioration or end organ damage. This time is independent of any procedures performed.
The patient's injury acutely impairs one or more vital organ systems, and there is a high probability of imminent or life threatening deterioration in the patient’s condition. The care of this patient requires complex medical decision making in the field of Infectious Diseases and extensive interpretation of laboratory, microbiological, and radiographic data
This patient is critically ill due to the following:  * Hemodynamic instability requiring titration of vasopressors or other vasoactive agents  * Multiple organ failure requiring complex decision-making, and there is a high probability of imminent or life-threatening deterioration in the patient’s condition  * The patient required frequent reassessments and monitoring to ensure response to interventions and therapies.    Critical care time includes time spent evaluating and treating the patient's acute illness as well as time spent reviewing labs, radiology,  and discussing the case with a multidisciplinary team in an effort to prevent further life threatening deterioration or end organ damage. This time is independent of any procedures performed.
This patient is critically ill due to the following:  * Multiple organ failure requiring complex decision-making, and there is a high probability of imminent or life-threatening deterioration in the patient’s condition  * The patient required frequent reassessments and monitoring to ensure response to interventions and therapies.    Critical care time includes time spent evaluating and treating the patient's acute illness as well as time spent reviewing labs, radiology,  and discussing the case with a multidisciplinary team in an effort to prevent further life threatening deterioration or end organ damage. This time is independent of any procedures performed.
This patient is critically ill due to the following:  * Hemodynamic instability requiring titration of vasopressors or other vasoactive agents  * Multiple organ failure requiring complex decision-making, and there is a high probability of imminent or life-threatening deterioration in the patient’s condition  * The patient required frequent reassessments and monitoring to ensure response to interventions and therapies.    Critical care time includes time spent evaluating and treating the patient's acute illness as well as time spent reviewing labs, radiology,  and discussing the case with a multidisciplinary team in an effort to prevent further life threatening deterioration or end organ damage. This time is independent of any procedures performed.
This patient is critically ill due to the following:  * Multiple organ failure requiring complex decision-making, and there is a high probability of imminent or life-threatening deterioration in the patient’s condition  * The patient required frequent reassessments and monitoring to ensure response to interventions and therapies.    Critical care time includes time spent evaluating and treating the patient's acute illness as well as time spent reviewing labs, radiology,  and discussing the case with a multidisciplinary team in an effort to prevent further life threatening deterioration or end organ damage. This time is independent of any procedures performed.
This patient is critically ill due to the following:  * Multiple organ failure requiring complex decision-making, and there is a high probability of imminent or life-threatening deterioration in the patient’s condition  * The patient required frequent reassessments and monitoring to ensure response to interventions and therapies.    Critical care time includes time spent evaluating and treating the patient's acute illness as well as time spent reviewing labs, radiology,  and discussing the case with a multidisciplinary team in an effort to prevent further life threatening deterioration or end organ damage. This time is independent of any procedures performed.

## 2025-01-01 NOTE — SWALLOW BEDSIDE ASSESSMENT ADULT - SWALLOW EVAL: ORAL MUSCULATURE
unable to assess due to poor participation/comprehension

## 2025-01-01 NOTE — SWALLOW BEDSIDE ASSESSMENT ADULT - SWALLOW EVAL: DIAGNOSIS
+toleration observed without overt s/s of aspiration/penetration for soft + bite sized with thin liquids
Unable to assess oropharyngeal swallow function 2/2 pt currently on BIPAP. RN deferred eval today 12/28/24 2/2 respiratory status
+toleration observed without overt s/s of aspiration/penetration for puree, soft + bite sized, and thin liquids
+toleration observed without overt s/s of aspiration/penetration for soft + bite sized with thin liquids

## 2025-01-01 NOTE — PROGRESS NOTE ADULT - SUBJECTIVE AND OBJECTIVE BOX
Patient is a 84y old  Female who presents with a chief complaint of acute blood loss anemia, cellulitis (31 Dec 2024 16:20)        Over Night Events:    Was started on midodrine but still remains on low-dose levophed  Net negative 1L, serum Cr improving with diuresis    ROS:     All ROS are negative except HPI         PHYSICAL EXAM    ICU Vital Signs Last 24 Hrs  T(C): 36.4 (31 Dec 2024 23:00), Max: 36.4 (31 Dec 2024 15:00)  T(F): 97.6 (31 Dec 2024 23:00), Max: 97.6 (31 Dec 2024 15:00)  HR: 87 (01 Jan 2025 06:00) (64 - 96)  BP: 126/65 (01 Jan 2025 06:00) (81/48 - 183/79)  BP(mean): 89 (01 Jan 2025 06:00) (60 - 114)  ABP: 119/52 (01 Jan 2025 06:00) (79/38 - 161/67)  ABP(mean): 75 (01 Jan 2025 06:00) (52 - 102)  RR: 28 (01 Jan 2025 06:00) (12 - 33)  SpO2: 100% (01 Jan 2025 06:00) (91% - 100%)    O2 Parameters below as of 01 Jan 2025 06:00  Patient On (Oxygen Delivery Method): nasal cannula            Constitutional: no acute distress, well nourished well developed  Neuro: moving all 4 limbs spontaneously, no facial droop or dysarthria  HEENT: NCAT, anicteric  Neck: no visible lymphadenopathy or goiter  Pulm: no respiratory distress. clear to auscultation bilaterally  Cardiac: extremities appear pink and well-perfused.  regular rhythm and rate, no murmur detected  Abdomen: non-distended  Extremities: no peripheral edema      12-31-24 @ 07:01  -  01-01-25 @ 07:00  --------------------------------------------------------  IN:    IV PiggyBack: 150 mL    Norepinephrine: 144.9 mL    Oral Fluid: 932 mL  Total IN: 1226.9 mL    OUT:    Voided (mL): 2400 mL  Total OUT: 2400 mL    Total NET: -1173.1 mL          LABS:                            8.4    8.54  )-----------( 370      ( 01 Jan 2025 05:57 )             28.8                                               12-31    138  |  94[L]  |  32[H]  ----------------------------<  165[H]  4.1   |  39[H]  |  1.5    Ca    9.4      31 Dec 2024 08:00  Mg     1.9     12-31    TPro  5.8[L]  /  Alb  2.7[L]  /  TBili  0.3  /  DBili  x   /  AST  7   /  ALT  <5  /  AlkPhos  75  12-31                                             Urinalysis Basic - ( 31 Dec 2024 08:00 )    Color: x / Appearance: x / SG: x / pH: x  Gluc: 165 mg/dL / Ketone: x  / Bili: x / Urobili: x   Blood: x / Protein: x / Nitrite: x   Leuk Esterase: x / RBC: x / WBC x   Sq Epi: x / Non Sq Epi: x / Bacteria: x                                                  LIVER FUNCTIONS - ( 31 Dec 2024 08:00 )  Alb: 2.7 g/dL / Pro: 5.8 g/dL / ALK PHOS: 75 U/L / ALT: <5 U/L / AST: 7 U/L / GGT: x                                                                                                                                       MEDICATIONS  (STANDING):  apixaban 2.5 milliGRAM(s) Oral two times a day  chlorhexidine 2% Cloths 1 Application(s) Topical <User Schedule>  dextrose 5%. 1000 milliLiter(s) (100 mL/Hr) IV Continuous <Continuous>  dextrose 5%. 1000 milliLiter(s) (50 mL/Hr) IV Continuous <Continuous>  dextrose 50% Injectable 25 Gram(s) IV Push once  dextrose 50% Injectable 12.5 Gram(s) IV Push once  dextrose 50% Injectable 25 Gram(s) IV Push once  ferrous    sulfate 325 milliGRAM(s) Oral daily  folic acid 1 milliGRAM(s) Oral daily  furosemide    Tablet 40 milliGRAM(s) Oral daily  glucagon  Injectable 1 milliGRAM(s) IntraMuscular once  influenza  Vaccine (HIGH DOSE) 0.5 milliLiter(s) IntraMuscular once  insulin lispro (ADMELOG) corrective regimen sliding scale   SubCutaneous three times a day before meals  lidocaine 1% Injectable 30 milliLiter(s) Local Injection once  metoprolol succinate ER 25 milliGRAM(s) Oral daily  midodrine 15 milliGRAM(s) Oral every 8 hours  norepinephrine Infusion 0.02 MICROgram(s)/kG/Min (4.25 mL/Hr) IV Continuous <Continuous>  nystatin Powder 1 Application(s) Topical two times a day  pantoprazole    Tablet 40 milliGRAM(s) Oral before breakfast  piperacillin/tazobactam IVPB.. 3.375 Gram(s) IV Intermittent every 8 hours  risperiDONE   Tablet 1 milliGRAM(s) Oral daily  rosuvastatin 10 milliGRAM(s) Oral at bedtime  senna 2 Tablet(s) Oral at bedtime  vitamin A & D Ointment 1 Application(s) Topical daily    MEDICATIONS  (PRN):  acetaminophen     Tablet .. 650 milliGRAM(s) Oral every 6 hours PRN Temp greater or equal to 38C (100.4F), Mild Pain (1 - 3)  dextrose Oral Gel 15 Gram(s) Oral once PRN Blood Glucose LESS THAN 70 milliGRAM(s)/deciliter      New X-rays reviewed:       ECHO reviewed    CXR interpreted by me:    1/1  images and radiologist read reviewed, by my read demonstrating slightly worsening RLL opacities

## 2025-01-01 NOTE — SWALLOW BEDSIDE ASSESSMENT ADULT - DIET PRIOR TO ADMI
regular consistency and thin liquids at [facility]
regular consistency and thin liquids at [facility]
Unknown
regular consistency and thin liquids at [facility]

## 2025-01-01 NOTE — SWALLOW BEDSIDE ASSESSMENT ADULT - SWALLOW EVAL: RECOMMENDED DIET
NPO
Continue soft + bite sized consistency and thin liquids

## 2025-01-01 NOTE — SWALLOW BEDSIDE ASSESSMENT ADULT - ADDITIONAL RECOMMENDATIONS
SLP d/c reconsult PRN
SLP to f/u
SLP will f/u when pt is off BIPAP. Pt at high risk for aspiration 2/2 respiratory status
SLP d/c reconsult PRN

## 2025-01-01 NOTE — SWALLOW BEDSIDE ASSESSMENT ADULT - SWALLOW EVAL: CURRENT DIET
soft + bite sized consistency and thin liquids

## 2025-01-01 NOTE — PROGRESS NOTE ADULT - ASSESSMENT
IMPRESSION:    Toxic Metabolic Encephalopathy resolved   Acute on Chronic Hypercapnic Respiratory Failure  MANJEET/OHS  Infected Hematoma SP I&D   BRYAN, improving    PLAN:    CNS: avoid sedatives. Delirium precautions.    HEENT: Oral care    PULMONARY:  HOB @ 45 degrees.  Aspiration precautions.  Continue BiPAP QHS & PRN.    CARDIOVASCULAR: TTE with EF 55%. continue diuresis to 40mg daily.  Continue metoprolol/rate control.  wean off Levophed. Clear to resume AC per Sx.  Target MAP >65 given BRYAN.  DC a-line.    GI: GI prophylaxis not indicated.  Advance diet per speech.  Bowel regimen PRN    RENAL:  Follow up lytes.  Correct as needed.  Diuresis as above.    INFECTIOUS DISEASE: Follow up cultures.  Continue Abx per ID.    HEMATOLOGICAL:  DVT prophylaxis: heparin SQ.    ENDOCRINE:  Follow up FS.  Insulin protocol if needed.    MUSCULOSKELETAL:  OOBTC, surgery follow-up for LLE    SDU monitoring

## 2025-01-01 NOTE — SWALLOW BEDSIDE ASSESSMENT ADULT - NS ASR SWALLOW FINDINGS DISCUS
[id number], Spoke with RN at b/s/Physician/Nursing/Patient
[id number], ENRIKE Kellogg/Physician/Nursing/Patient
Physician/Nursing/Patient
[id number], RN Stacy/Physician/Nursing/Patient

## 2025-01-02 LAB
ALBUMIN SERPL ELPH-MCNC: 2.7 G/DL — LOW (ref 3.5–5.2)
ALP SERPL-CCNC: 68 U/L — SIGNIFICANT CHANGE UP (ref 30–115)
ALT FLD-CCNC: <5 U/L — SIGNIFICANT CHANGE UP (ref 0–41)
ANION GAP SERPL CALC-SCNC: 5 MMOL/L — LOW (ref 7–14)
AST SERPL-CCNC: 7 U/L — SIGNIFICANT CHANGE UP (ref 0–41)
BILIRUB SERPL-MCNC: 0.2 MG/DL — SIGNIFICANT CHANGE UP (ref 0.2–1.2)
BUN SERPL-MCNC: 25 MG/DL — HIGH (ref 10–20)
CALCIUM SERPL-MCNC: 9.6 MG/DL — SIGNIFICANT CHANGE UP (ref 8.4–10.5)
CHLORIDE SERPL-SCNC: 95 MMOL/L — LOW (ref 98–110)
CO2 SERPL-SCNC: 40 MMOL/L — HIGH (ref 17–32)
CREAT SERPL-MCNC: 1.5 MG/DL — SIGNIFICANT CHANGE UP (ref 0.7–1.5)
EGFR: 34 ML/MIN/1.73M2 — LOW
GLUCOSE BLDC GLUCOMTR-MCNC: 124 MG/DL — HIGH (ref 70–99)
GLUCOSE BLDC GLUCOMTR-MCNC: 168 MG/DL — HIGH (ref 70–99)
GLUCOSE BLDC GLUCOMTR-MCNC: 191 MG/DL — HIGH (ref 70–99)
GLUCOSE BLDC GLUCOMTR-MCNC: 209 MG/DL — HIGH (ref 70–99)
GLUCOSE SERPL-MCNC: 126 MG/DL — HIGH (ref 70–99)
HCT VFR BLD CALC: 27.8 % — LOW (ref 37–47)
HGB BLD-MCNC: 7.9 G/DL — LOW (ref 12–16)
MAGNESIUM SERPL-MCNC: 2.2 MG/DL — SIGNIFICANT CHANGE UP (ref 1.8–2.4)
MCHC RBC-ENTMCNC: 27.2 PG — SIGNIFICANT CHANGE UP (ref 27–31)
MCHC RBC-ENTMCNC: 28.4 G/DL — LOW (ref 32–37)
MCV RBC AUTO: 95.9 FL — SIGNIFICANT CHANGE UP (ref 81–99)
NRBC # BLD: 0 /100 WBCS — SIGNIFICANT CHANGE UP (ref 0–0)
PLATELET # BLD AUTO: 357 K/UL — SIGNIFICANT CHANGE UP (ref 130–400)
PMV BLD: 9.8 FL — SIGNIFICANT CHANGE UP (ref 7.4–10.4)
POTASSIUM SERPL-MCNC: 4.7 MMOL/L — SIGNIFICANT CHANGE UP (ref 3.5–5)
POTASSIUM SERPL-SCNC: 4.7 MMOL/L — SIGNIFICANT CHANGE UP (ref 3.5–5)
PROT SERPL-MCNC: 5.8 G/DL — LOW (ref 6–8)
RBC # BLD: 2.9 M/UL — LOW (ref 4.2–5.4)
RBC # FLD: 14.5 % — SIGNIFICANT CHANGE UP (ref 11.5–14.5)
SODIUM SERPL-SCNC: 140 MMOL/L — SIGNIFICANT CHANGE UP (ref 135–146)
WBC # BLD: 6.16 K/UL — SIGNIFICANT CHANGE UP (ref 4.8–10.8)
WBC # FLD AUTO: 6.16 K/UL — SIGNIFICANT CHANGE UP (ref 4.8–10.8)

## 2025-01-02 PROCEDURE — 71045 X-RAY EXAM CHEST 1 VIEW: CPT | Mod: 26

## 2025-01-02 PROCEDURE — 99232 SBSQ HOSP IP/OBS MODERATE 35: CPT

## 2025-01-02 RX ADMIN — Medication 2: at 07:52

## 2025-01-02 RX ADMIN — PIPERACILLIN AND TAZOBACTAM 25 GRAM(S): 3; .375 INJECTION, POWDER, LYOPHILIZED, FOR SOLUTION INTRAVENOUS at 05:44

## 2025-01-02 RX ADMIN — Medication 40 MILLIGRAM(S): at 05:45

## 2025-01-02 RX ADMIN — MIDODRINE HYDROCHLORIDE 15 MILLIGRAM(S): 5 TABLET ORAL at 05:46

## 2025-01-02 RX ADMIN — APIXABAN 2.5 MILLIGRAM(S): 5 TABLET, FILM COATED ORAL at 05:46

## 2025-01-02 RX ADMIN — PANTOPRAZOLE 40 MILLIGRAM(S): 40 TABLET, DELAYED RELEASE ORAL at 05:46

## 2025-01-02 RX ADMIN — CHLORHEXIDINE GLUCONATE 1 APPLICATION(S): 1.2 RINSE ORAL at 05:47

## 2025-01-02 RX ADMIN — SENNOSIDES 2 TABLET(S): 8.6 TABLET, FILM COATED ORAL at 22:00

## 2025-01-02 RX ADMIN — ROSUVASTATIN 10 MILLIGRAM(S): 40 TABLET, FILM COATED ORAL at 22:00

## 2025-01-02 RX ADMIN — Medication 325 MILLIGRAM(S): at 13:38

## 2025-01-02 RX ADMIN — MIDODRINE HYDROCHLORIDE 15 MILLIGRAM(S): 5 TABLET ORAL at 22:00

## 2025-01-02 RX ADMIN — MIDODRINE HYDROCHLORIDE 15 MILLIGRAM(S): 5 TABLET ORAL at 13:38

## 2025-01-02 RX ADMIN — Medication 1: at 11:45

## 2025-01-02 RX ADMIN — PIPERACILLIN AND TAZOBACTAM 25 GRAM(S): 3; .375 INJECTION, POWDER, LYOPHILIZED, FOR SOLUTION INTRAVENOUS at 21:59

## 2025-01-02 RX ADMIN — APIXABAN 2.5 MILLIGRAM(S): 5 TABLET, FILM COATED ORAL at 17:20

## 2025-01-02 RX ADMIN — Medication 1: at 17:19

## 2025-01-02 RX ADMIN — Medication 1 MILLIGRAM(S): at 13:38

## 2025-01-02 RX ADMIN — NYSTATIN TOPICAL POWDER 1 APPLICATION(S): 100000 POWDER TOPICAL at 17:24

## 2025-01-02 RX ADMIN — Medication 25 MILLIGRAM(S): at 05:45

## 2025-01-02 RX ADMIN — Medication 5 MILLIGRAM(S): at 22:00

## 2025-01-02 RX ADMIN — RISPERIDONE 1 MILLIGRAM(S): 0.5 TABLET ORAL at 13:38

## 2025-01-02 RX ADMIN — NYSTATIN TOPICAL POWDER 1 APPLICATION(S): 100000 POWDER TOPICAL at 05:45

## 2025-01-02 RX ADMIN — PIPERACILLIN AND TAZOBACTAM 25 GRAM(S): 3; .375 INJECTION, POWDER, LYOPHILIZED, FOR SOLUTION INTRAVENOUS at 13:38

## 2025-01-02 RX ADMIN — LANOLIN AND PETROLATUM 1 APPLICATION(S): 136.4; 469.9 OINTMENT TOPICAL at 12:16

## 2025-01-02 NOTE — PROGRESS NOTE ADULT - SUBJECTIVE AND OBJECTIVE BOX
Chart reviewed, patient examined. Pertinent results reviewed.  reviewed with the PA; specialist f/u reviewed  HD#10; POD(I&D)#8-12/25    Patient  is a 84y old  Female who presented with a chief complaint of acute blood loss anemia, cellulitis (27 Dec 2024 10:13)  HPI reviewed          upgraded for hypotension/AMS, was altered transiently- came to CCU  Now off levophed since this AM- On/OFF over wk.  comfortable but unable to tolerate remaining off bipap for very long without worsening mental status;   ABG showing CO2 retention.    ROS: on BIPAP; s/p NE x > 24hrs    All ROS are negative except HPI   See WC- + VRE    MEDICATIONS  (STANDING):  apixaban 2.5 milliGRAM(s) Oral two times a day  chlorhexidine 2% Cloths 1 Application(s) Topical <User Schedule>  dextrose 5%. 1000 milliLiter(s) (100 mL/Hr) IV Continuous <Continuous>  dextrose 5%. 1000 milliLiter(s) (50 mL/Hr) IV Continuous <Continuous>  dextrose 50% Injectable 25 Gram(s) IV Push once  dextrose 50% Injectable 12.5 Gram(s) IV Push once  dextrose 50% Injectable 25 Gram(s) IV Push once  ferrous    sulfate 325 milliGRAM(s) Oral daily  folic acid 1 milliGRAM(s) Oral daily  furosemide    Tablet 40 milliGRAM(s) Oral daily  glucagon  Injectable 1 milliGRAM(s) IntraMuscular once  influenza  Vaccine (HIGH DOSE) 0.5 milliLiter(s) IntraMuscular once  insulin lispro (ADMELOG) corrective regimen sliding scale   SubCutaneous three times a day before meals  lidocaine 1% Injectable 30 milliLiter(s) Local Injection once  melatonin 5 milliGRAM(s) Oral at bedtime  metoprolol succinate ER 25 milliGRAM(s) Oral daily  midodrine 15 milliGRAM(s) Oral every 8 hours  norepinephrine Infusion 0.02 MICROgram(s)/kG/Min (4.25 mL/Hr) IV Continuous <Continuous>  nystatin Powder 1 Application(s) Topical two times a day  pantoprazole    Tablet 40 milliGRAM(s) Oral before breakfast  piperacillin/tazobactam IVPB.. 3.375 Gram(s) IV Intermittent every 8 hours  risperiDONE   Tablet 1 milliGRAM(s) Oral daily  rosuvastatin 10 milliGRAM(s) Oral at bedtime  senna 2 Tablet(s) Oral at bedtime  vitamin A & D Ointment 1 Application(s) Topical daily    MEDICATIONS  (PRN):  acetaminophen     Tablet .. 650 milliGRAM(s) Oral every 6 hours PRN Temp greater or equal to 38C (100.4F), Mild Pain (1 - 3)  dextrose Oral Gel 15 Gram(s) Oral once PRN Blood Glucose LESS THAN 70 milliGRAM(s)/deciliter      ICU Vital Signs Last 24 Hrs  T(C): 36.1 (02 Jan 2025 11:00), Max: 36.8 (01 Jan 2025 19:00)  T(F): 97 (02 Jan 2025 11:00), Max: 98.3 (01 Jan 2025 19:00)  HR: 92 (02 Jan 2025 10:49) (69 - 139)  BP: 166/72 (01 Jan 2025 16:00) (112/57 - 166/72)  BP(mean): 104 (01 Jan 2025 16:00) (80 - 104)  ABP: 106/45 (02 Jan 2025 09:00) (96/40 - 159/73)  ABP(mean): 65 (02 Jan 2025 09:00) (58 - 104)  RR: 18 (02 Jan 2025 11:00) (13 - 47)  SpO2: 99% (02 Jan 2025 10:49) (88% - 100%)    O2 Parameters below as of 02 Jan 2025 07:00  Patient On (Oxygen Delivery Method): BIPAP- 40%, RR-16          PHYSICAL EXAM    Constitutional: no acute distress, well nourished  obese; BIPAP; awake.+ nods  Neuro: moving all 4 limbs spontaneously, no facial droop or dysarthria  HEENT: NCAT, anicteric  Neck: no visible lymphadenopathy or goiter; + scar  Pulm: stable respirations w BIPAP. clear to auscultation bilaterally  Cardiac: extremities appear pink and well-perfused.  IRR IRR, no murmur detected  Abdomen: non-distended; obese; NT  Extremities:MILD peripheral edema; L calf/leg dressing; Min + TTP  Skin: + anasarca; ecchymoses on UE            LABS:                        7.9    6.16  )-----------( 357      ( 02 Jan 2025 05:30 )             27.8                           8.1    9.75  )-----------( 366      ( 28 Dec 2024 07:02 )             28.4                           8.1    10.38 )-----------( 334      ( 27 Dec 2024 08:23 )             27.8     01-02    140  |  95[L]  |  25[H]  ----------------------------<  126[H]  4.7   |  40[H]  |  1.5    Ca    9.6      02 Jan 2025 05:30  Mg     2.2     01-02    TPro  5.8[L]  /  Alb  2.7[L]  /  TBili  0.2  /  DBili  x   /  AST  7   /  ALT  <5  /  AlkPhos  68  01-02 12-28    144  |  102  |  50[H]  ----------------------------<  149[H]  5.1[H]   |  33[H]  |  1.4    Ca    9.1      28 Dec 2024 07:02  Phos  3.8     12-27  Mg     2.4     12-27    TPro  5.8[L]  /  Alb  2.7[L]  /  TBili  0.3  /  DBili  x   /  AST  8   /  ALT  <5  /  AlkPhos  77  12-28 12-27    141  |  100  |  56[H]  ----------------------------<  122[H]  4.9   |  34[H]  |  1.8[H]    Ca    9.0      27 Dec 2024 08:23  Phos  3.8     12-27  Mg     2.4     12-27    TPro  5.8[L]  /  Alb  2.8[L]  /  TBili  <0.2  /  DBili  x   /  AST  8   /  ALT  6   /  AlkPhos  78  12-26                                             Urinalysis Basic - ( 27 Dec 2024 08:23 )    Color: x / Appearance: x / SG: x / pH: x  Gluc: 122 mg/dL / Ketone: x  / Bili: x / Urobili: x   Blood: x / Protein: x / Nitrite: x   Leuk Esterase: x / RBC: x / WBC x   Sq Epi: x / Non Sq Epi: x / Bacteria: x                                                  LIVER FUNCTIONS - ( 26 Dec 2024 17:22 )  Alb: 2.8 g/dL / Pro: 5.8 g/dL / ALK PHOS: 78 U/L / ALT: 6 U/L / AST: 8 U/L / GGT: x                                              Pro-Brain Natriuretic Peptide (12.28.24 @ 11:48)   Pro-Brain Natriuretic Peptide: 2589 pg/mL  Historical Values  Pro-Brain Natriuretic Peptide (12.28.24 @ 11:48)   Pro-Brain Natriuretic Peptide: 2589 pg/mL  Pro-Brain Natriuretic Peptide (07.07.24 @ 08:35)   Pro-Brain Natriuretic Peptide: 984 pg/mL  Pro-Brain Natriuretic Peptide (06.05.24 @ 11:38)   Pro-Brain Natriuretic Peptide: 2440 pg/mL    Culture - Wound Aerobic/Anaerobic (collected 25 Dec 2024 16:00)  Source: Swab  Preliminary Report (27 Dec 2024 16:01):    Numerous Morganella morganii    Moderate Proteus mirabilis    Few Enterococcus faecalis (vancomycin resistant)  Organism: Morganella morganii  Proteus mirabilis  Enterococcus faecalis (vancomycin resistant) (27 Dec 2024 16:01)  Organism: Enterococcus faecalis (vancomycin resistant) (27 Dec 2024 16:01)  Organism: Proteus mirabilis (27 Dec 2024 16:01)  Organism: Morganella morganii (27 Dec 2024 16:01)                                                                                       ABG - ( 27 Dec 2024 17:47 )  pH, Arterial: 7.34  pH, Blood: x     /  pCO2: 69    /  pO2: 93    / HCO3: 37    / Base Excess: 9.8   /  SaO2: 98.5                New X-rays reviewed:     < from: Xray Chest 1 View- PORTABLE-Routine (Xray Chest 1 View- PORTABLE-Routine in AM.) (12.29.24 @ 07:31) >  ACC: [id number] EXAM:  XR CHEST PORTABLE  ROUTINE 1V   ORDERED BY: PERCY EDOUARD     PROCEDURE DATE:  12/29/2024          INTERPRETATION:  CLINICAL HISTORY: Shortness of breath    COMPARISON: Prior day.    TECHNIQUE: Portable frontal chest radiograph.    FINDINGS:    Support devices: Telemetry leads    Cardiac/mediastinum/hilum: Stable cardiomegaly.    Lung parenchyma/Pleura: Bilateral opacities.    Skeleton/soft tissues: Stable      IMPRESSION:    Stable bilateral opacifications.    --- End of Report ---      CHYNA BLACK MD; Attending Interventional Radiologist  This document has been electronically signed. Dec 29 2024  8:    < end of copied text >      ECHO reviewed    CXR interpreted by me:    12/27  images and radiologist read reviewed, by my read demonstrating stable L opacity vs effusion, RLL opacity.

## 2025-01-02 NOTE — PROGRESS NOTE ADULT - ASSESSMENT
IMPRESSION:    Toxic Metabolic Encephalopathy resolved   Acute on Chronic Hypercapnic Respiratory Failure  MANJEET/OHS  Infected Hematoma SP I&D   BRYAN, improving    PLAN:    CNS: avoid sedatives. Delirium precautions.    HEENT: Oral care    PULMONARY:  HOB @ 45 degrees.  Aspiration precautions.  Continue BiPAP QHS & PRN. ABG today.     CARDIOVASCULAR: TTE with EF 55%. continue diuresis to 40mg daily.  Continue metoprolol/rate control.  wean off Levophed. Clear to resume AC per Sx.  Target MAP >65 given BRYAN.    GI: GI prophylaxis not indicated.  Advance diet per speech.  Bowel regimen PRN    RENAL:  Follow up lytes.  Correct as needed.  Diuresis as above.    INFECTIOUS DISEASE: Follow up cultures.  Continue Abx per ID.    HEMATOLOGICAL:  DVT prophylaxis: heparin SQ.    ENDOCRINE:  Follow up FS.  Insulin protocol if needed.    MUSCULOSKELETAL:  OOBTC, surgery follow-up for LLE    SDU monitoring  IMPRESSION:    Toxic Metabolic Encephalopathy resolved   Acute on Chronic Hypercapnic Respiratory Failure  MANJEET/OHS  Infected Hematoma SP I&D   BRYAN, resolved     PLAN:    CNS: avoid sedatives. Delirium precautions.    HEENT: Oral care    PULMONARY:  HOB @ 45 degrees.  Aspiration precautions.  Continue BiPAP QHS & PRN. ABG today.     CARDIOVASCULAR: TTE with EF 55%. Cardiology follow up, overall net negative, hold Lasix. Continue metoprolol/rate control. Clear to resume AC per Sx.  Target MAP >65 given BRYAN.    GI: GI prophylaxis not indicated.  Advance diet per speech.  Bowel regimen PRN    RENAL:  Follow up lytes.  Correct as needed.      INFECTIOUS DISEASE: Follow up cultures.  Continue Abx per ID.    HEMATOLOGICAL:  DVT prophylaxis: heparin SQ.    ENDOCRINE:  Follow up FS.  Insulin protocol if needed.    MUSCULOSKELETAL:  OOBTC, surgery follow-up for LLE    SDU monitoring

## 2025-01-02 NOTE — PROGRESS NOTE ADULT - ASSESSMENT
#  Acute Respiratory distress ,failure probably from CHF Acute on chronic hypercapnic failure  hx a fib ,lasix 40mg x1 d/c IVF ,s/p BIPAP  cardiologist ,pulmonary   #LLE subcutaneous hematoma and cellulitis   #Fall   -CTA LE: A subcutaneous hematoma is noted along the lateral and posterior aspect of the left calf. The hematoma measures approximately 11.5 x 2 x 18 cm.  -zosyn   -Surgery following: Had I&D- see 12/25; continue daily wound care (telfa/gauze/abd/kerlix/ACE wrap)   -ID consult noted  -Wound care consult - noted: Had I&D yesterday, 12/25  -Pain control prn   -PT consult   -Fall precautions     #Acute blood loss anemia   #H/o iron deficiency anemia   -eliquis resumed        #Acute hypercarbic respiratory failure likely 2/2 MANJEET   -BiPAP HS 14/8  -Supplemental O2 prn     #DM  -Holding Farxiga 10 mg   -ISS while inpatient     will wean off from levophed

## 2025-01-02 NOTE — PROGRESS NOTE ADULT - SUBJECTIVE AND OBJECTIVE BOX
Patient is a 84y old  Female who presents with a chief complaint of acute blood loss anemia, cellulitis (01 Jan 2025 09:02)        Over Night Events:  On 3L nasal cannula. Afebrile. Levophed stopped at 10 AM.       ROS:     All ROS are negative except HPI         PHYSICAL EXAM    ICU Vital Signs Last 24 Hrs  T(C): 36.1 (02 Jan 2025 07:01), Max: 36.8 (01 Jan 2025 19:00)  T(F): 97 (02 Jan 2025 07:01), Max: 98.3 (01 Jan 2025 19:00)  HR: 79 (02 Jan 2025 09:00) (69 - 139)  BP: 166/72 (01 Jan 2025 16:00) (112/57 - 166/72)  BP(mean): 104 (01 Jan 2025 16:00) (80 - 104)  ABP: 106/45 (02 Jan 2025 09:00) (96/40 - 159/73)  ABP(mean): 65 (02 Jan 2025 09:00) (58 - 104)  RR: 16 (02 Jan 2025 09:00) (13 - 47)  SpO2: 100% (02 Jan 2025 09:00) (88% - 100%)    O2 Parameters below as of 02 Jan 2025 07:00  Patient On (Oxygen Delivery Method): nasal cannula  O2 Flow (L/min): 3          CONSTITUTIONAL:  in NAD    ENT:   Airway patent,   Mouth with normal mucosa.     EYES:   Pupils equal,   Round and reactive to light.    CARDIAC:   Normal rate,   Regular rhythm.    No edema    Vascular:  Normal systolic impulse  No Carotid bruits    RESPIRATORY:   No wheezing  Bilateral BS  Normal chest expansion  Not tachypneic,  No use of accessory muscles    GASTROINTESTINAL:  Abdomen soft,   Non-tender,   No guarding,     MUSCULOSKELETAL:   Range of motion is not limited,  No clubbing, cyanosis    NEUROLOGICAL:   Alert  Follows commands     SKIN:   Skin normal color for race,   Warm and dry  LLE wound       01-01-25 @ 07:01  -  01-02-25 @ 07:00  --------------------------------------------------------  IN:    IV PiggyBack: 200 mL    Norepinephrine: 143.6 mL    Oral Fluid: 1200 mL  Total IN: 1543.6 mL    OUT:    Voided (mL): 2900 mL  Total OUT: 2900 mL    Total NET: -1356.4 mL      01-02-25 @ 07:01  -  01-02-25 @ 10:27  --------------------------------------------------------  IN:    Norepinephrine: 7.7 mL    Oral Fluid: 240 mL  Total IN: 247.7 mL    OUT:    Voided (mL): 400 mL  Total OUT: 400 mL    Total NET: -152.3 mL          LABS:                            7.9    6.16  )-----------( 357      ( 02 Jan 2025 05:30 )             27.8                                               01-02    140  |  95[L]  |  25[H]  ----------------------------<  126[H]  4.7   |  40[H]  |  1.5    Ca    9.6      02 Jan 2025 05:30  Mg     2.2     01-02    TPro  5.8[L]  /  Alb  2.7[L]  /  TBili  0.2  /  DBili  x   /  AST  7   /  ALT  <5  /  AlkPhos  68  01-02                                             Urinalysis Basic - ( 02 Jan 2025 05:30 )    Color: x / Appearance: x / SG: x / pH: x  Gluc: 126 mg/dL / Ketone: x  / Bili: x / Urobili: x   Blood: x / Protein: x / Nitrite: x   Leuk Esterase: x / RBC: x / WBC x   Sq Epi: x / Non Sq Epi: x / Bacteria: x                                                  LIVER FUNCTIONS - ( 02 Jan 2025 05:30 )  Alb: 2.7 g/dL / Pro: 5.8 g/dL / ALK PHOS: 68 U/L / ALT: <5 U/L / AST: 7 U/L / GGT: x                                                                                                                                       MEDICATIONS  (STANDING):  apixaban 2.5 milliGRAM(s) Oral two times a day  chlorhexidine 2% Cloths 1 Application(s) Topical <User Schedule>  dextrose 5%. 1000 milliLiter(s) (100 mL/Hr) IV Continuous <Continuous>  dextrose 5%. 1000 milliLiter(s) (50 mL/Hr) IV Continuous <Continuous>  dextrose 50% Injectable 25 Gram(s) IV Push once  dextrose 50% Injectable 12.5 Gram(s) IV Push once  dextrose 50% Injectable 25 Gram(s) IV Push once  ferrous    sulfate 325 milliGRAM(s) Oral daily  folic acid 1 milliGRAM(s) Oral daily  furosemide    Tablet 40 milliGRAM(s) Oral daily  glucagon  Injectable 1 milliGRAM(s) IntraMuscular once  influenza  Vaccine (HIGH DOSE) 0.5 milliLiter(s) IntraMuscular once  insulin lispro (ADMELOG) corrective regimen sliding scale   SubCutaneous three times a day before meals  lidocaine 1% Injectable 30 milliLiter(s) Local Injection once  melatonin 5 milliGRAM(s) Oral at bedtime  metoprolol succinate ER 25 milliGRAM(s) Oral daily  midodrine 15 milliGRAM(s) Oral every 8 hours  norepinephrine Infusion 0.02 MICROgram(s)/kG/Min (4.25 mL/Hr) IV Continuous <Continuous>  nystatin Powder 1 Application(s) Topical two times a day  pantoprazole    Tablet 40 milliGRAM(s) Oral before breakfast  piperacillin/tazobactam IVPB.. 3.375 Gram(s) IV Intermittent every 8 hours  risperiDONE   Tablet 1 milliGRAM(s) Oral daily  rosuvastatin 10 milliGRAM(s) Oral at bedtime  senna 2 Tablet(s) Oral at bedtime  vitamin A & D Ointment 1 Application(s) Topical daily    MEDICATIONS  (PRN):  acetaminophen     Tablet .. 650 milliGRAM(s) Oral every 6 hours PRN Temp greater or equal to 38C (100.4F), Mild Pain (1 - 3)  dextrose Oral Gel 15 Gram(s) Oral once PRN Blood Glucose LESS THAN 70 milliGRAM(s)/deciliter      New X-rays reviewed:                                                                                  ECHO   Patient is a 84y old  Female who presents with a chief complaint of acute blood loss anemia, cellulitis (01 Jan 2025 09:02)        Over Night Events: no acute events overnight, on 3L nasal cannula. Afebrile. Levophed stopped at 10 AM.     Vital Signs Last 24 Hrs  T(C): 36.1 (02 Jan 2025 07:01), Max: 36.8 (01 Jan 2025 19:00)  T(F): 97 (02 Jan 2025 07:01), Max: 98.3 (01 Jan 2025 19:00)  HR: 79 (02 Jan 2025 09:00) (69 - 139)  BP: 166/72 (01 Jan 2025 16:00) (112/57 - 166/72)  BP(mean): 104 (01 Jan 2025 16:00) (80 - 104)  ABP: 106/45 (02 Jan 2025 09:00) (96/40 - 159/73)  ABP(mean): 65 (02 Jan 2025 09:00) (58 - 104)  RR: 16 (02 Jan 2025 09:00) (13 - 47)  SpO2: 100% (02 Jan 2025 09:00) (88% - 100%)    O2 Parameters below as of 02 Jan 2025 07:00  Patient On (Oxygen Delivery Method): nasal cannula  O2 Flow (L/min): 3          CONSTITUTIONAL:  in NAD    ENT:   Airway patent,   Mouth with normal mucosa.     EYES:   Pupils equal,   Round and reactive to light.    CARDIAC:   Normal rate,   Regular rhythm.      RESPIRATORY:   No wheezing  Bilateral BS  Normal chest expansion  Not tachypneic,  No use of accessory muscles    GASTROINTESTINAL:  Abdomen soft,   Non-tender,   No guarding,     MUSCULOSKELETAL:   Range of motion is not limited    NEUROLOGICAL:   Alert  Follows commands     SKIN:   Skin normal color for race,   Warm and dry  LLE wound       01-01-25 @ 07:01  -  01-02-25 @ 07:00  --------------------------------------------------------  IN:    IV PiggyBack: 200 mL    Norepinephrine: 143.6 mL    Oral Fluid: 1200 mL  Total IN: 1543.6 mL    OUT:    Voided (mL): 2900 mL  Total OUT: 2900 mL    Total NET: -1356.4 mL      01-02-25 @ 07:01  -  01-02-25 @ 10:27  --------------------------------------------------------  IN:    Norepinephrine: 7.7 mL    Oral Fluid: 240 mL  Total IN: 247.7 mL    OUT:    Voided (mL): 400 mL  Total OUT: 400 mL    Total NET: -152.3 mL          LABS:                            7.9    6.16  )-----------( 357      ( 02 Jan 2025 05:30 )             27.8                                               01-02    140  |  95[L]  |  25[H]  ----------------------------<  126[H]  4.7   |  40[H]  |  1.5    Ca    9.6      02 Jan 2025 05:30  Mg     2.2     01-02    TPro  5.8[L]  /  Alb  2.7[L]  /  TBili  0.2  /  DBili  x   /  AST  7   /  ALT  <5  /  AlkPhos  68  01-02                                             Urinalysis Basic - ( 02 Jan 2025 05:30 )    Color: x / Appearance: x / SG: x / pH: x  Gluc: 126 mg/dL / Ketone: x  / Bili: x / Urobili: x   Blood: x / Protein: x / Nitrite: x   Leuk Esterase: x / RBC: x / WBC x   Sq Epi: x / Non Sq Epi: x / Bacteria: x                                                  LIVER FUNCTIONS - ( 02 Jan 2025 05:30 )  Alb: 2.7 g/dL / Pro: 5.8 g/dL / ALK PHOS: 68 U/L / ALT: <5 U/L / AST: 7 U/L / GGT: x                                                                                                                                       MEDICATIONS  (STANDING):  apixaban 2.5 milliGRAM(s) Oral two times a day  chlorhexidine 2% Cloths 1 Application(s) Topical <User Schedule>  dextrose 5%. 1000 milliLiter(s) (100 mL/Hr) IV Continuous <Continuous>  dextrose 5%. 1000 milliLiter(s) (50 mL/Hr) IV Continuous <Continuous>  dextrose 50% Injectable 25 Gram(s) IV Push once  dextrose 50% Injectable 12.5 Gram(s) IV Push once  dextrose 50% Injectable 25 Gram(s) IV Push once  ferrous    sulfate 325 milliGRAM(s) Oral daily  folic acid 1 milliGRAM(s) Oral daily  furosemide    Tablet 40 milliGRAM(s) Oral daily  glucagon  Injectable 1 milliGRAM(s) IntraMuscular once  influenza  Vaccine (HIGH DOSE) 0.5 milliLiter(s) IntraMuscular once  insulin lispro (ADMELOG) corrective regimen sliding scale   SubCutaneous three times a day before meals  lidocaine 1% Injectable 30 milliLiter(s) Local Injection once  melatonin 5 milliGRAM(s) Oral at bedtime  metoprolol succinate ER 25 milliGRAM(s) Oral daily  midodrine 15 milliGRAM(s) Oral every 8 hours  norepinephrine Infusion 0.02 MICROgram(s)/kG/Min (4.25 mL/Hr) IV Continuous <Continuous>  nystatin Powder 1 Application(s) Topical two times a day  pantoprazole    Tablet 40 milliGRAM(s) Oral before breakfast  piperacillin/tazobactam IVPB.. 3.375 Gram(s) IV Intermittent every 8 hours  risperiDONE   Tablet 1 milliGRAM(s) Oral daily  rosuvastatin 10 milliGRAM(s) Oral at bedtime  senna 2 Tablet(s) Oral at bedtime  vitamin A & D Ointment 1 Application(s) Topical daily    MEDICATIONS  (PRN):  acetaminophen     Tablet .. 650 milliGRAM(s) Oral every 6 hours PRN Temp greater or equal to 38C (100.4F), Mild Pain (1 - 3)  dextrose Oral Gel 15 Gram(s) Oral once PRN Blood Glucose LESS THAN 70 milliGRAM(s)/deciliter

## 2025-01-02 NOTE — PROGRESS NOTE ADULT - TIME BILLING
spending 30  minutes on this patient's case:  10  minutes w patient,  10  minutes reviewing the chart,    and  10 minutes speaking to the HO and RN;   also coordinating care and documenting all.

## 2025-01-02 NOTE — PROGRESS NOTE ADULT - ASSESSMENT
IMPRESSION:    AMS - resolved ; 2/2 hypercapnea  Acute on chronic hypercapnic failure  On/OFF BIPAP- trying to [id number] by CC/Pulm  MANJEET/OHS  Hematoma secondary to fall - see surgery f/u; WC POS+    PLAN:    CNS:  Stable when not in respiratory distress.  GOC.    HEENT: Oral care    PULMONARY:  HOB @ 45 degrees.  Aspiration precautions.  Continue BiPAP PRN, should require less as diuresis improves.    CARDIOVASCULAR:  Check BNP, TTE, increase diuresis to 40mg BID.  Continue metoprolol/rate control.  Transiently on levophed when hypotensive.  Resumed AC for AF.  Target MAP >65     follow BMP closely.    GI: GI prophylaxis not indicated.  Advance diet per speech. as able, when able;  Bowel regimen PRN    RENAL:  Follow up lytes.  Correct as needed.  Diuresis as above.    INFECTIOUS DISEASE: Follow up cultures.  Continue Abx per ID. See + VRE- On Zosyn- soon to finish.    HEMATOLOGICAL:  DVT prophylaxis. on AC; + anemia- check FOB for any GI loss;     ENDOCRINE:  Follow up FS.  Insulin protocol if needed.    MUSCULOSKELETAL:  bedrest/offloading.    Plan as per CC/Pulm and ID

## 2025-01-02 NOTE — PROGRESS NOTE ADULT - SUBJECTIVE AND OBJECTIVE BOX
----------Daily Progress Note----------    HISTORY OF PRESENT ILLNESS:  Patient is a 84y old Female who presents with a chief complaint of acute blood loss anemia, cellulitis (02 Jan 2025 10:26)    Currently admitted to medicine with the primary diagnosis of Hematoma of left lower leg       Today is hospital day 9d.     INTERVAL HOSPITAL COURSE / OVERNIGHT EVENTS:    No overnight events    Review of Systems: Otherwise unremarkable     <<<<<PAST MEDICAL & SURGICAL HISTORY>>>>>  Diabetes mellitus    Hypertension    Schizoaffective disorder    Edema  Bilateral LE    Disorder of thyroid, unspecified  Son cannot provide details. States she had "thyroid surgery" decades ago when she was young    No significant past surgical history      ALLERGIES  No Known Allergies      Home Medications:  apixaban 5 mg oral tablet: 1 tab(s) orally every 12 hours (24 Dec 2024 18:43)  Crestor 10 mg oral tablet: 1 tab(s) orally once a day (24 Dec 2024 18:43)  Farxiga 10 mg oral tablet: 1 tab(s) orally once a day (24 Dec 2024 18:43)  ferrous sulfate 325 mg (65 mg elemental iron) oral delayed release tablet: 1 tab(s) orally 2 times a day (24 Dec 2024 18:43)  folic acid 1 mg oral tablet: 1 tab(s) orally once a day (24 Dec 2024 18:43)  Lasix 40 mg oral tablet: 1 tab(s) orally once a day (24 Dec 2024 18:40)  losartan 50 mg oral tablet: 1 tab(s) orally once a day hold for SBP less than 105mmhg (24 Dec 2024 18:43)  metoprolol succinate 25 mg oral tablet, extended release: 1 tab(s) orally once a day HOLD for SBP less than 100 and HR less than 60 (24 Dec 2024 18:43)  nystatin 100,000 units/g topical cream: Apply topically to affected area 2 times a day (24 Dec 2024 18:38)  RisperDAL 1 mg oral tablet: 1 tab(s) orally once a day (at bedtime) (24 Dec 2024 19:15)  senna leaf extract oral tablet: 2 tab(s) orally once a day (at bedtime) (24 Dec 2024 18:43)  silver sulfADIAZINE 1% topical cream: Apply topically to affected area once a day (24 Dec 2024 19:15)  Vitamin A and D topical ointment: Apply topically to affected area once a day Apply to bilateral LE&#x27;s/Feet daily (24 Dec 2024 18:43)  Vitamin D2 50,000 intl units (1.25 mg) oral capsule (obsolete): 1 cap(s) orally every 2 weeks (24 Dec 2024 18:43)        MEDICATIONS  STANDING MEDICATIONS  apixaban 2.5 milliGRAM(s) Oral two times a day  chlorhexidine 2% Cloths 1 Application(s) Topical <User Schedule>  dextrose 5%. 1000 milliLiter(s) IV Continuous <Continuous>  dextrose 5%. 1000 milliLiter(s) IV Continuous <Continuous>  dextrose 50% Injectable 25 Gram(s) IV Push once  dextrose 50% Injectable 12.5 Gram(s) IV Push once  dextrose 50% Injectable 25 Gram(s) IV Push once  ferrous    sulfate 325 milliGRAM(s) Oral daily  folic acid 1 milliGRAM(s) Oral daily  furosemide    Tablet 40 milliGRAM(s) Oral daily  glucagon  Injectable 1 milliGRAM(s) IntraMuscular once  influenza  Vaccine (HIGH DOSE) 0.5 milliLiter(s) IntraMuscular once  insulin lispro (ADMELOG) corrective regimen sliding scale   SubCutaneous three times a day before meals  lidocaine 1% Injectable 30 milliLiter(s) Local Injection once  melatonin 5 milliGRAM(s) Oral at bedtime  metoprolol succinate ER 25 milliGRAM(s) Oral daily  midodrine 15 milliGRAM(s) Oral every 8 hours  norepinephrine Infusion 0.02 MICROgram(s)/kG/Min IV Continuous <Continuous>  nystatin Powder 1 Application(s) Topical two times a day  pantoprazole    Tablet 40 milliGRAM(s) Oral before breakfast  piperacillin/tazobactam IVPB.. 3.375 Gram(s) IV Intermittent every 8 hours  risperiDONE   Tablet 1 milliGRAM(s) Oral daily  rosuvastatin 10 milliGRAM(s) Oral at bedtime  senna 2 Tablet(s) Oral at bedtime  vitamin A & D Ointment 1 Application(s) Topical daily    PRN MEDICATIONS  acetaminophen     Tablet .. 650 milliGRAM(s) Oral every 6 hours PRN  dextrose Oral Gel 15 Gram(s) Oral once PRN    VITALS:  T(F): 97  HR: 79  BP: 166/72  RR: 18  SpO2: 100%    <<<<<LABS>>>>>                        7.9    6.16  )-----------( 357      ( 02 Jan 2025 05:30 )             27.8     01-02    140  |  95[L]  |  25[H]  ----------------------------<  126[H]  4.7   |  40[H]  |  1.5    Ca    9.6      02 Jan 2025 05:30  Mg     2.2     01-02    TPro  5.8[L]  /  Alb  2.7[L]  /  TBili  0.2  /  DBili  x   /  AST  7   /  ALT  <5  /  AlkPhos  68  01-02      Urinalysis Basic - ( 02 Jan 2025 05:30 )    Color: x / Appearance: x / SG: x / pH: x  Gluc: 126 mg/dL / Ketone: x  / Bili: x / Urobili: x   Blood: x / Protein: x / Nitrite: x   Leuk Esterase: x / RBC: x / WBC x   Sq Epi: x / Non Sq Epi: x / Bacteria: x          [id number]        <<<<<RADIOLOGY>>>>>    < from: Xray Chest 1 View AP/PA (01.02.25 @ 05:56) >  IMPRESSION:    Stable left opacity/effusion and mild improvement in right lung opacities.    < end of copied text >      <<<<<PHYSICAL EXAM>>>>>  GENERAL: NAD, resting comfortably in bed  HEENT: Normocephalic, atraumatic  PULMONARY: Clear to auscultation bilaterally. No rales, rhonchi, or wheezing.  CARDIOVASCULAR: Regular rate and rhythm, S1-S2, no murmurs  GASTROINTESTINAL: Soft, non-tender, non-distended, no guarding.  RENAL: No CVA tenderness.  SKIN/EXTREMITIES: 1+ LE edema b/l  NEUROLOGIC: AAOX1-2      -----------------------------------------------------------------------------------------------------------------------------------------------------------------------------------------------

## 2025-01-03 LAB
ALBUMIN SERPL ELPH-MCNC: 2.8 G/DL — LOW (ref 3.5–5.2)
ALP SERPL-CCNC: 66 U/L — SIGNIFICANT CHANGE UP (ref 30–115)
ALT FLD-CCNC: <5 U/L — SIGNIFICANT CHANGE UP (ref 0–41)
ANION GAP SERPL CALC-SCNC: 6 MMOL/L — LOW (ref 7–14)
AST SERPL-CCNC: 7 U/L — SIGNIFICANT CHANGE UP (ref 0–41)
BILIRUB SERPL-MCNC: <0.2 MG/DL — SIGNIFICANT CHANGE UP (ref 0.2–1.2)
BLD GP AB SCN SERPL QL: SIGNIFICANT CHANGE UP
BUN SERPL-MCNC: 25 MG/DL — HIGH (ref 10–20)
CALCIUM SERPL-MCNC: 10 MG/DL — SIGNIFICANT CHANGE UP (ref 8.4–10.5)
CHLORIDE SERPL-SCNC: 94 MMOL/L — LOW (ref 98–110)
CO2 SERPL-SCNC: 40 MMOL/L — HIGH (ref 17–32)
CREAT SERPL-MCNC: 1.6 MG/DL — HIGH (ref 0.7–1.5)
EGFR: 31 ML/MIN/1.73M2 — LOW
GLUCOSE BLDC GLUCOMTR-MCNC: 138 MG/DL — HIGH (ref 70–99)
GLUCOSE BLDC GLUCOMTR-MCNC: 176 MG/DL — HIGH (ref 70–99)
GLUCOSE BLDC GLUCOMTR-MCNC: 183 MG/DL — HIGH (ref 70–99)
GLUCOSE BLDC GLUCOMTR-MCNC: 269 MG/DL — HIGH (ref 70–99)
GLUCOSE SERPL-MCNC: 103 MG/DL — HIGH (ref 70–99)
HCT VFR BLD CALC: 27.3 % — LOW (ref 37–47)
HCT VFR BLD CALC: 30.1 % — LOW (ref 37–47)
HGB BLD-MCNC: 7.8 G/DL — LOW (ref 12–16)
HGB BLD-MCNC: 8.4 G/DL — LOW (ref 12–16)
MAGNESIUM SERPL-MCNC: 2.1 MG/DL — SIGNIFICANT CHANGE UP (ref 1.8–2.4)
MCHC RBC-ENTMCNC: 26.6 PG — LOW (ref 27–31)
MCHC RBC-ENTMCNC: 27.4 PG — SIGNIFICANT CHANGE UP (ref 27–31)
MCHC RBC-ENTMCNC: 27.9 G/DL — LOW (ref 32–37)
MCHC RBC-ENTMCNC: 28.6 G/DL — LOW (ref 32–37)
MCV RBC AUTO: 95.3 FL — SIGNIFICANT CHANGE UP (ref 81–99)
MCV RBC AUTO: 95.8 FL — SIGNIFICANT CHANGE UP (ref 81–99)
NRBC # BLD: 0 /100 WBCS — SIGNIFICANT CHANGE UP (ref 0–0)
NRBC # BLD: 0 /100 WBCS — SIGNIFICANT CHANGE UP (ref 0–0)
PLATELET # BLD AUTO: 279 K/UL — SIGNIFICANT CHANGE UP (ref 130–400)
PLATELET # BLD AUTO: 310 K/UL — SIGNIFICANT CHANGE UP (ref 130–400)
PMV BLD: 10 FL — SIGNIFICANT CHANGE UP (ref 7.4–10.4)
PMV BLD: 9.8 FL — SIGNIFICANT CHANGE UP (ref 7.4–10.4)
POTASSIUM SERPL-MCNC: 4.6 MMOL/L — SIGNIFICANT CHANGE UP (ref 3.5–5)
POTASSIUM SERPL-SCNC: 4.6 MMOL/L — SIGNIFICANT CHANGE UP (ref 3.5–5)
PROT SERPL-MCNC: 5.7 G/DL — LOW (ref 6–8)
RBC # BLD: 2.85 M/UL — LOW (ref 4.2–5.4)
RBC # BLD: 3.16 M/UL — LOW (ref 4.2–5.4)
RBC # FLD: 14.6 % — HIGH (ref 11.5–14.5)
RBC # FLD: 14.6 % — HIGH (ref 11.5–14.5)
SODIUM SERPL-SCNC: 140 MMOL/L — SIGNIFICANT CHANGE UP (ref 135–146)
WBC # BLD: 7.49 K/UL — SIGNIFICANT CHANGE UP (ref 4.8–10.8)
WBC # BLD: 9.46 K/UL — SIGNIFICANT CHANGE UP (ref 4.8–10.8)
WBC # FLD AUTO: 7.49 K/UL — SIGNIFICANT CHANGE UP (ref 4.8–10.8)
WBC # FLD AUTO: 9.46 K/UL — SIGNIFICANT CHANGE UP (ref 4.8–10.8)

## 2025-01-03 PROCEDURE — 76882 US LMTD JT/FCL EVL NVASC XTR: CPT | Mod: 26,RT

## 2025-01-03 PROCEDURE — 99232 SBSQ HOSP IP/OBS MODERATE 35: CPT

## 2025-01-03 RX ORDER — FUROSEMIDE 20 MG
20 TABLET ORAL DAILY
Refills: 0 | Status: DISCONTINUED | OUTPATIENT
Start: 2025-01-04 | End: 2025-01-06

## 2025-01-03 RX ORDER — FUROSEMIDE 20 MG
20 TABLET ORAL DAILY
Refills: 0 | Status: DISCONTINUED | OUTPATIENT
Start: 2025-01-03 | End: 2025-01-03

## 2025-01-03 RX ADMIN — MIDODRINE HYDROCHLORIDE 15 MILLIGRAM(S): 5 TABLET ORAL at 21:13

## 2025-01-03 RX ADMIN — CHLORHEXIDINE GLUCONATE 1 APPLICATION(S): 1.2 RINSE ORAL at 06:08

## 2025-01-03 RX ADMIN — MIDODRINE HYDROCHLORIDE 15 MILLIGRAM(S): 5 TABLET ORAL at 14:06

## 2025-01-03 RX ADMIN — LANOLIN AND PETROLATUM 1 APPLICATION(S): 136.4; 469.9 OINTMENT TOPICAL at 11:37

## 2025-01-03 RX ADMIN — Medication 1 MILLIGRAM(S): at 11:35

## 2025-01-03 RX ADMIN — PIPERACILLIN AND TAZOBACTAM 25 GRAM(S): 3; .375 INJECTION, POWDER, LYOPHILIZED, FOR SOLUTION INTRAVENOUS at 06:06

## 2025-01-03 RX ADMIN — Medication 25 MILLIGRAM(S): at 06:06

## 2025-01-03 RX ADMIN — Medication 1: at 07:38

## 2025-01-03 RX ADMIN — Medication 325 MILLIGRAM(S): at 11:35

## 2025-01-03 RX ADMIN — NYSTATIN TOPICAL POWDER 1 APPLICATION(S): 100000 POWDER TOPICAL at 17:44

## 2025-01-03 RX ADMIN — PIPERACILLIN AND TAZOBACTAM 25 GRAM(S): 3; .375 INJECTION, POWDER, LYOPHILIZED, FOR SOLUTION INTRAVENOUS at 14:06

## 2025-01-03 RX ADMIN — APIXABAN 2.5 MILLIGRAM(S): 5 TABLET, FILM COATED ORAL at 17:43

## 2025-01-03 RX ADMIN — NYSTATIN TOPICAL POWDER 1 APPLICATION(S): 100000 POWDER TOPICAL at 06:08

## 2025-01-03 RX ADMIN — MIDODRINE HYDROCHLORIDE 15 MILLIGRAM(S): 5 TABLET ORAL at 06:05

## 2025-01-03 RX ADMIN — ROSUVASTATIN 10 MILLIGRAM(S): 40 TABLET, FILM COATED ORAL at 21:13

## 2025-01-03 RX ADMIN — SENNOSIDES 2 TABLET(S): 8.6 TABLET, FILM COATED ORAL at 21:13

## 2025-01-03 RX ADMIN — APIXABAN 2.5 MILLIGRAM(S): 5 TABLET, FILM COATED ORAL at 06:06

## 2025-01-03 RX ADMIN — PIPERACILLIN AND TAZOBACTAM 25 GRAM(S): 3; .375 INJECTION, POWDER, LYOPHILIZED, FOR SOLUTION INTRAVENOUS at 21:13

## 2025-01-03 RX ADMIN — Medication 40 MILLIGRAM(S): at 06:06

## 2025-01-03 RX ADMIN — RISPERIDONE 1 MILLIGRAM(S): 0.5 TABLET ORAL at 11:36

## 2025-01-03 RX ADMIN — PANTOPRAZOLE 40 MILLIGRAM(S): 40 TABLET, DELAYED RELEASE ORAL at 06:06

## 2025-01-03 RX ADMIN — Medication 1: at 11:36

## 2025-01-03 RX ADMIN — Medication 5 MILLIGRAM(S): at 21:13

## 2025-01-03 NOTE — PROGRESS NOTE ADULT - SUBJECTIVE AND OBJECTIVE BOX
CHELA GALLAGHER  84y  Female      Patient is a 84y old  Female who presents with a chief complaint of acute blood loss anemia, cellulitis (02 Jan 2025 13:30)        REVIEW OF SYSTEMS:  CONSTITUTIONAL: No fever, weight loss, or fatigue  RESPIRATORY: No cough, wheezing, chills or hemoptysis; No shortness of breath  CARDIOVASCULAR: No chest pain, palpitations, dizziness, or leg swelling  GASTROINTESTINAL: No abdominal or epigastric pain. No nausea, vomiting, or hematemesis; No diarrhea or constipation. No melena or hematochezia.  GENITOURINARY: No dysuria, frequency, hematuria,  incontinence+  PSYCHIATRIC: No depression, anxiety, mood swings, or difficulty sleeping  HEME/LYMPH: No easy bruising, or bleeding gums  ALLERY AND IMMUNOLOGIC: No hives or eczema  FAMILY HISTORY:    T(C): 36.4 (01-03-25 @ 07:01), Max: 36.7 (01-02-25 @ 19:00)  HR: 94 (01-03-25 @ 06:00) (70 - 102)  BP: 101/58 (01-02-25 @ 19:15) (101/58 - 101/58)  RR: 20 (01-03-25 @ 07:01) (14 - 38)  SpO2: 96% (01-03-25 @ 06:00) (92% - 100%)  Wt(kg): --Vital Signs Last 24 Hrs  T(C): 36.4 (03 Jan 2025 07:01), Max: 36.7 (02 Jan 2025 19:00)  T(F): 97.5 (03 Jan 2025 07:01), Max: 98 (02 Jan 2025 19:00)  HR: 94 (03 Jan 2025 06:00) (70 - 102)  BP: 101/58 (02 Jan 2025 19:15) (101/58 - 101/58)  BP(mean): 75 (02 Jan 2025 19:15) (75 - 75)  RR: 20 (03 Jan 2025 07:01) (14 - 38)  SpO2: 96% (03 Jan 2025 06:00) (92% - 100%)    Parameters below as of 03 Jan 2025 06:00  Patient On (Oxygen Delivery Method): nasal cannula  O2 Flow (L/min): 3    No Known Allergies      PHYSICAL EXAM:  GENERAL: NAD,   HEAD:  Atraumatic, Normocephalic  EYES: EOMI, PERRLA, conjunctiva and sclera clear  ENMT: No tonsillar erythema, exudates, or enlargement;  NECK: Supple, No JVD, Normal thyroid  NERVOUS SYSTEM:  Alert & Oriented   CHEST/LUNG: Clear to percussion bilaterally; No rales, rhonchi, wheezing, or rubs  HEART: Regular rate and rhythm; No murmurs, rubs, or gallops  ABDOMEN: Soft, Nontender, Nondistended; Bowel sounds present  EXTREMITIES: , No clubbing, cyanosis, or edema  LYMPH: No lymphadenopathy noted  SKIN: No rashes or lesions      LABS:  01-03    140  |  94[L]  |  25[H]  ----------------------------<  103[H]  4.6   |  40[H]  |  1.6[H]    Ca    10.0      03 Jan 2025 05:41  Mg     2.1     01-03    TPro  5.7[L]  /  Alb  2.8[L]  /  TBili  <0.2  /  DBili  x   /  AST  7   /  ALT  <5  /  AlkPhos  66  01-03                          7.8    7.49  )-----------( 310      ( 03 Jan 2025 05:41 )             27.3         RADIOLOGY & ADDITIONAL TESTS:    MEDICATION:  acetaminophen     Tablet .. 650 milliGRAM(s) Oral every 6 hours PRN  apixaban 2.5 milliGRAM(s) Oral two times a day  chlorhexidine 2% Cloths 1 Application(s) Topical <User Schedule>  dextrose 5%. 1000 milliLiter(s) IV Continuous <Continuous>  dextrose 5%. 1000 milliLiter(s) IV Continuous <Continuous>  dextrose 50% Injectable 25 Gram(s) IV Push once  dextrose 50% Injectable 12.5 Gram(s) IV Push once  dextrose 50% Injectable 25 Gram(s) IV Push once  dextrose Oral Gel 15 Gram(s) Oral once PRN  ferrous    sulfate 325 milliGRAM(s) Oral daily  folic acid 1 milliGRAM(s) Oral daily  furosemide    Tablet 40 milliGRAM(s) Oral daily  glucagon  Injectable 1 milliGRAM(s) IntraMuscular once  influenza  Vaccine (HIGH DOSE) 0.5 milliLiter(s) IntraMuscular once  insulin lispro (ADMELOG) corrective regimen sliding scale   SubCutaneous three times a day before meals  lidocaine 1% Injectable 30 milliLiter(s) Local Injection once  melatonin 5 milliGRAM(s) Oral at bedtime  metoprolol succinate ER 25 milliGRAM(s) Oral daily  midodrine 15 milliGRAM(s) Oral every 8 hours  nystatin Powder 1 Application(s) Topical two times a day  pantoprazole    Tablet 40 milliGRAM(s) Oral before breakfast  piperacillin/tazobactam IVPB.. 3.375 Gram(s) IV Intermittent every 8 hours  risperiDONE   Tablet 1 milliGRAM(s) Oral daily  rosuvastatin 10 milliGRAM(s) Oral at bedtime  senna 2 Tablet(s) Oral at bedtime  vitamin A & D Ointment 1 Application(s) Topical daily      HEALTH ISSUES - PROBLEM Dx:      HTN, chronic LE edema, bipolar disorder, tachybrady syndrome (declining PPM), A fib (on Eliquis), mild aortic stenosis, presenting s/p mechanical fall 3 days ago (-head trauma, +AC). Patient sustained wound to left posterior lateral lower extremity with hematoma, that started rebleeding today. Denies fever, chills, n/v, cp, sob, palpitations, cough, back pain, numbness/tingling, abd pain, changes in BM, and urinary symptoms,     # s/p rapid response Acute Respiratory distress ,failure probably from CHF Acute on chronic hypercapnic failure  hx a fib ,lasix 40mg x1 d/c IVF ,s/p BIPAP  cardiologist ,pulmonary   #LLE subcutaneous hematoma and cellulitis on zosyn  #Fall   -CTA LE: A subcutaneous hematoma is noted along the lateral and posterior aspect of the left calf. The hematoma measures approximately 11.5 x 2 x 18 cm.  -zosyn   -Surgery following: Had I&D- see 12/25; continue daily wound care (telfa/gauze/abd/kerlix/ACE wrap)   -ID consult noted  -Wound care consult - noted: Had I&D yesterday, 12/25  -Pain control prn   -PT consult   -Fall precautions ,s/p levophed    #Acute blood loss anemia   #H/o iron deficiency anemia   -Hgb 6.7 (10.2 in 7/2024). Baseline Hgb ~8- 9  -Hold Eliquis in setting of acute blood loss anemia   -F/U post transfusion CBC improved.  -C/w ferrous sulfate 325 mg   - check anemia labs        #Acute hypercarbic respiratory failure likely 2/2 MANJEET   -BiPAP HS 14/8  -Supplemental O2 prn     #DM  -Holding Farxiga 10 mg

## 2025-01-03 NOTE — PROGRESS NOTE ADULT - SUBJECTIVE AND OBJECTIVE BOX
Patient is a 84y old  Female who presents with a chief complaint of acute blood loss anemia, cellulitis (03 Jan 2025 08:02)        Over Night Events:  Refused NIV QHS. Afebrile. Off pressors.       ROS:     All ROS are negative except HPI         PHYSICAL EXAM    ICU Vital Signs Last 24 Hrs  T(C): 36.4 (03 Jan 2025 07:01), Max: 36.7 (02 Jan 2025 19:00)  T(F): 97.5 (03 Jan 2025 07:01), Max: 98 (02 Jan 2025 19:00)  HR: 88 (03 Jan 2025 08:00) (70 - 102)  BP: 101/58 (02 Jan 2025 19:15) (101/58 - 101/58)  BP(mean): 75 (02 Jan 2025 19:15) (75 - 75)  ABP: 116/52 (03 Jan 2025 08:00) (99/47 - 129/56)  ABP(mean): 72 (03 Jan 2025 08:00) (62 - 85)  RR: 20 (03 Jan 2025 09:00) (14 - 38)  SpO2: 98% (03 Jan 2025 09:00) (92% - 100%)    O2 Parameters below as of 03 Jan 2025 09:00  Patient On (Oxygen Delivery Method): nasal cannula  O2 Flow (L/min): 3          CONSTITUTIONAL:  in NAD    ENT:   Airway patent,   Mouth with normal mucosa.   No thrush    EYES:   Pupils equal,   Round and reactive to light.    CARDIAC:   Normal rate,   Regular rhythm.    LE edema    Vascular:  Normal systolic impulse  No Carotid bruits    RESPIRATORY:   decreased bilateral BS  Normal chest expansion  Not tachypneic,  No use of accessory muscles    GASTROINTESTINAL:  Abdomen soft,   Non-tender,   No guarding,   + BS    MUSCULOSKELETAL:   Range of motion is not limited,  No clubbing, cyanosis    NEUROLOGICAL:   Alert   follows commands     SKIN:   Skin normal color for race,   Warm and dry   RLE swelling, hematoma?       01-02-25 @ 07:01  -  01-03-25 @ 07:00  --------------------------------------------------------  IN:    IV PiggyBack: 200 mL    Norepinephrine: 7.7 mL    Oral Fluid: 1320 mL  Total IN: 1527.7 mL    OUT:    Voided (mL): 2050 mL  Total OUT: 2050 mL    Total NET: -522.3 mL      01-03-25 @ 07:01  -  01-03-25 @ 10:28  --------------------------------------------------------  IN:    Oral Fluid: 240 mL  Total IN: 240 mL    OUT:  Total OUT: 0 mL    Total NET: 240 mL          LABS:                            7.8    7.49  )-----------( 310      ( 03 Jan 2025 05:41 )             27.3                                               01-03    140  |  94[L]  |  25[H]  ----------------------------<  103[H]  4.6   |  40[H]  |  1.6[H]    Ca    10.0      03 Jan 2025 05:41  Mg     2.1     01-03    TPro  5.7[L]  /  Alb  2.8[L]  /  TBili  <0.2  /  DBili  x   /  AST  7   /  ALT  <5  /  AlkPhos  66  01-03                                             Urinalysis Basic - ( 03 Jan 2025 05:41 )    Color: x / Appearance: x / SG: x / pH: x  Gluc: 103 mg/dL / Ketone: x  / Bili: x / Urobili: x   Blood: x / Protein: x / Nitrite: x   Leuk Esterase: x / RBC: x / WBC x   Sq Epi: x / Non Sq Epi: x / Bacteria: x                                                  LIVER FUNCTIONS - ( 03 Jan 2025 05:41 )  Alb: 2.8 g/dL / Pro: 5.7 g/dL / ALK PHOS: 66 U/L / ALT: <5 U/L / AST: 7 U/L / GGT: x                                                                                                                                       MEDICATIONS  (STANDING):  apixaban 2.5 milliGRAM(s) Oral two times a day  chlorhexidine 2% Cloths 1 Application(s) Topical <User Schedule>  dextrose 5%. 1000 milliLiter(s) (100 mL/Hr) IV Continuous <Continuous>  dextrose 5%. 1000 milliLiter(s) (50 mL/Hr) IV Continuous <Continuous>  dextrose 50% Injectable 25 Gram(s) IV Push once  dextrose 50% Injectable 12.5 Gram(s) IV Push once  dextrose 50% Injectable 25 Gram(s) IV Push once  ferrous    sulfate 325 milliGRAM(s) Oral daily  folic acid 1 milliGRAM(s) Oral daily  furosemide    Tablet 40 milliGRAM(s) Oral daily  glucagon  Injectable 1 milliGRAM(s) IntraMuscular once  influenza  Vaccine (HIGH DOSE) 0.5 milliLiter(s) IntraMuscular once  insulin lispro (ADMELOG) corrective regimen sliding scale   SubCutaneous three times a day before meals  lidocaine 1% Injectable 30 milliLiter(s) Local Injection once  melatonin 5 milliGRAM(s) Oral at bedtime  metoprolol succinate ER 25 milliGRAM(s) Oral daily  midodrine 15 milliGRAM(s) Oral every 8 hours  nystatin Powder 1 Application(s) Topical two times a day  pantoprazole    Tablet 40 milliGRAM(s) Oral before breakfast  piperacillin/tazobactam IVPB.. 3.375 Gram(s) IV Intermittent every 8 hours  risperiDONE   Tablet 1 milliGRAM(s) Oral daily  rosuvastatin 10 milliGRAM(s) Oral at bedtime  senna 2 Tablet(s) Oral at bedtime  vitamin A & D Ointment 1 Application(s) Topical daily    MEDICATIONS  (PRN):  acetaminophen     Tablet .. 650 milliGRAM(s) Oral every 6 hours PRN Temp greater or equal to 38C (100.4F), Mild Pain (1 - 3)  dextrose Oral Gel 15 Gram(s) Oral once PRN Blood Glucose LESS THAN 70 milliGRAM(s)/deciliter      New X-rays reviewed:                                                                                  ECHO          Patient is a 84y old  Female who presents with a chief complaint of acute blood loss anemia, cellulitis (03 Jan 2025 08:02)        Over Night Events no acute events overnight, refused NIV QHS. Afebrile. Off pressors since yesterday    Vital Signs Last 24 Hrs  T(C): 36.4 (03 Jan 2025 07:01), Max: 36.7 (02 Jan 2025 19:00)  T(F): 97.5 (03 Jan 2025 07:01), Max: 98 (02 Jan 2025 19:00)  HR: 88 (03 Jan 2025 08:00) (70 - 102)  BP: 101/58 (02 Jan 2025 19:15) (101/58 - 101/58)  BP(mean): 75 (02 Jan 2025 19:15) (75 - 75)  ABP: 116/52 (03 Jan 2025 08:00) (99/47 - 129/56)  ABP(mean): 72 (03 Jan 2025 08:00) (62 - 85)  RR: 20 (03 Jan 2025 09:00) (14 - 38)  SpO2: 98% (03 Jan 2025 09:00) (92% - 100%)    O2 Parameters below as of 03 Jan 2025 09:00  Patient On (Oxygen Delivery Method): nasal cannula  O2 Flow (L/min): 3          CONSTITUTIONAL:  in NAD    ENT:   Airway patent,   Mouth with normal mucosa.   No thrush    EYES:   Pupils equal,   Round and reactive to light.    CARDIAC:   Normal rate,   Regular rhythm.    LE edema      RESPIRATORY:   decreased bilateral BS  Normal chest expansion  Not tachypneic,  No use of accessory muscles    GASTROINTESTINAL:  Abdomen soft,   Non-tender,   No guarding,   + BS    MUSCULOSKELETAL:   Range of motion is not limited    NEUROLOGICAL:   Alert   follows commands     SKIN:   Skin normal color for race,   Warm and dry   RLE swelling      01-02-25 @ 07:01  -  01-03-25 @ 07:00  --------------------------------------------------------  IN:    IV PiggyBack: 200 mL    Norepinephrine: 7.7 mL    Oral Fluid: 1320 mL  Total IN: 1527.7 mL    OUT:    Voided (mL): 2050 mL  Total OUT: 2050 mL    Total NET: -522.3 mL      01-03-25 @ 07:01  -  01-03-25 @ 10:28  --------------------------------------------------------  IN:    Oral Fluid: 240 mL  Total IN: 240 mL    OUT:  Total OUT: 0 mL    Total NET: 240 mL          LABS:                            7.8    7.49  )-----------( 310      ( 03 Jan 2025 05:41 )             27.3                                               01-03    140  |  94[L]  |  25[H]  ----------------------------<  103[H]  4.6   |  40[H]  |  1.6[H]    Ca    10.0      03 Jan 2025 05:41  Mg     2.1     01-03    TPro  5.7[L]  /  Alb  2.8[L]  /  TBili  <0.2  /  DBili  x   /  AST  7   /  ALT  <5  /  AlkPhos  66  01-03                                             Urinalysis Basic - ( 03 Jan 2025 05:41 )    Color: x / Appearance: x / SG: x / pH: x  Gluc: 103 mg/dL / Ketone: x  / Bili: x / Urobili: x   Blood: x / Protein: x / Nitrite: x   Leuk Esterase: x / RBC: x / WBC x   Sq Epi: x / Non Sq Epi: x / Bacteria: x                                                  LIVER FUNCTIONS - ( 03 Jan 2025 05:41 )  Alb: 2.8 g/dL / Pro: 5.7 g/dL / ALK PHOS: 66 U/L / ALT: <5 U/L / AST: 7 U/L / GGT: x                                                                                                                                       MEDICATIONS  (STANDING):  apixaban 2.5 milliGRAM(s) Oral two times a day  chlorhexidine 2% Cloths 1 Application(s) Topical <User Schedule>  dextrose 5%. 1000 milliLiter(s) (100 mL/Hr) IV Continuous <Continuous>  dextrose 5%. 1000 milliLiter(s) (50 mL/Hr) IV Continuous <Continuous>  dextrose 50% Injectable 25 Gram(s) IV Push once  dextrose 50% Injectable 12.5 Gram(s) IV Push once  dextrose 50% Injectable 25 Gram(s) IV Push once  ferrous    sulfate 325 milliGRAM(s) Oral daily  folic acid 1 milliGRAM(s) Oral daily  furosemide    Tablet 40 milliGRAM(s) Oral daily  glucagon  Injectable 1 milliGRAM(s) IntraMuscular once  influenza  Vaccine (HIGH DOSE) 0.5 milliLiter(s) IntraMuscular once  insulin lispro (ADMELOG) corrective regimen sliding scale   SubCutaneous three times a day before meals  lidocaine 1% Injectable 30 milliLiter(s) Local Injection once  melatonin 5 milliGRAM(s) Oral at bedtime  metoprolol succinate ER 25 milliGRAM(s) Oral daily  midodrine 15 milliGRAM(s) Oral every 8 hours  nystatin Powder 1 Application(s) Topical two times a day  pantoprazole    Tablet 40 milliGRAM(s) Oral before breakfast  piperacillin/tazobactam IVPB.. 3.375 Gram(s) IV Intermittent every 8 hours  risperiDONE   Tablet 1 milliGRAM(s) Oral daily  rosuvastatin 10 milliGRAM(s) Oral at bedtime  senna 2 Tablet(s) Oral at bedtime  vitamin A & D Ointment 1 Application(s) Topical daily    MEDICATIONS  (PRN):  acetaminophen     Tablet .. 650 milliGRAM(s) Oral every 6 hours PRN Temp greater or equal to 38C (100.4F), Mild Pain (1 - 3)  dextrose Oral Gel 15 Gram(s) Oral once PRN Blood Glucose LESS THAN 70 milliGRAM(s)/deciliter

## 2025-01-03 NOTE — PROGRESS NOTE ADULT - ASSESSMENT
IMPRESSION:    Toxic Metabolic Encephalopathy resolved   Acute on Chronic Hypercapnic Respiratory Failure  MANJEET/OHS  Infected Hematoma SP I&D   BRYAN, resolved     PLAN:    CNS: avoid sedatives. Delirium precautions.    HEENT: Oral care    PULMONARY:  HOB @ 45 degrees.  Aspiration precautions.  Continue BiPAP QHS & PRN. Refusing NIV intermittently. ABG today.      CARDIOVASCULAR: TTE with EF 55%. Cardiology follow up, overall net negative, decrease lasix to 20mg PO daily for now. Continue metoprolol/rate control. Clear to resume AC per Sx.     GI: GI prophylaxis not indicated.  Advance diet per speech.  Bowel regimen PRN    RENAL:  Follow up lytes.  Correct as needed.      INFECTIOUS DISEASE: Follow up cultures.  Continue Abx per ID.    HEMATOLOGICAL:  DVT prophylaxis: heparin SQ.    ENDOCRINE:  Follow up FS.  Insulin protocol if needed.    MUSCULOSKELETAL:  OOBTC, surgery follow-up for LLE    DC a line  SDU monitoring.  IMPRESSION:    Toxic Metabolic Encephalopathy resolved   Acute on Chronic Hypercapnic Respiratory Failure  MANJEET/OHS  Infected Hematoma SP I&D   BRYAN, resolved     PLAN:    CNS: avoid sedatives. Delirium precautions.    HEENT: Oral care    PULMONARY:  HOB @ 45 degrees.  Aspiration precautions.  Continue BiPAP QHS & PRN. Refusing NIV intermittently.     CARDIOVASCULAR: TTE with EF 55%. Cardiology follow up, overall net negative, decrease lasix to 20mg PO daily for now. Continue metoprolol/rate control. Clear to resume AC per Sx.     GI: GI prophylaxis not indicated.  Advance diet per speech.  Bowel regimen PRN    RENAL:  Follow up lytes.  Correct as needed.      INFECTIOUS DISEASE: Follow up cultures.  Continue Abx per ID.    HEMATOLOGICAL:  DVT prophylaxis    ENDOCRINE:  Follow up FS.  Insulin protocol if needed.    MUSCULOSKELETAL:  OOBTC, surgery follow-up for LLE    DC a line    Downgrade to Telemetry

## 2025-01-03 NOTE — PROGRESS NOTE ADULT - ATTENDING COMMENTS
as above. Daily dressing changes as detailed above.
I&D bedside. f/u cx. local wound care. Pt and son questions answered. Transition to wound VAC eventually as outpt.
IMPRESSION:    Toxic Metabolic Encephalopathy resolved   Acute on Chronic Hypercapnic Respiratory Failure  MANJEET/OHS  Infected Hematoma SP I&D   BRYAN, resolved
IMPRESSION:    Toxic Metabolic Encephalopathy resolved   Acute on Chronic Hypercapnic Respiratory Failure  MANJEET/OHS  Infected Hematoma SP I&D   BRYAN, resolved
IMPRESSION:    Toxic Metabolic Encephalopathy resolved   Acute on Chronic Hypercapnic Respiratory Failure  MANJEET/OHS  Infected Hematoma SP I&D   BRYAN, improving    Patient seen and examined, plan as above.

## 2025-01-04 LAB
ALBUMIN SERPL ELPH-MCNC: 2.9 G/DL — LOW (ref 3.5–5.2)
ALP SERPL-CCNC: 63 U/L — SIGNIFICANT CHANGE UP (ref 30–115)
ALT FLD-CCNC: <5 U/L — SIGNIFICANT CHANGE UP (ref 0–41)
ANION GAP SERPL CALC-SCNC: 9 MMOL/L — SIGNIFICANT CHANGE UP (ref 7–14)
AST SERPL-CCNC: 6 U/L — SIGNIFICANT CHANGE UP (ref 0–41)
BILIRUB SERPL-MCNC: <0.2 MG/DL — SIGNIFICANT CHANGE UP (ref 0.2–1.2)
BUN SERPL-MCNC: 29 MG/DL — HIGH (ref 10–20)
CALCIUM SERPL-MCNC: 9.9 MG/DL — SIGNIFICANT CHANGE UP (ref 8.4–10.5)
CHLORIDE SERPL-SCNC: 96 MMOL/L — LOW (ref 98–110)
CO2 SERPL-SCNC: 37 MMOL/L — HIGH (ref 17–32)
CREAT SERPL-MCNC: 1.5 MG/DL — SIGNIFICANT CHANGE UP (ref 0.7–1.5)
EGFR: 34 ML/MIN/1.73M2 — LOW
GLUCOSE BLDC GLUCOMTR-MCNC: 165 MG/DL — HIGH (ref 70–99)
GLUCOSE BLDC GLUCOMTR-MCNC: 169 MG/DL — HIGH (ref 70–99)
GLUCOSE BLDC GLUCOMTR-MCNC: 178 MG/DL — HIGH (ref 70–99)
GLUCOSE SERPL-MCNC: 118 MG/DL — HIGH (ref 70–99)
HCT VFR BLD CALC: 27.9 % — LOW (ref 37–47)
HGB BLD-MCNC: 7.9 G/DL — LOW (ref 12–16)
MAGNESIUM SERPL-MCNC: 2.2 MG/DL — SIGNIFICANT CHANGE UP (ref 1.8–2.4)
MCHC RBC-ENTMCNC: 26.7 PG — LOW (ref 27–31)
MCHC RBC-ENTMCNC: 28.3 G/DL — LOW (ref 32–37)
MCV RBC AUTO: 94.3 FL — SIGNIFICANT CHANGE UP (ref 81–99)
NRBC # BLD: 0 /100 WBCS — SIGNIFICANT CHANGE UP (ref 0–0)
PLATELET # BLD AUTO: 275 K/UL — SIGNIFICANT CHANGE UP (ref 130–400)
PMV BLD: 10.3 FL — SIGNIFICANT CHANGE UP (ref 7.4–10.4)
POTASSIUM SERPL-MCNC: 4.6 MMOL/L — SIGNIFICANT CHANGE UP (ref 3.5–5)
POTASSIUM SERPL-SCNC: 4.6 MMOL/L — SIGNIFICANT CHANGE UP (ref 3.5–5)
PROT SERPL-MCNC: 5.7 G/DL — LOW (ref 6–8)
RBC # BLD: 2.96 M/UL — LOW (ref 4.2–5.4)
RBC # FLD: 14.6 % — HIGH (ref 11.5–14.5)
SODIUM SERPL-SCNC: 142 MMOL/L — SIGNIFICANT CHANGE UP (ref 135–146)
WBC # BLD: 8.15 K/UL — SIGNIFICANT CHANGE UP (ref 4.8–10.8)
WBC # FLD AUTO: 8.15 K/UL — SIGNIFICANT CHANGE UP (ref 4.8–10.8)

## 2025-01-04 PROCEDURE — 99024 POSTOP FOLLOW-UP VISIT: CPT

## 2025-01-04 PROCEDURE — 99232 SBSQ HOSP IP/OBS MODERATE 35: CPT

## 2025-01-04 RX ADMIN — Medication 1: at 08:08

## 2025-01-04 RX ADMIN — Medication 5 MILLIGRAM(S): at 21:35

## 2025-01-04 RX ADMIN — Medication 1: at 11:52

## 2025-01-04 RX ADMIN — Medication 325 MILLIGRAM(S): at 11:54

## 2025-01-04 RX ADMIN — APIXABAN 2.5 MILLIGRAM(S): 5 TABLET, FILM COATED ORAL at 05:47

## 2025-01-04 RX ADMIN — Medication 1 MILLIGRAM(S): at 11:54

## 2025-01-04 RX ADMIN — APIXABAN 2.5 MILLIGRAM(S): 5 TABLET, FILM COATED ORAL at 17:24

## 2025-01-04 RX ADMIN — MIDODRINE HYDROCHLORIDE 15 MILLIGRAM(S): 5 TABLET ORAL at 05:47

## 2025-01-04 RX ADMIN — PIPERACILLIN AND TAZOBACTAM 25 GRAM(S): 3; .375 INJECTION, POWDER, LYOPHILIZED, FOR SOLUTION INTRAVENOUS at 05:46

## 2025-01-04 RX ADMIN — ROSUVASTATIN 10 MILLIGRAM(S): 40 TABLET, FILM COATED ORAL at 21:35

## 2025-01-04 RX ADMIN — NYSTATIN TOPICAL POWDER 1 APPLICATION(S): 100000 POWDER TOPICAL at 05:48

## 2025-01-04 RX ADMIN — Medication 20 MILLIGRAM(S): at 05:47

## 2025-01-04 RX ADMIN — Medication 25 MILLIGRAM(S): at 05:47

## 2025-01-04 RX ADMIN — MIDODRINE HYDROCHLORIDE 15 MILLIGRAM(S): 5 TABLET ORAL at 13:28

## 2025-01-04 RX ADMIN — SENNOSIDES 2 TABLET(S): 8.6 TABLET, FILM COATED ORAL at 21:35

## 2025-01-04 RX ADMIN — PANTOPRAZOLE 40 MILLIGRAM(S): 40 TABLET, DELAYED RELEASE ORAL at 05:47

## 2025-01-04 RX ADMIN — NYSTATIN TOPICAL POWDER 1 APPLICATION(S): 100000 POWDER TOPICAL at 17:24

## 2025-01-04 RX ADMIN — LANOLIN AND PETROLATUM 1 APPLICATION(S): 136.4; 469.9 OINTMENT TOPICAL at 11:56

## 2025-01-04 RX ADMIN — MIDODRINE HYDROCHLORIDE 15 MILLIGRAM(S): 5 TABLET ORAL at 21:35

## 2025-01-04 RX ADMIN — CHLORHEXIDINE GLUCONATE 1 APPLICATION(S): 1.2 RINSE ORAL at 05:48

## 2025-01-04 RX ADMIN — RISPERIDONE 1 MILLIGRAM(S): 0.5 TABLET ORAL at 11:56

## 2025-01-04 RX ADMIN — Medication 1: at 16:54

## 2025-01-04 NOTE — CONSULT NOTE ADULT - REASON FOR ADMISSION
acute blood loss anemia, cellulitis

## 2025-01-04 NOTE — PROGRESS NOTE ADULT - SUBJECTIVE AND OBJECTIVE BOX
Patient is a 84y old  Female who presents with a chief complaint of acute blood loss anemia, cellulitis (03 Jan 2025 10:28)      Over Night Events:  Patient seen and examined.   on NC   no pressors   transferred to tele     ROS:  See HPI    PHYSICAL EXAM    ICU Vital Signs Last 24 Hrs  T(C): 36.9 (04 Jan 2025 04:40), Max: 37.3 (03 Jan 2025 11:00)  T(F): 98.5 (04 Jan 2025 04:40), Max: 99.1 (03 Jan 2025 11:00)  HR: 92 (04 Jan 2025 05:55) (71 - 97)  BP: 92/53 (04 Jan 2025 05:55) (87/52 - 139/71)  BP(mean): 68 (04 Jan 2025 05:55) (63 - 92)  ABP: 110/55 (03 Jan 2025 14:00) (110/55 - 134/60)  ABP(mean): 72 (03 Jan 2025 14:00) (72 - 84)  RR: 22 (04 Jan 2025 05:55) (17 - 26)  SpO2: 97% (04 Jan 2025 05:55) (96% - 100%)    O2 Parameters below as of 04 Jan 2025 05:55  Patient On (Oxygen Delivery Method): nasal cannula  O2 Flow (L/min): 3          General: awake   HEENT:          pamela      Lymph Nodes: NO cervical LN   Lungs: Bilateral BS  Cardiovascular: Regular   Abdomen: Soft, Positive BS  Skin: warm   Neurological: no focal   Musculoskeletal: move all ext     I&O's Detail    02 Jan 2025 07:01  -  03 Jan 2025 07:00  --------------------------------------------------------  IN:    IV PiggyBack: 200 mL    Norepinephrine: 7.7 mL    Oral Fluid: 1320 mL  Total IN: 1527.7 mL    OUT:    Voided (mL): 2050 mL  Total OUT: 2050 mL    Total NET: -522.3 mL      03 Jan 2025 07:01  -  04 Jan 2025 06:39  --------------------------------------------------------  IN:    IV PiggyBack: 100 mL    Oral Fluid: 1050 mL  Total IN: 1150 mL    OUT:    Voided (mL): 1250 mL  Total OUT: 1250 mL    Total NET: -100 mL          LABS:                          8.4    9.46  )-----------( 279      ( 03 Jan 2025 19:36 )             30.1         03 Jan 2025 05:41    140    |  94     |  25     ----------------------------<  103    4.6     |  40     |  1.6      Ca    10.0       03 Jan 2025 05:41  Mg     2.1       03 Jan 2025 05:41                                                                                      Urinalysis Basic - ( 03 Jan 2025 05:41 )    Color: x / Appearance: x / SG: x / pH: x  Gluc: 103 mg/dL / Ketone: x  / Bili: x / Urobili: x   Blood: x / Protein: x / Nitrite: x   Leuk Esterase: x / RBC: x / WBC x   Sq Epi: x / Non Sq Epi: x / Bacteria: x                                                                                                                                                     MEDICATIONS  (STANDING):  apixaban 2.5 milliGRAM(s) Oral two times a day  chlorhexidine 2% Cloths 1 Application(s) Topical <User Schedule>  dextrose 5%. 1000 milliLiter(s) (100 mL/Hr) IV Continuous <Continuous>  dextrose 5%. 1000 milliLiter(s) (50 mL/Hr) IV Continuous <Continuous>  dextrose 50% Injectable 25 Gram(s) IV Push once  dextrose 50% Injectable 12.5 Gram(s) IV Push once  dextrose 50% Injectable 25 Gram(s) IV Push once  ferrous    sulfate 325 milliGRAM(s) Oral daily  folic acid 1 milliGRAM(s) Oral daily  furosemide    Tablet 20 milliGRAM(s) Oral daily  glucagon  Injectable 1 milliGRAM(s) IntraMuscular once  influenza  Vaccine (HIGH DOSE) 0.5 milliLiter(s) IntraMuscular once  insulin lispro (ADMELOG) corrective regimen sliding scale   SubCutaneous three times a day before meals  lidocaine 1% Injectable 30 milliLiter(s) Local Injection once  melatonin 5 milliGRAM(s) Oral at bedtime  metoprolol succinate ER 25 milliGRAM(s) Oral daily  midodrine 15 milliGRAM(s) Oral every 8 hours  nystatin Powder 1 Application(s) Topical two times a day  pantoprazole    Tablet 40 milliGRAM(s) Oral before breakfast  risperiDONE   Tablet 1 milliGRAM(s) Oral daily  rosuvastatin 10 milliGRAM(s) Oral at bedtime  senna 2 Tablet(s) Oral at bedtime  vitamin A & D Ointment 1 Application(s) Topical daily    MEDICATIONS  (PRN):  acetaminophen     Tablet .. 650 milliGRAM(s) Oral every 6 hours PRN Temp greater or equal to 38C (100.4F), Mild Pain (1 - 3)  dextrose Oral Gel 15 Gram(s) Oral once PRN Blood Glucose LESS THAN 70 milliGRAM(s)/deciliter          Xrays:                                                                                   ECHO:  [facility] ICU:

## 2025-01-04 NOTE — PROGRESS NOTE ADULT - ASSESSMENT
IMPRESSION:    Toxic Metabolic Encephalopathy resolved   Acute on Chronic Hypercapnic Respiratory Failure  MANJEET/OHS  Infected Hematoma SP I&D   BRYAN, resolved     PLAN:    CNS: avoid sedatives. Delirium precautions.    HEENT: Oral care    PULMONARY:  HOB @ 45 degrees.  Aspiration precautions.  Continue BiPAP QHS & PRN. Refusing NIV intermittently.   cxr agnes   CARDIOVASCULAR: TTE with EF 55%. Cardiology follow up, overall net negative lasix to 20mg PO daily for now. Continue metoprolol/rate control. Clear to resume AC per Sx.     GI: GI prophylaxis not indicated.  Advance diet per speech.  Bowel regimen PRN    RENAL:  Follow up lytes.  Correct as needed.      INFECTIOUS DISEASE: Follow up cultures.  s/p Abx per ID.    HEMATOLOGICAL:  DVT prophylaxis    ENDOCRINE:  Follow up FS.  Insulin protocol if needed.    MUSCULOSKELETAL:  OOBTC, recall surgery follow-up for LLE    DC a line    downgrade to floor afternoon if VS remain stable and am lab stable

## 2025-01-04 NOTE — CONSULT NOTE ADULT - SUBJECTIVE AND OBJECTIVE BOX
S:  S: 84F PMHx DM, HTN, chronic BLE edema, bipolar disorder, tachybrady syndrome (on Eliquis) who presented to the ED 12/24 post-fall 2-3 days earlier at [facility]. Surgery consulted to evaluate LLE wound on 12/25 for which bedside I&D was performed that day. Surgery reconsulted today for follow-up. Pt reports she is comfortable and has minimal pain at hematoma site.     O: Vital Signs Last 24 Hrs  T(C): 36.4 (04 Jan 2025 07:00), Max: 36.9 (04 Jan 2025 04:40)  T(F): 97.6 (04 Jan 2025 07:00), Max: 98.5 (04 Jan 2025 04:40)  HR: 89 (04 Jan 2025 07:01) (71 - 97)  BP: 97/55 (04 Jan 2025 07:01) (87/52 - 139/71)  BP(mean): 72 (04 Jan 2025 07:01) (64 - 92)  RR: 36 (04 Jan 2025 07:01) (18 - 36)  SpO2: 99% (04 Jan 2025 08:00) (88% - 99%)    O2 Parameters below as of 04 Jan 2025 08:00  Patient On (Oxygen Delivery Method): nasal cannula  O2 Flow (L/min): 3    MEDICATIONS  (STANDING):  apixaban 2.5 milliGRAM(s) Oral two times a day  chlorhexidine 2% Cloths 1 Application(s) Topical <User Schedule>  dextrose 5%. 1000 milliLiter(s) (100 mL/Hr) IV Continuous <Continuous>  dextrose 5%. 1000 milliLiter(s) (50 mL/Hr) IV Continuous <Continuous>  dextrose 50% Injectable 25 Gram(s) IV Push once  dextrose 50% Injectable 12.5 Gram(s) IV Push once  dextrose 50% Injectable 25 Gram(s) IV Push once  ferrous    sulfate 325 milliGRAM(s) Oral daily  folic acid 1 milliGRAM(s) Oral daily  furosemide    Tablet 20 milliGRAM(s) Oral daily  glucagon  Injectable 1 milliGRAM(s) IntraMuscular once  influenza  Vaccine (HIGH DOSE) 0.5 milliLiter(s) IntraMuscular once  insulin lispro (ADMELOG) corrective regimen sliding scale   SubCutaneous three times a day before meals  lidocaine 1% Injectable 30 milliLiter(s) Local Injection once  melatonin 5 milliGRAM(s) Oral at bedtime  metoprolol succinate ER 25 milliGRAM(s) Oral daily  midodrine 15 milliGRAM(s) Oral every 8 hours  nystatin Powder 1 Application(s) Topical two times a day  pantoprazole    Tablet 40 milliGRAM(s) Oral before breakfast  risperiDONE   Tablet 1 milliGRAM(s) Oral daily  rosuvastatin 10 milliGRAM(s) Oral at bedtime  senna 2 Tablet(s) Oral at bedtime  vitamin A & D Ointment 1 Application(s) Topical daily    MEDICATIONS  (PRN):  acetaminophen     Tablet .. 650 milliGRAM(s) Oral every 6 hours PRN Temp greater or equal to 38C (100.4F), Mild Pain (1 - 3)  dextrose Oral Gel 15 Gram(s) Oral once PRN Blood Glucose LESS THAN 70 milliGRAM(s)/deciliter    T(C): 36.4 (01-04-25 @ 07:00), Max: 36.9 (01-04-25 @ 04:40)  HR: 89 (01-04-25 @ 07:01) (71 - 97)  BP: 97/55 (01-04-25 @ 07:01) (87/52 - 139/71)  RR: 36 (01-04-25 @ 07:01) (18 - 36)  SpO2: 99% (01-04-25 @ 08:00) (88% - 99%)    EXAM:  GENERAL: Well groomed, no apparent distress  RESP: No respiratory distress, no use of accessory muscles; CTA b/l  CV: RRR  GI: Soft, NT, ND, no rebound, no guarding  SKIN: LLE wound w/ decreased drainage, decreased surrounding erythema, granulation and increased healing of open wound    LABS:                    7.9    8.15  )-----------( 275      ( 04 Jan 2025 06:07 )             27.9       01-04    142  |  96[L]  |  29[H]  ----------------------------<  118[H]  4.6   |  37[H]  |  1.5    Ca    9.9      04 Jan 2025 06:07  Mg     2.2     01-04    TPro  5.7[L]  /  Alb  2.9[L]  /  TBili  <0.2  /  DBili  x   /  AST  6   /  ALT  <5  /  AlkPhos  63  01-04          Magnesium: 2.2 mg/dL (01-04-25 @ 06:07)    Urinalysis Basic - ( 04 Jan 2025 06:07 )    Color: x / Appearance: x / SG: x / pH: x  Gluc: 118 mg/dL / Ketone: x  / Bili: x / Urobili: x   Blood: x / Protein: x / Nitrite: x   Leuk Esterase: x / RBC: x / WBC x   Sq Epi: x / Non Sq Epi: x / Bacteria: x    Lactate Trend    Culture Results:   Numerous Morganella morganii  Moderate Proteus mirabilis  Few Enterococcus faecalis (vancomycin resistant) (12-25 @ 16:00)  Culture Results:   No growth at 5 days (12-24 @ 13:00)  Culture Results:   No growth at 5 days (12-24 @ 13:00)

## 2025-01-04 NOTE — CONSULT NOTE ADULT - NS ATTEND AMEND GEN_ALL_CORE FT
84-year-old female brought in by EMS from [facility], with past medical history of  diabetes, HTN, chronic LE edema, bipolar disorder, tachybrady syndrome (declining PPM), A fib (on Eliquis), mild aortic stenosis, presenting s/p mechanical fall. Cardiology consulted for eval for CHF. Patient with sick sinus refused pacemaker. Afib on AC. She hasd resp failure. On bipap. Give lasix IV. Follow lytes . Po lasix when able. Continue eta. Follow cbc. Resume AC when able
LLE infected hematoma. Pt off AC now but still has risk for bleeding. Plan for bedside I&D. f/u Cx. Wound care with Aquacel daily after I&D. Transition to wound VAC for d/c planning.
The wound continues to improve.  The cavity has closed.  There is a minimal amount of necrotic tissue.  Used Medihoney daily with gauze, ABD Curlex and Ace for auto debridement.  You may consider wound VAC placement.  The patient's leg continues to decrease in edema.  She has another hematoma on the right side dorsum of the foot with no skin compromise.  Patient may follow back up in my office upon discharge or with the wound care center.

## 2025-01-04 NOTE — CONSULT NOTE ADULT - ASSESSMENT
84F PMHx DM, HTN, chronic BLE edema, bipolar disorder, tachybrady syndrome (on Eliquis) who presented to the ED 12/24 post-fall 2-3 days earlier at [facility]. Surgery consulted to evaluate LLE wound on 12/25 for which bedside I&D was performed that day. Surgery reconsulted today for follow-up. Pt reports she is comfortable and has minimal pain at hematoma site.     PLAN:  - No acute surgical intervention att  - Can continue to redress wound as follows: pack wound w/ 4x4 guaze soaked in medihoney, followed by ABD pad on top of guaze, wrap leg and foot in kerlex, and wrap ACE on top for additional support   - Management per primary  - Consult surgery prn    Case discussed w/ Dr. Aguirre 84F PMHx DM, HTN, chronic BLE edema, bipolar disorder, tachybrady syndrome (on Eliquis) who presented to the ED 12/24 post-fall 2-3 days earlier at [facility]. Surgery consulted to evaluate LLE wound on 12/25 for which bedside I&D was performed that day. Surgery reconsulted today for follow-up. Pt reports she is comfortable and has minimal pain at hematoma site.     PLAN:  - No acute surgical intervention att  - Can continue to redress wound as follows: pack wound w/ 4x4 guaze with medihoney, followed by ABD pad on top of guaze, wrap leg and foot in kerlex, and wrap ACE on top for additional support   - Management per primary  - Consult surgery prn    Case discussed w/ Dr. Aguirre

## 2025-01-04 NOTE — PROGRESS NOTE ADULT - SUBJECTIVE AND OBJECTIVE BOX
CHELA GALLAGHER  84y  Female      Patient is a 84y old  Female who presents with a chief complaint of acute blood loss anemia, cellulitis (04 Jan 2025 06:39)        REVIEW OF SYSTEMS:  CONSTITUTIONAL: No fever, weight loss, or fatigue  RESPIRATORY: No cough, wheezing, chills or hemoptysis; No shortness of breath  CARDIOVASCULAR: No chest pain, palpitations, dizziness, or leg swelling  GASTROINTESTINAL: No abdominal or epigastric pain. No nausea, vomiting, or hematemesis; No diarrhea or constipation. No melena or hematochezia.  GENITOURINARY: No dysuria, frequency, hematuria, incontinence+  MUSCULOSKELETAL: No joint pain or swelling; No muscle, back, or extremity pain  PSYCHIATRIC: No depression, anxiety, mood swings, or difficulty sleeping  HEME/LYMPH: No easy bruising, or bleeding gums  ALLERY AND IMMUNOLOGIC: No hives or eczema  FAMILY HISTORY:    T(C): 36.4 (01-04-25 @ 07:00), Max: 37.3 (01-03-25 @ 11:00)  HR: 89 (01-04-25 @ 07:01) (71 - 97)  BP: 97/55 (01-04-25 @ 07:01) (87/52 - 139/71)  RR: 36 (01-04-25 @ 07:01) (18 - 36)  SpO2: 99% (01-04-25 @ 08:00) (88% - 100%)  Wt(kg): --Vital Signs Last 24 Hrs  T(C): 36.4 (04 Jan 2025 07:00), Max: 37.3 (03 Jan 2025 11:00)  T(F): 97.6 (04 Jan 2025 07:00), Max: 99.1 (03 Jan 2025 11:00)  HR: 89 (04 Jan 2025 07:01) (71 - 97)  BP: 97/55 (04 Jan 2025 07:01) (87/52 - 139/71)  BP(mean): 72 (04 Jan 2025 07:01) (63 - 92)  RR: 36 (04 Jan 2025 07:01) (18 - 36)  SpO2: 99% (04 Jan 2025 08:00) (88% - 100%)    Parameters below as of 04 Jan 2025 08:00  Patient On (Oxygen Delivery Method): nasal cannula  O2 Flow (L/min): 3    No Known Allergies      PHYSICAL EXAM:  GENERAL: NAD,   HEAD:  Atraumatic, Normocephalic  EYES: EOMI, PERRLA, conjunctiva and sclera clear  ENMT: No tonsillar erythema, exudates, or enlargement;   NECK: Supple, No JVD, Normal thyroid  NERVOUS SYSTEM:  Alert & Oriented   CHEST/LUNG: Clear to percussion bilaterally; No rales, rhonchi, wheezing, or rubs  HEART: Regular rate and rhythm; No murmurs, rubs, or gallops  ABDOMEN: Soft, Nontender, Nondistended; Bowel sounds present  EXTREMITIES: , No clubbing, cyanosis, or edema  LYMPH: No lymphadenopathy noted  SKIN: No rashes or lesions      LABS:  01-04    142  |  96[L]  |  29[H]  ----------------------------<  118[H]  4.6   |  37[H]  |  1.5    Ca    9.9      04 Jan 2025 06:07  Mg     2.2     01-04    TPro  5.7[L]  /  Alb  2.9[L]  /  TBili  <0.2  /  DBili  x   /  AST  6   /  ALT  <5  /  AlkPhos  63  01-04                          7.9    8.15  )-----------( 275      ( 04 Jan 2025 06:07 )             27.9         RADIOLOGY & ADDITIONAL TESTS:  < from: US Extremity Nonvasc Limited, Right (01.03.25 @ 15:48) >  Indeterminate lobulated 6.0 x 2.0 x 5.6 cm structure in the soft tissues   of the foot at the site of clinical concern. Mass is on the differential.   Hematoma is also a possibility. Clinical correlation is necessary along   with further workup. Consider further imaging as warranted. Interspersed   loculated fluid spanning approximately 2.4 cm.    < end of copied text >    MEDICATION:  acetaminophen     Tablet .. 650 milliGRAM(s) Oral every 6 hours PRN  apixaban 2.5 milliGRAM(s) Oral two times a day  chlorhexidine 2% Cloths 1 Application(s) Topical <User Schedule>  dextrose 5%. 1000 milliLiter(s) IV Continuous <Continuous>  dextrose 5%. 1000 milliLiter(s) IV Continuous <Continuous>  dextrose 50% Injectable 25 Gram(s) IV Push once  dextrose 50% Injectable 12.5 Gram(s) IV Push once  dextrose 50% Injectable 25 Gram(s) IV Push once  dextrose Oral Gel 15 Gram(s) Oral once PRN  ferrous    sulfate 325 milliGRAM(s) Oral daily  folic acid 1 milliGRAM(s) Oral daily  furosemide    Tablet 20 milliGRAM(s) Oral daily  glucagon  Injectable 1 milliGRAM(s) IntraMuscular once  influenza  Vaccine (HIGH DOSE) 0.5 milliLiter(s) IntraMuscular once  insulin lispro (ADMELOG) corrective regimen sliding scale   SubCutaneous three times a day before meals  lidocaine 1% Injectable 30 milliLiter(s) Local Injection once  melatonin 5 milliGRAM(s) Oral at bedtime  metoprolol succinate ER 25 milliGRAM(s) Oral daily  midodrine 15 milliGRAM(s) Oral every 8 hours  nystatin Powder 1 Application(s) Topical two times a day  pantoprazole    Tablet 40 milliGRAM(s) Oral before breakfast  risperiDONE   Tablet 1 milliGRAM(s) Oral daily  rosuvastatin 10 milliGRAM(s) Oral at bedtime  senna 2 Tablet(s) Oral at bedtime  vitamin A & D Ointment 1 Application(s) Topical daily      HEALTH ISSUES - PROBLEM Dx:    HTN, chronic LE edema, bipolar disorder, tachybrady syndrome (declining PPM), A fib (on Eliquis), mild aortic stenosis, presenting s/p mechanical fall 3 days ago (-head trauma, +AC). Patient sustained wound to left posterior lateral lower extremity with hematoma, that started rebleeding today. Denies fever, chills, n/v, cp, sob, palpitations, cough, back pain, numbness/tingling, abd pain, changes in BM, and urinary symptoms,     # s/p rapid response Acute Respiratory distress ,failure probably from CHF Acute on chronic hypercapnic failure  hx a fib ,lasix 40mg x1 d/c IVF ,s/p BIPAP  cardiologist ,pulmonary   #LLE subcutaneous hematoma and cellulitis on zosyn  #Fall   -CTA LE: A subcutaneous hematoma is noted along the lateral and posterior aspect of the left calf. The hematoma measures approximately 11.5 x 2 x 18 cm.  -zosyn   -Surgery following: Had I&D- see 12/25; continue daily wound care (telfa/gauze/abd/kerlix/ACE wrap)   -ID consult noted  -Wound care consult - noted: Had I&D yesterday, 12/25  -Pain control prn   -PT consult   -Fall precautions ,s/p levophed    #Acute blood loss anemia   #H/o iron deficiency anemia   -Hgb 6.7 (10.2 in 7/2024). Baseline Hgb ~8- 9  -Hold Eliquis in setting of acute blood loss anemia   -F/U post transfusion CBC improved.  -C/w ferrous sulfate 325 mg   - check anemia labs        #Acute hypercarbic respiratory failure likely 2/2 MANJEET   -BiPAP HS 14/8  -Supplemental O2 prn     #DM  -Holding Farxiga 10 mg

## 2025-01-05 LAB
ALBUMIN SERPL ELPH-MCNC: 2.9 G/DL — LOW (ref 3.5–5.2)
ALP SERPL-CCNC: 63 U/L — SIGNIFICANT CHANGE UP (ref 30–115)
ALT FLD-CCNC: 5 U/L — SIGNIFICANT CHANGE UP (ref 0–41)
ANION GAP SERPL CALC-SCNC: 7 MMOL/L — SIGNIFICANT CHANGE UP (ref 7–14)
AST SERPL-CCNC: 7 U/L — SIGNIFICANT CHANGE UP (ref 0–41)
BILIRUB SERPL-MCNC: <0.2 MG/DL — SIGNIFICANT CHANGE UP (ref 0.2–1.2)
BUN SERPL-MCNC: 32 MG/DL — HIGH (ref 10–20)
CALCIUM SERPL-MCNC: 9.8 MG/DL — SIGNIFICANT CHANGE UP (ref 8.4–10.5)
CHLORIDE SERPL-SCNC: 96 MMOL/L — LOW (ref 98–110)
CO2 SERPL-SCNC: 40 MMOL/L — HIGH (ref 17–32)
CREAT SERPL-MCNC: 1.7 MG/DL — HIGH (ref 0.7–1.5)
EGFR: 29 ML/MIN/1.73M2 — LOW
GLUCOSE BLDC GLUCOMTR-MCNC: 142 MG/DL — HIGH (ref 70–99)
GLUCOSE BLDC GLUCOMTR-MCNC: 158 MG/DL — HIGH (ref 70–99)
GLUCOSE BLDC GLUCOMTR-MCNC: 229 MG/DL — HIGH (ref 70–99)
GLUCOSE SERPL-MCNC: 127 MG/DL — HIGH (ref 70–99)
HCT VFR BLD CALC: 28 % — LOW (ref 37–47)
HGB BLD-MCNC: 7.8 G/DL — LOW (ref 12–16)
MAGNESIUM SERPL-MCNC: 2.2 MG/DL — SIGNIFICANT CHANGE UP (ref 1.8–2.4)
MCHC RBC-ENTMCNC: 26.4 PG — LOW (ref 27–31)
MCHC RBC-ENTMCNC: 27.9 G/DL — LOW (ref 32–37)
MCV RBC AUTO: 94.9 FL — SIGNIFICANT CHANGE UP (ref 81–99)
NRBC # BLD: 0 /100 WBCS — SIGNIFICANT CHANGE UP (ref 0–0)
PLATELET # BLD AUTO: 266 K/UL — SIGNIFICANT CHANGE UP (ref 130–400)
PMV BLD: 10.4 FL — SIGNIFICANT CHANGE UP (ref 7.4–10.4)
POTASSIUM SERPL-MCNC: 4.6 MMOL/L — SIGNIFICANT CHANGE UP (ref 3.5–5)
POTASSIUM SERPL-SCNC: 4.6 MMOL/L — SIGNIFICANT CHANGE UP (ref 3.5–5)
PROT SERPL-MCNC: 5.7 G/DL — LOW (ref 6–8)
RBC # BLD: 2.95 M/UL — LOW (ref 4.2–5.4)
RBC # FLD: 14.7 % — HIGH (ref 11.5–14.5)
SODIUM SERPL-SCNC: 143 MMOL/L — SIGNIFICANT CHANGE UP (ref 135–146)
WBC # BLD: 7.16 K/UL — SIGNIFICANT CHANGE UP (ref 4.8–10.8)
WBC # FLD AUTO: 7.16 K/UL — SIGNIFICANT CHANGE UP (ref 4.8–10.8)

## 2025-01-05 PROCEDURE — 71045 X-RAY EXAM CHEST 1 VIEW: CPT | Mod: 26

## 2025-01-05 RX ADMIN — RISPERIDONE 1 MILLIGRAM(S): 0.5 TABLET ORAL at 09:03

## 2025-01-05 RX ADMIN — Medication 25 MILLIGRAM(S): at 05:08

## 2025-01-05 RX ADMIN — Medication 1 MILLIGRAM(S): at 09:03

## 2025-01-05 RX ADMIN — Medication 325 MILLIGRAM(S): at 09:03

## 2025-01-05 RX ADMIN — ROSUVASTATIN 10 MILLIGRAM(S): 40 TABLET, FILM COATED ORAL at 21:03

## 2025-01-05 RX ADMIN — Medication 2: at 12:12

## 2025-01-05 RX ADMIN — MIDODRINE HYDROCHLORIDE 15 MILLIGRAM(S): 5 TABLET ORAL at 16:04

## 2025-01-05 RX ADMIN — CHLORHEXIDINE GLUCONATE 1 APPLICATION(S): 1.2 RINSE ORAL at 05:07

## 2025-01-05 RX ADMIN — NYSTATIN TOPICAL POWDER 1 APPLICATION(S): 100000 POWDER TOPICAL at 05:08

## 2025-01-05 RX ADMIN — PANTOPRAZOLE 40 MILLIGRAM(S): 40 TABLET, DELAYED RELEASE ORAL at 05:06

## 2025-01-05 RX ADMIN — MIDODRINE HYDROCHLORIDE 15 MILLIGRAM(S): 5 TABLET ORAL at 21:03

## 2025-01-05 RX ADMIN — Medication 20 MILLIGRAM(S): at 05:07

## 2025-01-05 RX ADMIN — APIXABAN 2.5 MILLIGRAM(S): 5 TABLET, FILM COATED ORAL at 05:06

## 2025-01-05 RX ADMIN — Medication 5 MILLIGRAM(S): at 21:03

## 2025-01-05 RX ADMIN — SENNOSIDES 2 TABLET(S): 8.6 TABLET, FILM COATED ORAL at 21:03

## 2025-01-05 RX ADMIN — MIDODRINE HYDROCHLORIDE 15 MILLIGRAM(S): 5 TABLET ORAL at 05:06

## 2025-01-05 RX ADMIN — APIXABAN 2.5 MILLIGRAM(S): 5 TABLET, FILM COATED ORAL at 17:31

## 2025-01-05 RX ADMIN — NYSTATIN TOPICAL POWDER 1 APPLICATION(S): 100000 POWDER TOPICAL at 17:56

## 2025-01-05 NOTE — PROGRESS NOTE ADULT - SUBJECTIVE AND OBJECTIVE BOX
CHELA GALLAGHER  84y  Female      Patient is a 84y old  Female who presents with a chief complaint of acute blood loss anemia, cellulitis (04 Jan 2025 12:03)        REVIEW OF SYSTEMS:  CONSTITUTIONAL: No fever, weight loss, or fatigue  RESPIRATORY: No cough, wheezing, chills or hemoptysis; No shortness of breath  CARDIOVASCULAR: No chest pain, palpitations, dizziness, or leg swelling  GASTROINTESTINAL: No abdominal or epigastric pain. No nausea, vomiting, or hematemesis; No diarrhea or constipation. No melena or hematochezia.  GENITOURINARY: No dysuria, frequency, hematuria,  incontinence+  PSYCHIATRIC: No depression, anxiety, mood swings, or difficulty sleeping  HEME/LYMPH: No easy bruising, or bleeding gums  ALLERY AND IMMUNOLOGIC: No hives or eczema  FAMILY HISTORY:    T(C): 36.3 (01-05-25 @ 05:06), Max: 37.3 (01-04-25 @ 15:12)  HR: 72 (01-05-25 @ 05:06) (72 - 101)  BP: 112/69 (01-05-25 @ 05:06) (86/41 - 112/69)  RR: 16 (01-05-25 @ 05:06) (16 - 34)  SpO2: 97% (01-05-25 @ 05:06) (93% - 100%)  Wt(kg): --Vital Signs Last 24 Hrs  T(C): 36.3 (05 Jan 2025 05:06), Max: 37.3 (04 Jan 2025 15:12)  T(F): 97.4 (05 Jan 2025 05:06), Max: 99.2 (04 Jan 2025 15:12)  HR: 72 (05 Jan 2025 05:06) (72 - 101)  BP: 112/69 (05 Jan 2025 05:06) (86/41 - 112/69)  BP(mean): 62 (04 Jan 2025 16:10) (59 - 62)  RR: 16 (05 Jan 2025 05:06) (16 - 34)  SpO2: 97% (05 Jan 2025 05:06) (93% - 100%)      No Known Allergies      PHYSICAL EXAM:  GENERAL: NAD  HEAD:  Atraumatic, Normocephalic  EYES: EOMI, PERRLA, conjunctiva and sclera clear  ENMT: No tonsillar erythema, exudates, or enlargement;   NECK: Supple, No JVD, Normal thyroid  NERVOUS SYSTEM:  Alert & Oriented   CHEST/LUNG: Clear to percussion bilaterally; No rales, rhonchi, wheezing, or rubs  HEART: Regular rate and rhythm; No murmurs, rubs, or gallops  ABDOMEN: Soft, Nontender, Nondistended; Bowel sounds present  EXTREMITIES:  , No clubbing, cyanosis, or edema,lt leg in dressing  LYMPH: No lymphadenopathy noted  SKIN: No rashes or lesions      LABS:  01-04    142  |  96[L]  |  29[H]  ----------------------------<  118[H]  4.6   |  37[H]  |  1.5    Ca    9.9      04 Jan 2025 06:07  Mg     2.2     01-04    TPro  5.7[L]  /  Alb  2.9[L]  /  TBili  <0.2  /  DBili  x   /  AST  6   /  ALT  <5  /  AlkPhos  63  01-04                          7.9    8.15  )-----------( 275      ( 04 Jan 2025 06:07 )             27.9     < from: CT Angio Lower Extremity w/ IV Cont, Left (12.24.24 @ 16:39) >  A subcutaneous hematoma is noted along the lateral and posterior aspect   of the left calf. The hematoma measures approximately 11.5 x 2 x 18 cm.    There is no evidence of active extravasation.    < end of copied text >    < from: US Extremity Nonvasc Limited, Right (01.03.25 @ 15:48) >  Indeterminate lobulated 6.0 x 2.0 x 5.6 cm structure in the soft tissues   of the foot at the site of clinical concern. Mass is on the differential.   Hematoma is also a possibility. Clinical correlation is necessary along   with further workup. Consider further imaging as warranted. Interspersed   loculated fluid spanning approximately 2.4 cm.    < end of copied text >    RADIOLOGY & ADDITIONAL TESTS:    MEDICATION:  acetaminophen     Tablet .. 650 milliGRAM(s) Oral every 6 hours PRN  apixaban 2.5 milliGRAM(s) Oral two times a day  chlorhexidine 2% Cloths 1 Application(s) Topical <User Schedule>  dextrose 5%. 1000 milliLiter(s) IV Continuous <Continuous>  dextrose 5%. 1000 milliLiter(s) IV Continuous <Continuous>  dextrose 50% Injectable 25 Gram(s) IV Push once  dextrose 50% Injectable 12.5 Gram(s) IV Push once  dextrose 50% Injectable 25 Gram(s) IV Push once  dextrose Oral Gel 15 Gram(s) Oral once PRN  ferrous    sulfate 325 milliGRAM(s) Oral daily  folic acid 1 milliGRAM(s) Oral daily  furosemide    Tablet 20 milliGRAM(s) Oral daily  glucagon  Injectable 1 milliGRAM(s) IntraMuscular once  influenza  Vaccine (HIGH DOSE) 0.5 milliLiter(s) IntraMuscular once  insulin lispro (ADMELOG) corrective regimen sliding scale   SubCutaneous three times a day before meals  lidocaine 1% Injectable 30 milliLiter(s) Local Injection once  melatonin 5 milliGRAM(s) Oral at bedtime  metoprolol succinate ER 25 milliGRAM(s) Oral daily  midodrine 15 milliGRAM(s) Oral every 8 hours  nystatin Powder 1 Application(s) Topical two times a day  pantoprazole    Tablet 40 milliGRAM(s) Oral before breakfast  risperiDONE   Tablet 1 milliGRAM(s) Oral daily  rosuvastatin 10 milliGRAM(s) Oral at bedtime  senna 2 Tablet(s) Oral at bedtime  vitamin A & D Ointment 1 Application(s) Topical daily      HEALTH ISSUES - PROBLEM Dx:    HTN, chronic LE edema, bipolar disorder, tachybrady syndrome (declining PPM), A fib (on Eliquis), mild aortic stenosis, presenting s/p mechanical fall 3 days ago (-head trauma, +AC). Patient sustained wound to left posterior lateral lower extremity with hematoma, that started rebleeding today. Denies fever, chills, n/v, cp, sob, palpitations, cough, back pain, numbness/tingling, abd pain, changes in BM, and urinary symptoms,     # s/p rapid response Acute Respiratory distress ,failure probably from CHF Acute on chronic hypercapnic failure,also probablyOSA  hx a fib ,lasix 40mg x1 d/c IVF ,s/p BIPAP  cardiologist ,pulmonary   #LLE subcutaneous hematoma and cellulitis on zosyn  #Fall   -CTA LE: A subcutaneous hematoma is noted along the lateral and posterior aspect of the left calf. The hematoma measures approximately 11.5 x 2 x 18 cm.now is 6cm  -zosyn   -Surgery following: Had I&D- see 12/25; continue daily wound care (telfa/gauze/abd/kerlix/ACE wrap)   -ID consult noted  -Wound care consult - noted: Had I&D yesterday, 12/25  -Pain control prn   -PT consult   -Fall precautions ,s/p levophed    #Acute blood loss anemia   #H/o iron deficiency anemia   -Hgb 6.7 (10.2 in 7/2024). Baseline Hgb ~8- 9  -Hold Eliquis in setting of acute blood loss anemia   -F/U post transfusion CBC improved.  -C/w ferrous sulfate 325 mg   - check anemia labs  #Acute hypercarbic respiratory failure likely 2/2 MANJEET   -BiPAP HS 14/8  -Supplemental O2 prn     #DM  -Holding Farxiga 10 mg     will send her back to [facility] in am if stable

## 2025-01-06 ENCOUNTER — TRANSCRIPTION ENCOUNTER (OUTPATIENT)
Age: 85
End: 2025-01-06

## 2025-01-06 VITALS
TEMPERATURE: 98 F | OXYGEN SATURATION: 98 % | HEART RATE: 95 BPM | RESPIRATION RATE: 18 BRPM | DIASTOLIC BLOOD PRESSURE: 56 MMHG | SYSTOLIC BLOOD PRESSURE: 93 MMHG

## 2025-01-06 LAB
ALBUMIN SERPL ELPH-MCNC: 2.9 G/DL — LOW (ref 3.5–5.2)
ALP SERPL-CCNC: 67 U/L — SIGNIFICANT CHANGE UP (ref 30–115)
ALT FLD-CCNC: 6 U/L — SIGNIFICANT CHANGE UP (ref 0–41)
ANION GAP SERPL CALC-SCNC: 7 MMOL/L — SIGNIFICANT CHANGE UP (ref 7–14)
AST SERPL-CCNC: 11 U/L — SIGNIFICANT CHANGE UP (ref 0–41)
BILIRUB SERPL-MCNC: <0.2 MG/DL — SIGNIFICANT CHANGE UP (ref 0.2–1.2)
BUN SERPL-MCNC: 34 MG/DL — HIGH (ref 10–20)
CALCIUM SERPL-MCNC: 10.1 MG/DL — SIGNIFICANT CHANGE UP (ref 8.4–10.5)
CHLORIDE SERPL-SCNC: 95 MMOL/L — LOW (ref 98–110)
CO2 SERPL-SCNC: 40 MMOL/L — HIGH (ref 17–32)
CREAT SERPL-MCNC: 1.8 MG/DL — HIGH (ref 0.7–1.5)
EGFR: 27 ML/MIN/1.73M2 — LOW
GLUCOSE BLDC GLUCOMTR-MCNC: 164 MG/DL — HIGH (ref 70–99)
GLUCOSE BLDC GLUCOMTR-MCNC: 165 MG/DL — HIGH (ref 70–99)
GLUCOSE SERPL-MCNC: 143 MG/DL — HIGH (ref 70–99)
HCT VFR BLD CALC: 28.3 % — LOW (ref 37–47)
HGB BLD-MCNC: 7.8 G/DL — LOW (ref 12–16)
MAGNESIUM SERPL-MCNC: 2.3 MG/DL — SIGNIFICANT CHANGE UP (ref 1.8–2.4)
MCHC RBC-ENTMCNC: 26.4 PG — LOW (ref 27–31)
MCHC RBC-ENTMCNC: 27.6 G/DL — LOW (ref 32–37)
MCV RBC AUTO: 95.9 FL — SIGNIFICANT CHANGE UP (ref 81–99)
NRBC # BLD: 0 /100 WBCS — SIGNIFICANT CHANGE UP (ref 0–0)
PLATELET # BLD AUTO: 271 K/UL — SIGNIFICANT CHANGE UP (ref 130–400)
PMV BLD: 10.4 FL — SIGNIFICANT CHANGE UP (ref 7.4–10.4)
POTASSIUM SERPL-MCNC: 5 MMOL/L — SIGNIFICANT CHANGE UP (ref 3.5–5)
POTASSIUM SERPL-SCNC: 5 MMOL/L — SIGNIFICANT CHANGE UP (ref 3.5–5)
PROT SERPL-MCNC: 6 G/DL — SIGNIFICANT CHANGE UP (ref 6–8)
RBC # BLD: 2.95 M/UL — LOW (ref 4.2–5.4)
RBC # FLD: 14.9 % — HIGH (ref 11.5–14.5)
SODIUM SERPL-SCNC: 142 MMOL/L — SIGNIFICANT CHANGE UP (ref 135–146)
WBC # BLD: 5.65 K/UL — SIGNIFICANT CHANGE UP (ref 4.8–10.8)
WBC # FLD AUTO: 5.65 K/UL — SIGNIFICANT CHANGE UP (ref 4.8–10.8)

## 2025-01-06 PROCEDURE — 99024 POSTOP FOLLOW-UP VISIT: CPT

## 2025-01-06 RX ORDER — PANTOPRAZOLE 40 MG/1
1 TABLET, DELAYED RELEASE ORAL
Qty: 0 | Refills: 0 | DISCHARGE
Start: 2025-01-06

## 2025-01-06 RX ORDER — ACETAMINOPHEN 80 MG/.8ML
2 SOLUTION/ DROPS ORAL
Qty: 0 | Refills: 0 | DISCHARGE
Start: 2025-01-06

## 2025-01-06 RX ORDER — GINKGO BILOBA 40 MG
1 CAPSULE ORAL
Qty: 0 | Refills: 0 | DISCHARGE
Start: 2025-01-06

## 2025-01-06 RX ORDER — NYSTATIN TOPICAL POWDER 100000 U/G
1 POWDER TOPICAL
Qty: 0 | Refills: 0 | DISCHARGE
Start: 2025-01-06

## 2025-01-06 RX ORDER — RISPERIDONE 0.5 MG/1
1 TABLET ORAL
Qty: 0 | Refills: 0 | DISCHARGE
Start: 2025-01-06

## 2025-01-06 RX ORDER — ROSUVASTATIN 40 MG/1
1 TABLET, FILM COATED ORAL
Qty: 0 | Refills: 0 | DISCHARGE
Start: 2025-01-06

## 2025-01-06 RX ORDER — FUROSEMIDE 20 MG
1 TABLET ORAL
Qty: 0 | Refills: 0 | DISCHARGE
Start: 2025-01-06

## 2025-01-06 RX ORDER — VITAMIN A 10000 UNIT
1 TABLET ORAL
Qty: 0 | Refills: 0 | DISCHARGE
Start: 2025-01-06

## 2025-01-06 RX ORDER — SENNOSIDES 8.6 MG/1
2 TABLET, FILM COATED ORAL
Qty: 0 | Refills: 0 | DISCHARGE
Start: 2025-01-06

## 2025-01-06 RX ORDER — MIDODRINE HYDROCHLORIDE 5 MG/1
3 TABLET ORAL
Qty: 0 | Refills: 0 | DISCHARGE
Start: 2025-01-06

## 2025-01-06 RX ORDER — RISPERIDONE 0.5 MG/1
1 TABLET ORAL
Refills: 0 | DISCHARGE

## 2025-01-06 RX ORDER — METOPROLOL TARTRATE 50 MG
1 TABLET ORAL
Qty: 0 | Refills: 0 | DISCHARGE
Start: 2025-01-06

## 2025-01-06 RX ADMIN — Medication 1 MILLIGRAM(S): at 11:15

## 2025-01-06 RX ADMIN — CHLORHEXIDINE GLUCONATE 1 APPLICATION(S): 1.2 RINSE ORAL at 05:10

## 2025-01-06 RX ADMIN — Medication 1: at 07:40

## 2025-01-06 RX ADMIN — APIXABAN 2.5 MILLIGRAM(S): 5 TABLET, FILM COATED ORAL at 05:09

## 2025-01-06 RX ADMIN — NYSTATIN TOPICAL POWDER 1 APPLICATION(S): 100000 POWDER TOPICAL at 05:09

## 2025-01-06 RX ADMIN — RISPERIDONE 1 MILLIGRAM(S): 0.5 TABLET ORAL at 11:15

## 2025-01-06 RX ADMIN — MIDODRINE HYDROCHLORIDE 15 MILLIGRAM(S): 5 TABLET ORAL at 05:09

## 2025-01-06 RX ADMIN — Medication 1: at 12:09

## 2025-01-06 RX ADMIN — MIDODRINE HYDROCHLORIDE 15 MILLIGRAM(S): 5 TABLET ORAL at 13:57

## 2025-01-06 RX ADMIN — Medication 25 MILLIGRAM(S): at 05:09

## 2025-01-06 RX ADMIN — Medication 20 MILLIGRAM(S): at 05:09

## 2025-01-06 RX ADMIN — LANOLIN AND PETROLATUM 1 APPLICATION(S): 136.4; 469.9 OINTMENT TOPICAL at 11:15

## 2025-01-06 RX ADMIN — PANTOPRAZOLE 40 MILLIGRAM(S): 40 TABLET, DELAYED RELEASE ORAL at 05:09

## 2025-01-06 RX ADMIN — Medication 325 MILLIGRAM(S): at 11:15

## 2025-01-06 NOTE — DISCHARGE NOTE NURSING/CASE MANAGEMENT/SOCIAL WORK - NSDCPEFALRISK_GEN_ALL_CORE
Patent
For information on Fall & Injury Prevention, visit: [email or web address] OR  [email or web address] OR  https://www.cdc.gov/steadi/patient.html

## 2025-01-06 NOTE — DISCHARGE NOTE NURSING/CASE MANAGEMENT/SOCIAL WORK - PATIENT PORTAL LINK FT
You can access the FollowMyHealth Patient Portal offered by [facility] by registering at the following website: [email or web address]. By joining [employer]’s FollowMyHealth portal, you will also be able to view your health information using other applications (apps) compatible with our system.

## 2025-01-06 NOTE — CHART NOTE - NSCHARTNOTESELECT_GEN_ALL_CORE
Event Note
Event Note
Labs/Event Note
PA Note/Event Note
Swallow
med
Event Note
PA NOTE rapid response
med

## 2025-01-06 NOTE — DISCHARGE NOTE NURSING/CASE MANAGEMENT/SOCIAL WORK - NSDCVIVACCINE_GEN_ALL_CORE_FT
Tdap; 28-May-2020 05:58; Case Guillen); [employer]; [id number] (Exp. Date: 28-Apr-2022); IntraMuscular; Deltoid Left.; 0.5 milliLiter(s); VIS (VIS Published: 09-May-2013, VIS Presented: 28-May-2020);

## 2025-01-06 NOTE — DISCHARGE NOTE PROVIDER - HOSPITAL COURSE
HTN, chronic LE edema, bipolar disorder, tachybrady syndrome (declining PPM), A fib (on Eliquis), mild aortic stenosis, presenting s/p mechanical fall 3 days ago (-head trauma, +AC). Patient sustained wound to left posterior lateral lower extremity with hematoma, that started rebleeding today. Denies fever, chills, n/v, cp, sob, palpitations, cough, back pain, numbness/tingling, abd pain, changes in BM, and urinary symptoms,     # s/p rapid response Acute Respiratory distress ,failure probably from CHF Acute on chronic hypercapnic failure,also probablyOSA  hx a fib ,lasix 40mg x1 d/c IVF ,s/p BIPAP  cardiologist ,pulmonary   #LLE subcutaneous hematoma and cellulitis on zosyn  #Fall   -CTA LE: A subcutaneous hematoma is noted along the lateral and posterior aspect of the left calf. The hematoma measures approximately 11.5 x 2 x 18 cm.now is 6cm  -finished zosyn  -Surgery following: Had I&D- see 12/25; continue daily wound care (telfa/gauze/abd/kerlix/ACE wrap)   -ID consult noted  -Wound care consult - noted: Had I&D yesterday, 12/25  -Pain control prn   -PT consult   -Fall precautions ,s/p levophed    #Acute blood loss anemia   #H/o iron deficiency anemia   -Hgb 6.7 (10.2 in 7/2024). Baseline Hgb ~8- 9 post transfusion CBC improved.  -C/w ferrous sulfate 325 mg   - check anemia labs  #Acute hypercarbic respiratory failure likely 2/2 MANJEET   -BiPAP HS 14/8  -Supplemental O2 prn     #DM  - Farxiga 10 mg    HTN, chronic LE edema, bipolar disorder, tachybrady syndrome (declining PPM), A fib (on Eliquis), mild aortic stenosis, presenting s/p mechanical fall 3 days ago (-head trauma, +AC). Patient sustained wound to left posterior lateral lower extremity with hematoma, that started rebleeding today. Denies fever, chills, n/v, cp, sob, palpitations, cough, back pain, numbness/tingling, abd pain, changes in BM, and urinary symptoms,    #Acute Respiratory distress , failure probably from CHF   #Acute on chronic hypercapnic failure  #MANJEET  #hx a fib   - Initally on BIPAP, given IV lasix > BIPAP discontinued   - Maintain on PO lasix 20mg daily, cont Farxiga  - BiPAP HS 14/8  - Supplemental O2 prn      #LLE subcutaneous hematoma and cellulitis  - Completed course of zosyn    #Fall   -CTA LE: A subcutaneous hematoma is noted along the lateral and posterior aspect of the left calf. The hematoma measures approximately 11.5 x 2 x 18 cm.now is 6cm  -finished zosyn  -Surgery following: Had I&D- see 12/25; continue daily wound care (telfa/gauze/abd/kerlix/ACE wrap)   -Wound care consult   -Pain control prn   -PT consult   -Fall precautions     #Acute blood loss anemia   #H/o iron deficiency anemia   -Hgb 6.7 (10.2 in 7/2024). Baseline Hgb ~8- 9 post transfusion CBC improved.  -C/w ferrous sulfate 325 mg     #DM  - Farxiga 10 mg

## 2025-01-06 NOTE — PROGRESS NOTE ADULT - SUBJECTIVE AND OBJECTIVE BOX
CHELA GALLAGHER  84y  Female      Patient is a 84y old  Female who presents with a chief complaint of acute blood loss anemia, cellulitis (05 Jan 2025 08:26)    s/p fell at [facility] few days earlier    REVIEW OF SYSTEMS:  CONSTITUTIONAL: No fever, weight loss, or fatigue  RESPIRATORY: No cough, wheezing, chills or hemoptysis; No shortness of breath  CARDIOVASCULAR: No chest pain, palpitations, dizziness,  leg swelling+  GASTROINTESTINAL: No abdominal or epigastric pain. No nausea, vomiting, or hematemesis; No diarrhea or constipation. No melena or hematochezia.  GENITOURINARY: No dysuria, frequency, hematuria, or incontinence  PSYCHIATRIC: No depression, anxiety, mood swings, or difficulty sleeping  HEME/LYMPH: No easy bruising, or bleeding gums  ALLERY AND IMMUNOLOGIC: No hives or eczema  FAMILY HISTORY:    T(C): 36.7 (01-05-25 @ 21:15), Max: 37.4 (01-05-25 @ 14:09)  HR: 92 (01-06-25 @ 05:09) (65 - 92)  BP: 118/75 (01-06-25 @ 05:09) (94/63 - 118/75)  RR: 18 (01-06-25 @ 05:09) (18 - 18)  SpO2: 98% (01-06-25 @ 05:09) (98% - 98%)  Wt(kg): --Vital Signs Last 24 Hrs  T(C): 36.7 (05 Jan 2025 21:15), Max: 37.4 (05 Jan 2025 14:09)  T(F): 98.1 (05 Jan 2025 21:15), Max: 99.3 (05 Jan 2025 14:09)  HR: 92 (06 Jan 2025 05:09) (65 - 92)  BP: 118/75 (06 Jan 2025 05:09) (94/63 - 118/75)  BP(mean): --  RR: 18 (06 Jan 2025 05:09) (18 - 18)  SpO2: 98% (06 Jan 2025 05:09) (98% - 98%)      No Known Allergies      PHYSICAL EXAM:  GENERAL: NAD,   HEAD:  Atraumatic, Normocephalic  EYES: EOMI, PERRLA, conjunctiva and sclera clear  ENMT: No tonsillar erythema, exudates, or enlargement;   NECK: Supple, No JVD, Normal thyroid  NERVOUS SYSTEM:  Alert & Oriented   CHEST/LUNG: Clear to percussion bilaterally; No rales, rhonchi, wheezing, or rubs  HEART: Regular rate and rhythm; No murmurs, rubs, or gallops  ABDOMEN: Soft, Nontender, Nondistended; Bowel sounds present  EXTREMITIES:  , No clubbing, cyanosis, or edema  LYMPH: No lymphadenopathy noted  SKIN: No rashes or lesions      LABS:  01-05    143  |  96[L]  |  32[H]  ----------------------------<  127[H]  4.6   |  40[H]  |  1.7[H]    Ca    9.8      05 Jan 2025 08:18  Mg     2.2     01-05    TPro  5.7[L]  /  Alb  2.9[L]  /  TBili  <0.2  /  DBili  x   /  AST  7   /  ALT  5   /  AlkPhos  63  01-05                          7.8    7.16  )-----------( 266      ( 05 Jan 2025 08:18 )             28.0     < from: US Extremity Nonvasc Limited, Right (01.03.25 @ 15:48) >    Limited portable exam.    Indeterminate lobulated 6.0 x 2.0 x 5.6 cm structure in the soft tissues   of the foot at the site of clinical concern. Mass is on the differential.   Hematoma is also a possibility. Clinical correlation is necessary along   with further workup. Consider further imaging as warranted. Interspersed   loculated fluid spanning approximately 2.4 cm.    < end of copied text >      RADIOLOGY & ADDITIONAL TESTS:    MEDICATION:  acetaminophen     Tablet .. 650 milliGRAM(s) Oral every 6 hours PRN  apixaban 2.5 milliGRAM(s) Oral two times a day  chlorhexidine 2% Cloths 1 Application(s) Topical <User Schedule>  dextrose 5%. 1000 milliLiter(s) IV Continuous <Continuous>  dextrose 5%. 1000 milliLiter(s) IV Continuous <Continuous>  dextrose 50% Injectable 25 Gram(s) IV Push once  dextrose 50% Injectable 12.5 Gram(s) IV Push once  dextrose 50% Injectable 25 Gram(s) IV Push once  dextrose Oral Gel 15 Gram(s) Oral once PRN  ferrous    sulfate 325 milliGRAM(s) Oral daily  folic acid 1 milliGRAM(s) Oral daily  furosemide    Tablet 20 milliGRAM(s) Oral daily  glucagon  Injectable 1 milliGRAM(s) IntraMuscular once  influenza  Vaccine (HIGH DOSE) 0.5 milliLiter(s) IntraMuscular once  insulin lispro (ADMELOG) corrective regimen sliding scale   SubCutaneous three times a day before meals  lidocaine 1% Injectable 30 milliLiter(s) Local Injection once  melatonin 5 milliGRAM(s) Oral at bedtime  metoprolol succinate ER 25 milliGRAM(s) Oral daily  midodrine 15 milliGRAM(s) Oral every 8 hours  nystatin Powder 1 Application(s) Topical two times a day  pantoprazole    Tablet 40 milliGRAM(s) Oral before breakfast  risperiDONE   Tablet 1 milliGRAM(s) Oral daily  rosuvastatin 10 milliGRAM(s) Oral at bedtime  senna 2 Tablet(s) Oral at bedtime  vitamin A & D Ointment 1 Application(s) Topical daily      HEALTH ISSUES - PROBLEM Dx:    HTN, chronic LE edema, bipolar disorder, tachybrady syndrome (declining PPM), A fib (on Eliquis), mild aortic stenosis, presenting s/p mechanical fall 3 days ago (-head trauma, +AC). Patient sustained wound to left posterior lateral lower extremity with hematoma, that started rebleeding today. Denies fever, chills, n/v, cp, sob, palpitations, cough, back pain, numbness/tingling, abd pain, changes in BM, and urinary symptoms,     # s/p rapid response Acute Respiratory distress ,failure probably from CHF Acute on chronic hypercapnic failure,also probablyOSA  hx a fib ,lasix 40mg x1 d/c IVF ,s/p BIPAP  cardiologist ,pulmonary   #LLE subcutaneous hematoma and cellulitis on zosyn  #Fall   -CTA LE: A subcutaneous hematoma is noted along the lateral and posterior aspect of the left calf. The hematoma measures approximately 11.5 x 2 x 18 cm.now is 6cm  -zosyn   -Surgery following: Had I&D- see 12/25; continue daily wound care (telfa/gauze/abd/kerlix/ACE wrap)   -ID consult noted  -Wound care consult - noted: Had I&D yesterday, 12/25  -Pain control prn   -PT consult   -Fall precautions ,s/p levophed    #Acute blood loss anemia   #H/o iron deficiency anemia   -Hgb 6.7 (10.2 in 7/2024). Baseline Hgb ~8- 9  -Hold Eliquis in setting of acute blood loss anemia   -F/U post transfusion CBC improved.  -C/w ferrous sulfate 325 mg   - check anemia labs  #Acute hypercarbic respiratory failure likely 2/2 MANJEET   -BiPAP HS 14/8  -Supplemental O2 prn     #DM  -Holding Farxiga 10 mg     will send her back to [facility] today

## 2025-01-06 NOTE — DISCHARGE NOTE PROVIDER - NSDCCPCAREPLAN_GEN_ALL_CORE_FT
PRINCIPAL DISCHARGE DIAGNOSIS  Diagnosis: Acute on chronic respiratory failure with hypercapnia  Assessment and Plan of Treatment: Likely mixed etiology including CHF and MANJEET. Received BIPAP intially. Diuresed with IV lasix. Weaned off BIPAP and transitioned to PO lasix. Should continue oral lasix for now and nightly BIPAP      SECONDARY DISCHARGE DIAGNOSES  Diagnosis: Cellulitis  Assessment and Plan of Treatment: Completed a course of zosyn for cellulitis. No further need for antibiotics    Diagnosis: Anemia due to acute blood loss  Assessment and Plan of Treatment: Received 1u of blood. Hemoglobin remained stable thereafter. Cont oral iron supplementation    Diagnosis: Fall  Assessment and Plan of Treatment: No fractures or acute pathology on imaging.

## 2025-01-06 NOTE — PROGRESS NOTE ADULT - PROVIDER SPECIALTY LIST ADULT
CCU
Cardiology
Critical Care
Critical Care
Internal Medicine
Surgery
CCU
Critical Care
Internal Medicine
Internal Medicine
Pulmonology
Surgery
Infectious Disease
Internal Medicine
Pulmonology
Surgery
Infectious Disease
Infectious Disease
Internal Medicine
Surgery
Critical Care

## 2025-01-06 NOTE — CHART NOTE - NSCHARTNOTEFT_GEN_A_CORE
I saw and examined the patient at bedside today.  The wound is in better shape.  The cavity has closed down.  There is minimal slough.  There is good granulation.  Edema of the leg is much improved.  There is a small hematoma on the contralateral dorsal surface of the foot with normal skin.  She may follow back up in my office.  Continue Medihoney with gauze ABD Kerlix and Ace.  She can also follow-up in the wound care center.

## 2025-01-06 NOTE — DISCHARGE NOTE PROVIDER - CARE PROVIDER_API CALL
Caitlin Armstrong  Internal Medicine  [address] [place] [address]  Phone: [phone]  Fax: [phone]  Follow Up Time: Routine

## 2025-01-06 NOTE — DISCHARGE NOTE NURSING/CASE MANAGEMENT/SOCIAL WORK - FINANCIAL ASSISTANCE
[facility] provides services at a reduced cost to those who are determined to be eligible through [facility]’s financial assistance program. Information regarding [facility]’s financial assistance program can be found by going to [email or web address] or by calling [phone].

## 2025-01-06 NOTE — DISCHARGE NOTE PROVIDER - NSDCMRMEDTOKEN_GEN_ALL_CORE_FT
apixaban 5 mg oral tablet: 1 tab(s) orally every 12 hours  Crestor 10 mg oral tablet: 1 tab(s) orally once a day  Farxiga 10 mg oral tablet: 1 tab(s) orally once a day  ferrous sulfate 325 mg (65 mg elemental iron) oral delayed release tablet: 1 tab(s) orally 2 times a day  folic acid 1 mg oral tablet: 1 tab(s) orally once a day  Lasix 40 mg oral tablet: 1 tab(s) orally once a day  losartan 50 mg oral tablet: 1 tab(s) orally once a day hold for SBP less than 105mmhg  metoprolol succinate 25 mg oral tablet, extended release: 1 tab(s) orally once a day HOLD for SBP less than 100 and HR less than 60  nystatin 100,000 units/g topical cream: Apply topically to affected area 2 times a day  RisperDAL 1 mg oral tablet: 1 tab(s) orally once a day (at bedtime)  senna leaf extract oral tablet: 2 tab(s) orally once a day (at bedtime)  silver sulfADIAZINE 1% topical cream: Apply topically to affected area once a day  Vitamin A and D topical ointment: Apply topically to affected area once a day Apply to bilateral LE&#x27;s/Feet daily  Vitamin D2 50,000 intl units (1.25 mg) oral capsule (obsolete): 1 cap(s) orally every 2 weeks   acetaminophen 325 mg oral tablet: 2 tab(s) orally every 6 hours As needed Temp greater or equal to 38C (100.4F), Mild Pain (1 - 3)  apixaban 5 mg oral tablet: 1 tab(s) orally every 12 hours  Farxiga 10 mg oral tablet: 1 tab(s) orally once a day  ferrous sulfate 325 mg (65 mg elemental iron) oral delayed release tablet: 1 tab(s) orally 2 times a day  folic acid 1 mg oral tablet: 1 tab(s) orally once a day  furosemide 20 mg oral tablet: 1 tab(s) orally once a day  melatonin 5 mg oral tablet: 1 tab(s) orally once a day (at bedtime)  metoprolol succinate 25 mg oral tablet, extended release: 1 tab(s) orally once a day  midodrine 5 mg oral tablet: 3 tab(s) orally every 8 hours  nystatin 100,000 units/g topical powder: 1 Apply topically to affected area 2 times a day  pantoprazole 40 mg oral delayed release tablet: 1 tab(s) orally once a day (before a meal)  risperiDONE 1 mg oral tablet: 1 tab(s) orally once a day  rosuvastatin 10 mg oral tablet: 1 tab(s) orally once a day (at bedtime)  senna leaf extract oral tablet: 2 tab(s) orally once a day (at bedtime)  silver sulfADIAZINE 1% topical cream: Apply topically to affected area once a day  Vitamin A and D topical ointment: Apply topically to affected area once a day Apply to bilateral LE&#x27;s/Feet daily  Vitamin D2 50,000 intl units (1.25 mg) oral capsule (obsolete): 1 cap(s) orally every 2 weeks

## 2025-01-11 DIAGNOSIS — E66.2 MORBID (SEVERE) OBESITY WITH ALVEOLAR HYPOVENTILATION: ICD-10-CM

## 2025-01-11 DIAGNOSIS — I49.5 SICK SINUS SYNDROME: ICD-10-CM

## 2025-01-11 DIAGNOSIS — J96.22 ACUTE AND CHRONIC RESPIRATORY FAILURE WITH HYPERCAPNIA: ICD-10-CM

## 2025-01-11 DIAGNOSIS — I50.9 HEART FAILURE, UNSPECIFIED: ICD-10-CM

## 2025-01-11 DIAGNOSIS — S80.12XA CONTUSION OF LEFT LOWER LEG, INITIAL ENCOUNTER: ICD-10-CM

## 2025-01-11 DIAGNOSIS — Z79.01 LONG TERM (CURRENT) USE OF ANTICOAGULANTS: ICD-10-CM
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
## 2025-01-11 DIAGNOSIS — D84.81 IMMUNODEFICIENCY DUE TO CONDITIONS CLASSIFIED ELSEWHERE: ICD-10-CM

## 2025-01-11 DIAGNOSIS — R33.9 RETENTION OF URINE, UNSPECIFIED: ICD-10-CM

## 2025-01-11 DIAGNOSIS — L03.116 CELLULITIS OF LEFT LOWER LIMB: ICD-10-CM

## 2025-01-11 DIAGNOSIS — I48.19 OTHER PERSISTENT ATRIAL FIBRILLATION: ICD-10-CM
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
## 2025-01-11 DIAGNOSIS — N17.9 ACUTE KIDNEY FAILURE, UNSPECIFIED: ICD-10-CM

## 2025-01-11 DIAGNOSIS — D62 ACUTE POSTHEMORRHAGIC ANEMIA: ICD-10-CM

## 2025-01-11 DIAGNOSIS — I11.0 HYPERTENSIVE HEART DISEASE WITH HEART FAILURE: ICD-10-CM

## 2025-01-11 DIAGNOSIS — E11.9 TYPE 2 DIABETES MELLITUS WITHOUT COMPLICATIONS: ICD-10-CM

## 2025-01-11 DIAGNOSIS — G93.41 METABOLIC ENCEPHALOPATHY: ICD-10-CM

## 2025-04-10 NOTE — ED ADULT NURSE NOTE - NS ED PATIENT SAFETY CONCERN
Patient called requesting to speak with coordinator about getting a refill on Amitriptyline .  Patient call back number is [phone]   No

## 2025-05-13 NOTE — OCCUPATIONAL THERAPY INITIAL EVALUATION ADULT - LEVEL OF INDEPENDENCE: STAND/SIT, REHAB EVAL
Spoke to pt regarding results    ----- Message from Dr. Mayda Arzola DO sent at 5/7/2025 12:01 PM EDT -----  Breast us felt to be benign.  Breast us 1 year    minimum assist (75% patients effort)

## 2025-05-19 ENCOUNTER — INPATIENT (INPATIENT)
Facility: HOSPITAL | Age: 85
LOS: 11 days | Discharge: HOME | DRG: 872 | End: 2025-05-31
Attending: INTERNAL MEDICINE | Admitting: INTERNAL MEDICINE
Payer: MEDICARE

## 2025-05-19 VITALS
DIASTOLIC BLOOD PRESSURE: 36 MMHG | TEMPERATURE: 98 F | RESPIRATION RATE: 24 BRPM | SYSTOLIC BLOOD PRESSURE: 64 MMHG | OXYGEN SATURATION: 99 % | HEART RATE: 79 BPM

## 2025-05-19 DIAGNOSIS — A41.9 SEPSIS, UNSPECIFIED ORGANISM: ICD-10-CM

## 2025-05-19 LAB
ALBUMIN SERPL ELPH-MCNC: 2.5 G/DL — LOW (ref 3.5–5.2)
ALP SERPL-CCNC: 86 U/L — SIGNIFICANT CHANGE UP (ref 30–115)
ALT FLD-CCNC: 6 U/L — SIGNIFICANT CHANGE UP (ref 0–41)
ANION GAP SERPL CALC-SCNC: 7 MMOL/L — SIGNIFICANT CHANGE UP (ref 7–14)
AST SERPL-CCNC: 21 U/L — SIGNIFICANT CHANGE UP (ref 0–41)
BASOPHILS # BLD AUTO: 0 K/UL — SIGNIFICANT CHANGE UP (ref 0–0.2)
BASOPHILS NFR BLD AUTO: 0 % — SIGNIFICANT CHANGE UP (ref 0–1)
BILIRUB SERPL-MCNC: 0.2 MG/DL — SIGNIFICANT CHANGE UP (ref 0.2–1.2)
BUN SERPL-MCNC: 23 MG/DL — HIGH (ref 10–20)
CALCIUM SERPL-MCNC: 9.7 MG/DL — SIGNIFICANT CHANGE UP (ref 8.4–10.5)
CHLORIDE SERPL-SCNC: 91 MMOL/L — LOW (ref 98–110)
CO2 SERPL-SCNC: 35 MMOL/L — HIGH (ref 17–32)
CREAT SERPL-MCNC: 1.3 MG/DL — SIGNIFICANT CHANGE UP (ref 0.7–1.5)
EGFR: 40 ML/MIN/1.73M2 — LOW
EGFR: 40 ML/MIN/1.73M2 — LOW
EOSINOPHIL # BLD AUTO: 0.14 K/UL — SIGNIFICANT CHANGE UP (ref 0–0.7)
EOSINOPHIL NFR BLD AUTO: 1.7 % — SIGNIFICANT CHANGE UP (ref 0–8)
FLUAV AG NPH QL: SIGNIFICANT CHANGE UP
FLUBV AG NPH QL: SIGNIFICANT CHANGE UP
GAS PNL BLDV: SIGNIFICANT CHANGE UP
GLUCOSE SERPL-MCNC: 131 MG/DL — HIGH (ref 70–99)
HCT VFR BLD CALC: 28.2 % — LOW (ref 37–47)
HGB BLD-MCNC: 7.9 G/DL — LOW (ref 12–16)
LYMPHOCYTES # BLD AUTO: 0.29 K/UL — LOW (ref 1.2–3.4)
LYMPHOCYTES # BLD AUTO: 3.5 % — LOW (ref 20.5–51.1)
MCHC RBC-ENTMCNC: 25.9 PG — LOW (ref 27–31)
MCHC RBC-ENTMCNC: 28 G/DL — LOW (ref 32–37)
MCV RBC AUTO: 92.5 FL — SIGNIFICANT CHANGE UP (ref 81–99)
MONOCYTES # BLD AUTO: 0.36 K/UL — SIGNIFICANT CHANGE UP (ref 0.1–0.6)
MONOCYTES NFR BLD AUTO: 4.3 % — SIGNIFICANT CHANGE UP (ref 1.7–9.3)
NEUTROPHILS # BLD AUTO: 7 K/UL — HIGH (ref 1.4–6.5)
NEUTROPHILS NFR BLD AUTO: 83.5 % — HIGH (ref 42.2–75.2)
NT-PROBNP SERPL-SCNC: 2811 PG/ML — HIGH (ref 0–300)
PLATELET # BLD AUTO: 347 K/UL — SIGNIFICANT CHANGE UP (ref 130–400)
PMV BLD: 10.6 FL — HIGH (ref 7.4–10.4)
POTASSIUM SERPL-MCNC: SIGNIFICANT CHANGE UP MMOL/L (ref 3.5–5)
POTASSIUM SERPL-SCNC: SIGNIFICANT CHANGE UP MMOL/L (ref 3.5–5)
PROT SERPL-MCNC: 6.8 G/DL — SIGNIFICANT CHANGE UP (ref 6–8)
RBC # BLD: 3.05 M/UL — LOW (ref 4.2–5.4)
RBC # FLD: 16.4 % — HIGH (ref 11.5–14.5)
RSV RNA NPH QL NAA+NON-PROBE: SIGNIFICANT CHANGE UP
SARS-COV-2 RNA SPEC QL NAA+PROBE: SIGNIFICANT CHANGE UP
SODIUM SERPL-SCNC: 133 MMOL/L — LOW (ref 135–146)
SOURCE RESPIRATORY: SIGNIFICANT CHANGE UP
TROPONIN T, HIGH SENSITIVITY RESULT: 37 NG/L — HIGH (ref 6–13)
WBC # BLD: 8.38 K/UL — SIGNIFICANT CHANGE UP (ref 4.8–10.8)
WBC # FLD AUTO: 8.38 K/UL — SIGNIFICANT CHANGE UP (ref 4.8–10.8)

## 2025-05-19 PROCEDURE — 92610 EVALUATE SWALLOWING FUNCTION: CPT | Mod: GN

## 2025-05-19 PROCEDURE — 83550 IRON BINDING TEST: CPT

## 2025-05-19 PROCEDURE — 93308 TTE F-UP OR LMTD: CPT

## 2025-05-19 PROCEDURE — 36415 COLL VENOUS BLD VENIPUNCTURE: CPT

## 2025-05-19 PROCEDURE — 84132 ASSAY OF SERUM POTASSIUM: CPT

## 2025-05-19 PROCEDURE — 82746 ASSAY OF FOLIC ACID SERUM: CPT

## 2025-05-19 PROCEDURE — 83735 ASSAY OF MAGNESIUM: CPT

## 2025-05-19 PROCEDURE — 85025 COMPLETE CBC W/AUTO DIFF WBC: CPT

## 2025-05-19 PROCEDURE — 36430 TRANSFUSION BLD/BLD COMPNT: CPT

## 2025-05-19 PROCEDURE — 82962 GLUCOSE BLOOD TEST: CPT

## 2025-05-19 PROCEDURE — 85014 HEMATOCRIT: CPT

## 2025-05-19 PROCEDURE — 93970 EXTREMITY STUDY: CPT

## 2025-05-19 PROCEDURE — 84295 ASSAY OF SERUM SODIUM: CPT

## 2025-05-19 PROCEDURE — 85018 HEMOGLOBIN: CPT

## 2025-05-19 PROCEDURE — 87641 MR-STAPH DNA AMP PROBE: CPT

## 2025-05-19 PROCEDURE — 86923 COMPATIBILITY TEST ELECTRIC: CPT

## 2025-05-19 PROCEDURE — 71045 X-RAY EXAM CHEST 1 VIEW: CPT | Mod: 26

## 2025-05-19 PROCEDURE — 85027 COMPLETE CBC AUTOMATED: CPT

## 2025-05-19 PROCEDURE — 80053 COMPREHEN METABOLIC PANEL: CPT

## 2025-05-19 PROCEDURE — 82330 ASSAY OF CALCIUM: CPT

## 2025-05-19 PROCEDURE — 84484 ASSAY OF TROPONIN QUANT: CPT

## 2025-05-19 PROCEDURE — 99285 EMERGENCY DEPT VISIT HI MDM: CPT | Mod: FS

## 2025-05-19 PROCEDURE — 86900 BLOOD TYPING SEROLOGIC ABO: CPT

## 2025-05-19 PROCEDURE — 71045 X-RAY EXAM CHEST 1 VIEW: CPT

## 2025-05-19 PROCEDURE — 87640 STAPH A DNA AMP PROBE: CPT

## 2025-05-19 PROCEDURE — 83540 ASSAY OF IRON: CPT

## 2025-05-19 PROCEDURE — 82607 VITAMIN B-12: CPT

## 2025-05-19 PROCEDURE — P9016: CPT

## 2025-05-19 PROCEDURE — 86850 RBC ANTIBODY SCREEN: CPT

## 2025-05-19 PROCEDURE — 92526 ORAL FUNCTION THERAPY: CPT | Mod: GN

## 2025-05-19 PROCEDURE — 93308 TTE F-UP OR LMTD: CPT | Mod: 26

## 2025-05-19 PROCEDURE — 80048 BASIC METABOLIC PNL TOTAL CA: CPT

## 2025-05-19 PROCEDURE — 93005 ELECTROCARDIOGRAM TRACING: CPT

## 2025-05-19 PROCEDURE — 93010 ELECTROCARDIOGRAM REPORT: CPT

## 2025-05-19 PROCEDURE — 93306 TTE W/DOPPLER COMPLETE: CPT

## 2025-05-19 PROCEDURE — 82728 ASSAY OF FERRITIN: CPT

## 2025-05-19 PROCEDURE — 85379 FIBRIN DEGRADATION QUANT: CPT

## 2025-05-19 PROCEDURE — 94660 CPAP INITIATION&MGMT: CPT

## 2025-05-19 PROCEDURE — 86901 BLOOD TYPING SEROLOGIC RH(D): CPT

## 2025-05-19 PROCEDURE — 82803 BLOOD GASES ANY COMBINATION: CPT

## 2025-05-19 PROCEDURE — 83605 ASSAY OF LACTIC ACID: CPT

## 2025-05-19 PROCEDURE — 84145 PROCALCITONIN (PCT): CPT

## 2025-05-19 PROCEDURE — 84443 ASSAY THYROID STIM HORMONE: CPT

## 2025-05-19 RX ORDER — CEFEPIME 2 G/20ML
2000 INJECTION, POWDER, FOR SOLUTION INTRAVENOUS ONCE
Refills: 0 | Status: COMPLETED | OUTPATIENT
Start: 2025-05-19 | End: 2025-05-19

## 2025-05-19 RX ORDER — VANCOMYCIN HCL IN 5 % DEXTROSE 1.5G/250ML
1000 PLASTIC BAG, INJECTION (ML) INTRAVENOUS ONCE
Refills: 0 | Status: COMPLETED | OUTPATIENT
Start: 2025-05-19 | End: 2025-05-19

## 2025-05-19 RX ORDER — ONDANSETRON HCL/PF 4 MG/2 ML
4 VIAL (ML) INJECTION ONCE
Refills: 0 | Status: COMPLETED | OUTPATIENT
Start: 2025-05-19 | End: 2025-05-19

## 2025-05-19 RX ADMIN — Medication 4 MILLIGRAM(S): at 22:41

## 2025-05-19 RX ADMIN — Medication 1000 MILLIGRAM(S): at 22:15

## 2025-05-19 RX ADMIN — Medication 250 MILLILITER(S): at 22:41

## 2025-05-19 RX ADMIN — Medication 250 MILLILITER(S): at 23:30

## 2025-05-19 RX ADMIN — Medication 250 MILLIGRAM(S): at 21:25

## 2025-05-19 RX ADMIN — CEFEPIME 100 MILLIGRAM(S): 2 INJECTION, POWDER, FOR SOLUTION INTRAVENOUS at 20:24

## 2025-05-20 LAB
ANION GAP SERPL CALC-SCNC: 5 MMOL/L — LOW (ref 7–14)
BASE EXCESS BLDA CALC-SCNC: 11.4 MMOL/L — HIGH (ref -2–3)
BASOPHILS # BLD AUTO: 0.03 K/UL — SIGNIFICANT CHANGE UP (ref 0–0.2)
BASOPHILS NFR BLD AUTO: 0.3 % — SIGNIFICANT CHANGE UP (ref 0–1)
BUN SERPL-MCNC: 25 MG/DL — HIGH (ref 10–20)
CALCIUM SERPL-MCNC: 9.8 MG/DL — SIGNIFICANT CHANGE UP (ref 8.4–10.5)
CHLORIDE SERPL-SCNC: 92 MMOL/L — LOW (ref 98–110)
CO2 SERPL-SCNC: 38 MMOL/L — HIGH (ref 17–32)
CREAT SERPL-MCNC: 1.3 MG/DL — SIGNIFICANT CHANGE UP (ref 0.7–1.5)
EGFR: 40 ML/MIN/1.73M2 — LOW
EGFR: 40 ML/MIN/1.73M2 — LOW
EOSINOPHIL # BLD AUTO: 0.06 K/UL — SIGNIFICANT CHANGE UP (ref 0–0.7)
EOSINOPHIL NFR BLD AUTO: 0.6 % — SIGNIFICANT CHANGE UP (ref 0–8)
GLUCOSE SERPL-MCNC: 111 MG/DL — HIGH (ref 70–99)
HCO3 BLDA-SCNC: 41 MMOL/L — HIGH (ref 21–28)
HCT VFR BLD CALC: 28.1 % — LOW (ref 37–47)
HGB BLD-MCNC: 7.7 G/DL — LOW (ref 12–16)
IMM GRANULOCYTES NFR BLD AUTO: 0.3 % — SIGNIFICANT CHANGE UP (ref 0.1–0.3)
LACTATE SERPL-SCNC: 1 MMOL/L — SIGNIFICANT CHANGE UP (ref 0.7–2)
LYMPHOCYTES # BLD AUTO: 0.86 K/UL — LOW (ref 1.2–3.4)
LYMPHOCYTES # BLD AUTO: 8.4 % — LOW (ref 20.5–51.1)
MCHC RBC-ENTMCNC: 25.7 PG — LOW (ref 27–31)
MCHC RBC-ENTMCNC: 27.4 G/DL — LOW (ref 32–37)
MCV RBC AUTO: 93.7 FL — SIGNIFICANT CHANGE UP (ref 81–99)
MONOCYTES # BLD AUTO: 1 K/UL — HIGH (ref 0.1–0.6)
MONOCYTES NFR BLD AUTO: 9.7 % — HIGH (ref 1.7–9.3)
NEUTROPHILS # BLD AUTO: 8.31 K/UL — HIGH (ref 1.4–6.5)
NEUTROPHILS NFR BLD AUTO: 80.7 % — HIGH (ref 42.2–75.2)
NRBC BLD AUTO-RTO: 0 /100 WBCS — SIGNIFICANT CHANGE UP (ref 0–0)
PCO2 BLDA: 82 MMHG — CRITICAL HIGH (ref 32–45)
PH BLDA: 7.31 — LOW (ref 7.35–7.45)
PLATELET # BLD AUTO: 307 K/UL — SIGNIFICANT CHANGE UP (ref 130–400)
PMV BLD: 9.3 FL — SIGNIFICANT CHANGE UP (ref 7.4–10.4)
PO2 BLDA: 53 MMHG — LOW (ref 83–108)
POTASSIUM SERPL-MCNC: 5.5 MMOL/L — HIGH (ref 3.5–5)
POTASSIUM SERPL-SCNC: 5.5 MMOL/L — HIGH (ref 3.5–5)
PROCALCITONIN SERPL-MCNC: 0.17 NG/ML — HIGH (ref 0.02–0.1)
RBC # BLD: 3 M/UL — LOW (ref 4.2–5.4)
RBC # FLD: 16.5 % — HIGH (ref 11.5–14.5)
SAO2 % BLDA: 90.4 % — LOW (ref 94–98)
SODIUM SERPL-SCNC: 135 MMOL/L — SIGNIFICANT CHANGE UP (ref 135–146)
WBC # BLD: 10.29 K/UL — SIGNIFICANT CHANGE UP (ref 4.8–10.8)
WBC # FLD AUTO: 10.29 K/UL — SIGNIFICANT CHANGE UP (ref 4.8–10.8)

## 2025-05-20 PROCEDURE — 99291 CRITICAL CARE FIRST HOUR: CPT

## 2025-05-20 RX ORDER — DOXYCYCLINE HYCLATE 100 MG
100 TABLET ORAL ONCE
Refills: 0 | Status: COMPLETED | OUTPATIENT
Start: 2025-05-20 | End: 2025-05-20

## 2025-05-20 RX ORDER — MIDODRINE HYDROCHLORIDE 5 MG/1
10 TABLET ORAL EVERY 8 HOURS
Refills: 0 | Status: DISCONTINUED | OUTPATIENT
Start: 2025-05-20 | End: 2025-05-20

## 2025-05-20 RX ORDER — SENNA 187 MG
2 TABLET ORAL AT BEDTIME
Refills: 0 | Status: DISCONTINUED | OUTPATIENT
Start: 2025-05-20 | End: 2025-05-31

## 2025-05-20 RX ORDER — DOXYCYCLINE HYCLATE 100 MG
TABLET ORAL
Refills: 0 | Status: DISCONTINUED | OUTPATIENT
Start: 2025-05-20 | End: 2025-05-24

## 2025-05-20 RX ORDER — FOLIC ACID 1 MG/1
1 TABLET ORAL DAILY
Refills: 0 | Status: DISCONTINUED | OUTPATIENT
Start: 2025-05-20 | End: 2025-05-31

## 2025-05-20 RX ORDER — NYSTATIN 100000 [USP'U]/G
1 CREAM TOPICAL
Refills: 0 | Status: DISCONTINUED | OUTPATIENT
Start: 2025-05-20 | End: 2025-05-31

## 2025-05-20 RX ORDER — MEDPURA VITAMIN A AND D 95 G/100G
1 OINTMENT TOPICAL DAILY
Refills: 0 | Status: DISCONTINUED | OUTPATIENT
Start: 2025-05-20 | End: 2025-05-31

## 2025-05-20 RX ORDER — FERROUS SULFATE 137(45) MG
325 TABLET, EXTENDED RELEASE ORAL DAILY
Refills: 0 | Status: DISCONTINUED | OUTPATIENT
Start: 2025-05-20 | End: 2025-05-31

## 2025-05-20 RX ORDER — DOXYCYCLINE HYCLATE 100 MG
100 TABLET ORAL EVERY 12 HOURS
Refills: 0 | Status: DISCONTINUED | OUTPATIENT
Start: 2025-05-20 | End: 2025-05-24

## 2025-05-20 RX ORDER — DAPAGLIFLOZIN 5 MG/1
10 TABLET, FILM COATED ORAL DAILY
Refills: 0 | Status: DISCONTINUED | OUTPATIENT
Start: 2025-05-20 | End: 2025-05-31

## 2025-05-20 RX ORDER — ACETAMINOPHEN 500 MG/5ML
650 LIQUID (ML) ORAL EVERY 6 HOURS
Refills: 0 | Status: DISCONTINUED | OUTPATIENT
Start: 2025-05-20 | End: 2025-05-31

## 2025-05-20 RX ORDER — SILVER SULFADIAZINE 1 %
1 CREAM (GRAM) TOPICAL DAILY
Refills: 0 | Status: DISCONTINUED | OUTPATIENT
Start: 2025-05-20 | End: 2025-05-31

## 2025-05-20 RX ORDER — METOPROLOL SUCCINATE 50 MG/1
25 TABLET, EXTENDED RELEASE ORAL DAILY
Refills: 0 | Status: DISCONTINUED | OUTPATIENT
Start: 2025-05-20 | End: 2025-05-24

## 2025-05-20 RX ORDER — ROSUVASTATIN CALCIUM 20 MG/1
10 TABLET, FILM COATED ORAL AT BEDTIME
Refills: 0 | Status: DISCONTINUED | OUTPATIENT
Start: 2025-05-20 | End: 2025-05-31

## 2025-05-20 RX ORDER — RISPERIDONE 4 MG
1 TABLET ORAL AT BEDTIME
Refills: 0 | Status: DISCONTINUED | OUTPATIENT
Start: 2025-05-20 | End: 2025-05-31

## 2025-05-20 RX ORDER — APIXABAN 2.5 MG/1
5 TABLET, FILM COATED ORAL EVERY 12 HOURS
Refills: 0 | Status: DISCONTINUED | OUTPATIENT
Start: 2025-05-20 | End: 2025-05-29

## 2025-05-20 RX ORDER — MIDODRINE HYDROCHLORIDE 5 MG/1
10 TABLET ORAL EVERY 8 HOURS
Refills: 0 | Status: DISCONTINUED | OUTPATIENT
Start: 2025-05-20 | End: 2025-05-31

## 2025-05-20 RX ORDER — CEFEPIME 2 G/20ML
2000 INJECTION, POWDER, FOR SOLUTION INTRAVENOUS EVERY 12 HOURS
Refills: 0 | Status: DISCONTINUED | OUTPATIENT
Start: 2025-05-20 | End: 2025-05-24

## 2025-05-20 RX ORDER — FUROSEMIDE 10 MG/ML
40 INJECTION INTRAMUSCULAR; INTRAVENOUS DAILY
Refills: 0 | Status: DISCONTINUED | OUTPATIENT
Start: 2025-05-20 | End: 2025-05-31

## 2025-05-20 RX ADMIN — Medication 100 MILLIGRAM(S): at 03:18

## 2025-05-20 RX ADMIN — NYSTATIN 1 APPLICATION(S): 100000 CREAM TOPICAL at 17:52

## 2025-05-20 RX ADMIN — CEFEPIME 100 MILLIGRAM(S): 2 INJECTION, POWDER, FOR SOLUTION INTRAVENOUS at 22:48

## 2025-05-20 RX ADMIN — MEDPURA VITAMIN A AND D 1 APPLICATION(S): 95 OINTMENT TOPICAL at 12:23

## 2025-05-20 RX ADMIN — Medication 1 MILLIGRAM(S): at 22:48

## 2025-05-20 RX ADMIN — Medication 325 MILLIGRAM(S): at 12:23

## 2025-05-20 RX ADMIN — NYSTATIN 1 APPLICATION(S): 100000 CREAM TOPICAL at 05:24

## 2025-05-20 RX ADMIN — APIXABAN 5 MILLIGRAM(S): 2.5 TABLET, FILM COATED ORAL at 17:52

## 2025-05-20 RX ADMIN — MIDODRINE HYDROCHLORIDE 10 MILLIGRAM(S): 5 TABLET ORAL at 03:15

## 2025-05-20 RX ADMIN — Medication 2 TABLET(S): at 23:07

## 2025-05-20 RX ADMIN — Medication 1 APPLICATION(S): at 12:23

## 2025-05-20 RX ADMIN — FOLIC ACID 1 MILLIGRAM(S): 1 TABLET ORAL at 12:23

## 2025-05-20 RX ADMIN — Medication 40 MILLIGRAM(S): at 05:28

## 2025-05-20 RX ADMIN — ROSUVASTATIN CALCIUM 10 MILLIGRAM(S): 20 TABLET, FILM COATED ORAL at 22:48

## 2025-05-20 RX ADMIN — DAPAGLIFLOZIN 10 MILLIGRAM(S): 5 TABLET, FILM COATED ORAL at 12:23

## 2025-05-20 RX ADMIN — APIXABAN 5 MILLIGRAM(S): 2.5 TABLET, FILM COATED ORAL at 05:24

## 2025-05-20 RX ADMIN — CEFEPIME 100 MILLIGRAM(S): 2 INJECTION, POWDER, FOR SOLUTION INTRAVENOUS at 07:51

## 2025-05-20 RX ADMIN — Medication 100 MILLIGRAM(S): at 17:52

## 2025-05-20 RX ADMIN — MIDODRINE HYDROCHLORIDE 10 MILLIGRAM(S): 5 TABLET ORAL at 15:15

## 2025-05-20 RX ADMIN — MIDODRINE HYDROCHLORIDE 10 MILLIGRAM(S): 5 TABLET ORAL at 22:48

## 2025-05-21 LAB
ANION GAP SERPL CALC-SCNC: 12 MMOL/L — SIGNIFICANT CHANGE UP (ref 7–14)
ANION GAP SERPL CALC-SCNC: 4 MMOL/L — LOW (ref 7–14)
BUN SERPL-MCNC: 31 MG/DL — HIGH (ref 10–20)
BUN SERPL-MCNC: 35 MG/DL — HIGH (ref 10–20)
CALCIUM SERPL-MCNC: 10.1 MG/DL — SIGNIFICANT CHANGE UP (ref 8.4–10.5)
CALCIUM SERPL-MCNC: 9.9 MG/DL — SIGNIFICANT CHANGE UP (ref 8.4–10.5)
CHLORIDE SERPL-SCNC: 91 MMOL/L — LOW (ref 98–110)
CHLORIDE SERPL-SCNC: 92 MMOL/L — LOW (ref 98–110)
CO2 SERPL-SCNC: 31 MMOL/L — SIGNIFICANT CHANGE UP (ref 17–32)
CO2 SERPL-SCNC: 39 MMOL/L — HIGH (ref 17–32)
CREAT SERPL-MCNC: 1.5 MG/DL — SIGNIFICANT CHANGE UP (ref 0.7–1.5)
CREAT SERPL-MCNC: 1.5 MG/DL — SIGNIFICANT CHANGE UP (ref 0.7–1.5)
D DIMER BLD IA.RAPID-MCNC: 227 NG/ML DDU — SIGNIFICANT CHANGE UP
EGFR: 34 ML/MIN/1.73M2 — LOW
GLUCOSE BLDC GLUCOMTR-MCNC: 117 MG/DL — HIGH (ref 70–99)
GLUCOSE BLDC GLUCOMTR-MCNC: 139 MG/DL — HIGH (ref 70–99)
GLUCOSE BLDC GLUCOMTR-MCNC: 98 MG/DL — SIGNIFICANT CHANGE UP (ref 70–99)
GLUCOSE SERPL-MCNC: 104 MG/DL — HIGH (ref 70–99)
GLUCOSE SERPL-MCNC: 119 MG/DL — HIGH (ref 70–99)
HCT VFR BLD CALC: 27.5 % — LOW (ref 37–47)
HGB BLD-MCNC: 7.3 G/DL — LOW (ref 12–16)
MAGNESIUM SERPL-MCNC: 2.5 MG/DL — HIGH (ref 1.8–2.4)
MCHC RBC-ENTMCNC: 25.2 PG — LOW (ref 27–31)
MCHC RBC-ENTMCNC: 26.5 G/DL — LOW (ref 32–37)
MCV RBC AUTO: 94.8 FL — SIGNIFICANT CHANGE UP (ref 81–99)
MRSA PCR RESULT.: NEGATIVE — SIGNIFICANT CHANGE UP
NRBC BLD AUTO-RTO: 0 /100 WBCS — SIGNIFICANT CHANGE UP (ref 0–0)
PLATELET # BLD AUTO: 298 K/UL — SIGNIFICANT CHANGE UP (ref 130–400)
PMV BLD: 9.6 FL — SIGNIFICANT CHANGE UP (ref 7.4–10.4)
POTASSIUM SERPL-MCNC: 5.3 MMOL/L — HIGH (ref 3.5–5)
POTASSIUM SERPL-MCNC: 5.7 MMOL/L — HIGH (ref 3.5–5)
POTASSIUM SERPL-SCNC: 5.3 MMOL/L — HIGH (ref 3.5–5)
POTASSIUM SERPL-SCNC: 5.7 MMOL/L — HIGH (ref 3.5–5)
RBC # BLD: 2.9 M/UL — LOW (ref 4.2–5.4)
RBC # FLD: 16.2 % — HIGH (ref 11.5–14.5)
SODIUM SERPL-SCNC: 134 MMOL/L — LOW (ref 135–146)
SODIUM SERPL-SCNC: 135 MMOL/L — SIGNIFICANT CHANGE UP (ref 135–146)
TROPONIN T, HIGH SENSITIVITY RESULT: 49 NG/L — HIGH (ref 6–13)
TROPONIN T, HIGH SENSITIVITY RESULT: 49 NG/L — HIGH (ref 6–13)
TROPONIN T, HIGH SENSITIVITY RESULT: 52 NG/L — CRITICAL HIGH (ref 6–13)
TROPONIN T, HIGH SENSITIVITY RESULT: 53 NG/L — CRITICAL HIGH (ref 6–13)
WBC # BLD: 7.99 K/UL — SIGNIFICANT CHANGE UP (ref 4.8–10.8)
WBC # FLD AUTO: 7.99 K/UL — SIGNIFICANT CHANGE UP (ref 4.8–10.8)

## 2025-05-21 PROCEDURE — 71045 X-RAY EXAM CHEST 1 VIEW: CPT | Mod: 26

## 2025-05-21 PROCEDURE — 93970 EXTREMITY STUDY: CPT | Mod: 26

## 2025-05-21 PROCEDURE — 99233 SBSQ HOSP IP/OBS HIGH 50: CPT

## 2025-05-21 RX ORDER — SODIUM ZIRCONIUM CYCLOSILICATE 5 G/5G
10 POWDER, FOR SUSPENSION ORAL ONCE
Refills: 0 | Status: COMPLETED | OUTPATIENT
Start: 2025-05-21 | End: 2025-05-21

## 2025-05-21 RX ADMIN — MIDODRINE HYDROCHLORIDE 10 MILLIGRAM(S): 5 TABLET ORAL at 21:54

## 2025-05-21 RX ADMIN — FUROSEMIDE 40 MILLIGRAM(S): 10 INJECTION INTRAMUSCULAR; INTRAVENOUS at 05:40

## 2025-05-21 RX ADMIN — NYSTATIN 1 APPLICATION(S): 100000 CREAM TOPICAL at 05:48

## 2025-05-21 RX ADMIN — Medication 40 MILLIGRAM(S): at 06:11

## 2025-05-21 RX ADMIN — APIXABAN 5 MILLIGRAM(S): 2.5 TABLET, FILM COATED ORAL at 05:40

## 2025-05-21 RX ADMIN — ROSUVASTATIN CALCIUM 10 MILLIGRAM(S): 20 TABLET, FILM COATED ORAL at 21:54

## 2025-05-21 RX ADMIN — Medication 2 TABLET(S): at 21:54

## 2025-05-21 RX ADMIN — SODIUM ZIRCONIUM CYCLOSILICATE 10 GRAM(S): 5 POWDER, FOR SUSPENSION ORAL at 10:45

## 2025-05-21 RX ADMIN — Medication 1 APPLICATION(S): at 05:39

## 2025-05-21 RX ADMIN — Medication 100 MILLIGRAM(S): at 05:39

## 2025-05-21 RX ADMIN — METOPROLOL SUCCINATE 25 MILLIGRAM(S): 50 TABLET, EXTENDED RELEASE ORAL at 05:40

## 2025-05-21 RX ADMIN — Medication 100 MILLIGRAM(S): at 17:19

## 2025-05-21 RX ADMIN — MIDODRINE HYDROCHLORIDE 10 MILLIGRAM(S): 5 TABLET ORAL at 03:50

## 2025-05-21 RX ADMIN — Medication 1 MILLIGRAM(S): at 21:54

## 2025-05-21 RX ADMIN — CEFEPIME 100 MILLIGRAM(S): 2 INJECTION, POWDER, FOR SOLUTION INTRAVENOUS at 09:02

## 2025-05-21 RX ADMIN — APIXABAN 5 MILLIGRAM(S): 2.5 TABLET, FILM COATED ORAL at 17:19

## 2025-05-21 RX ADMIN — CEFEPIME 100 MILLIGRAM(S): 2 INJECTION, POWDER, FOR SOLUTION INTRAVENOUS at 21:53

## 2025-05-22 ENCOUNTER — RESULT REVIEW (OUTPATIENT)
Age: 85
End: 2025-05-22

## 2025-05-22 LAB
ALBUMIN SERPL ELPH-MCNC: 2.6 G/DL — LOW (ref 3.5–5.2)
ALP SERPL-CCNC: 82 U/L — SIGNIFICANT CHANGE UP (ref 30–115)
ALT FLD-CCNC: 8 U/L — SIGNIFICANT CHANGE UP (ref 0–41)
ANION GAP SERPL CALC-SCNC: 5 MMOL/L — LOW (ref 7–14)
AST SERPL-CCNC: 7 U/L — SIGNIFICANT CHANGE UP (ref 0–41)
BASOPHILS # BLD AUTO: 0.05 K/UL — SIGNIFICANT CHANGE UP (ref 0–0.2)
BASOPHILS NFR BLD AUTO: 0.7 % — SIGNIFICANT CHANGE UP (ref 0–1)
BILIRUB SERPL-MCNC: 0.3 MG/DL — SIGNIFICANT CHANGE UP (ref 0.2–1.2)
BUN SERPL-MCNC: 38 MG/DL — HIGH (ref 10–20)
CALCIUM SERPL-MCNC: 10 MG/DL — SIGNIFICANT CHANGE UP (ref 8.4–10.5)
CHLORIDE SERPL-SCNC: 93 MMOL/L — LOW (ref 98–110)
CO2 SERPL-SCNC: 36 MMOL/L — HIGH (ref 17–32)
CREAT SERPL-MCNC: 1.4 MG/DL — SIGNIFICANT CHANGE UP (ref 0.7–1.5)
EGFR: 37 ML/MIN/1.73M2 — LOW
EGFR: 37 ML/MIN/1.73M2 — LOW
EOSINOPHIL # BLD AUTO: 0.07 K/UL — SIGNIFICANT CHANGE UP (ref 0–0.7)
EOSINOPHIL NFR BLD AUTO: 1 % — SIGNIFICANT CHANGE UP (ref 0–8)
FERRITIN SERPL-MCNC: 72 NG/ML — SIGNIFICANT CHANGE UP (ref 13–330)
FOLATE SERPL-MCNC: >20 NG/ML — SIGNIFICANT CHANGE UP
GLUCOSE BLDC GLUCOMTR-MCNC: 104 MG/DL — HIGH (ref 70–99)
GLUCOSE BLDC GLUCOMTR-MCNC: 105 MG/DL — HIGH (ref 70–99)
GLUCOSE BLDC GLUCOMTR-MCNC: 123 MG/DL — HIGH (ref 70–99)
GLUCOSE BLDC GLUCOMTR-MCNC: 95 MG/DL — SIGNIFICANT CHANGE UP (ref 70–99)
GLUCOSE SERPL-MCNC: 90 MG/DL — SIGNIFICANT CHANGE UP (ref 70–99)
HCT VFR BLD CALC: 30.9 % — LOW (ref 37–47)
HGB BLD-MCNC: 8.7 G/DL — LOW (ref 12–16)
IMM GRANULOCYTES NFR BLD AUTO: 0.4 % — HIGH (ref 0.1–0.3)
IRON SATN MFR SERPL: 168 UG/DL — HIGH (ref 35–150)
IRON SATN MFR SERPL: 60 % — HIGH (ref 15–50)
LYMPHOCYTES # BLD AUTO: 0.75 K/UL — LOW (ref 1.2–3.4)
LYMPHOCYTES # BLD AUTO: 10.6 % — LOW (ref 20.5–51.1)
MAGNESIUM SERPL-MCNC: 2.1 MG/DL — SIGNIFICANT CHANGE UP (ref 1.8–2.4)
MCHC RBC-ENTMCNC: 25.7 PG — LOW (ref 27–31)
MCHC RBC-ENTMCNC: 28.2 G/DL — LOW (ref 32–37)
MCV RBC AUTO: 91.2 FL — SIGNIFICANT CHANGE UP (ref 81–99)
MONOCYTES # BLD AUTO: 0.53 K/UL — SIGNIFICANT CHANGE UP (ref 0.1–0.6)
MONOCYTES NFR BLD AUTO: 7.5 % — SIGNIFICANT CHANGE UP (ref 1.7–9.3)
NEUTROPHILS # BLD AUTO: 5.62 K/UL — SIGNIFICANT CHANGE UP (ref 1.4–6.5)
NEUTROPHILS NFR BLD AUTO: 79.8 % — HIGH (ref 42.2–75.2)
NRBC BLD AUTO-RTO: 0 /100 WBCS — SIGNIFICANT CHANGE UP (ref 0–0)
PLATELET # BLD AUTO: 306 K/UL — SIGNIFICANT CHANGE UP (ref 130–400)
PMV BLD: 9.4 FL — SIGNIFICANT CHANGE UP (ref 7.4–10.4)
POTASSIUM SERPL-MCNC: 4.7 MMOL/L — SIGNIFICANT CHANGE UP (ref 3.5–5)
POTASSIUM SERPL-SCNC: 4.7 MMOL/L — SIGNIFICANT CHANGE UP (ref 3.5–5)
PROCALCITONIN SERPL-MCNC: 0.25 NG/ML — HIGH (ref 0.02–0.1)
PROT SERPL-MCNC: 6.4 G/DL — SIGNIFICANT CHANGE UP (ref 6–8)
RBC # BLD: 3.39 M/UL — LOW (ref 4.2–5.4)
RBC # FLD: 17.3 % — HIGH (ref 11.5–14.5)
SODIUM SERPL-SCNC: 134 MMOL/L — LOW (ref 135–146)
TIBC SERPL-MCNC: 280 UG/DL — SIGNIFICANT CHANGE UP (ref 220–430)
TSH SERPL-MCNC: 1.19 UIU/ML — SIGNIFICANT CHANGE UP (ref 0.27–4.2)
UIBC SERPL-MCNC: 112 UG/DL — SIGNIFICANT CHANGE UP (ref 110–370)
VIT B12 SERPL-MCNC: 545 PG/ML — SIGNIFICANT CHANGE UP (ref 232–1245)
WBC # BLD: 7.05 K/UL — SIGNIFICANT CHANGE UP (ref 4.8–10.8)
WBC # FLD AUTO: 7.05 K/UL — SIGNIFICANT CHANGE UP (ref 4.8–10.8)

## 2025-05-22 PROCEDURE — 93306 TTE W/DOPPLER COMPLETE: CPT | Mod: 26

## 2025-05-22 PROCEDURE — 99233 SBSQ HOSP IP/OBS HIGH 50: CPT

## 2025-05-22 RX ADMIN — Medication 100 MILLIGRAM(S): at 18:28

## 2025-05-22 RX ADMIN — Medication 1000 MILLILITER(S): at 07:24

## 2025-05-22 RX ADMIN — CEFEPIME 100 MILLIGRAM(S): 2 INJECTION, POWDER, FOR SOLUTION INTRAVENOUS at 08:25

## 2025-05-22 RX ADMIN — APIXABAN 5 MILLIGRAM(S): 2.5 TABLET, FILM COATED ORAL at 05:29

## 2025-05-22 RX ADMIN — MIDODRINE HYDROCHLORIDE 10 MILLIGRAM(S): 5 TABLET ORAL at 21:46

## 2025-05-22 RX ADMIN — FUROSEMIDE 40 MILLIGRAM(S): 10 INJECTION INTRAMUSCULAR; INTRAVENOUS at 05:29

## 2025-05-22 RX ADMIN — Medication 1 MILLIGRAM(S): at 21:46

## 2025-05-22 RX ADMIN — CEFEPIME 100 MILLIGRAM(S): 2 INJECTION, POWDER, FOR SOLUTION INTRAVENOUS at 20:08

## 2025-05-22 RX ADMIN — MIDODRINE HYDROCHLORIDE 10 MILLIGRAM(S): 5 TABLET ORAL at 05:29

## 2025-05-22 RX ADMIN — NYSTATIN 1 APPLICATION(S): 100000 CREAM TOPICAL at 19:08

## 2025-05-22 RX ADMIN — Medication 100 MILLIGRAM(S): at 05:28

## 2025-05-22 RX ADMIN — Medication 2 TABLET(S): at 21:46

## 2025-05-22 RX ADMIN — Medication 1 APPLICATION(S): at 19:08

## 2025-05-22 RX ADMIN — METOPROLOL SUCCINATE 25 MILLIGRAM(S): 50 TABLET, EXTENDED RELEASE ORAL at 05:29

## 2025-05-22 RX ADMIN — MEDPURA VITAMIN A AND D 1 APPLICATION(S): 95 OINTMENT TOPICAL at 12:18

## 2025-05-22 RX ADMIN — Medication 40 MILLIGRAM(S): at 06:56

## 2025-05-22 RX ADMIN — ROSUVASTATIN CALCIUM 10 MILLIGRAM(S): 20 TABLET, FILM COATED ORAL at 21:46

## 2025-05-22 RX ADMIN — Medication 1 APPLICATION(S): at 05:30

## 2025-05-23 DIAGNOSIS — G93.41 METABOLIC ENCEPHALOPATHY: ICD-10-CM

## 2025-05-23 DIAGNOSIS — Z51.5 ENCOUNTER FOR PALLIATIVE CARE: ICD-10-CM

## 2025-05-23 DIAGNOSIS — E66.2 MORBID (SEVERE) OBESITY WITH ALVEOLAR HYPOVENTILATION: ICD-10-CM

## 2025-05-23 DIAGNOSIS — J96.22 ACUTE AND CHRONIC RESPIRATORY FAILURE WITH HYPERCAPNIA: ICD-10-CM

## 2025-05-23 LAB
ANION GAP SERPL CALC-SCNC: 8 MMOL/L — SIGNIFICANT CHANGE UP (ref 7–14)
BASE EXCESS BLDA CALC-SCNC: 8.8 MMOL/L — HIGH (ref -2–3)
BUN SERPL-MCNC: 38 MG/DL — HIGH (ref 10–20)
CALCIUM SERPL-MCNC: 10.2 MG/DL — SIGNIFICANT CHANGE UP (ref 8.4–10.5)
CHLORIDE SERPL-SCNC: 95 MMOL/L — LOW (ref 98–110)
CO2 SERPL-SCNC: 35 MMOL/L — HIGH (ref 17–32)
CREAT SERPL-MCNC: 1.3 MG/DL — SIGNIFICANT CHANGE UP (ref 0.7–1.5)
EGFR: 40 ML/MIN/1.73M2 — LOW
EGFR: 40 ML/MIN/1.73M2 — LOW
GAS PNL BLDA: SIGNIFICANT CHANGE UP
GLUCOSE SERPL-MCNC: 86 MG/DL — SIGNIFICANT CHANGE UP (ref 70–99)
HCO3 BLDA-SCNC: 37 MMOL/L — HIGH (ref 21–28)
MAGNESIUM SERPL-MCNC: 2 MG/DL — SIGNIFICANT CHANGE UP (ref 1.8–2.4)
PCO2 BLDA: 67 MMHG — HIGH (ref 32–45)
PH BLDA: 7.35 — SIGNIFICANT CHANGE UP (ref 7.35–7.45)
PO2 BLDA: 128 MMHG — HIGH (ref 83–108)
POTASSIUM SERPL-MCNC: 4.9 MMOL/L — SIGNIFICANT CHANGE UP (ref 3.5–5)
POTASSIUM SERPL-SCNC: 4.9 MMOL/L — SIGNIFICANT CHANGE UP (ref 3.5–5)
SAO2 % BLDA: 99.1 % — HIGH (ref 94–98)
SODIUM SERPL-SCNC: 138 MMOL/L — SIGNIFICANT CHANGE UP (ref 135–146)

## 2025-05-23 PROCEDURE — 99232 SBSQ HOSP IP/OBS MODERATE 35: CPT

## 2025-05-23 PROCEDURE — 99223 1ST HOSP IP/OBS HIGH 75: CPT

## 2025-05-23 RX ADMIN — NYSTATIN 1 APPLICATION(S): 100000 CREAM TOPICAL at 05:39

## 2025-05-23 RX ADMIN — Medication 100 MILLIGRAM(S): at 17:00

## 2025-05-23 RX ADMIN — CEFEPIME 100 MILLIGRAM(S): 2 INJECTION, POWDER, FOR SOLUTION INTRAVENOUS at 22:55

## 2025-05-23 RX ADMIN — APIXABAN 5 MILLIGRAM(S): 2.5 TABLET, FILM COATED ORAL at 05:42

## 2025-05-23 RX ADMIN — NYSTATIN 1 APPLICATION(S): 100000 CREAM TOPICAL at 17:15

## 2025-05-23 RX ADMIN — Medication 1 APPLICATION(S): at 17:03

## 2025-05-23 RX ADMIN — Medication 1 APPLICATION(S): at 05:45

## 2025-05-23 RX ADMIN — METOPROLOL SUCCINATE 25 MILLIGRAM(S): 50 TABLET, EXTENDED RELEASE ORAL at 05:45

## 2025-05-23 RX ADMIN — Medication 40 MILLIGRAM(S): at 06:10

## 2025-05-23 RX ADMIN — MIDODRINE HYDROCHLORIDE 10 MILLIGRAM(S): 5 TABLET ORAL at 05:42

## 2025-05-23 RX ADMIN — Medication 100 MILLIGRAM(S): at 05:40

## 2025-05-23 RX ADMIN — FUROSEMIDE 40 MILLIGRAM(S): 10 INJECTION INTRAMUSCULAR; INTRAVENOUS at 05:40

## 2025-05-23 RX ADMIN — MEDPURA VITAMIN A AND D 1 APPLICATION(S): 95 OINTMENT TOPICAL at 17:04

## 2025-05-23 NOTE — ED ADULT NURSE NOTE - DOES PATIENT HAVE ADVANCE DIRECTIVE
No

## 2025-05-24 LAB
ALBUMIN SERPL ELPH-MCNC: 2.7 G/DL — LOW (ref 3.5–5.2)
ALP SERPL-CCNC: 70 U/L — SIGNIFICANT CHANGE UP (ref 30–115)
ALT FLD-CCNC: 10 U/L — SIGNIFICANT CHANGE UP (ref 0–41)
ANION GAP SERPL CALC-SCNC: 11 MMOL/L — SIGNIFICANT CHANGE UP (ref 7–14)
AST SERPL-CCNC: 13 U/L — SIGNIFICANT CHANGE UP (ref 0–41)
BASOPHILS # BLD AUTO: 0.06 K/UL — SIGNIFICANT CHANGE UP (ref 0–0.2)
BASOPHILS NFR BLD AUTO: 1.1 % — HIGH (ref 0–1)
BILIRUB SERPL-MCNC: 0.3 MG/DL — SIGNIFICANT CHANGE UP (ref 0.2–1.2)
BUN SERPL-MCNC: 38 MG/DL — HIGH (ref 10–20)
CALCIUM SERPL-MCNC: 10.1 MG/DL — SIGNIFICANT CHANGE UP (ref 8.4–10.5)
CHLORIDE SERPL-SCNC: 95 MMOL/L — LOW (ref 98–110)
CO2 SERPL-SCNC: 31 MMOL/L — SIGNIFICANT CHANGE UP (ref 17–32)
CREAT SERPL-MCNC: 1.3 MG/DL — SIGNIFICANT CHANGE UP (ref 0.7–1.5)
EGFR: 40 ML/MIN/1.73M2 — LOW
EGFR: 40 ML/MIN/1.73M2 — LOW
EOSINOPHIL # BLD AUTO: 0.07 K/UL — SIGNIFICANT CHANGE UP (ref 0–0.7)
EOSINOPHIL NFR BLD AUTO: 1.2 % — SIGNIFICANT CHANGE UP (ref 0–8)
GAS PNL BLDA: SIGNIFICANT CHANGE UP
GLUCOSE SERPL-MCNC: 64 MG/DL — LOW (ref 70–99)
HCT VFR BLD CALC: 28.5 % — LOW (ref 37–47)
HGB BLD-MCNC: 8 G/DL — LOW (ref 12–16)
IMM GRANULOCYTES NFR BLD AUTO: 0.4 % — HIGH (ref 0.1–0.3)
LYMPHOCYTES # BLD AUTO: 0.84 K/UL — LOW (ref 1.2–3.4)
LYMPHOCYTES # BLD AUTO: 14.9 % — LOW (ref 20.5–51.1)
MAGNESIUM SERPL-MCNC: 2.2 MG/DL — SIGNIFICANT CHANGE UP (ref 1.8–2.4)
MCHC RBC-ENTMCNC: 25.2 PG — LOW (ref 27–31)
MCHC RBC-ENTMCNC: 28.1 G/DL — LOW (ref 32–37)
MCV RBC AUTO: 89.6 FL — SIGNIFICANT CHANGE UP (ref 81–99)
MONOCYTES # BLD AUTO: 0.72 K/UL — HIGH (ref 0.1–0.6)
MONOCYTES NFR BLD AUTO: 12.8 % — HIGH (ref 1.7–9.3)
NEUTROPHILS # BLD AUTO: 3.93 K/UL — SIGNIFICANT CHANGE UP (ref 1.4–6.5)
NEUTROPHILS NFR BLD AUTO: 69.6 % — SIGNIFICANT CHANGE UP (ref 42.2–75.2)
NRBC BLD AUTO-RTO: 0 /100 WBCS — SIGNIFICANT CHANGE UP (ref 0–0)
PLATELET # BLD AUTO: 277 K/UL — SIGNIFICANT CHANGE UP (ref 130–400)
PMV BLD: 9.4 FL — SIGNIFICANT CHANGE UP (ref 7.4–10.4)
POTASSIUM SERPL-MCNC: 5.4 MMOL/L — HIGH (ref 3.5–5)
POTASSIUM SERPL-SCNC: 5.4 MMOL/L — HIGH (ref 3.5–5)
PROT SERPL-MCNC: 5.7 G/DL — LOW (ref 6–8)
RBC # BLD: 3.18 M/UL — LOW (ref 4.2–5.4)
RBC # FLD: 16.7 % — HIGH (ref 11.5–14.5)
SODIUM SERPL-SCNC: 137 MMOL/L — SIGNIFICANT CHANGE UP (ref 135–146)
WBC # BLD: 5.64 K/UL — SIGNIFICANT CHANGE UP (ref 4.8–10.8)
WBC # FLD AUTO: 5.64 K/UL — SIGNIFICANT CHANGE UP (ref 4.8–10.8)

## 2025-05-24 PROCEDURE — 71045 X-RAY EXAM CHEST 1 VIEW: CPT | Mod: 26

## 2025-05-24 PROCEDURE — 93010 ELECTROCARDIOGRAM REPORT: CPT

## 2025-05-24 PROCEDURE — 99233 SBSQ HOSP IP/OBS HIGH 50: CPT | Mod: GC

## 2025-05-24 RX ORDER — AMIODARONE HYDROCHLORIDE 50 MG/ML
0.5 INJECTION, SOLUTION INTRAVENOUS
Qty: 450 | Refills: 0 | Status: DISCONTINUED | OUTPATIENT
Start: 2025-05-25 | End: 2025-05-26

## 2025-05-24 RX ORDER — METOPROLOL SUCCINATE 50 MG/1
2.5 TABLET, EXTENDED RELEASE ORAL ONCE
Refills: 0 | Status: COMPLETED | OUTPATIENT
Start: 2025-05-24 | End: 2025-05-24

## 2025-05-24 RX ORDER — DIGOXIN 250 UG/1
500 TABLET ORAL ONCE
Refills: 0 | Status: COMPLETED | OUTPATIENT
Start: 2025-05-24 | End: 2025-05-24

## 2025-05-24 RX ORDER — AMIODARONE HYDROCHLORIDE 50 MG/ML
1 INJECTION, SOLUTION INTRAVENOUS
Qty: 450 | Refills: 0 | Status: COMPLETED | OUTPATIENT
Start: 2025-05-24 | End: 2025-05-25

## 2025-05-24 RX ORDER — METOPROLOL SUCCINATE 50 MG/1
25 TABLET, EXTENDED RELEASE ORAL DAILY
Refills: 0 | Status: DISCONTINUED | OUTPATIENT
Start: 2025-05-24 | End: 2025-05-24

## 2025-05-24 RX ORDER — METOPROLOL SUCCINATE 50 MG/1
12.5 TABLET, EXTENDED RELEASE ORAL EVERY 12 HOURS
Refills: 0 | Status: DISCONTINUED | OUTPATIENT
Start: 2025-05-24 | End: 2025-05-31

## 2025-05-24 RX ORDER — AMIODARONE HYDROCHLORIDE 50 MG/ML
150 INJECTION, SOLUTION INTRAVENOUS ONCE
Refills: 0 | Status: DISCONTINUED | OUTPATIENT
Start: 2025-05-24 | End: 2025-05-24

## 2025-05-24 RX ORDER — BUMETANIDE 1 MG/1
2 TABLET ORAL ONCE
Refills: 0 | Status: COMPLETED | OUTPATIENT
Start: 2025-05-24 | End: 2025-05-24

## 2025-05-24 RX ADMIN — DIGOXIN 500 MICROGRAM(S): 250 TABLET ORAL at 20:10

## 2025-05-24 RX ADMIN — MIDODRINE HYDROCHLORIDE 10 MILLIGRAM(S): 5 TABLET ORAL at 13:37

## 2025-05-24 RX ADMIN — CEFEPIME 100 MILLIGRAM(S): 2 INJECTION, POWDER, FOR SOLUTION INTRAVENOUS at 08:05

## 2025-05-24 RX ADMIN — METOPROLOL SUCCINATE 2.5 MILLIGRAM(S): 50 TABLET, EXTENDED RELEASE ORAL at 13:05

## 2025-05-24 RX ADMIN — NYSTATIN 1 APPLICATION(S): 100000 CREAM TOPICAL at 17:58

## 2025-05-24 RX ADMIN — BUMETANIDE 2 MILLIGRAM(S): 1 TABLET ORAL at 11:04

## 2025-05-24 RX ADMIN — APIXABAN 5 MILLIGRAM(S): 2.5 TABLET, FILM COATED ORAL at 17:58

## 2025-05-24 RX ADMIN — Medication 1 APPLICATION(S): at 12:27

## 2025-05-24 RX ADMIN — Medication 1 MILLIGRAM(S): at 22:17

## 2025-05-24 RX ADMIN — FUROSEMIDE 40 MILLIGRAM(S): 10 INJECTION INTRAMUSCULAR; INTRAVENOUS at 06:32

## 2025-05-24 RX ADMIN — Medication 2 TABLET(S): at 22:17

## 2025-05-24 RX ADMIN — Medication 100 MILLIGRAM(S): at 06:32

## 2025-05-24 RX ADMIN — Medication 325 MILLIGRAM(S): at 13:44

## 2025-05-24 RX ADMIN — AMIODARONE HYDROCHLORIDE 33.3 MG/MIN: 50 INJECTION, SOLUTION INTRAVENOUS at 20:10

## 2025-05-24 RX ADMIN — Medication 1 APPLICATION(S): at 06:30

## 2025-05-24 RX ADMIN — NYSTATIN 1 APPLICATION(S): 100000 CREAM TOPICAL at 06:33

## 2025-05-24 RX ADMIN — DAPAGLIFLOZIN 10 MILLIGRAM(S): 5 TABLET, FILM COATED ORAL at 13:37

## 2025-05-24 RX ADMIN — MEDPURA VITAMIN A AND D 1 APPLICATION(S): 95 OINTMENT TOPICAL at 12:27

## 2025-05-24 RX ADMIN — FOLIC ACID 1 MILLIGRAM(S): 1 TABLET ORAL at 13:38

## 2025-05-24 RX ADMIN — MIDODRINE HYDROCHLORIDE 10 MILLIGRAM(S): 5 TABLET ORAL at 22:17

## 2025-05-24 RX ADMIN — ROSUVASTATIN CALCIUM 10 MILLIGRAM(S): 20 TABLET, FILM COATED ORAL at 22:17

## 2025-05-25 LAB
ALBUMIN SERPL ELPH-MCNC: 2.7 G/DL — LOW (ref 3.5–5.2)
ALP SERPL-CCNC: 66 U/L — SIGNIFICANT CHANGE UP (ref 30–115)
ALT FLD-CCNC: 9 U/L — SIGNIFICANT CHANGE UP (ref 0–41)
ANION GAP SERPL CALC-SCNC: 7 MMOL/L — SIGNIFICANT CHANGE UP (ref 7–14)
AST SERPL-CCNC: 12 U/L — SIGNIFICANT CHANGE UP (ref 0–41)
BASOPHILS # BLD AUTO: 0.04 K/UL — SIGNIFICANT CHANGE UP (ref 0–0.2)
BASOPHILS NFR BLD AUTO: 0.6 % — SIGNIFICANT CHANGE UP (ref 0–1)
BILIRUB SERPL-MCNC: 0.3 MG/DL — SIGNIFICANT CHANGE UP (ref 0.2–1.2)
BUN SERPL-MCNC: 42 MG/DL — HIGH (ref 10–20)
CALCIUM SERPL-MCNC: 10.2 MG/DL — SIGNIFICANT CHANGE UP (ref 8.4–10.5)
CHLORIDE SERPL-SCNC: 94 MMOL/L — LOW (ref 98–110)
CO2 SERPL-SCNC: 38 MMOL/L — HIGH (ref 17–32)
CREAT SERPL-MCNC: 1.5 MG/DL — SIGNIFICANT CHANGE UP (ref 0.7–1.5)
CULTURE RESULTS: SIGNIFICANT CHANGE UP
CULTURE RESULTS: SIGNIFICANT CHANGE UP
EGFR: 34 ML/MIN/1.73M2 — LOW
EGFR: 34 ML/MIN/1.73M2 — LOW
EOSINOPHIL # BLD AUTO: 0.07 K/UL — SIGNIFICANT CHANGE UP (ref 0–0.7)
EOSINOPHIL NFR BLD AUTO: 1.1 % — SIGNIFICANT CHANGE UP (ref 0–8)
GAS PNL BLDA: SIGNIFICANT CHANGE UP
GLUCOSE SERPL-MCNC: 90 MG/DL — SIGNIFICANT CHANGE UP (ref 70–99)
HCT VFR BLD CALC: 29.4 % — LOW (ref 37–47)
HGB BLD-MCNC: 8.2 G/DL — LOW (ref 12–16)
IMM GRANULOCYTES NFR BLD AUTO: 0.3 % — SIGNIFICANT CHANGE UP (ref 0.1–0.3)
LYMPHOCYTES # BLD AUTO: 0.98 K/UL — LOW (ref 1.2–3.4)
LYMPHOCYTES # BLD AUTO: 15.6 % — LOW (ref 20.5–51.1)
MAGNESIUM SERPL-MCNC: 1.8 MG/DL — SIGNIFICANT CHANGE UP (ref 1.8–2.4)
MCHC RBC-ENTMCNC: 24.9 PG — LOW (ref 27–31)
MCHC RBC-ENTMCNC: 27.9 G/DL — LOW (ref 32–37)
MCV RBC AUTO: 89.4 FL — SIGNIFICANT CHANGE UP (ref 81–99)
MONOCYTES # BLD AUTO: 0.73 K/UL — HIGH (ref 0.1–0.6)
MONOCYTES NFR BLD AUTO: 11.6 % — HIGH (ref 1.7–9.3)
NEUTROPHILS # BLD AUTO: 4.45 K/UL — SIGNIFICANT CHANGE UP (ref 1.4–6.5)
NEUTROPHILS NFR BLD AUTO: 70.8 % — SIGNIFICANT CHANGE UP (ref 42.2–75.2)
NRBC BLD AUTO-RTO: 0 /100 WBCS — SIGNIFICANT CHANGE UP (ref 0–0)
PLATELET # BLD AUTO: 254 K/UL — SIGNIFICANT CHANGE UP (ref 130–400)
PMV BLD: 9.3 FL — SIGNIFICANT CHANGE UP (ref 7.4–10.4)
POTASSIUM SERPL-MCNC: 4.5 MMOL/L — SIGNIFICANT CHANGE UP (ref 3.5–5)
POTASSIUM SERPL-SCNC: 4.5 MMOL/L — SIGNIFICANT CHANGE UP (ref 3.5–5)
PROT SERPL-MCNC: 5.6 G/DL — LOW (ref 6–8)
RBC # BLD: 3.29 M/UL — LOW (ref 4.2–5.4)
RBC # FLD: 16.6 % — HIGH (ref 11.5–14.5)
SODIUM SERPL-SCNC: 139 MMOL/L — SIGNIFICANT CHANGE UP (ref 135–146)
SPECIMEN SOURCE: SIGNIFICANT CHANGE UP
SPECIMEN SOURCE: SIGNIFICANT CHANGE UP
WBC # BLD: 6.29 K/UL — SIGNIFICANT CHANGE UP (ref 4.8–10.8)
WBC # FLD AUTO: 6.29 K/UL — SIGNIFICANT CHANGE UP (ref 4.8–10.8)

## 2025-05-25 PROCEDURE — 99233 SBSQ HOSP IP/OBS HIGH 50: CPT | Mod: GC

## 2025-05-25 RX ADMIN — AMIODARONE HYDROCHLORIDE 16.7 MG/MIN: 50 INJECTION, SOLUTION INTRAVENOUS at 01:33

## 2025-05-25 RX ADMIN — NYSTATIN 1 APPLICATION(S): 100000 CREAM TOPICAL at 05:58

## 2025-05-25 RX ADMIN — MEDPURA VITAMIN A AND D 1 APPLICATION(S): 95 OINTMENT TOPICAL at 12:32

## 2025-05-25 RX ADMIN — Medication 1 APPLICATION(S): at 05:36

## 2025-05-25 RX ADMIN — AMIODARONE HYDROCHLORIDE 16.7 MG/MIN: 50 INJECTION, SOLUTION INTRAVENOUS at 06:24

## 2025-05-25 RX ADMIN — NYSTATIN 1 APPLICATION(S): 100000 CREAM TOPICAL at 17:40

## 2025-05-25 RX ADMIN — METOPROLOL SUCCINATE 12.5 MILLIGRAM(S): 50 TABLET, EXTENDED RELEASE ORAL at 05:32

## 2025-05-25 RX ADMIN — Medication 40 MILLIGRAM(S): at 05:31

## 2025-05-25 RX ADMIN — APIXABAN 5 MILLIGRAM(S): 2.5 TABLET, FILM COATED ORAL at 17:33

## 2025-05-25 RX ADMIN — FOLIC ACID 1 MILLIGRAM(S): 1 TABLET ORAL at 12:37

## 2025-05-25 RX ADMIN — APIXABAN 5 MILLIGRAM(S): 2.5 TABLET, FILM COATED ORAL at 05:32

## 2025-05-25 RX ADMIN — Medication 2 TABLET(S): at 22:27

## 2025-05-25 RX ADMIN — Medication 1 MILLIGRAM(S): at 22:27

## 2025-05-25 RX ADMIN — MIDODRINE HYDROCHLORIDE 10 MILLIGRAM(S): 5 TABLET ORAL at 05:32

## 2025-05-25 RX ADMIN — ROSUVASTATIN CALCIUM 10 MILLIGRAM(S): 20 TABLET, FILM COATED ORAL at 22:27

## 2025-05-25 RX ADMIN — Medication 1 APPLICATION(S): at 12:32

## 2025-05-25 RX ADMIN — Medication 325 MILLIGRAM(S): at 12:37

## 2025-05-25 RX ADMIN — MIDODRINE HYDROCHLORIDE 10 MILLIGRAM(S): 5 TABLET ORAL at 13:05

## 2025-05-25 RX ADMIN — AMIODARONE HYDROCHLORIDE 33.3 MG/MIN: 50 INJECTION, SOLUTION INTRAVENOUS at 05:31

## 2025-05-25 RX ADMIN — DAPAGLIFLOZIN 10 MILLIGRAM(S): 5 TABLET, FILM COATED ORAL at 12:37

## 2025-05-25 RX ADMIN — MIDODRINE HYDROCHLORIDE 10 MILLIGRAM(S): 5 TABLET ORAL at 22:28

## 2025-05-25 RX ADMIN — FUROSEMIDE 40 MILLIGRAM(S): 10 INJECTION INTRAMUSCULAR; INTRAVENOUS at 05:32

## 2025-05-26 LAB
ALBUMIN SERPL ELPH-MCNC: 2.8 G/DL — LOW (ref 3.5–5.2)
ALP SERPL-CCNC: 72 U/L — SIGNIFICANT CHANGE UP (ref 30–115)
ALT FLD-CCNC: 10 U/L — SIGNIFICANT CHANGE UP (ref 0–41)
ANION GAP SERPL CALC-SCNC: 10 MMOL/L — SIGNIFICANT CHANGE UP (ref 7–14)
AST SERPL-CCNC: 13 U/L — SIGNIFICANT CHANGE UP (ref 0–41)
BASOPHILS # BLD AUTO: 0.06 K/UL — SIGNIFICANT CHANGE UP (ref 0–0.2)
BASOPHILS NFR BLD AUTO: 0.8 % — SIGNIFICANT CHANGE UP (ref 0–1)
BILIRUB SERPL-MCNC: 0.3 MG/DL — SIGNIFICANT CHANGE UP (ref 0.2–1.2)
BUN SERPL-MCNC: 43 MG/DL — HIGH (ref 10–20)
CALCIUM SERPL-MCNC: 10 MG/DL — SIGNIFICANT CHANGE UP (ref 8.4–10.5)
CHLORIDE SERPL-SCNC: 95 MMOL/L — LOW (ref 98–110)
CO2 SERPL-SCNC: 36 MMOL/L — HIGH (ref 17–32)
CREAT SERPL-MCNC: 1.5 MG/DL — SIGNIFICANT CHANGE UP (ref 0.7–1.5)
EGFR: 34 ML/MIN/1.73M2 — LOW
EGFR: 34 ML/MIN/1.73M2 — LOW
EOSINOPHIL # BLD AUTO: 0.11 K/UL — SIGNIFICANT CHANGE UP (ref 0–0.7)
EOSINOPHIL NFR BLD AUTO: 1.4 % — SIGNIFICANT CHANGE UP (ref 0–8)
GLUCOSE SERPL-MCNC: 123 MG/DL — HIGH (ref 70–99)
HCT VFR BLD CALC: 31.1 % — LOW (ref 37–47)
HGB BLD-MCNC: 8.9 G/DL — LOW (ref 12–16)
IMM GRANULOCYTES NFR BLD AUTO: 0.4 % — HIGH (ref 0.1–0.3)
LYMPHOCYTES # BLD AUTO: 1.06 K/UL — LOW (ref 1.2–3.4)
LYMPHOCYTES # BLD AUTO: 13.4 % — LOW (ref 20.5–51.1)
MAGNESIUM SERPL-MCNC: 1.8 MG/DL — SIGNIFICANT CHANGE UP (ref 1.8–2.4)
MCHC RBC-ENTMCNC: 25.6 PG — LOW (ref 27–31)
MCHC RBC-ENTMCNC: 28.6 G/DL — LOW (ref 32–37)
MCV RBC AUTO: 89.4 FL — SIGNIFICANT CHANGE UP (ref 81–99)
MONOCYTES # BLD AUTO: 0.79 K/UL — HIGH (ref 0.1–0.6)
MONOCYTES NFR BLD AUTO: 10 % — HIGH (ref 1.7–9.3)
NEUTROPHILS # BLD AUTO: 5.84 K/UL — SIGNIFICANT CHANGE UP (ref 1.4–6.5)
NEUTROPHILS NFR BLD AUTO: 74 % — SIGNIFICANT CHANGE UP (ref 42.2–75.2)
NRBC BLD AUTO-RTO: 0 /100 WBCS — SIGNIFICANT CHANGE UP (ref 0–0)
PLATELET # BLD AUTO: 217 K/UL — SIGNIFICANT CHANGE UP (ref 130–400)
PMV BLD: 9.9 FL — SIGNIFICANT CHANGE UP (ref 7.4–10.4)
POTASSIUM SERPL-MCNC: 4.2 MMOL/L — SIGNIFICANT CHANGE UP (ref 3.5–5)
POTASSIUM SERPL-SCNC: 4.2 MMOL/L — SIGNIFICANT CHANGE UP (ref 3.5–5)
PROT SERPL-MCNC: 6 G/DL — SIGNIFICANT CHANGE UP (ref 6–8)
RBC # BLD: 3.48 M/UL — LOW (ref 4.2–5.4)
RBC # FLD: 17 % — HIGH (ref 11.5–14.5)
SODIUM SERPL-SCNC: 141 MMOL/L — SIGNIFICANT CHANGE UP (ref 135–146)
WBC # BLD: 7.89 K/UL — SIGNIFICANT CHANGE UP (ref 4.8–10.8)
WBC # FLD AUTO: 7.89 K/UL — SIGNIFICANT CHANGE UP (ref 4.8–10.8)

## 2025-05-26 PROCEDURE — 99232 SBSQ HOSP IP/OBS MODERATE 35: CPT | Mod: GC

## 2025-05-26 PROCEDURE — 71045 X-RAY EXAM CHEST 1 VIEW: CPT | Mod: 26

## 2025-05-26 RX ORDER — AMIODARONE HYDROCHLORIDE 50 MG/ML
200 INJECTION, SOLUTION INTRAVENOUS DAILY
Refills: 0 | Status: DISCONTINUED | OUTPATIENT
Start: 2025-05-26 | End: 2025-05-26

## 2025-05-26 RX ADMIN — DAPAGLIFLOZIN 10 MILLIGRAM(S): 5 TABLET, FILM COATED ORAL at 11:40

## 2025-05-26 RX ADMIN — APIXABAN 5 MILLIGRAM(S): 2.5 TABLET, FILM COATED ORAL at 06:16

## 2025-05-26 RX ADMIN — MIDODRINE HYDROCHLORIDE 10 MILLIGRAM(S): 5 TABLET ORAL at 21:18

## 2025-05-26 RX ADMIN — MIDODRINE HYDROCHLORIDE 10 MILLIGRAM(S): 5 TABLET ORAL at 14:53

## 2025-05-26 RX ADMIN — Medication 40 MILLIGRAM(S): at 06:15

## 2025-05-26 RX ADMIN — Medication 1 MILLIGRAM(S): at 21:16

## 2025-05-26 RX ADMIN — NYSTATIN 1 APPLICATION(S): 100000 CREAM TOPICAL at 17:53

## 2025-05-26 RX ADMIN — Medication 1 APPLICATION(S): at 06:17

## 2025-05-26 RX ADMIN — ROSUVASTATIN CALCIUM 10 MILLIGRAM(S): 20 TABLET, FILM COATED ORAL at 21:16

## 2025-05-26 RX ADMIN — Medication 1 APPLICATION(S): at 11:43

## 2025-05-26 RX ADMIN — METOPROLOL SUCCINATE 12.5 MILLIGRAM(S): 50 TABLET, EXTENDED RELEASE ORAL at 06:16

## 2025-05-26 RX ADMIN — MEDPURA VITAMIN A AND D 1 APPLICATION(S): 95 OINTMENT TOPICAL at 11:41

## 2025-05-26 RX ADMIN — METOPROLOL SUCCINATE 12.5 MILLIGRAM(S): 50 TABLET, EXTENDED RELEASE ORAL at 17:53

## 2025-05-26 RX ADMIN — FUROSEMIDE 40 MILLIGRAM(S): 10 INJECTION INTRAMUSCULAR; INTRAVENOUS at 06:16

## 2025-05-26 RX ADMIN — Medication 2 TABLET(S): at 21:16

## 2025-05-26 RX ADMIN — MIDODRINE HYDROCHLORIDE 10 MILLIGRAM(S): 5 TABLET ORAL at 06:15

## 2025-05-26 RX ADMIN — FOLIC ACID 1 MILLIGRAM(S): 1 TABLET ORAL at 11:40

## 2025-05-26 RX ADMIN — Medication 325 MILLIGRAM(S): at 11:40

## 2025-05-26 RX ADMIN — NYSTATIN 1 APPLICATION(S): 100000 CREAM TOPICAL at 06:16

## 2025-05-26 RX ADMIN — APIXABAN 5 MILLIGRAM(S): 2.5 TABLET, FILM COATED ORAL at 17:53

## 2025-05-27 LAB
ALBUMIN SERPL ELPH-MCNC: 2.6 G/DL — LOW (ref 3.5–5.2)
ALP SERPL-CCNC: 76 U/L — SIGNIFICANT CHANGE UP (ref 30–115)
ALT FLD-CCNC: 9 U/L — SIGNIFICANT CHANGE UP (ref 0–41)
ANION GAP SERPL CALC-SCNC: 7 MMOL/L — SIGNIFICANT CHANGE UP (ref 7–14)
AST SERPL-CCNC: 10 U/L — SIGNIFICANT CHANGE UP (ref 0–41)
BASOPHILS # BLD AUTO: 0.06 K/UL — SIGNIFICANT CHANGE UP (ref 0–0.2)
BASOPHILS NFR BLD AUTO: 0.6 % — SIGNIFICANT CHANGE UP (ref 0–1)
BILIRUB SERPL-MCNC: 0.3 MG/DL — SIGNIFICANT CHANGE UP (ref 0.2–1.2)
BUN SERPL-MCNC: 40 MG/DL — HIGH (ref 10–20)
CALCIUM SERPL-MCNC: 10.3 MG/DL — SIGNIFICANT CHANGE UP (ref 8.4–10.5)
CHLORIDE SERPL-SCNC: 97 MMOL/L — LOW (ref 98–110)
CO2 SERPL-SCNC: 40 MMOL/L — HIGH (ref 17–32)
CREAT SERPL-MCNC: 1.3 MG/DL — SIGNIFICANT CHANGE UP (ref 0.7–1.5)
EGFR: 40 ML/MIN/1.73M2 — LOW
EGFR: 40 ML/MIN/1.73M2 — LOW
EOSINOPHIL # BLD AUTO: 0.19 K/UL — SIGNIFICANT CHANGE UP (ref 0–0.7)
EOSINOPHIL NFR BLD AUTO: 1.9 % — SIGNIFICANT CHANGE UP (ref 0–8)
GLUCOSE SERPL-MCNC: 104 MG/DL — HIGH (ref 70–99)
HCT VFR BLD CALC: 31.1 % — LOW (ref 37–47)
HGB BLD-MCNC: 8.6 G/DL — LOW (ref 12–16)
IMM GRANULOCYTES NFR BLD AUTO: 0.4 % — HIGH (ref 0.1–0.3)
LYMPHOCYTES # BLD AUTO: 0.91 K/UL — LOW (ref 1.2–3.4)
LYMPHOCYTES # BLD AUTO: 9.2 % — LOW (ref 20.5–51.1)
MAGNESIUM SERPL-MCNC: 1.6 MG/DL — LOW (ref 1.8–2.4)
MCHC RBC-ENTMCNC: 25.1 PG — LOW (ref 27–31)
MCHC RBC-ENTMCNC: 27.7 G/DL — LOW (ref 32–37)
MCV RBC AUTO: 90.9 FL — SIGNIFICANT CHANGE UP (ref 81–99)
MONOCYTES # BLD AUTO: 0.94 K/UL — HIGH (ref 0.1–0.6)
MONOCYTES NFR BLD AUTO: 9.5 % — HIGH (ref 1.7–9.3)
NEUTROPHILS # BLD AUTO: 7.8 K/UL — HIGH (ref 1.4–6.5)
NEUTROPHILS NFR BLD AUTO: 78.4 % — HIGH (ref 42.2–75.2)
NRBC BLD AUTO-RTO: 0 /100 WBCS — SIGNIFICANT CHANGE UP (ref 0–0)
PLATELET # BLD AUTO: 225 K/UL — SIGNIFICANT CHANGE UP (ref 130–400)
PMV BLD: 10.1 FL — SIGNIFICANT CHANGE UP (ref 7.4–10.4)
POTASSIUM SERPL-MCNC: 3.9 MMOL/L — SIGNIFICANT CHANGE UP (ref 3.5–5)
POTASSIUM SERPL-SCNC: 3.9 MMOL/L — SIGNIFICANT CHANGE UP (ref 3.5–5)
PROT SERPL-MCNC: 6 G/DL — SIGNIFICANT CHANGE UP (ref 6–8)
RBC # BLD: 3.42 M/UL — LOW (ref 4.2–5.4)
RBC # FLD: 16.7 % — HIGH (ref 11.5–14.5)
SODIUM SERPL-SCNC: 144 MMOL/L — SIGNIFICANT CHANGE UP (ref 135–146)
WBC # BLD: 9.94 K/UL — SIGNIFICANT CHANGE UP (ref 4.8–10.8)
WBC # FLD AUTO: 9.94 K/UL — SIGNIFICANT CHANGE UP (ref 4.8–10.8)

## 2025-05-27 PROCEDURE — 99291 CRITICAL CARE FIRST HOUR: CPT

## 2025-05-27 PROCEDURE — 99233 SBSQ HOSP IP/OBS HIGH 50: CPT

## 2025-05-27 RX ORDER — MAGNESIUM SULFATE 500 MG/ML
2 SYRINGE (ML) INJECTION ONCE
Refills: 0 | Status: COMPLETED | OUTPATIENT
Start: 2025-05-27 | End: 2025-05-27

## 2025-05-27 RX ADMIN — DAPAGLIFLOZIN 10 MILLIGRAM(S): 5 TABLET, FILM COATED ORAL at 11:34

## 2025-05-27 RX ADMIN — FUROSEMIDE 40 MILLIGRAM(S): 10 INJECTION INTRAMUSCULAR; INTRAVENOUS at 05:58

## 2025-05-27 RX ADMIN — MIDODRINE HYDROCHLORIDE 10 MILLIGRAM(S): 5 TABLET ORAL at 05:58

## 2025-05-27 RX ADMIN — FOLIC ACID 1 MILLIGRAM(S): 1 TABLET ORAL at 11:33

## 2025-05-27 RX ADMIN — ROSUVASTATIN CALCIUM 10 MILLIGRAM(S): 20 TABLET, FILM COATED ORAL at 22:26

## 2025-05-27 RX ADMIN — METOPROLOL SUCCINATE 12.5 MILLIGRAM(S): 50 TABLET, EXTENDED RELEASE ORAL at 17:19

## 2025-05-27 RX ADMIN — MIDODRINE HYDROCHLORIDE 10 MILLIGRAM(S): 5 TABLET ORAL at 22:26

## 2025-05-27 RX ADMIN — Medication 325 MILLIGRAM(S): at 11:34

## 2025-05-27 RX ADMIN — Medication 1 MILLIGRAM(S): at 22:26

## 2025-05-27 RX ADMIN — Medication 40 MILLIGRAM(S): at 05:57

## 2025-05-27 RX ADMIN — APIXABAN 5 MILLIGRAM(S): 2.5 TABLET, FILM COATED ORAL at 05:58

## 2025-05-27 RX ADMIN — MEDPURA VITAMIN A AND D 1 APPLICATION(S): 95 OINTMENT TOPICAL at 11:35

## 2025-05-27 RX ADMIN — Medication 1 APPLICATION(S): at 06:01

## 2025-05-27 RX ADMIN — NYSTATIN 1 APPLICATION(S): 100000 CREAM TOPICAL at 18:48

## 2025-05-27 RX ADMIN — METOPROLOL SUCCINATE 12.5 MILLIGRAM(S): 50 TABLET, EXTENDED RELEASE ORAL at 05:57

## 2025-05-27 RX ADMIN — NYSTATIN 1 APPLICATION(S): 100000 CREAM TOPICAL at 05:57

## 2025-05-27 RX ADMIN — MIDODRINE HYDROCHLORIDE 10 MILLIGRAM(S): 5 TABLET ORAL at 17:19

## 2025-05-27 RX ADMIN — Medication 1 APPLICATION(S): at 11:35

## 2025-05-27 RX ADMIN — Medication 25 GRAM(S): at 09:50

## 2025-05-27 RX ADMIN — Medication 2 TABLET(S): at 22:27

## 2025-05-27 RX ADMIN — APIXABAN 5 MILLIGRAM(S): 2.5 TABLET, FILM COATED ORAL at 17:19

## 2025-05-28 PROCEDURE — 99233 SBSQ HOSP IP/OBS HIGH 50: CPT

## 2025-05-28 RX ORDER — MAGNESIUM SULFATE 500 MG/ML
2 SYRINGE (ML) INJECTION ONCE
Refills: 0 | Status: COMPLETED | OUTPATIENT
Start: 2025-05-28 | End: 2025-05-28

## 2025-05-28 RX ADMIN — DAPAGLIFLOZIN 10 MILLIGRAM(S): 5 TABLET, FILM COATED ORAL at 13:32

## 2025-05-28 RX ADMIN — FOLIC ACID 1 MILLIGRAM(S): 1 TABLET ORAL at 13:32

## 2025-05-28 RX ADMIN — Medication 40 MILLIGRAM(S): at 06:16

## 2025-05-28 RX ADMIN — APIXABAN 5 MILLIGRAM(S): 2.5 TABLET, FILM COATED ORAL at 06:16

## 2025-05-28 RX ADMIN — FUROSEMIDE 40 MILLIGRAM(S): 10 INJECTION INTRAMUSCULAR; INTRAVENOUS at 06:16

## 2025-05-28 RX ADMIN — MIDODRINE HYDROCHLORIDE 10 MILLIGRAM(S): 5 TABLET ORAL at 21:58

## 2025-05-28 RX ADMIN — Medication 1 MILLIGRAM(S): at 21:58

## 2025-05-28 RX ADMIN — METOPROLOL SUCCINATE 12.5 MILLIGRAM(S): 50 TABLET, EXTENDED RELEASE ORAL at 06:16

## 2025-05-28 RX ADMIN — METOPROLOL SUCCINATE 12.5 MILLIGRAM(S): 50 TABLET, EXTENDED RELEASE ORAL at 18:35

## 2025-05-28 RX ADMIN — Medication 25 GRAM(S): at 11:43

## 2025-05-28 RX ADMIN — MIDODRINE HYDROCHLORIDE 10 MILLIGRAM(S): 5 TABLET ORAL at 13:33

## 2025-05-28 RX ADMIN — Medication 1 APPLICATION(S): at 06:17

## 2025-05-28 RX ADMIN — ROSUVASTATIN CALCIUM 10 MILLIGRAM(S): 20 TABLET, FILM COATED ORAL at 21:59

## 2025-05-28 RX ADMIN — NYSTATIN 1 APPLICATION(S): 100000 CREAM TOPICAL at 06:22

## 2025-05-28 RX ADMIN — MEDPURA VITAMIN A AND D 1 APPLICATION(S): 95 OINTMENT TOPICAL at 13:32

## 2025-05-28 RX ADMIN — NYSTATIN 1 APPLICATION(S): 100000 CREAM TOPICAL at 18:36

## 2025-05-28 RX ADMIN — Medication 2 TABLET(S): at 21:58

## 2025-05-28 RX ADMIN — MIDODRINE HYDROCHLORIDE 10 MILLIGRAM(S): 5 TABLET ORAL at 06:16

## 2025-05-28 RX ADMIN — Medication 325 MILLIGRAM(S): at 13:33

## 2025-05-28 RX ADMIN — Medication 1 APPLICATION(S): at 13:35

## 2025-05-28 RX ADMIN — APIXABAN 5 MILLIGRAM(S): 2.5 TABLET, FILM COATED ORAL at 18:35

## 2025-05-29 PROCEDURE — 71045 X-RAY EXAM CHEST 1 VIEW: CPT | Mod: 26

## 2025-05-29 RX ORDER — ACETAZOLAMIDE 250 MG/1
500 TABLET ORAL ONCE
Refills: 0 | Status: COMPLETED | OUTPATIENT
Start: 2025-05-29 | End: 2025-05-29

## 2025-05-29 RX ADMIN — ROSUVASTATIN CALCIUM 10 MILLIGRAM(S): 20 TABLET, FILM COATED ORAL at 21:18

## 2025-05-29 RX ADMIN — Medication 1 MILLIGRAM(S): at 21:18

## 2025-05-29 RX ADMIN — APIXABAN 5 MILLIGRAM(S): 2.5 TABLET, FILM COATED ORAL at 05:19

## 2025-05-29 RX ADMIN — Medication 325 MILLIGRAM(S): at 11:34

## 2025-05-29 RX ADMIN — MIDODRINE HYDROCHLORIDE 10 MILLIGRAM(S): 5 TABLET ORAL at 21:18

## 2025-05-29 RX ADMIN — FUROSEMIDE 40 MILLIGRAM(S): 10 INJECTION INTRAMUSCULAR; INTRAVENOUS at 05:20

## 2025-05-29 RX ADMIN — DAPAGLIFLOZIN 10 MILLIGRAM(S): 5 TABLET, FILM COATED ORAL at 11:34

## 2025-05-29 RX ADMIN — Medication 2 TABLET(S): at 21:17

## 2025-05-29 RX ADMIN — Medication 40 MILLIGRAM(S): at 05:19

## 2025-05-29 RX ADMIN — NYSTATIN 1 APPLICATION(S): 100000 CREAM TOPICAL at 17:25

## 2025-05-29 RX ADMIN — METOPROLOL SUCCINATE 12.5 MILLIGRAM(S): 50 TABLET, EXTENDED RELEASE ORAL at 17:25

## 2025-05-29 RX ADMIN — ACETAZOLAMIDE 110 MILLIGRAM(S): 250 TABLET ORAL at 17:22

## 2025-05-29 RX ADMIN — Medication 1 APPLICATION(S): at 14:57

## 2025-05-29 RX ADMIN — MIDODRINE HYDROCHLORIDE 10 MILLIGRAM(S): 5 TABLET ORAL at 05:20

## 2025-05-29 RX ADMIN — MIDODRINE HYDROCHLORIDE 10 MILLIGRAM(S): 5 TABLET ORAL at 13:32

## 2025-05-29 RX ADMIN — FOLIC ACID 1 MILLIGRAM(S): 1 TABLET ORAL at 11:34

## 2025-05-29 RX ADMIN — Medication 1 APPLICATION(S): at 05:25

## 2025-05-29 RX ADMIN — MEDPURA VITAMIN A AND D 1 APPLICATION(S): 95 OINTMENT TOPICAL at 11:34

## 2025-05-29 RX ADMIN — NYSTATIN 1 APPLICATION(S): 100000 CREAM TOPICAL at 06:57

## 2025-05-30 PROCEDURE — 99232 SBSQ HOSP IP/OBS MODERATE 35: CPT | Mod: GC

## 2025-05-30 RX ORDER — APIXABAN 2.5 MG/1
5 TABLET, FILM COATED ORAL EVERY 12 HOURS
Refills: 0 | Status: DISCONTINUED | OUTPATIENT
Start: 2025-05-30 | End: 2025-05-31

## 2025-05-30 RX ADMIN — MIDODRINE HYDROCHLORIDE 10 MILLIGRAM(S): 5 TABLET ORAL at 13:22

## 2025-05-30 RX ADMIN — Medication 325 MILLIGRAM(S): at 12:12

## 2025-05-30 RX ADMIN — Medication 1 APPLICATION(S): at 12:12

## 2025-05-30 RX ADMIN — MIDODRINE HYDROCHLORIDE 5 MILLIGRAM(S): 5 TABLET ORAL at 21:19

## 2025-05-30 RX ADMIN — FOLIC ACID 1 MILLIGRAM(S): 1 TABLET ORAL at 12:12

## 2025-05-30 RX ADMIN — Medication 40 MILLIGRAM(S): at 05:14

## 2025-05-30 RX ADMIN — Medication 1 MILLIGRAM(S): at 21:19

## 2025-05-30 RX ADMIN — NYSTATIN 1 APPLICATION(S): 100000 CREAM TOPICAL at 18:01

## 2025-05-30 RX ADMIN — FUROSEMIDE 40 MILLIGRAM(S): 10 INJECTION INTRAMUSCULAR; INTRAVENOUS at 05:14

## 2025-05-30 RX ADMIN — Medication 1 APPLICATION(S): at 05:14

## 2025-05-30 RX ADMIN — APIXABAN 5 MILLIGRAM(S): 2.5 TABLET, FILM COATED ORAL at 21:19

## 2025-05-30 RX ADMIN — DAPAGLIFLOZIN 10 MILLIGRAM(S): 5 TABLET, FILM COATED ORAL at 12:12

## 2025-05-30 RX ADMIN — METOPROLOL SUCCINATE 12.5 MILLIGRAM(S): 50 TABLET, EXTENDED RELEASE ORAL at 05:14

## 2025-05-30 RX ADMIN — ROSUVASTATIN CALCIUM 10 MILLIGRAM(S): 20 TABLET, FILM COATED ORAL at 21:20

## 2025-05-30 RX ADMIN — NYSTATIN 1 APPLICATION(S): 100000 CREAM TOPICAL at 05:14

## 2025-05-30 RX ADMIN — Medication 1 TABLET(S): at 21:20

## 2025-05-30 RX ADMIN — MEDPURA VITAMIN A AND D 1 APPLICATION(S): 95 OINTMENT TOPICAL at 12:12

## 2025-05-31 ENCOUNTER — TRANSCRIPTION ENCOUNTER (OUTPATIENT)
Age: 85
End: 2025-05-31

## 2025-05-31 VITALS — RESPIRATION RATE: 18 BRPM | OXYGEN SATURATION: 97 %

## 2025-05-31 RX ORDER — LEVOFLOXACIN 25 MG/ML
1 SOLUTION ORAL
Refills: 0 | DISCHARGE

## 2025-05-31 RX ADMIN — Medication 40 MILLIGRAM(S): at 06:17

## 2025-05-31 RX ADMIN — Medication 1 APPLICATION(S): at 12:26

## 2025-05-31 RX ADMIN — NYSTATIN 1 APPLICATION(S): 100000 CREAM TOPICAL at 06:18

## 2025-05-31 RX ADMIN — FUROSEMIDE 40 MILLIGRAM(S): 10 INJECTION INTRAMUSCULAR; INTRAVENOUS at 06:17

## 2025-05-31 RX ADMIN — Medication 325 MILLIGRAM(S): at 12:26

## 2025-05-31 RX ADMIN — Medication 1 APPLICATION(S): at 06:18

## 2025-05-31 RX ADMIN — FOLIC ACID 1 MILLIGRAM(S): 1 TABLET ORAL at 12:25

## 2025-05-31 RX ADMIN — APIXABAN 5 MILLIGRAM(S): 2.5 TABLET, FILM COATED ORAL at 06:17

## 2025-05-31 RX ADMIN — DAPAGLIFLOZIN 10 MILLIGRAM(S): 5 TABLET, FILM COATED ORAL at 12:26

## 2025-05-31 RX ADMIN — MEDPURA VITAMIN A AND D 1 APPLICATION(S): 95 OINTMENT TOPICAL at 12:25

## 2025-05-31 RX ADMIN — MIDODRINE HYDROCHLORIDE 10 MILLIGRAM(S): 5 TABLET ORAL at 06:17

## 2025-05-31 RX ADMIN — METOPROLOL SUCCINATE 12.5 MILLIGRAM(S): 50 TABLET, EXTENDED RELEASE ORAL at 06:17

## 2025-06-09 DIAGNOSIS — J90 PLEURAL EFFUSION, NOT ELSEWHERE CLASSIFIED: ICD-10-CM

## 2025-06-09 DIAGNOSIS — J96.21 ACUTE AND CHRONIC RESPIRATORY FAILURE WITH HYPOXIA: ICD-10-CM

## 2025-06-09 DIAGNOSIS — J18.9 PNEUMONIA, UNSPECIFIED ORGANISM: ICD-10-CM

## 2025-06-09 DIAGNOSIS — I48.91 UNSPECIFIED ATRIAL FIBRILLATION: ICD-10-CM

## 2025-06-09 DIAGNOSIS — E87.29 OTHER ACIDOSIS: ICD-10-CM

## 2025-06-09 DIAGNOSIS — G92.8 OTHER TOXIC ENCEPHALOPATHY: ICD-10-CM

## 2025-06-09 DIAGNOSIS — D50.9 IRON DEFICIENCY ANEMIA, UNSPECIFIED: ICD-10-CM

## 2025-06-09 DIAGNOSIS — E87.5 HYPERKALEMIA: ICD-10-CM

## 2025-06-09 DIAGNOSIS — Z79.01 LONG TERM (CURRENT) USE OF ANTICOAGULANTS: ICD-10-CM

## 2025-06-09 DIAGNOSIS — A41.9 SEPSIS, UNSPECIFIED ORGANISM: ICD-10-CM

## 2025-06-09 DIAGNOSIS — E66.2 MORBID (SEVERE) OBESITY WITH ALVEOLAR HYPOVENTILATION: ICD-10-CM

## 2025-06-09 DIAGNOSIS — F25.9 SCHIZOAFFECTIVE DISORDER, UNSPECIFIED: ICD-10-CM

## 2025-06-09 DIAGNOSIS — Z51.5 ENCOUNTER FOR PALLIATIVE CARE: ICD-10-CM

## 2025-06-09 DIAGNOSIS — I50.9 HEART FAILURE, UNSPECIFIED: ICD-10-CM

## 2025-06-09 DIAGNOSIS — E83.42 HYPOMAGNESEMIA: ICD-10-CM

## 2025-06-09 DIAGNOSIS — Z66 DO NOT RESUSCITATE: ICD-10-CM

## 2025-06-09 DIAGNOSIS — J96.22 ACUTE AND CHRONIC RESPIRATORY FAILURE WITH HYPERCAPNIA: ICD-10-CM

## 2025-06-09 DIAGNOSIS — I35.0 NONRHEUMATIC AORTIC (VALVE) STENOSIS: ICD-10-CM

## 2025-06-09 DIAGNOSIS — E78.5 HYPERLIPIDEMIA, UNSPECIFIED: ICD-10-CM

## 2025-06-09 DIAGNOSIS — C44.91 BASAL CELL CARCINOMA OF SKIN, UNSPECIFIED: ICD-10-CM

## 2025-06-09 DIAGNOSIS — E66.01 MORBID (SEVERE) OBESITY DUE TO EXCESS CALORIES: ICD-10-CM

## 2025-06-09 DIAGNOSIS — E11.22 TYPE 2 DIABETES MELLITUS WITH DIABETIC CHRONIC KIDNEY DISEASE: ICD-10-CM

## 2025-06-11 ENCOUNTER — TRANSCRIPTION ENCOUNTER (OUTPATIENT)
Age: 85
End: 2025-06-11

## 2025-06-18 ENCOUNTER — TRANSCRIPTION ENCOUNTER (OUTPATIENT)
Age: 85
End: 2025-06-18